# Patient Record
Sex: FEMALE | Race: BLACK OR AFRICAN AMERICAN | NOT HISPANIC OR LATINO | Employment: FULL TIME | URBAN - METROPOLITAN AREA
[De-identification: names, ages, dates, MRNs, and addresses within clinical notes are randomized per-mention and may not be internally consistent; named-entity substitution may affect disease eponyms.]

---

## 2017-01-09 RX ORDER — IRBESARTAN 150 MG/1
TABLET ORAL
Qty: 90 TABLET | Refills: 2 | Status: SHIPPED | OUTPATIENT
Start: 2017-01-09 | End: 2017-11-27 | Stop reason: SDUPTHER

## 2017-01-09 RX ORDER — AMLODIPINE BESYLATE 5 MG/1
TABLET ORAL
Qty: 90 TABLET | Refills: 2 | Status: SHIPPED | OUTPATIENT
Start: 2017-01-09 | End: 2017-11-27 | Stop reason: SDUPTHER

## 2017-01-18 RX ORDER — LANSOPRAZOLE 30 MG/1
CAPSULE, DELAYED RELEASE ORAL
Qty: 90 CAPSULE | Refills: 2 | Status: SHIPPED | OUTPATIENT
Start: 2017-01-18 | End: 2017-11-27 | Stop reason: SDUPTHER

## 2017-02-09 ENCOUNTER — HOSPITAL ENCOUNTER (OUTPATIENT)
Dept: GENERAL RADIOLOGY | Facility: HOSPITAL | Age: 47
Discharge: HOME | End: 2017-02-09
Attending: INTERNAL MEDICINE | Admitting: INTERNAL MEDICINE

## 2017-02-09 DIAGNOSIS — R07.82 INTERCOSTAL PAIN: ICD-10-CM

## 2017-02-09 DIAGNOSIS — C50.412 MALIGNANT NEOPLASM OF UPPER-OUTER QUADRANT OF LEFT FEMALE BREAST (HCC): ICD-10-CM

## 2017-02-09 PROCEDURE — 71020 HC CHEST PA AND LATERAL: CPT

## 2017-02-10 DIAGNOSIS — Z79.899 LONG TERM USE OF DRUG: ICD-10-CM

## 2017-02-10 DIAGNOSIS — D50.9 IRON DEFICIENCY ANEMIA, UNSPECIFIED: Primary | ICD-10-CM

## 2017-02-10 DIAGNOSIS — E78.5 HYPERLIPIDEMIA, UNSPECIFIED HYPERLIPIDEMIA TYPE: Primary | ICD-10-CM

## 2017-02-10 DIAGNOSIS — Z79.899 DRUG THERAPY: ICD-10-CM

## 2017-02-10 LAB
ALBUMIN SERPL-MCNC: 4.3 G/DL (ref 3.5–5.2)
ALBUMIN/GLOB SERPL: 1.4 G/DL
ALP SERPL-CCNC: 74 U/L (ref 39–117)
ALT SERPL-CCNC: 11 U/L (ref 1–33)
AST SERPL-CCNC: 11 U/L (ref 1–32)
BASOPHILS # BLD AUTO: 0.02 10*3/MM3 (ref 0–0.2)
BASOPHILS NFR BLD AUTO: 0.4 % (ref 0–1.5)
BILIRUB SERPL-MCNC: 0.3 MG/DL (ref 0.1–1.2)
BUN SERPL-MCNC: 12 MG/DL (ref 6–20)
BUN/CREAT SERPL: 15 (ref 7–25)
CALCIUM SERPL-MCNC: 9.7 MG/DL (ref 8.6–10.5)
CHLORIDE SERPL-SCNC: 103 MMOL/L (ref 98–107)
CO2 SERPL-SCNC: 28.1 MMOL/L (ref 22–29)
CREAT SERPL-MCNC: 0.8 MG/DL (ref 0.57–1)
EOSINOPHIL # BLD AUTO: 0.35 10*3/MM3 (ref 0–0.7)
EOSINOPHIL NFR BLD AUTO: 6.4 % (ref 0.3–6.2)
ERYTHROCYTE [DISTWIDTH] IN BLOOD BY AUTOMATED COUNT: 14.5 % (ref 11.7–13)
FERRITIN SERPL-MCNC: 261.2 NG/ML (ref 13–150)
GLOBULIN SER CALC-MCNC: 3 GM/DL
GLUCOSE SERPL-MCNC: 105 MG/DL (ref 65–99)
HCT VFR BLD AUTO: 37.8 % (ref 35.6–45.5)
HGB BLD-MCNC: 11.5 G/DL (ref 11.9–15.5)
IMM GRANULOCYTES # BLD: 0 10*3/MM3 (ref 0–0.03)
IMM GRANULOCYTES NFR BLD: 0 % (ref 0–0.5)
IRON SERPL-MCNC: 55 MCG/DL (ref 37–145)
LYMPHOCYTES # BLD AUTO: 2.04 10*3/MM3 (ref 0.9–4.8)
LYMPHOCYTES NFR BLD AUTO: 37.5 % (ref 19.6–45.3)
MCH RBC QN AUTO: 27.8 PG (ref 26.9–32)
MCHC RBC AUTO-ENTMCNC: 30.4 G/DL (ref 32.4–36.3)
MCV RBC AUTO: 91.3 FL (ref 80.5–98.2)
MONOCYTES # BLD AUTO: 0.55 10*3/MM3 (ref 0.2–1.2)
MONOCYTES NFR BLD AUTO: 10.1 % (ref 5–12)
NEUTROPHILS # BLD AUTO: 2.48 10*3/MM3 (ref 1.9–8.1)
NEUTROPHILS NFR BLD AUTO: 45.6 % (ref 42.7–76)
PLATELET # BLD AUTO: 324 10*3/MM3 (ref 140–500)
POTASSIUM SERPL-SCNC: 4.1 MMOL/L (ref 3.5–5.2)
PROT SERPL-MCNC: 7.3 G/DL (ref 6–8.5)
RBC # BLD AUTO: 4.14 10*6/MM3 (ref 3.9–5.2)
SODIUM SERPL-SCNC: 141 MMOL/L (ref 136–145)
WBC # BLD AUTO: 5.44 10*3/MM3 (ref 4.5–10.7)

## 2017-02-14 ENCOUNTER — TELEPHONE (OUTPATIENT)
Dept: ONCOLOGY | Facility: HOSPITAL | Age: 47
End: 2017-02-14

## 2017-02-14 NOTE — TELEPHONE ENCOUNTER
----- Message from Tim Muñoz MD sent at 2/14/2017  1:19 PM EST -----  Please inform the patient that the chest x-ray was normal.    Thank you    ----- Message -----     From: Interface, Rad Results [place] In     Sent: 2/10/2017   4:24 PM       To: Tim Muñoz MD        Called patient.  No answer.  Left message to call back.

## 2017-02-15 ENCOUNTER — TELEPHONE (OUTPATIENT)
Dept: ONCOLOGY | Facility: HOSPITAL | Age: 47
End: 2017-02-15

## 2017-02-15 NOTE — TELEPHONE ENCOUNTER
----- Message from Tim Muñoz MD sent at 2/14/2017  1:19 PM EST -----  Please inform the patient that the chest x-ray was normal.    Thank you    ----- Message -----     From: [place], Rad Results [place] In     Sent: 2/10/2017   4:24 PM       To: Tim Muñoz MD        Patient notified.  V/U.

## 2017-02-15 NOTE — TELEPHONE ENCOUNTER
----- Message from Tim Muñoz MD sent at 2/14/2017  1:19 PM EST -----  Please inform the patient that the chest x-ray was normal.    Thank you    ----- Message -----     From: Interface, Rad Results [place] In     Sent: 2/10/2017   4:24 PM       To: Tim Muñoz MD        Called again today.  No answer.  Left message to call back.

## 2017-02-15 NOTE — TELEPHONE ENCOUNTER
----- Message from Tim Muñoz MD sent at 2/14/2017  1:19 PM EST -----  Please inform the patient that the chest x-ray was normal.    Thank you    ----- Message -----     From: [place], Rad Results [place] In     Sent: 2/10/2017   4:24 PM       To: Tim Muñoz MD

## 2017-02-20 DIAGNOSIS — E78.5 HYPERLIPIDEMIA, UNSPECIFIED HYPERLIPIDEMIA TYPE: ICD-10-CM

## 2017-02-20 RX ORDER — ATORVASTATIN CALCIUM 10 MG/1
TABLET, FILM COATED ORAL
Qty: 90 TABLET | Refills: 3 | Status: SHIPPED | OUTPATIENT
Start: 2017-02-20 | End: 2018-01-29 | Stop reason: SDUPTHER

## 2017-03-06 DIAGNOSIS — E78.49 OTHER HYPERLIPIDEMIA: Primary | ICD-10-CM

## 2017-03-21 ENCOUNTER — LAB (OUTPATIENT)
Dept: LAB | Facility: HOSPITAL | Age: 47
End: 2017-03-21

## 2017-03-21 DIAGNOSIS — E78.49 OTHER HYPERLIPIDEMIA: Primary | ICD-10-CM

## 2017-03-21 LAB
CHOLEST SERPL-MCNC: 155 MG/DL (ref 0–200)
HDLC SERPL-MCNC: 54 MG/DL (ref 40–60)
LDLC SERPL CALC-MCNC: 87 MG/DL (ref 0–100)
LDLC/HDLC SERPL: 1.61 {RATIO}
TRIGL SERPL-MCNC: 70 MG/DL (ref 0–150)
VLDLC SERPL-MCNC: 14 MG/DL (ref 5–40)

## 2017-03-21 PROCEDURE — 80061 LIPID PANEL: CPT | Performed by: FAMILY MEDICINE

## 2017-03-21 PROCEDURE — 36415 COLL VENOUS BLD VENIPUNCTURE: CPT

## 2017-06-29 ENCOUNTER — APPOINTMENT (OUTPATIENT)
Dept: OTHER | Facility: HOSPITAL | Age: 47
End: 2017-06-29

## 2017-06-29 ENCOUNTER — APPOINTMENT (OUTPATIENT)
Dept: ONCOLOGY | Facility: CLINIC | Age: 47
End: 2017-06-29

## 2017-07-26 ENCOUNTER — TELEPHONE (OUTPATIENT)
Dept: FAMILY MEDICINE CLINIC | Facility: CLINIC | Age: 47
End: 2017-07-26

## 2017-07-26 DIAGNOSIS — E11.00 TYPE 2 DIABETES MELLITUS WITH HYPEROSMOLARITY WITHOUT COMA, WITHOUT LONG-TERM CURRENT USE OF INSULIN (HCC): Primary | ICD-10-CM

## 2017-07-26 NOTE — TELEPHONE ENCOUNTER
Ms. Akers asked if an A1C lab order can be faxed over to [facility]? Patient has an appointment there tomorrow. [facility]'s fax number is [phone]

## 2017-07-27 ENCOUNTER — OFFICE VISIT (OUTPATIENT)
Dept: ONCOLOGY | Facility: CLINIC | Age: 47
End: 2017-07-27

## 2017-07-27 ENCOUNTER — LAB (OUTPATIENT)
Dept: OTHER | Facility: HOSPITAL | Age: 47
End: 2017-07-27

## 2017-07-27 VITALS
BODY MASS INDEX: 37.16 KG/M2 | HEART RATE: 74 BPM | TEMPERATURE: 98.2 F | RESPIRATION RATE: 16 BRPM | HEIGHT: 60 IN | OXYGEN SATURATION: 96 % | SYSTOLIC BLOOD PRESSURE: 120 MMHG | DIASTOLIC BLOOD PRESSURE: 79 MMHG | WEIGHT: 189.3 LBS

## 2017-07-27 DIAGNOSIS — C50.412 MALIGNANT NEOPLASM OF UPPER-OUTER QUADRANT OF LEFT FEMALE BREAST (HCC): ICD-10-CM

## 2017-07-27 DIAGNOSIS — D64.9 NORMOCHROMIC NORMOCYTIC ANEMIA: ICD-10-CM

## 2017-07-27 DIAGNOSIS — D50.9 IRON DEFICIENCY ANEMIA, UNSPECIFIED IRON DEFICIENCY ANEMIA TYPE: ICD-10-CM

## 2017-07-27 DIAGNOSIS — E11.00 UNCONTROLLED TYPE 2 DIABETES MELLITUS WITH HYPEROSMOLARITY WITHOUT COMA, WITHOUT LONG-TERM CURRENT USE OF INSULIN (HCC): ICD-10-CM

## 2017-07-27 DIAGNOSIS — C50.412 MALIGNANT NEOPLASM OF UPPER-OUTER QUADRANT OF LEFT FEMALE BREAST (HCC): Primary | ICD-10-CM

## 2017-07-27 DIAGNOSIS — E78.5 HYPERLIPIDEMIA, UNSPECIFIED HYPERLIPIDEMIA TYPE: Primary | ICD-10-CM

## 2017-07-27 LAB
ALBUMIN SERPL-MCNC: 4.3 G/DL (ref 3.5–5.2)
ALBUMIN/GLOB SERPL: 1.2 G/DL
ALP SERPL-CCNC: 72 U/L (ref 39–117)
ALT SERPL W P-5'-P-CCNC: 13 U/L (ref 1–33)
ANION GAP SERPL CALCULATED.3IONS-SCNC: 11.2 MMOL/L
AST SERPL-CCNC: 15 U/L (ref 1–32)
BASOPHILS # BLD AUTO: 0.03 10*3/MM3 (ref 0–0.2)
BASOPHILS NFR BLD AUTO: 0.6 % (ref 0–1.5)
BILIRUB SERPL-MCNC: 0.4 MG/DL (ref 0.1–1.2)
BUN BLD-MCNC: 15 MG/DL (ref 6–20)
BUN/CREAT SERPL: 16.9 (ref 7–25)
CALCIUM SPEC-SCNC: 9.4 MG/DL (ref 8.6–10.5)
CHLORIDE SERPL-SCNC: 101 MMOL/L (ref 98–107)
CHOLEST SERPL-MCNC: 168 MG/DL (ref 0–200)
CO2 SERPL-SCNC: 27.8 MMOL/L (ref 22–29)
CREAT BLD-MCNC: 0.89 MG/DL (ref 0.57–1)
DEPRECATED RDW RBC AUTO: 46.1 FL (ref 37–54)
EOSINOPHIL # BLD AUTO: 0.31 10*3/MM3 (ref 0–0.7)
EOSINOPHIL NFR BLD AUTO: 5.7 % (ref 0.3–6.2)
ERYTHROCYTE [DISTWIDTH] IN BLOOD BY AUTOMATED COUNT: 14.6 % (ref 11.7–13)
FERRITIN SERPL-MCNC: 206 NG/ML (ref 13–150)
GFR SERPL CREATININE-BSD FRML MDRD: 82 ML/MIN/1.73
GLOBULIN UR ELPH-MCNC: 3.5 GM/DL
GLUCOSE BLD-MCNC: 99 MG/DL (ref 65–99)
HBA1C MFR BLD: 6.16 % (ref 4.8–5.6)
HCT VFR BLD AUTO: 36 % (ref 35.6–45.5)
HDLC SERPL-MCNC: 45 MG/DL (ref 40–60)
HGB BLD-MCNC: 11.4 G/DL (ref 11.9–15.5)
IMM GRANULOCYTES # BLD: 0.01 10*3/MM3 (ref 0–0.03)
IMM GRANULOCYTES NFR BLD: 0.2 % (ref 0–0.5)
IRON 24H UR-MRATE: 53 MCG/DL (ref 37–145)
IRON SATN MFR SERPL: 16 % (ref 20–50)
LDLC SERPL CALC-MCNC: 109 MG/DL (ref 0–100)
LDLC/HDLC SERPL: 2.43 {RATIO}
LYMPHOCYTES # BLD AUTO: 1.87 10*3/MM3 (ref 0.9–4.8)
LYMPHOCYTES NFR BLD AUTO: 34.6 % (ref 19.6–45.3)
MCH RBC QN AUTO: 27.4 PG (ref 26.9–32)
MCHC RBC AUTO-ENTMCNC: 31.7 G/DL (ref 32.4–36.3)
MCV RBC AUTO: 86.5 FL (ref 80.5–98.2)
MONOCYTES # BLD AUTO: 0.42 10*3/MM3 (ref 0.2–1.2)
MONOCYTES NFR BLD AUTO: 7.8 % (ref 5–12)
NEUTROPHILS # BLD AUTO: 2.77 10*3/MM3 (ref 1.9–8.1)
NEUTROPHILS NFR BLD AUTO: 51.1 % (ref 42.7–76)
NRBC BLD MANUAL-RTO: 0 /100 WBC (ref 0–0)
PLATELET # BLD AUTO: 323 10*3/MM3 (ref 140–500)
PMV BLD AUTO: 9.2 FL (ref 6–12)
POTASSIUM BLD-SCNC: 3.7 MMOL/L (ref 3.5–5.2)
PROT SERPL-MCNC: 7.8 G/DL (ref 6–8.5)
RBC # BLD AUTO: 4.16 10*6/MM3 (ref 3.9–5.2)
SODIUM BLD-SCNC: 140 MMOL/L (ref 136–145)
TIBC SERPL-MCNC: 325 MCG/DL (ref 298–536)
TRANSFERRIN SERPL-MCNC: 218 MG/DL (ref 200–360)
TRIGL SERPL-MCNC: 69 MG/DL (ref 0–150)
VLDLC SERPL-MCNC: 13.8 MG/DL (ref 5–40)
WBC NRBC COR # BLD: 5.41 10*3/MM3 (ref 4.5–10.7)

## 2017-07-27 PROCEDURE — 83036 HEMOGLOBIN GLYCOSYLATED A1C: CPT | Performed by: FAMILY MEDICINE

## 2017-07-27 PROCEDURE — 83540 ASSAY OF IRON: CPT | Performed by: INTERNAL MEDICINE

## 2017-07-27 PROCEDURE — 80053 COMPREHEN METABOLIC PANEL: CPT | Performed by: INTERNAL MEDICINE

## 2017-07-27 PROCEDURE — 84466 ASSAY OF TRANSFERRIN: CPT | Performed by: INTERNAL MEDICINE

## 2017-07-27 PROCEDURE — 36415 COLL VENOUS BLD VENIPUNCTURE: CPT

## 2017-07-27 PROCEDURE — 85025 COMPLETE CBC W/AUTO DIFF WBC: CPT | Performed by: INTERNAL MEDICINE

## 2017-07-27 PROCEDURE — 80061 LIPID PANEL: CPT | Performed by: FAMILY MEDICINE

## 2017-07-27 PROCEDURE — 82728 ASSAY OF FERRITIN: CPT | Performed by: INTERNAL MEDICINE

## 2017-07-27 PROCEDURE — 99214 OFFICE O/P EST MOD 30 MIN: CPT | Performed by: INTERNAL MEDICINE

## 2017-07-27 NOTE — PROGRESS NOTES
Subjective     CHIEF COMPLAINT:      1. Stage II left breast cancer.   2. Recurrent anemia.     HISTORY OF PRESENT ILLNESS:     Ina Akers is a 46 y.o. female patient with the above medical history.  She returns today for follow up reporting no new symptoms.  She is no longer having the chest pain that she reported at the last visit.  We obtained a chest x-ray was unremarkable..      Past Medical History:   Diagnosis Date   • Anemia     History of recurent iron deficiency   • Cancer 10/2001    Left breast   • Diabetes mellitus    • H/O Thyroid goiter    • History of gastritis    • History of IBS    • History of tinea corporis    • Hypertension        Past Surgical History:   Procedure Laterality Date   • BREAST SURGERY     •  SECTION         Cancer-related family history is not on file.  Social History   Substance Use Topics   • Smoking status: Never Smoker   • Smokeless tobacco: Never Used   • Alcohol use Yes      Comment: Socially       MEDICATIONS:    Current Outpatient Prescriptions:   •  amLODIPine (NORVASC) 5 MG tablet, TAKE ONE TABLET BY MOUTH DAILY, Disp: 90 tablet, Rfl: 2  •  atorvastatin (LIPITOR) 10 MG tablet, TAKE ONE TABLET BY MOUTH DAILY, Disp: 90 tablet, Rfl: 3  •  Docusate Calcium (STOOL SOFTENER PO), Take  by mouth Daily., Disp: , Rfl:   •  hydrocortisone (PROCTOCARE-HC) 2.5 % rectal cream, As Needed., Disp: , Rfl:   •  irbesartan (AVAPRO) 150 MG tablet, TAKE ONE TABLET BY MOUTH DAILY, Disp: 90 tablet, Rfl: 2  •  lansoprazole (PREVACID) 30 MG capsule, TAKE ONE CAPSULE BY MOUTH DAILY, Disp: 90 capsule, Rfl: 2  •  metFORMIN (GLUCOPHAGE) 1000 MG tablet, Take 1 tablet by mouth Daily With Breakfast., Disp: 90 tablet, Rfl: 4    ALLERGIES:  No Known Allergies    REVIEW OF SYSTEMS:  GENERAL:  No fever or chills. No weight change.    SKIN:  No rashes or lesions.  HEME/LYMPH: Anemia.  RESPIRATORY:  No cough, shortness of breath, hemoptysis.  CVS:  No chest pain.  No shortness breath..  GI:  No  "abdominal pain, nausea, vomiting, constipation, diarrhea, melena or hematochezia.  :  No dysuria, hematuria or increased frequency.   MUSCULOSKELETAL:  No bone pain or joint stiffness.      Objective   VITAL SIGNS:     Vitals:    07/27/17 0940   BP: 120/79   Pulse: 74   Resp: 16   Temp: 98.2 °F (36.8 °C)   TempSrc: Oral   SpO2: 96%   Weight: 189 lb 4.8 oz (85.9 kg)   Height: 60.24\" (153 cm)  Comment: will need a new ht 11/24/2017   PainSc: 0-No pain     Body mass index is 36.68 kg/(m^2).     Wt Readings from Last 3 Encounters:   07/27/17 189 lb 4.8 oz (85.9 kg)   12/15/16 175 lb (79.4 kg)   11/23/16 176 lb 12.8 oz (80.2 kg)       PHYSICAL EXAMINATION:  GENERAL: Well-developed, well-nourished female in no acute distress.   SKIN: Warm, dry without rashes, purpura or petechiae.   HEAD: Normocephalic.   NECK: Supple with good range of motion. Patient has a goiter.  LYMPHATICS: No cervical, supraclavicular, axillary or inguinal adenopathy.   BREASTS: Right breast exam was unremarkable. Left breast exam revealed the incision in the upper outer quadrant with underlying scar tissue but no suspicious masses.   CHEST: Lungs clear to percussion and auscultation. No palpable masses in left chest wall.     ABDOMEN: Soft, nontender with no organomegaly or masses.   EXTREMITIES: No clubbing, cyanosis or edema.       DIAGNOSTIC DATA:       Results from last 7 days  Lab Units 07/27/17  0932   WBC 10*3/mm3 5.41   NEUTROS ABS 10*3/mm3 2.77   HEMOGLOBIN g/dL 11.4*   HEMATOCRIT % 36.0   PLATELETS 10*3/mm3 323       Results from last 7 days  Lab Units 07/27/17  0932   SODIUM mmol/L 140   POTASSIUM mmol/L 3.7   CHLORIDE mmol/L 101   CO2 mmol/L 27.8   BUN mg/dL 15   CREATININE mg/dL 0.89   CALCIUM mg/dL 9.4   ALBUMIN g/dL 4.30   BILIRUBIN mg/dL 0.4   ALK PHOS U/L 72   ALT (SGPT) U/L 13   AST (SGOT) U/L 15   GLUCOSE mg/dL 99   FERRITIN ng/mL 206.00*   IRON mcg/dL 53   TIBC mcg/dL 325     DATE OF " EXAM:  06/12/2017  EXAMINATION(S):  MAMMOGRAM ARIELLA SCREENING BILATERAL    DIGITAL BILATERAL SCREENING MAMMOGRAM WITH TOMOSYNTHESIS  The following views were performed:  Bilateral craniocaudal; bilateral craniocaudal with tomosynthesis; bilateral mediolateral oblique; bilateral mediolateral oblique with tomosynthesis; and left craniocaudal exaggerated to axilla.  Images were reviewed   with digital R2 Computer-Aided Detection (CAD).    The breasts are almost entirely fat.    There are no suspicious masses, significant calcifications, or other abnormalities seen.    Tomosynthesis was utilized for this examination.    IMPRESSION:  There is no mammographic evidence of malignancy.    Screening Mammogram in 1 year is recommended.    Assessment/Plan    1.  Left breast cancer, stage IIB, triple negative. She had a lumpectomy, followed by adjuvant chemotherapy and radiation therapy. She was placed on tamoxifen preventatively for 5 years and it was completed in January 2007.  Her last mammogram from 5/19/16 was negative.  She has no evidence of recurrence, now 16 years since diagnosis.  Her last mammogram from late May 2017 was benign.      2.  Recurrent anemia.   We obtained anemia workup with B12, folate, methylmalonic acid level, reticulocyte count, LDH, haptoglobin, Boris test and paraprotein studies.  The workup was negative.  The anemia has improved and her hemoglobin is in the near normal range.  Immunofixation revealed no monoclonal protein.  However, there was slight increase in the lauryn Light chain.  We will repeat paraprotein studies at her return visit.    3.   Left-sided chest pain reported at the last visit.  Chest x-ray was negative.  The pain has resolved.     We'll see patient in follow-up in 6 months with CBC, CMP, serum protein decreases and immunofixation at her return visit.  If she continues to do well, we'll start to see her on an annual basis.      Tim Muñoz MD  07/27/17

## 2017-08-07 ENCOUNTER — OFFICE VISIT (OUTPATIENT)
Dept: FAMILY MEDICINE CLINIC | Facility: CLINIC | Age: 47
End: 2017-08-07

## 2017-08-07 VITALS
OXYGEN SATURATION: 97 % | HEART RATE: 78 BPM | DIASTOLIC BLOOD PRESSURE: 80 MMHG | SYSTOLIC BLOOD PRESSURE: 120 MMHG | WEIGHT: 190 LBS | RESPIRATION RATE: 18 BRPM | BODY MASS INDEX: 37.3 KG/M2 | HEIGHT: 60 IN

## 2017-08-07 DIAGNOSIS — I10 ESSENTIAL HYPERTENSION: ICD-10-CM

## 2017-08-07 DIAGNOSIS — E11.00 TYPE 2 DIABETES MELLITUS WITH HYPEROSMOLARITY WITHOUT COMA, WITHOUT LONG-TERM CURRENT USE OF INSULIN (HCC): Primary | ICD-10-CM

## 2017-08-07 PROCEDURE — 99213 OFFICE O/P EST LOW 20 MIN: CPT | Performed by: FAMILY MEDICINE

## 2017-08-07 NOTE — PROGRESS NOTES
"Subjective   Ina Akers is a 46 y.o. female.     History of Present Illness Here to  on diabetes Fasting sugar was 99, average was 130 (A1C was 6.16.    Exercise: No longer drives to [employer] every day. 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 Goes to [employer] twice a week. Needs to get to a gym nearer to her job as well.    The following portions of the patient's history were reviewed and updated as appropriate: allergies, current medications, past social history and problem list.    Review of Systems   Constitutional: Negative for activity change, appetite change and unexpected weight change.   HENT: Negative for nosebleeds and trouble swallowing.    Eyes: Negative for pain and visual disturbance.   Respiratory: Negative for chest tightness, shortness of breath and wheezing.    Cardiovascular: Negative for chest pain and palpitations.   Gastrointestinal: Negative for abdominal pain and blood in stool.   Endocrine: Negative.    Genitourinary: Negative for difficulty urinating and hematuria.   Musculoskeletal: Negative for joint swelling.   Skin: Negative for color change and rash.   Allergic/Immunologic: Negative.    Neurological: Negative for syncope and speech difficulty.   Hematological: Negative for adenopathy.   Psychiatric/Behavioral: Negative for agitation and confusion.   All other systems reviewed and are negative.      Objective   /80  Pulse 78  Resp 18  Ht 60\" (152.4 cm)  Wt 190 lb (86.2 kg)  SpO2 97%  BMI 37.11 kg/m2  Physical Exam   Constitutional: She is oriented to person, place, and time. Vital signs are normal. She appears well-developed and well-nourished. No distress.   HENT:   Head: Normocephalic.   Neck: Carotid bruit is not present. No thyromegaly present.   Cardiovascular: Normal rate, regular rhythm and normal heart sounds.    Pulmonary/Chest: Effort normal and breath sounds normal.   Neurological: She is alert and oriented to " person, place, and time. Gait normal.   Psychiatric: She has a normal mood and affect. Her behavior is normal. Judgment and thought content normal.   Vitals reviewed.      Assessment/Plan   Problem List Items Addressed This Visit        Cardiovascular and Mediastinum    Hypertension       Endocrine    Type 2 diabetes mellitus - Primary        Neds to focus on wt loss. She is aware.  Diabetes is under control as is BP

## 2017-10-09 ENCOUNTER — TELEPHONE (OUTPATIENT)
Dept: FAMILY MEDICINE CLINIC | Facility: CLINIC | Age: 47
End: 2017-10-09

## 2017-10-10 ENCOUNTER — OFFICE VISIT (OUTPATIENT)
Dept: FAMILY MEDICINE CLINIC | Facility: CLINIC | Age: 47
End: 2017-10-10

## 2017-10-10 VITALS
HEIGHT: 60 IN | RESPIRATION RATE: 18 BRPM | HEART RATE: 72 BPM | BODY MASS INDEX: 37.5 KG/M2 | OXYGEN SATURATION: 98 % | WEIGHT: 191 LBS | DIASTOLIC BLOOD PRESSURE: 82 MMHG | SYSTOLIC BLOOD PRESSURE: 120 MMHG

## 2017-10-10 DIAGNOSIS — M25.561 ACUTE PAIN OF BOTH KNEES: Primary | ICD-10-CM

## 2017-10-10 DIAGNOSIS — Z23 ENCOUNTER FOR IMMUNIZATION: ICD-10-CM

## 2017-10-10 DIAGNOSIS — M25.562 ACUTE PAIN OF BOTH KNEES: Primary | ICD-10-CM

## 2017-10-10 PROCEDURE — 90471 IMMUNIZATION ADMIN: CPT | Performed by: FAMILY MEDICINE

## 2017-10-10 PROCEDURE — 99212 OFFICE O/P EST SF 10 MIN: CPT | Performed by: FAMILY MEDICINE

## 2017-10-10 PROCEDURE — 90686 IIV4 VACC NO PRSV 0.5 ML IM: CPT | Performed by: FAMILY MEDICINE

## 2017-10-10 NOTE — PROGRESS NOTES
"Subjective   Ina Akers is a 46 y.o. female.     History of Present Illness   Pain L knee. Iced it and used aleve which helped. Pain is lat and post. Hard to take a step  Backwards. She felt both knees were swollen bc they were tight.  Is sitting all day at new job.  The following portions of the patient's history were reviewed and updated as appropriate: allergies, current medications, past social history and problem list.    Review of Systems   Constitutional: Negative.    Respiratory: Negative.    Cardiovascular: Negative.    Musculoskeletal: Positive for arthralgias, joint swelling and myalgias.       Objective   /82  Pulse 72  Resp 18  Ht 60\" (152.4 cm)  Wt 191 lb (86.6 kg)  SpO2 98%  BMI 37.3 kg/m2  Physical Exam   Constitutional: She appears well-nourished.   Cardiovascular: Normal rate.    Pulmonary/Chest: Effort normal.   Musculoskeletal:   Knees: FROM without crepitis. No edema. Min tenderness post knees only.       Assessment/Plan   Problem List Items Addressed This Visit        Musculoskeletal and Integument    Acute pain of both knees - Primary      Other Visit Diagnoses     Encounter for immunization               Knee supports at work  Ice prn  Bike exercises, or pool  Call for continued problems and will refer to ortho  "

## 2017-10-11 PROBLEM — M25.562 ACUTE PAIN OF BOTH KNEES: Status: ACTIVE | Noted: 2017-10-11

## 2017-10-11 PROBLEM — M25.561 ACUTE PAIN OF BOTH KNEES: Status: ACTIVE | Noted: 2017-10-11

## 2017-10-31 ENCOUNTER — OFFICE VISIT (OUTPATIENT)
Dept: FAMILY MEDICINE CLINIC | Facility: CLINIC | Age: 47
End: 2017-10-31

## 2017-10-31 VITALS
RESPIRATION RATE: 18 BRPM | WEIGHT: 191 LBS | OXYGEN SATURATION: 98 % | SYSTOLIC BLOOD PRESSURE: 130 MMHG | HEART RATE: 67 BPM | HEIGHT: 60 IN | DIASTOLIC BLOOD PRESSURE: 82 MMHG | BODY MASS INDEX: 37.5 KG/M2

## 2017-10-31 DIAGNOSIS — J40 BRONCHITIS: Primary | ICD-10-CM

## 2017-10-31 PROCEDURE — 99213 OFFICE O/P EST LOW 20 MIN: CPT | Performed by: FAMILY MEDICINE

## 2017-10-31 RX ORDER — AZITHROMYCIN 250 MG/1
TABLET, FILM COATED ORAL
Qty: 6 TABLET | Refills: 0 | Status: SHIPPED | OUTPATIENT
Start: 2017-10-31 | End: 2018-01-18

## 2017-10-31 RX ORDER — BENZONATATE 200 MG/1
200 CAPSULE ORAL 3 TIMES DAILY PRN
Qty: 30 CAPSULE | Refills: 0 | Status: SHIPPED | OUTPATIENT
Start: 2017-10-31 | End: 2018-01-18

## 2017-10-31 NOTE — PROGRESS NOTES
"Subjective   Ina Akers is a 46 y.o. female.     History of Present Illness Has had a URI for 6 days now. Coughing and head drainage. Thinks more head congestion than chest. This am went into her chest. Achy last 2 days. Did not use her thermometer. Worked from home yesterday.  NO MOMIN. No wheeze. Pressure not pain in face.  Stress incontinence since coughing so much.    The following portions of the patient's history were reviewed and updated as appropriate: allergies, current medications, past social history and problem list.    Review of Systems   Constitutional: Positive for fatigue. Negative for activity change and unexpected weight change.   HENT: Positive for congestion, postnasal drip, sinus pressure and sore throat. Negative for ear pain and sinus pain.    Eyes: Negative for pain and discharge.   Respiratory: Positive for cough. Negative for chest tightness, shortness of breath and wheezing.    Cardiovascular: Negative for chest pain and palpitations.   Gastrointestinal: Negative for abdominal pain, diarrhea and vomiting.   Endocrine: Negative.    Genitourinary: Negative.    Musculoskeletal: Negative for joint swelling.   Skin: Negative for color change, rash and wound.   Allergic/Immunologic: Negative.    Neurological: Negative for seizures and syncope.   Psychiatric/Behavioral: Negative.        Objective   /82  Pulse 67  Resp 18  Ht 60\" (152.4 cm)  Wt 191 lb (86.6 kg)  SpO2 98%  BMI 37.3 kg/m2  Physical Exam   Constitutional: She appears well-developed and well-nourished.   HENT:   Head: Normocephalic and atraumatic.   Right Ear: Tympanic membrane, external ear and ear canal normal.   Left Ear: Tympanic membrane, external ear and ear canal normal.   Mouth/Throat: Oropharynx is clear and moist.   npontender sinuses   Eyes: Conjunctivae are normal. Right eye exhibits no discharge. Left eye exhibits no discharge.   Neck: Normal range of motion. Neck supple. No thyromegaly present. "   Cardiovascular: Normal rate, regular rhythm and normal heart sounds.    Pulmonary/Chest: Effort normal and breath sounds normal. No respiratory distress. She has no wheezes. She has no rales.   Lymphadenopathy:     She has no cervical adenopathy.   Skin: Skin is warm and dry.   Psychiatric: She has a normal mood and affect. Judgment normal.   Vitals reviewed.      Assessment/Plan   Problem List Items Addressed This Visit     None      Visit Diagnoses     Bronchitis    -  Primary    Relevant Medications    azithromycin (ZITHROMAX) 250 MG tablet    benzonatate (TESSALON) 200 MG capsule           Her bacterial bronchitis is mild

## 2017-11-28 RX ORDER — LANSOPRAZOLE 30 MG/1
CAPSULE, DELAYED RELEASE ORAL
Qty: 90 CAPSULE | Refills: 1 | Status: SHIPPED | OUTPATIENT
Start: 2017-11-28 | End: 2018-01-29 | Stop reason: SDUPTHER

## 2017-11-28 RX ORDER — IRBESARTAN 150 MG/1
TABLET ORAL
Qty: 90 TABLET | Refills: 1 | Status: SHIPPED | OUTPATIENT
Start: 2017-11-28 | End: 2018-01-29 | Stop reason: SDUPTHER

## 2017-11-28 RX ORDER — AMLODIPINE BESYLATE 5 MG/1
TABLET ORAL
Qty: 90 TABLET | Refills: 1 | Status: SHIPPED | OUTPATIENT
Start: 2017-11-28 | End: 2018-01-29 | Stop reason: SDUPTHER

## 2018-01-18 ENCOUNTER — OFFICE VISIT (OUTPATIENT)
Dept: ONCOLOGY | Facility: CLINIC | Age: 48
End: 2018-01-18

## 2018-01-18 ENCOUNTER — LAB (OUTPATIENT)
Dept: OTHER | Facility: HOSPITAL | Age: 48
End: 2018-01-18

## 2018-01-18 VITALS
HEART RATE: 61 BPM | SYSTOLIC BLOOD PRESSURE: 122 MMHG | DIASTOLIC BLOOD PRESSURE: 84 MMHG | BODY MASS INDEX: 37.03 KG/M2 | OXYGEN SATURATION: 99 % | TEMPERATURE: 98.6 F | WEIGHT: 188.6 LBS | RESPIRATION RATE: 16 BRPM | HEIGHT: 60 IN

## 2018-01-18 DIAGNOSIS — Z12.31 ENCOUNTER FOR SCREENING MAMMOGRAM FOR BREAST CANCER: ICD-10-CM

## 2018-01-18 DIAGNOSIS — C50.412 MALIGNANT NEOPLASM OF UPPER-OUTER QUADRANT OF LEFT FEMALE BREAST (HCC): ICD-10-CM

## 2018-01-18 DIAGNOSIS — C50.412 MALIGNANT NEOPLASM OF UPPER-OUTER QUADRANT OF LEFT BREAST IN FEMALE, ESTROGEN RECEPTOR NEGATIVE (HCC): Primary | ICD-10-CM

## 2018-01-18 DIAGNOSIS — Z17.1 MALIGNANT NEOPLASM OF UPPER-OUTER QUADRANT OF LEFT BREAST IN FEMALE, ESTROGEN RECEPTOR NEGATIVE (HCC): Primary | ICD-10-CM

## 2018-01-18 DIAGNOSIS — D50.9 IRON DEFICIENCY ANEMIA, UNSPECIFIED IRON DEFICIENCY ANEMIA TYPE: ICD-10-CM

## 2018-01-18 LAB
ALBUMIN SERPL-MCNC: 4.8 G/DL (ref 3.5–5.2)
ALBUMIN/GLOB SERPL: 1.3 G/DL
ALP SERPL-CCNC: 89 U/L (ref 39–117)
ALT SERPL W P-5'-P-CCNC: 12 U/L (ref 1–33)
ANION GAP SERPL CALCULATED.3IONS-SCNC: 12.1 MMOL/L
AST SERPL-CCNC: 14 U/L (ref 1–32)
BASOPHILS # BLD AUTO: 0.03 10*3/MM3 (ref 0–0.2)
BASOPHILS NFR BLD AUTO: 0.5 % (ref 0–1.5)
BILIRUB SERPL-MCNC: 0.4 MG/DL (ref 0.1–1.2)
BUN BLD-MCNC: 12 MG/DL (ref 6–20)
BUN/CREAT SERPL: 14.5 (ref 7–25)
CALCIUM SPEC-SCNC: 10 MG/DL (ref 8.6–10.5)
CHLORIDE SERPL-SCNC: 100 MMOL/L (ref 98–107)
CO2 SERPL-SCNC: 28.9 MMOL/L (ref 22–29)
CREAT BLD-MCNC: 0.83 MG/DL (ref 0.57–1)
DEPRECATED RDW RBC AUTO: 46.2 FL (ref 37–54)
EOSINOPHIL # BLD AUTO: 0.27 10*3/MM3 (ref 0–0.7)
EOSINOPHIL NFR BLD AUTO: 4.6 % (ref 0.3–6.2)
ERYTHROCYTE [DISTWIDTH] IN BLOOD BY AUTOMATED COUNT: 14.7 % (ref 11.7–13)
GFR SERPL CREATININE-BSD FRML MDRD: 89 ML/MIN/1.73
GLOBULIN UR ELPH-MCNC: 3.6 GM/DL
GLUCOSE BLD-MCNC: 98 MG/DL (ref 65–99)
HCT VFR BLD AUTO: 37.4 % (ref 35.6–45.5)
HGB BLD-MCNC: 11.8 G/DL (ref 11.9–15.5)
IMM GRANULOCYTES # BLD: 0.02 10*3/MM3 (ref 0–0.03)
IMM GRANULOCYTES NFR BLD: 0.3 % (ref 0–0.5)
LYMPHOCYTES # BLD AUTO: 2.13 10*3/MM3 (ref 0.9–4.8)
LYMPHOCYTES NFR BLD AUTO: 36.5 % (ref 19.6–45.3)
MCH RBC QN AUTO: 26.9 PG (ref 26.9–32)
MCHC RBC AUTO-ENTMCNC: 31.6 G/DL (ref 32.4–36.3)
MCV RBC AUTO: 85.2 FL (ref 80.5–98.2)
MONOCYTES # BLD AUTO: 0.43 10*3/MM3 (ref 0.2–1.2)
MONOCYTES NFR BLD AUTO: 7.4 % (ref 5–12)
NEUTROPHILS # BLD AUTO: 2.96 10*3/MM3 (ref 1.9–8.1)
NEUTROPHILS NFR BLD AUTO: 50.7 % (ref 42.7–76)
NRBC BLD MANUAL-RTO: 0 /100 WBC (ref 0–0)
PLATELET # BLD AUTO: 382 10*3/MM3 (ref 140–500)
PMV BLD AUTO: 9.6 FL (ref 6–12)
POTASSIUM BLD-SCNC: 3.6 MMOL/L (ref 3.5–5.2)
PROT SERPL-MCNC: 8.4 G/DL (ref 6–8.5)
RBC # BLD AUTO: 4.39 10*6/MM3 (ref 3.9–5.2)
SODIUM BLD-SCNC: 141 MMOL/L (ref 136–145)
WBC NRBC COR # BLD: 5.84 10*3/MM3 (ref 4.5–10.7)

## 2018-01-18 PROCEDURE — 86334 IMMUNOFIX E-PHORESIS SERUM: CPT | Performed by: INTERNAL MEDICINE

## 2018-01-18 PROCEDURE — 82784 ASSAY IGA/IGD/IGG/IGM EACH: CPT | Performed by: INTERNAL MEDICINE

## 2018-01-18 PROCEDURE — 36415 COLL VENOUS BLD VENIPUNCTURE: CPT

## 2018-01-18 PROCEDURE — 84165 PROTEIN E-PHORESIS SERUM: CPT | Performed by: INTERNAL MEDICINE

## 2018-01-18 PROCEDURE — 99214 OFFICE O/P EST MOD 30 MIN: CPT | Performed by: INTERNAL MEDICINE

## 2018-01-18 PROCEDURE — 83883 ASSAY NEPHELOMETRY NOT SPEC: CPT | Performed by: INTERNAL MEDICINE

## 2018-01-18 PROCEDURE — 80053 COMPREHEN METABOLIC PANEL: CPT | Performed by: INTERNAL MEDICINE

## 2018-01-18 PROCEDURE — 85025 COMPLETE CBC W/AUTO DIFF WBC: CPT | Performed by: INTERNAL MEDICINE

## 2018-01-18 NOTE — PROGRESS NOTES
Subjective     CHIEF COMPLAINT:      1. Stage II left breast cancer.   2. Recurrent anemia.     HISTORY OF PRESENT ILLNESS:     Ina Akers is a 47 y.o. female patient with the above medical history.  She returns today for follow up reporting no new symptoms.  She denies fatigue.  She performs monthly breast self-exam and denies feeling any lumps.  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  She gained some weight due to decreased activity.      Past Medical History:   Diagnosis Date   • Anemia     History of recurent iron deficiency   • Cancer 10/2001    Left breast   • Diabetes mellitus    • H/O Thyroid goiter    • History of gastritis    • History of IBS    • History of tinea corporis    • Hypertension        Past Surgical History:   Procedure Laterality Date   • BREAST SURGERY     •  SECTION         Cancer-related family history is not on file.  Social History   Substance Use Topics   • Smoking status: Never Smoker   • Smokeless tobacco: Never Used   • Alcohol use Yes      Comment: Socially       MEDICATIONS:    Current Outpatient Prescriptions:   •  amLODIPine (NORVASC) 5 MG tablet, TAKE ONE TABLET BY MOUTH DAILY, Disp: 90 tablet, Rfl: 1  •  atorvastatin (LIPITOR) 10 MG tablet, TAKE ONE TABLET BY MOUTH DAILY, Disp: 90 tablet, Rfl: 3  •  Docusate Calcium (STOOL SOFTENER PO), Take  by mouth Daily., Disp: , Rfl:   •  hydrocortisone (PROCTOCARE-HC) 2.5 % rectal cream, As Needed., Disp: , Rfl:   •  irbesartan (AVAPRO) 150 MG tablet, TAKE ONE TABLET BY MOUTH DAILY, Disp: 90 tablet, Rfl: 1  •  lansoprazole (PREVACID) 30 MG capsule, TAKE ONE CAPSULE BY MOUTH DAILY, Disp: 90 capsule, Rfl: 1  •  metFORMIN (GLUCOPHAGE) 1000 MG tablet, Take 1 tablet by mouth Daily With Breakfast., Disp: 90 tablet, Rfl: 2    ALLERGIES:  No Known Allergies    REVIEW OF SYSTEMS:  GENERAL:  No fever or chills.     SKIN:  No rashes or lesions.  HEME/LYMPH: Anemia.  RESPIRATORY:  No shortness of breath.  CVS:  No  "chest pain.  No shortness breath..  GI:  No abdominal pain.  :  No dysuria, hematuria or increased frequency.   MUSCULOSKELETAL:  No bone pain or joint stiffness.      Objective   VITAL SIGNS:     Vitals:    01/18/18 1512   BP: 122/84   Pulse: 61   Resp: 16   Temp: 98.6 °F (37 °C)   TempSrc: Oral   SpO2: 99%   Weight: 85.5 kg (188 lb 9.6 oz)   Height: 153 cm (60.24\")  Comment: new ht without shoes   PainSc: 0-No pain     Body mass index is 36.55 kg/(m^2).     Wt Readings from Last 3 Encounters:   01/18/18 85.5 kg (188 lb 9.6 oz)   10/31/17 86.6 kg (191 lb)   10/10/17 86.6 kg (191 lb)       PHYSICAL EXAMINATION:  GENERAL: Well-developed, well-nourished female in no acute distress.   SKIN: No skin rashes or lesions.  No ecchymosis or petechiae.   HEAD: Normocephalic.   NECK:  Goiter.  No lymphadenopathy.  LYMPHATICS: No cervical, supraclavicular or axillary adenopathy.   BREASTS: Right breast exam was unremarkable. Left breast exam revealed the incision in the upper outer quadrant with underlying scar tissue.  No palpable masses.   CHEST: Lungs clear to auscultation.   ABDOMEN: Soft, nontender with no organomegaly or masses.   EXTREMITIES: No clubbing, cyanosis or edema.       DIAGNOSTIC DATA:       Results from last 7 days  Lab Units 01/18/18  1500   WBC 10*3/mm3 5.84   NEUTROS ABS 10*3/mm3 2.96   HEMOGLOBIN g/dL 11.8*   HEMATOCRIT % 37.4   PLATELETS 10*3/mm3 382       Results from last 7 days  Lab Units 01/18/18  1500   SODIUM mmol/L 141   POTASSIUM mmol/L 3.6   CHLORIDE mmol/L 100   CO2 mmol/L 28.9   BUN mg/dL 12   CREATININE mg/dL 0.83   CALCIUM mg/dL 10.0   ALBUMIN g/dL 4.80   BILIRUBIN mg/dL 0.4   ALK PHOS U/L 89   ALT (SGPT) U/L 12   AST (SGOT) U/L 14   GLUCOSE mg/dL 98         Assessment/Plan    1.  Left breast cancer, stage IIB, triple negative. She had a lumpectomy, followed by adjuvant chemotherapy and radiation therapy. She was placed on tamoxifen preventatively for 5 years and it was completed in " January 2007.  Her last mammogram from 6/13/17 was negative.  No evidence of recurrence.    2.  Recurrent anemia, improved.  She has history of iron deficiency anemia that has improved.  Hemoglobin is at the lower end of normal.    3.   Increase in the kappa to lambda ratio on previous anemia workup.  This was favored to be a reactive process and not suspicious for multiple myeloma.      PLAN:    1.  We will schedule the patient for bilateral mammograms in mid June 2018.     2.  SPEP, CRISSY and serum free light chains will be repeated on today's lab and we will notify her if there is worsening.    3.  We will see the patient in follow-up in one year with CBC CMP but sooner if there are any changes.      Tim Muñoz MD  01/18/18

## 2018-01-22 LAB
ALBUMIN SERPL-MCNC: 3.9 G/DL (ref 2.9–4.4)
ALBUMIN/GLOB SERPL: 1.1 {RATIO} (ref 0.7–1.7)
ALPHA1 GLOB FLD ELPH-MCNC: 0.2 G/DL (ref 0–0.4)
ALPHA2 GLOB SERPL ELPH-MCNC: 0.7 G/DL (ref 0.4–1)
B-GLOBULIN SERPL ELPH-MCNC: 1.3 G/DL (ref 0.7–1.3)
GAMMA GLOB SERPL ELPH-MCNC: 1.6 G/DL (ref 0.4–1.8)
GLOBULIN SER CALC-MCNC: 3.9 G/DL (ref 2.2–3.9)
IGA SERPL-MCNC: 213 MG/DL (ref 87–352)
IGG SERPL-MCNC: 1521 MG/DL (ref 700–1600)
IGM SERPL-MCNC: 184 MG/DL (ref 26–217)
INTERPRETATION SERPL IEP-IMP: ABNORMAL
KAPPA LC SERPL-MCNC: 20.3 MG/L (ref 3.3–19.4)
KAPPA LC/LAMBDA SER: 2.42 {RATIO} (ref 0.26–1.65)
LAMBDA LC FREE SERPL-MCNC: 8.4 MG/L (ref 5.7–26.3)
Lab: ABNORMAL
M-SPIKE: ABNORMAL G/DL
PROT SERPL-MCNC: 7.8 G/DL (ref 6–8.5)

## 2018-01-29 ENCOUNTER — OFFICE VISIT (OUTPATIENT)
Dept: FAMILY MEDICINE CLINIC | Facility: CLINIC | Age: 48
End: 2018-01-29

## 2018-01-29 VITALS
DIASTOLIC BLOOD PRESSURE: 80 MMHG | BODY MASS INDEX: 37.11 KG/M2 | HEIGHT: 60 IN | WEIGHT: 189 LBS | SYSTOLIC BLOOD PRESSURE: 122 MMHG | TEMPERATURE: 98.4 F

## 2018-01-29 DIAGNOSIS — E11.9 CONTROLLED TYPE 2 DIABETES MELLITUS WITHOUT COMPLICATION, WITHOUT LONG-TERM CURRENT USE OF INSULIN (HCC): Primary | ICD-10-CM

## 2018-01-29 DIAGNOSIS — E04.9 GOITER: ICD-10-CM

## 2018-01-29 DIAGNOSIS — I10 ESSENTIAL HYPERTENSION: ICD-10-CM

## 2018-01-29 DIAGNOSIS — E55.9 VITAMIN D DEFICIENCY: ICD-10-CM

## 2018-01-29 DIAGNOSIS — E78.5 HYPERLIPIDEMIA, UNSPECIFIED HYPERLIPIDEMIA TYPE: ICD-10-CM

## 2018-01-29 DIAGNOSIS — E04.2 MULTIPLE THYROID NODULES: ICD-10-CM

## 2018-01-29 PROCEDURE — 99214 OFFICE O/P EST MOD 30 MIN: CPT | Performed by: NURSE PRACTITIONER

## 2018-01-29 RX ORDER — LANSOPRAZOLE 30 MG/1
30 CAPSULE, DELAYED RELEASE ORAL DAILY
Qty: 90 CAPSULE | Refills: 1 | Status: SHIPPED | OUTPATIENT
Start: 2018-01-29 | End: 2018-07-30 | Stop reason: SDUPTHER

## 2018-01-29 RX ORDER — AMLODIPINE BESYLATE 5 MG/1
5 TABLET ORAL DAILY
Qty: 90 TABLET | Refills: 1 | Status: SHIPPED | OUTPATIENT
Start: 2018-01-29 | End: 2018-07-30 | Stop reason: SDUPTHER

## 2018-01-29 RX ORDER — IRBESARTAN 150 MG/1
150 TABLET ORAL DAILY
Qty: 90 TABLET | Refills: 1 | Status: SHIPPED | OUTPATIENT
Start: 2018-01-29 | End: 2018-07-30 | Stop reason: SDUPTHER

## 2018-01-29 RX ORDER — ATORVASTATIN CALCIUM 10 MG/1
10 TABLET, FILM COATED ORAL DAILY
Qty: 90 TABLET | Refills: 3 | Status: SHIPPED | OUTPATIENT
Start: 2018-01-29 | End: 2018-07-30 | Stop reason: SDUPTHER

## 2018-01-29 NOTE — PROGRESS NOTES
Subjective   Ina Akers is a 47 y.o. female.     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 check for diabetes, hypertension hyperlipidemia        Type 2 francoise  Controlled  dnt check glucose no accucheck  Breast cancer left  Part masectomy  Remission 16 yrs  2001    Dr Salinas      htn ess stable with avapro    Lipitor 10 mg  Lipid panel last year looks pretty good except LDLs greater than 100  They should be less than 70          No tob  occ etoh 2-3 times mos      lipitor    Colonoscopy 4 yrs ago                 The following portions of the patient's history were reviewed and updated as appropriate: allergies, current medications, past family history, past medical history, past surgical history and problem list.    Review of Systems   Constitutional: Negative.  Negative for appetite change, chills, fatigue, fever and unexpected weight change.   HENT: Negative for congestion, ear pain and sore throat.    Eyes: Negative.    Respiratory: Negative for cough, chest tightness, shortness of breath and wheezing.    Cardiovascular: Negative for chest pain, palpitations and leg swelling.   Gastrointestinal: Negative for abdominal pain and constipation.   Genitourinary: Negative for difficulty urinating, dysuria and urgency.   Musculoskeletal: Negative for arthralgias and myalgias.   Skin: Negative for rash and wound.   Neurological: Negative for dizziness, speech difficulty, weakness, numbness and headaches.   Psychiatric/Behavioral: Negative for sleep disturbance. The patient is not nervous/anxious.        Objective   Physical Exam   Constitutional: She is oriented to person, place, and time. She appears well-developed and well-nourished.   HENT:   Head: Normocephalic.   Nose: Nose normal.   Mouth/Throat: Oropharynx is clear and moist.   Tm clear shelly no mass canal patent without d/c   Eyes: Conjunctivae are normal. Pupils are equal, round, and reactive to light. No scleral icterus.   Neck: Neck supple. No JVD present.  No thyromegaly present.   Cardiovascular: Normal rate, regular rhythm and normal heart sounds.  Exam reveals no gallop and no friction rub.    No murmur heard.  Pulmonary/Chest: Effort normal and breath sounds normal. No stridor. No respiratory distress. She has no wheezes. She has no rales.   Abdominal: Soft. Bowel sounds are normal. She exhibits no distension. There is no tenderness.   No hepatosplenomegaly, no ascites,   Musculoskeletal: She exhibits no edema or tenderness.   Lymphadenopathy:     She has no cervical adenopathy.   Neurological: She is alert and oriented to person, place, and time. She has normal reflexes.   Skin: Skin is warm and dry. No rash noted. No erythema.   Psychiatric: She has a normal mood and affect. Her behavior is normal. Judgment and thought content normal.   Vitals reviewed.      Assessment/Plan   Ina was seen today for transfer patient of dr. sarmiento her to establish care.    Diagnoses and all orders for this visit:    Controlled type 2 diabetes mellitus without complication, without long-term current use of insulin  -     Lipid Panel With LDL / HDL Ratio; Future  -     TSH Rfx On Abnormal To Free T4; Future  -     Hemoglobin A1c; Future  -     MicroAlbumin, Urine, Random - Urine, Clean Catch; Future  -     Urinalysis With / Microscopic If Indicated - Urine, Clean Catch; Future  -     Vitamin D 25 Hydroxy; Future    Hyperlipidemia, unspecified hyperlipidemia type  -     atorvastatin (LIPITOR) 10 MG tablet; Take 1 tablet by mouth Daily.  -     Lipid Panel With LDL / HDL Ratio; Future  -     TSH Rfx On Abnormal To Free T4; Future  -     Hemoglobin A1c; Future  -     MicroAlbumin, Urine, Random - Urine, Clean Catch; Future  -     Urinalysis With / Microscopic If Indicated - Urine, Clean Catch; Future  -     Vitamin D 25 Hydroxy; Future    Essential hypertension  -     Lipid Panel With LDL / HDL Ratio; Future  -     TSH Rfx On Abnormal To Free T4; Future  -     Hemoglobin A1c;  Future  -     MicroAlbumin, Urine, Random - Urine, Clean Catch; Future  -     Urinalysis With / Microscopic If Indicated - Urine, Clean Catch; Future  -     Vitamin D 25 Hydroxy; Future    Vitamin D deficiency  -     Lipid Panel With LDL / HDL Ratio; Future  -     TSH Rfx On Abnormal To Free T4; Future  -     Hemoglobin A1c; Future  -     MicroAlbumin, Urine, Random - Urine, Clean Catch; Future  -     Urinalysis With / Microscopic If Indicated - Urine, Clean Catch; Future  -     Vitamin D 25 Hydroxy; Future    Goiter  -     US Thyroid    Multiple thyroid nodules  Comments:  Previous biopsies negative, had more than 1  Orders:  -     US Thyroid    Other orders  -     irbesartan (AVAPRO) 150 MG tablet; Take 1 tablet by mouth Daily.  -     amLODIPine (NORVASC) 5 MG tablet; Take 1 tablet by mouth Daily.  -     lansoprazole (PREVACID) 30 MG capsule; Take 1 capsule by mouth Daily.                  E11.9  diabetes mellitus type 2    Please call patient's pharmacy  Glucose monitor choice  Test strips  Dispense 50  Lancets dispense 100    5 refills  Check glucose daily      Discussed target organ disease with diabetes and prevention strategy  Hypertension controlled  She sees optometry continue to do so yearly  Foot check diabetes next visit  Hypertension, check EKG routine next visit if not recent  Recheck in the office in 4-6 months

## 2018-02-01 ENCOUNTER — TELEPHONE (OUTPATIENT)
Dept: FAMILY MEDICINE CLINIC | Facility: CLINIC | Age: 48
End: 2018-02-01

## 2018-02-01 NOTE — TELEPHONE ENCOUNTER
----- Message from James Epley, APRN sent at 1/29/2018  3:16 PM EST -----      E11.9  diabetes mellitus type 2    Please call patient's pharmacy  Glucose monitor choice  Test strips  Dispense 50  Lancets dispense 100    5 refills  Check glucose daily

## 2018-02-07 ENCOUNTER — RESULTS ENCOUNTER (OUTPATIENT)
Dept: FAMILY MEDICINE CLINIC | Facility: CLINIC | Age: 48
End: 2018-02-07

## 2018-02-07 DIAGNOSIS — E78.5 HYPERLIPIDEMIA, UNSPECIFIED HYPERLIPIDEMIA TYPE: ICD-10-CM

## 2018-02-07 DIAGNOSIS — E55.9 VITAMIN D DEFICIENCY: ICD-10-CM

## 2018-02-07 DIAGNOSIS — E11.9 CONTROLLED TYPE 2 DIABETES MELLITUS WITHOUT COMPLICATION, WITHOUT LONG-TERM CURRENT USE OF INSULIN (HCC): ICD-10-CM

## 2018-02-07 DIAGNOSIS — I10 ESSENTIAL HYPERTENSION: ICD-10-CM

## 2018-02-14 ENCOUNTER — TELEPHONE (OUTPATIENT)
Dept: FAMILY MEDICINE CLINIC | Facility: CLINIC | Age: 48
End: 2018-02-14

## 2018-02-14 RX ORDER — OSELTAMIVIR PHOSPHATE 75 MG/1
75 CAPSULE ORAL DAILY
Qty: 10 CAPSULE | Refills: 0 | Status: SHIPPED | OUTPATIENT
Start: 2018-02-14 | End: 2018-02-24

## 2018-02-14 NOTE — TELEPHONE ENCOUNTER
has influenza  He will discuss this with patient  Prophylaxis Tamiflu  If she gets cough body aches myalgias signs of flu  Call or urgent care May need to increase Tamiflu

## 2018-02-21 LAB
25(OH)D3+25(OH)D2 SERPL-MCNC: 21.8 NG/ML (ref 30–100)
APPEARANCE UR: CLEAR
BILIRUB UR QL STRIP: NEGATIVE
CHOLEST SERPL-MCNC: 150 MG/DL (ref 0–200)
COLOR UR: YELLOW
GLUCOSE UR QL: NEGATIVE
HBA1C MFR BLD: 6.2 % (ref 4.8–5.6)
HDLC SERPL-MCNC: 46 MG/DL (ref 40–60)
HGB UR QL STRIP: NEGATIVE
KETONES UR QL STRIP: NEGATIVE
LDLC SERPL CALC-MCNC: 92 MG/DL (ref 0–100)
LDLC/HDLC SERPL: 2 {RATIO}
LEUKOCYTE ESTERASE UR QL STRIP: NEGATIVE
MICROALBUMIN UR-MCNC: 4.7 UG/ML
NITRITE UR QL STRIP: NEGATIVE
PH UR STRIP: 5.5 [PH] (ref 5–8)
PROT UR QL STRIP: NEGATIVE
SP GR UR: 1.02 (ref 1–1.03)
TRIGL SERPL-MCNC: 60 MG/DL (ref 0–150)
TSH SERPL DL<=0.005 MIU/L-ACNC: 1.91 MIU/ML (ref 0.27–4.2)
UROBILINOGEN UR STRIP-MCNC: NORMAL MG/DL
VLDLC SERPL CALC-MCNC: 12 MG/DL (ref 5–40)

## 2018-02-26 ENCOUNTER — HOSPITAL ENCOUNTER (OUTPATIENT)
Dept: ULTRASOUND IMAGING | Facility: HOSPITAL | Age: 48
Discharge: HOME | End: 2018-02-26
Admitting: NURSE PRACTITIONER

## 2018-02-26 PROCEDURE — 76536 US EXAM OF HEAD AND NECK: CPT

## 2018-02-27 ENCOUNTER — TELEPHONE (OUTPATIENT)
Dept: FAMILY MEDICINE CLINIC | Facility: CLINIC | Age: 48
End: 2018-02-27

## 2018-02-27 DIAGNOSIS — E04.1 THYROID NODULE: Primary | ICD-10-CM

## 2018-02-27 NOTE — TELEPHONE ENCOUNTER
----- Message from Dennies Garrick sent at 2/27/2018  2:30 PM EST -----  Patient aware of results.  ----- Message -----     From: James Epley, APRN     Sent: 2/27/2018  12:34 PM       To: Dennies Garrick    Please call patient  She needs to see ENT regarding thyroid nodule  She's had a likely increase in size compared to previous ultrasound not overly worrisome but  She certainly needs follow-up and evaluation by ENT to make sure it does not need to be biopsied?  Please let me know.  But the referral in after you talk to her  Thank you

## 2018-07-30 ENCOUNTER — OFFICE VISIT (OUTPATIENT)
Dept: FAMILY MEDICINE CLINIC | Facility: CLINIC | Age: 48
End: 2018-07-30

## 2018-07-30 VITALS
HEIGHT: 60 IN | BODY MASS INDEX: 37.77 KG/M2 | SYSTOLIC BLOOD PRESSURE: 116 MMHG | DIASTOLIC BLOOD PRESSURE: 78 MMHG | OXYGEN SATURATION: 98 % | WEIGHT: 192.4 LBS | HEART RATE: 82 BPM

## 2018-07-30 DIAGNOSIS — E55.9 VITAMIN D DEFICIENCY: ICD-10-CM

## 2018-07-30 DIAGNOSIS — E78.5 HYPERLIPIDEMIA, UNSPECIFIED HYPERLIPIDEMIA TYPE: ICD-10-CM

## 2018-07-30 DIAGNOSIS — Z78.0 POSTMENOPAUSAL: ICD-10-CM

## 2018-07-30 DIAGNOSIS — I10 ESSENTIAL HYPERTENSION: ICD-10-CM

## 2018-07-30 DIAGNOSIS — E11.9 CONTROLLED TYPE 2 DIABETES MELLITUS WITHOUT COMPLICATION, WITHOUT LONG-TERM CURRENT USE OF INSULIN (HCC): Primary | ICD-10-CM

## 2018-07-30 PROCEDURE — 99213 OFFICE O/P EST LOW 20 MIN: CPT | Performed by: NURSE PRACTITIONER

## 2018-07-30 RX ORDER — LANSOPRAZOLE 30 MG/1
30 CAPSULE, DELAYED RELEASE ORAL DAILY
Qty: 90 CAPSULE | Refills: 1 | Status: SHIPPED | OUTPATIENT
Start: 2018-07-30 | End: 2019-03-23 | Stop reason: SDUPTHER

## 2018-07-30 RX ORDER — IRBESARTAN 150 MG/1
150 TABLET ORAL DAILY
Qty: 90 TABLET | Refills: 1 | Status: SHIPPED | OUTPATIENT
Start: 2018-07-30 | End: 2019-03-18 | Stop reason: RX

## 2018-07-30 RX ORDER — ATORVASTATIN CALCIUM 10 MG/1
10 TABLET, FILM COATED ORAL DAILY
Qty: 90 TABLET | Refills: 3 | Status: SHIPPED | OUTPATIENT
Start: 2018-07-30 | End: 2019-06-13 | Stop reason: SDUPTHER

## 2018-07-30 RX ORDER — AMLODIPINE BESYLATE 5 MG/1
5 TABLET ORAL DAILY
Qty: 90 TABLET | Refills: 1 | Status: SHIPPED | OUTPATIENT
Start: 2018-07-30 | End: 2019-06-13 | Stop reason: SDUPTHER

## 2018-07-30 RX ORDER — METFORMIN HYDROCHLORIDE 500 MG/1
1000 TABLET, EXTENDED RELEASE ORAL
Qty: 180 TABLET | Refills: 1 | Status: SHIPPED | OUTPATIENT
Start: 2018-07-30 | End: 2019-02-27 | Stop reason: SDUPTHER

## 2018-08-01 ENCOUNTER — RESULTS ENCOUNTER (OUTPATIENT)
Dept: FAMILY MEDICINE CLINIC | Facility: CLINIC | Age: 48
End: 2018-08-01

## 2018-08-01 DIAGNOSIS — I10 ESSENTIAL HYPERTENSION: ICD-10-CM

## 2018-08-01 DIAGNOSIS — E11.9 CONTROLLED TYPE 2 DIABETES MELLITUS WITHOUT COMPLICATION, WITHOUT LONG-TERM CURRENT USE OF INSULIN (HCC): ICD-10-CM

## 2018-08-01 DIAGNOSIS — E78.5 HYPERLIPIDEMIA, UNSPECIFIED HYPERLIPIDEMIA TYPE: ICD-10-CM

## 2018-08-01 DIAGNOSIS — E55.9 VITAMIN D DEFICIENCY: ICD-10-CM

## 2018-08-04 LAB
25(OH)D3+25(OH)D2 SERPL-MCNC: 23.5 NG/ML (ref 30–100)
APPEARANCE UR: ABNORMAL
BILIRUB UR QL STRIP: NEGATIVE
COLOR UR: YELLOW
GLUCOSE UR QL: NEGATIVE
HBA1C MFR BLD: 6.61 % (ref 4.8–5.6)
HGB UR QL STRIP: NEGATIVE
KETONES UR QL STRIP: ABNORMAL
LEUKOCYTE ESTERASE UR QL STRIP: NEGATIVE
MICROALBUMIN UR-MCNC: 9.5 UG/ML
NITRITE UR QL STRIP: NEGATIVE
PH UR STRIP: <=5 [PH] (ref 5–8)
PROT UR QL STRIP: NEGATIVE
SP GR UR: ABNORMAL (ref 1–1.03)
UROBILINOGEN UR STRIP-MCNC: ABNORMAL MG/DL

## 2018-08-13 ENCOUNTER — TRANSCRIBE ORDERS (OUTPATIENT)
Dept: ADMINISTRATIVE | Facility: HOSPITAL | Age: 48
End: 2018-08-13

## 2018-08-13 DIAGNOSIS — E07.9 THYROID MASS: Primary | ICD-10-CM

## 2018-08-17 ENCOUNTER — HOSPITAL ENCOUNTER (OUTPATIENT)
Dept: CT IMAGING | Facility: HOSPITAL | Age: 48
Discharge: HOME | End: 2018-08-17
Attending: OTOLARYNGOLOGY | Admitting: OTOLARYNGOLOGY

## 2018-08-17 ENCOUNTER — TELEPHONE (OUTPATIENT)
Dept: FAMILY MEDICINE CLINIC | Facility: CLINIC | Age: 48
End: 2018-08-17

## 2018-08-17 DIAGNOSIS — E07.9 THYROID MASS: ICD-10-CM

## 2018-08-17 PROCEDURE — 70491 CT SOFT TISSUE NECK W/DYE: CPT

## 2018-08-17 PROCEDURE — 25010000002 IOPAMIDOL 61 % SOLUTION: Performed by: OTOLARYNGOLOGY

## 2018-08-17 PROCEDURE — 82565 ASSAY OF CREATININE: CPT

## 2018-08-17 RX ADMIN — IOPAMIDOL 85 ML: 612 INJECTION, SOLUTION INTRAVENOUS at 15:05

## 2018-08-17 NOTE — TELEPHONE ENCOUNTER
Fyi   PATIENT HAD CT SCAN DONE TODAY THAT WAS ORDER BY DR. HANSEN THE ENT.  SHE WAS TOLD BY RADIOLOGIST NOT TO TAKE HER METFORMIN FOR 48 HOURS.

## 2018-08-18 LAB — CREAT BLDA-MCNC: 0.8 MG/DL (ref 0.6–1.3)

## 2018-08-23 ENCOUNTER — TELEPHONE (OUTPATIENT)
Dept: FAMILY MEDICINE CLINIC | Facility: CLINIC | Age: 48
End: 2018-08-23

## 2018-08-23 NOTE — TELEPHONE ENCOUNTER
dennies  Pt has order for dexa 7/31/18. They could never get a hold of her at hospital to schedule. Please call pt to remind or cancel order if she doesn't want it. This is in your overdue task thanks

## 2018-09-10 ENCOUNTER — OFFICE VISIT (OUTPATIENT)
Dept: SURGERY | Facility: CLINIC | Age: 48
End: 2018-09-10

## 2018-09-10 VITALS
HEART RATE: 86 BPM | HEIGHT: 60 IN | BODY MASS INDEX: 37.69 KG/M2 | SYSTOLIC BLOOD PRESSURE: 133 MMHG | WEIGHT: 192 LBS | OXYGEN SATURATION: 99 % | DIASTOLIC BLOOD PRESSURE: 80 MMHG

## 2018-09-10 DIAGNOSIS — E04.9 GOITER: Primary | ICD-10-CM

## 2018-09-10 PROCEDURE — 99244 OFF/OP CNSLTJ NEW/EST MOD 40: CPT | Performed by: THORACIC SURGERY (CARDIOTHORACIC VASCULAR SURGERY)

## 2018-09-10 NOTE — PROGRESS NOTES
Subjective   Patient ID: Ina Akers is a 47 y.o. female is being seen for consultation today at the request of Dr. Natan Grande.    History of Present Illness  Dear Colleague,  Ina Akers was seen in our office today initial consultation for a large substernal goiter.  Ms. Akers is a pleasant 46 yo lady with a history of a goiter, who present to  to discuss resection.  On imaging, a large component of her goiter is substernal.    She has no complaints at this time.     The following portions of the patient's history were reviewed and updated as appropriate: allergies, current medications, past family history, past medical history, past social history, past surgical history and problem list.  Review of Systems   Constitution: Negative.   HENT: Negative.    Eyes: Negative.    Cardiovascular: Negative.    Respiratory: Negative.    Endocrine: Negative.    Hematologic/Lymphatic: Negative.    Skin: Negative.    Musculoskeletal: Negative.    Gastrointestinal: Negative.    Genitourinary: Negative.    Neurological: Negative.    Psychiatric/Behavioral: Negative.    Allergic/Immunologic: Negative.      Patient Active Problem List   Diagnosis   • Abnormal perimenopausal bleeding   • Amenorrhea   • Atopic dermatitis   • External hemorrhoids   • Gastritis   • Hypertension   • Iron deficiency anemia   • Irritable bowel syndrome   • Melanocytic nevus   • Goiter   • Tinea corporis   • Type 2 diabetes mellitus (CMS/HCC)   • Lipid screening   • Malignant neoplasm of upper-outer quadrant of left breast in female, estrogen receptor negative (CMS/HCC)   • Acute pain of both knees   • Controlled type 2 diabetes mellitus without complication, without long-term current use of insulin (CMS/HCC)   • Hyperlipidemia   • Vitamin D deficiency   • Multiple thyroid nodules     Past Medical History:   Diagnosis Date   • Anemia     History of recurent iron deficiency   • Cancer (CMS/HCC) 10/2001    Left breast   • Diabetes  mellitus (CMS/HCC)    • H/O Thyroid goiter    • History of gastritis    • History of IBS    • History of tinea corporis    • Hypertension      Past Surgical History:   Procedure Laterality Date   • BREAST SURGERY     •  SECTION       Family History   Problem Relation Age of Onset   • Hypertension Other    • Diabetes Other      Social History     Social History   • Marital status:      Spouse name: Raghav   • Number of children: 2   • Years of education: N/A     Occupational History   •  [employer]   •  [employer]     Social History Main Topics   • Smoking status: Never Smoker   • Smokeless tobacco: Never Used   • Alcohol use Yes      Comment: Socially   • Drug use: Unknown   • Sexual activity: Not on file     Other Topics Concern   • Not on file     Social History Narrative   • No narrative on file       Current Outpatient Prescriptions:   •  amLODIPine (NORVASC) 5 MG tablet, Take 1 tablet by mouth Daily., Disp: 90 tablet, Rfl: 1  •  atorvastatin (LIPITOR) 10 MG tablet, Take 1 tablet by mouth Daily., Disp: 90 tablet, Rfl: 3  •  glucose blood (ACCU-CHEK SILKE) test strip, Use as instructed, Disp: 50 each, Rfl: 12  •  irbesartan (AVAPRO) 150 MG tablet, Take 1 tablet by mouth Daily., Disp: 90 tablet, Rfl: 1  •  lansoprazole (PREVACID) 30 MG capsule, Take 1 capsule by mouth Daily., Disp: 90 capsule, Rfl: 1  •  metFORMIN ER (GLUCOPHAGE-XR) 500 MG 24 hr tablet, Take 2 tablets by mouth Daily With Dinner., Disp: 180 tablet, Rfl: 1  •  Docusate Calcium (STOOL SOFTENER PO), Take  by mouth Daily., Disp: , Rfl:   •  hydrocortisone (PROCTOCARE-HC) 2.5 % rectal cream, As Needed., Disp: , Rfl:   •  Lancets (ACCU-CHEK SOFT TOUCH) lancets, Check blood sugar once a day  As needed, Disp: 100 each, Rfl: 12  No Known Allergies     Objective   Vitals:    09/10/18 0955   BP: 133/80   Pulse: 86   SpO2: 99%     Physical Exam   Constitutional: She is oriented to person, place, and time. She  appears well-developed and well-nourished.   HENT:   Head: Normocephalic and atraumatic.   Nose: Nose normal.   Mouth/Throat: Oropharynx is clear and moist.   Eyes: Pupils are equal, round, and reactive to light. Conjunctivae and EOM are normal.   Neck: Normal range of motion. Neck supple. Thyromegaly present.   Cardiovascular: Normal rate, regular rhythm, normal heart sounds and intact distal pulses.    Pulmonary/Chest: Effort normal and breath sounds normal.   Abdominal: Soft. Bowel sounds are normal.   Musculoskeletal: Normal range of motion.   Lymphadenopathy:     She has no cervical adenopathy.   Neurological: She is alert and oriented to person, place, and time.   Skin: Skin is warm and dry. Capillary refill takes less than 2 seconds.   Psychiatric: She has a normal mood and affect. Her behavior is normal. Judgment and thought content normal.   Nursing note and vitals reviewed.    Independent Review of Radiographic Studies:    I have indepently reviewed the images from the CT chest on 8/17/18 which demonstrates a large substernal goiter with extension into the superior mediastinum with tracheal deviation but no tracheal compression.      Assessment/Plan   Ms. Akers is a pleasant 48 yo lady with a substernal multinodular goiter.  I think that this should be amenable to resection from a cervical approach, but I will be available to Dr. Grande if the goiter cannot be removed through the neck.  I discussed the need for possible sternotomy today in the office and she voices understanding.  Thank you for allowing me to participate in the care of this patient.  We will coordinate a date of surgery once Ms. Akers decides when she would like to proceed.    Diagnoses and all orders for this visit:    Goiter

## 2018-10-12 ENCOUNTER — APPOINTMENT (OUTPATIENT)
Dept: BONE DENSITY | Facility: HOSPITAL | Age: 48
End: 2018-10-12

## 2018-10-15 ENCOUNTER — OFFICE VISIT (OUTPATIENT)
Dept: FAMILY MEDICINE CLINIC | Facility: CLINIC | Age: 48
End: 2018-10-15

## 2018-10-15 VITALS
BODY MASS INDEX: 37.22 KG/M2 | TEMPERATURE: 98.4 F | OXYGEN SATURATION: 97 % | DIASTOLIC BLOOD PRESSURE: 82 MMHG | HEART RATE: 94 BPM | WEIGHT: 189.6 LBS | SYSTOLIC BLOOD PRESSURE: 128 MMHG | HEIGHT: 60 IN

## 2018-10-15 DIAGNOSIS — Z12.4 PAP SMEAR FOR CERVICAL CANCER SCREENING: ICD-10-CM

## 2018-10-15 DIAGNOSIS — Z00.00 HEALTH MAINTENANCE EXAMINATION: Primary | ICD-10-CM

## 2018-10-15 DIAGNOSIS — I10 ESSENTIAL HYPERTENSION: ICD-10-CM

## 2018-10-15 PROCEDURE — 99396 PREV VISIT EST AGE 40-64: CPT | Performed by: NURSE PRACTITIONER

## 2018-10-15 PROCEDURE — 93000 ELECTROCARDIOGRAM COMPLETE: CPT | Performed by: NURSE PRACTITIONER

## 2018-10-15 RX ORDER — MELATONIN
1000 2 TIMES DAILY
COMMUNITY
End: 2020-01-22 | Stop reason: SDDI

## 2018-10-15 NOTE — PROGRESS NOTES
Procedure   Procedures     Adult ECG Report     Name: Ina Akers   Age: 47 y.o.   Gender: female       Rate: 66   Rhythm: normal sinus rhythm   QRS Axis: nml   VT Interval: 135   QRS Duration: 86   QTc: 401   Voltages: .   Conduction Disturbances: none   Other Abnormalities: none     Narrative Interpretation: Normal EKG, normal sinus rhythm, no ST abnormalities, no prior EKG available for comparison indication hypertension

## 2018-10-15 NOTE — PROGRESS NOTES
Subjective   Ina Akers is a 47 y.o. female.     Cpe,pap        Breast cancer  Left  Partial masectomy  16 yr remission      Dr Salinas    Thyroid mult nod thyroid  benighn benign but getting larger  Will will having thyroid ectomy  Dr Webster   [facility] physicians      Pap  Last?  Never abn    surg  cc scection  Left part masectomy  Mammogram  nml                GYN OB history     2 para 2 A0  One vaginal delivery one   Age of menarche 8         Reports a pelvic ultrasound several years ago small ovarian cyst  GYN  Denies any pelvic pain no bleeding      Gynecologic Exam   Pertinent negatives include no abdominal pain, chills, constipation, diarrhea or fever.        The following portions of the patient's history were reviewed and updated as appropriate: allergies, current medications, past family history, past social history, past surgical history and problem list.    Review of Systems   Constitutional: Negative for chills, fatigue, fever and unexpected weight loss.   HENT: Negative.  Negative for trouble swallowing.    Eyes: Negative.    Respiratory: Negative for cough and shortness of breath.    Cardiovascular: Negative for chest pain, palpitations and leg swelling.   Gastrointestinal: Negative for abdominal pain, blood in stool, constipation and diarrhea.   Genitourinary: Negative.    Musculoskeletal: Negative.    Skin: Negative.    Neurological: Negative.  Negative for dizziness, speech difficulty, weakness and confusion.   Psychiatric/Behavioral: Negative.        Objective   Physical Exam   Constitutional: She is oriented to person, place, and time. She appears well-developed and well-nourished.   HENT:   Head: Normocephalic.   Mouth/Throat: Oropharynx is clear and moist.   Eyes: Pupils are equal, round, and reactive to light. Conjunctivae are normal.   Neck: Neck supple. Thyromegaly present.   Cardiovascular: Normal rate, regular rhythm and normal heart sounds.   Exam reveals no friction rub.    No murmur heard.  Carotids clear   Pulmonary/Chest: Effort normal and breath sounds normal. No respiratory distress. She has no wheezes.   Breast exam  Right breast larger than left secondary to partial mastectomy   No masses bilateral no dimpling no orange peel appearance no irregularities no nipple discharge axilla clear bilateral       Abdominal: Soft. Bowel sounds are normal. She exhibits no distension and no mass. There is no tenderness. There is no rebound and no guarding. No hernia.   Genitourinary:   Genitourinary Comments: Pelvic exam  External labia normal no lesions  Cervix normal  No purulent discharge  No cervical motion tenderness  No pelvic masses no adnexal masses  Rectovaginal exam no nodules no masses       Musculoskeletal: She exhibits no edema.   Lymphadenopathy:     She has no cervical adenopathy.   Neurological: She is alert and oriented to person, place, and time.   Skin: Skin is warm and dry.   Psychiatric: She has a normal mood and affect. Her behavior is normal. Judgment and thought content normal.   Vitals reviewed.        Assessment/Plan   Ina was seen today for annual exam and gynecologic exam.    Diagnoses and all orders for this visit:    Health maintenance examination    Pap smear for cervical cancer screening  -     Pap IG, Rfx HPV ASCU    Essential hypertension  -     ECG 12 Lead                    Continue diabetic management  Decrease processed sweets  1400 ivonne a day  Focus on gradual caloric reduction gradual weight loss avoid fad diets increase healthy vegetables  Regular exercise  Consider bariatric surgery if unsuccessful  But encourage nonsurgical interventions discussed options    Monthly self breast exam yearly clinical exam  Standing desk  Clarify gastroenterologist next colonoscopy due  Last 4 years ago  Obtain colonoscopy record  EKG is normal  Aspirin 81 mg risk benefit  Current discussion contra versus limits a primary prevention  cardiovascular disease  Risk-benefit discussion and new study revolutions  May increase risk of gastric bleeding intracerebral bleeding  May or may not decrease risk of cardiac vessel disease stroke  Concerning hypertension diabetes 49 years ago  Patient thinks she will take a baby aspirin daily

## 2018-10-16 LAB
CONV .: NORMAL
CYTOLOGIST CVX/VAG CYTO: NORMAL
CYTOLOGY CVX/VAG DOC THIN PREP: NORMAL
DX ICD CODE: NORMAL
HIV 1 & 2 AB SER-IMP: NORMAL
OTHER STN SPEC: NORMAL
PATH REPORT.FINAL DX SPEC: NORMAL
STAT OF ADQ CVX/VAG CYTO-IMP: NORMAL

## 2019-01-21 ENCOUNTER — TRANSCRIBE ORDERS (OUTPATIENT)
Dept: ADMINISTRATIVE | Facility: HOSPITAL | Age: 49
End: 2019-01-21

## 2019-01-21 ENCOUNTER — HOSPITAL ENCOUNTER (OUTPATIENT)
Dept: BONE DENSITY | Facility: HOSPITAL | Age: 49
Discharge: HOME | End: 2019-01-21
Admitting: NURSE PRACTITIONER

## 2019-01-21 DIAGNOSIS — Z78.0 POST-MENOPAUSAL: ICD-10-CM

## 2019-01-21 DIAGNOSIS — Z78.0 POST-MENOPAUSAL: Primary | ICD-10-CM

## 2019-01-21 PROCEDURE — 77080 DXA BONE DENSITY AXIAL: CPT

## 2019-01-30 ENCOUNTER — APPOINTMENT (OUTPATIENT)
Dept: ONCOLOGY | Facility: CLINIC | Age: 49
End: 2019-01-30

## 2019-01-30 ENCOUNTER — OFFICE VISIT (OUTPATIENT)
Dept: ONCOLOGY | Facility: CLINIC | Age: 49
End: 2019-01-30

## 2019-01-30 ENCOUNTER — LAB (OUTPATIENT)
Dept: OTHER | Facility: HOSPITAL | Age: 49
End: 2019-01-30

## 2019-01-30 ENCOUNTER — APPOINTMENT (OUTPATIENT)
Dept: OTHER | Facility: HOSPITAL | Age: 49
End: 2019-01-30

## 2019-01-30 VITALS
BODY MASS INDEX: 35.91 KG/M2 | WEIGHT: 190.2 LBS | DIASTOLIC BLOOD PRESSURE: 88 MMHG | SYSTOLIC BLOOD PRESSURE: 131 MMHG | RESPIRATION RATE: 16 BRPM | HEIGHT: 61 IN | HEART RATE: 85 BPM | OXYGEN SATURATION: 96 % | TEMPERATURE: 98.6 F

## 2019-01-30 DIAGNOSIS — C50.412 MALIGNANT NEOPLASM OF UPPER-OUTER QUADRANT OF LEFT BREAST IN FEMALE, ESTROGEN RECEPTOR NEGATIVE (HCC): Primary | ICD-10-CM

## 2019-01-30 DIAGNOSIS — E05.00 PRIMARY THYROID HYPERPLASIA: ICD-10-CM

## 2019-01-30 DIAGNOSIS — Z12.31 ENCOUNTER FOR SCREENING MAMMOGRAM FOR BREAST CANCER: ICD-10-CM

## 2019-01-30 DIAGNOSIS — Z17.1 MALIGNANT NEOPLASM OF UPPER-OUTER QUADRANT OF LEFT BREAST IN FEMALE, ESTROGEN RECEPTOR NEGATIVE (HCC): Primary | ICD-10-CM

## 2019-01-30 DIAGNOSIS — D50.9 IRON DEFICIENCY ANEMIA, UNSPECIFIED IRON DEFICIENCY ANEMIA TYPE: ICD-10-CM

## 2019-01-30 LAB
ALBUMIN SERPL-MCNC: 4.6 G/DL (ref 3.5–5.2)
ALBUMIN/GLOB SERPL: 1.4 G/DL
ALP SERPL-CCNC: 87 U/L (ref 39–117)
ALT SERPL W P-5'-P-CCNC: 13 U/L (ref 1–33)
ANION GAP SERPL CALCULATED.3IONS-SCNC: 10.5 MMOL/L
AST SERPL-CCNC: 18 U/L (ref 1–32)
BASOPHILS # BLD AUTO: 0.03 10*3/MM3 (ref 0–0.2)
BASOPHILS NFR BLD AUTO: 0.5 % (ref 0–1.5)
BILIRUB SERPL-MCNC: 0.3 MG/DL (ref 0.1–1.2)
BUN BLD-MCNC: 10 MG/DL (ref 6–20)
BUN/CREAT SERPL: 12.2 (ref 7–25)
CALCIUM SPEC-SCNC: 9.7 MG/DL (ref 8.6–10.5)
CHLORIDE SERPL-SCNC: 104 MMOL/L (ref 98–107)
CO2 SERPL-SCNC: 27.5 MMOL/L (ref 22–29)
CREAT BLD-MCNC: 0.82 MG/DL (ref 0.57–1)
DEPRECATED RDW RBC AUTO: 47.2 FL (ref 37–54)
EOSINOPHIL # BLD AUTO: 0.27 10*3/MM3 (ref 0–0.7)
EOSINOPHIL NFR BLD AUTO: 4.3 % (ref 0.3–6.2)
ERYTHROCYTE [DISTWIDTH] IN BLOOD BY AUTOMATED COUNT: 15.1 % (ref 11.7–13)
GFR SERPL CREATININE-BSD FRML MDRD: 89 ML/MIN/1.73
GLOBULIN UR ELPH-MCNC: 3.3 GM/DL
GLUCOSE BLD-MCNC: 92 MG/DL (ref 65–99)
HCT VFR BLD AUTO: 35.4 % (ref 35.6–45.5)
HGB BLD-MCNC: 11 G/DL (ref 11.9–15.5)
IMM GRANULOCYTES # BLD AUTO: 0.02 10*3/MM3 (ref 0–0.03)
IMM GRANULOCYTES NFR BLD AUTO: 0.3 % (ref 0–0.5)
LYMPHOCYTES # BLD AUTO: 2.5 10*3/MM3 (ref 0.9–4.8)
LYMPHOCYTES NFR BLD AUTO: 39.8 % (ref 19.6–45.3)
MCH RBC QN AUTO: 26.5 PG (ref 26.9–32)
MCHC RBC AUTO-ENTMCNC: 31.1 G/DL (ref 32.4–36.3)
MCV RBC AUTO: 85.3 FL (ref 80.5–98.2)
MONOCYTES # BLD AUTO: 0.54 10*3/MM3 (ref 0.2–1.2)
MONOCYTES NFR BLD AUTO: 8.6 % (ref 5–12)
NEUTROPHILS # BLD AUTO: 2.92 10*3/MM3 (ref 1.9–8.1)
NEUTROPHILS NFR BLD AUTO: 46.5 % (ref 42.7–76)
NRBC BLD AUTO-RTO: 0 /100 WBC (ref 0–0)
PLATELET # BLD AUTO: 324 10*3/MM3 (ref 140–500)
PMV BLD AUTO: 9.4 FL (ref 6–12)
POTASSIUM BLD-SCNC: 3.9 MMOL/L (ref 3.5–5.2)
PROT SERPL-MCNC: 7.9 G/DL (ref 6–8.5)
RBC # BLD AUTO: 4.15 10*6/MM3 (ref 3.9–5.2)
SODIUM BLD-SCNC: 142 MMOL/L (ref 136–145)
WBC NRBC COR # BLD: 6.28 10*3/MM3 (ref 4.5–10.7)

## 2019-01-30 PROCEDURE — 82728 ASSAY OF FERRITIN: CPT | Performed by: INTERNAL MEDICINE

## 2019-01-30 PROCEDURE — 86334 IMMUNOFIX E-PHORESIS SERUM: CPT | Performed by: INTERNAL MEDICINE

## 2019-01-30 PROCEDURE — 82784 ASSAY IGA/IGD/IGG/IGM EACH: CPT | Performed by: INTERNAL MEDICINE

## 2019-01-30 PROCEDURE — 84165 PROTEIN E-PHORESIS SERUM: CPT | Performed by: INTERNAL MEDICINE

## 2019-01-30 PROCEDURE — 83883 ASSAY NEPHELOMETRY NOT SPEC: CPT | Performed by: INTERNAL MEDICINE

## 2019-01-30 PROCEDURE — 36415 COLL VENOUS BLD VENIPUNCTURE: CPT

## 2019-01-30 PROCEDURE — 80053 COMPREHEN METABOLIC PANEL: CPT | Performed by: INTERNAL MEDICINE

## 2019-01-30 PROCEDURE — 99215 OFFICE O/P EST HI 40 MIN: CPT | Performed by: INTERNAL MEDICINE

## 2019-01-30 PROCEDURE — 84466 ASSAY OF TRANSFERRIN: CPT | Performed by: INTERNAL MEDICINE

## 2019-01-30 PROCEDURE — 85025 COMPLETE CBC W/AUTO DIFF WBC: CPT | Performed by: INTERNAL MEDICINE

## 2019-01-30 PROCEDURE — 83540 ASSAY OF IRON: CPT | Performed by: INTERNAL MEDICINE

## 2019-01-30 NOTE — PROGRESS NOTES
Subjective     CHIEF COMPLAINT:      Chief Complaint   Patient presents with   • Annual Exam     no concerns       HISTORY OF PRESENT ILLNESS:     Ina Akers is a 48 y.o. female patient who returns today for follow up on her left breast cancer.  She was found to have an enlarged right thyroid lobe and she underwent a right thyroid lobectomy by Dr. Webster on 12/17/2018.  She tolerated procedure well.  She reports some discomfort at the incision but no true pain.     Patient reports some itching at the surgical incision in the left axillary area.  She did not notice any skin lesions.    Patient denies blood in the urine.  She had a brief episode of low back pain that resolved spontaneously.        REVIEW OF SYSTEMS:  Review of Systems   Constitutional: Negative for chills, fever and unexpected weight change.   HENT: Negative for mouth sores, nosebleeds, sore throat and voice change.    Eyes: Negative for visual disturbance.   Respiratory: Negative for cough and shortness of breath.    Cardiovascular: Negative for chest pain and leg swelling.   Gastrointestinal: Negative for abdominal pain, blood in stool, constipation, diarrhea, nausea and vomiting.   Genitourinary: Negative for dysuria, frequency and hematuria.   Musculoskeletal: Negative for arthralgias, back pain and joint swelling.   Skin: Negative for rash.   Neurological: Negative for dizziness, numbness and headaches.   Hematological: Negative for adenopathy. Does not bruise/bleed easily.   Psychiatric/Behavioral: Negative for dysphoric mood. The patient is not nervous/anxious.      I verified the ROS obtained by the MA.        Past Medical History:   Diagnosis Date   • Anemia     History of recurent iron deficiency   • Cancer (CMS/HCC) 10/2001    Left breast   • Diabetes mellitus (CMS/HCC)    • H/O Thyroid goiter    • History of gastritis    • History of IBS    • History of tinea corporis    • Hypertension        Past Surgical History:   Procedure  "Laterality Date   • BREAST SURGERY     •  SECTION     • THYROID BIOPSY  2018       Cancer-related family history is not on file.  Social History     Tobacco Use   • Smoking status: Never Smoker   • Smokeless tobacco: Never Used   Substance Use Topics   • Alcohol use: Yes     Comment: Socially       MEDICATIONS:    Current Outpatient Medications:   •  amLODIPine (NORVASC) 5 MG tablet, Take 1 tablet by mouth Daily., Disp: 90 tablet, Rfl: 1  •  atorvastatin (LIPITOR) 10 MG tablet, Take 1 tablet by mouth Daily., Disp: 90 tablet, Rfl: 3  •  cholecalciferol (VITAMIN D3) 1000 units tablet, Take 1,000 Units by mouth 2 (Two) Times a Day., Disp: , Rfl:   •  Docusate Calcium (STOOL SOFTENER PO), Take  by mouth Daily. As needed, Disp: , Rfl:   •  glucose blood (ACCU-CHEK SILKE) test strip, Use as instructed, Disp: 50 each, Rfl: 12  •  hydrocortisone (PROCTOCARE-HC) 2.5 % rectal cream, As Needed., Disp: , Rfl:   •  irbesartan (AVAPRO) 150 MG tablet, Take 1 tablet by mouth Daily., Disp: 90 tablet, Rfl: 1  •  Lancets (ACCU-CHEK SOFT TOUCH) lancets, Check blood sugar once a day  As needed, Disp: 100 each, Rfl: 12  •  lansoprazole (PREVACID) 30 MG capsule, Take 1 capsule by mouth Daily., Disp: 90 capsule, Rfl: 1  •  metFORMIN ER (GLUCOPHAGE-XR) 500 MG 24 hr tablet, Take 2 tablets by mouth Daily With Dinner., Disp: 180 tablet, Rfl: 1    ALLERGIES:  No Known Allergies      Objective   VITAL SIGNS:     Vitals:    19 1450   BP: 131/88   Pulse: 85   Resp: 16   Temp: 98.6 °F (37 °C)   TempSrc: Oral   SpO2: 96%   Weight: 86.3 kg (190 lb 3.2 oz)   Height: 155 cm (61.02\")  Comment: new ht   PainSc: 0-No pain     Body mass index is 35.91 kg/m².     Wt Readings from Last 3 Encounters:   19 86.3 kg (190 lb 3.2 oz)   10/15/18 86 kg (189 lb 9.6 oz)   09/10/18 87.1 kg (192 lb)       PHYSICAL EXAMINATION:  GENERAL:  The patient appears in good general condition, not in acute distress.  SKIN: Warm and dry. No skin rashes, " ecchymosis or petechiae.  HEAD:  Normocephalic.  EYES:  No Jaundice. No Pallor. Pupils equal. EOMI.  MOUTH: No Ulcers. No Thrush. No Exudates.  NECK:  Supple with Good ROM. No Thyroid masses.  Lower neck incision is clean and intact.    LYMPHATICS:  No cervical or supraclavicular or axillary lymphadenopathy.  BREASTS: Right breast exam is unremarkable.  Left breast exam revealed syndrome at the upper outer quadrant.  No suspicious masses.  Dryness of the skin at the incision with no suspicious skin lesions.  CHEST: Normal respiratory effort. Lungs clear to auscultation.   CARDIAC:  Normal S1 & S2. No murmurs. No edema.  ABDOMEN:  Soft. No tenderness. No Hepatomegaly. No Splenomegaly. No masses.  EXTREMITIES:  No clubbing. No joint swelling in the hands. No Calf tenderness.  NEUROLOGICAL:  No Focal neurological deficits.         DIAGNOSTIC DATA:     Results from last 7 days   Lab Units 01/30/19  1441   WBC 10*3/mm3 6.28   NEUTROS ABS 10*3/mm3 2.92   HEMOGLOBIN g/dL 11.0*   HEMATOCRIT % 35.4*   PLATELETS 10*3/mm3 324     Results from last 7 days   Lab Units 01/30/19  1441   SODIUM mmol/L 142   POTASSIUM mmol/L 3.9   CHLORIDE mmol/L 104   CO2 mmol/L 27.5   BUN mg/dL 10   CREATININE mg/dL 0.82   CALCIUM mg/dL 9.7   ALBUMIN g/dL 4.60   BILIRUBIN mg/dL 0.3   ALK PHOS U/L 87   ALT (SGPT) U/L 13   AST (SGOT) U/L 18   GLUCOSE mg/dL 92         Bilateral mammograms from 7/10/2018 were BI-RADS Category 1: Negative.    Patient Name: CARRIE GALVAN  YOB: 1970  MRN: [id number]  Accession#: [id number]  Collected: 12/17/2018  Received: 12/18/2018    [place] [id number]    DIAGNOSIS:  ADDENDUM REPORT (01/15/19)    This case was sent for consultation to Dr. Adán Currie, an expert in thyroid  pathology at [facility].  An excerpt from the consultation  report with his diagnosis is reported below.  Please see the scanned  consultation report below  "or in Epic for additional information.  If the scan is  not available in the electronic medical record, please call the [facility]  Pathology Office at [phone] for a hard copy.    DR. MEZA'S DIAGNOSIS:    \"...I have reviewed the glass slides you submitted from this thyroid lobectomy  specimen.  The sections, indeed, show features of multinodular hyperplasia with  extensive regressive changes and mild lymphocytic thyroiditis.  Some of the  nodules (such as in slides A7, A14, and A19) show a thick fibrous capsule and  dense cellularity with a fine pattern of stromal sclerosis at the edges and more  solid central portions displaying a striking clear cell appearance of the cells.      The differential diagnosis of a lesion with these features includes an  intrathyroidal parathyroid adenoma, a follicular thyroid nodule with clear cell  changes, and more remotely a metastasis from clear cell renal cell carcinoma. We  did a few immunohistochemical stains in our Lab from the paraffin blocks you  kindly provided, which showed nuclear positivity of the tumor cells in these  nodules for TTF and PAX8, and negative stains for CD10 and parathyroid hormone.   This establishes these nodules as representing follicular thyroid nodules with  secondary clear cells changes and helps to rule out parathyroid tumor and  metastatic renal cell carcinoma.     Overall, I believe these nodules are all benign and represent part and parcel of  the nodular hyperplastic process.  I would favor the designation of adenomatoid  hyperplastic nodules with clear cell changes for these lesions.  I am not  concerned for malignancy in this case.\"      ________________________________________________________________________________  _____________  ORIGINAL REPORT    RIGHT THYROID LOBE, RIGHT THYROID LOBECTOMY:       1.     OUTSIDE CONSULTATION PENDING.            2.     MULTIPLE CELLULAR THYROID NODULES WITH MILD CYTOLOGIC ATYPIA            IN " A BACKGROUND OF NODULAR HYPERPLASIA, CYSTIC DEGENERATION,            DYSTROPHIC CALCIFICATIONS, AND CHRONIC INFLAMMATION.       3.     SEE COMMENT.    Assessment/Plan   1.  Left breast cancer, stage IIB, triple negative.   · She had a lumpectomy  · She received adjuvant chemotherapy  · She received post-lumpectomy radiation therapy.   · She was placed on tamoxifen for 5 years and it was completed in January 2007.      She continues to do well.  No evidence of recurrence.  Bilateral mammograms from 7/10/2018 were benign.    2.  Anemia with history of iron deficiency anemia.    3.   Increase in the kappa to lambda ratio on previous anemia workup.  The kappa to lambda ratio was slightly elevated in January 2018.    4.  Multinodular thyroid goiter.  She has enlargement of the right lobe and she had right thyroid lobectomy on 12/17/2018.  The specimen was sent to the [facility] in consultation.  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  The patient is not having any symptoms suggesting underlying cancer.  I explained this to the patient.  At this point, we will not obtain a CT scan of the abdomen if she develops abdominal pain or hematuria, we'll obtain a CT scan to further evaluate this.  I explained this to the patient and she agreed with the plan.    PLAN:    1.  Obtain ferritin and iron panel to evaluate for recurrence of iron deficiency anemia.    2.  Repeat SPEP, CRISSY and serum free light chains today.  We will contact her with the results.    3.  We will schedule patient for bilateral mammograms in July 2019.    We will see in follow-up in one year with a CBC CMP ferritin and iron panel.          Tim Muñoz MD  01/30/19

## 2019-01-31 LAB
FERRITIN SERPL-MCNC: 167.9 NG/ML (ref 13–150)
IRON 24H UR-MRATE: 37 MCG/DL (ref 37–145)
IRON SATN MFR SERPL: 11 % (ref 20–50)
TIBC SERPL-MCNC: 337 MCG/DL (ref 298–536)
TRANSFERRIN SERPL-MCNC: 226 MG/DL (ref 200–360)

## 2019-02-01 LAB
ALBUMIN SERPL-MCNC: 3.9 G/DL (ref 2.9–4.4)
ALBUMIN/GLOB SERPL: 1.2 {RATIO} (ref 0.7–1.7)
ALPHA1 GLOB FLD ELPH-MCNC: 0.2 G/DL (ref 0–0.4)
ALPHA2 GLOB SERPL ELPH-MCNC: 0.6 G/DL (ref 0.4–1)
B-GLOBULIN SERPL ELPH-MCNC: 1.1 G/DL (ref 0.7–1.3)
GAMMA GLOB SERPL ELPH-MCNC: 1.6 G/DL (ref 0.4–1.8)
GLOBULIN SER CALC-MCNC: 3.5 G/DL (ref 2.2–3.9)
IGA SERPL-MCNC: 226 MG/DL (ref 87–352)
IGG SERPL-MCNC: 1419 MG/DL (ref 700–1600)
IGM SERPL-MCNC: 178 MG/DL (ref 26–217)
INTERPRETATION SERPL IEP-IMP: ABNORMAL
KAPPA LC SERPL-MCNC: 21.2 MG/L (ref 3.3–19.4)
KAPPA LC/LAMBDA SER: 2.26 {RATIO} (ref 0.26–1.65)
LAMBDA LC FREE SERPL-MCNC: 9.4 MG/L (ref 5.7–26.3)
Lab: ABNORMAL
M-SPIKE: ABNORMAL G/DL
PROT SERPL-MCNC: 7.4 G/DL (ref 6–8.5)

## 2019-02-04 ENCOUNTER — OFFICE VISIT (OUTPATIENT)
Dept: FAMILY MEDICINE CLINIC | Facility: CLINIC | Age: 49
End: 2019-02-04

## 2019-02-04 ENCOUNTER — TELEPHONE (OUTPATIENT)
Dept: ONCOLOGY | Facility: CLINIC | Age: 49
End: 2019-02-04

## 2019-02-04 VITALS
OXYGEN SATURATION: 95 % | BODY MASS INDEX: 37.11 KG/M2 | SYSTOLIC BLOOD PRESSURE: 120 MMHG | WEIGHT: 189 LBS | HEART RATE: 82 BPM | HEIGHT: 60 IN | DIASTOLIC BLOOD PRESSURE: 78 MMHG

## 2019-02-04 DIAGNOSIS — E11.9 CONTROLLED TYPE 2 DIABETES MELLITUS WITHOUT COMPLICATION, WITHOUT LONG-TERM CURRENT USE OF INSULIN (HCC): Primary | ICD-10-CM

## 2019-02-04 DIAGNOSIS — I10 ESSENTIAL HYPERTENSION: ICD-10-CM

## 2019-02-04 DIAGNOSIS — E55.9 VITAMIN D DEFICIENCY: ICD-10-CM

## 2019-02-04 DIAGNOSIS — Z12.11 SCREEN FOR COLON CANCER: ICD-10-CM

## 2019-02-04 DIAGNOSIS — E78.2 MIXED HYPERLIPIDEMIA: ICD-10-CM

## 2019-02-04 PROCEDURE — 99213 OFFICE O/P EST LOW 20 MIN: CPT | Performed by: NURSE PRACTITIONER

## 2019-02-04 NOTE — TELEPHONE ENCOUNTER
----- Message from Tim Muñoz MD sent at 2/4/2019  8:10 AM EST -----  Please inform the patient that the protein test was normal. Her iron level is low. Please ask her to start oral iron OTC once daily for 6 months.    Thank you    Called and left message for pt to call the office to inform her that the protein test was normal. Her iron level is low. Please ask her to start oral iron OTC once daily for 6 months per Dr. Muñoz

## 2019-02-04 NOTE — PATIENT INSTRUCTIONS
Discharge instructions  Continue present care  Check in 6 months  Increase vegetables increase fiber decrease processed foods  Exercise most days of the week

## 2019-02-06 ENCOUNTER — TELEPHONE (OUTPATIENT)
Dept: ONCOLOGY | Facility: CLINIC | Age: 49
End: 2019-02-06

## 2019-02-06 NOTE — TELEPHONE ENCOUNTER
----- Message from Tim Muñoz MD sent at 2/4/2019  8:10 AM EST -----  Please inform the patient that the protein test was normal. Her iron level is low. Please ask her to start oral iron OTC once daily for 6 months.    Thank you    Called and left message for to call the office back to inform the patient that the protein test was normal. Her iron level is low.  Please ask her to start oral iron OTC once daily for six months per Dr. Muñoz

## 2019-02-10 NOTE — PROGRESS NOTES
Subjective   Ina Akers is a 48 y.o. female.     Follow-up diabetes mellitus type II controlled  Essential hypertension controlled    Recent thyroidectomy  ENT doctor charlene went well  No problems         The following portions of the patient's history were reviewed and updated as appropriate: allergies, current medications, past family history, past medical history, past social history, past surgical history and problem list.    Review of Systems   Constitutional: Negative for chills, fatigue, fever and unexpected weight loss.   HENT: Negative.  Negative for trouble swallowing.    Eyes: Negative.    Respiratory: Negative for cough and shortness of breath.    Cardiovascular: Negative for chest pain, palpitations and leg swelling.   Gastrointestinal: Negative for abdominal pain, blood in stool, constipation and diarrhea.   Genitourinary: Negative.    Musculoskeletal: Negative.    Skin: Negative.    Neurological: Negative.  Negative for dizziness, speech difficulty, weakness and confusion.   Psychiatric/Behavioral: Negative.        Objective   Physical Exam   Constitutional: She is oriented to person, place, and time. She appears well-developed and well-nourished.   HENT:   Head: Normocephalic.   Mouth/Throat: Oropharynx is clear and moist.   Eyes: Conjunctivae are normal. Pupils are equal, round, and reactive to light.   Neck: Neck supple.   Cardiovascular: Normal rate, regular rhythm and normal heart sounds. Exam reveals no friction rub.   No murmur heard.  Pulmonary/Chest: Effort normal and breath sounds normal. No respiratory distress. She has no wheezes.   Abdominal: Soft. Bowel sounds are normal. She exhibits no distension. There is no tenderness.   Musculoskeletal: She exhibits no edema.   Neurological: She is alert and oriented to person, place, and time.   Skin: Skin is warm and dry.   Psychiatric: She has a normal mood and affect. Her behavior is normal. Judgment and thought content normal.   Vitals  reviewed.        Assessment/Plan   Ina was seen today for follow-up after thyroid surgery.    Diagnoses and all orders for this visit:    Controlled type 2 diabetes mellitus without complication, without long-term current use of insulin (CMS/[facility])  -     TSH Rfx On Abnormal To Free T4; Future  -     Hemoglobin A1c; Future  -     MicroAlbumin, Urine, Random - Urine, Clean Catch; Future  -     Lipid Panel With LDL / HDL Ratio; Future    Essential hypertension  -     TSH Rfx On Abnormal To Free T4; Future  -     Hemoglobin A1c; Future  -     MicroAlbumin, Urine, Random - Urine, Clean Catch; Future  -     Lipid Panel With LDL / HDL Ratio; Future    Mixed hyperlipidemia  -     TSH Rfx On Abnormal To Free T4; Future  -     Hemoglobin A1c; Future  -     MicroAlbumin, Urine, Random - Urine, Clean Catch; Future  -     Lipid Panel With LDL / HDL Ratio; Future    Vitamin D deficiency  -     TSH Rfx On Abnormal To Free T4; Future  -     Hemoglobin A1c; Future  -     MicroAlbumin, Urine, Random - Urine, Clean Catch; Future  -     Lipid Panel With LDL / HDL Ratio; Future    Screen for colon cancer  -     Ambulatory Referral For Screening Colonoscopy  -     TSH Rfx On Abnormal To Free T4; Future  -     Hemoglobin A1c; Future  -     MicroAlbumin, Urine, Random - Urine, Clean Catch; Future  -     Lipid Panel With LDL / HDL Ratio; Future                  Discharge instructions  Continue present care  Check in 6 months  Increase vegetables increase fiber decrease processed foods  Exercise most days of the week

## 2019-02-12 DIAGNOSIS — I10 ESSENTIAL HYPERTENSION: ICD-10-CM

## 2019-02-12 DIAGNOSIS — E11.9 CONTROLLED TYPE 2 DIABETES MELLITUS WITHOUT COMPLICATION, WITHOUT LONG-TERM CURRENT USE OF INSULIN (HCC): ICD-10-CM

## 2019-02-12 DIAGNOSIS — E55.9 VITAMIN D DEFICIENCY: ICD-10-CM

## 2019-02-12 DIAGNOSIS — Z12.11 SCREEN FOR COLON CANCER: ICD-10-CM

## 2019-02-12 DIAGNOSIS — E78.2 MIXED HYPERLIPIDEMIA: ICD-10-CM

## 2019-02-13 LAB
CHOLEST SERPL-MCNC: 148 MG/DL (ref 100–199)
HBA1C MFR BLD: 6.3 % (ref 4.8–5.6)
HDLC SERPL-MCNC: 42 MG/DL
LDLC SERPL CALC-MCNC: 93 MG/DL (ref 0–99)
LDLC/HDLC SERPL: 2.2 RATIO (ref 0–3.2)
MICROALBUMIN UR-MCNC: 5.1 UG/ML
T4 FREE SERPL-MCNC: 0.9 NG/DL (ref 0.82–1.77)
TRIGL SERPL-MCNC: 65 MG/DL (ref 0–149)
TSH SERPL DL<=0.005 MIU/L-ACNC: 4.69 UIU/ML (ref 0.45–4.5)
VLDLC SERPL CALC-MCNC: 13 MG/DL (ref 5–40)

## 2019-02-28 RX ORDER — METFORMIN HYDROCHLORIDE 500 MG/1
TABLET, EXTENDED RELEASE ORAL
Qty: 180 TABLET | Refills: 0 | Status: SHIPPED | OUTPATIENT
Start: 2019-02-28 | End: 2019-06-13 | Stop reason: SDUPTHER

## 2019-03-01 ENCOUNTER — TELEPHONE (OUTPATIENT)
Dept: FAMILY MEDICINE CLINIC | Facility: CLINIC | Age: 49
End: 2019-03-01

## 2019-03-01 NOTE — TELEPHONE ENCOUNTER
Ms. Akers wants to know with her thyroid being borderline would that cause her to have menstrual cramps.    I gave her the number to [facility] [phone]    Advise.

## 2019-03-01 NOTE — TELEPHONE ENCOUNTER
Unlikely this borderline    If she is having bad menstrual problems see GYN      Symptoms will overlap with other potential problems  May include constipation fatigue dry skin    If patient is symptomatic sometimes we go ahead and treat  Otherwise guidelines recommend that if the free T4 is within normal range as long as TSH is less than 10    Just continue to monitor      Hope this helps

## 2019-03-18 RX ORDER — LOSARTAN POTASSIUM 50 MG/1
50 TABLET ORAL DAILY
Qty: 30 TABLET | Refills: 0 | OUTPATIENT
Start: 2019-03-18 | End: 2019-06-13 | Stop reason: SDUPTHER

## 2019-03-23 DIAGNOSIS — E78.5 HYPERLIPIDEMIA, UNSPECIFIED HYPERLIPIDEMIA TYPE: ICD-10-CM

## 2019-03-23 DIAGNOSIS — E55.9 VITAMIN D DEFICIENCY: ICD-10-CM

## 2019-03-23 DIAGNOSIS — I10 ESSENTIAL HYPERTENSION: ICD-10-CM

## 2019-03-23 DIAGNOSIS — E11.9 CONTROLLED TYPE 2 DIABETES MELLITUS WITHOUT COMPLICATION, WITHOUT LONG-TERM CURRENT USE OF INSULIN (HCC): ICD-10-CM

## 2019-03-25 RX ORDER — LANSOPRAZOLE 30 MG/1
CAPSULE, DELAYED RELEASE ORAL
Qty: 90 CAPSULE | Refills: 0 | Status: SHIPPED | OUTPATIENT
Start: 2019-03-25 | End: 2019-06-13 | Stop reason: SDUPTHER

## 2019-06-04 DIAGNOSIS — E04.9 GOITER: ICD-10-CM

## 2019-06-04 DIAGNOSIS — I10 ESSENTIAL HYPERTENSION: ICD-10-CM

## 2019-06-04 DIAGNOSIS — E55.9 VITAMIN D DEFICIENCY: ICD-10-CM

## 2019-06-04 DIAGNOSIS — Z00.00 HEALTH MAINTENANCE EXAMINATION: ICD-10-CM

## 2019-06-04 DIAGNOSIS — E11.9 CONTROLLED TYPE 2 DIABETES MELLITUS WITHOUT COMPLICATION, WITHOUT LONG-TERM CURRENT USE OF INSULIN (HCC): ICD-10-CM

## 2019-06-04 DIAGNOSIS — E11.9 CONTROLLED TYPE 2 DIABETES MELLITUS WITHOUT COMPLICATION, WITHOUT LONG-TERM CURRENT USE OF INSULIN (HCC): Primary | ICD-10-CM

## 2019-06-06 LAB
25(OH)D3+25(OH)D2 SERPL-MCNC: 36.1 NG/ML (ref 30–100)
ALBUMIN SERPL-MCNC: 4.4 G/DL (ref 3.5–5.2)
ALBUMIN/GLOB SERPL: 1.3 G/DL
ALP SERPL-CCNC: 86 U/L (ref 39–117)
ALT SERPL-CCNC: 14 U/L (ref 1–33)
APPEARANCE UR: CLEAR
AST SERPL-CCNC: 13 U/L (ref 1–32)
BACTERIA #/AREA URNS HPF: NORMAL /HPF
BASOPHILS # BLD AUTO: 0.04 10*3/MM3 (ref 0–0.2)
BASOPHILS NFR BLD AUTO: 0.7 % (ref 0–1.5)
BILIRUB SERPL-MCNC: 0.4 MG/DL (ref 0.2–1.2)
BILIRUB UR QL STRIP: NEGATIVE
BUN SERPL-MCNC: 14 MG/DL (ref 6–20)
BUN/CREAT SERPL: 17.5 (ref 7–25)
CALCIUM SERPL-MCNC: 10.5 MG/DL (ref 8.6–10.5)
CASTS URNS MICRO: NORMAL
CHLORIDE SERPL-SCNC: 102 MMOL/L (ref 98–107)
CO2 SERPL-SCNC: 31.7 MMOL/L (ref 22–29)
COLOR UR: YELLOW
CREAT SERPL-MCNC: 0.8 MG/DL (ref 0.57–1)
EOSINOPHIL # BLD AUTO: 0.33 10*3/MM3 (ref 0–0.4)
EOSINOPHIL NFR BLD AUTO: 5.4 % (ref 0.3–6.2)
EPI CELLS #/AREA URNS HPF: NORMAL /HPF
ERYTHROCYTE [DISTWIDTH] IN BLOOD BY AUTOMATED COUNT: 15.1 % (ref 12.3–15.4)
GLOBULIN SER CALC-MCNC: 3.4 GM/DL
GLUCOSE SERPL-MCNC: 84 MG/DL (ref 65–99)
GLUCOSE UR QL: NEGATIVE
HBA1C MFR BLD: 6.3 % (ref 4.8–5.6)
HCT VFR BLD AUTO: 38.7 % (ref 34–46.6)
HGB BLD-MCNC: 11.8 G/DL (ref 12–15.9)
HGB UR QL STRIP: NEGATIVE
IMM GRANULOCYTES # BLD AUTO: 0.01 10*3/MM3 (ref 0–0.05)
IMM GRANULOCYTES NFR BLD AUTO: 0.2 % (ref 0–0.5)
KETONES UR QL STRIP: NEGATIVE
LEUKOCYTE ESTERASE UR QL STRIP: NEGATIVE
LYMPHOCYTES # BLD AUTO: 2.27 10*3/MM3 (ref 0.7–3.1)
LYMPHOCYTES NFR BLD AUTO: 37.1 % (ref 19.6–45.3)
MCH RBC QN AUTO: 26.8 PG (ref 26.6–33)
MCHC RBC AUTO-ENTMCNC: 30.5 G/DL (ref 31.5–35.7)
MCV RBC AUTO: 88 FL (ref 79–97)
MONOCYTES # BLD AUTO: 0.48 10*3/MM3 (ref 0.1–0.9)
MONOCYTES NFR BLD AUTO: 7.8 % (ref 5–12)
NEUTROPHILS # BLD AUTO: 2.99 10*3/MM3 (ref 1.7–7)
NEUTROPHILS NFR BLD AUTO: 48.8 % (ref 42.7–76)
NITRITE UR QL STRIP: NEGATIVE
NRBC BLD AUTO-RTO: 0 /100 WBC (ref 0–0.2)
PH UR STRIP: 6.5 [PH] (ref 5–8)
PLATELET # BLD AUTO: 370 10*3/MM3 (ref 140–450)
POTASSIUM SERPL-SCNC: 3.7 MMOL/L (ref 3.5–5.2)
PROT SERPL-MCNC: 7.8 G/DL (ref 6–8.5)
PROT UR QL STRIP: NEGATIVE
RBC # BLD AUTO: 4.4 10*6/MM3 (ref 3.77–5.28)
RBC #/AREA URNS HPF: NORMAL /HPF
SODIUM SERPL-SCNC: 143 MMOL/L (ref 136–145)
SP GR UR: 1.02 (ref 1–1.03)
TSH SERPL DL<=0.005 MIU/L-ACNC: 4.36 MIU/ML (ref 0.27–4.2)
UROBILINOGEN UR STRIP-MCNC: (no result) MG/DL
WBC # BLD AUTO: 6.12 10*3/MM3 (ref 3.4–10.8)
WBC #/AREA URNS HPF: NORMAL /HPF

## 2019-06-13 ENCOUNTER — OFFICE VISIT (OUTPATIENT)
Dept: FAMILY MEDICINE CLINIC | Facility: CLINIC | Age: 49
End: 2019-06-13

## 2019-06-13 VITALS
WEIGHT: 186 LBS | HEIGHT: 60 IN | OXYGEN SATURATION: 96 % | SYSTOLIC BLOOD PRESSURE: 120 MMHG | HEART RATE: 111 BPM | DIASTOLIC BLOOD PRESSURE: 80 MMHG | BODY MASS INDEX: 36.52 KG/M2

## 2019-06-13 DIAGNOSIS — R79.89 TSH ELEVATION: Primary | ICD-10-CM

## 2019-06-13 DIAGNOSIS — E11.9 CONTROLLED TYPE 2 DIABETES MELLITUS WITHOUT COMPLICATION, WITHOUT LONG-TERM CURRENT USE OF INSULIN (HCC): ICD-10-CM

## 2019-06-13 DIAGNOSIS — E55.9 VITAMIN D DEFICIENCY: ICD-10-CM

## 2019-06-13 DIAGNOSIS — I10 ESSENTIAL HYPERTENSION: ICD-10-CM

## 2019-06-13 DIAGNOSIS — E78.5 HYPERLIPIDEMIA, UNSPECIFIED HYPERLIPIDEMIA TYPE: ICD-10-CM

## 2019-06-13 PROCEDURE — 99213 OFFICE O/P EST LOW 20 MIN: CPT | Performed by: NURSE PRACTITIONER

## 2019-06-13 RX ORDER — AMLODIPINE BESYLATE 5 MG/1
5 TABLET ORAL DAILY
Qty: 90 TABLET | Refills: 1 | Status: SHIPPED | OUTPATIENT
Start: 2019-06-13 | End: 2020-01-06 | Stop reason: SDUPTHER

## 2019-06-13 RX ORDER — LOSARTAN POTASSIUM 50 MG/1
50 TABLET ORAL DAILY
Qty: 90 TABLET | Refills: 1 | OUTPATIENT
Start: 2019-06-13 | End: 2019-09-23 | Stop reason: SDUPTHER

## 2019-06-13 RX ORDER — METFORMIN HYDROCHLORIDE 500 MG/1
1000 TABLET, EXTENDED RELEASE ORAL
Qty: 180 TABLET | Refills: 1 | Status: SHIPPED | OUTPATIENT
Start: 2019-06-13 | End: 2020-01-06 | Stop reason: SDUPTHER

## 2019-06-13 RX ORDER — ATORVASTATIN CALCIUM 10 MG/1
10 TABLET, FILM COATED ORAL DAILY
Qty: 90 TABLET | Refills: 3 | Status: SHIPPED | OUTPATIENT
Start: 2019-06-13 | End: 2020-06-29 | Stop reason: SDUPTHER

## 2019-06-13 RX ORDER — LANSOPRAZOLE 30 MG/1
30 CAPSULE, DELAYED RELEASE ORAL DAILY
Qty: 90 CAPSULE | Refills: 3 | Status: SHIPPED | OUTPATIENT
Start: 2019-06-13 | End: 2020-11-30

## 2019-06-13 NOTE — PROGRESS NOTES
Subjective   Ina Akers is a 48 y.o. female.     Elevated TSH on recent labs  With normal low normal free T4 February repeated TSH mildly elevated but free T4 was not ordered  We discussed hypothyroid subclinical hypothyroid she has no increasing fatigue or other symptoms of thyroid disease no difficulty swallowing or tenderness of her neck  Normal thyroid exam we will simply repeat this  In fever she is  Essential hypertension controlled  Diabetes mellitus type 2 controlled           The following portions of the patient's history were reviewed and updated as appropriate: allergies, current medications, past family history, past medical history, past social history, past surgical history and problem list.    Review of Systems   Constitutional: Negative for chills, fatigue, fever and unexpected weight loss.   HENT: Negative.  Negative for trouble swallowing.    Eyes: Negative.    Respiratory: Negative for cough and shortness of breath.    Cardiovascular: Negative for chest pain, palpitations and leg swelling.   Gastrointestinal: Negative for abdominal pain and blood in stool.   Genitourinary: Negative.  Negative for pelvic pain.   Musculoskeletal: Negative.    Skin: Negative.    Neurological: Negative.  Negative for dizziness, speech difficulty, weakness and confusion.   Psychiatric/Behavioral: Negative.        Objective   Physical Exam   Constitutional: She is oriented to person, place, and time. She appears well-developed and well-nourished.   HENT:   Head: Normocephalic.   Mouth/Throat: Oropharynx is clear and moist. No oropharyngeal exudate.   Eyes: Conjunctivae are normal. Pupils are equal, round, and reactive to light.   Neck: Neck supple. No JVD present.   Cardiovascular: Normal rate, regular rhythm and normal heart sounds. Exam reveals no friction rub.   No murmur heard.  Pulmonary/Chest: Effort normal and breath sounds normal. No respiratory distress. She has no wheezes.   Abdominal: Soft. Bowel  sounds are normal. She exhibits no distension and no mass. There is no tenderness. There is no guarding. No hernia.   Musculoskeletal: She exhibits no edema or tenderness.   Lymphadenopathy:     She has no cervical adenopathy.   Neurological: She is alert and oriented to person, place, and time.   Skin: Skin is warm and dry.   Psychiatric: She has a normal mood and affect. Her behavior is normal. Judgment and thought content normal.   Vitals reviewed.        Assessment/Plan   Ina was seen today for diabetes.    Diagnoses and all orders for this visit:    TSH elevation  -     TSH Rfx On Abnormal To Free T4; Future    Hyperlipidemia, unspecified hyperlipidemia type  -     lansoprazole (PREVACID) 30 MG capsule; Take 1 capsule by mouth Daily.  -     atorvastatin (LIPITOR) 10 MG tablet; Take 1 tablet by mouth Daily.  -     amLODIPine (NORVASC) 5 MG tablet; Take 1 tablet by mouth Daily.    Controlled type 2 diabetes mellitus without complication, without long-term current use of insulin (CMS/[facility])  -     lansoprazole (PREVACID) 30 MG capsule; Take 1 capsule by mouth Daily.  -     atorvastatin (LIPITOR) 10 MG tablet; Take 1 tablet by mouth Daily.  -     amLODIPine (NORVASC) 5 MG tablet; Take 1 tablet by mouth Daily.    Essential hypertension  -     lansoprazole (PREVACID) 30 MG capsule; Take 1 capsule by mouth Daily.  -     atorvastatin (LIPITOR) 10 MG tablet; Take 1 tablet by mouth Daily.  -     amLODIPine (NORVASC) 5 MG tablet; Take 1 tablet by mouth Daily.    Vitamin D deficiency  -     lansoprazole (PREVACID) 30 MG capsule; Take 1 capsule by mouth Daily.  -     atorvastatin (LIPITOR) 10 MG tablet; Take 1 tablet by mouth Daily.  -     amLODIPine (NORVASC) 5 MG tablet; Take 1 tablet by mouth Daily.    Other orders  -     metFORMIN ER (GLUCOPHAGE-XR) 500 MG 24 hr tablet; Take 2 tablets by mouth Daily With Dinner.  -     losartan (COZAAR) 50 MG tablet; Take 1 tablet by mouth Daily.      Continue present  medication  Follow-up outpatient lab may be developing hypothyroid  Continue therapeutic lifestyle changes healthy diet gradual weight loss              There are no Patient Instructions on file for this visit.

## 2019-07-17 ENCOUNTER — RESULTS ENCOUNTER (OUTPATIENT)
Dept: FAMILY MEDICINE CLINIC | Facility: CLINIC | Age: 49
End: 2019-07-17

## 2019-07-17 DIAGNOSIS — R79.89 TSH ELEVATION: ICD-10-CM

## 2019-09-16 ENCOUNTER — TELEPHONE (OUTPATIENT)
Dept: FAMILY MEDICINE CLINIC | Facility: CLINIC | Age: 49
End: 2019-09-16

## 2019-09-16 NOTE — TELEPHONE ENCOUNTER
WENT TO UC ON Sunday FOR HIVES, SHE GOT A STEROID INJ WHICH SHE FELT BETTER BUT ITS FLARING UP AGAIN AND SHE WANTS TO KNOW IF SHE ALSO NEEDS AN ORAL STEROID? PLEASE ADVISE

## 2019-09-16 NOTE — TELEPHONE ENCOUNTER
Possibly  Injections are usually long-acting but sometimes we have to add an oral steroid based on symptoms    Urgent care or urgent recheck here  She can also take Pepcid 20 mg twice daily if not already    And make sure she is taking Benadryl  OTC 4 times a day but caution sedation  Do not drive with Benadryl  Shortness of breath difficulty swallowing  Severe emergency room

## 2019-09-23 RX ORDER — LOSARTAN POTASSIUM 50 MG/1
50 TABLET ORAL DAILY
Qty: 90 TABLET | Refills: 1 | OUTPATIENT
Start: 2019-09-23 | End: 2020-03-16

## 2019-11-15 LAB — TSH SERPL DL<=0.005 MIU/L-ACNC: 4.49 UIU/ML (ref 0.27–4.2)

## 2019-12-16 ENCOUNTER — OFFICE VISIT (OUTPATIENT)
Dept: FAMILY MEDICINE CLINIC | Facility: CLINIC | Age: 49
End: 2019-12-16

## 2019-12-16 VITALS
BODY MASS INDEX: 33.99 KG/M2 | OXYGEN SATURATION: 98 % | WEIGHT: 180 LBS | HEIGHT: 61 IN | SYSTOLIC BLOOD PRESSURE: 130 MMHG | HEART RATE: 70 BPM | DIASTOLIC BLOOD PRESSURE: 80 MMHG

## 2019-12-16 DIAGNOSIS — E11.9 CONTROLLED TYPE 2 DIABETES MELLITUS WITHOUT COMPLICATION, WITHOUT LONG-TERM CURRENT USE OF INSULIN (HCC): Primary | ICD-10-CM

## 2019-12-16 DIAGNOSIS — E03.8 SUBCLINICAL HYPOTHYROIDISM: ICD-10-CM

## 2019-12-16 DIAGNOSIS — I10 ESSENTIAL HYPERTENSION: ICD-10-CM

## 2019-12-16 DIAGNOSIS — Z12.11 COLON CANCER SCREENING: ICD-10-CM

## 2019-12-16 DIAGNOSIS — R79.89 ABNORMAL CBC: ICD-10-CM

## 2019-12-16 DIAGNOSIS — E78.2 MIXED HYPERLIPIDEMIA: ICD-10-CM

## 2019-12-16 PROCEDURE — 99213 OFFICE O/P EST LOW 20 MIN: CPT | Performed by: NURSE PRACTITIONER

## 2019-12-16 NOTE — PATIENT INSTRUCTIONS
Call your insurance see if they cover Prevnar 13 if so outpatient here or your pharmacy   Tdap if not up-to-date    Shingrix shingles vaccine if covered x2

## 2019-12-16 NOTE — PROGRESS NOTES
"Subjective   Ina Akers is a 49 y.o. female.     Very pleasant patient here follow-up diabetes mellitus type 2 well controlled.  Hypertension essential controlled blood pressure is 130/80 her blood pressure goal is less than 130/80 so she is very close    Hyperlipidemia mixed take statin appropriately no problems    Colonoscopy in 2011 normal  Mammogram up-to-date  Pap up-to-date  Officially menopause now  Sees GYN    Subclinical hypothyroid the follow-up TSH for some reason free T4 not completed or not in the chart I requested  Otherwise a repeat this outpatient  As well she needs fasting labs      Diabetes          /80   Pulse 70   Ht 154.9 cm (61\")   Wt 81.6 kg (180 lb)   SpO2 98%   BMI 34.01 kg/m²       The following portions of the patient's history were reviewed and updated as appropriate: allergies, current medications, past family history, past medical history, past social history, past surgical history and problem list.    Review of Systems    Objective   Physical Exam      Assessment/Plan   Ina was seen today for diabetes.    Diagnoses and all orders for this visit:    Controlled type 2 diabetes mellitus without complication, without long-term current use of insulin (CMS/[facility])  -     TSH; Future  -     T4, Free; Future  -     Comprehensive Metabolic Panel; Future  -     Lipid Panel With LDL / HDL Ratio; Future  -     Urinalysis With Microscopic If Indicated (No Culture) - Urine, Clean Catch; Future  -     CBC & Differential; Future  -     MicroAlbumin, Urine, Random - Urine, Clean Catch; Future    Essential hypertension  -     TSH; Future  -     T4, Free; Future  -     Comprehensive Metabolic Panel; Future  -     Lipid Panel With LDL / HDL Ratio; Future  -     Urinalysis With Microscopic If Indicated (No Culture) - Urine, Clean Catch; Future  -     CBC & Differential; Future  -     MicroAlbumin, Urine, Random - Urine, Clean Catch; Future    Subclinical hypothyroidism  -     TSH; " Future  -     T4, Free; Future  -     Comprehensive Metabolic Panel; Future  -     Lipid Panel With LDL / HDL Ratio; Future  -     Urinalysis With Microscopic If Indicated (No Culture) - Urine, Clean Catch; Future  -     CBC & Differential; Future  -     MicroAlbumin, Urine, Random - Urine, Clean Catch; Future    Abnormal CBC  -     TSH; Future  -     T4, Free; Future  -     Comprehensive Metabolic Panel; Future  -     Lipid Panel With LDL / HDL Ratio; Future  -     Urinalysis With Microscopic If Indicated (No Culture) - Urine, Clean Catch; Future  -     CBC & Differential; Future  -     MicroAlbumin, Urine, Random - Urine, Clean Catch; Future    Colon cancer screening  -     Ambulatory Referral For Screening Colonoscopy; Future    Mixed hyperlipidemia      Diabetes mellitus type 2 controlled subclinical hypothyroid hyperlipidemia mixed hypertension essential continue healthy diet regular exercise therapeutic lifestyle changes labs today            Patient Instructions   Call your insurance see if they cover Prevnar 13 if so outpatient here or your pharmacy   Tdap if not up-to-date    Shingrix shingles vaccine if covered x2

## 2019-12-17 PROBLEM — E03.8 SUBCLINICAL HYPOTHYROIDISM: Status: ACTIVE | Noted: 2019-12-17

## 2019-12-19 ENCOUNTER — RESULTS ENCOUNTER (OUTPATIENT)
Dept: FAMILY MEDICINE CLINIC | Facility: CLINIC | Age: 49
End: 2019-12-19

## 2019-12-19 DIAGNOSIS — I10 ESSENTIAL HYPERTENSION: ICD-10-CM

## 2019-12-19 DIAGNOSIS — R79.89 ABNORMAL CBC: ICD-10-CM

## 2019-12-19 DIAGNOSIS — E03.8 SUBCLINICAL HYPOTHYROIDISM: ICD-10-CM

## 2019-12-19 DIAGNOSIS — E11.9 CONTROLLED TYPE 2 DIABETES MELLITUS WITHOUT COMPLICATION, WITHOUT LONG-TERM CURRENT USE OF INSULIN (HCC): ICD-10-CM

## 2019-12-21 LAB
ALBUMIN SERPL-MCNC: 4.5 G/DL (ref 3.5–5.2)
ALBUMIN/GLOB SERPL: 1.6 G/DL
ALP SERPL-CCNC: 73 U/L (ref 39–117)
ALT SERPL-CCNC: 11 U/L (ref 1–33)
APPEARANCE UR: CLEAR
AST SERPL-CCNC: 14 U/L (ref 1–32)
BASOPHILS # BLD AUTO: 0.05 10*3/MM3 (ref 0–0.2)
BASOPHILS NFR BLD AUTO: 0.9 % (ref 0–1.5)
BILIRUB SERPL-MCNC: 0.4 MG/DL (ref 0.2–1.2)
BILIRUB UR QL STRIP: NEGATIVE
BUN SERPL-MCNC: 12 MG/DL (ref 6–20)
BUN/CREAT SERPL: 14.5 (ref 7–25)
CALCIUM SERPL-MCNC: 9.4 MG/DL (ref 8.6–10.5)
CHLORIDE SERPL-SCNC: 102 MMOL/L (ref 98–107)
CHOLEST SERPL-MCNC: 145 MG/DL (ref 0–200)
CO2 SERPL-SCNC: 28.1 MMOL/L (ref 22–29)
COLOR UR: YELLOW
CREAT SERPL-MCNC: 0.83 MG/DL (ref 0.57–1)
EOSINOPHIL # BLD AUTO: 0.36 10*3/MM3 (ref 0–0.4)
EOSINOPHIL NFR BLD AUTO: 6.8 % (ref 0.3–6.2)
ERYTHROCYTE [DISTWIDTH] IN BLOOD BY AUTOMATED COUNT: 14.1 % (ref 12.3–15.4)
GLOBULIN SER CALC-MCNC: 2.9 GM/DL
GLUCOSE SERPL-MCNC: 92 MG/DL (ref 65–99)
GLUCOSE UR QL: NEGATIVE
HCT VFR BLD AUTO: 33.9 % (ref 34–46.6)
HDLC SERPL-MCNC: 50 MG/DL (ref 40–60)
HGB BLD-MCNC: 11.1 G/DL (ref 12–15.9)
HGB UR QL STRIP: NEGATIVE
IMM GRANULOCYTES # BLD AUTO: 0.01 10*3/MM3 (ref 0–0.05)
IMM GRANULOCYTES NFR BLD AUTO: 0.2 % (ref 0–0.5)
KETONES UR QL STRIP: NEGATIVE
LDLC SERPL CALC-MCNC: 84 MG/DL (ref 0–100)
LDLC/HDLC SERPL: 1.68 {RATIO}
LEUKOCYTE ESTERASE UR QL STRIP: NEGATIVE
LYMPHOCYTES # BLD AUTO: 1.57 10*3/MM3 (ref 0.7–3.1)
LYMPHOCYTES NFR BLD AUTO: 29.7 % (ref 19.6–45.3)
MCH RBC QN AUTO: 27.3 PG (ref 26.6–33)
MCHC RBC AUTO-ENTMCNC: 32.7 G/DL (ref 31.5–35.7)
MCV RBC AUTO: 83.5 FL (ref 79–97)
MICROALBUMIN UR-MCNC: 6.2 UG/ML
MONOCYTES # BLD AUTO: 0.42 10*3/MM3 (ref 0.1–0.9)
MONOCYTES NFR BLD AUTO: 7.9 % (ref 5–12)
NEUTROPHILS # BLD AUTO: 2.88 10*3/MM3 (ref 1.7–7)
NEUTROPHILS NFR BLD AUTO: 54.5 % (ref 42.7–76)
NITRITE UR QL STRIP: NEGATIVE
NRBC BLD AUTO-RTO: 0 /100 WBC (ref 0–0.2)
PH UR STRIP: 6 [PH] (ref 5–8)
PLATELET # BLD AUTO: 341 10*3/MM3 (ref 140–450)
POTASSIUM SERPL-SCNC: 4 MMOL/L (ref 3.5–5.2)
PROT SERPL-MCNC: 7.4 G/DL (ref 6–8.5)
PROT UR QL STRIP: NEGATIVE
RBC # BLD AUTO: 4.06 10*6/MM3 (ref 3.77–5.28)
SODIUM SERPL-SCNC: 142 MMOL/L (ref 136–145)
SP GR UR: 1.02 (ref 1–1.03)
T4 FREE SERPL-MCNC: 0.88 NG/DL (ref 0.93–1.7)
TRIGL SERPL-MCNC: 56 MG/DL (ref 0–150)
TSH SERPL DL<=0.005 MIU/L-ACNC: 4.21 UIU/ML (ref 0.27–4.2)
UROBILINOGEN UR STRIP-MCNC: NORMAL MG/DL
VLDLC SERPL CALC-MCNC: 11.2 MG/DL
WBC # BLD AUTO: 5.29 10*3/MM3 (ref 3.4–10.8)

## 2019-12-23 DIAGNOSIS — E03.8 SUBCLINICAL HYPOTHYROIDISM: Primary | ICD-10-CM

## 2019-12-23 RX ORDER — LEVOTHYROXINE SODIUM 0.05 MG/1
50 TABLET ORAL DAILY
Qty: 90 TABLET | Refills: 0 | Status: SHIPPED | OUTPATIENT
Start: 2019-12-23 | End: 2020-04-06

## 2020-01-06 DIAGNOSIS — E55.9 VITAMIN D DEFICIENCY: ICD-10-CM

## 2020-01-06 DIAGNOSIS — I10 ESSENTIAL HYPERTENSION: ICD-10-CM

## 2020-01-06 DIAGNOSIS — E78.5 HYPERLIPIDEMIA, UNSPECIFIED HYPERLIPIDEMIA TYPE: ICD-10-CM

## 2020-01-06 DIAGNOSIS — E11.9 CONTROLLED TYPE 2 DIABETES MELLITUS WITHOUT COMPLICATION, WITHOUT LONG-TERM CURRENT USE OF INSULIN (HCC): ICD-10-CM

## 2020-01-06 RX ORDER — METFORMIN HYDROCHLORIDE 500 MG/1
1000 TABLET, EXTENDED RELEASE ORAL
Qty: 180 TABLET | Refills: 2 | Status: SHIPPED | OUTPATIENT
Start: 2020-01-06 | End: 2020-10-06

## 2020-01-06 RX ORDER — AMLODIPINE BESYLATE 5 MG/1
5 TABLET ORAL DAILY
Qty: 90 TABLET | Refills: 2 | Status: SHIPPED | OUTPATIENT
Start: 2020-01-06 | End: 2020-10-01

## 2020-01-22 ENCOUNTER — LAB (OUTPATIENT)
Dept: OTHER | Facility: HOSPITAL | Age: 50
End: 2020-01-22

## 2020-01-22 ENCOUNTER — OFFICE VISIT (OUTPATIENT)
Dept: ONCOLOGY | Facility: CLINIC | Age: 50
End: 2020-01-22

## 2020-01-22 VITALS
HEIGHT: 60 IN | RESPIRATION RATE: 16 BRPM | WEIGHT: 174.5 LBS | OXYGEN SATURATION: 100 % | SYSTOLIC BLOOD PRESSURE: 120 MMHG | DIASTOLIC BLOOD PRESSURE: 84 MMHG | BODY MASS INDEX: 34.26 KG/M2 | HEART RATE: 70 BPM | TEMPERATURE: 98.3 F

## 2020-01-22 DIAGNOSIS — C50.412 MALIGNANT NEOPLASM OF UPPER-OUTER QUADRANT OF LEFT BREAST IN FEMALE, ESTROGEN RECEPTOR NEGATIVE (HCC): Primary | ICD-10-CM

## 2020-01-22 DIAGNOSIS — Z17.1 MALIGNANT NEOPLASM OF UPPER-OUTER QUADRANT OF LEFT BREAST IN FEMALE, ESTROGEN RECEPTOR NEGATIVE (HCC): ICD-10-CM

## 2020-01-22 DIAGNOSIS — Z12.31 ENCOUNTER FOR SCREENING MAMMOGRAM FOR BREAST CANCER: ICD-10-CM

## 2020-01-22 DIAGNOSIS — Z17.1 MALIGNANT NEOPLASM OF UPPER-OUTER QUADRANT OF LEFT BREAST IN FEMALE, ESTROGEN RECEPTOR NEGATIVE (HCC): Primary | ICD-10-CM

## 2020-01-22 DIAGNOSIS — D50.9 IRON DEFICIENCY ANEMIA, UNSPECIFIED IRON DEFICIENCY ANEMIA TYPE: ICD-10-CM

## 2020-01-22 DIAGNOSIS — D47.2 MONOCLONAL GAMMOPATHY: ICD-10-CM

## 2020-01-22 DIAGNOSIS — C50.412 MALIGNANT NEOPLASM OF UPPER-OUTER QUADRANT OF LEFT BREAST IN FEMALE, ESTROGEN RECEPTOR NEGATIVE (HCC): ICD-10-CM

## 2020-01-22 LAB
ALBUMIN SERPL-MCNC: 4.3 G/DL (ref 3.5–5.2)
ALBUMIN/GLOB SERPL: 1.2 G/DL
ALP SERPL-CCNC: 76 U/L (ref 39–117)
ALT SERPL W P-5'-P-CCNC: 7 U/L (ref 1–33)
ANION GAP SERPL CALCULATED.3IONS-SCNC: 9.3 MMOL/L (ref 5–15)
AST SERPL-CCNC: 11 U/L (ref 1–32)
BASOPHILS # BLD AUTO: 0.04 10*3/MM3 (ref 0–0.2)
BASOPHILS NFR BLD AUTO: 0.8 % (ref 0–1.5)
BILIRUB SERPL-MCNC: 0.4 MG/DL (ref 0.1–1.2)
BUN BLD-MCNC: 12 MG/DL (ref 6–20)
BUN/CREAT SERPL: 15.6 (ref 7–25)
CALCIUM SPEC-SCNC: 9.6 MG/DL (ref 8.6–10.5)
CHLORIDE SERPL-SCNC: 102 MMOL/L (ref 98–107)
CO2 SERPL-SCNC: 27.7 MMOL/L (ref 22–29)
CREAT BLD-MCNC: 0.77 MG/DL (ref 0.57–1)
DEPRECATED RDW RBC AUTO: 46.8 FL (ref 37–54)
EOSINOPHIL # BLD AUTO: 0.28 10*3/MM3 (ref 0–0.4)
EOSINOPHIL NFR BLD AUTO: 5.5 % (ref 0.3–6.2)
ERYTHROCYTE [DISTWIDTH] IN BLOOD BY AUTOMATED COUNT: 14.6 % (ref 12.3–15.4)
FERRITIN SERPL-MCNC: 200.8 NG/ML (ref 13–150)
GFR SERPL CREATININE-BSD FRML MDRD: 96 ML/MIN/1.73
GLOBULIN UR ELPH-MCNC: 3.6 GM/DL
GLUCOSE BLD-MCNC: 94 MG/DL (ref 65–99)
HCT VFR BLD AUTO: 34.3 % (ref 34–46.6)
HGB BLD-MCNC: 10.8 G/DL (ref 12–15.9)
IMM GRANULOCYTES # BLD AUTO: 0.01 10*3/MM3 (ref 0–0.05)
IMM GRANULOCYTES NFR BLD AUTO: 0.2 % (ref 0–0.5)
IRON 24H UR-MRATE: 48 MCG/DL (ref 37–145)
IRON SATN MFR SERPL: 15 % (ref 20–50)
LYMPHOCYTES # BLD AUTO: 1.99 10*3/MM3 (ref 0.7–3.1)
LYMPHOCYTES NFR BLD AUTO: 39.1 % (ref 19.6–45.3)
MCH RBC QN AUTO: 27.3 PG (ref 26.6–33)
MCHC RBC AUTO-ENTMCNC: 31.5 G/DL (ref 31.5–35.7)
MCV RBC AUTO: 86.8 FL (ref 79–97)
MONOCYTES # BLD AUTO: 0.35 10*3/MM3 (ref 0.1–0.9)
MONOCYTES NFR BLD AUTO: 6.9 % (ref 5–12)
NEUTROPHILS # BLD AUTO: 2.42 10*3/MM3 (ref 1.7–7)
NEUTROPHILS NFR BLD AUTO: 47.5 % (ref 42.7–76)
NRBC BLD AUTO-RTO: 0 /100 WBC (ref 0–0.2)
PLATELET # BLD AUTO: 314 10*3/MM3 (ref 140–450)
PMV BLD AUTO: 9.1 FL (ref 6–12)
POTASSIUM BLD-SCNC: 3.7 MMOL/L (ref 3.5–5.2)
PROT SERPL-MCNC: 7.9 G/DL (ref 6–8.5)
RBC # BLD AUTO: 3.95 10*6/MM3 (ref 3.77–5.28)
SODIUM BLD-SCNC: 139 MMOL/L (ref 136–145)
TIBC SERPL-MCNC: 317 MCG/DL (ref 298–536)
TRANSFERRIN SERPL-MCNC: 213 MG/DL (ref 200–360)
WBC NRBC COR # BLD: 5.09 10*3/MM3 (ref 3.4–10.8)

## 2020-01-22 PROCEDURE — 36415 COLL VENOUS BLD VENIPUNCTURE: CPT

## 2020-01-22 PROCEDURE — 82728 ASSAY OF FERRITIN: CPT | Performed by: INTERNAL MEDICINE

## 2020-01-22 PROCEDURE — 83540 ASSAY OF IRON: CPT | Performed by: INTERNAL MEDICINE

## 2020-01-22 PROCEDURE — 80053 COMPREHEN METABOLIC PANEL: CPT | Performed by: INTERNAL MEDICINE

## 2020-01-22 PROCEDURE — 85025 COMPLETE CBC W/AUTO DIFF WBC: CPT | Performed by: INTERNAL MEDICINE

## 2020-01-22 PROCEDURE — 84466 ASSAY OF TRANSFERRIN: CPT | Performed by: INTERNAL MEDICINE

## 2020-01-22 PROCEDURE — 99214 OFFICE O/P EST MOD 30 MIN: CPT | Performed by: INTERNAL MEDICINE

## 2020-01-22 RX ORDER — FERROUS SULFATE 325(65) MG
325 TABLET ORAL DAILY
Start: 2020-01-22 | End: 2020-07-20

## 2020-01-22 NOTE — PROGRESS NOTES
Subjective     CHIEF COMPLAINT:      Chief Complaint   Patient presents with   • Annual Exam     NO CONCERNS       HISTORY OF PRESENT ILLNESS:     Ina Akers is a 49 y.o. female patient who returns today for follow up on her left breast cancer.  She returns today for follow-up reporting no new symptoms.  She is performing monthly breast exam and denies feeling any lumps.  She states that she has been fasting with her Religion members for the past month.    Patient states that she is now on thyroid replacement under the care of her primary care team.    REVIEW OF SYSTEMS:  Review of Systems   Constitutional: Negative for chills, fever and unexpected weight change.   HENT: Negative for mouth sores, nosebleeds, sore throat and voice change.    Eyes: Negative for visual disturbance.   Respiratory: Negative for cough and shortness of breath.    Cardiovascular: Negative for chest pain and leg swelling.   Gastrointestinal: Negative for abdominal pain, blood in stool, constipation, diarrhea, nausea and vomiting.   Genitourinary: Negative for dysuria, frequency and hematuria.   Musculoskeletal: Negative for arthralgias, back pain and joint swelling.   Skin: Negative for rash.   Neurological: Negative for dizziness, numbness and headaches.   Hematological: Negative for adenopathy. Does not bruise/bleed easily.   Psychiatric/Behavioral: Negative for dysphoric mood. The patient is not nervous/anxious.      I verified the ROS obtained by the MA.      Past Medical History:   Diagnosis Date   • Anemia     History of recurent iron deficiency   • Cancer (CMS/HCC) 10/2001    Left breast   • Diabetes mellitus (CMS/HCC)    • H/O Thyroid goiter    • History of gastritis    • History of IBS    • History of tinea corporis    • Hypertension        Past Surgical History:   Procedure Laterality Date   • BREAST SURGERY     •  SECTION     • THYROID BIOPSY  2018       Cancer-related family history is not on file.  Social History  "    Tobacco Use   • Smoking status: Never Smoker   • Smokeless tobacco: Never Used   Substance Use Topics   • Alcohol use: Yes     Comment: Socially       MEDICATIONS:    Current Outpatient Medications:   •  amLODIPine (NORVASC) 5 MG tablet, Take 1 tablet by mouth Daily., Disp: 90 tablet, Rfl: 2  •  atorvastatin (LIPITOR) 10 MG tablet, Take 1 tablet by mouth Daily., Disp: 90 tablet, Rfl: 3  •  diphenhydrAMINE (BENADRYL ALLERGY) 25 mg capsule, Take 25 mg by mouth Every 6 (Six) Hours As Needed for Itching., Disp: , Rfl:   •  Docusate Calcium (STOOL SOFTENER PO), Take  by mouth Daily. As needed, Disp: , Rfl:   •  glucose blood (ACCU-CHEK SILKE) test strip, Use as instructed, Disp: 50 each, Rfl: 12  •  hydrocortisone (PROCTOCARE-HC) 2.5 % rectal cream, As Needed., Disp: , Rfl:   •  Lancets (ACCU-CHEK SOFT TOUCH) lancets, Check blood sugar once a day  As needed, Disp: 100 each, Rfl: 12  •  lansoprazole (PREVACID) 30 MG capsule, Take 1 capsule by mouth Daily., Disp: 90 capsule, Rfl: 3  •  levothyroxine (SYNTHROID) 50 MCG tablet, Take 1 tablet by mouth Daily., Disp: 90 tablet, Rfl: 0  •  losartan (COZAAR) 50 MG tablet, Take 1 tablet by mouth Daily., Disp: 90 tablet, Rfl: 1  •  metFORMIN ER (GLUCOPHAGE-XR) 500 MG 24 hr tablet, Take 2 tablets by mouth Daily With Dinner., Disp: 180 tablet, Rfl: 2    ALLERGIES:  No Known Allergies      Objective   VITAL SIGNS:     Vitals:    01/22/20 1601   BP: 120/84   Pulse: 70   Resp: 16   Temp: 98.3 °F (36.8 °C)   TempSrc: Oral   SpO2: 100%   Weight: 79.2 kg (174 lb 8 oz)   Height: 153 cm (60.24\")  Comment: NEW HT WITHOUT SHOES   PainSc: 0-No pain     Body mass index is 33.81 kg/m².     Wt Readings from Last 3 Encounters:   01/22/20 79.2 kg (174 lb 8 oz)   12/16/19 81.6 kg (180 lb)   09/15/19 83.9 kg (185 lb)       PHYSICAL EXAMINATION:  GENERAL:  The patient appears in good general condition, not in acute distress.  SKIN: Warm and dry. No skin rashes. No ecchymosis.  HEAD:  " Normocephalic.  EYES:  No Jaundice. No Pallor. Pupils equal. EOMI.  NECK:  Supple with Good ROM. No Thyromegaly. No Masses.  LYMPHATICS:  No cervical or supraclavicular or axillary lymphadenopathy.  CHEST: Normal respiratory effort. Lungs clear to auscultation.   CARDIAC:  Normal S1 & S2. No murmur. No edema.  ABDOMEN:  Soft. No tenderness. No Hepatomegaly. No Splenomegaly. No masses.  EXTREMITIES:  No clubbing. No deforming arthritis in the hands.  NEUROLOGICAL:  No Focal neurological deficits.         DIAGNOSTIC DATA:     Results from last 7 days   Lab Units 01/22/20  1547   WBC 10*3/mm3 5.09   NEUTROS ABS 10*3/mm3 2.42   HEMOGLOBIN g/dL 10.8*   HEMATOCRIT % 34.3   PLATELETS 10*3/mm3 314     Results from last 7 days   Lab Units 01/22/20  1547   SODIUM mmol/L 139   POTASSIUM mmol/L 3.7   CHLORIDE mmol/L 102   CO2 mmol/L 27.7   BUN mg/dL 12   CREATININE mg/dL 0.77   CALCIUM mg/dL 9.6   ALBUMIN g/dL 4.30   BILIRUBIN mg/dL 0.4   ALK PHOS U/L 76   ALT (SGPT) U/L 7   AST (SGOT) U/L 11   GLUCOSE mg/dL 94   FERRITIN ng/mL 200.80*   IRON mcg/dL 48   TIBC mcg/dL 317     Component      Latest Ref Rng & Units 11/23/2016 1/18/2018 1/30/2019   IgG      700 - 1600 mg/dL 1452 1521 1419   IgA      87 - 352 mg/dL 220 213 226   IgM      26 - 217 mg/dL 197 184 178   Total Protein      6.0 - 8.5 g/dL 7.3 7.8 7.4   Albumin      2.9 - 4.4 g/dL 3.6 3.9 3.9   Alpha-1-Globulin      0.0 - 0.4 g/dL 0.2 0.2 0.2   Alpha-2-Globulin      0.4 - 1.0 g/dL 0.6 0.7 0.6   Beta Globulin      0.7 - 1.3 g/dL 1.2 1.3 1.1   Gamma Globulin      0.4 - 1.8 g/dL 1.6 1.6 1.6   M-Russell      Not Observed g/dL Not Observed Not Observed Not Observed   Globulin      2.2 - 3.9 g/dL 3.7 3.9 3.5   A/G Ratio      0.7 - 1.7 1.0 1.1 1.2   Immunofixation Reflex, Serum       Comment Comment Comment   Please note       Comment Comment Comment   Kappa FLC      3.3 - 19.4 mg/L 21.64 (H) 20.3 (H) 21.2 (H)   Free Lambda Light Chains      5.7 - 26.3 mg/L 9.37 8.4 9.4    Kappa/Lambda Ratio      0.26 - 1.65 2.31 (H) 2.42 (H) 2.26 (H)       MAMMOGRAPHY REPORT    FACILITY:    UNIT/AGE/GENDER:      OP     AGE: 48 Y          SEX: F  PATIENT NAME/:  CARRIE GALVAN J    1970  UNIT NUMBER:  [id number]  ACCOUNT NUMBER:  [id number]  ACCESSION NUMBER:  [id number]    DATE OF EXAM:  2019  EXAMINATION(S):  MAMMOGRAM ARIELLA SCREENING BILATERAL    FOR THE PATIENT:  Your exam shows there is no evidence of breast cancer.    CLINICAL HISTORY:  Patient is a 48 year old female. The patient is seen for a screening examination.  The patient has the following personal history of breast cancer:  breast cancer in the left breast . The patient has a history of left lumpectomy in  - malignant.   The patient has no family history of breast cancer.    COMPARISON STUDIES:  The present examination has been compared to prior imaging studies performed at [facility] on 2017 and 07/10/2018.    DIGITAL BILATERAL SCREENING MAMMOGRAM WITH TOMOSYNTHESIS  The following views were performed:  Bilateral craniocaudal; bilateral craniocaudal with tomosynthesis; bilateral mediolateral oblique; and bilateral mediolateral oblique with tomosynthesis.  Images were reviewed with digital R2 Computer-Aided Detection   (CAD).    The breasts are almost entirely fatty.    There are no suspicious masses, significant calcifications, or other abnormalities seen.  The left lumpectomy site is stable.    Tomosynthesis was utilized for this examination.    IMPRESSION:  There is no mammographic evidence of malignancy.    Screening Mammogram in 1 year is recommended.    BI-RADS Category 1:  Negative    The patient has been entered into an automated reminder system.        <Electronically signed by CHAO VELIZ M.D.>  2019 1015    Assessment/Plan   1.  Left breast cancer, stage IIB, triple negative.   · S/P lumpectomy  · She received adjuvant chemotherapy on the NSABP B34 with AC  followed by [facility].  · She received post-lumpectomy radiation therapy.   · She was placed on tamoxifen for 5 years and it was completed in January 2007.    · Patient is doing well.  There is no evidence of recurrence.  Last mammograms from 8/9/2019 were benign.  · Patient is performing monthly breast self-exam.    2.  Iron deficiency anemia.  Hemoglobin level decreased on today's lab down to 10.8.  Transferrin saturation is low at 15% while ferritin is elevated but this is likely secondary to acute phase reaction.    3.   Increase in the kappa to lambda ratio.  She does not have monoclonal protein.  Repeat labs showed that the kappa to lambda ratio remains minimally elevated.  I recommended repeating labs in 1 year.    PLAN:    1.  Ferrous sulfate 325 mg once daily for 6 months.  2.  Bilateral mammograms will be obtained in August 2020.  3.  Follow-up in 1 year with CBC CMP ferritin iron panel SPEP [facility].        Tim Muñoz MD  01/22/20

## 2020-01-23 ENCOUNTER — RESULTS ENCOUNTER (OUTPATIENT)
Dept: FAMILY MEDICINE CLINIC | Facility: CLINIC | Age: 50
End: 2020-01-23

## 2020-01-23 DIAGNOSIS — E03.8 SUBCLINICAL HYPOTHYROIDISM: ICD-10-CM

## 2020-03-16 RX ORDER — LOSARTAN POTASSIUM 50 MG/1
TABLET ORAL
Qty: 90 TABLET | Refills: 1 | Status: SHIPPED | OUTPATIENT
Start: 2020-03-16 | End: 2020-10-01

## 2020-03-17 ENCOUNTER — TELEPHONE (OUTPATIENT)
Dept: FAMILY MEDICINE CLINIC | Facility: CLINIC | Age: 50
End: 2020-03-17

## 2020-03-17 NOTE — TELEPHONE ENCOUNTER
PATIENT PHARMACY CALLED IN TO REQUEST SCRIPT FOR TEST STRIPS AND LANCETS. WOULD ALSO LIKE TO KNOW HOW OFTEN SHE WILL BE USING THEM? PER-[facility]     PATIENT CAN BE REACHED AT:[phone]

## 2020-04-06 RX ORDER — LEVOTHYROXINE SODIUM 0.05 MG/1
TABLET ORAL
Qty: 90 TABLET | Refills: 0 | Status: SHIPPED | OUTPATIENT
Start: 2020-04-06 | End: 2020-07-06

## 2020-05-04 DIAGNOSIS — Z12.11 ENCOUNTER FOR SCREENING FOR MALIGNANT NEOPLASM OF COLON: Primary | ICD-10-CM

## 2020-05-04 RX ORDER — SODIUM CHLORIDE, SODIUM LACTATE, POTASSIUM CHLORIDE, CALCIUM CHLORIDE 600; 310; 30; 20 MG/100ML; MG/100ML; MG/100ML; MG/100ML
30 INJECTION, SOLUTION INTRAVENOUS CONTINUOUS
Status: CANCELLED | OUTPATIENT
Start: 2020-07-10

## 2020-05-19 PROBLEM — Z12.11 ENCOUNTER FOR SCREENING FOR MALIGNANT NEOPLASM OF COLON: Status: ACTIVE | Noted: 2020-05-19

## 2020-06-02 ENCOUNTER — TELEPHONE (OUTPATIENT)
Dept: FAMILY MEDICINE CLINIC | Facility: CLINIC | Age: 50
End: 2020-06-02

## 2020-06-02 DIAGNOSIS — I10 ESSENTIAL HYPERTENSION: Primary | ICD-10-CM

## 2020-06-02 DIAGNOSIS — D50.9 IRON DEFICIENCY ANEMIA, UNSPECIFIED IRON DEFICIENCY ANEMIA TYPE: ICD-10-CM

## 2020-06-02 DIAGNOSIS — E78.2 MIXED HYPERLIPIDEMIA: ICD-10-CM

## 2020-06-02 DIAGNOSIS — E11.9 CONTROLLED TYPE 2 DIABETES MELLITUS WITHOUT COMPLICATION, WITHOUT LONG-TERM CURRENT USE OF INSULIN (HCC): ICD-10-CM

## 2020-06-05 ENCOUNTER — LAB (OUTPATIENT)
Dept: LAB | Facility: HOSPITAL | Age: 50
End: 2020-06-05

## 2020-06-05 ENCOUNTER — RESULTS ENCOUNTER (OUTPATIENT)
Dept: FAMILY MEDICINE CLINIC | Facility: CLINIC | Age: 50
End: 2020-06-05

## 2020-06-05 DIAGNOSIS — E11.9 CONTROLLED TYPE 2 DIABETES MELLITUS WITHOUT COMPLICATION, WITHOUT LONG-TERM CURRENT USE OF INSULIN (HCC): ICD-10-CM

## 2020-06-05 DIAGNOSIS — I10 ESSENTIAL HYPERTENSION: ICD-10-CM

## 2020-06-05 DIAGNOSIS — D50.9 IRON DEFICIENCY ANEMIA, UNSPECIFIED IRON DEFICIENCY ANEMIA TYPE: ICD-10-CM

## 2020-06-05 DIAGNOSIS — E78.2 MIXED HYPERLIPIDEMIA: ICD-10-CM

## 2020-06-05 LAB
ALBUMIN SERPL-MCNC: 4.5 G/DL (ref 3.5–5.2)
ALBUMIN/GLOB SERPL: 1.5 G/DL
ALP SERPL-CCNC: 78 U/L (ref 39–117)
ALT SERPL W P-5'-P-CCNC: 11 U/L (ref 1–33)
ANION GAP SERPL CALCULATED.3IONS-SCNC: 8.5 MMOL/L (ref 5–15)
AST SERPL-CCNC: 14 U/L (ref 1–32)
BASOPHILS # BLD AUTO: 0.02 10*3/MM3 (ref 0–0.2)
BASOPHILS NFR BLD AUTO: 0.4 % (ref 0–1.5)
BILIRUB SERPL-MCNC: 0.3 MG/DL (ref 0.1–1.2)
BILIRUB UR QL STRIP: NEGATIVE
BUN BLD-MCNC: 14 MG/DL (ref 6–20)
BUN/CREAT SERPL: 16.3 (ref 7–25)
CALCIUM SPEC-SCNC: 9.4 MG/DL (ref 8.6–10.5)
CHLORIDE SERPL-SCNC: 103 MMOL/L (ref 98–107)
CHOLEST SERPL-MCNC: 131 MG/DL (ref 0–200)
CLARITY UR: CLEAR
CO2 SERPL-SCNC: 27.5 MMOL/L (ref 22–29)
COLOR UR: YELLOW
CREAT BLD-MCNC: 0.86 MG/DL (ref 0.57–1)
DEPRECATED RDW RBC AUTO: 47.1 FL (ref 37–54)
EOSINOPHIL # BLD AUTO: 0.41 10*3/MM3 (ref 0–0.4)
EOSINOPHIL NFR BLD AUTO: 8.4 % (ref 0.3–6.2)
ERYTHROCYTE [DISTWIDTH] IN BLOOD BY AUTOMATED COUNT: 14.6 % (ref 12.3–15.4)
GFR SERPL CREATININE-BSD FRML MDRD: 84 ML/MIN/1.73
GLOBULIN UR ELPH-MCNC: 3.1 GM/DL
GLUCOSE BLD-MCNC: 104 MG/DL (ref 65–99)
GLUCOSE UR STRIP-MCNC: NEGATIVE MG/DL
HBA1C MFR BLD: 6.23 % (ref 4.8–5.6)
HCT VFR BLD AUTO: 35.7 % (ref 34–46.6)
HDLC SERPL-MCNC: 42 MG/DL (ref 40–60)
HGB BLD-MCNC: 11 G/DL (ref 12–15.9)
HGB UR QL STRIP.AUTO: NEGATIVE
IMM GRANULOCYTES # BLD AUTO: 0.01 10*3/MM3 (ref 0–0.05)
IMM GRANULOCYTES NFR BLD AUTO: 0.2 % (ref 0–0.5)
KETONES UR QL STRIP: NEGATIVE
LDLC SERPL CALC-MCNC: 72 MG/DL (ref 0–100)
LDLC/HDLC SERPL: 1.71 {RATIO}
LEUKOCYTE ESTERASE UR QL STRIP.AUTO: NEGATIVE
LYMPHOCYTES # BLD AUTO: 1.91 10*3/MM3 (ref 0.7–3.1)
LYMPHOCYTES NFR BLD AUTO: 38.9 % (ref 19.6–45.3)
MCH RBC QN AUTO: 26.9 PG (ref 26.6–33)
MCHC RBC AUTO-ENTMCNC: 30.8 G/DL (ref 31.5–35.7)
MCV RBC AUTO: 87.3 FL (ref 79–97)
MONOCYTES # BLD AUTO: 0.41 10*3/MM3 (ref 0.1–0.9)
MONOCYTES NFR BLD AUTO: 8.4 % (ref 5–12)
NEUTROPHILS # BLD AUTO: 2.15 10*3/MM3 (ref 1.7–7)
NEUTROPHILS NFR BLD AUTO: 43.7 % (ref 42.7–76)
NITRITE UR QL STRIP: NEGATIVE
NRBC BLD AUTO-RTO: 0 /100 WBC (ref 0–0.2)
PH UR STRIP.AUTO: 5.5 [PH] (ref 5–8)
PLATELET # BLD AUTO: 293 10*3/MM3 (ref 140–450)
PMV BLD AUTO: 9.2 FL (ref 6–12)
POTASSIUM BLD-SCNC: 3.9 MMOL/L (ref 3.5–5.2)
PROT SERPL-MCNC: 7.6 G/DL (ref 6–8.5)
PROT UR QL STRIP: NEGATIVE
RBC # BLD AUTO: 4.09 10*6/MM3 (ref 3.77–5.28)
SODIUM BLD-SCNC: 139 MMOL/L (ref 136–145)
SP GR UR STRIP: 1.02 (ref 1–1.03)
TRIGL SERPL-MCNC: 86 MG/DL (ref 0–150)
TSH SERPL DL<=0.05 MIU/L-ACNC: 2.69 UIU/ML (ref 0.27–4.2)
UROBILINOGEN UR QL STRIP: NORMAL
VLDLC SERPL-MCNC: 17.2 MG/DL (ref 5–40)
WBC NRBC COR # BLD: 4.91 10*3/MM3 (ref 3.4–10.8)

## 2020-06-05 PROCEDURE — 85025 COMPLETE CBC W/AUTO DIFF WBC: CPT | Performed by: NURSE PRACTITIONER

## 2020-06-05 PROCEDURE — 80061 LIPID PANEL: CPT | Performed by: NURSE PRACTITIONER

## 2020-06-05 PROCEDURE — 81003 URINALYSIS AUTO W/O SCOPE: CPT | Performed by: NURSE PRACTITIONER

## 2020-06-05 PROCEDURE — 83036 HEMOGLOBIN GLYCOSYLATED A1C: CPT | Performed by: NURSE PRACTITIONER

## 2020-06-05 PROCEDURE — 84443 ASSAY THYROID STIM HORMONE: CPT | Performed by: NURSE PRACTITIONER

## 2020-06-05 PROCEDURE — 36415 COLL VENOUS BLD VENIPUNCTURE: CPT | Performed by: NURSE PRACTITIONER

## 2020-06-05 PROCEDURE — 80053 COMPREHEN METABOLIC PANEL: CPT | Performed by: NURSE PRACTITIONER

## 2020-06-19 ENCOUNTER — OFFICE VISIT (OUTPATIENT)
Dept: FAMILY MEDICINE CLINIC | Facility: CLINIC | Age: 50
End: 2020-06-19

## 2020-06-19 VITALS
HEIGHT: 61 IN | DIASTOLIC BLOOD PRESSURE: 87 MMHG | OXYGEN SATURATION: 99 % | WEIGHT: 183 LBS | HEART RATE: 97 BPM | BODY MASS INDEX: 34.55 KG/M2 | SYSTOLIC BLOOD PRESSURE: 144 MMHG

## 2020-06-19 DIAGNOSIS — E11.8 CONTROLLED TYPE 2 DIABETES MELLITUS WITH COMPLICATION, WITHOUT LONG-TERM CURRENT USE OF INSULIN (HCC): ICD-10-CM

## 2020-06-19 DIAGNOSIS — I10 HYPERTENSION, ESSENTIAL: ICD-10-CM

## 2020-06-19 DIAGNOSIS — Z00.00 HEALTH MAINTENANCE EXAMINATION: Primary | ICD-10-CM

## 2020-06-19 DIAGNOSIS — E03.9 ACQUIRED HYPOTHYROIDISM: ICD-10-CM

## 2020-06-19 DIAGNOSIS — Z28.39 IMMUNIZATION DEFICIENCY: ICD-10-CM

## 2020-06-19 PROCEDURE — 93000 ELECTROCARDIOGRAM COMPLETE: CPT | Performed by: NURSE PRACTITIONER

## 2020-06-19 PROCEDURE — 90471 IMMUNIZATION ADMIN: CPT | Performed by: NURSE PRACTITIONER

## 2020-06-19 PROCEDURE — 99396 PREV VISIT EST AGE 40-64: CPT | Performed by: NURSE PRACTITIONER

## 2020-06-19 PROCEDURE — 90715 TDAP VACCINE 7 YRS/> IM: CPT | Performed by: NURSE PRACTITIONER

## 2020-06-19 RX ORDER — GLUCOSAMINE HCL/CHONDROITIN SU 500-400 MG
CAPSULE ORAL
Qty: 100 EACH | Refills: 1 | Status: SHIPPED | OUTPATIENT
Start: 2020-06-19

## 2020-06-19 NOTE — PROGRESS NOTES
"Subjective   Ina Akers is a 49 y.o. female.     Pleasant patient here for complete physical exam she is doing well  Getting ready to get her mammogram with oncology history of breast cancer she sees hematology as well for anemia  She takes iron replacement she has a colonoscopy planned soon    She had the right side thyroid removed 2018 due to thyroid mass she is having no problems here we discussed if any follow-up is needed and we decided we will do a follow-up ultrasound next year  She is had normal thyroid exam since her surgery    Essential hypertension       /87   Pulse 97   Ht 154.9 cm (61\")   Wt 83 kg (183 lb)   SpO2 99%   BMI 34.58 kg/m²       The following portions of the patient's history were reviewed and updated as appropriate: allergies, current medications, past family history, past medical history, past social history, past surgical history and problem list.    Review of Systems   Constitutional: Negative for chills, fatigue, fever and unexpected weight loss.   HENT: Negative.  Negative for trouble swallowing.    Eyes: Negative.    Respiratory: Negative for cough and shortness of breath.    Cardiovascular: Negative for chest pain, palpitations and leg swelling.   Gastrointestinal: Negative for abdominal pain and blood in stool.   Genitourinary: Negative.  Negative for pelvic pain.   Musculoskeletal: Negative.    Skin: Negative.    Neurological: Negative.  Negative for dizziness, speech difficulty, weakness and confusion.   Psychiatric/Behavioral: Negative.        Objective   Physical Exam   Constitutional: She is oriented to person, place, and time. She appears well-developed and well-nourished.   HENT:   Head: Normocephalic.   Mouth/Throat: Oropharynx is clear and moist. No oropharyngeal exudate.   Eyes: Pupils are equal, round, and reactive to light. Conjunctivae are normal.   Neck: Neck supple. No JVD present. No thyromegaly present.   Normal thyroid exam   Cardiovascular: Normal " rate, regular rhythm and normal heart sounds. Exam reveals no friction rub.   No murmur heard.  Pulmonary/Chest: Effort normal and breath sounds normal. No respiratory distress. She has no wheezes.   Abdominal: Soft. Bowel sounds are normal. She exhibits no distension and no mass. There is no tenderness. There is no guarding. No hernia.   Musculoskeletal: She exhibits no edema or tenderness.   Lymphadenopathy:     She has no cervical adenopathy.   Neurological: She is alert and oriented to person, place, and time.   Skin: Skin is warm and dry.   Psychiatric: She has a normal mood and affect. Her behavior is normal. Judgment and thought content normal.   Vitals reviewed.        Assessment/Plan   Ina was seen today for annual exam.    Diagnoses and all orders for this visit:    Health maintenance examination  -     Tdap Vaccine Greater Than or Equal To 8yo IM    Controlled type 2 diabetes mellitus with complication, without long-term current use of insulin (CMS/[facility])    Immunization deficiency  -     Tdap Vaccine Greater Than or Equal To 8yo IM    Hypertension, essential  -     ECG 12 Lead    Acquired hypothyroidism    Other orders  -     Blood Glucose Monitoring Suppl w/Device kit; Check blood sugar twice a day  -     Glucose Blood (BLOOD GLUCOSE TEST) strip; Check blood sugar twice a day.  -     Lancets (ACCU-CHEK SOFT TOUCH) lancets; Check blood sugar twice a day as needed         Therapeutic lifestyle changes  Check blood pressure weekly should average less than 130/80  Healthy diet  As of vegetables chicken fish 64 ounces water daily follow-up with  Oncology hematology routine                There are no Patient Instructions on file for this visit.

## 2020-06-19 NOTE — PROGRESS NOTES
Procedure   Procedures     Adult ECG Report     Name: Ina Akers   Age: 49 y.o.   Gender: female       Rate: 63   Rhythm: normal sinus rhythm   QRS Axis: nml   WY Interval: 144   QRS Duration: 84   QTc: 395   Voltages: .   Conduction Disturbances: none   Other Abnormalities: none     Narrative Interpretation: Normal EKG indication hypertension comparison no change from previous EKG 2018

## 2020-06-19 NOTE — PATIENT INSTRUCTIONS
Check blood pressure weekly or monthly should average less than 130/80  Continue healthy diet regular exercise 1200 ivonne a day mostly vegetables chicken fish greatly decrease processed foods  Continue to do the good things you have been doing  Healthy diet to feel better    We will repeat your thyroid ultrasound next year yearly thyroid exam as well    Follow-up oncology hematology regarding  Anemia and history of breast cancers

## 2020-06-29 DIAGNOSIS — E55.9 VITAMIN D DEFICIENCY: ICD-10-CM

## 2020-06-29 DIAGNOSIS — I10 ESSENTIAL HYPERTENSION: ICD-10-CM

## 2020-06-29 DIAGNOSIS — E78.5 HYPERLIPIDEMIA, UNSPECIFIED HYPERLIPIDEMIA TYPE: ICD-10-CM

## 2020-06-29 DIAGNOSIS — E11.9 CONTROLLED TYPE 2 DIABETES MELLITUS WITHOUT COMPLICATION, WITHOUT LONG-TERM CURRENT USE OF INSULIN (HCC): ICD-10-CM

## 2020-06-29 RX ORDER — ATORVASTATIN CALCIUM 10 MG/1
10 TABLET, FILM COATED ORAL DAILY
Qty: 90 TABLET | Refills: 3 | Status: SHIPPED | OUTPATIENT
Start: 2020-06-29 | End: 2021-07-15 | Stop reason: SDUPTHER

## 2020-07-02 ENCOUNTER — TELEPHONE (OUTPATIENT)
Dept: FAMILY MEDICINE CLINIC | Facility: CLINIC | Age: 50
End: 2020-07-02

## 2020-07-02 NOTE — TELEPHONE ENCOUNTER
Patient would like to get an order for the cologuard test because she doesn't want to get a colonoscopy. Patient stated that she is having pain in her left hip and lower back and that's one of the reasons she doesn't want to have the colonoscopy. Please call to advise [phone]    [facility] [facility] - [phone] [facility] [phone]   [phone]

## 2020-07-06 RX ORDER — LEVOTHYROXINE SODIUM 0.05 MG/1
TABLET ORAL
Qty: 90 TABLET | Refills: 0 | Status: SHIPPED | OUTPATIENT
Start: 2020-07-06 | End: 2020-10-22

## 2020-07-06 NOTE — TELEPHONE ENCOUNTER
Cologuard test would be okay as long as she does not have a family history of colon cancer or family history of familial polyps  Or personal history of polyps    If this is the case, document this in the chart and go ahead and enter an order for Cologuard per patient please thank you

## 2020-07-06 NOTE — TELEPHONE ENCOUNTER
LVM for patient to call me back so I can ask her about her family history and personal history.  However, I did not see anything listed in the chart that would stop the order from being placed

## 2020-07-06 NOTE — TELEPHONE ENCOUNTER
HUB- If patient calls back please ask if there is a hx of colon cancer or polyps, or if she has a personal hx of polyps .  Thank you

## 2020-07-07 ENCOUNTER — RESULTS ENCOUNTER (OUTPATIENT)
Dept: FAMILY MEDICINE CLINIC | Facility: CLINIC | Age: 50
End: 2020-07-07

## 2020-07-07 DIAGNOSIS — Z12.11 COLON CANCER SCREENING: Primary | ICD-10-CM

## 2020-07-07 DIAGNOSIS — Z12.11 COLON CANCER SCREENING: ICD-10-CM

## 2020-07-07 NOTE — TELEPHONE ENCOUNTER
I put the Cologuard order  And for patient not sure   If this is all we need to do if you could assist please thank you

## 2020-08-18 RX ORDER — TRIAMCINOLONE ACETONIDE 1 MG/G
CREAM TOPICAL 2 TIMES DAILY
Qty: 60 G | Refills: 1 | Status: SHIPPED | OUTPATIENT
Start: 2020-08-18 | End: 2022-03-04

## 2020-08-24 ENCOUNTER — TELEPHONE (OUTPATIENT)
Dept: FAMILY MEDICINE CLINIC | Facility: CLINIC | Age: 50
End: 2020-08-24

## 2020-08-24 NOTE — TELEPHONE ENCOUNTER
PATIENT IS HAVING PAIN IN LEFT AND RIGHT HIP, ALONG WITH BACK PAIN. SHE WOULD LIKE TO GET MRI TO CHECK ON THIS ISSUE , SINCE IT IS NOT GETTING ANY BETTER. SHE HAS TRIED TO SELF MEDICATE WITH PAIN MEDICINE AND DIFFERENT STRECHING TECHNIQUES. PLEASE GIVE HER A CALL TO LET HER KNOW WHAT JAMES EPLEY RECOMMENDS.    [facility]# [phone]

## 2020-08-25 ENCOUNTER — HOSPITAL ENCOUNTER (OUTPATIENT)
Dept: GENERAL RADIOLOGY | Facility: HOSPITAL | Age: 50
Discharge: HOME | End: 2020-08-25
Admitting: NURSE PRACTITIONER

## 2020-08-25 ENCOUNTER — OFFICE VISIT (OUTPATIENT)
Dept: FAMILY MEDICINE CLINIC | Facility: CLINIC | Age: 50
End: 2020-08-25

## 2020-08-25 ENCOUNTER — HOSPITAL ENCOUNTER (OUTPATIENT)
Dept: GENERAL RADIOLOGY | Facility: HOSPITAL | Age: 50
Discharge: HOME | End: 2020-08-25

## 2020-08-25 VITALS
TEMPERATURE: 98.2 F | OXYGEN SATURATION: 99 % | WEIGHT: 182.3 LBS | HEIGHT: 61 IN | SYSTOLIC BLOOD PRESSURE: 146 MMHG | BODY MASS INDEX: 34.42 KG/M2 | DIASTOLIC BLOOD PRESSURE: 93 MMHG | HEART RATE: 90 BPM

## 2020-08-25 DIAGNOSIS — M54.42 ACUTE MIDLINE LOW BACK PAIN WITH LEFT-SIDED SCIATICA: Primary | ICD-10-CM

## 2020-08-25 DIAGNOSIS — M25.552 LEFT HIP PAIN: ICD-10-CM

## 2020-08-25 PROCEDURE — 99214 OFFICE O/P EST MOD 30 MIN: CPT | Performed by: NURSE PRACTITIONER

## 2020-08-25 PROCEDURE — 72100 X-RAY EXAM L-S SPINE 2/3 VWS: CPT

## 2020-08-25 PROCEDURE — 73502 X-RAY EXAM HIP UNI 2-3 VIEWS: CPT

## 2020-08-25 RX ORDER — METHYLPREDNISOLONE 4 MG/1
TABLET ORAL
Qty: 21 TABLET | Refills: 0 | Status: SHIPPED | OUTPATIENT
Start: 2020-08-25 | End: 2020-09-11

## 2020-08-25 NOTE — PATIENT INSTRUCTIONS
Discharge instructions temporarily stop ibuprofen and take Medrol Dosepak when completed for anti-inflammatory effect resume ibuprofen 600  3 times a day with food and water  To protect stomach and kidneys  Physical therapy  Follow-up in the office 4 weeks after starting therapy so I will see you back in 5 weeks or so    Weakness back pain fever severe back pain and associated belly pain  Bowel or bladder incontinence or retention saddlebag paresthesia emergency room    Any persistent or increase in pelvic pain new character pain intensity pain more frequency or associated with appetite changes fever etc. follow-up with GYN   Sciatica Rehab  Ask your health care provider which exercises are safe for you. Do exercises exactly as told by your health care provider and adjust them as directed. It is normal to feel mild stretching, pulling, tightness, or discomfort as you do these exercises. Stop right away if you feel sudden pain or your pain gets worse. Do not begin these exercises until told by your health care provider.  Stretching and range-of-motion exercises  These exercises warm up your muscles and joints and improve the movement and flexibility of your hips and back. These exercises also help to relieve pain, numbness, and tingling.  Sciatic nerve glide  1. Sit in a chair with your head facing down toward your chest. Place your hands behind your back. Let your shoulders slump forward.  2. Slowly straighten one of your legs while you tilt your head back as if you are looking toward the ceiling. Only straighten your leg as far as you can without making your symptoms worse.  3. Hold this position for __________ seconds.  4. Slowly return to the starting position.  5. Repeat with your other leg.  Repeat __________ times. Complete this exercise __________ times a day.  Knee to chest with hip adduction and internal rotation    1. Lie on your back on a firm surface with both legs straight.  2. Bend one of your knees  and move it up toward your chest until you feel a gentle stretch in your lower back and buttock. Then, move your knee toward the shoulder that is on the opposite side from your leg. This is hip adduction and internal rotation.  ? Hold your leg in this position by holding on to the front of your knee.  3. Hold this position for __________ seconds.  4. Slowly return to the starting position.  5. Repeat with your other leg.  Repeat __________ times. Complete this exercise __________ times a day.  Prone extension on elbows    1. Lie on your abdomen on a firm surface. A bed may be too soft for this exercise.  2. Prop yourself up on your elbows.  3. Use your arms to help lift your chest up until you feel a gentle stretch in your abdomen and your lower back.  ? This will place some of your body weight on your elbows. If this is uncomfortable, try stacking pillows under your chest.  ? Your hips should stay down, against the surface that you are lying on. Keep your hip and back muscles relaxed.  4. Hold this position for __________ seconds.  5. Slowly relax your upper body and return to the starting position.  Repeat __________ times. Complete this exercise __________ times a day.  Strengthening exercises  These exercises build strength and endurance in your back. Endurance is the ability to use your muscles for a long time, even after they get tired.  Pelvic tilt  This exercise strengthens the muscles that lie deep in the abdomen.  1. Lie on your back on a firm surface. Bend your knees and keep your feet flat on the floor.  2. Tense your abdominal muscles. Tip your pelvis up toward the ceiling and flatten your lower back into the floor.  ? To help with this exercise, you may place a small towel under your lower back and try to push your back into the towel.  3. Hold this position for __________ seconds.  4. Let your muscles relax completely before you repeat this exercise.  Repeat __________ times. Complete this exercise  __________ times a day.  Alternating arm and leg raises    1. Get on your hands and knees on a firm surface. If you are on a hard floor, you may want to use padding, such as an exercise mat, to cushion your knees.  2. Line up your arms and legs. Your hands should be directly below your shoulders, and your knees should be directly below your hips.  3. Lift your left leg behind you. At the same time, raise your right arm and straighten it in front of you.  ? Do not lift your leg higher than your hip.  ? Do not lift your arm higher than your shoulder.  ? Keep your abdominal and back muscles tight.  ? Keep your hips facing the ground.  ? Do not arch your back.  ? Keep your balance carefully, and do not hold your breath.  4. Hold this position for __________ seconds.  5. Slowly return to the starting position.  6. Repeat with your right leg and your left arm.  Repeat __________ times. Complete this exercise __________ times a day.  Posture and body mechanics  Good posture and healthy body mechanics can help to relieve stress in your body's tissues and joints. Body mechanics refers to the movements and positions of your body while you do your daily activities. Posture is part of body mechanics. Good posture means:  · Your spine is in its natural S-curve position (neutral).  · Your shoulders are pulled back slightly.  · Your head is not tipped forward.  Follow these guidelines to improve your posture and body mechanics in your everyday activities.  Standing    · When standing, keep your spine neutral and your feet about hip width apart. Keep a slight bend in your knees. Your ears, shoulders, and hips should line up.  · When you do a task in which you  one place for a long time, place one foot up on a stable object that is 2-4 inches (5-10 cm) high, such as a footstool. This helps keep your spine neutral.  Sitting    · When sitting, keep your spine neutral and keep your feet flat on the floor. Use a footrest, if  necessary, and keep your thighs parallel to the floor. Avoid rounding your shoulders, and avoid tilting your head forward.  · When working at a desk or a computer, keep your desk at a height where your hands are slightly lower than your elbows. Slide your chair under your desk so you are close enough to maintain good posture.  · When working at a computer, place your monitor at a height where you are looking straight ahead and you do not have to tilt your head forward or downward to look at the screen.  Resting  · When lying down and resting, avoid positions that are most painful for you.  · If you have pain with activities such as sitting, bending, stooping, or squatting, lie in a position in which your body does not bend very much. For example, avoid curling up on your side with your arms and knees near your chest (fetal position).  · If you have pain with activities such as standing for a long time or reaching with your arms, lie with your spine in a neutral position and bend your knees slightly. Try the following positions:  ? Lying on your side with a pillow between your knees.  ? Lying on your back with a pillow under your knees.  Lifting    · When lifting objects, keep your feet at least shoulder width apart and tighten your abdominal muscles.  · Bend your knees and hips and keep your spine neutral. It is important to lift using the strength of your legs, not your back. Do not lock your knees straight out.  · Always ask for help to lift heavy or awkward objects.  This information is not intended to replace advice given to you by your health care provider. Make sure you discuss any questions you have with your health care provider.  Document Released: 12/18/2006 Document Revised: 04/10/2020 Document Reviewed: 01/09/2020  Elsevier Patient Education © 2020 [employer]    Sciatica Rehab  Ask your health care provider which exercises are safe for you. Do exercises exactly as told by your health care provider and  adjust them as directed. It is normal to feel mild stretching, pulling, tightness, or discomfort as you do these exercises. Stop right away if you feel sudden pain or your pain gets worse. Do not begin these exercises until told by your health care provider.  Stretching and range-of-motion exercises  These exercises warm up your muscles and joints and improve the movement and flexibility of your hips and back. These exercises also help to relieve pain, numbness, and tingling.  Sciatic nerve glide  6. Sit in a chair with your head facing down toward your chest. Place your hands behind your back. Let your shoulders slump forward.  7. Slowly straighten one of your legs while you tilt your head back as if you are looking toward the ceiling. Only straighten your leg as far as you can without making your symptoms worse.  8. Hold this position for __________ seconds.  9. Slowly return to the starting position.  10. Repeat with your other leg.  Repeat __________ times. Complete this exercise __________ times a day.  Knee to chest with hip adduction and internal rotation    6. Lie on your back on a firm surface with both legs straight.  7. Bend one of your knees and move it up toward your chest until you feel a gentle stretch in your lower back and buttock. Then, move your knee toward the shoulder that is on the opposite side from your leg. This is hip adduction and internal rotation.  ? Hold your leg in this position by holding on to the front of your knee.  8. Hold this position for __________ seconds.  9. Slowly return to the starting position.  10. Repeat with your other leg.  Repeat __________ times. Complete this exercise __________ times a day.  Prone extension on elbows    6. Lie on your abdomen on a firm surface. A bed may be too soft for this exercise.  7. Prop yourself up on your elbows.  8. Use your arms to help lift your chest up until you feel a gentle stretch in your abdomen and your lower back.  ? This will  place some of your body weight on your elbows. If this is uncomfortable, try stacking pillows under your chest.  ? Your hips should stay down, against the surface that you are lying on. Keep your hip and back muscles relaxed.  9. Hold this position for __________ seconds.  10. Slowly relax your upper body and return to the starting position.  Repeat __________ times. Complete this exercise __________ times a day.  Strengthening exercises  These exercises build strength and endurance in your back. Endurance is the ability to use your muscles for a long time, even after they get tired.  Pelvic tilt  This exercise strengthens the muscles that lie deep in the abdomen.  5. Lie on your back on a firm surface. Bend your knees and keep your feet flat on the floor.  6. Tense your abdominal muscles. Tip your pelvis up toward the ceiling and flatten your lower back into the floor.  ? To help with this exercise, you may place a small towel under your lower back and try to push your back into the towel.  7. Hold this position for __________ seconds.  8. Let your muscles relax completely before you repeat this exercise.  Repeat __________ times. Complete this exercise __________ times a day.  Alternating arm and leg raises    7. Get on your hands and knees on a firm surface. If you are on a hard floor, you may want to use padding, such as an exercise mat, to cushion your knees.  8. Line up your arms and legs. Your hands should be directly below your shoulders, and your knees should be directly below your hips.  9. Lift your left leg behind you. At the same time, raise your right arm and straighten it in front of you.  ? Do not lift your leg higher than your hip.  ? Do not lift your arm higher than your shoulder.  ? Keep your abdominal and back muscles tight.  ? Keep your hips facing the ground.  ? Do not arch your back.  ? Keep your balance carefully, and do not hold your breath.  10. Hold this position for __________  seconds.  11. Slowly return to the starting position.  12. Repeat with your right leg and your left arm.  Repeat __________ times. Complete this exercise __________ times a day.  Posture and body mechanics  Good posture and healthy body mechanics can help to relieve stress in your body's tissues and joints. Body mechanics refers to the movements and positions of your body while you do your daily activities. Posture is part of body mechanics. Good posture means:  · Your spine is in its natural S-curve position (neutral).  · Your shoulders are pulled back slightly.  · Your head is not tipped forward.  Follow these guidelines to improve your posture and body mechanics in your everyday activities.  Standing    · When standing, keep your spine neutral and your feet about hip width apart. Keep a slight bend in your knees. Your ears, shoulders, and hips should line up.  · When you do a task in which you  one place for a long time, place one foot up on a stable object that is 2-4 inches (5-10 cm) high, such as a footstool. This helps keep your spine neutral.  Sitting    · When sitting, keep your spine neutral and keep your feet flat on the floor. Use a footrest, if necessary, and keep your thighs parallel to the floor. Avoid rounding your shoulders, and avoid tilting your head forward.  · When working at a desk or a computer, keep your desk at a height where your hands are slightly lower than your elbows. Slide your chair under your desk so you are close enough to maintain good posture.  · When working at a computer, place your monitor at a height where you are looking straight ahead and you do not have to tilt your head forward or downward to look at the screen.  Resting  · When lying down and resting, avoid positions that are most painful for you.  · If you have pain with activities such as sitting, bending, stooping, or squatting, lie in a position in which your body does not bend very much. For example, avoid  curling up on your side with your arms and knees near your chest (fetal position).  · If you have pain with activities such as standing for a long time or reaching with your arms, lie with your spine in a neutral position and bend your knees slightly. Try the following positions:  ? Lying on your side with a pillow between your knees.  ? Lying on your back with a pillow under your knees.  Lifting    · When lifting objects, keep your feet at least shoulder width apart and tighten your abdominal muscles.  · Bend your knees and hips and keep your spine neutral. It is important to lift using the strength of your legs, not your back. Do not lock your knees straight out.  · Always ask for help to lift heavy or awkward objects.  This information is not intended to replace advice given to you by your health care provider. Make sure you discuss any questions you have with your health care provider.  Document Released: 12/18/2006 Document Revised: 04/10/2020 Document Reviewed: 01/09/2020  Elsevier Patient Education © 2020 [employer]

## 2020-08-25 NOTE — PROGRESS NOTES
Left low back pain 3 mos    Patient complains of a nagging left low back pain sometimes left hip sometimes right side but for the most part mid and left-sided back pain aggravated by sitting more so in the evenings  Pain is waxed and waned and got a little better there for a while after she started anti-inflammatory ibuprofen 600 mg 3-4 times a day but is returned.  And her  thinks she needs to get it looked at  No bowel or bladder changes no saddle-like paresthesias no fevers no chills no associated abdominal pain and back pain however she sometimes has right lower pelvic discomfort she saw GYN last year for menstrual type pain without minces  Does not remember she had an ultrasound she has no acute pain today so she will follow-up with GYN    She has not had a colonoscopy but last year Cologuard was negative she has not had a CT of the abdomen    She has no night sweats or unexplained weight loss she does have a positive history of breast cancer  In 2001 left  Chemo radiation  Finished tamoxifen after 5 years    She has no severe focal cervical thoracic or lumbar pain    Clarified patient's pelvic pain she really does not have any increase in pelvic pain and she is already had a work-up for she thought it might be related least one occasion she had both abdominal and back pain but none presently for the most part is only back      Nonetheless she will follow-up with GYN any change or increased frequency of any pelvic pain and she need further work-up including ultrasound of pelvis and or CT abdomen pelvis        Subjective   Ina Akers is a 49 y.o. female.     Left low back pain 3 mos    Patient complains of a nagging left low back pain sometimes left hip sometimes right side but for the most part mid and left-sided back pain aggravated by sitting more so in the evenings  Pain is waxed and waned and got a little better there for a while after she started anti-inflammatory ibuprofen 600 mg 3-4 times a  "day but is returned.  And her  thinks she needs to get it looked at  No bowel or bladder changes no saddle-like paresthesias no fevers no chills no associated abdominal pain and back pain however she sometimes has right lower pelvic discomfort she saw GYN last year for menstrual type pain without minces  Does not remember she had an ultrasound she has no acute pain today so she will follow-up with GYN    She has not had a colonoscopy but last year Cologuard was negative she has not had a CT of the abdomen    She has no night sweats or unexplained weight loss she does have a positive history of breast cancer  In 2001 left  Chemo radiation  Finished tamoxifen after 5 years    She has no severe focal cervical thoracic or lumbar pain    Clarified patient's pelvic pain she really does not have any increase in pelvic pain and she is already had a work-up for she thought it might be related least one occasion she had both abdominal and back pain but none presently for the most part is only back      Nonetheless she will follow-up with GYN any change or increased frequency of any pelvic pain and she need further work-up including ultrasound of pelvis and or CT abdomen pelvis                       /93   Pulse 90   Temp 98.2 °F (36.8 °C)   Ht 154.9 cm (61\")   Wt 82.7 kg (182 lb 4.8 oz)   SpO2 99%   BMI 34.45 kg/m²       The following portions of the patient's history were reviewed and updated as appropriate: allergies, current medications, past family history, past medical history, past social history, past surgical history and problem list.    Review of Systems   Musculoskeletal: Positive for back pain.       Objective   Physical Exam   Constitutional: She is oriented to person, place, and time. She appears well-developed and well-nourished.   Pleasant appears well   HENT:   Head: Normocephalic.   Mouth/Throat: Oropharynx is clear and moist. No oropharyngeal exudate.   Eyes: Pupils are equal, round, and " reactive to light. Conjunctivae are normal.   Neck: Neck supple. No JVD present.   Cardiovascular: Normal rate, regular rhythm and normal heart sounds. Exam reveals no friction rub.   No murmur heard.  Pulmonary/Chest: Effort normal and breath sounds normal. No respiratory distress. She has no wheezes.   Abdominal: Soft. Bowel sounds are normal. She exhibits no distension and no mass. There is no tenderness. There is no guarding. No hernia.   Musculoskeletal: She exhibits no edema or tenderness.   Negative straight leg raise plantar flexion dorsiflexion normal  Range of motion bilateral hips normal no lateral tenderness over the bursa  No lumbar tenderness  Plantar flexion dorsiflexion normal neurovascular intact reflexes normal   Lymphadenopathy:     She has no cervical adenopathy.   Neurological: She is alert and oriented to person, place, and time.   Skin: Skin is warm and dry.   Psychiatric: She has a normal mood and affect. Her behavior is normal. Judgment and thought content normal.   Vitals reviewed.        Assessment/Plan   Ina was seen today for back pain and hip pain.    Diagnoses and all orders for this visit:    Acute midline low back pain with left-sided sciatica  -     Ambulatory Referral to Physical Therapy Evaluate and treat  -     XR Spine Lumbar 2 or 3 View  -     XR hip w or wo pelvis 2-3 view left    Left hip pain  -     Ambulatory Referral to Physical Therapy Evaluate and treat  -     XR Spine Lumbar 2 or 3 View  -     XR hip w or wo pelvis 2-3 view left    Other orders  -     methylPREDNISolone (MEDROL) 4 MG dose pack; Take as directed on package instructions.      Outpatient x-rays  Today call for tomorrow results  Medrol Dosepak risk-benefit as an inflammatory followed by NSAID  Short-term follow-up any increased pain fever chills weakness  Severe uncontrolled pain bowel bladder incontinence or retention groin paresthesias emergency room              Patient Instructions   Discharge  instructions temporarily stop ibuprofen and take Medrol Dosepak when completed for anti-inflammatory effect resume ibuprofen 600  3 times a day with food and water  To protect stomach and kidneys  Physical therapy  Follow-up in the office 4 weeks after starting therapy so I will see you back in 5 weeks or so    Weakness back pain fever severe back pain and associated belly pain  Bowel or bladder incontinence or retention saddlebag paresthesia emergency room    Any persistent or increase in pelvic pain new character pain intensity pain more frequency or associated with appetite changes fever etc. follow-up with GYN   Sciatica Rehab  Ask your health care provider which exercises are safe for you. Do exercises exactly as told by your health care provider and adjust them as directed. It is normal to feel mild stretching, pulling, tightness, or discomfort as you do these exercises. Stop right away if you feel sudden pain or your pain gets worse. Do not begin these exercises until told by your health care provider.  Stretching and range-of-motion exercises  These exercises warm up your muscles and joints and improve the movement and flexibility of your hips and back. These exercises also help to relieve pain, numbness, and tingling.  Sciatic nerve glide  1. Sit in a chair with your head facing down toward your chest. Place your hands behind your back. Let your shoulders slump forward.  2. Slowly straighten one of your legs while you tilt your head back as if you are looking toward the ceiling. Only straighten your leg as far as you can without making your symptoms worse.  3. Hold this position for __________ seconds.  4. Slowly return to the starting position.  5. Repeat with your other leg.  Repeat __________ times. Complete this exercise __________ times a day.  Knee to chest with hip adduction and internal rotation    1. Lie on your back on a firm surface with both legs straight.  2. Bend one of your knees and move it  up toward your chest until you feel a gentle stretch in your lower back and buttock. Then, move your knee toward the shoulder that is on the opposite side from your leg. This is hip adduction and internal rotation.  ? Hold your leg in this position by holding on to the front of your knee.  3. Hold this position for __________ seconds.  4. Slowly return to the starting position.  5. Repeat with your other leg.  Repeat __________ times. Complete this exercise __________ times a day.  Prone extension on elbows    1. Lie on your abdomen on a firm surface. A bed may be too soft for this exercise.  2. Prop yourself up on your elbows.  3. Use your arms to help lift your chest up until you feel a gentle stretch in your abdomen and your lower back.  ? This will place some of your body weight on your elbows. If this is uncomfortable, try stacking pillows under your chest.  ? Your hips should stay down, against the surface that you are lying on. Keep your hip and back muscles relaxed.  4. Hold this position for __________ seconds.  5. Slowly relax your upper body and return to the starting position.  Repeat __________ times. Complete this exercise __________ times a day.  Strengthening exercises  These exercises build strength and endurance in your back. Endurance is the ability to use your muscles for a long time, even after they get tired.  Pelvic tilt  This exercise strengthens the muscles that lie deep in the abdomen.  1. Lie on your back on a firm surface. Bend your knees and keep your feet flat on the floor.  2. Tense your abdominal muscles. Tip your pelvis up toward the ceiling and flatten your lower back into the floor.  ? To help with this exercise, you may place a small towel under your lower back and try to push your back into the towel.  3. Hold this position for __________ seconds.  4. Let your muscles relax completely before you repeat this exercise.  Repeat __________ times. Complete this exercise __________  times a day.  Alternating arm and leg raises    1. Get on your hands and knees on a firm surface. If you are on a hard floor, you may want to use padding, such as an exercise mat, to cushion your knees.  2. Line up your arms and legs. Your hands should be directly below your shoulders, and your knees should be directly below your hips.  3. Lift your left leg behind you. At the same time, raise your right arm and straighten it in front of you.  ? Do not lift your leg higher than your hip.  ? Do not lift your arm higher than your shoulder.  ? Keep your abdominal and back muscles tight.  ? Keep your hips facing the ground.  ? Do not arch your back.  ? Keep your balance carefully, and do not hold your breath.  4. Hold this position for __________ seconds.  5. Slowly return to the starting position.  6. Repeat with your right leg and your left arm.  Repeat __________ times. Complete this exercise __________ times a day.  Posture and body mechanics  Good posture and healthy body mechanics can help to relieve stress in your body's tissues and joints. Body mechanics refers to the movements and positions of your body while you do your daily activities. Posture is part of body mechanics. Good posture means:  · Your spine is in its natural S-curve position (neutral).  · Your shoulders are pulled back slightly.  · Your head is not tipped forward.  Follow these guidelines to improve your posture and body mechanics in your everyday activities.  Standing    · When standing, keep your spine neutral and your feet about hip width apart. Keep a slight bend in your knees. Your ears, shoulders, and hips should line up.  · When you do a task in which you  one place for a long time, place one foot up on a stable object that is 2-4 inches (5-10 cm) high, such as a footstool. This helps keep your spine neutral.  Sitting    · When sitting, keep your spine neutral and keep your feet flat on the floor. Use a footrest, if necessary, and  keep your thighs parallel to the floor. Avoid rounding your shoulders, and avoid tilting your head forward.  · When working at a desk or a computer, keep your desk at a height where your hands are slightly lower than your elbows. Slide your chair under your desk so you are close enough to maintain good posture.  · When working at a computer, place your monitor at a height where you are looking straight ahead and you do not have to tilt your head forward or downward to look at the screen.  Resting  · When lying down and resting, avoid positions that are most painful for you.  · If you have pain with activities such as sitting, bending, stooping, or squatting, lie in a position in which your body does not bend very much. For example, avoid curling up on your side with your arms and knees near your chest (fetal position).  · If you have pain with activities such as standing for a long time or reaching with your arms, lie with your spine in a neutral position and bend your knees slightly. Try the following positions:  ? Lying on your side with a pillow between your knees.  ? Lying on your back with a pillow under your knees.  Lifting    · When lifting objects, keep your feet at least shoulder width apart and tighten your abdominal muscles.  · Bend your knees and hips and keep your spine neutral. It is important to lift using the strength of your legs, not your back. Do not lock your knees straight out.  · Always ask for help to lift heavy or awkward objects.  This information is not intended to replace advice given to you by your health care provider. Make sure you discuss any questions you have with your health care provider.  Document Released: 12/18/2006 Document Revised: 04/10/2020 Document Reviewed: 01/09/2020  [employer] Patient Education © 2020 [employer]    Sciatica Rehab  Ask your health care provider which exercises are safe for you. Do exercises exactly as told by your health care provider and adjust them as  directed. It is normal to feel mild stretching, pulling, tightness, or discomfort as you do these exercises. Stop right away if you feel sudden pain or your pain gets worse. Do not begin these exercises until told by your health care provider.  Stretching and range-of-motion exercises  These exercises warm up your muscles and joints and improve the movement and flexibility of your hips and back. These exercises also help to relieve pain, numbness, and tingling.  Sciatic nerve glide  6. Sit in a chair with your head facing down toward your chest. Place your hands behind your back. Let your shoulders slump forward.  7. Slowly straighten one of your legs while you tilt your head back as if you are looking toward the ceiling. Only straighten your leg as far as you can without making your symptoms worse.  8. Hold this position for __________ seconds.  9. Slowly return to the starting position.  10. Repeat with your other leg.  Repeat __________ times. Complete this exercise __________ times a day.  Knee to chest with hip adduction and internal rotation    6. Lie on your back on a firm surface with both legs straight.  7. Bend one of your knees and move it up toward your chest until you feel a gentle stretch in your lower back and buttock. Then, move your knee toward the shoulder that is on the opposite side from your leg. This is hip adduction and internal rotation.  ? Hold your leg in this position by holding on to the front of your knee.  8. Hold this position for __________ seconds.  9. Slowly return to the starting position.  10. Repeat with your other leg.  Repeat __________ times. Complete this exercise __________ times a day.  Prone extension on elbows    6. Lie on your abdomen on a firm surface. A bed may be too soft for this exercise.  7. Prop yourself up on your elbows.  8. Use your arms to help lift your chest up until you feel a gentle stretch in your abdomen and your lower back.  ? This will place some of  your body weight on your elbows. If this is uncomfortable, try stacking pillows under your chest.  ? Your hips should stay down, against the surface that you are lying on. Keep your hip and back muscles relaxed.  9. Hold this position for __________ seconds.  10. Slowly relax your upper body and return to the starting position.  Repeat __________ times. Complete this exercise __________ times a day.  Strengthening exercises  These exercises build strength and endurance in your back. Endurance is the ability to use your muscles for a long time, even after they get tired.  Pelvic tilt  This exercise strengthens the muscles that lie deep in the abdomen.  5. Lie on your back on a firm surface. Bend your knees and keep your feet flat on the floor.  6. Tense your abdominal muscles. Tip your pelvis up toward the ceiling and flatten your lower back into the floor.  ? To help with this exercise, you may place a small towel under your lower back and try to push your back into the towel.  7. Hold this position for __________ seconds.  8. Let your muscles relax completely before you repeat this exercise.  Repeat __________ times. Complete this exercise __________ times a day.  Alternating arm and leg raises    7. Get on your hands and knees on a firm surface. If you are on a hard floor, you may want to use padding, such as an exercise mat, to cushion your knees.  8. Line up your arms and legs. Your hands should be directly below your shoulders, and your knees should be directly below your hips.  9. Lift your left leg behind you. At the same time, raise your right arm and straighten it in front of you.  ? Do not lift your leg higher than your hip.  ? Do not lift your arm higher than your shoulder.  ? Keep your abdominal and back muscles tight.  ? Keep your hips facing the ground.  ? Do not arch your back.  ? Keep your balance carefully, and do not hold your breath.  10. Hold this position for __________ seconds.  11. Slowly  return to the starting position.  12. Repeat with your right leg and your left arm.  Repeat __________ times. Complete this exercise __________ times a day.  Posture and body mechanics  Good posture and healthy body mechanics can help to relieve stress in your body's tissues and joints. Body mechanics refers to the movements and positions of your body while you do your daily activities. Posture is part of body mechanics. Good posture means:  · Your spine is in its natural S-curve position (neutral).  · Your shoulders are pulled back slightly.  · Your head is not tipped forward.  Follow these guidelines to improve your posture and body mechanics in your everyday activities.  Standing    · When standing, keep your spine neutral and your feet about hip width apart. Keep a slight bend in your knees. Your ears, shoulders, and hips should line up.  · When you do a task in which you  one place for a long time, place one foot up on a stable object that is 2-4 inches (5-10 cm) high, such as a footstool. This helps keep your spine neutral.  Sitting    · When sitting, keep your spine neutral and keep your feet flat on the floor. Use a footrest, if necessary, and keep your thighs parallel to the floor. Avoid rounding your shoulders, and avoid tilting your head forward.  · When working at a desk or a computer, keep your desk at a height where your hands are slightly lower than your elbows. Slide your chair under your desk so you are close enough to maintain good posture.  · When working at a computer, place your monitor at a height where you are looking straight ahead and you do not have to tilt your head forward or downward to look at the screen.  Resting  · When lying down and resting, avoid positions that are most painful for you.  · If you have pain with activities such as sitting, bending, stooping, or squatting, lie in a position in which your body does not bend very much. For example, avoid curling up on your side  with your arms and knees near your chest (fetal position).  · If you have pain with activities such as standing for a long time or reaching with your arms, lie with your spine in a neutral position and bend your knees slightly. Try the following positions:  ? Lying on your side with a pillow between your knees.  ? Lying on your back with a pillow under your knees.  Lifting    · When lifting objects, keep your feet at least shoulder width apart and tighten your abdominal muscles.  · Bend your knees and hips and keep your spine neutral. It is important to lift using the strength of your legs, not your back. Do not lock your knees straight out.  · Always ask for help to lift heavy or awkward objects.  This information is not intended to replace advice given to you by your health care provider. Make sure you discuss any questions you have with your health care provider.  Document Released: 12/18/2006 Document Revised: 04/10/2020 Document Reviewed: 01/09/2020  [employer] Patient Education © 2020 [employer]

## 2020-08-26 DIAGNOSIS — M89.8X9 BONE MASS: ICD-10-CM

## 2020-08-26 DIAGNOSIS — M54.42 ACUTE LEFT-SIDED BACK PAIN WITH SCIATICA: ICD-10-CM

## 2020-08-26 DIAGNOSIS — M54.42 ACUTE MIDLINE LOW BACK PAIN WITH LEFT-SIDED SCIATICA: ICD-10-CM

## 2020-08-26 DIAGNOSIS — M48.56XA PATHOLOGICAL COMPRESSION FRACTURE OF LUMBAR VERTEBRA, INITIAL ENCOUNTER (HCC): Primary | ICD-10-CM

## 2020-08-27 ENCOUNTER — HOSPITAL ENCOUNTER (OUTPATIENT)
Dept: MRI IMAGING | Facility: HOSPITAL | Age: 50
Discharge: HOME | End: 2020-08-27
Admitting: NURSE PRACTITIONER

## 2020-08-27 PROCEDURE — 82565 ASSAY OF CREATININE: CPT

## 2020-08-27 PROCEDURE — 25010000002 GADOTERATE MEGLUMINE 10 MMOL/20ML SOLUTION: Performed by: NURSE PRACTITIONER

## 2020-08-27 PROCEDURE — A9575 INJ GADOTERATE MEGLUMI 0.1ML: HCPCS | Performed by: NURSE PRACTITIONER

## 2020-08-27 PROCEDURE — 72158 MRI LUMBAR SPINE W/O & W/DYE: CPT

## 2020-08-27 RX ORDER — GADOTERATE MEGLUMINE 376.9 MG/ML
16 INJECTION INTRAVENOUS
Status: COMPLETED | OUTPATIENT
Start: 2020-08-27 | End: 2020-08-27

## 2020-08-27 RX ADMIN — GADOTERATE MEGLUMINE 16 ML: 376.9 INJECTION, SOLUTION INTRAVENOUS at 10:33

## 2020-08-28 ENCOUNTER — TELEPHONE (OUTPATIENT)
Dept: GENERAL RADIOLOGY | Facility: HOSPITAL | Age: 50
End: 2020-08-28

## 2020-08-28 DIAGNOSIS — G95.89 SPINAL CORD MASS (HCC): ICD-10-CM

## 2020-08-28 DIAGNOSIS — M84.48XA PATHOLOGICAL FRACTURE OF LUMBAR VERTEBRA, INITIAL ENCOUNTER: Primary | ICD-10-CM

## 2020-08-28 DIAGNOSIS — M89.8X9 BONE MASS: ICD-10-CM

## 2020-08-28 LAB — CREAT BLDA-MCNC: 0.7 MG/DL (ref 0.6–1.3)

## 2020-08-28 NOTE — TELEPHONE ENCOUNTER
----- Message from Tim Muñoz MD sent at 8/28/2020 10:38 AM EDT -----  I received a call from the patient's primary NP. She has a new L4 lesion.  She will be having a bone scan and is been referred to neurosurgery.  Please schedule her to see me on 9/3/2020 at 12 PM.    Thank you

## 2020-09-01 ENCOUNTER — TELEPHONE (OUTPATIENT)
Dept: NEUROSURGERY | Facility: CLINIC | Age: 50
End: 2020-09-01

## 2020-09-01 NOTE — TELEPHONE ENCOUNTER
Pt is scheduled to have PET scan tomorrow and is scheduled for follow up with next Friday. She would like to know if she could be seen sooner.   [phone]

## 2020-09-02 ENCOUNTER — HOSPITAL ENCOUNTER (OUTPATIENT)
Dept: NUCLEAR MEDICINE | Facility: HOSPITAL | Age: 50
Discharge: HOME | End: 2020-09-02

## 2020-09-02 PROCEDURE — 0 TECHNETIUM MEDRONATE KIT: Performed by: NURSE PRACTITIONER

## 2020-09-02 PROCEDURE — 78315 BONE IMAGING 3 PHASE: CPT

## 2020-09-02 PROCEDURE — A9503 TC99M MEDRONATE: HCPCS | Performed by: NURSE PRACTITIONER

## 2020-09-02 PROCEDURE — 78306 BONE IMAGING WHOLE BODY: CPT

## 2020-09-02 RX ORDER — TC 99M MEDRONATE 20 MG/10ML
20.7 INJECTION, POWDER, LYOPHILIZED, FOR SOLUTION INTRAVENOUS
Status: COMPLETED | OUTPATIENT
Start: 2020-09-02 | End: 2020-09-02

## 2020-09-02 RX ADMIN — Medication 20.7 MILLICURIE: at 08:46

## 2020-09-02 NOTE — TELEPHONE ENCOUNTER
Spoke with patient and advised that at the moment we do not have any earlier availability. I told her I would call her back if something changes.

## 2020-09-03 ENCOUNTER — LAB (OUTPATIENT)
Dept: LAB | Facility: HOSPITAL | Age: 50
End: 2020-09-03

## 2020-09-03 ENCOUNTER — OFFICE VISIT (OUTPATIENT)
Dept: ONCOLOGY | Facility: CLINIC | Age: 50
End: 2020-09-03

## 2020-09-03 ENCOUNTER — HOSPITAL ENCOUNTER (OUTPATIENT)
Dept: MRI IMAGING | Facility: HOSPITAL | Age: 50
Discharge: HOME | End: 2020-09-03
Admitting: INTERNAL MEDICINE

## 2020-09-03 VITALS
OXYGEN SATURATION: 100 % | SYSTOLIC BLOOD PRESSURE: 135 MMHG | BODY MASS INDEX: 34.36 KG/M2 | WEIGHT: 182 LBS | HEIGHT: 61 IN | HEART RATE: 94 BPM | TEMPERATURE: 97.3 F | DIASTOLIC BLOOD PRESSURE: 88 MMHG | RESPIRATION RATE: 16 BRPM

## 2020-09-03 DIAGNOSIS — M89.9 SKULL LESION: ICD-10-CM

## 2020-09-03 DIAGNOSIS — Z17.1 MALIGNANT NEOPLASM OF UPPER-OUTER QUADRANT OF LEFT BREAST IN FEMALE, ESTROGEN RECEPTOR NEGATIVE (HCC): ICD-10-CM

## 2020-09-03 DIAGNOSIS — M89.9 LYTIC LESION OF BONE ON X-RAY: ICD-10-CM

## 2020-09-03 DIAGNOSIS — Z17.1 MALIGNANT NEOPLASM OF UPPER-OUTER QUADRANT OF LEFT BREAST IN FEMALE, ESTROGEN RECEPTOR NEGATIVE (HCC): Primary | ICD-10-CM

## 2020-09-03 DIAGNOSIS — C50.412 MALIGNANT NEOPLASM OF UPPER-OUTER QUADRANT OF LEFT BREAST IN FEMALE, ESTROGEN RECEPTOR NEGATIVE (HCC): Primary | ICD-10-CM

## 2020-09-03 DIAGNOSIS — D50.9 IRON DEFICIENCY ANEMIA, UNSPECIFIED IRON DEFICIENCY ANEMIA TYPE: ICD-10-CM

## 2020-09-03 DIAGNOSIS — D47.2 MONOCLONAL GAMMOPATHY: ICD-10-CM

## 2020-09-03 DIAGNOSIS — C50.412 MALIGNANT NEOPLASM OF UPPER-OUTER QUADRANT OF LEFT BREAST IN FEMALE, ESTROGEN RECEPTOR NEGATIVE (HCC): ICD-10-CM

## 2020-09-03 LAB
ALBUMIN SERPL-MCNC: 4.2 G/DL (ref 3.5–5.2)
ALBUMIN/GLOB SERPL: 1.3 G/DL (ref 1.1–2.4)
ALP SERPL-CCNC: 67 U/L (ref 38–116)
ALT SERPL W P-5'-P-CCNC: 8 U/L (ref 0–33)
ANION GAP SERPL CALCULATED.3IONS-SCNC: 8 MMOL/L (ref 5–15)
AST SERPL-CCNC: 10 U/L (ref 0–32)
B2 MICROGLOB SERPL-MCNC: 2 MG/L (ref 0.8–2.2)
BASOPHILS # BLD AUTO: 0.02 10*3/MM3 (ref 0–0.2)
BASOPHILS NFR BLD AUTO: 0.4 % (ref 0–1.5)
BILIRUB SERPL-MCNC: 0.3 MG/DL (ref 0.2–1.2)
BUN SERPL-MCNC: 16 MG/DL (ref 6–20)
BUN/CREAT SERPL: 20.8 (ref 7.3–30)
CALCIUM SPEC-SCNC: 9.5 MG/DL (ref 8.5–10.2)
CANCER AG15-3 SERPL-ACNC: 10.1 U/ML
CHLORIDE SERPL-SCNC: 103 MMOL/L (ref 98–107)
CO2 SERPL-SCNC: 27 MMOL/L (ref 22–29)
CREAT SERPL-MCNC: 0.77 MG/DL (ref 0.6–1.1)
DEPRECATED RDW RBC AUTO: 48.3 FL (ref 37–54)
EOSINOPHIL # BLD AUTO: 0.37 10*3/MM3 (ref 0–0.4)
EOSINOPHIL NFR BLD AUTO: 6.6 % (ref 0.3–6.2)
ERYTHROCYTE [DISTWIDTH] IN BLOOD BY AUTOMATED COUNT: 14.9 % (ref 12.3–15.4)
GFR SERPL CREATININE-BSD FRML MDRD: 96 ML/MIN/1.73
GLOBULIN UR ELPH-MCNC: 3.3 GM/DL (ref 1.8–3.5)
GLUCOSE SERPL-MCNC: 93 MG/DL (ref 74–124)
HCT VFR BLD AUTO: 34.8 % (ref 34–46.6)
HGB BLD-MCNC: 10.6 G/DL (ref 12–15.9)
IMM GRANULOCYTES # BLD AUTO: 0.02 10*3/MM3 (ref 0–0.05)
IMM GRANULOCYTES NFR BLD AUTO: 0.4 % (ref 0–0.5)
LYMPHOCYTES # BLD AUTO: 1.88 10*3/MM3 (ref 0.7–3.1)
LYMPHOCYTES NFR BLD AUTO: 33.5 % (ref 19.6–45.3)
MCH RBC QN AUTO: 26.9 PG (ref 26.6–33)
MCHC RBC AUTO-ENTMCNC: 30.5 G/DL (ref 31.5–35.7)
MCV RBC AUTO: 88.3 FL (ref 79–97)
MONOCYTES # BLD AUTO: 0.67 10*3/MM3 (ref 0.1–0.9)
MONOCYTES NFR BLD AUTO: 11.9 % (ref 5–12)
NEUTROPHILS NFR BLD AUTO: 2.65 10*3/MM3 (ref 1.7–7)
NEUTROPHILS NFR BLD AUTO: 47.2 % (ref 42.7–76)
NRBC BLD AUTO-RTO: 0 /100 WBC (ref 0–0.2)
PLATELET # BLD AUTO: 292 10*3/MM3 (ref 140–450)
PMV BLD AUTO: 8.8 FL (ref 6–12)
POTASSIUM SERPL-SCNC: 3.6 MMOL/L (ref 3.5–4.7)
PROT SERPL-MCNC: 7.5 G/DL (ref 6.3–8)
RBC # BLD AUTO: 3.94 10*6/MM3 (ref 3.77–5.28)
SODIUM SERPL-SCNC: 138 MMOL/L (ref 134–145)
WBC # BLD AUTO: 5.61 10*3/MM3 (ref 3.4–10.8)

## 2020-09-03 PROCEDURE — 82607 VITAMIN B-12: CPT | Performed by: INTERNAL MEDICINE

## 2020-09-03 PROCEDURE — 85025 COMPLETE CBC W/AUTO DIFF WBC: CPT | Performed by: INTERNAL MEDICINE

## 2020-09-03 PROCEDURE — A9575 INJ GADOTERATE MEGLUMI 0.1ML: HCPCS | Performed by: INTERNAL MEDICINE

## 2020-09-03 PROCEDURE — 25010000002 GADOTERATE MEGLUMINE 10 MMOL/20ML SOLUTION: Performed by: INTERNAL MEDICINE

## 2020-09-03 PROCEDURE — 84466 ASSAY OF TRANSFERRIN: CPT | Performed by: INTERNAL MEDICINE

## 2020-09-03 PROCEDURE — 36415 COLL VENOUS BLD VENIPUNCTURE: CPT | Performed by: INTERNAL MEDICINE

## 2020-09-03 PROCEDURE — 82746 ASSAY OF FOLIC ACID SERUM: CPT | Performed by: INTERNAL MEDICINE

## 2020-09-03 PROCEDURE — 82728 ASSAY OF FERRITIN: CPT | Performed by: INTERNAL MEDICINE

## 2020-09-03 PROCEDURE — 83540 ASSAY OF IRON: CPT | Performed by: INTERNAL MEDICINE

## 2020-09-03 PROCEDURE — 80053 COMPREHEN METABOLIC PANEL: CPT | Performed by: INTERNAL MEDICINE

## 2020-09-03 PROCEDURE — 82232 ASSAY OF BETA-2 PROTEIN: CPT | Performed by: INTERNAL MEDICINE

## 2020-09-03 PROCEDURE — 70553 MRI BRAIN STEM W/O & W/DYE: CPT

## 2020-09-03 PROCEDURE — 99215 OFFICE O/P EST HI 40 MIN: CPT | Performed by: INTERNAL MEDICINE

## 2020-09-03 PROCEDURE — 86300 IMMUNOASSAY TUMOR CA 15-3: CPT | Performed by: INTERNAL MEDICINE

## 2020-09-03 RX ORDER — GADOTERATE MEGLUMINE 376.9 MG/ML
17 INJECTION INTRAVENOUS
Status: COMPLETED | OUTPATIENT
Start: 2020-09-03 | End: 2020-09-03

## 2020-09-03 RX ADMIN — GADOTERATE MEGLUMINE 17 ML: 376.9 INJECTION, SOLUTION INTRAVENOUS at 16:37

## 2020-09-03 NOTE — PROGRESS NOTES
Subjective     CHIEF COMPLAINT:      Chief Complaint   Patient presents with   • Follow-up     discuss bone scan and mri       HISTORY OF PRESENT ILLNESS:     Ina Akers is a 49 y.o. female patient with history of breast cancer who is seen today to evaluate the new findings of an abnormal lesion at L4.  Patient was at her usual state of health until May 2020.  She started experiencing low back pain.  The pain worsened gradually over time.  It started in the lower back and she then started experiencing pain in the left hip area.  She now has pain in the right hip.  The pain does not radiate to the lower extremities.  There was no preceding trauma.  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    Patient reports intermittent episodes of pain in the left side of her head.  This started a few months ago.  No vision changes.      REVIEW OF SYSTEMS:  Review of Systems   Constitutional: Negative for fatigue, fever and unexpected weight change.   HENT: Negative for nosebleeds and voice change.    Eyes: Negative for visual disturbance.   Respiratory: Negative for cough and shortness of breath.    Cardiovascular: Negative for chest pain and leg swelling.   Gastrointestinal: Negative for abdominal pain, blood in stool, constipation, diarrhea, nausea and vomiting.   Genitourinary: Negative for frequency and hematuria.   Musculoskeletal: Positive for back pain. Negative for joint swelling.        Bilateral hip pain   Skin: Negative for rash.   Neurological: Negative for dizziness and headaches.   Hematological: Negative for adenopathy. Does not bruise/bleed easily.   Psychiatric/Behavioral: Negative for dysphoric mood. The patient is not nervous/anxious.      I reviewed and verified the CC and ROS obtained by the MA.     Past Medical History:   Diagnosis Date   • Anemia     History of recurent iron deficiency   • Cancer (CMS/HCC) 10/2001    Left breast   • Diabetes mellitus (CMS/HCC)     • H/O Thyroid goiter    • History of gastritis    • History of IBS    • History of tinea corporis    • Hypertension        Past Surgical History:   Procedure Laterality Date   • BREAST SURGERY     •  SECTION     • THYROID BIOPSY  2018       Cancer-related family history is not on file.  Social History     Tobacco Use   • Smoking status: Never Smoker   • Smokeless tobacco: Never Used   Substance Use Topics   • Alcohol use: Yes     Comment: Socially       MEDICATIONS:    Current Outpatient Medications:   •  amLODIPine (NORVASC) 5 MG tablet, Take 1 tablet by mouth Daily., Disp: 90 tablet, Rfl: 2  •  atorvastatin (LIPITOR) 10 MG tablet, Take 1 tablet by mouth Daily., Disp: 90 tablet, Rfl: 3  •  Blood Glucose Monitoring Suppl w/Device kit, Check blood sugar twice a day, Disp: 1 each, Rfl: 0  •  diphenhydrAMINE (BENADRYL ALLERGY) 25 mg capsule, Take 25 mg by mouth Every 6 (Six) Hours As Needed for Itching., Disp: , Rfl:   •  Docusate Calcium (STOOL SOFTENER PO), Take  by mouth Daily. As needed, Disp: , Rfl:   •  Glucose Blood (BLOOD GLUCOSE TEST) strip, Check blood sugar twice a day., Disp: 100 each, Rfl: 1  •  hydrocortisone (PROCTOCARE-HC) 2.5 % rectal cream, As Needed., Disp: , Rfl:   •  Lancets (ACCU-CHEK SOFT TOUCH) lancets, Check blood sugar twice a day as needed, Disp: 100 each, Rfl: 1  •  lansoprazole (PREVACID) 30 MG capsule, Take 1 capsule by mouth Daily., Disp: 90 capsule, Rfl: 3  •  levothyroxine (SYNTHROID, LEVOTHROID) 50 MCG tablet, TAKE ONE TABLET BY MOUTH DAILY, Disp: 90 tablet, Rfl: 0  •  losartan (COZAAR) 50 MG tablet, TAKE ONE TABLET BY MOUTH DAILY, Disp: 90 tablet, Rfl: 1  •  metFORMIN ER (GLUCOPHAGE-XR) 500 MG 24 hr tablet, Take 2 tablets by mouth Daily With Dinner., Disp: 180 tablet, Rfl: 2  •  methylPREDNISolone (MEDROL) 4 MG dose pack, Take as directed on package instructions., Disp: 21 tablet, Rfl: 0  •  triamcinolone (KENALOG) 0.1 % cream, Apply  topically to the appropriate area as  "directed 2 (Two) Times a Day. Use sparingly avoid face, Disp: 60 g, Rfl: 1    ALLERGIES:  No Known Allergies      Objective   VITAL SIGNS:     Vitals:    09/03/20 1151   BP: 135/88   Pulse: 94   Resp: 16   Temp: 97.3 °F (36.3 °C)   SpO2: 100%   Weight: 82.6 kg (182 lb)   Height: 154.9 cm (60.98\")   PainSc:   5   PainLoc: Hip  Comment: lower back     Body mass index is 34.41 kg/m².     Wt Readings from Last 3 Encounters:   09/03/20 82.6 kg (182 lb)   08/25/20 82.7 kg (182 lb 4.8 oz)   06/19/20 83 kg (183 lb)       PHYSICAL EXAMINATION:  GENERAL:  The patient appears in pain, not in acute distress.  SKIN: No skin rashes. No ecchymosis.  HEAD:  Normocephalic.  EYES:  No Jaundice. No Pallor. Pupils equal. EOMI.  NECK:  Supple with Good ROM. No Thyromegaly. No Masses.  LYMPHATICS:  No cervical or supraclavicular or axillary lymphadenopathy.  CHEST: Normal respiratory effort. Lungs clear to auscultation.   CARDIAC:  Normal S1 & S2. No murmur. No edema.  ABDOMEN:  Soft. No tenderness. No Hepatomegaly. No Splenomegaly. No masses.  EXTREMITIES:  No clubbing. No deforming arthritis in the hands. No Calf tenderness.  Tenderness in the L4 area.  NEUROLOGICAL:  No Focal neurological deficits.       DIAGNOSTIC DATA:     Results from last 7 days   Lab Units 09/03/20  1139   WBC 10*3/mm3 5.61   NEUTROS ABS 10*3/mm3 2.65   HEMOGLOBIN g/dL 10.6*   HEMATOCRIT % 34.8   PLATELETS 10*3/mm3 292     Results from last 7 days   Lab Units 09/03/20  1139   SODIUM mmol/L 138   POTASSIUM mmol/L 3.6   CHLORIDE mmol/L 103   CO2 mmol/L 27.0   BUN mg/dL 16   CREATININE mg/dL 0.77   CALCIUM mg/dL 9.5   ALBUMIN g/dL 4.20   BILIRUBIN mg/dL 0.3   ALK PHOS U/L 67   ALT (SGPT) U/L 8   AST (SGOT) U/L 10   GLUCOSE mg/dL 93     WHOLE BODY BONE SCAN WITH THREE-PHASE IMAGES OF THE LUMBAR SPINE 9/2/2020:     HISTORY: Expansile, enhancing bone lesion was observed throughout the  entirety of the L4 vertebra and associated with compression deformity on  lumbar MRI " 08/27/2020. The patient has a reported history of breast  cancer from November 2016.     TECHNIQUE: Three phase bone scan of the lumbar region along with a  delayed whole body bone scan was performed and is correlated with lumbar  spine MRI 08/27/2020.     FINDINGS: There is no abnormal radiotracer activity observed around the  lumbar spine or the visualized upper pelvis on the first or the second  phase images. Normal genitourinary and vascular activity is observed on  those images.     Third phase images show normal genitourinary radiotracer activity. There  is very subtle increased activity in the region of the L4 vertebra that  would typically be mistaken for degenerative change but in this case  corresponds to the expansile lesions seen in that vertebral body to  better advantage on MRI. There is also a focus of abnormal radiotracer  uptake along the lateral left side of the skull approximately just  posterior to the expected level of the coronoid suture. This area of  abnormal activity measures approximately 3 cm diameter. Radiotracer  activity elsewhere throughout the skeleton is normal.     IMPRESSION:  There is only very subtle increased activity in the region  of the large expansile mass in the L4 vertebral body. There is also  abnormal radiotracer activity in the large area along the lateral left  side of the skull. It would not be unusual for a purely osteolytic  process such as myeloma or osteolytic metastases to be poorly  represented on bone scan.     This report was finalized on 9/3/2020 8:14 AM by Dr. Kumar Mustafa M.D.    I personally reviewed the bone scan with the patient and I concur with the finding of abnormal activity at L4 as well as in the left side of the skull.    MRI EXAMINATION OF THE LUMBAR SPINE WITH AND WITHOUT CONTRAST 8/27/2020:     HISTORY: Back pain, L4 fracture.     COMPARISON: Plain film examination 08/25/2020.     FINDINGS: The sagittal T2 sequence demonstrates a T2  hyperintense lesion  involving the L4 vertebral body which is T1 hypointense and enhances  moderately and relatively homogeneously after contrast administration.  This measures approximately 3.8 cm in AP dimension. The mass bows  anteriorly and extends anterior to the anterior margin of the lumbar  spine by approximately 7 mm. There is no evidence of bony retropulsion.  There is increased signal intensity involving the pedicles of L4  bilaterally. There is no evidence of paraspinal edema.     The conus is at T12-L1 and the caudal aspect of the spinal cord appears  unremarkable.     T12-L1: There is no evidence of disc bulge or herniation.     L1-L2: There is no evidence of disc bulge or herniation.     L2-L3: There is no evidence of disc bulge or herniation.     L3-L4: There is mild central disc osteophyte complex. There is no  evidence of bony retropulsion or of epidural extension of tumor.     L4-L5: Mild-to-moderate facet degenerative disease is present  bilaterally. There is a mild central disc osteophyte complex with no  evidence of herniation. There is mild neural foraminal compromise on the  right secondary to extension of a small disc osteophyte complex into the  neural foramen. At L4-L5 the enhancing lesion extends into the anterior  and superior aspect of the neural foramen. It does not abut the left L4  exiting nerve.     L5-S1: Mild-to-moderate facet degenerative disease is present  bilaterally.     Note is made of an area of signal loss involving the sacral ala on the  right on the axial T1 and T2 sequences. No convincing enhancement is  appreciated.     IMPRESSION:  There is a lesion involving the L4 vertebral body which  erodes and disrupts the anterior cortex. It extends anteriorly by  approximately 7 mm. The lesion measures 3.8 cm in craniocaudal dimension  and there is approximately 50% to 60% loss of vertical height centrally  representing a pathological fracture. There is no evidence of  bony  retropulsion. The enhancing lesion extends into the anterior and  superior aspect of the neural foramen on the left slightly. The  appearance is nonspecific. This may represent a myelomatous lesion.  Metastatic disease cannot be excluded. There is also an area of signal  loss involving the sacral ala on the right, nonspecific but without  convincing enhancement measuring approximately 1.4 cm in maximum  dimension. Further evaluation with a whole-body bone scan is  recommended. Biopsy of the L4 vertebral body could also be performed.     This report was finalized on 8/28/2020 8:51 AM by Dr. Santosh Thomson M.D.    Personally reviewed the MRI with the patient during today's visit and I concur with the findings.    Bilateral mammograms on 8/14/2020 showed no evidence of malignancy.  There were BI-RADS Category 2: Benign    Assessment/Plan   1.  Left breast cancer, stage IIB, triple negative.   · S/P lumpectomy  · She received adjuvant chemotherapy on the NSABP B34 with AC followed by Taxotere.  · She received post-lumpectomy radiation therapy.   · She was placed on tamoxifen for 5 years and it was completed in January 2007.    · Bilateral mammograms from 8/14/2020 were benign.      2.  A bone lesion at L4 resulting in a pathologic compression fracture.  There is 50-60% loss of the vertebral height.  It is hypermetabolic.  It is considered highly suspicious for metastasis from breast cancer.  The other possibility is multiple myeloma.  Patient is symptomatic and is having severe pain in the area.    I explained the findings to the patient.  The radiologist indicated that this lesion can be biopsied with CT guidance.  However, I recommended waiting for evaluation by neurosurgery since the patient may require open surgery or kyphoplasty and a tissue can be obtained at that point.    3.  Bone scan revealed a lesion in the skull in the left parietal area, also suspicious for metastasis.  Patient reports headaches.  Is  not clear if the headaches are related to this lesion.    4.  Iron deficiency anemia.  Patient is on ferrous sulfate 325 mg daily.  Her anemia worsened and hemoglobin is down to 10.6 today.        PLAN:    1.  I will obtain a PET scan to evaluate for other areas of metastasis.  2.  I will obtain MRI of the brain to evaluate the skull lesion reported on the bone scan.  3.  I will obtain CA-15-3, SPEP CRISSY F[facility] today.  4.  Repeat iron studies and obtain B12 folate and methylmalonic acid levels.  5.  Patient has follow-up with Neurosurgery in 1 week.  I will see her in follow-up in 2 weeks.        Tim Muñoz MD  09/03/20

## 2020-09-04 LAB
ALBUMIN SERPL-MCNC: 3.7 G/DL (ref 2.9–4.4)
ALBUMIN/GLOB SERPL: 1.1 {RATIO} (ref 0.7–1.7)
ALPHA1 GLOB FLD ELPH-MCNC: 0.2 G/DL (ref 0–0.4)
ALPHA2 GLOB SERPL ELPH-MCNC: 0.9 G/DL (ref 0.4–1)
B-GLOBULIN SERPL ELPH-MCNC: 1 G/DL (ref 0.7–1.3)
FERRITIN SERPL-MCNC: 192.5 NG/ML (ref 11–207)
FOLATE SERPL-MCNC: 7.51 NG/ML (ref 4.78–24.2)
GAMMA GLOB SERPL ELPH-MCNC: 1.5 G/DL (ref 0.4–1.8)
GLOBULIN SER CALC-MCNC: 3.6 G/DL (ref 2.2–3.9)
IGA SERPL-MCNC: 222 MG/DL (ref 87–352)
IGG SERPL-MCNC: 1326 MG/DL (ref 586–1602)
IGM SERPL-MCNC: 187 MG/DL (ref 26–217)
INTERPRETATION SERPL IEP-IMP: ABNORMAL
IRON 24H UR-MRATE: 29 MCG/DL (ref 37–145)
IRON SATN MFR SERPL: 10 % (ref 14–48)
KAPPA LC SERPL-MCNC: 23 MG/L (ref 3.3–19.4)
KAPPA LC/LAMBDA SER: 2.17 {RATIO} (ref 0.26–1.65)
LAMBDA LC FREE SERPL-MCNC: 10.6 MG/L (ref 5.7–26.3)
Lab: ABNORMAL
M-SPIKE: ABNORMAL G/DL
PROT SERPL-MCNC: 7.3 G/DL (ref 6–8.5)
TIBC SERPL-MCNC: 279 MCG/DL (ref 249–505)
TRANSFERRIN SERPL-MCNC: 199 MG/DL (ref 200–360)
VIT B12 BLD-MCNC: 464 PG/ML (ref 211–946)

## 2020-09-08 NOTE — PROGRESS NOTES
Subjective   History of Present Illness: Ina Akers is a 49 y.o. female is being seen for consultation today at the request of James Epley, APRN Dx: Pathological fracture of lumbar vertebra, initial encounter; Bone mass. She had XR L Spine , MRI L Spine , NM Bone scan  and MRI Brain 9/3 at [facility]. Today Ms. Akers reports intermittent low back pain that radiates bilaterally to hips and BLE.  She denies any numbness or weakness.  She denies issues with balance/gait, denies issues with bowel or bladder incontinence.  She reports that right now the pain is a 2 out of 10 while resting, but becomes severe with sitting in certain positions or with certain movements.  She denies any fever, chills, weight loss.  Denies any blood in her stool.  Denies any pain anywhere else on her body.  Denies any growths or lumps detected anywhere else on her body.  Denies any headaches.  Denies any nausea vomiting.     History of Present Illness    The following portions of the patient's history were reviewed and updated as appropriate: allergies, past family history, past medical history, past social history, past surgical history and problem list.    Past Medical History:   Diagnosis Date   • Anemia     History of recurent iron deficiency   • Cancer (CMS/HCC) 10/2001    Left breast   • Diabetes mellitus (CMS/HCC)    • H/O Thyroid goiter    • History of gastritis    • History of IBS    • History of tinea corporis    • Hypertension         Past Surgical History:   Procedure Laterality Date   • BREAST SURGERY     •  SECTION     • THYROID BIOPSY            Current Outpatient Medications:   •  amLODIPine (NORVASC) 5 MG tablet, Take 1 tablet by mouth Daily., Disp: 90 tablet, Rfl: 2  •  atorvastatin (LIPITOR) 10 MG tablet, Take 1 tablet by mouth Daily., Disp: 90 tablet, Rfl: 3  •  Blood Glucose Monitoring Suppl w/Device kit, Check blood sugar twice a day, Disp: 1 each, Rfl: 0  •  Docusate Calcium (STOOL SOFTENER  PO), Take  by mouth Daily. As needed, Disp: , Rfl:   •  Glucose Blood (BLOOD GLUCOSE TEST) strip, Check blood sugar twice a day., Disp: 100 each, Rfl: 1  •  Lancets (ACCU-CHEK SOFT TOUCH) lancets, Check blood sugar twice a day as needed, Disp: 100 each, Rfl: 1  •  lansoprazole (PREVACID) 30 MG capsule, Take 1 capsule by mouth Daily., Disp: 90 capsule, Rfl: 3  •  levothyroxine (SYNTHROID, LEVOTHROID) 50 MCG tablet, TAKE ONE TABLET BY MOUTH DAILY, Disp: 90 tablet, Rfl: 0  •  losartan (COZAAR) 50 MG tablet, TAKE ONE TABLET BY MOUTH DAILY, Disp: 90 tablet, Rfl: 1  •  metFORMIN ER (GLUCOPHAGE-XR) 500 MG 24 hr tablet, Take 2 tablets by mouth Daily With Dinner., Disp: 180 tablet, Rfl: 2  •  triamcinolone (KENALOG) 0.1 % cream, Apply  topically to the appropriate area as directed 2 (Two) Times a Day. Use sparingly avoid face, Disp: 60 g, Rfl: 1     Social History     Socioeconomic History   • Marital status:      Spouse name: Raghav   • Number of children: 2   • Years of education: Not on file   • Highest education level: Not on file   Occupational History     Employer: [employer]     Employer: [employer]   Tobacco Use   • Smoking status: Never Smoker   • Smokeless tobacco: Never Used   Substance and Sexual Activity   • Alcohol use: Yes     Comment: Socially   • Drug use: No   • Sexual activity: Defer        Family History   Problem Relation Age of Onset   • Hypertension Other    • Diabetes Other     No Known Allergies    Review of Systems   Constitutional: Negative for chills and fever.   HENT: Negative for tinnitus and trouble swallowing.    Eyes: Negative for photophobia, redness and visual disturbance.   Respiratory: Negative for cough, shortness of breath and wheezing.    Cardiovascular: Negative for chest pain, palpitations and leg swelling.   Gastrointestinal: Negative for abdominal pain, constipation, diarrhea, nausea and vomiting.   Genitourinary:  "Negative for difficulty urinating and frequency.   Musculoskeletal: Positive for back pain. Negative for gait problem, neck pain and neck stiffness.   Neurological: Positive for numbness. Negative for dizziness, speech difficulty, weakness, light-headedness and headaches.   Psychiatric/Behavioral: Negative for confusion and decreased concentration.       Objective     Vitals:    09/11/20 0846   BP: 124/88   Pulse: 106   Temp: 97.3 °F (36.3 °C)   Weight: 83.1 kg (183 lb 3.7 oz)   Height: 154.9 cm (61\")     Body mass index is 34.62 kg/m².      Physical Exam  Neurologic Exam  Awake, alert, oriented x3  Pupils equal round reactive to light  Extraocular muscles intact  Face symmetric  Speech is fluent and clear  No pronator drift  Motor exam  Bilateral deltoids 5/5, bilateral biceps 5/5, bilateral triceps 5/5, bilateral wrist extension 5/5 bilateral hand  5/5  Bilateral hip flexion 5/5, bilateral knee extension 5/5, bilateral DF/PF 5/5  No clonus  No Alethea's reflex  2+ bilateral patellar reflexes  2+ bilateral biceps reflex  Steady normal gait  Able to walk heel-to-toe without difficulty  Able to detect  light touch in all 4 extremities    Assessment/Plan   Independent Review of Radiographic Studies:      I personally reviewed the images from the following studies.  MRI lumbar spine, MRI brain with and without contrast.  The MRI brain with and without contrast demonstrates a small left parietal skull lesion extending into the dura.  The lesion is about 2 to 3 mm in thickness and several many millimeters in length.  There is no significant mass-effect or compression of the underlying brain.  There is a small blush within the right occipital lobe but no obvious tumor.  The MRI of the lumbar spine demonstrates a large amount of tumor burden within the L4 vertebral body with anterior extension and a pathologic fracture with loss of vertebral body height.  There is no retropulsion and no canal stenosis at this level. "     Medical Decision Makin-year-old female with history of breast cancer now presenting with lower back and hip pain since May  -The MRI of her lumbar spine demonstrates a L4 vertebral body lesion with pathologic fracture.  There is no central canal stenosis, she has no numbness or weakness in her lower extremities.  I believe she is a candidate for a osteo-cool radiofrequency ablation and kyphoplasty.  I will arrange for this procedure next Thursday.  This procedure will help obtain a biopsy, ablate some of the tumor, reinforce the vertebral body with cement to avoid further collapse and treat her pain.   -I recommend that she follow-up with her oncologist as soon as possible and begin radiation treatment as soon as possible.  She has had a nuclear medicine study which demonstrated she also has lesions in her right ninth rib, the L4 vertebral body, the right femoral head, sacrum, left ischium  -She is also had an MRI of her brain which demonstrates a small left parietal skull lesion with extension into the underlying dura and a small blush within the right occipital lobe which may represent a benign venous anomaly.  I recommend radiation to the left parietal lesion.  I will follow-up in 2 to 3 months with a repeat MRI to evaluate for any new intracranial lesions and for any growth of the left parietal skull lesion      Ina was seen today for pathological fracture of lumbar vertebra, initial encounter and bone mass.    Diagnoses and all orders for this visit:    Lumbar spine tumor  -     MRI Brain With & Without Contrast; Future  -     Ambulatory Referral to Radiation Oncology    Skull lesion  -     MRI Brain With & Without Contrast; Future  -     Ambulatory Referral to Radiation Oncology      No follow-ups on file.

## 2020-09-09 ENCOUNTER — HOSPITAL ENCOUNTER (OUTPATIENT)
Dept: PET IMAGING | Facility: HOSPITAL | Age: 50
Discharge: HOME | End: 2020-09-09

## 2020-09-09 DIAGNOSIS — M89.9 LYTIC LESION OF BONE ON X-RAY: ICD-10-CM

## 2020-09-09 DIAGNOSIS — C50.412 MALIGNANT NEOPLASM OF UPPER-OUTER QUADRANT OF LEFT BREAST IN FEMALE, ESTROGEN RECEPTOR NEGATIVE (HCC): ICD-10-CM

## 2020-09-09 DIAGNOSIS — Z17.1 MALIGNANT NEOPLASM OF UPPER-OUTER QUADRANT OF LEFT BREAST IN FEMALE, ESTROGEN RECEPTOR NEGATIVE (HCC): ICD-10-CM

## 2020-09-09 LAB — GLUCOSE BLDC GLUCOMTR-MCNC: 77 MG/DL (ref 70–130)

## 2020-09-09 PROCEDURE — A9552 F18 FDG: HCPCS | Performed by: INTERNAL MEDICINE

## 2020-09-09 PROCEDURE — 0 FLUDEOXYGLUCOSE F18 SOLUTION: Performed by: INTERNAL MEDICINE

## 2020-09-09 PROCEDURE — 78815 PET IMAGE W/CT SKULL-THIGH: CPT

## 2020-09-09 PROCEDURE — 82962 GLUCOSE BLOOD TEST: CPT

## 2020-09-09 RX ADMIN — FLUDEOXYGLUCOSE F18 1 DOSE: 300 INJECTION INTRAVENOUS at 12:20

## 2020-09-10 LAB
Lab: NORMAL
METHYLMALONATE SERPL-SCNC: 144 NMOL/L (ref 0–378)

## 2020-09-11 ENCOUNTER — PREP FOR SURGERY (OUTPATIENT)
Dept: OTHER | Facility: HOSPITAL | Age: 50
End: 2020-09-11

## 2020-09-11 ENCOUNTER — OFFICE VISIT (OUTPATIENT)
Dept: NEUROSURGERY | Facility: CLINIC | Age: 50
End: 2020-09-11

## 2020-09-11 VITALS
TEMPERATURE: 97.3 F | DIASTOLIC BLOOD PRESSURE: 88 MMHG | HEIGHT: 61 IN | HEART RATE: 106 BPM | BODY MASS INDEX: 34.59 KG/M2 | WEIGHT: 183.23 LBS | SYSTOLIC BLOOD PRESSURE: 124 MMHG

## 2020-09-11 DIAGNOSIS — M89.9 SKULL LESION: ICD-10-CM

## 2020-09-11 DIAGNOSIS — D49.2 LUMBAR SPINE TUMOR: Primary | ICD-10-CM

## 2020-09-11 PROCEDURE — 99243 OFF/OP CNSLTJ NEW/EST LOW 30: CPT | Performed by: NEUROLOGICAL SURGERY

## 2020-09-15 ENCOUNTER — TELEPHONE (OUTPATIENT)
Dept: ONCOLOGY | Facility: CLINIC | Age: 50
End: 2020-09-15

## 2020-09-15 ENCOUNTER — APPOINTMENT (OUTPATIENT)
Dept: PREADMISSION TESTING | Facility: HOSPITAL | Age: 50
End: 2020-09-15

## 2020-09-15 VITALS
OXYGEN SATURATION: 98 % | DIASTOLIC BLOOD PRESSURE: 82 MMHG | WEIGHT: 181 LBS | HEART RATE: 84 BPM | HEIGHT: 61 IN | RESPIRATION RATE: 20 BRPM | SYSTOLIC BLOOD PRESSURE: 127 MMHG | TEMPERATURE: 98.1 F | BODY MASS INDEX: 34.17 KG/M2

## 2020-09-15 DIAGNOSIS — D49.2 LUMBAR SPINE TUMOR: ICD-10-CM

## 2020-09-15 LAB
ANION GAP SERPL CALCULATED.3IONS-SCNC: 9.1 MMOL/L (ref 5–15)
APTT PPP: 29.1 SECONDS (ref 22.7–35.4)
BUN SERPL-MCNC: 15 MG/DL (ref 6–20)
BUN/CREAT SERPL: 17.2 (ref 7–25)
CALCIUM SPEC-SCNC: 9.5 MG/DL (ref 8.6–10.5)
CHLORIDE SERPL-SCNC: 102 MMOL/L (ref 98–107)
CO2 SERPL-SCNC: 25.9 MMOL/L (ref 22–29)
CREAT SERPL-MCNC: 0.87 MG/DL (ref 0.57–1)
DEPRECATED RDW RBC AUTO: 44.4 FL (ref 37–54)
ERYTHROCYTE [DISTWIDTH] IN BLOOD BY AUTOMATED COUNT: 14.1 % (ref 12.3–15.4)
GFR SERPL CREATININE-BSD FRML MDRD: 83 ML/MIN/1.73
GLUCOSE SERPL-MCNC: 103 MG/DL (ref 65–99)
HCT VFR BLD AUTO: 35.2 % (ref 34–46.6)
HGB BLD-MCNC: 11.1 G/DL (ref 12–15.9)
INR PPP: 1.01 (ref 0.9–1.1)
MCH RBC QN AUTO: 27.1 PG (ref 26.6–33)
MCHC RBC AUTO-ENTMCNC: 31.5 G/DL (ref 31.5–35.7)
MCV RBC AUTO: 86.1 FL (ref 79–97)
PLATELET # BLD AUTO: 327 10*3/MM3 (ref 140–450)
PMV BLD AUTO: 9.1 FL (ref 6–12)
POTASSIUM SERPL-SCNC: 3.6 MMOL/L (ref 3.5–5.2)
PROTHROMBIN TIME: 13.2 SECONDS (ref 11.7–14.2)
RBC # BLD AUTO: 4.09 10*6/MM3 (ref 3.77–5.28)
SODIUM SERPL-SCNC: 137 MMOL/L (ref 136–145)
WBC # BLD AUTO: 5.71 10*3/MM3 (ref 3.4–10.8)

## 2020-09-15 PROCEDURE — 85730 THROMBOPLASTIN TIME PARTIAL: CPT | Performed by: NEUROLOGICAL SURGERY

## 2020-09-15 PROCEDURE — 36415 COLL VENOUS BLD VENIPUNCTURE: CPT

## 2020-09-15 PROCEDURE — 80048 BASIC METABOLIC PNL TOTAL CA: CPT | Performed by: NEUROLOGICAL SURGERY

## 2020-09-15 PROCEDURE — U0004 COV-19 TEST NON-CDC HGH THRU: HCPCS | Performed by: NURSE PRACTITIONER

## 2020-09-15 PROCEDURE — 85610 PROTHROMBIN TIME: CPT | Performed by: NEUROLOGICAL SURGERY

## 2020-09-15 PROCEDURE — 85027 COMPLETE CBC AUTOMATED: CPT | Performed by: NEUROLOGICAL SURGERY

## 2020-09-15 PROCEDURE — C9803 HOPD COVID-19 SPEC COLLECT: HCPCS | Performed by: NURSE PRACTITIONER

## 2020-09-15 RX ORDER — FERROUS SULFATE 325(65) MG
325 TABLET ORAL
COMMUNITY
End: 2021-01-20

## 2020-09-15 RX ORDER — MELATONIN
1000 DAILY
COMMUNITY

## 2020-09-15 NOTE — DISCHARGE INSTRUCTIONS
Take the following medications the morning of surgery:    AMLODIPINE, PREVACID, LEVOTHYROXINE    ARRIVE TO MAIN SURGERY AT 9:30 AM ON 9/17/2020    If you are on prescription narcotic pain medication to control your pain you may also take that medication the morning of surgery.    General Instructions:  • Do not eat solid food after midnight the night before surgery.  • You may drink clear liquids day of surgery but must stop at least one hour before your hospital arrival time.  • It is beneficial for you to have a clear drink that contains carbohydrates the day of surgery.  We suggest a 12 to 20 ounce bottle of Gatorade or Powerade for non-diabetic patients or a 12 to 20 ounce bottle of G2 or Powerade Zero for diabetic patients. (Pediatric patients, are not advised to drink a 12 to 20 ounce carbohydrate drink)    Clear liquids are liquids you can see through.  Nothing red in color.     Plain water                               Sports drinks  Sodas                                   Gelatin (Jell-O)  Fruit juices without pulp such as white grape juice and apple juice  Popsicles that contain no fruit or yogurt  Tea or coffee (no cream or milk added)  Gatorade / Powerade  G2 / Powerade Zero    • Infants may have breast milk up to four hours before surgery.  • Infants drinking formula may drink formula up to six hours before surgery.   • Patients who avoid smoking, chewing tobacco and alcohol for 4 weeks prior to surgery have a reduced risk of post-operative complications.  Quit smoking as many days before surgery as you can.  • Do not smoke, use chewing tobacco or drink alcohol the day of surgery.   • If applicable bring your C-PAP/ BI-PAP machine.  • Bring any papers given to you in the doctor’s office.  • Wear clean comfortable clothes.  • Do not wear contact lenses, false eyelashes or make-up.  Bring a case for your glasses.   • Bring crutches or walker if applicable.  • Remove all piercings.  Leave jewelry and any  other valuables at home.  • Hair extensions with metal clips must be removed prior to surgery.  • The Pre-Admission Testing nurse will instruct you to bring medications if unable to obtain an accurate list in Pre-Admission Testing.            Preventing a Surgical Site Infection:  • For 2 to 3 days before surgery, avoid shaving with a razor because the razor can irritate skin and make it easier to develop an infection.    • Any areas of open skin can increase the risk of a post-operative wound infection by allowing bacteria to enter and travel throughout the body.  Notify your surgeon if you have any skin wounds / rashes even if it is not near the expected surgical site.  The area will need assessed to determine if surgery should be delayed until it is healed.  • The night prior to surgery shower using a fresh bar of anti-bacterial soap (such as Dial) and clean washcloth.  Sleep in a clean bed with clean clothing.  Do not allow pets to sleep with you.  • Shower on the morning of surgery using a fresh bar of anti-bacterial soap (such as Dial) and clean washcloth.  Dry with a clean towel and dress in clean clothing.  • Ask your surgeon if you will be receiving antibiotics prior to surgery.  • Make sure you, your family, and all healthcare providers clean their hands with soap and water or an alcohol based hand  before caring for you or your wound.    Day of surgery:  Your arrival time is approximately two hours before your scheduled surgery time.  Upon arrival, a Pre-op nurse and Anesthesiologist will review your health history, obtain vital signs, and answer questions you may have.  The only belongings needed at this time will be a list of your home medications and if applicable your C-PAP/BI-PAP machine.  If you are staying overnight your family can leave the rest of your belongings in the car and bring them to your room later.  A Pre-op nurse will start an IV and you may receive medication in preparation  for surgery, including something to help you relax.  Your family will be able to see you in the Pre-op area.  Two visitors at a time will be allowed in the Pre-op room.  While you are in surgery your family should notify the waiting room  if they leave the waiting room area and provide a contact phone number.    Please be aware that surgery does come with discomfort.  We want to make every effort to control your discomfort so please discuss any uncontrolled symptoms with your nurse.   Your doctor will most likely have prescribed pain medications.      If you are going home after surgery you will receive individualized written care instructions before being discharged.  A responsible adult must drive you to and from the hospital on the day of your surgery and stay with you for 24 hours.    If you are staying overnight following surgery, you will be transported to your hospital room following the recovery period.  [facility] has all private rooms.    If you have any questions please call Pre-Admission Testing at [phone].  Deductibles and co-payments are collected on the day of service. Please be prepared to pay the required co-pay, deductible or deposit on the day of service as defined by your plan.    Patient Education for Self-Quarantine Process    Following your COVID testing, we strongly recommend that you do not leave your home after you have been tested for COVID except to get medical care. This includes not going to work, school or to public areas.  If this is not possible for you to do please limit your activities to only required outings.  Be sure to wear a mask when you are with other people, practice social distancing and wash your hands frequently.      The following items provide additional details to keep you safe.  • Wash your hands with soap and water frequently for at least 20 seconds.   • Avoid touching your eyes, nose and mouth with unwashed hands.  • Do not share  anything - utensils, towels, food from the same bowl.   • Have your own utensils, drinking glass, dishes, towels and bedding.   • Do not have visitors.   • Do use FaceTime to stay in touch with family and friends.  • You should stay in a specific room away from others if possible.   • Stay at least 6 feet away from others in the home if you cannot have a dedicated room to yourself.   • Do not snuggle with your pet. While the CDC says there is no evidence that pets can spread COVID-19 or be infected from humans, it is probably best to avoid “petting, snuggling, being kissed or licked and sharing food (during self-quarantine)”, according to the CDC.   • Sanitize household surfaces daily. Include all high touch areas (door handles, light switches, phones, countertops, etc.)  • Do not share a bathroom with others, if possible.   • Wear a mask around others in your home if you are unable to stay in a separate room or 6 feet apart. If  you are unable to wear a mask, have your family member wear a mask if they must be within 6 feet of you.   Call your surgeon immediately if you experience any of the following symptoms:  • Sore Throat  • Shortness of Breath or difficulty breathing  • Cough  • Chills  • Body soreness or muscle pain  • Headache  • Fever  • New loss of taste or smell  • Do not arrive for your surgery ill.  Your procedure will need to be rescheduled to another time.  You will need to call your physician before the day of surgery to avoid any unnecessary exposure to hospital staff as well as other patients.    CHLORHEXIDINE CLOTH INSTRUCTIONS  The morning of surgery follow these instructions using the Chlorhexidine cloths you've been given.  These steps reduce bacteria on the body.  Do not use the cloths near your eyes, ears mouth, genitalia or on open wounds.  Throw the cloths away after use but do not try to flush them down a toilet.      • Open and remove one cloth at a time from the package.    • Leave the  cloth unfolded and begin the bathing.  • Massage the skin with the cloths using gentle pressure to remove bacteria.  Do not scrub harshly.   • Follow the steps below with one 2% CHG cloth per area (6 total cloths).  • One cloth for neck, shoulders and chest.  • One cloth for both arms, hands, fingers and underarms (do underarms last).  • One cloth for the abdomen followed by groin.  • One cloth for right leg and foot including between the toes.  • One cloth for left leg and foot including between the toes.  • The last cloth is to be used for the back of the neck, back and buttocks.    Allow the CHG to air dry 3 minutes on the skin which will give it time to work and decrease the chance of irritation.  The skin may feel sticky until it is dry.  Do not rinse with water or any other liquid or you will lose the beneficial effects of the CHG.  If mild skin irritation occurs, do rinse the skin to remove the CHG.  Report this to the nurse at time of admission.  Do not apply lotions, creams, ointments, deodorants or perfumes after using the clothes. Dress in clean clothes before coming to the hospital.

## 2020-09-15 NOTE — TELEPHONE ENCOUNTER
Pt called to let the doctor know she is having out patient surgery on Thursday 9/17 with Dr. Rios at [facility].  Also she wanted to know if her visit on Friday 9/18 can be by Telephone?  Best call back number [phone]

## 2020-09-15 NOTE — TELEPHONE ENCOUNTER
PT CALLING ASKING IF HER APPT THIS FRI COULD BE CHANGED TO TELEVISIT. PT IS HAVING OUTPT SPINAL SX THIS THURS AND DOESN'T WANT TO COME INTO OFFICE FRI. MESSAGED MA POOL TO HANDLE. PT V/U.

## 2020-09-16 LAB — SARS-COV-2 RNA RESP QL NAA+PROBE: NOT DETECTED

## 2020-09-17 ENCOUNTER — ANESTHESIA EVENT (OUTPATIENT)
Dept: PERIOP | Facility: HOSPITAL | Age: 50
End: 2020-09-17

## 2020-09-17 ENCOUNTER — ANESTHESIA (OUTPATIENT)
Dept: PERIOP | Facility: HOSPITAL | Age: 50
End: 2020-09-17

## 2020-09-17 ENCOUNTER — APPOINTMENT (OUTPATIENT)
Dept: GENERAL RADIOLOGY | Facility: HOSPITAL | Age: 50
End: 2020-09-17

## 2020-09-17 ENCOUNTER — HOSPITAL ENCOUNTER (OUTPATIENT)
Facility: HOSPITAL | Age: 50
Setting detail: HOSPITAL OUTPATIENT SURGERY
Discharge: HOME | End: 2020-09-17
Attending: NEUROLOGICAL SURGERY | Admitting: NEUROLOGICAL SURGERY

## 2020-09-17 VITALS
HEIGHT: 61 IN | DIASTOLIC BLOOD PRESSURE: 82 MMHG | OXYGEN SATURATION: 94 % | TEMPERATURE: 97.6 F | WEIGHT: 181.2 LBS | SYSTOLIC BLOOD PRESSURE: 128 MMHG | HEART RATE: 70 BPM | RESPIRATION RATE: 16 BRPM | BODY MASS INDEX: 34.21 KG/M2

## 2020-09-17 DIAGNOSIS — D49.2 LUMBAR SPINE TUMOR: ICD-10-CM

## 2020-09-17 LAB
GLUCOSE BLDC GLUCOMTR-MCNC: 88 MG/DL (ref 70–130)
GLUCOSE BLDC GLUCOMTR-MCNC: 89 MG/DL (ref 70–130)

## 2020-09-17 PROCEDURE — 0 IOPAMIDOL 41 % SOLUTION: Performed by: NEUROLOGICAL SURGERY

## 2020-09-17 PROCEDURE — 25010000002 FENTANYL CITRATE (PF) 100 MCG/2ML SOLUTION: Performed by: NURSE ANESTHETIST, CERTIFIED REGISTERED

## 2020-09-17 PROCEDURE — S0260 H&P FOR SURGERY: HCPCS | Performed by: NEUROLOGICAL SURGERY

## 2020-09-17 PROCEDURE — 22514 PERQ VERTEBRAL AUGMENTATION: CPT | Performed by: NEUROLOGICAL SURGERY

## 2020-09-17 PROCEDURE — 25010000002 NEOSTIGMINE PER 0.5 MG: Performed by: NURSE ANESTHETIST, CERTIFIED REGISTERED

## 2020-09-17 PROCEDURE — 25010000003 CEFAZOLIN IN DEXTROSE 2-4 GM/100ML-% SOLUTION: Performed by: NEUROLOGICAL SURGERY

## 2020-09-17 PROCEDURE — 82962 GLUCOSE BLOOD TEST: CPT

## 2020-09-17 PROCEDURE — 88307 TISSUE EXAM BY PATHOLOGIST: CPT | Performed by: NEUROLOGICAL SURGERY

## 2020-09-17 PROCEDURE — 20982 ABLATE BONE TUMOR(S) PERQ: CPT | Performed by: NEUROLOGICAL SURGERY

## 2020-09-17 PROCEDURE — 25010000002 DEXAMETHASONE PER 1 MG: Performed by: NURSE ANESTHETIST, CERTIFIED REGISTERED

## 2020-09-17 PROCEDURE — 25010000003 LIDOCAINE 1 % SOLUTION 20 ML VIAL: Performed by: NEUROLOGICAL SURGERY

## 2020-09-17 PROCEDURE — 25010000002 PROPOFOL 10 MG/ML EMULSION: Performed by: NURSE ANESTHETIST, CERTIFIED REGISTERED

## 2020-09-17 PROCEDURE — 25010000002 ONDANSETRON PER 1 MG: Performed by: NURSE ANESTHETIST, CERTIFIED REGISTERED

## 2020-09-17 PROCEDURE — C1713 ANCHOR/SCREW BN/BN,TIS/BN: HCPCS | Performed by: NEUROLOGICAL SURGERY

## 2020-09-17 DEVICE — CEMENT C01A KYPHX HV-R BONE CEMENT EN
Type: IMPLANTABLE DEVICE | Site: SPINE LUMBAR | Status: FUNCTIONAL
Brand: KYPHON® HV-R® BONE CEMENT

## 2020-09-17 RX ORDER — SODIUM CHLORIDE 0.9 % (FLUSH) 0.9 %
3-10 SYRINGE (ML) INJECTION AS NEEDED
Status: DISCONTINUED | OUTPATIENT
Start: 2020-09-17 | End: 2020-09-17 | Stop reason: HOSPADM

## 2020-09-17 RX ORDER — ROCURONIUM BROMIDE 10 MG/ML
INJECTION, SOLUTION INTRAVENOUS AS NEEDED
Status: DISCONTINUED | OUTPATIENT
Start: 2020-09-17 | End: 2020-09-17 | Stop reason: SURG

## 2020-09-17 RX ORDER — EPHEDRINE SULFATE 50 MG/ML
INJECTION, SOLUTION INTRAVENOUS AS NEEDED
Status: DISCONTINUED | OUTPATIENT
Start: 2020-09-17 | End: 2020-09-17 | Stop reason: SURG

## 2020-09-17 RX ORDER — EPHEDRINE SULFATE 50 MG/ML
5 INJECTION, SOLUTION INTRAVENOUS ONCE AS NEEDED
Status: DISCONTINUED | OUTPATIENT
Start: 2020-09-17 | End: 2020-09-17 | Stop reason: HOSPADM

## 2020-09-17 RX ORDER — HYDROCODONE BITARTRATE AND ACETAMINOPHEN 7.5; 325 MG/1; MG/1
1 TABLET ORAL ONCE AS NEEDED
Status: DISCONTINUED | OUTPATIENT
Start: 2020-09-17 | End: 2020-09-17 | Stop reason: HOSPADM

## 2020-09-17 RX ORDER — ACETAMINOPHEN 500 MG
1000 TABLET ORAL EVERY 6 HOURS PRN
COMMUNITY

## 2020-09-17 RX ORDER — MAGNESIUM HYDROXIDE 1200 MG/15ML
LIQUID ORAL AS NEEDED
Status: DISCONTINUED | OUTPATIENT
Start: 2020-09-17 | End: 2020-09-17 | Stop reason: HOSPADM

## 2020-09-17 RX ORDER — DEXAMETHASONE SODIUM PHOSPHATE 10 MG/ML
INJECTION INTRAMUSCULAR; INTRAVENOUS AS NEEDED
Status: DISCONTINUED | OUTPATIENT
Start: 2020-09-17 | End: 2020-09-17 | Stop reason: SURG

## 2020-09-17 RX ORDER — DIPHENHYDRAMINE HYDROCHLORIDE 50 MG/ML
12.5 INJECTION INTRAMUSCULAR; INTRAVENOUS
Status: DISCONTINUED | OUTPATIENT
Start: 2020-09-17 | End: 2020-09-17 | Stop reason: HOSPADM

## 2020-09-17 RX ORDER — CEFAZOLIN SODIUM 2 G/100ML
2 INJECTION, SOLUTION INTRAVENOUS ONCE
Status: COMPLETED | OUTPATIENT
Start: 2020-09-17 | End: 2020-09-17

## 2020-09-17 RX ORDER — OXYCODONE AND ACETAMINOPHEN 7.5; 325 MG/1; MG/1
1 TABLET ORAL ONCE AS NEEDED
Status: DISCONTINUED | OUTPATIENT
Start: 2020-09-17 | End: 2020-09-17 | Stop reason: HOSPADM

## 2020-09-17 RX ORDER — PROPOFOL 10 MG/ML
VIAL (ML) INTRAVENOUS AS NEEDED
Status: DISCONTINUED | OUTPATIENT
Start: 2020-09-17 | End: 2020-09-17 | Stop reason: SURG

## 2020-09-17 RX ORDER — ONDANSETRON 2 MG/ML
4 INJECTION INTRAMUSCULAR; INTRAVENOUS ONCE AS NEEDED
Status: DISCONTINUED | OUTPATIENT
Start: 2020-09-17 | End: 2020-09-17 | Stop reason: HOSPADM

## 2020-09-17 RX ORDER — LIDOCAINE HYDROCHLORIDE 20 MG/ML
INJECTION, SOLUTION INFILTRATION; PERINEURAL AS NEEDED
Status: DISCONTINUED | OUTPATIENT
Start: 2020-09-17 | End: 2020-09-17 | Stop reason: SURG

## 2020-09-17 RX ORDER — HYDRALAZINE HYDROCHLORIDE 20 MG/ML
5 INJECTION INTRAMUSCULAR; INTRAVENOUS
Status: DISCONTINUED | OUTPATIENT
Start: 2020-09-17 | End: 2020-09-17 | Stop reason: HOSPADM

## 2020-09-17 RX ORDER — ONDANSETRON 2 MG/ML
INJECTION INTRAMUSCULAR; INTRAVENOUS AS NEEDED
Status: DISCONTINUED | OUTPATIENT
Start: 2020-09-17 | End: 2020-09-17 | Stop reason: SURG

## 2020-09-17 RX ORDER — OXYCODONE HYDROCHLORIDE AND ACETAMINOPHEN 5; 325 MG/1; MG/1
2 TABLET ORAL EVERY 6 HOURS PRN
Status: DISCONTINUED | OUTPATIENT
Start: 2020-09-17 | End: 2020-09-17 | Stop reason: HOSPADM

## 2020-09-17 RX ORDER — FENTANYL CITRATE 50 UG/ML
50 INJECTION, SOLUTION INTRAMUSCULAR; INTRAVENOUS
Status: DISCONTINUED | OUTPATIENT
Start: 2020-09-17 | End: 2020-09-17 | Stop reason: HOSPADM

## 2020-09-17 RX ORDER — NALOXONE HCL 0.4 MG/ML
0.2 VIAL (ML) INJECTION AS NEEDED
Status: DISCONTINUED | OUTPATIENT
Start: 2020-09-17 | End: 2020-09-17 | Stop reason: HOSPADM

## 2020-09-17 RX ORDER — PROMETHAZINE HYDROCHLORIDE 25 MG/1
25 TABLET ORAL ONCE AS NEEDED
Status: DISCONTINUED | OUTPATIENT
Start: 2020-09-17 | End: 2020-09-17 | Stop reason: HOSPADM

## 2020-09-17 RX ORDER — PROMETHAZINE HYDROCHLORIDE 25 MG/1
25 SUPPOSITORY RECTAL ONCE AS NEEDED
Status: DISCONTINUED | OUTPATIENT
Start: 2020-09-17 | End: 2020-09-17 | Stop reason: HOSPADM

## 2020-09-17 RX ORDER — HYDROMORPHONE HYDROCHLORIDE 1 MG/ML
0.5 INJECTION, SOLUTION INTRAMUSCULAR; INTRAVENOUS; SUBCUTANEOUS
Status: DISCONTINUED | OUTPATIENT
Start: 2020-09-17 | End: 2020-09-17 | Stop reason: HOSPADM

## 2020-09-17 RX ORDER — DIPHENHYDRAMINE HCL 25 MG
25 CAPSULE ORAL
Status: DISCONTINUED | OUTPATIENT
Start: 2020-09-17 | End: 2020-09-17 | Stop reason: HOSPADM

## 2020-09-17 RX ORDER — OXYCODONE HYDROCHLORIDE AND ACETAMINOPHEN 5; 325 MG/1; MG/1
2 TABLET ORAL EVERY 6 HOURS PRN
Qty: 24 TABLET | Refills: 0 | Status: SHIPPED | OUTPATIENT
Start: 2020-09-17 | End: 2020-09-20

## 2020-09-17 RX ORDER — FENTANYL CITRATE 50 UG/ML
INJECTION, SOLUTION INTRAMUSCULAR; INTRAVENOUS AS NEEDED
Status: DISCONTINUED | OUTPATIENT
Start: 2020-09-17 | End: 2020-09-17 | Stop reason: SURG

## 2020-09-17 RX ORDER — LABETALOL HYDROCHLORIDE 5 MG/ML
5 INJECTION, SOLUTION INTRAVENOUS
Status: DISCONTINUED | OUTPATIENT
Start: 2020-09-17 | End: 2020-09-17 | Stop reason: HOSPADM

## 2020-09-17 RX ORDER — GLYCOPYRROLATE 0.2 MG/ML
INJECTION INTRAMUSCULAR; INTRAVENOUS AS NEEDED
Status: DISCONTINUED | OUTPATIENT
Start: 2020-09-17 | End: 2020-09-17 | Stop reason: SURG

## 2020-09-17 RX ORDER — SODIUM CHLORIDE, SODIUM LACTATE, POTASSIUM CHLORIDE, CALCIUM CHLORIDE 600; 310; 30; 20 MG/100ML; MG/100ML; MG/100ML; MG/100ML
9 INJECTION, SOLUTION INTRAVENOUS CONTINUOUS
Status: DISCONTINUED | OUTPATIENT
Start: 2020-09-17 | End: 2020-09-17 | Stop reason: HOSPADM

## 2020-09-17 RX ORDER — SCOLOPAMINE TRANSDERMAL SYSTEM 1 MG/1
1 PATCH, EXTENDED RELEASE TRANSDERMAL ONCE
Status: DISCONTINUED | OUTPATIENT
Start: 2020-09-17 | End: 2020-09-17 | Stop reason: HOSPADM

## 2020-09-17 RX ORDER — FLUMAZENIL 0.1 MG/ML
0.2 INJECTION INTRAVENOUS AS NEEDED
Status: DISCONTINUED | OUTPATIENT
Start: 2020-09-17 | End: 2020-09-17 | Stop reason: HOSPADM

## 2020-09-17 RX ORDER — MIDAZOLAM HYDROCHLORIDE 1 MG/ML
1 INJECTION INTRAMUSCULAR; INTRAVENOUS
Status: DISCONTINUED | OUTPATIENT
Start: 2020-09-17 | End: 2020-09-17 | Stop reason: HOSPADM

## 2020-09-17 RX ORDER — LIDOCAINE HYDROCHLORIDE 10 MG/ML
0.5 INJECTION, SOLUTION EPIDURAL; INFILTRATION; INTRACAUDAL; PERINEURAL ONCE AS NEEDED
Status: DISCONTINUED | OUTPATIENT
Start: 2020-09-17 | End: 2020-09-17 | Stop reason: HOSPADM

## 2020-09-17 RX ORDER — SODIUM CHLORIDE 0.9 % (FLUSH) 0.9 %
3 SYRINGE (ML) INJECTION EVERY 12 HOURS SCHEDULED
Status: DISCONTINUED | OUTPATIENT
Start: 2020-09-17 | End: 2020-09-17 | Stop reason: HOSPADM

## 2020-09-17 RX ADMIN — SODIUM CHLORIDE, POTASSIUM CHLORIDE, SODIUM LACTATE AND CALCIUM CHLORIDE 9 ML/HR: 600; 310; 30; 20 INJECTION, SOLUTION INTRAVENOUS at 09:50

## 2020-09-17 RX ADMIN — OXYCODONE HYDROCHLORIDE AND ACETAMINOPHEN 1 TABLET: 5; 325 TABLET ORAL at 16:46

## 2020-09-17 RX ADMIN — EPHEDRINE SULFATE 10 MG: 50 INJECTION INTRAVENOUS at 13:36

## 2020-09-17 RX ADMIN — FENTANYL CITRATE 100 MCG: 50 INJECTION INTRAMUSCULAR; INTRAVENOUS at 13:06

## 2020-09-17 RX ADMIN — DEXAMETHASONE SODIUM PHOSPHATE 4 MG: 10 INJECTION INTRAMUSCULAR; INTRAVENOUS at 15:02

## 2020-09-17 RX ADMIN — GLYCOPYRROLATE 0.1 MG: 0.2 INJECTION INTRAMUSCULAR; INTRAVENOUS at 15:19

## 2020-09-17 RX ADMIN — NEOSTIGMINE METHYLSULFATE 3 MG: 1 INJECTION INTRAMUSCULAR; INTRAVENOUS; SUBCUTANEOUS at 15:07

## 2020-09-17 RX ADMIN — GLYCOPYRROLATE 0.4 MG: 0.2 INJECTION INTRAMUSCULAR; INTRAVENOUS at 15:07

## 2020-09-17 RX ADMIN — NEOSTIGMINE METHYLSULFATE 1 MG: 1 INJECTION INTRAMUSCULAR; INTRAVENOUS; SUBCUTANEOUS at 15:19

## 2020-09-17 RX ADMIN — LIDOCAINE HYDROCHLORIDE 100 MG: 20 INJECTION, SOLUTION INFILTRATION; PERINEURAL at 13:10

## 2020-09-17 RX ADMIN — EPHEDRINE SULFATE 10 MG: 50 INJECTION INTRAVENOUS at 13:47

## 2020-09-17 RX ADMIN — ONDANSETRON HYDROCHLORIDE 4 MG: 2 SOLUTION INTRAMUSCULAR; INTRAVENOUS at 15:02

## 2020-09-17 RX ADMIN — EPHEDRINE SULFATE 10 MG: 50 INJECTION INTRAVENOUS at 14:15

## 2020-09-17 RX ADMIN — EPHEDRINE SULFATE 10 MG: 50 INJECTION INTRAVENOUS at 13:27

## 2020-09-17 RX ADMIN — SCOPALAMINE 1 PATCH: 1 PATCH, EXTENDED RELEASE TRANSDERMAL at 10:12

## 2020-09-17 RX ADMIN — CEFAZOLIN SODIUM 2 G: 2 INJECTION, SOLUTION INTRAVENOUS at 13:04

## 2020-09-17 RX ADMIN — PROPOFOL 200 MG: 10 INJECTION, EMULSION INTRAVENOUS at 13:10

## 2020-09-17 RX ADMIN — FENTANYL CITRATE 50 MCG: 50 INJECTION, SOLUTION INTRAMUSCULAR; INTRAVENOUS at 15:55

## 2020-09-17 RX ADMIN — ROCURONIUM BROMIDE 50 MG: 10 INJECTION INTRAVENOUS at 13:10

## 2020-09-17 RX ADMIN — FENTANYL CITRATE 50 MCG: 50 INJECTION, SOLUTION INTRAMUSCULAR; INTRAVENOUS at 15:35

## 2020-09-17 NOTE — OP NOTE
Surgeon: Jacobo Rios MD    Preoperative diagnosis: Acute L4 pathological  compression fracture   Postoperative diagnosis Acute L4 pathological fracture from metastatic tumor     Procedure: L4 Osteo-cool ablation, biopsy, and  kyphoplasty      Indication: To obtain biopsy, treat the tumor, and augment bone to prevent further deformity and collapse.  Also to treat pain         Description of procedure: The patient was brought into the hybrid OR and placed prone on the OR table a timeout was performed her back was then cleaned prepped and draped in usual sterile fashion.  She was given preoperative antibiotics.  A hemostat was then used to localize the L4 pedicles in AP and lateral planes.  1% lidocaine was injected in the subcutaneous tissue and down to the periosteum with a spinal needle.  Next a small stab incision was made on both sides adjacent to the L4 pedicles.  The pedicles were then cannulated with the pedicle needles under live fluoroscopy in the AP and lateral planes.  A biopsy was then taken from within the vertebral body and sent to pathology.  Small drills were then used to hand drill path to the anterior third of the vertebral body. The osteo-cool ablation probes were inserted and heated to 90 degrees C for 6 min.   The Kyphon balloons were then advanced down the pedicles into the vertebral body and inflated under live fluoroscopy.  There was some restoration of the height of the vertebral body.  The balloons were then removed and 6 cc cement was carefully injected into each cavity under direct fluoroscopy.  The needles were then removed and the skin was closed with Steri-Strips and Mastisol.      Complications: None  Estimated blood loss: Less than 10 cc     Anesthesia: General  Specimen: L4 tumor biopsy

## 2020-09-17 NOTE — H&P
History of Present Illness: Ina Akers is a 49 y.o. female is being seen for consultation today at the request of James Epley, APRN Dx: Pathological fracture of lumbar vertebra, initial encounter; Bone mass. She had XR L Spine , MRI L Spine , NM Bone scan  and MRI Brain 9/3 at [facility]. Today Ms. Akers reports intermittent low back pain that radiates bilaterally to hips and BLE.  She denies any numbness or weakness.  She denies issues with balance/gait, denies issues with bowel or bladder incontinence.  She reports that right now the pain is a 2 out of 10 while resting, but becomes severe with sitting in certain positions or with certain movements.  She denies any fever, chills, weight loss.  Denies any blood in her stool.  Denies any pain anywhere else on her body.  Denies any growths or lumps detected anywhere else on her body.  Denies any headaches.  Denies any nausea vomiting.      History of Present Illness     The following portions of the patient's history were reviewed and updated as appropriate: allergies, past family history, past medical history, past social history, past surgical history and problem list.     Medical History        Past Medical History:   Diagnosis Date   • Anemia       History of recurent iron deficiency   • Cancer (CMS/HCC) 10/2001     Left breast   • Diabetes mellitus (CMS/HCC)     • H/O Thyroid goiter     • History of gastritis     • History of IBS     • History of tinea corporis     • Hypertension              Surgical History         Past Surgical History:   Procedure Laterality Date   • BREAST SURGERY       •  SECTION       • THYROID BIOPSY                  Current Outpatient Medications:   •  amLODIPine (NORVASC) 5 MG tablet, Take 1 tablet by mouth Daily., Disp: 90 tablet, Rfl: 2  •  atorvastatin (LIPITOR) 10 MG tablet, Take 1 tablet by mouth Daily., Disp: 90 tablet, Rfl: 3  •  Blood Glucose Monitoring Suppl w/Device kit, Check blood sugar twice a  day, Disp: 1 each, Rfl: 0  •  Docusate Calcium (STOOL SOFTENER PO), Take  by mouth Daily. As needed, Disp: , Rfl:   •  Glucose Blood (BLOOD GLUCOSE TEST) strip, Check blood sugar twice a day., Disp: 100 each, Rfl: 1  •  Lancets (ACCU-CHEK SOFT TOUCH) lancets, Check blood sugar twice a day as needed, Disp: 100 each, Rfl: 1  •  lansoprazole (PREVACID) 30 MG capsule, Take 1 capsule by mouth Daily., Disp: 90 capsule, Rfl: 3  •  levothyroxine (SYNTHROID, LEVOTHROID) 50 MCG tablet, TAKE ONE TABLET BY MOUTH DAILY, Disp: 90 tablet, Rfl: 0  •  losartan (COZAAR) 50 MG tablet, TAKE ONE TABLET BY MOUTH DAILY, Disp: 90 tablet, Rfl: 1  •  metFORMIN ER (GLUCOPHAGE-XR) 500 MG 24 hr tablet, Take 2 tablets by mouth Daily With Dinner., Disp: 180 tablet, Rfl: 2  •  triamcinolone (KENALOG) 0.1 % cream, Apply  topically to the appropriate area as directed 2 (Two) Times a Day. Use sparingly avoid face, Disp: 60 g, Rfl: 1      Social History   Social History            Socioeconomic History   • Marital status:        Spouse name: Raghav   • Number of children: 2   • Years of education: Not on file   • Highest education level: Not on file   Occupational History       Employer: [employer]       Employer: [employer]   Tobacco Use   • Smoking status: Never Smoker   • Smokeless tobacco: Never Used   Substance and Sexual Activity   • Alcohol use: Yes       Comment: Socially   • Drug use: No   • Sexual activity: Defer                  Family History   Problem Relation Age of Onset   • Hypertension Other     • Diabetes Other      No Known Allergies     Review of Systems   Constitutional: Negative for chills and fever.   HENT: Negative for tinnitus and trouble swallowing.    Eyes: Negative for photophobia, redness and visual disturbance.   Respiratory: Negative for cough, shortness of breath and wheezing.    Cardiovascular: Negative for chest pain, palpitations and leg swelling.  "  Gastrointestinal: Negative for abdominal pain, constipation, diarrhea, nausea and vomiting.   Genitourinary: Negative for difficulty urinating and frequency.   Musculoskeletal: Positive for back pain. Negative for gait problem, neck pain and neck stiffness.   Neurological: Positive for numbness. Negative for dizziness, speech difficulty, weakness, light-headedness and headaches.   Psychiatric/Behavioral: Negative for confusion and decreased concentration.            Objective         Vitals       Vitals:     09/11/20 0846   BP: 124/88   Pulse: 106   Temp: 97.3 °F (36.3 °C)   Weight: 83.1 kg (183 lb 3.7 oz)   Height: 154.9 cm (61\")        Body mass index is 34.62 kg/m².        Physical Exam  Neurologic Exam  Awake, alert, oriented x3  Pupils equal round reactive to light  Extraocular muscles intact  Face symmetric  Speech is fluent and clear  No pronator drift  Motor exam  Bilateral deltoids 5/5, bilateral biceps 5/5, bilateral triceps 5/5, bilateral wrist extension 5/5 bilateral hand  5/5  Bilateral hip flexion 5/5, bilateral knee extension 5/5, bilateral DF/PF 5/5  No clonus  No Alethea's reflex  2+ bilateral patellar reflexes  2+ bilateral biceps reflex  Steady normal gait  Able to walk heel-to-toe without difficulty  Able to detect  light touch in all 4 extremities        Assessment/Plan      Independent Review of Radiographic Studies:      I personally reviewed the images from the following studies.  MRI lumbar spine, MRI brain with and without contrast.  The MRI brain with and without contrast demonstrates a small left parietal skull lesion extending into the dura.  The lesion is about 2 to 3 mm in thickness and several many millimeters in length.  There is no significant mass-effect or compression of the underlying brain.  There is a small blush within the right occipital lobe but no obvious tumor.  The MRI of the lumbar spine demonstrates a large amount of tumor burden within the L4 vertebral body with " anterior extension and a pathologic fracture with loss of vertebral body height.  There is no retropulsion and no canal stenosis at this level.      Medical Decision Makin-year-old female with history of breast cancer now presenting with lower back and hip pain since May  -The MRI of her lumbar spine demonstrates a L4 vertebral body lesion with pathologic fracture.  There is no central canal stenosis, she has no numbness or weakness in her lower extremities.  I believe she is a candidate for a osteo-cool radiofrequency ablation and kyphoplasty.  I will arrange for this procedure next Thursday.  This procedure will help obtain a biopsy, ablate some of the tumor, reinforce the vertebral body with cement to avoid further collapse and treat her pain.   -I recommend that she follow-up with her oncologist as soon as possible and begin radiation treatment as soon as possible.  She has had a nuclear medicine study which demonstrated she also has lesions in her right ninth rib, the L4 vertebral body, the right femoral head, sacrum, left ischium  -She is also had an MRI of her brain which demonstrates a small left parietal skull lesion with extension into the underlying dura and a small blush within the right occipital lobe which may represent a benign venous anomaly.  I recommend radiation to the left parietal lesion.  I will follow-up in 2 to 3 months with a repeat MRI to evaluate for any new intracranial lesions and for any growth of the left parietal skull lesion            H&P reviewed and updated.  No changes made from previous visit  Plan to proceed to OR for biopsy, ablation, and kyphoplasty.

## 2020-09-17 NOTE — DISCHARGE INSTRUCTIONS
KYPHOPLASTY  Kyphoplasty is a procedure done to reduce pain and deformity of vertebral compression fractures. An orthopedic balloon was placed between the fractured, collapsed vertebra in your spinal column. The balloon was inflated, raising the collapsed vertebra to its original height. This procedure is used in people with osteoporosis and certain types of cancer.  It strengthens the collapsed vertebra and reduces the risk of fracture in the surrounding vertebra.    Follow these instructions:  - Rest for the next 24 hours. Return to your regular activities as you feel able.  - Take your pain medicines exactly as prescribed.  - Keep your incision covered and dry for the next 24 hours. You then may remove the bandage and shower. Pat the stitches or steri-strips dry.  - Do not soak in the tub until your incision is healed.    Call your doctor if you have:  - trouble walking.  - numbness, tingling, or loss of feeling in your feet or legs.  - problems urinating or having a bowel movement.  - increased bleeding or drainage from your incision.  - swelling, redness or opening of your incision.  - foul odor from your incision or dressing.  - chills or fever.  - pain or increased back pain.  - any new problems or concerns.          Scopolamine Patch  This patch has been applied to the skin behind one of your ears.  It may stay in place up to 24 hours. You may remove it at any time after your surgery; however, it should be removed after you are up and walking around the next day.  This medicine reduces stomach upset. Side effects may include: dry mouth, dizziness, sleepiness, constipation, or upset stomach.  An allergy would show up as: a rash, itching, wheezing or shortness of breath.  Follow these instructions:  1. Do not drink alcohol, drive or operate machinery while taking this medicine.  2. Wear only 1 patch at a time. You can leave the patch on for up to 24 hours.  3. When you remove the patch, fold it in half with  the sticky sides together and throw it away. Wash your hands and the area under the patch.  4. Do not touch your eye with your hand if it has touched the patch.  5. Wash your hands well before and after touching the patch.  6. Sit or stand slowly to avoid dizziness.  Call your doctor if you have:  1. Any sign of allergy  2. No relief  3. Trouble passing urine  4. Any new or severe symptoms    **Please remove the patch behind your ear tonight or tomorrow.**

## 2020-09-17 NOTE — ANESTHESIA PROCEDURE NOTES
Airway  Urgency: elective    Date/Time: 9/17/2020 1:12 PM  End Time:9/17/2020 1:12 PM  Airway not difficult    General Information and Staff    Patient location during procedure: OR  Anesthesiologist: Marquise Cardona MD  CRNA: Libertad Gardner CRNA    Indications and Patient Condition  Indications for airway management: airway protection    Preoxygenated: yes  Mask difficulty assessment: 2 - vent by mask + OA or adjuvant +/- NMBA    Final Airway Details  Final airway type: endotracheal airway      Successful airway: ETT  Cuffed: yes   Successful intubation technique: direct laryngoscopy  Facilitating devices/methods: intubating stylet  Endotracheal tube insertion site: oral  Blade: Schuler  Blade size: 2  ETT size (mm): 7.0  Cormack-Lehane Classification: grade IIa - partial view of glottis  Placement verified by: chest auscultation and capnometry   Cuff volume (mL): 7  Measured from: lips  ETT/EBT  to lips (cm): 21  Number of attempts at approach: 1  Assessment: lips, teeth, and gum same as pre-op and atraumatic intubation    Additional Comments  SIVI.  EYES TAPED CLOSED PRIOR TO DL.  INTUBATION AS CHARTED ABOVE.  APPEARS ATRAUMATIC.  NO CHANGE TO DENTITION. +ETCO2. +BBS. +CR.

## 2020-09-17 NOTE — ANESTHESIA POSTPROCEDURE EVALUATION
"Patient: Ina Akers    Procedure Summary     Date: 09/17/20 Room / Location:  [facility] OR 19 INV /  [facility] OR 18/19    Anesthesia Start: 1304 Anesthesia Stop: 1526    Procedure: Lumbar 4  osteo cool radiofrequency ablation and KYPHOPLASTY (N/A Spine Lumbar) Diagnosis:       Lumbar spine tumor      (Lumbar spine tumor [D49.2])    Surgeon: Vik Rios MD Provider: Marquise Cardona MD    Anesthesia Type: general ASA Status: 3          Anesthesia Type: general    Vitals  Vitals Value Taken Time   /63 09/17/20 1650   Temp 36.4 °C (97.6 °F) 09/17/20 1640   Pulse 74 09/17/20 1650   Resp 16 09/17/20 1650   SpO2 98 % 09/17/20 1658   Vitals shown include unvalidated device data.        Post Anesthesia Care and Evaluation    Patient location during evaluation: bedside  Patient participation: complete - patient participated  Level of consciousness: awake and alert  Pain management: adequate  Airway patency: patent  Anesthetic complications: No anesthetic complications    Cardiovascular status: acceptable  Respiratory status: acceptable  Hydration status: acceptable    Comments: /92   Pulse 88   Temp 36.4 °C (97.6 °F) (Oral)   Resp 16   Ht 154.9 cm (61\")   Wt 82.2 kg (181 lb 3.2 oz)   SpO2 96%   BMI 34.24 kg/m²       "

## 2020-09-17 NOTE — ANESTHESIA PREPROCEDURE EVALUATION
Anesthesia Evaluation     Patient summary reviewed   history of anesthetic complications: PONV  NPO Solid Status: > 8 hours  NPO Liquid Status: > 2 hours           Airway   Mallampati: I  TM distance: >3 FB  Neck ROM: full  Dental      Pulmonary     breath sounds clear to auscultation  (-) shortness of breath, not a smoker  Cardiovascular   Exercise tolerance: good (4-7 METS)    ECG reviewed  Rhythm: regular  Rate: normal    (+) hypertension, hyperlipidemia,   (-) angina, MOMIN      Neuro/Psych  GI/Hepatic/Renal/Endo    (+) obesity,   diabetes mellitus,     Musculoskeletal     (+) chronic pain (L  hip),   Abdominal    Substance History      OB/GYN          Other      history of cancer                    Anesthesia Plan    ASA 3     general     intravenous induction     Anesthetic plan, all risks, benefits, and alternatives have been provided, discussed and informed consent has been obtained with: patient.

## 2020-09-18 ENCOUNTER — TELEPHONE (OUTPATIENT)
Dept: NEUROSURGERY | Facility: CLINIC | Age: 50
End: 2020-09-18

## 2020-09-18 ENCOUNTER — APPOINTMENT (OUTPATIENT)
Dept: LAB | Facility: HOSPITAL | Age: 50
End: 2020-09-18

## 2020-09-18 NOTE — TELEPHONE ENCOUNTER
PATIENT CALLED TO INFORM PAOLA WILKS THAT SHE IS FAXING WORK PAPERWORK OVER TO THE PRACTICE AND TO BE ON THE LOOK OUT FOR IT.  DR GAMBOA WILL NEED TO FILL OUT THE PAPERWORK AND FAX BACK.    [phone]

## 2020-09-21 ENCOUNTER — TELEPHONE (OUTPATIENT)
Dept: ONCOLOGY | Facility: CLINIC | Age: 50
End: 2020-09-21

## 2020-09-21 LAB
CYTO UR: NORMAL
LAB AP CASE REPORT: NORMAL
PATH REPORT.FINAL DX SPEC: NORMAL
PATH REPORT.GROSS SPEC: NORMAL

## 2020-09-21 NOTE — TELEPHONE ENCOUNTER
Caller: CARRIE GALVAN    Relationship to patient: SELF    Best call back number: [phone]    Concerns or Questions if Applicable:  PT  HAD BREAST CANCER IN 2001 AND NOW HAS A NEW DX.    SHE NEEDS TO GET ADA PAPERWORK DONE IN ORDER TO WORK FROM HOME, SHE'S ASKING IF DR LORD NEEDS TO COMPLETE THE PAPERWORK OF IF IT SHOULD BE HANDLED BY ANOTHER OFFICE

## 2020-09-23 ENCOUNTER — CONSULT (OUTPATIENT)
Dept: RADIATION ONCOLOGY | Facility: HOSPITAL | Age: 50
End: 2020-09-23

## 2020-09-23 ENCOUNTER — APPOINTMENT (OUTPATIENT)
Dept: RADIATION ONCOLOGY | Facility: HOSPITAL | Age: 50
End: 2020-09-23

## 2020-09-23 ENCOUNTER — OFFICE VISIT (OUTPATIENT)
Dept: ONCOLOGY | Facility: CLINIC | Age: 50
End: 2020-09-23

## 2020-09-23 VITALS
WEIGHT: 182.7 LBS | SYSTOLIC BLOOD PRESSURE: 150 MMHG | BODY MASS INDEX: 34.49 KG/M2 | HEIGHT: 61 IN | RESPIRATION RATE: 18 BRPM | DIASTOLIC BLOOD PRESSURE: 92 MMHG | TEMPERATURE: 97.3 F | HEART RATE: 92 BPM | OXYGEN SATURATION: 100 %

## 2020-09-23 VITALS
SYSTOLIC BLOOD PRESSURE: 122 MMHG | WEIGHT: 181 LBS | HEART RATE: 78 BPM | OXYGEN SATURATION: 97 % | TEMPERATURE: 97.2 F | BODY MASS INDEX: 34.2 KG/M2 | DIASTOLIC BLOOD PRESSURE: 88 MMHG

## 2020-09-23 DIAGNOSIS — Z17.1 MALIGNANT NEOPLASM OF UPPER-OUTER QUADRANT OF LEFT BREAST IN FEMALE, ESTROGEN RECEPTOR NEGATIVE (HCC): ICD-10-CM

## 2020-09-23 DIAGNOSIS — M84.48XA PATHOLOGICAL FRACTURE OF LUMBOSACRAL SPINE: ICD-10-CM

## 2020-09-23 DIAGNOSIS — Z79.899 ENCOUNTER FOR MONITORING CARDIOTOXIC DRUG THERAPY: ICD-10-CM

## 2020-09-23 DIAGNOSIS — C50.412 MALIGNANT NEOPLASM OF UPPER-OUTER QUADRANT OF LEFT BREAST IN FEMALE, ESTROGEN RECEPTOR NEGATIVE (HCC): Primary | ICD-10-CM

## 2020-09-23 DIAGNOSIS — C50.412 MALIGNANT NEOPLASM OF UPPER-OUTER QUADRANT OF LEFT BREAST IN FEMALE, ESTROGEN RECEPTOR NEGATIVE (HCC): ICD-10-CM

## 2020-09-23 DIAGNOSIS — Z51.81 ENCOUNTER FOR MONITORING CARDIOTOXIC DRUG THERAPY: ICD-10-CM

## 2020-09-23 DIAGNOSIS — D63.0 ANEMIA IN NEOPLASTIC DISEASE: ICD-10-CM

## 2020-09-23 DIAGNOSIS — C79.51 BONE METASTASIS: Primary | ICD-10-CM

## 2020-09-23 DIAGNOSIS — Z17.1 MALIGNANT NEOPLASM OF UPPER-OUTER QUADRANT OF LEFT BREAST IN FEMALE, ESTROGEN RECEPTOR NEGATIVE (HCC): Primary | ICD-10-CM

## 2020-09-23 PROBLEM — Z12.11 SCREEN FOR COLON CANCER: Status: RESOLVED | Noted: 2019-02-04 | Resolved: 2020-09-23

## 2020-09-23 PROBLEM — Z12.11 ENCOUNTER FOR SCREENING FOR MALIGNANT NEOPLASM OF COLON: Status: RESOLVED | Noted: 2020-05-19 | Resolved: 2020-09-23

## 2020-09-23 PROCEDURE — G0463 HOSPITAL OUTPT CLINIC VISIT: HCPCS | Performed by: RADIOLOGY

## 2020-09-23 PROCEDURE — 99215 OFFICE O/P EST HI 40 MIN: CPT | Performed by: INTERNAL MEDICINE

## 2020-09-23 PROCEDURE — 77263 THER RADIOLOGY TX PLNG CPLX: CPT | Performed by: RADIOLOGY

## 2020-09-23 PROCEDURE — 99205 OFFICE O/P NEW HI 60 MIN: CPT | Performed by: RADIOLOGY

## 2020-09-23 PROCEDURE — 77334 RADIATION TREATMENT AID(S): CPT | Performed by: RADIOLOGY

## 2020-09-23 NOTE — PROGRESS NOTES
Subjective     CHIEF COMPLAINT:      Chief Complaint   Patient presents with   • Follow-up     discuss scans       HISTORY OF PRESENT ILLNESS:     Ina Akers is a 49 y.o. female patient who returns today for follow up on her abnormal bone lesions.  Since her last visit, she had kyphoplasty to L4.  She is feeling better since the procedure.  Pain has improved but she is having pain in the right hip anterior and posterior aspect.  The pain worsened with walking.  Patient denies having pain in the hip while in the sitting position.    Patient denies having headache.          REVIEW OF SYSTEMS:  Review of Systems   Constitutional: Negative for fatigue, fever and unexpected weight change.   HENT: Negative for nosebleeds and voice change.    Eyes: Negative for visual disturbance.   Respiratory: Negative for cough and shortness of breath.    Cardiovascular: Negative for chest pain and leg swelling.   Gastrointestinal: Negative for abdominal pain, blood in stool, constipation, diarrhea, nausea and vomiting.   Genitourinary: Negative for frequency and hematuria.   Musculoskeletal: Negative for back pain and joint swelling.   Skin: Negative for rash.   Neurological: Negative for dizziness and headaches.   Hematological: Negative for adenopathy. Does not bruise/bleed easily.   Psychiatric/Behavioral: Negative for dysphoric mood. The patient is not nervous/anxious.      I reviewed and verified the CC and ROS obtained by the MA.     Past Medical History:   Diagnosis Date   • Anemia     History of recurent iron deficiency   • Cancer (CMS/HCC) 10/2001    Left breast   • Compression fracture of fourth lumbar vertebra (CMS/HCC)    • Diabetes mellitus (CMS/HCC)    • H/O Thyroid goiter    • History of gastritis    • History of IBS    • History of tinea corporis    • Hypertension    • PONV (postoperative nausea and vomiting)        Past Surgical History:   Procedure Laterality Date   • BREAST SURGERY      PARTIAL LEFT  MASTECTOMY   •  SECTION     • KYPHOPLASTY N/A 2020    Procedure: Lumbar 4  osteo cool radiofrequency ablation and KYPHOPLASTY;  Surgeon: Vik Rios MD;  Location: [facility] ;  Service: Neurosurgery;  Laterality: N/A;   • THYROID BIOPSY Left 2018       Cancer-related family history is not on file.  Social History     Tobacco Use   • Smoking status: Never Smoker   • Smokeless tobacco: Never Used   Substance Use Topics   • Alcohol use: Yes     Comment: Socially       MEDICATIONS:    Current Outpatient Medications:   •  acetaminophen (TYLENOL) 500 MG tablet, Take 1,000 mg by mouth Every 6 (Six) Hours As Needed for Mild Pain ., Disp: , Rfl:   •  amLODIPine (NORVASC) 5 MG tablet, Take 1 tablet by mouth Daily., Disp: 90 tablet, Rfl: 2  •  atorvastatin (LIPITOR) 10 MG tablet, Take 1 tablet by mouth Daily., Disp: 90 tablet, Rfl: 3  •  Blood Glucose Monitoring Suppl w/Device kit, Check blood sugar twice a day, Disp: 1 each, Rfl: 0  •  cholecalciferol (VITAMIN D3) 25 MCG (1000 UT) tablet, Take 1,000 Units by mouth Daily., Disp: , Rfl:   •  Docusate Calcium (STOOL SOFTENER PO), Take  by mouth As Needed. As needed, Disp: , Rfl:   •  ferrous sulfate 325 (65 FE) MG tablet, Take 325 mg by mouth Daily With Breakfast., Disp: , Rfl:   •  Glucose Blood (BLOOD GLUCOSE TEST) strip, Check blood sugar twice a day., Disp: 100 each, Rfl: 1  •  Lancets (ACCU-CHEK SOFT TOUCH) lancets, Check blood sugar twice a day as needed, Disp: 100 each, Rfl: 1  •  lansoprazole (PREVACID) 30 MG capsule, Take 1 capsule by mouth Daily., Disp: 90 capsule, Rfl: 3  •  levothyroxine (SYNTHROID, LEVOTHROID) 50 MCG tablet, TAKE ONE TABLET BY MOUTH DAILY, Disp: 90 tablet, Rfl: 0  •  losartan (COZAAR) 50 MG tablet, TAKE ONE TABLET BY MOUTH DAILY, Disp: 90 tablet, Rfl: 1  •  metFORMIN ER (GLUCOPHAGE-XR) 500 MG 24 hr tablet, Take 2 tablets by mouth Daily With Dinner., Disp: 180 tablet, Rfl: 2  •  triamcinolone (KENALOG) 0.1 % cream,  "Apply  topically to the appropriate area as directed 2 (Two) Times a Day. Use sparingly avoid face, Disp: 60 g, Rfl: 1    ALLERGIES:  No Known Allergies      Objective   VITAL SIGNS:     Vitals:    09/23/20 1610   BP: 150/92   Pulse: 92   Resp: 18   Temp: 97.3 °F (36.3 °C)   TempSrc: Temporal   SpO2: 100%   Weight: 82.9 kg (182 lb 11.2 oz)   Height: 154.9 cm (60.98\")   PainSc:   4   PainLoc: Hip  Comment: rt     Body mass index is 34.54 kg/m².     Wt Readings from Last 3 Encounters:   09/23/20 82.9 kg (182 lb 11.2 oz)   09/23/20 82.1 kg (181 lb)   09/17/20 82.2 kg (181 lb 3.2 oz)       PHYSICAL EXAMINATION:  GENERAL:  The patient appears in good general condition, not in acute distress.  SKIN: No skin rashes. No ecchymosis.  HEAD:  Normocephalic.  EYES:  No Jaundice. No Pallor. Pupils equal. EOMI.  NECK:  Supple with Good ROM. No Thyromegaly. No Masses.  LYMPHATICS:  No cervical or supraclavicular lymphadenopathy.  CHEST: Normal respiratory effort. Lungs clear to auscultation.   CARDIAC:  Normal S1 & S2. No murmur. No edema.  ABDOMEN:  Soft. No tenderness. No Hepatomegaly. No Splenomegaly. No masses.  EXTREMITIES: Tenderness on exam of the lower thoracic spine  Tenderness in the right hip.  NEUROLOGICAL:  No Focal neurological deficits.       DIAGNOSTIC DATA:     F-18 FDG PET FROM SKULL BASE TO MID THIGH WITH PET/CT FUSION 9/9/2020.     HISTORY: 49-year-old female with metastatic left breast cancer. Staging.     TECHNIQUE: Radiation dose reduction techniques were utilized, including  automated exposure control and exposure modulation based on body size.   Blood glucose level at time of injection was 77 mg/dL.  6.3 mCi of F-18  FDG were injected and PET was performed from skull base to mid thigh. CT  was obtained for localization and attenuation correction. Time at  injection 12:20 PM. PET start time 1:48 PM. Compared with bone scan  09/02/2020.     FINDINGS: There is an approximately 1.2 cm lytic lesion at the " posterior  medial aspect of the right 9th rib, at the costovertebral articulation  and the lytic lesion has a maximal SUV of 7.5. There is intense  hypermetabolic activity at the L4 vertebral body which has a large lytic  lesion involving nearly the entire vertebral body with a maximal SUV of  7.4. There is a tiny focus of hypermetabolic activity at the midline of  the sacrum just anterior to the S1 neural foramina. There is an  approximately 2.3 cm lytic lesion at the right femoral neck which has a  maximal SUV of 7.6. There is a tiny hypermetabolic focus within the left  ischium. The calvarial metastasis seen on the recent brain MRI is not  included. There is no hypermetabolic lymphadenopathy at the axilla or  subpectoral regions. There is no hypermetabolic lymphadenopathy or  suspicious abnormality within the chest. There is a typical speckled  pattern of activity throughout the liver. There are long segments of  bowel activity with no definitive thickening on the corresponding CT.     IMPRESSION:  There are hypermetabolic metastases at the right 9th rib,  L4, right femoral neck, sacrum, and left ischium. There is no  hypermetabolic lymphadenopathy and there is no evidence for visceral  metastases.     This report was finalized on 9/10/2020 4:29 PM by Dr. Ellen Dickerson M.D.      MRI EXAMINATION OF THE BRAIN WITH AND WITHOUT CONTRAST 9/3/2020:     HISTORY: Breast cancer, abnormal bone scan.     COMPARISON: No prior MRI examination of the brain is available for  comparison. Comparison is made to the bone scan performed on 09/02/2020.     TECHNIQUE: A MRI examination of the brain was performed before and after  the intravenous administration of contrast utilizing sagittal T1, axial  diffusion, T1, T2, T2 FLAIR as well as sagittal, axial and coronal T1  postcontrast weighted sequences.     FINDINGS: There is no evidence of restricted diffusion to suggest acute  infarction. The axial T2 sequence demonstrates increased  signal  intensity involving the left parietal bone with the area of increased  signal intensity measuring approximately 17 mm in AP dimension. This  enhances after contrast administration. There is adjacent dural  thickening/enhancement measuring approximately 2.7 mm in transverse  dimension and approximately 14 mm in the craniocaudal dimension. There  is expected flow-void in the basilar artery and in the distal aspect of  the internal carotid arteries bilaterally on the axial T2 sequence. The  basilar artery is diminutive in caliber which is likely a function of  fetal origins of the posterior cerebral arteries bilaterally. There is  moderate mucosal thickening involving the ethmoid air cells bilaterally  and mild mucosal thickening involving the sinuses.     An area of enhancement involving the subcortical white matter of the  right occipital lobe posterolaterally and superiorly is noted measuring  approximately 17 x 4 x 4 mm in size. There is no corresponding area of  T2 FLAIR signal abnormality. There is mild small vessel ischemic  disease. No other areas of abnormal or atypical intra-axial enhancement  were appreciated.     IMPRESSION:  1.  There is a calvarial metastatic lesion with dural thickening or  epidural extension appreciated involving the left parietal bone. The  calvarial lesion measures approximately 17 mm in AP dimension and the  epidural area of enhancement measures approximately 2.7 mm in thickness.  2.  Area of enhancement with somewhat ill-defined margins involving the  right occipital lobe posterolaterally and superiorly measuring  approximately 17 x 4 x 4 mm in size. There is no corresponding T2 FLAIR  hyperintensity. This may represent an area of capillary telangiectasia  or a developmental venous anomaly. A subacute infarct or metastatic  disease is thought to be less likely. A short-term follow-up MRI  examination is suggested to assess stability.  3.  Mild small vessel ischemic disease  and paranasal sinus disease as  described.     This report was finalized on 9/4/2020 4:48 PM by Dr. Santosh Thomson M.D.    Pathology exam from 9/17/2020:  1. Bone, Lumbar Spine, Fracture:   A. Scanty fragments of bone and fragments of marrow with extensive clotted blood consistent with the clinical        history.    Assessment/Plan   1.  Left breast cancer, stage IIB, triple negative.   · S/P lumpectomy  · She received adjuvant chemotherapy on the NSABP B34 with AC followed by Taxotere.  · She received post-lumpectomy radiation therapy.   · She was placed on tamoxifen for 5 years and it was completed in January 2007.    · Bilateral mammograms from 8/14/2020 were benign.       2.  Bone metastasis.  · Patient was found on MRI on 8/27/2020 to have an L4 lesion resulting in pathologic compression fracture.  · Bone scan on 9/2/2020 revealed increased activity at L4 and left side of the skull.  · PET scan on 9/9/2020 revealed abnormal activity at L4, right femoral neck, lower sacrum, left ischium and right ninth medial aspect.  · Findings are highly suspicious for bone metastasis.  · Although we initially suspected possible multiple myeloma, paraprotein studies showed no evidence of M protein or elevated free light chains.  · I explained to the patient and her  today that the findings are considered suspicious for metastatic cancer to bone.  · PET scan showed no evidence of visceral metastasis.  · Pathology exam of the lesion at L4 was nondiagnostic.  I explained to the patient the need to obtain a tissue to confirm the diagnosis and from additional testing for ER, DE and HER-2/stacey.    3.  Bone scan revealed a lesion in the skull in the left parietal area seen on bone scan on 9/2/2020.  MRI of the brain on 9/3/2020 confirmed the findings.  She is asymptomatic.  She was seen by radiation oncology and radiation therapy to this area was not recommended at this point.    4.  Iron deficiency anemia.  Patient is on  ferrous sulfate 325 mg daily.  Her anemia worsened.  Hemoglobin decreased to 10.6 on 9/3/2020.  Considered due to anemia of malignancy..        PLAN:    1.  I will refer the patient to Orthopedics for their opinion regarding need for surgery to the right femoral neck metastasis.  If the patient has surgery, this provides the specimen to evaluate for ER CO and HER-2/stacey.  .  2.  I recommended starting a bone strengthening agent.  I recommended Xgeva every 4 weeks.  She agreed to proceed.  We will schedule her to start Xgeva on 9/29/2020.    3.  I will obtain new baseline echocardiogram.  4.  Continue with starting radiation therapy to L4 on 9/29/2020.    5.  If the patient is not going to have surgery for the right femoral neck, I would recommend obtaining CT-guided biopsy from the right ninth rib lesion.    6.  I discussed the case with Dr. Contreras today.  I agreed with proceeding with radiation therapy to L4.  We will hold on the radiation to the right hip until the decision is made regarding the possibility of surgery.    7.  I will see the patient in follow-up on 2/20/2020.        Tim Muñoz MD  09/23/20

## 2020-09-23 NOTE — PROGRESS NOTES
Subjective     Vik Rios MD    Cancer Staging  Stage IIB (T2, N1, cM0)    Chief Complaint   Patient presents with   • Consult     Spinal tumor     CC: T2N1M1 metastaic breast cancer triple negative                             Dear Vik Rios MD    I had the pleasure of seeing Ina Akers  today in the [facility]    The patient is a 49 y.o. female with a hx of T2N1 triple negative left breast cancer diagnosed in 2002 s/p lumpectomy, chemotherapy and radiation who presented recently with a several month hx of low back pain.  She had a mri of the lumbar spine on 8/27/20 which showed a 3.8cm hyperintense lesion in L4 vertebral body exending anteiror to anterior margin of lumbar spine by 7mm with 50-60% loss of vertical heigth representing pathologic fracture with eh lesion extending into the anterior and superior aspect of the neural foramen on the left sligtly, a 1.4cm nonspecific area of signal loss involving the right sacral ala.  She had a bone scan on 9/2/20 which showed subtle activity in L4 and abnormal uptake in lateral late skull.     She had a brain mri on 9/3/20 which showed a calvarial lesion involving the left parietal bone with dural thickening or epidural extension measuring 17mm AP and the epidural enhancement measuring 2.7mm and ill defined area of enhancement in right occipital lobe posteriolaterally and superiorly 17mm with short term follow up recommneded.  She had a PET scan on 9/9/20 which showed a 1.2cm lytic lesion posterior medial right 9th rib suv of 7.5, hypermetabolic activity at L4 lesion with max suv of 7.4, tiny focus of activity in midline sacrum anterior to S1 neural foramina, 2.3cm lytic lesion right femoral neck with max suv of 7.6 and tiny focus left ischium.    She met with Dr. Rios with neurosurgery and underwent a L4 osteo-cool ablation, biopsy and kyphoplasty on 9/17/20.  Pathology from the biopsy showed only scant framgents of bone and marrow  with extensive clotted blood. She is referred today for evaluation for radiation.  She states the pain in her low back and left hip have improved significantly since the kyphoplasty procedure.  Her pain is now mostly located in her right hip and upper leg.  She denies any numbness, tingling, weakness, bowel or bladder incontinence.  .        Review of Systems   Constitutional: Negative.    HENT: Negative.    Respiratory: Negative.    Cardiovascular: Negative.    Gastrointestinal: Negative.    Genitourinary: Positive for pelvic pain.   Musculoskeletal: Positive for arthralgias and back pain.   Skin: Negative.    Allergic/Immunologic: Negative.    Neurological: Negative.    Hematological: Negative.    Psychiatric/Behavioral: Negative.          Past Medical History:   Diagnosis Date   • Anemia     History of recurent iron deficiency   • Cancer (CMS/HCC) 10/2001    Left breast   • Compression fracture of fourth lumbar vertebra (CMS/HCC)    • Diabetes mellitus (CMS/HCC)    • H/O Thyroid goiter    • History of gastritis    • History of IBS    • History of tinea corporis    • Hypertension    • PONV (postoperative nausea and vomiting)          Past Surgical History:   Procedure Laterality Date   • BREAST SURGERY      PARTIAL LEFT MASTECTOMY   •  SECTION     • KYPHOPLASTY N/A 2020    Procedure: Lumbar 4  osteo cool radiofrequency ablation and KYPHOPLASTY;  Surgeon: Vik Rios MD;  Location: [facility] ;  Service: Neurosurgery;  Laterality: N/A;   • THYROID BIOPSY Left          Social History     Socioeconomic History   • Marital status:      Spouse name: Raghav   • Number of children: 2   • Years of education: Not on file   • Highest education level: Not on file   Occupational History     [employer]er: [employer]     [employer]er: [employer]   Tobacco Use   • Smoking status: Never Smoker   • Smokeless tobacco: Never Used   Substance and  Sexual Activity   • Alcohol use: Yes     Comment: Socially   • Drug use: No   • Sexual activity: Defer         Family History   Problem Relation Age of Onset   • Hypertension Other    • Diabetes Other    • Malig Hyperthermia Neg Hx           Objective    Physical Exam  Constitutional:       Appearance: Normal appearance.   HENT:      Head: Atraumatic.      Nose: Nose normal.      Mouth/Throat:      Mouth: Mucous membranes are moist.   Eyes:      Extraocular Movements: Extraocular movements intact.   Pulmonary:      Effort: Pulmonary effort is normal.   Abdominal:      Palpations: Abdomen is soft.   Musculoskeletal: Normal range of motion.      Comments: nontender to palpation over her spine   Neurological:      General: No focal deficit present.      Mental Status: She is alert and oriented to person, place, and time.      Motor: No weakness.      Gait: Gait normal.   Psychiatric:         Mood and Affect: Mood normal.         Behavior: Behavior normal.         Thought Content: Thought content normal.         Judgment: Judgment normal.           Current Outpatient Medications on File Prior to Visit   Medication Sig Dispense Refill   • acetaminophen (TYLENOL) 500 MG tablet Take 1,000 mg by mouth Every 6 (Six) Hours As Needed for Mild Pain .     • amLODIPine (NORVASC) 5 MG tablet Take 1 tablet by mouth Daily. 90 tablet 2   • atorvastatin (LIPITOR) 10 MG tablet Take 1 tablet by mouth Daily. 90 tablet 3   • Blood Glucose Monitoring Suppl w/Device kit Check blood sugar twice a day 1 each 0   • cholecalciferol (VITAMIN D3) 25 MCG (1000 UT) tablet Take 1,000 Units by mouth Daily.     • Docusate Calcium (STOOL SOFTENER PO) Take  by mouth As Needed. As needed     • ferrous sulfate 325 (65 FE) MG tablet Take 325 mg by mouth Daily With Breakfast.     • Glucose Blood (BLOOD GLUCOSE TEST) strip Check blood sugar twice a day. 100 each 1   • Lancets (ACCU-CHEK SOFT TOUCH) lancets Check blood sugar twice a day as needed 100 each 1    • lansoprazole (PREVACID) 30 MG capsule Take 1 capsule by mouth Daily. 90 capsule 3   • levothyroxine (SYNTHROID, LEVOTHROID) 50 MCG tablet TAKE ONE TABLET BY MOUTH DAILY 90 tablet 0   • losartan (COZAAR) 50 MG tablet TAKE ONE TABLET BY MOUTH DAILY 90 tablet 1   • metFORMIN ER (GLUCOPHAGE-XR) 500 MG 24 hr tablet Take 2 tablets by mouth Daily With Dinner. 180 tablet 2   • triamcinolone (KENALOG) 0.1 % cream Apply  topically to the appropriate area as directed 2 (Two) Times a Day. Use sparingly avoid face 60 g 1     No current facility-administered medications on file prior to visit.        ALLERGIES:  No Known Allergies    There were no vitals taken for this visit.     Current Status 9/3/2020   ECOG score 0         Assessment/Plan     49 year old female with a hx of stage IIB triple negative left breast cancer in 2002 now with multiple osseous lesions concerning for metastatic breast cancer.  I have personally reviewed all of her imaging studies including MRI of lumbar spine, mri of the brain, PET scan and bone scan as well as her records from referring physicians. Her recent biopsy of L4 showed no malignancy.  She will likely need another biopsy for tissue confirmation, possibly L4 or right posterior 9th rib lesion.  I discussed with her my recommendation for palliative radiation to the L4 lesion as well as the right femoral neck lesion to hopefully palliate her right hip pain and prevent a fracture down the road.  At this time, she is asymptomatic from the left parietal skull lesion and other lesions so I recommend holding off on radiation to these areas but if they shows signs of growth or become symptomatic, I can certainly treat them in the future.  I discussed possible acute side effects of radiation to L4 and right femur to include fatigue, skin erythema, nausea, abdominal pain, cramping diarrhea and possible lymphedema of the right leg.  I discussed late effects of possible fracture or permanent lymphedema.   She voiced understanding and wishes to proceed with the treaments.  I will discuss with dr. Muñoz her medical oncologist, the need for a repeat biopsy.  We will proceed with CT simulation today.  I plan to treat the L4 lesion with stereotactic radiotherapy in 5 fractions and the right femoral head/neck lesion in 10 fractions.   We will also attempt to retrieve her records from prior radiation at [facility].    I spent greater than 60 minutes in face-to-face time with the patient and 45 minutes of that time were spent in counseling and coordination of care, including review of imaging and pathology; indications, goals, logistics, alternatives and risks - both common and rare - for my recommendations as well as surveillance and potential outcomes.             Thank you very much for allowing me to participate in the care of this very pleasant patient.    Sincerely,      Tiffany Contreras MD

## 2020-09-24 NOTE — PROGRESS NOTES
Subjective   History of Present Illness: Ina Akers is a 49 y.o. female is here today for p/o 1 L4 osteo-cool ablation, biopsy, and kyphoplasty on 20 by Dr. Rios. Today Ms. Akers reports some back pain, numbness bilateral thighs. Incision looks healthy no redness or oozing.  She reports some of her pain is significantly improved since the procedure however she continues to have right hip pain.  She denies any numbness or weakness in her lower extremities.    History of Present Illness    The following portions of the patient's history were reviewed and updated as appropriate: allergies, past family history, past medical history, past social history, past surgical history and problem list.    Past Medical History:   Diagnosis Date   • Anemia     History of recurent iron deficiency   • Cancer (CMS/HCC) 10/2001    Left breast   • Compression fracture of fourth lumbar vertebra (CMS/HCC)    • Diabetes mellitus (CMS/HCC)    • H/O Thyroid goiter    • History of gastritis    • History of IBS    • History of tinea corporis    • Hypertension    • PONV (postoperative nausea and vomiting)         Past Surgical History:   Procedure Laterality Date   • BREAST SURGERY      PARTIAL LEFT MASTECTOMY   •  SECTION     • KYPHOPLASTY N/A 2020    Procedure: Lumbar 4  osteo cool radiofrequency ablation and KYPHOPLASTY;  Surgeon: Vik Rios MD;  Location: [facility] ;  Service: Neurosurgery;  Laterality: N/A;   • THYROID BIOPSY Left           Current Outpatient Medications:   •  acetaminophen (TYLENOL) 500 MG tablet, Take 1,000 mg by mouth Every 6 (Six) Hours As Needed for Mild Pain ., Disp: , Rfl:   •  amLODIPine (NORVASC) 5 MG tablet, Take 1 tablet by mouth Daily., Disp: 90 tablet, Rfl: 2  •  atorvastatin (LIPITOR) 10 MG tablet, Take 1 tablet by mouth Daily., Disp: 90 tablet, Rfl: 3  •  Blood Glucose Monitoring Suppl w/Device kit, Check blood sugar twice a day, Disp: 1 each, Rfl: 0  •   cholecalciferol (VITAMIN D3) 25 MCG (1000 UT) tablet, Take 1,000 Units by mouth Daily., Disp: , Rfl:   •  Docusate Calcium (STOOL SOFTENER PO), Take  by mouth As Needed. As needed, Disp: , Rfl:   •  ferrous sulfate 325 (65 FE) MG tablet, Take 325 mg by mouth Daily With Breakfast., Disp: , Rfl:   •  Glucose Blood (BLOOD GLUCOSE TEST) strip, Check blood sugar twice a day., Disp: 100 each, Rfl: 1  •  Lancets (ACCU-CHEK SOFT TOUCH) lancets, Check blood sugar twice a day as needed, Disp: 100 each, Rfl: 1  •  lansoprazole (PREVACID) 30 MG capsule, Take 1 capsule by mouth Daily., Disp: 90 capsule, Rfl: 3  •  levothyroxine (SYNTHROID, LEVOTHROID) 50 MCG tablet, TAKE ONE TABLET BY MOUTH DAILY, Disp: 90 tablet, Rfl: 0  •  losartan (COZAAR) 50 MG tablet, TAKE ONE TABLET BY MOUTH DAILY, Disp: 90 tablet, Rfl: 1  •  metFORMIN ER (GLUCOPHAGE-XR) 500 MG 24 hr tablet, Take 2 tablets by mouth Daily With Dinner., Disp: 180 tablet, Rfl: 2  •  triamcinolone (KENALOG) 0.1 % cream, Apply  topically to the appropriate area as directed 2 (Two) Times a Day. Use sparingly avoid face, Disp: 60 g, Rfl: 1  •  HYDROcodone-acetaminophen (NORCO) 5-325 MG per tablet, Take 2 tablets by mouth Every 6 (Six) Hours As Needed for Moderate Pain  for up to 5 days., Disp: 30 tablet, Rfl: 0     Social History     Socioeconomic History   • Marital status:      Spouse name: Raghav   • Number of children: 2   • Years of education: Not on file   • Highest education level: Not on file   Occupational History     Employer: [employer] [place] [employer]     Employer: [employer]   Tobacco Use   • Smoking status: Never Smoker   • Smokeless tobacco: Never Used   Substance and Sexual Activity   • Alcohol use: Yes     Comment: Socially   • Drug use: No   • Sexual activity: Defer        Family History   Problem Relation Age of Onset   • Hypertension Other    • Diabetes Other    • Malig Hyperthermia Neg Hx         Review of Systems  "  Musculoskeletal: Positive for back pain. Negative for gait problem.   Skin: Negative for wound.   Neurological: Positive for numbness (bilateral thigh). Negative for weakness.   Psychiatric/Behavioral: Negative for sleep disturbance.       Objective     Vitals:    20 1408   BP: 130/88   Pulse: 80   Temp: 98 °F (36.7 °C)   Weight: 83 kg (183 lb)   Height: 154.9 cm (61\")     Body mass index is 34.58 kg/m².      Physical Exam  Neurologic Exam    Awake, alert, oriented x3  Pupils equal round reactive to light  Extraocular muscles intact  Face symmetric  Speech is fluent and clear  No pronator drift  Motor exam  Bilateral deltoids 5/5, bilateral biceps 5/5, bilateral triceps 5/5, bilateral wrist extension 5/5 bilateral hand  5/5  Bilateral hip flexion 5/5, bilateral knee extension 5/5, bilateral DF/PF 5/5  Steady normal gait, walks with cane secondary to pain in right hip  Able to detect  light touch in all 4 extremities    Assessment/Plan   Independent Review of Radiographic Studies:      I personally reviewed the images from the following studies.  N/A    Medical Decision Makin-year-old female with history of breast cancer now presenting with lower back and hip pain since May  -She had an MRI of her lumbar spine which demonstrated an L4 vertebral body tumor as well as metastatic cancer with lesions in her  right ninth rib, the L4 vertebral body, the right femoral head, sacrum, left ischium  -She is also had an MRI of her brain which demonstrates a small left parietal skull lesion with extension into the underlying dura and a small blush within the right occipital lobe which may represent a benign venous anomaly.  I recommend radiation to the left parietal lesion.  I will follow-up in 2 to 3 months with a repeat MRI to evaluate for any new intracranial lesions and for any growth of the left parietal skull lesion  -She recently underwent a L4 radiofrequency ablation of the tumor with kyphoplasty.  I " sent a biopsy at the time of that procedure which was inconclusive.  -She still has some severe pain from her right hip lesion and pelvic lesions.  I have given her a 5-day course of Norco for now but have recommended that she follow-up with her oncologist for further management of her pain.    Ina was seen today for post-op.    Diagnoses and all orders for this visit:    Lumbar spine tumor  -     HYDROcodone-acetaminophen (NORCO) 5-325 MG per tablet; Take 2 tablets by mouth Every 6 (Six) Hours As Needed for Moderate Pain  for up to 5 days.    Skull lesion  -     HYDROcodone-acetaminophen (NORCO) 5-325 MG per tablet; Take 2 tablets by mouth Every 6 (Six) Hours As Needed for Moderate Pain  for up to 5 days.      Return in about 2 months (around 11/25/2020).

## 2020-09-25 ENCOUNTER — TELEPHONE (OUTPATIENT)
Dept: FAMILY MEDICINE CLINIC | Facility: CLINIC | Age: 50
End: 2020-09-25

## 2020-09-25 ENCOUNTER — OFFICE VISIT (OUTPATIENT)
Dept: NEUROSURGERY | Facility: CLINIC | Age: 50
End: 2020-09-25

## 2020-09-25 ENCOUNTER — TELEPHONE (OUTPATIENT)
Dept: NEUROSURGERY | Facility: CLINIC | Age: 50
End: 2020-09-25

## 2020-09-25 ENCOUNTER — TELEPHONE (OUTPATIENT)
Dept: ONCOLOGY | Facility: CLINIC | Age: 50
End: 2020-09-25

## 2020-09-25 VITALS
DIASTOLIC BLOOD PRESSURE: 88 MMHG | BODY MASS INDEX: 34.55 KG/M2 | WEIGHT: 183 LBS | TEMPERATURE: 98 F | SYSTOLIC BLOOD PRESSURE: 130 MMHG | HEART RATE: 80 BPM | HEIGHT: 61 IN

## 2020-09-25 DIAGNOSIS — D49.2 LUMBAR SPINE TUMOR: Primary | ICD-10-CM

## 2020-09-25 DIAGNOSIS — M89.9 SKULL LESION: ICD-10-CM

## 2020-09-25 PROCEDURE — 77470 SPECIAL RADIATION TREATMENT: CPT | Performed by: RADIOLOGY

## 2020-09-25 PROCEDURE — 99024 POSTOP FOLLOW-UP VISIT: CPT | Performed by: NEUROLOGICAL SURGERY

## 2020-09-25 RX ORDER — HYDROCODONE BITARTRATE AND ACETAMINOPHEN 5; 325 MG/1; MG/1
2 TABLET ORAL EVERY 6 HOURS PRN
Qty: 30 TABLET | Refills: 0 | Status: SHIPPED | OUTPATIENT
Start: 2020-09-25 | End: 2020-09-30

## 2020-09-25 NOTE — TELEPHONE ENCOUNTER
Patient said that we are supposed to have [facility] paperwork signed and ready for her to . Wants to confirm this is ready for her because she will be by the office around 1:30pm or 2:00pm.     Callback# [phone]

## 2020-09-25 NOTE — TELEPHONE ENCOUNTER
PT IS CALLING IN TO LEAVE A MESSAGE FOR DR EPLEYS ASSISTANT.  PT STATES THAT SHE IS REQUESTING TO HAVE HER HEMORRHOID MEDICATION THAT SHE WAS PRESCRIBED TO BE CALLED IN FOR HER.  PT STATES THAT SHE DOES NOT REMEMBER THE NAME OF IT BUT KNOW THAT IT WAS A CREAM.  PT IS REQUESTING A CALL BACK REGARDING THIS.        PT CALL BACK   [phone]  PHARMACY  [facility]  [address]  [phone]

## 2020-09-25 NOTE — TELEPHONE ENCOUNTER
Pt forgot to ask Dr Rios about being able to drive. Her post op paper says she cannot drive until she sees him. Is she ok to drive?   Ina [phone]

## 2020-09-25 NOTE — TELEPHONE ENCOUNTER
Per Dr Rios:  It is OK to drive , but not while taking the Hydrocodone.  Pt advised and voiced understanding.

## 2020-09-28 PROCEDURE — 77338 DESIGN MLC DEVICE FOR IMRT: CPT | Performed by: RADIOLOGY

## 2020-09-28 PROCEDURE — 77301 RADIOTHERAPY DOSE PLAN IMRT: CPT | Performed by: RADIOLOGY

## 2020-09-28 PROCEDURE — 77300 RADIATION THERAPY DOSE PLAN: CPT | Performed by: RADIOLOGY

## 2020-09-28 RX ORDER — HYDROCORTISONE 25 MG/G
CREAM TOPICAL 3 TIMES DAILY PRN
Qty: 60 G | Refills: 2 | Status: SHIPPED | OUTPATIENT
Start: 2020-09-28 | End: 2022-03-24

## 2020-09-29 ENCOUNTER — INFUSION (OUTPATIENT)
Dept: ONCOLOGY | Facility: HOSPITAL | Age: 50
End: 2020-09-29

## 2020-09-29 ENCOUNTER — APPOINTMENT (OUTPATIENT)
Dept: ONCOLOGY | Facility: HOSPITAL | Age: 50
End: 2020-09-29

## 2020-09-29 ENCOUNTER — RADIATION ONCOLOGY WEEKLY ASSESSMENT (OUTPATIENT)
Dept: RADIATION ONCOLOGY | Facility: HOSPITAL | Age: 50
End: 2020-09-29
Payer: COMMERCIAL

## 2020-09-29 ENCOUNTER — LAB (OUTPATIENT)
Dept: ONCOLOGY | Facility: HOSPITAL | Age: 50
End: 2020-09-29

## 2020-09-29 ENCOUNTER — HOSPITAL ENCOUNTER (OUTPATIENT)
Dept: CARDIOLOGY | Facility: HOSPITAL | Age: 50
Discharge: HOME | End: 2020-09-29
Admitting: INTERNAL MEDICINE

## 2020-09-29 VITALS
HEIGHT: 61 IN | DIASTOLIC BLOOD PRESSURE: 78 MMHG | WEIGHT: 181 LBS | SYSTOLIC BLOOD PRESSURE: 130 MMHG | HEART RATE: 68 BPM | OXYGEN SATURATION: 95 % | BODY MASS INDEX: 34.17 KG/M2

## 2020-09-29 VITALS
WEIGHT: 180.8 LBS | SYSTOLIC BLOOD PRESSURE: 136 MMHG | BODY MASS INDEX: 34.16 KG/M2 | DIASTOLIC BLOOD PRESSURE: 91 MMHG | RESPIRATION RATE: 16 BRPM | OXYGEN SATURATION: 93 % | TEMPERATURE: 96.9 F | HEART RATE: 77 BPM

## 2020-09-29 DIAGNOSIS — Z51.81 ENCOUNTER FOR MONITORING CARDIOTOXIC DRUG THERAPY: ICD-10-CM

## 2020-09-29 DIAGNOSIS — Z17.1 MALIGNANT NEOPLASM OF UPPER-OUTER QUADRANT OF LEFT BREAST IN FEMALE, ESTROGEN RECEPTOR NEGATIVE (HCC): ICD-10-CM

## 2020-09-29 DIAGNOSIS — C79.51 BONE METASTASIS: Primary | ICD-10-CM

## 2020-09-29 DIAGNOSIS — Z79.899 ENCOUNTER FOR MONITORING CARDIOTOXIC DRUG THERAPY: ICD-10-CM

## 2020-09-29 DIAGNOSIS — C50.412 MALIGNANT NEOPLASM OF UPPER-OUTER QUADRANT OF LEFT BREAST IN FEMALE, ESTROGEN RECEPTOR NEGATIVE (HCC): ICD-10-CM

## 2020-09-29 DIAGNOSIS — C79.51 BONE METASTASIS: ICD-10-CM

## 2020-09-29 DIAGNOSIS — C50.412 MALIGNANT NEOPLASM OF UPPER-OUTER QUADRANT OF LEFT BREAST IN FEMALE, ESTROGEN RECEPTOR NEGATIVE (HCC): Primary | ICD-10-CM

## 2020-09-29 DIAGNOSIS — Z17.1 MALIGNANT NEOPLASM OF UPPER-OUTER QUADRANT OF LEFT BREAST IN FEMALE, ESTROGEN RECEPTOR NEGATIVE (HCC): Primary | ICD-10-CM

## 2020-09-29 LAB
ALBUMIN SERPL-MCNC: 4.3 G/DL (ref 3.5–5.2)
ALBUMIN/GLOB SERPL: 1.2 G/DL (ref 1.1–2.4)
ALP SERPL-CCNC: 79 U/L (ref 38–116)
ALT SERPL W P-5'-P-CCNC: 12 U/L (ref 0–33)
ANION GAP SERPL CALCULATED.3IONS-SCNC: 9.7 MMOL/L (ref 5–15)
AST SERPL-CCNC: 12 U/L (ref 0–32)
BASOPHILS # BLD AUTO: 0.03 10*3/MM3 (ref 0–0.2)
BASOPHILS NFR BLD AUTO: 0.6 % (ref 0–1.5)
BILIRUB SERPL-MCNC: 0.3 MG/DL (ref 0.2–1.2)
BUN SERPL-MCNC: 10 MG/DL (ref 6–20)
BUN/CREAT SERPL: 9.8 (ref 7.3–30)
CALCIUM SPEC-SCNC: 9.4 MG/DL (ref 8.5–10.2)
CHLORIDE SERPL-SCNC: 101 MMOL/L (ref 98–107)
CO2 SERPL-SCNC: 28.3 MMOL/L (ref 22–29)
CREAT SERPL-MCNC: 1.02 MG/DL (ref 0.6–1.1)
DEPRECATED RDW RBC AUTO: 47.7 FL (ref 37–54)
EOSINOPHIL # BLD AUTO: 0.3 10*3/MM3 (ref 0–0.4)
EOSINOPHIL NFR BLD AUTO: 5.8 % (ref 0.3–6.2)
ERYTHROCYTE [DISTWIDTH] IN BLOOD BY AUTOMATED COUNT: 14.6 % (ref 12.3–15.4)
GFR SERPL CREATININE-BSD FRML MDRD: 69 ML/MIN/1.73
GLOBULIN UR ELPH-MCNC: 3.6 GM/DL (ref 1.8–3.5)
GLUCOSE SERPL-MCNC: 103 MG/DL (ref 74–124)
HCT VFR BLD AUTO: 36.2 % (ref 34–46.6)
HGB BLD-MCNC: 10.9 G/DL (ref 12–15.9)
IMM GRANULOCYTES # BLD AUTO: 0.02 10*3/MM3 (ref 0–0.05)
IMM GRANULOCYTES NFR BLD AUTO: 0.4 % (ref 0–0.5)
LYMPHOCYTES # BLD AUTO: 1.84 10*3/MM3 (ref 0.7–3.1)
LYMPHOCYTES NFR BLD AUTO: 35.8 % (ref 19.6–45.3)
MAGNESIUM SERPL-MCNC: 2.1 MG/DL (ref 1.8–2.5)
MCH RBC QN AUTO: 26.8 PG (ref 26.6–33)
MCHC RBC AUTO-ENTMCNC: 30.1 G/DL (ref 31.5–35.7)
MCV RBC AUTO: 88.9 FL (ref 79–97)
MONOCYTES # BLD AUTO: 0.41 10*3/MM3 (ref 0.1–0.9)
MONOCYTES NFR BLD AUTO: 8 % (ref 5–12)
NEUTROPHILS NFR BLD AUTO: 2.54 10*3/MM3 (ref 1.7–7)
NEUTROPHILS NFR BLD AUTO: 49.4 % (ref 42.7–76)
NRBC BLD AUTO-RTO: 0 /100 WBC (ref 0–0.2)
PHOSPHATE SERPL-MCNC: 3.6 MG/DL (ref 2.5–4.5)
PLATELET # BLD AUTO: 455 10*3/MM3 (ref 140–450)
PMV BLD AUTO: 8.2 FL (ref 6–12)
POTASSIUM SERPL-SCNC: 3.7 MMOL/L (ref 3.5–4.7)
PROT SERPL-MCNC: 7.9 G/DL (ref 6.3–8)
RBC # BLD AUTO: 4.07 10*6/MM3 (ref 3.77–5.28)
SODIUM SERPL-SCNC: 139 MMOL/L (ref 134–145)
WBC # BLD AUTO: 5.14 10*3/MM3 (ref 3.4–10.8)

## 2020-09-29 PROCEDURE — 96365 THER/PROPH/DIAG IV INF INIT: CPT

## 2020-09-29 PROCEDURE — 85025 COMPLETE CBC W/AUTO DIFF WBC: CPT

## 2020-09-29 PROCEDURE — 96374 THER/PROPH/DIAG INJ IV PUSH: CPT

## 2020-09-29 PROCEDURE — 93356 MYOCRD STRAIN IMG SPCKL TRCK: CPT | Performed by: INTERNAL MEDICINE

## 2020-09-29 PROCEDURE — 84100 ASSAY OF PHOSPHORUS: CPT

## 2020-09-29 PROCEDURE — 25010000002 ZOLEDRONIC ACID 4 MG/100ML SOLUTION: Performed by: INTERNAL MEDICINE

## 2020-09-29 PROCEDURE — 93356 MYOCRD STRAIN IMG SPCKL TRCK: CPT

## 2020-09-29 PROCEDURE — 93306 TTE W/DOPPLER COMPLETE: CPT | Performed by: INTERNAL MEDICINE

## 2020-09-29 PROCEDURE — 77373 STRTCTC BDY RAD THER TX DLVR: CPT | Performed by: RADIOLOGY

## 2020-09-29 PROCEDURE — 93306 TTE W/DOPPLER COMPLETE: CPT

## 2020-09-29 PROCEDURE — 25010000002 PERFLUTREN (DEFINITY) 8.476 MG IN SODIUM CHLORIDE 0.9 % 10 ML INJECTION: Performed by: INTERNAL MEDICINE

## 2020-09-29 PROCEDURE — 77435 SBRT MANAGEMENT: CPT | Performed by: RADIOLOGY

## 2020-09-29 PROCEDURE — 83735 ASSAY OF MAGNESIUM: CPT

## 2020-09-29 PROCEDURE — 80053 COMPREHEN METABOLIC PANEL: CPT

## 2020-09-29 RX ORDER — SODIUM CHLORIDE 9 MG/ML
250 INJECTION, SOLUTION INTRAVENOUS ONCE
Status: COMPLETED | OUTPATIENT
Start: 2020-09-29 | End: 2020-09-29

## 2020-09-29 RX ORDER — ZOLEDRONIC ACID 0.04 MG/ML
4 INJECTION, SOLUTION INTRAVENOUS ONCE
Status: COMPLETED | OUTPATIENT
Start: 2020-09-29 | End: 2020-09-29

## 2020-09-29 RX ADMIN — SODIUM CHLORIDE 250 ML: 9 INJECTION, SOLUTION INTRAVENOUS at 14:48

## 2020-09-29 RX ADMIN — PERFLUTREN 1.5 ML: 6.52 INJECTION, SUSPENSION INTRAVENOUS at 07:45

## 2020-09-29 RX ADMIN — ZOLEDRONIC ACID 4 MG: 0.04 INJECTION, SOLUTION INTRAVENOUS at 14:48

## 2020-09-29 NOTE — PROGRESS NOTES
"  Physician Stereotactic Management Note    Diagnosis:   Bone metastasis (CMS/HCC)    Doing well pretreatment, no problems.    Treatment Number and Dose:fx 1/5 lumbar spine sbrt, 6Gy of 30Gy    Notes on treatment course, films, medical progress and plan:    Doing well. Tolerated treatment without difficulty. No problems or questions.    Subjective     I was personally present at the machine for the delivery of the above treatment.     I provided direct supervision of the patient's set up, treatment parameters and image guidance. I provided corrections and approval of the above prior to the treatment, in real time. I was present for any adjustments required due to patient motion, target movement or equipment issues.    The ongoing images for localization, tumor tracking, gating and beam's eye view localization images will also be approved.     Problem added:  None  Medications added:   None  Ancillary referrals made: None    I have reviewed and marked as \"reviewed\" the current medications, allergies and problem list in the patients EMR.          "

## 2020-09-30 LAB
AORTIC ARCH: 2.5 CM
ASCENDING AORTA: 2.6 CM
BH CV ECHO MEAS - ACS: 2 CM
BH CV ECHO MEAS - AO ARCH DIAM (PROXIMAL TRANS.): 2.5 CM
BH CV ECHO MEAS - AO MAX PG (FULL): 3.3 MMHG
BH CV ECHO MEAS - AO MAX PG: 5.5 MMHG
BH CV ECHO MEAS - AO MEAN PG (FULL): 1.6 MMHG
BH CV ECHO MEAS - AO MEAN PG: 3 MMHG
BH CV ECHO MEAS - AO ROOT AREA (BSA CORRECTED): 1.7
BH CV ECHO MEAS - AO ROOT AREA: 7.3 CM^2
BH CV ECHO MEAS - AO ROOT DIAM: 3 CM
BH CV ECHO MEAS - AO V2 MAX: 117.3 CM/SEC
BH CV ECHO MEAS - AO V2 MEAN: 80.6 CM/SEC
BH CV ECHO MEAS - AO V2 VTI: 22.1 CM
BH CV ECHO MEAS - ASC AORTA: 2.6 CM
BH CV ECHO MEAS - AVA(I,A): 2 CM^2
BH CV ECHO MEAS - AVA(I,D): 2 CM^2
BH CV ECHO MEAS - AVA(V,A): 1.7 CM^2
BH CV ECHO MEAS - AVA(V,D): 1.7 CM^2
BH CV ECHO MEAS - BSA(HAYCOCK): 1.9 M^2
BH CV ECHO MEAS - BSA: 1.8 M^2
BH CV ECHO MEAS - BZI_BMI: 34.2 KILOGRAMS/M^2
BH CV ECHO MEAS - BZI_METRIC_HEIGHT: 154.9 CM
BH CV ECHO MEAS - BZI_METRIC_WEIGHT: 82.1 KG
BH CV ECHO MEAS - EDV(MOD-SP2): 105 ML
BH CV ECHO MEAS - EDV(MOD-SP4): 111 ML
BH CV ECHO MEAS - EDV(TEICH): 100.9 ML
BH CV ECHO MEAS - EF(CUBED): 56.1 %
BH CV ECHO MEAS - EF(MOD-BP): 64.1 %
BH CV ECHO MEAS - EF(MOD-SP2): 62.9 %
BH CV ECHO MEAS - EF(MOD-SP4): 64.9 %
BH CV ECHO MEAS - EF(TEICH): 47.8 %
BH CV ECHO MEAS - EF_3D-VOL: 62 %
BH CV ECHO MEAS - ESV(MOD-SP2): 39 ML
BH CV ECHO MEAS - ESV(MOD-SP4): 39 ML
BH CV ECHO MEAS - ESV(TEICH): 52.7 ML
BH CV ECHO MEAS - FS: 24 %
BH CV ECHO MEAS - IVS/LVPW: 1
BH CV ECHO MEAS - IVSD: 1 CM
BH CV ECHO MEAS - LA 3D VOL INDEX: 30
BH CV ECHO MEAS - LAT PEAK E' VEL: 7 CM/SEC
BH CV ECHO MEAS - LV DIASTOLIC VOL/BSA (35-75): 61.3 ML/M^2
BH CV ECHO MEAS - LV MASS(C)D: 168.2 GRAMS
BH CV ECHO MEAS - LV MASS(C)DI: 92.9 GRAMS/M^2
BH CV ECHO MEAS - LV MAX PG: 2.2 MMHG
BH CV ECHO MEAS - LV MEAN PG: 1.3 MMHG
BH CV ECHO MEAS - LV SYSTOLIC VOL/BSA (12-30): 21.5 ML/M^2
BH CV ECHO MEAS - LV V1 MAX: 73.7 CM/SEC
BH CV ECHO MEAS - LV V1 MEAN: 55.1 CM/SEC
BH CV ECHO MEAS - LV V1 VTI: 16.3 CM
BH CV ECHO MEAS - LVIDD: 4.7 CM
BH CV ECHO MEAS - LVIDS: 3.6 CM
BH CV ECHO MEAS - LVLD AP2: 7.2 CM
BH CV ECHO MEAS - LVLD AP4: 7.3 CM
BH CV ECHO MEAS - LVLS AP2: 5.8 CM
BH CV ECHO MEAS - LVLS AP4: 5.9 CM
BH CV ECHO MEAS - LVOT AREA (M): 2.8 CM^2
BH CV ECHO MEAS - LVOT AREA: 2.7 CM^2
BH CV ECHO MEAS - LVOT DIAM: 1.9 CM
BH CV ECHO MEAS - LVPWD: 1 CM
BH CV ECHO MEAS - MED PEAK E' VEL: 6.3 CM/SEC
BH CV ECHO MEAS - MV A DUR: 0.1 SEC
BH CV ECHO MEAS - MV A MAX VEL: 67.3 CM/SEC
BH CV ECHO MEAS - MV DEC SLOPE: 329.9 CM/SEC^2
BH CV ECHO MEAS - MV DEC TIME: 0.14 SEC
BH CV ECHO MEAS - MV E MAX VEL: 66.8 CM/SEC
BH CV ECHO MEAS - MV E/A: 0.99
BH CV ECHO MEAS - MV MAX PG: 2.9 MMHG
BH CV ECHO MEAS - MV MEAN PG: 1.4 MMHG
BH CV ECHO MEAS - MV P1/2T MAX VEL: 85.8 CM/SEC
BH CV ECHO MEAS - MV P1/2T: 76.2 MSEC
BH CV ECHO MEAS - MV V2 MAX: 84.7 CM/SEC
BH CV ECHO MEAS - MV V2 MEAN: 56 CM/SEC
BH CV ECHO MEAS - MV V2 VTI: 24 CM
BH CV ECHO MEAS - MVA P1/2T LCG: 2.6 CM^2
BH CV ECHO MEAS - MVA(P1/2T): 2.9 CM^2
BH CV ECHO MEAS - MVA(VTI): 1.8 CM^2
BH CV ECHO MEAS - PA MAX PG (FULL): 2.7 MMHG
BH CV ECHO MEAS - PA MAX PG: 3.5 MMHG
BH CV ECHO MEAS - PA V2 MAX: 93.1 CM/SEC
BH CV ECHO MEAS - PULM A REVS DUR: 0.11 SEC
BH CV ECHO MEAS - PULM A REVS VEL: 32.6 CM/SEC
BH CV ECHO MEAS - PULM DIAS VEL: 41.1 CM/SEC
BH CV ECHO MEAS - PULM S/D: 1.8
BH CV ECHO MEAS - PULM SYS VEL: 72 CM/SEC
BH CV ECHO MEAS - PVA(V,A): 1.9 CM^2
BH CV ECHO MEAS - PVA(V,D): 1.9 CM^2
BH CV ECHO MEAS - QP/QS: 1
BH CV ECHO MEAS - RAP SYSTOLE: 8 MMHG
BH CV ECHO MEAS - RV MAX PG: 0.76 MMHG
BH CV ECHO MEAS - RV MEAN PG: 0.53 MMHG
BH CV ECHO MEAS - RV V1 MAX: 43.7 CM/SEC
BH CV ECHO MEAS - RV V1 MEAN: 35.1 CM/SEC
BH CV ECHO MEAS - RV V1 VTI: 11 CM
BH CV ECHO MEAS - RVOT AREA: 4.1 CM^2
BH CV ECHO MEAS - RVOT DIAM: 2.3 CM
BH CV ECHO MEAS - RVSP: 29 MMHG
BH CV ECHO MEAS - SI(AO): 89.4 ML/M^2
BH CV ECHO MEAS - SI(CUBED): 31.6 ML/M^2
BH CV ECHO MEAS - SI(LVOT): 24.3 ML/M^2
BH CV ECHO MEAS - SI(MOD-SP2): 36.5 ML/M^2
BH CV ECHO MEAS - SI(MOD-SP4): 39.8 ML/M^2
BH CV ECHO MEAS - SI(TEICH): 26.7 ML/M^2
BH CV ECHO MEAS - SUP REN AO DIAM: 1.9 CM
BH CV ECHO MEAS - SV(AO): 161.8 ML
BH CV ECHO MEAS - SV(CUBED): 57.2 ML
BH CV ECHO MEAS - SV(LVOT): 44.1 ML
BH CV ECHO MEAS - SV(MOD-SP2): 66 ML
BH CV ECHO MEAS - SV(MOD-SP4): 72 ML
BH CV ECHO MEAS - SV(RVOT): 45.2 ML
BH CV ECHO MEAS - SV(TEICH): 48.3 ML
BH CV ECHO MEAS - TAPSE (>1.6): 2.2 CM
BH CV ECHO MEAS - TR MAX VEL: 229.1 CM/SEC
BH CV ECHO MEASUREMENTS AVERAGE E/E' RATIO: 10.05
BH CV XLRA - RV BASE: 2.8 CM
BH CV XLRA - RV LENGTH: 6.1 CM
BH CV XLRA - RV MID: 2.6 CM
BH CV XLRA - TDI S': 12.6 CM/SEC
LEFT ATRIUM VOLUME INDEX: 27 ML/M2
LEFT ATRIUM VOLUME: 45 CM3
LV EF 2D ECHO EST: 64 %
MAXIMAL PREDICTED HEART RATE: 169 BPM
SINUS: 2.8 CM
STJ: 2.5 CM
STRESS TARGET HR: 144 BPM

## 2020-10-01 ENCOUNTER — APPOINTMENT (OUTPATIENT)
Dept: RADIATION ONCOLOGY | Facility: HOSPITAL | Age: 50
End: 2020-10-01

## 2020-10-01 DIAGNOSIS — I10 ESSENTIAL HYPERTENSION: ICD-10-CM

## 2020-10-01 DIAGNOSIS — E78.5 HYPERLIPIDEMIA, UNSPECIFIED HYPERLIPIDEMIA TYPE: ICD-10-CM

## 2020-10-01 DIAGNOSIS — E55.9 VITAMIN D DEFICIENCY: ICD-10-CM

## 2020-10-01 DIAGNOSIS — E11.9 CONTROLLED TYPE 2 DIABETES MELLITUS WITHOUT COMPLICATION, WITHOUT LONG-TERM CURRENT USE OF INSULIN (HCC): ICD-10-CM

## 2020-10-01 RX ORDER — AMLODIPINE BESYLATE 5 MG/1
TABLET ORAL
Qty: 90 TABLET | Refills: 1 | Status: SHIPPED | OUTPATIENT
Start: 2020-10-01 | End: 2021-01-20 | Stop reason: DRUGHIGH

## 2020-10-01 RX ORDER — LOSARTAN POTASSIUM 50 MG/1
TABLET ORAL
Qty: 90 TABLET | Refills: 0 | Status: SHIPPED | OUTPATIENT
Start: 2020-10-01 | End: 2021-05-17 | Stop reason: ALTCHOICE

## 2020-10-02 ENCOUNTER — TRANSCRIBE ORDERS (OUTPATIENT)
Dept: ADMINISTRATIVE | Facility: HOSPITAL | Age: 50
End: 2020-10-02

## 2020-10-02 ENCOUNTER — RADIATION ONCOLOGY WEEKLY ASSESSMENT (OUTPATIENT)
Dept: RADIATION ONCOLOGY | Facility: HOSPITAL | Age: 50
End: 2020-10-02

## 2020-10-02 ENCOUNTER — OFFICE VISIT (OUTPATIENT)
Dept: ONCOLOGY | Facility: CLINIC | Age: 50
End: 2020-10-02

## 2020-10-02 ENCOUNTER — TELEPHONE (OUTPATIENT)
Dept: ONCOLOGY | Facility: CLINIC | Age: 50
End: 2020-10-02

## 2020-10-02 ENCOUNTER — LAB (OUTPATIENT)
Dept: LAB | Facility: HOSPITAL | Age: 50
End: 2020-10-02

## 2020-10-02 VITALS
OXYGEN SATURATION: 98 % | BODY MASS INDEX: 34.78 KG/M2 | HEIGHT: 61 IN | DIASTOLIC BLOOD PRESSURE: 81 MMHG | HEART RATE: 86 BPM | WEIGHT: 184.2 LBS | SYSTOLIC BLOOD PRESSURE: 125 MMHG | RESPIRATION RATE: 16 BRPM | TEMPERATURE: 97.3 F

## 2020-10-02 DIAGNOSIS — C79.51 BONE METASTASIS: ICD-10-CM

## 2020-10-02 DIAGNOSIS — C50.412 MALIGNANT NEOPLASM OF UPPER-OUTER QUADRANT OF LEFT BREAST IN FEMALE, ESTROGEN RECEPTOR NEGATIVE (HCC): Primary | ICD-10-CM

## 2020-10-02 DIAGNOSIS — Z01.818 OTHER SPECIFIED PRE-OPERATIVE EXAMINATION: Primary | ICD-10-CM

## 2020-10-02 DIAGNOSIS — M89.9 SKULL LESION: ICD-10-CM

## 2020-10-02 DIAGNOSIS — D50.9 IRON DEFICIENCY ANEMIA, UNSPECIFIED IRON DEFICIENCY ANEMIA TYPE: ICD-10-CM

## 2020-10-02 DIAGNOSIS — Z17.1 MALIGNANT NEOPLASM OF UPPER-OUTER QUADRANT OF LEFT BREAST IN FEMALE, ESTROGEN RECEPTOR NEGATIVE (HCC): Primary | ICD-10-CM

## 2020-10-02 PROCEDURE — 99214 OFFICE O/P EST MOD 30 MIN: CPT | Performed by: INTERNAL MEDICINE

## 2020-10-02 PROCEDURE — 77373 STRTCTC BDY RAD THER TX DLVR: CPT | Performed by: RADIOLOGY

## 2020-10-02 NOTE — PROGRESS NOTES
Subjective     CHIEF COMPLAINT:      Chief Complaint   Patient presents with   • Follow-up     discuss work and zometa side effects       HISTORY OF PRESENT ILLNESS:     Ina Akers is a 49 y.o. female patient who returns today for follow up on her metastatic cancer to bone.  She was seen by Dr. Connell yesterday.     Patient reports intermittent pain in the left hip area.  It usually develops after sitting for a period of time.  She is not having pain in the right hip.    Patient reports developing achiness in the bones after receiving Zometa.  She also had low-grade fever and chills.  It started the day of the infusion.  It continued to be the following day and stopped after she took Benadryl and Tylenol.    REVIEW OF SYSTEMS:  Review of Systems   Constitutional: Negative for fatigue, fever and unexpected weight change.   HENT: Negative for nosebleeds and voice change.    Eyes: Negative for visual disturbance.   Respiratory: Negative for cough and shortness of breath.    Cardiovascular: Negative for chest pain and leg swelling.   Gastrointestinal: Negative for abdominal pain, blood in stool, constipation, diarrhea, nausea and vomiting.   Genitourinary: Negative for frequency and hematuria.   Musculoskeletal: Negative for back pain and joint swelling.   Skin: Negative for rash.   Neurological: Negative for dizziness and headaches.   Hematological: Negative for adenopathy. Does not bruise/bleed easily.   Psychiatric/Behavioral: Negative for dysphoric mood. The patient is not nervous/anxious.      I reviewed and verified the CC and ROS obtained by the MA.     Past Medical History:   Diagnosis Date   • Anemia     History of recurent iron deficiency   • Cancer (CMS/HCC) 10/2001    Left breast   • Compression fracture of fourth lumbar vertebra (CMS/HCC)    • Diabetes mellitus (CMS/HCC)    • H/O Thyroid goiter    • History of gastritis    • History of IBS    • History of tinea corporis    • Hypertension    • PONV  (postoperative nausea and vomiting)        Past Surgical History:   Procedure Laterality Date   • BREAST SURGERY      PARTIAL LEFT MASTECTOMY   •  SECTION     • KYPHOPLASTY N/A 2020    Procedure: Lumbar 4  osteo cool radiofrequency ablation and KYPHOPLASTY;  Surgeon: Vik Rios MD;  Location: [facility] ;  Service: Neurosurgery;  Laterality: N/A;   • THYROID BIOPSY Left 2018       Cancer-related family history is not on file.  Social History     Tobacco Use   • Smoking status: Never Smoker   • Smokeless tobacco: Never Used   Substance Use Topics   • Alcohol use: Yes     Comment: Socially       MEDICATIONS:    Current Outpatient Medications:   •  acetaminophen (TYLENOL) 500 MG tablet, Take 1,000 mg by mouth Every 6 (Six) Hours As Needed for Mild Pain ., Disp: , Rfl:   •  amLODIPine (NORVASC) 5 MG tablet, TAKE ONE TABLET BY MOUTH DAILY, Disp: 90 tablet, Rfl: 1  •  atorvastatin (LIPITOR) 10 MG tablet, Take 1 tablet by mouth Daily., Disp: 90 tablet, Rfl: 3  •  Blood Glucose Monitoring Suppl w/Device kit, Check blood sugar twice a day, Disp: 1 each, Rfl: 0  •  cholecalciferol (VITAMIN D3) 25 MCG (1000 UT) tablet, Take 1,000 Units by mouth Daily., Disp: , Rfl:   •  Docusate Calcium (STOOL SOFTENER PO), Take  by mouth As Needed. As needed, Disp: , Rfl:   •  erythromycin (ROMYCIN) 5 MG/GM ophthalmic ointment, Apply  to eye(s) as directed by provider Every 4 (Four) Hours While Awake., Disp: 1 each, Rfl: 0  •  ferrous sulfate 325 (65 FE) MG tablet, Take 325 mg by mouth Daily With Breakfast., Disp: , Rfl:   •  Glucose Blood (BLOOD GLUCOSE TEST) strip, Check blood sugar twice a day., Disp: 100 each, Rfl: 1  •  Hydrocortisone, Perianal, (ANUSOL-HC) 2.5 % rectal cream, Insert  into the rectum 3 (Three) Times a Day As Needed for Hemorrhoids., Disp: 60 g, Rfl: 2  •  Lancets (ACCU-CHEK SOFT TOUCH) lancets, Check blood sugar twice a day as needed, Disp: 100 each, Rfl: 1  •  lansoprazole (PREVACID) 30  "MG capsule, Take 1 capsule by mouth Daily., Disp: 90 capsule, Rfl: 3  •  levothyroxine (SYNTHROID, LEVOTHROID) 50 MCG tablet, TAKE ONE TABLET BY MOUTH DAILY, Disp: 90 tablet, Rfl: 0  •  losartan (COZAAR) 50 MG tablet, TAKE ONE TABLET BY MOUTH DAILY, Disp: 90 tablet, Rfl: 0  •  metFORMIN ER (GLUCOPHAGE-XR) 500 MG 24 hr tablet, Take 2 tablets by mouth Daily With Dinner., Disp: 180 tablet, Rfl: 2  •  triamcinolone (KENALOG) 0.1 % cream, Apply  topically to the appropriate area as directed 2 (Two) Times a Day. Use sparingly avoid face, Disp: 60 g, Rfl: 1    ALLERGIES:  No Known Allergies      Objective   VITAL SIGNS:     Vitals:    10/02/20 1213   BP: 125/81   Pulse: 86   Resp: 16   Temp: 97.3 °F (36.3 °C)   TempSrc: Temporal   SpO2: 98%   Weight: 83.6 kg (184 lb 3.2 oz)   Height: 154.9 cm (60.98\")   PainSc:   4   PainLoc: Buttocks  Comment: lt hip     Body mass index is 34.82 kg/m².     Wt Readings from Last 3 Encounters:   10/02/20 83.6 kg (184 lb 3.2 oz)   09/30/20 82.1 kg (181 lb)   09/29/20 82.1 kg (181 lb)       PHYSICAL EXAMINATION:  GENERAL:  The patient appears in good general condition, not in acute distress.  SKIN: No skin rashes. No ecchymosis.  HEAD:  Normocephalic.  EYES:  No Jaundice. No Pallor. Pupils equal. EOMI.  NECK:  Supple with Good ROM. No Thyromegaly. No Masses.  LYMPHATICS:  No cervical or supraclavicular lymphadenopathy.  CHEST: Normal respiratory effort. Lungs clear to auscultation.  No tenderness in the right posterior ribs.  CARDIAC:  Normal S1 & S2. No murmur. No edema.  ABDOMEN:  Soft. No tenderness. No Hepatomegaly. No Splenomegaly. No masses.  EXTREMITIES:  No clubbing. No deforming arthritis in the hands.   NEUROLOGICAL:  No Focal neurological deficits.       DIAGNOSTIC DATA:     Results from last 7 days   Lab Units 09/29/20  1317   WBC 10*3/mm3 5.14   NEUTROS ABS 10*3/mm3 2.54   HEMOGLOBIN g/dL 10.9*   HEMATOCRIT % 36.2   PLATELETS 10*3/mm3 455*     Results from last 7 days   Lab Units " 09/29/20  1317   SODIUM mmol/L 139   POTASSIUM mmol/L 3.7   CHLORIDE mmol/L 101   CO2 mmol/L 28.3   BUN mg/dL 10   CREATININE mg/dL 1.02   CALCIUM mg/dL 9.4   ALBUMIN g/dL 4.30   BILIRUBIN mg/dL 0.3   ALK PHOS U/L 79   ALT (SGPT) U/L 12   AST (SGOT) U/L 12   GLUCOSE mg/dL 103   MAGNESIUM mg/dL 2.1     Echocardiogram on 9/23/2020 showed ejection fraction of 64.1%.    Assessment/Plan   1.  Left breast cancer, stage IIB, triple negative.   · S/P lumpectomy  · She received adjuvant chemotherapy on the NSABP B34 with AC followed by Taxotere.  · She received post-lumpectomy radiation therapy.   · She was placed on tamoxifen for 5 years and it was completed in January 2007.    · Bilateral mammograms from 8/14/2020 were benign.       2.  Bone metastasis.  · Patient was found on MRI on 8/27/2020 to have an L4 lesion resulting in pathologic compression fracture.  · Bone scan on 9/2/2020 revealed increased activity at L4 and left side of the skull.  · PET scan on 9/9/2020 revealed abnormal activity at L4, right femoral neck, lower sacrum, left ischium and right ninth medial aspect.  · Findings are highly suspicious for bone metastasis.  · Although we initially suspected possible multiple myeloma, paraprotein studies showed no evidence of M protein or elevated free light chains.  · I explained to the patient and her  today that the findings are considered suspicious for metastatic cancer to bone.  · PET scan showed no evidence of visceral metastasis.  · Pathology exam of the lesion at L4 was nondiagnostic.  · I recommended CT-guided biopsy of the lesion in the right posterior ninth rib.  In addition to general pathology exam, the specimen will be sent for testing for ER WI and HER-2/stacey.     3.  Bone scan revealed a lesion in the skull in the left parietal area seen on bone scan on 9/2/2020.  MRI of the brain on 9/3/2020 confirmed the findings.  Patient is not having headaches.    4.  Iron deficiency anemia.  Patient is  on ferrous sulfate 325 mg daily. Hemoglobin decreased to 10.6 on 9/3/2020.  The worsening was explained by anemia of malignancy.      PLAN:    1.  I will request a CT-guided biopsy of the right ninth rib lesion.  2.  I discussed the case with Dr. Connell.  If the pathology exam is nondiagnostic, we will ask Dr. Connell to perform right hip replacement and the lesion in the right femoral neck will be sent for pathology exam.  3.  If the biopsy provide the needed information, we will ask Dr. Connell to perform nailing of the right femoral neck.  This will be followed by radiation therapy.  4.  I will see the patient in follow-up in 1 week to review the results.  5.  After the patient receives the next dose of Zometa, we will ask her to take Tylenol and Benadryl.  If the symptoms do not improve, we would recommend switching to Xgeva.  6.  I gave the patient okay to work from home.        Tim Muñoz MD  10/02/20

## 2020-10-02 NOTE — PROGRESS NOTES
"  Physician Stereotactic Management Note    Diagnosis:   Malignant neoplasm of upper-outer quadrant of left breast in female, estrogen receptor negative (CMS/HCC)    Doing well pretreatment, no problems.    Treatment Number and Dose:fx 2/5 12Gy of30Gy lumbar spine, saw Dr. harmon with ortho and he is not doing surgery on femur yet but wants to hold off on xrt as well for 4 weeks per pt    Notes on treatment course, films, medical progress and plan:    Doing well. Tolerated treatment without difficulty. No problems or questions.    Subjective     I was personally present at the machine for the delivery of the above treatment.     I provided direct supervision of the patient's set up, treatment parameters and image guidance. I provided corrections and approval of the above prior to the treatment, in real time. I was present for any adjustments required due to patient motion, target movement or equipment issues.    The ongoing images for localization, tumor tracking, gating and beam's eye view localization images will also be approved.     Problem added:  None  Medications added:   None  Ancillary referrals made: None    I have reviewed and marked as \"reviewed\" the current medications, allergies and problem list in the patients EMR.          "

## 2020-10-02 NOTE — TELEPHONE ENCOUNTER
Caller: CARRIE GALVAN    Relationship to patient: SELF    Best call back number: [phone]    PT SAYS SHE WAS TOLD TO LET THE PRACTICE KNOW WHEN SHE HAD RESCHEDULED HER COVID TEST. SHE SAYS IT HAS BEEN RESCHEDULED FOR 10/3 12:40P AT [facility]

## 2020-10-03 ENCOUNTER — APPOINTMENT (OUTPATIENT)
Dept: LAB | Facility: HOSPITAL | Age: 50
End: 2020-10-03

## 2020-10-03 ENCOUNTER — LAB (OUTPATIENT)
Dept: LAB | Facility: HOSPITAL | Age: 50
End: 2020-10-03

## 2020-10-03 DIAGNOSIS — Z01.818 OTHER SPECIFIED PRE-OPERATIVE EXAMINATION: ICD-10-CM

## 2020-10-03 PROCEDURE — U0004 COV-19 TEST NON-CDC HGH THRU: HCPCS

## 2020-10-03 PROCEDURE — C9803 HOPD COVID-19 SPEC COLLECT: HCPCS

## 2020-10-05 ENCOUNTER — RADIATION ONCOLOGY WEEKLY ASSESSMENT (OUTPATIENT)
Dept: RADIATION ONCOLOGY | Facility: HOSPITAL | Age: 50
End: 2020-10-05

## 2020-10-05 DIAGNOSIS — C50.412 MALIGNANT NEOPLASM OF UPPER-OUTER QUADRANT OF LEFT BREAST IN FEMALE, ESTROGEN RECEPTOR NEGATIVE (HCC): Primary | ICD-10-CM

## 2020-10-05 DIAGNOSIS — Z17.1 MALIGNANT NEOPLASM OF UPPER-OUTER QUADRANT OF LEFT BREAST IN FEMALE, ESTROGEN RECEPTOR NEGATIVE (HCC): Primary | ICD-10-CM

## 2020-10-05 LAB — SARS-COV-2 RNA RESP QL NAA+PROBE: NOT DETECTED

## 2020-10-05 PROCEDURE — 77373 STRTCTC BDY RAD THER TX DLVR: CPT | Performed by: RADIOLOGY

## 2020-10-05 NOTE — PROGRESS NOTES
"  Physician Stereotactic Management Note    Diagnosis:   Malignant neoplasm of upper-outer quadrant of left breast in female, estrogen receptor negative (CMS/HCC)    Doing well pretreatment, no problems.    Treatment Number and Dose:fx 3/5 lumbar spine 18Gy    Notes on treatment course, films, medical progress and plan:    Doing well. Tolerated treatment without difficulty. No problems or questions.    Subjective     I was personally present at the machine for the delivery of the above treatment.     I provided direct supervision of the patient's set up, treatment parameters and image guidance. I provided corrections and approval of the above prior to the treatment, in real time. I was present for any adjustments required due to patient motion, target movement or equipment issues.    The ongoing images for localization, tumor tracking, gating and beam's eye view localization images will also be approved.     Problem added:  None  Medications added:   None  Ancillary referrals made: None    I have reviewed and marked as \"reviewed\" the current medications, allergies and problem list in the patients EMR.          "

## 2020-10-06 ENCOUNTER — HOSPITAL ENCOUNTER (OUTPATIENT)
Dept: CT IMAGING | Facility: HOSPITAL | Age: 50
Discharge: HOME | End: 2020-10-06

## 2020-10-06 ENCOUNTER — HOSPITAL ENCOUNTER (OUTPATIENT)
Dept: GENERAL RADIOLOGY | Facility: HOSPITAL | Age: 50
Discharge: HOME | End: 2020-10-06

## 2020-10-06 VITALS
WEIGHT: 181 LBS | RESPIRATION RATE: 16 BRPM | HEIGHT: 61 IN | TEMPERATURE: 97.7 F | OXYGEN SATURATION: 99 % | DIASTOLIC BLOOD PRESSURE: 78 MMHG | SYSTOLIC BLOOD PRESSURE: 118 MMHG | HEART RATE: 79 BPM | BODY MASS INDEX: 34.17 KG/M2

## 2020-10-06 DIAGNOSIS — C79.51 BONE METASTASIS: ICD-10-CM

## 2020-10-06 DIAGNOSIS — Z17.1 MALIGNANT NEOPLASM OF UPPER-OUTER QUADRANT OF LEFT BREAST IN FEMALE, ESTROGEN RECEPTOR NEGATIVE (HCC): ICD-10-CM

## 2020-10-06 DIAGNOSIS — C50.412 MALIGNANT NEOPLASM OF UPPER-OUTER QUADRANT OF LEFT BREAST IN FEMALE, ESTROGEN RECEPTOR NEGATIVE (HCC): ICD-10-CM

## 2020-10-06 LAB
INR PPP: 1 (ref 0.8–1.2)
PLATELET # BLD AUTO: 431 10*3/MM3 (ref 140–450)
PROTHROMBIN TIME: 11.6 SECONDS (ref 12.8–15.2)

## 2020-10-06 PROCEDURE — 25010000003 LIDOCAINE 1 % SOLUTION: Performed by: RADIOLOGY

## 2020-10-06 PROCEDURE — 85049 AUTOMATED PLATELET COUNT: CPT | Performed by: RADIOLOGY

## 2020-10-06 PROCEDURE — 88305 TISSUE EXAM BY PATHOLOGIST: CPT | Performed by: INTERNAL MEDICINE

## 2020-10-06 PROCEDURE — 88342 IMHCHEM/IMCYTCHM 1ST ANTB: CPT | Performed by: INTERNAL MEDICINE

## 2020-10-06 PROCEDURE — 77012 CT SCAN FOR NEEDLE BIOPSY: CPT

## 2020-10-06 PROCEDURE — 71045 X-RAY EXAM CHEST 1 VIEW: CPT

## 2020-10-06 PROCEDURE — 88341 IMHCHEM/IMCYTCHM EA ADD ANTB: CPT | Performed by: INTERNAL MEDICINE

## 2020-10-06 PROCEDURE — 85610 PROTHROMBIN TIME: CPT

## 2020-10-06 PROCEDURE — 77336 RADIATION PHYSICS CONSULT: CPT | Performed by: RADIOLOGY

## 2020-10-06 RX ORDER — ALPRAZOLAM 0.5 MG/1
0.5 TABLET ORAL ONCE
Status: COMPLETED | OUTPATIENT
Start: 2020-10-06 | End: 2020-10-06

## 2020-10-06 RX ORDER — SODIUM CHLORIDE 0.9 % (FLUSH) 0.9 %
10 SYRINGE (ML) INJECTION AS NEEDED
Status: DISCONTINUED | OUTPATIENT
Start: 2020-10-06 | End: 2020-10-07 | Stop reason: HOSPADM

## 2020-10-06 RX ORDER — LIDOCAINE HYDROCHLORIDE 10 MG/ML
20 INJECTION, SOLUTION INFILTRATION; PERINEURAL ONCE
Status: COMPLETED | OUTPATIENT
Start: 2020-10-06 | End: 2020-10-06

## 2020-10-06 RX ORDER — METFORMIN HYDROCHLORIDE 500 MG/1
TABLET, EXTENDED RELEASE ORAL
Qty: 180 TABLET | Refills: 1 | Status: SHIPPED | OUTPATIENT
Start: 2020-10-06 | End: 2021-04-08

## 2020-10-06 RX ORDER — SODIUM CHLORIDE 0.9 % (FLUSH) 0.9 %
3 SYRINGE (ML) INJECTION EVERY 12 HOURS SCHEDULED
Status: DISCONTINUED | OUTPATIENT
Start: 2020-10-06 | End: 2020-10-07 | Stop reason: HOSPADM

## 2020-10-06 RX ADMIN — LIDOCAINE HYDROCHLORIDE 20 ML: 10 INJECTION, SOLUTION INFILTRATION; PERINEURAL at 08:15

## 2020-10-06 RX ADMIN — ALPRAZOLAM 0.5 MG: 0.5 TABLET ORAL at 07:05

## 2020-10-06 NOTE — DISCHARGE INSTRUCTIONS
EDUCATION /DISCHARGE INSTRUCTIONS  CT/US guided biopsy:  A biopsy is a procedure done to remove tissue for further analysis.  Before images are taken to locate the target area.  Images can be obtained using ultrasound, CT or MRI.  A physician will clean your skin with antiseptic soap, place a sterile towel around the site and administer a local anesthetic to numb the area.  The physician will then insert a special needle.  Sometimes images are taken of the needle after it is inserted to ensure the needle is in the correct area to be biopsied.   A sample is obtained and sent to the laboratory for study.  Occasionally the laboratory is unable to make a diagnosis from the sample and the procedure may need to be repeated.  Within a week the radiologist will send a report to your physician.  A pathologist will also examine the tissue and send a report.    Risks of the procedure include but are not limited to:   *  Bleeding    *  Infection   *  Puncture of surrounding organs *  Death     *  Lung collapse if the biopsy is near the chest which may require insertion of a      chest tube to re-inflate the lung if severe.    Benefits of the procedure:  Using x-ray helps to locate the area that requires a biopsy. The procedure is less invasive than a surgical procedure, there are no large incisions and it does not require anesthesia.    Alternatives to the procedure:  A biopsy can be performed surgically.  Risks of a surgical biopsy include exposure to anesthesia, infection, excessive bleeding and injury to abdominal organs.  A benefit of surgical biopsy is the ability to see the area to be biopsied and remove of a larger piece of tissue.    THIS EDUCATION INFORMATION WAS REVIEWED PRIOR TO PROCEDURE AND CONSENT. Patient initials__________________Time___0715________________    Post Procedure:    *  Expect the biopsy site may be tender up to one week.    *  Rest today (no pushing pulling or straining).   *  Slowly increase  activity tomorrow.    *  If you received sedation do not drive for 24 hours.   *  Keep dressing clean and dry.   *  Leave dressing on puncture site for 24 hours.    *  You may shower when dressing removed.  Call your doctor if experiencing:   *  Signs of infection such as redness, swelling, excessive pain and / or foul        smelling drainage from the puncture site.   *  Chills or fever over 101 degrees (by mouth).   *  Unrelieved pain.   *  Any new or severe symptoms.   *  If experiencing sudden / severe shortness of breath or chest pain go to the       nearest emergency room.   Following the procedure:     Follow-up with the ordering physician as directed.    Continue to take other medications as directed by your physician unless    otherwise instructed.   If applicable, resume taking your blood thinners or Aspirin on __October 7, 2020 after 10:00 AM_________.    If you have any concerns please call the Radiology Nurses Desk at [phone].  You are the most important factor in your recovery.  Follow the above instructions carefully.

## 2020-10-06 NOTE — H&P
Name: Ina Akers ADMIT: 10/6/2020   : 1970  PCP: Epley, James, APRN    MRN: [id number] LOS: 0 days   AGE/SEX: 49 y.o. female  ROOM: Room/bed info not found       Chief complaint destructive rib leason    Present Illness or Internal History:  Patient is a 49 y.o. female presents with same.     Past Surgical History:  Past Surgical History:   Procedure Laterality Date   • BREAST SURGERY      PARTIAL LEFT MASTECTOMY   •  SECTION     • KYPHOPLASTY N/A 2020    Procedure: Lumbar 4  osteo cool radiofrequency ablation and KYPHOPLASTY;  Surgeon: Vik Rios MD;  Location: [facility] ;  Service: Neurosurgery;  Laterality: N/A;   • THYROID BIOPSY Left        Past Medical History:  Past Medical History:   Diagnosis Date   • Anemia     History of recurent iron deficiency   • Cancer (CMS/HCC) 10/2001    Left breast   • Compression fracture of fourth lumbar vertebra (CMS/HCC)    • Diabetes mellitus (CMS/HCC)    • H/O Thyroid goiter    • History of gastritis    • History of IBS    • History of tinea corporis    • Hypertension    • PONV (postoperative nausea and vomiting)        Home Medications:  (Not in a hospital admission)      Allergies:  Patient has no known allergies.    Family History:  Family History   Problem Relation Age of Onset   • Hypertension Other    • Diabetes Other    • Malig Hyperthermia Neg Hx        Social History:  Social History     Tobacco Use   • Smoking status: Never Smoker   • Smokeless tobacco: Never Used   Substance Use Topics   • Alcohol use: Yes     Comment: Socially   • Drug use: No        Objective     Physical Exam:    No exam performed today,    Vital Signs       Anticipated Surgical Procedure:  Ct bx    The risks, benefits and alternatives of this procedure have been discussed with the patient or responsible party: Yes        Faizan Barbosa MD  10/06/20  06:55 EDT

## 2020-10-07 ENCOUNTER — TELEMEDICINE (OUTPATIENT)
Dept: FAMILY MEDICINE CLINIC | Facility: CLINIC | Age: 50
End: 2020-10-07

## 2020-10-07 ENCOUNTER — TELEPHONE (OUTPATIENT)
Dept: INTERVENTIONAL RADIOLOGY/VASCULAR | Facility: HOSPITAL | Age: 50
End: 2020-10-07

## 2020-10-07 ENCOUNTER — RADIATION ONCOLOGY WEEKLY ASSESSMENT (OUTPATIENT)
Dept: RADIATION ONCOLOGY | Facility: HOSPITAL | Age: 50
End: 2020-10-07

## 2020-10-07 DIAGNOSIS — Z17.1 MALIGNANT NEOPLASM OF UPPER-OUTER QUADRANT OF LEFT BREAST IN FEMALE, ESTROGEN RECEPTOR NEGATIVE (HCC): Primary | ICD-10-CM

## 2020-10-07 DIAGNOSIS — I10 ESSENTIAL HYPERTENSION: ICD-10-CM

## 2020-10-07 DIAGNOSIS — M25.562 ACUTE PAIN OF BOTH KNEES: ICD-10-CM

## 2020-10-07 DIAGNOSIS — E11.9 CONTROLLED TYPE 2 DIABETES MELLITUS WITHOUT COMPLICATION, WITHOUT LONG-TERM CURRENT USE OF INSULIN (HCC): ICD-10-CM

## 2020-10-07 DIAGNOSIS — C50.412 MALIGNANT NEOPLASM OF UPPER-OUTER QUADRANT OF LEFT BREAST IN FEMALE, ESTROGEN RECEPTOR NEGATIVE (HCC): Primary | ICD-10-CM

## 2020-10-07 DIAGNOSIS — C79.51 BONE METASTASIS: Primary | ICD-10-CM

## 2020-10-07 DIAGNOSIS — M25.561 ACUTE PAIN OF BOTH KNEES: ICD-10-CM

## 2020-10-07 PROCEDURE — 77373 STRTCTC BDY RAD THER TX DLVR: CPT | Performed by: RADIOLOGY

## 2020-10-07 PROCEDURE — 99213 OFFICE O/P EST LOW 20 MIN: CPT | Performed by: NURSE PRACTITIONER

## 2020-10-07 NOTE — PROGRESS NOTES
"  Physician Stereotactic Management Note    Diagnosis:   Malignant neoplasm of upper-outer quadrant of left breast in female, estrogen receptor negative (CMS/HCC)    Doing well pretreatment, no problems.    Treatment Number and Dose:fx 4/5 lumbar spine, 24/30Gy    Notes on treatment course, films, medical progress and plan:    Doing well. Tolerated treatment without difficulty. No problems or questions.    Subjective     I was personally present at the machine for the delivery of the above treatment.     I provided direct supervision of the patient's set up, treatment parameters and image guidance. I provided corrections and approval of the above prior to the treatment, in real time. I was present for any adjustments required due to patient motion, target movement or equipment issues.    The ongoing images for localization, tumor tracking, gating and beam's eye view localization images will also be approved.     Problem added:  None  Medications added:   None  Ancillary referrals made: None    I have reviewed and marked as \"reviewed\" the current medications, allergies and problem list in the patients EMR.          "

## 2020-10-07 NOTE — PROGRESS NOTES
Subjective   Ina Akers is a 49 y.o. female.     Very pleasant patient who unfortunately was recently diagnosed with cavernous malignant bone lesion L4 as well as abnormal pet scan of her section of her ribs skull and right hip region  She has no pain in the right hip and she is not a candidate at this time for hip replacement may need some pinning for strengthening  At this time both her doctors are worried about an injury or fall causing a fracture on the right  She is already had 4 radiation treatments and scheduled to get next week she will be starting chemotherapy soon  Presently on short-term disability  No metastasis otherwise with the exception of bone  Coping as well as can be expected she has no acute complaints maintaining social distance  Diabetes type 2 and hypertension controlled       There were no vitals taken for this visit.      The following portions of the patient's history were reviewed and updated as appropriate: allergies, current medications, past family history, past medical history, past social history, past surgical history and problem list.    Review of Systems   Constitutional: Negative for chills, fatigue, fever and unexpected weight loss.   HENT: Negative.  Negative for trouble swallowing.    Eyes: Negative.    Respiratory: Negative for cough and shortness of breath.    Cardiovascular: Negative for chest pain, palpitations and leg swelling.   Gastrointestinal: Negative for abdominal pain and blood in stool.   Genitourinary: Negative.  Negative for pelvic pain.   Musculoskeletal: Positive for back pain.   Skin: Negative.    Neurological: Negative.  Negative for dizziness, speech difficulty, weakness and confusion.   Psychiatric/Behavioral: Negative.        Objective   Physical Exam  Constitutional:       Appearance: Normal appearance.      Comments: Pleasant appears well   Skin:     General: Skin is warm and dry.   Neurological:      General: No focal deficit present.       Mental Status: She is alert and oriented to person, place, and time.   Psychiatric:         Mood and Affect: Mood normal.         Behavior: Behavior normal.         Thought Content: Thought content normal.         Judgment: Judgment normal.      Comments: Pleasant tearful at times discussing the diagnosis smelling at times as well           Assessment/Plan   Diagnoses and all orders for this visit:    Bone metastasis (CMS/HCC)    Acute pain of both knees    Controlled type 2 diabetes mellitus without complication, without long-term current use of insulin (CMS/HCC)    Essential hypertension        Encourage patient  She will let me know if there is anything I can do for her  She will be in our thoughts and prayers during her treatment  She will follow-up in December  These fasting labs at that time  She will continue to follow-up with her doctors social distance  kn95 filtration mass preferable to no filtration  And was a  Telehealth video visit 20 minutes with patient permission  Continue present medication          There are no Patient Instructions on file for this visit.

## 2020-10-08 ENCOUNTER — FLU SHOT (OUTPATIENT)
Dept: FAMILY MEDICINE CLINIC | Facility: CLINIC | Age: 50
End: 2020-10-08

## 2020-10-08 DIAGNOSIS — Z23 NEED FOR IMMUNIZATION AGAINST INFLUENZA: Primary | ICD-10-CM

## 2020-10-08 DIAGNOSIS — D84.9 IMMUNOCOMPROMISED PATIENT (HCC): ICD-10-CM

## 2020-10-08 DIAGNOSIS — C79.51 BONE METASTASIS: ICD-10-CM

## 2020-10-08 PROCEDURE — 90686 IIV4 VACC NO PRSV 0.5 ML IM: CPT | Performed by: NURSE PRACTITIONER

## 2020-10-08 PROCEDURE — 90732 PPSV23 VACC 2 YRS+ SUBQ/IM: CPT | Performed by: NURSE PRACTITIONER

## 2020-10-08 PROCEDURE — 90472 IMMUNIZATION ADMIN EACH ADD: CPT | Performed by: NURSE PRACTITIONER

## 2020-10-08 PROCEDURE — 90471 IMMUNIZATION ADMIN: CPT | Performed by: NURSE PRACTITIONER

## 2020-10-09 ENCOUNTER — APPOINTMENT (OUTPATIENT)
Dept: OTHER | Facility: HOSPITAL | Age: 50
End: 2020-10-09

## 2020-10-09 ENCOUNTER — APPOINTMENT (OUTPATIENT)
Dept: LAB | Facility: HOSPITAL | Age: 50
End: 2020-10-09

## 2020-10-09 ENCOUNTER — DOCUMENTATION (OUTPATIENT)
Dept: RADIATION ONCOLOGY | Facility: HOSPITAL | Age: 50
End: 2020-10-09

## 2020-10-09 ENCOUNTER — DOCUMENTATION (OUTPATIENT)
Dept: OTHER | Facility: HOSPITAL | Age: 50
End: 2020-10-09

## 2020-10-09 ENCOUNTER — OFFICE VISIT (OUTPATIENT)
Dept: ONCOLOGY | Facility: CLINIC | Age: 50
End: 2020-10-09

## 2020-10-09 ENCOUNTER — RADIATION ONCOLOGY WEEKLY ASSESSMENT (OUTPATIENT)
Dept: RADIATION ONCOLOGY | Facility: HOSPITAL | Age: 50
End: 2020-10-09

## 2020-10-09 VITALS
OXYGEN SATURATION: 99 % | DIASTOLIC BLOOD PRESSURE: 82 MMHG | HEIGHT: 61 IN | HEART RATE: 95 BPM | TEMPERATURE: 97.7 F | SYSTOLIC BLOOD PRESSURE: 129 MMHG | BODY MASS INDEX: 34.64 KG/M2 | WEIGHT: 183.5 LBS | RESPIRATION RATE: 16 BRPM

## 2020-10-09 DIAGNOSIS — D50.9 IRON DEFICIENCY ANEMIA, UNSPECIFIED IRON DEFICIENCY ANEMIA TYPE: ICD-10-CM

## 2020-10-09 DIAGNOSIS — C50.412 MALIGNANT NEOPLASM OF UPPER-OUTER QUADRANT OF LEFT BREAST IN FEMALE, ESTROGEN RECEPTOR NEGATIVE (HCC): Primary | ICD-10-CM

## 2020-10-09 DIAGNOSIS — M89.9 SKULL LESION: ICD-10-CM

## 2020-10-09 DIAGNOSIS — C73 THYROID CANCER (HCC): Primary | ICD-10-CM

## 2020-10-09 DIAGNOSIS — C79.51 BONE METASTASIS: ICD-10-CM

## 2020-10-09 DIAGNOSIS — C50.412 MALIGNANT NEOPLASM OF UPPER-OUTER QUADRANT OF LEFT BREAST IN FEMALE, ESTROGEN RECEPTOR NEGATIVE (HCC): ICD-10-CM

## 2020-10-09 DIAGNOSIS — Z17.1 MALIGNANT NEOPLASM OF UPPER-OUTER QUADRANT OF LEFT BREAST IN FEMALE, ESTROGEN RECEPTOR NEGATIVE (HCC): Primary | ICD-10-CM

## 2020-10-09 DIAGNOSIS — Z17.1 MALIGNANT NEOPLASM OF UPPER-OUTER QUADRANT OF LEFT BREAST IN FEMALE, ESTROGEN RECEPTOR NEGATIVE (HCC): ICD-10-CM

## 2020-10-09 LAB
T4 FREE SERPL-MCNC: 1.29 NG/DL (ref 0.93–1.7)
TSH SERPL DL<=0.05 MIU/L-ACNC: 1.19 UIU/ML (ref 0.27–4.2)

## 2020-10-09 PROCEDURE — 77373 STRTCTC BDY RAD THER TX DLVR: CPT | Performed by: RADIOLOGY

## 2020-10-09 PROCEDURE — 84439 ASSAY OF FREE THYROXINE: CPT | Performed by: INTERNAL MEDICINE

## 2020-10-09 PROCEDURE — 84443 ASSAY THYROID STIM HORMONE: CPT | Performed by: INTERNAL MEDICINE

## 2020-10-09 PROCEDURE — 99215 OFFICE O/P EST HI 40 MIN: CPT | Performed by: INTERNAL MEDICINE

## 2020-10-09 PROCEDURE — 36415 COLL VENOUS BLD VENIPUNCTURE: CPT | Performed by: INTERNAL MEDICINE

## 2020-10-09 NOTE — PROGRESS NOTES
Subjective     CHIEF COMPLAINT:      Chief Complaint   Patient presents with   • Follow-up     discuss back/lt hip and rt leg pain       HISTORY OF PRESENT ILLNESS:     Ina Akers is a 49 y.o. female patient who returns today for follow up on her metastatic cancer to bone.  She reports worsening of the pain in the back.  She also has pain in the right hip.  Due to worsening of the pain, she contacted the orthopedic surgeon and she will see him on 10/14/2020.    Patient tolerated the rib biopsy well.  She initially had pain but the pain has subsided.    REVIEW OF SYSTEMS:  Review of Systems   Constitutional: Negative for fatigue, fever and unexpected weight change.   HENT: Negative for nosebleeds and voice change.    Eyes: Negative for visual disturbance.   Respiratory: Negative for cough and shortness of breath.    Cardiovascular: Negative for chest pain and leg swelling.   Gastrointestinal: Negative for abdominal pain, blood in stool, constipation, diarrhea, nausea and vomiting.   Genitourinary: Negative for frequency and hematuria.   Musculoskeletal: Positive for back pain. Negative for joint swelling.   Skin: Negative for rash.   Neurological: Negative for dizziness and headaches.   Hematological: Negative for adenopathy. Does not bruise/bleed easily.   Psychiatric/Behavioral: Negative for dysphoric mood. The patient is not nervous/anxious.      I reviewed and verified the CC and ROS obtained by the MA.     Past Medical History:   Diagnosis Date   • Anemia     History of recurent iron deficiency   • Cancer (CMS/HCC) 10/2001    Left breast   • Compression fracture of fourth lumbar vertebra (CMS/HCC)    • Diabetes mellitus (CMS/HCC)    • H/O Thyroid goiter    • History of gastritis    • History of IBS    • History of tinea corporis    • Hypertension    • PONV (postoperative nausea and vomiting)        Past Surgical History:   Procedure Laterality Date   • BREAST SURGERY      PARTIAL LEFT MASTECTOMY   •   SECTION     • KYPHOPLASTY N/A 2020    Procedure: Lumbar 4  osteo cool radiofrequency ablation and KYPHOPLASTY;  Surgeon: Vik Rios MD;  Location: [facility] ;  Service: Neurosurgery;  Laterality: N/A;   • THYROID BIOPSY Left 2018       Cancer-related family history is not on file.  Social History     Tobacco Use   • Smoking status: Never Smoker   • Smokeless tobacco: Never Used   Substance Use Topics   • Alcohol use: Yes     Comment: Socially       MEDICATIONS:    Current Outpatient Medications:   •  acetaminophen (TYLENOL) 500 MG tablet, Take 1,000 mg by mouth Every 6 (Six) Hours As Needed for Mild Pain ., Disp: , Rfl:   •  amLODIPine (NORVASC) 5 MG tablet, TAKE ONE TABLET BY MOUTH DAILY, Disp: 90 tablet, Rfl: 1  •  atorvastatin (LIPITOR) 10 MG tablet, Take 1 tablet by mouth Daily., Disp: 90 tablet, Rfl: 3  •  Blood Glucose Monitoring Suppl w/Device kit, Check blood sugar twice a day, Disp: 1 each, Rfl: 0  •  cholecalciferol (VITAMIN D3) 25 MCG (1000 UT) tablet, Take 1,000 Units by mouth Daily., Disp: , Rfl:   •  Docusate Calcium (STOOL SOFTENER PO), Take  by mouth As Needed. As needed, Disp: , Rfl:   •  erythromycin (ROMYCIN) 5 MG/GM ophthalmic ointment, Apply  to eye(s) as directed by provider Every 4 (Four) Hours While Awake., Disp: 1 each, Rfl: 0  •  ferrous sulfate 325 (65 FE) MG tablet, Take 325 mg by mouth Daily With Breakfast., Disp: , Rfl:   •  Glucose Blood (BLOOD GLUCOSE TEST) strip, Check blood sugar twice a day., Disp: 100 each, Rfl: 1  •  Hydrocortisone, Perianal, (ANUSOL-HC) 2.5 % rectal cream, Insert  into the rectum 3 (Three) Times a Day As Needed for Hemorrhoids., Disp: 60 g, Rfl: 2  •  Lancets (ACCU-CHEK SOFT TOUCH) lancets, Check blood sugar twice a day as needed, Disp: 100 each, Rfl: 1  •  lansoprazole (PREVACID) 30 MG capsule, Take 1 capsule by mouth Daily., Disp: 90 capsule, Rfl: 3  •  levothyroxine (SYNTHROID, LEVOTHROID) 50 MCG tablet, TAKE ONE TABLET BY  "MOUTH DAILY, Disp: 90 tablet, Rfl: 0  •  losartan (COZAAR) 50 MG tablet, TAKE ONE TABLET BY MOUTH DAILY, Disp: 90 tablet, Rfl: 0  •  metFORMIN ER (GLUCOPHAGE-XR) 500 MG 24 hr tablet, TAKE TWO TABLETS BY MOUTH DAILY WITH DINNER, Disp: 180 tablet, Rfl: 1  •  triamcinolone (KENALOG) 0.1 % cream, Apply  topically to the appropriate area as directed 2 (Two) Times a Day. Use sparingly avoid face, Disp: 60 g, Rfl: 1    ALLERGIES:  No Known Allergies      Objective   VITAL SIGNS:     Vitals:    10/09/20 1233   BP: 129/82   Pulse: 95   Resp: 16   Temp: 97.7 °F (36.5 °C)   TempSrc: Temporal   SpO2: 99%   Weight: 83.2 kg (183 lb 8 oz)   Height: 154.9 cm (60.98\")   PainSc:   4   PainLoc: Back  Comment: hip and leg     Body mass index is 34.69 kg/m².     Wt Readings from Last 3 Encounters:   10/09/20 83.2 kg (183 lb 8 oz)   10/06/20 82.1 kg (181 lb)   10/02/20 83.6 kg (184 lb 3.2 oz)       PHYSICAL EXAMINATION:  GENERAL:  The patient appears in fair general condition, not in acute distress.  SKIN: No skin rashes. No ecchymosis.  HEAD:  Normocephalic.  EYES:  No Jaundice. No Pallor. Pupils equal. EOMI.  NECK:  Supple with Good ROM. No Thyromegaly. No Masses.  LYMPHATICS:  No cervical or supraclavicular lymphadenopathy.  CHEST: Normal respiratory effort.  Biopsy site is well-healed..  No hematoma.  CARDIAC:   No edema.  ABDOMEN:  Soft. No tenderness. No Hepatomegaly. No Splenomegaly. No masses.  EXTREMITIES: Ambulating with the use of a cane  NEUROLOGICAL:  No Focal neurological deficits.       DIAGNOSTIC DATA:     Results from last 7 days   Lab Units 10/06/20  0707   PLATELETS 10*3/mm3 431         Results from last 7 days   Lab Units 10/06/20  0707   INR  1.0     Pathology exam from 10/6/2020:  Preliminary Diagnosis   1. Chest Wall, Right, CT-Guided Core Needle Biopsy:               A. INVOLVED BY CLEAR CELL NEOPLASM (SEE COMMENT).    Preliminary result electronically signed by Nila Araya MD on 10/9/2020 at 0843   Comment P "    Additional immunohistochemical studies are pending with a final report to follow upon receipt of those additional studies.      This case was preliminarily discussed with Dr. Muñoz via telephone conversation on 10/9/2020 at 8:35 am by Dr. Nila Araya.          Assessment/Plan   *Left breast cancer, stage IIB, triple negative.   · S/P lumpectomy  · She received adjuvant chemotherapy on the NSABP B34 with AC followed by Taxotere.  · She received post-lumpectomy radiation therapy.   · She was placed on tamoxifen for 5 years and it was completed in January 2007.    · Bilateral mammograms from 8/14/2020 were benign.       *Bone metastasis.  · Patient was found on MRI on 8/27/2020 to have an L4 lesion resulting in pathologic compression fracture.  · Bone scan on 9/2/2020 revealed increased activity at L4 and left side of the skull.  · PET scan on 9/9/2020 revealed abnormal activity at L4, right femoral neck, lower sacrum, left ischium and right ninth medial aspect.  · Paraprotein studies showed no evidence of M protein or elevated free light chains.  · PET scan showed no evidence of visceral metastasis.  · Patient had kyphoplasty on 9/17/2020.  · Pathology exam of the specimen obtained from L4 was nondiagnostic.  · Patient underwent CT-guided biopsy of the lesion in the right posterior ninth rib on 10/6/2020.   · Pathology exam revealed clear cell neoplasm.  It is positive for PAX 8 and TTF-1.  Those findings are arguing against metastatic breast cancer and favor metastatic thyroid cancer.    *History of thyroid lesion.  She had right thyroid lobectomy on 12/17/2018.  The specimen was sent for outside consultation and the final conclusion was that there was no evidence of malignancy.    The finding of metastatic clear cell carcinoma is concerning for the thyroid being the primary.    *Bone scan revealed a lesion in the skull in the left parietal area seen on bone scan on 9/2/2020.  MRI of the brain on 9/3/2020  confirmed the findings.  Patient is not having headaches.    * Iron deficiency anemia.  Patient is on ferrous sulfate 325 mg daily. Hemoglobin decreased to 10.6 on 9/3/2020.  The worsening was explained by anemia of malignancy.      PLAN:    1.  I had a lengthy discussion with the patient.  I explained the pathology findings to her.  2.  I discussed the case with the pathologist, Dr. Araya.  We will obtain the slides from the right thyroid lobectomy surgery from 12/17/2018 so the pathologist at [facility] can review.  3.  Obtain T4, TSH and thyroglobulin level.  4.  I recommended referral to the thyroid cancer clinic at [facility] to see Dr. Garcia.  Patient agreed to proceed.  5.  Patient will see Dr. Connell on 10/14/2020.  If no additional tissue is needed, he can proceed with pinning of the right femoral neck and this will be followed by radiation therapy when she is cleared by him.  I will update Dr. Diaz about the status of the pathology exam of the current specimen.  6.  Follow-up in 3 weeks.  We will schedule to receive the next dose of Zometa.            Tim Muñoz MD  10/09/20

## 2020-10-09 NOTE — PROGRESS NOTES
"  Physician Stereotactic Management Note    Diagnosis:   Malignant neoplasm of upper-outer quadrant of left breast in female, estrogen receptor negative (CMS/HCC)    Bone metastasis (CMS/HCC)    Doing well pretreatment, no problems.    Treatment Number and Dose:fx 5/5 30Gy lumbar spine    Notes on treatment course, films, medical progress and plan:    Doing well. Tolerated treatment without difficulty. No problems or questions.    Subjective     I was personally present at the machine for the delivery of the above treatment.     I provided direct supervision of the patient's set up, treatment parameters and image guidance. I provided corrections and approval of the above prior to the treatment, in real time. I was present for any adjustments required due to patient motion, target movement or equipment issues.    The ongoing images for localization, tumor tracking, gating and beam's eye view localization images will also be approved.     Problem added:  None  Medications added:   None  Ancillary referrals made: None    I have reviewed and marked as \"reviewed\" the current medications, allergies and problem list in the patients EMR.          "

## 2020-10-09 NOTE — PROGRESS NOTES
Oncology Social Work  Radiation Center - [facility]    OSW met with patient today to provide information on how to apply for SSD - Social Security Disability.  Patient is still working and hopes to work as long as she can.  Also provided information on how to notarize will.     Encourage patient to follow up with me if future needs arise  Tia Florian, ANTONYW, CSW, OSW_C

## 2020-10-12 ENCOUNTER — LAB REQUISITION (OUTPATIENT)
Dept: LAB | Facility: HOSPITAL | Age: 50
End: 2020-10-12

## 2020-10-12 DIAGNOSIS — Z00.00 ENCOUNTER FOR GENERAL ADULT MEDICAL EXAMINATION WITHOUT ABNORMAL FINDINGS: ICD-10-CM

## 2020-10-12 NOTE — PROGRESS NOTES
RADIATION THERAPY TREATMENT SUMMARY  Patient: Ina Akers  YOB: 1970  Diagnosis: Malignant neoplasm of upper-outer quadrant of left breast in female, estrogen receptor negative (CMS/HCC)     Ina Akers has recently completed the course of radiation therapy prescribed for the above-mentioned diagnosis.  Below, please find the specifics of the course of treatment delivered:    Radiation Details:    Tx Start Date  9/29/2020   Tx End date  10/9/2020   Intent   Palliative   Total Treatments 5    Prescription Name SBRT L4  Site   Lumbar Spine  Primary/Boost  PRIMARY  Dose Per Fraction 600 cGy/Frac  Number of Fractions 5  Prescribe To  Volume PTV_High  3000 cGy  600 cGy/Frac  Mode    Photon  Technique  SBRT VMAT  Frequency  3 TIMES A WEEK  Energy   6FFF  Prescription Note 100% covers 90% PTV High  VMAT            Tolerance and Toxicities: she tolerated the treatments well , requiring only the planned breaks between treatments. she completed the treatments in 10 elapsed days. She met with the orthopaedic surgeon and he would like to hold off on surgery and radiation for now to the femur and re-evaluate in one month.    Follow-up Plans:  I have asked the patient to return to see me in approximately 4 weeks .

## 2020-10-13 ENCOUNTER — TELEPHONE (OUTPATIENT)
Dept: ONCOLOGY | Facility: CLINIC | Age: 50
End: 2020-10-13

## 2020-10-13 LAB
CYTO UR: NORMAL
DX PRELIMINARY: NORMAL
LAB AP CASE REPORT: NORMAL
LAB AP CLINICAL INFORMATION: NORMAL
LAB AP DIAGNOSIS COMMENT: NORMAL
LAB AP SPECIAL STAINS: NORMAL
PATH REPORT.FINAL DX SPEC: NORMAL
PATH REPORT.GROSS SPEC: NORMAL

## 2020-10-13 NOTE — TELEPHONE ENCOUNTER
Caller: GÉNESIS    Relationship to patient: [facility]    Best call back number: [phone]    DR LORD IS REFERRING THE PT TO THEIR PRACTICE. THEY STILL NEED PATHOLOGY, OP NOTES, LABS, IMAGING, AND OFFICE NOTES FAXED OVER TO THEIR OFFICE    FAX: [phone]

## 2020-10-14 ENCOUNTER — PREP FOR SURGERY (OUTPATIENT)
Dept: OTHER | Facility: HOSPITAL | Age: 50
End: 2020-10-14

## 2020-10-14 DIAGNOSIS — M84.451A: Primary | ICD-10-CM

## 2020-10-14 RX ORDER — CEFAZOLIN SODIUM 2 G/100ML
2 INJECTION, SOLUTION INTRAVENOUS ONCE
Status: CANCELLED | OUTPATIENT
Start: 2020-10-23 | End: 2020-10-14

## 2020-10-15 LAB — THYROGLOB SERPL-MCNC: 46 NG/ML

## 2020-10-19 NOTE — H&P (VIEW-ONLY)
TORIBIO MCGHEE is a 49F presents for follow up of a RIGHT femoral neck lesion and LEFT hip pain.  She was diagnosed with breast cancer in 2001. At that time she underwent partial mastectomy, chemo and radiaiton. She finished this treatment in 2002. A recent brain MRI showed mets to the dura and a recent PET scan showed bony metastatic lesions to L4, left ischium, sacrum at the S1 foramen, 9th rib, and the right femoral neck. On 10/6 she underwent a CT guided biopsy of the right 9th rib. Biopsy shows metastasis from thyroid cancer. She has been referred to the thyroid cancer specialists at the [facility].   She has been having some pain in the posterior hip periodically. It is occurs mostly with prolonged sitting. It does not happen everyday. Denies groin pain. Denies radiating pain. Denies numbness or tingling.   She has also noticed increased pain in the RIGHT thight over the last two weeks.  She has significantly decreased her activity and has started using a cane held in the LEFT hand.  She works as a nurse in the [employer].  Denies any history of MRSA, DVT.  She is here today for discussion of cannulated screw vs gamma nail / DEVENDRA to stabilize the bone, for  Impending pathological fracture , and prepped for radiation therapy.   Review of Systems:  Positive for: Joint Pain.    Patient denies: Abdominal Pain, Bleeding, Chest Pain, Convulsions/Seizure, Decreased Motion, Depression, Difficulty Swallowing, Easy Bruisability, Emotional Disturbances, Eyes or Vision Problems, Fecal Incontinence, Fever/Chills, Headaches, Increased Thirst, Increased Hunger, Insomnia, Nausea/Vomiting, Night Sweats, Poor Balance, Persistent Cough, Rash, Shortness of Breath, Shortness of Breath While Lying down, Skin Problems, Urinary Retention and Weakness.  Allergies:  * no known allergies  Medications:  cvs stool softener capsule (docusate sodium caps)   iron tablet (ferrous sulfate tabs)   cvs lansoprazole capsule  delayed release (lansoprazole cpdr)   metformin hcl tablet (metformin hcl tabs)   losartan potassium-hctz 50-12.5 mg oral tablet (losartan potassium-hctz) ; route: oral  amlodipine besylate 5 mg oral tablet (amlodipine besylate) ; route: oral  atorvastatin calcium tablet (atorvastatin calcium tabs)   Patient History of:  HYPERLIPIDEMIA  THYROID DISEASE  HYPERTENSION  DIABETES - TYPE 2  CANCER - BONE  BLOOD CLOTS/EMBOLISM - NEGATIVE  CANCER - BREAST  Surgical History:  bisopy of 9th rib-   Spine-   L4-   Thyroidectomy-[[id number]]   Known Family History of:  hypertension/mother  diabetes-mother  cancer-father  Social History:  Social history taken on 10/14/2020 states CARRIE GALVAN is a  49 year old female.  She has never used tobacco products.      Past medical, social, family histories and ROS reviewed today with the patient and changes documented in the chart (10/14/2020).  Referring Dr. USAMA LORD MD  PCP Dr. EPLEY APRN, JAMES    Physical Exam  Height:  61 in.    Weight:  181.0 lbs.     BMI:  34.32      Gait: normal               Ambulation: patient ambulates without assistive devices      Mental/HEENT/Cardio/Skin  The patient's general appearance was well nourished, well hydrated, no acute distress.  Orientation was alert and oritented x 3.  The patient's mood was normal.  A head exam revealed normocephalic/atraumatic.  An eye exam revealed pupils equal.  Pulmonary exam shows normal air exchange, no labored breathing, or shortness of breath.  A skin exam showed normal temperature and color in the area of examination.      Right Hip/Pelvis  Neutral alignment.  No atrophy.  There is no effusion.  No venous status deformatitus.  There is no warmth.  There is no erythema.  The leg lengths are equal.  Skin is normal: Skin was normal..  Neurovascular status is intact.  Sensation in hip is normal.  Capillary Refill is normal.  Reflexes are normal.    mild pain with stinchfield.    ROM  Internal  Rotation: >30  External Rotation: >80  Abduction: >45  Adduction: >25  Flexion: >100  Extension: >40    Strength  Quad: normal  Abduction: normal  Adduction: normal  Flexion: normal  Extension: normal    Left Hip/Pelvis    Neutral alignment.  No atrophy.  There is no effusion.  No venous status deformatitus.  There is no warmth.  There is no erythema.  The leg lengths are equal.  Skin is normal: Skin was normal..  Neurovascular status is intact.  Sensation in hip is normal.  Capillary Refill is normal.  Reflexes are normal.     ROM  Internal Rotation: >30  External Rotation: >80  Abduction: >45  Adduction: >25  Flexion: >100  Extension: >40    Strength  Quad: normal  Abduction: normal  Adduction: normal  Flexion: normal  Extension: normal        Imaging/Diagnostic Studies  X-rays of the Left Hip [AP;AP pelvis;Frog Lateral] were ordered and reviewed today.      X-rays of the left hip/pelvis show no acute changes,no significant abnormalities are identified,no fractures identified, no sign of arthritis andno subchondral cyst.    Impression  Thigh pain  Hip pain  Skeletal metastatic disease - Impending pathological fracture, RIGHT femoral neck  Adult bmi 34.0-34.9    Plan  Options and alternatives were discussed with the patient.   The patient has an impending pathological fracture involving the RIGHT proximal femur.  We have discussed regarding possible cannulated screw fixation/IM nailing/total hip replacement.  She has no symptoms of hip joint involvement.  She is young.  I think the optimal ways to stabilize her with intramedullary nailing.   We will plan for a RIGHT gamma nail to stabilize the femur for upcoming radiation treatments.  The likely risks and benefits of the procedure including but not limited to infection, DVT, pulmonary embolism, malunion, nonunion, post traumatic stiffness, post traumatic arthritis, possibility of injury to nerves, vessels, tendons have been discussed in detail with the patient .  Despite the risks involved the patient would like to proceed.   The surgery is scheduled for at [facility] on October 23, 2020.  She will be a 23 hour observation.  She understands that this is not a definite management for her malignancy.        We discussed the benefits of surgical intervention, as well as alternative treatments.  Potential surgical risks and complications include but are not limited to expected outcomes and the risk that the procedure may not accomplish the desired objective.  Sufficient opportunity was given to discuss the condition and treatment plan with the doctor, and all questions were answered for the patient.  The discussion lasted 30 minutes.  CARRIE agreed to proceed with the surgery.      Carrie should follow up with MATEO ARAUJO MD post op.

## 2020-10-19 NOTE — H&P
TORIBIO MCGHEE is a 49F presents for follow up of a RIGHT femoral neck lesion and LEFT hip pain.  She was diagnosed with breast cancer in 2001. At that time she underwent partial mastectomy, chemo and radiaiton. She finished this treatment in 2002. A recent brain MRI showed mets to the dura and a recent PET scan showed bony metastatic lesions to L4, left ischium, sacrum at the S1 foramen, 9th rib, and the right femoral neck. On 10/6 she underwent a CT guided biopsy of the right 9th rib. Biopsy shows metastasis from thyroid cancer. She has been referred to the thyroid cancer specialists at the [facility].   She has been having some pain in the posterior hip periodically. It is occurs mostly with prolonged sitting. It does not happen everyday. Denies groin pain. Denies radiating pain. Denies numbness or tingling.   She has also noticed increased pain in the RIGHT thight over the last two weeks.  She has significantly decreased her activity and has started using a cane held in the LEFT hand.  She works as a nurse in the [employer].  Denies any history of MRSA, DVT.  She is here today for discussion of cannulated screw vs gamma nail / DEVENDRA to stabilize the bone, for  Impending pathological fracture , and prepped for radiation therapy.   Review of Systems:  Positive for: Joint Pain.    Patient denies: Abdominal Pain, Bleeding, Chest Pain, Convulsions/Seizure, Decreased Motion, Depression, Difficulty Swallowing, Easy Bruisability, Emotional Disturbances, Eyes or Vision Problems, Fecal Incontinence, Fever/Chills, Headaches, Increased Thirst, Increased Hunger, Insomnia, Nausea/Vomiting, Night Sweats, Poor Balance, Persistent Cough, Rash, Shortness of Breath, Shortness of Breath While Lying down, Skin Problems, Urinary Retention and Weakness.  Allergies:  * no known allergies  Medications:  cvs stool softener capsule (docusate sodium caps)   iron tablet (ferrous sulfate tabs)   cvs lansoprazole capsule  delayed release (lansoprazole cpdr)   metformin hcl tablet (metformin hcl tabs)   losartan potassium-hctz 50-12.5 mg oral tablet (losartan potassium-hctz) ; route: oral  amlodipine besylate 5 mg oral tablet (amlodipine besylate) ; route: oral  atorvastatin calcium tablet (atorvastatin calcium tabs)   Patient History of:  HYPERLIPIDEMIA  THYROID DISEASE  HYPERTENSION  DIABETES - TYPE 2  CANCER - BONE  BLOOD CLOTS/EMBOLISM - NEGATIVE  CANCER - BREAST  Surgical History:  bisopy of 9th rib-   Spine-   L4-   Thyroidectomy-[[id number]]   Known Family History of:  hypertension/mother  diabetes-mother  cancer-father  Social History:  Social history taken on 10/14/2020 states CARRIE GALVAN is a  49 year old female.  She has never used tobacco products.      Past medical, social, family histories and ROS reviewed today with the patient and changes documented in the chart (10/14/2020).  Referring Dr. USAMA LORD MD  PCP Dr. EPLEY APRN, JAMES    Physical Exam  Height:  61 in.    Weight:  181.0 lbs.     BMI:  34.32      Gait: normal               Ambulation: patient ambulates without assistive devices      Mental/HEENT/Cardio/Skin  The patient's general appearance was well nourished, well hydrated, no acute distress.  Orientation was alert and oritented x 3.  The patient's mood was normal.  A head exam revealed normocephalic/atraumatic.  An eye exam revealed pupils equal.  Pulmonary exam shows normal air exchange, no labored breathing, or shortness of breath.  A skin exam showed normal temperature and color in the area of examination.      Right Hip/Pelvis  Neutral alignment.  No atrophy.  There is no effusion.  No venous status deformatitus.  There is no warmth.  There is no erythema.  The leg lengths are equal.  Skin is normal: Skin was normal..  Neurovascular status is intact.  Sensation in hip is normal.  Capillary Refill is normal.  Reflexes are normal.    mild pain with stinchfield.    ROM  Internal  Rotation: >30  External Rotation: >80  Abduction: >45  Adduction: >25  Flexion: >100  Extension: >40    Strength  Quad: normal  Abduction: normal  Adduction: normal  Flexion: normal  Extension: normal    Left Hip/Pelvis    Neutral alignment.  No atrophy.  There is no effusion.  No venous status deformatitus.  There is no warmth.  There is no erythema.  The leg lengths are equal.  Skin is normal: Skin was normal..  Neurovascular status is intact.  Sensation in hip is normal.  Capillary Refill is normal.  Reflexes are normal.     ROM  Internal Rotation: >30  External Rotation: >80  Abduction: >45  Adduction: >25  Flexion: >100  Extension: >40    Strength  Quad: normal  Abduction: normal  Adduction: normal  Flexion: normal  Extension: normal        Imaging/Diagnostic Studies  X-rays of the Left Hip [AP;AP pelvis;Frog Lateral] were ordered and reviewed today.      X-rays of the left hip/pelvis show no acute changes,no significant abnormalities are identified,no fractures identified, no sign of arthritis andno subchondral cyst.    Impression  Thigh pain  Hip pain  Skeletal metastatic disease - Impending pathological fracture, RIGHT femoral neck  Adult bmi 34.0-34.9    Plan  Options and alternatives were discussed with the patient.   The patient has an impending pathological fracture involving the RIGHT proximal femur.  We have discussed regarding possible cannulated screw fixation/IM nailing/total hip replacement.  She has no symptoms of hip joint involvement.  She is young.  I think the optimal ways to stabilize her with intramedullary nailing.   We will plan for a RIGHT gamma nail to stabilize the femur for upcoming radiation treatments.  The likely risks and benefits of the procedure including but not limited to infection, DVT, pulmonary embolism, malunion, nonunion, post traumatic stiffness, post traumatic arthritis, possibility of injury to nerves, vessels, tendons have been discussed in detail with the patient .  Despite the risks involved the patient would like to proceed.   The surgery is scheduled for at [facility] on October 23, 2020.  She will be a 23 hour observation.  She understands that this is not a definite management for her malignancy.        We discussed the benefits of surgical intervention, as well as alternative treatments.  Potential surgical risks and complications include but are not limited to expected outcomes and the risk that the procedure may not accomplish the desired objective.  Sufficient opportunity was given to discuss the condition and treatment plan with the doctor, and all questions were answered for the patient.  The discussion lasted 30 minutes.  CARRIE agreed to proceed with the surgery.      Carrie should follow up with MATEO ARAUJO MD post op.

## 2020-10-21 ENCOUNTER — HOSPITAL ENCOUNTER (OUTPATIENT)
Dept: GENERAL RADIOLOGY | Facility: HOSPITAL | Age: 50
Discharge: HOME | End: 2020-10-21

## 2020-10-21 ENCOUNTER — APPOINTMENT (OUTPATIENT)
Dept: PREADMISSION TESTING | Facility: HOSPITAL | Age: 50
End: 2020-10-21

## 2020-10-21 VITALS
WEIGHT: 182.9 LBS | DIASTOLIC BLOOD PRESSURE: 91 MMHG | RESPIRATION RATE: 20 BRPM | BODY MASS INDEX: 34.53 KG/M2 | SYSTOLIC BLOOD PRESSURE: 141 MMHG | TEMPERATURE: 98.9 F | OXYGEN SATURATION: 99 % | HEIGHT: 61 IN | HEART RATE: 78 BPM

## 2020-10-21 LAB
ALBUMIN SERPL-MCNC: 4.2 G/DL (ref 3.5–5.2)
ALBUMIN/GLOB SERPL: 1.2 G/DL
ALP SERPL-CCNC: 65 U/L (ref 39–117)
ALT SERPL W P-5'-P-CCNC: 17 U/L (ref 1–33)
ANION GAP SERPL CALCULATED.3IONS-SCNC: 8.7 MMOL/L (ref 5–15)
APTT PPP: 29.4 SECONDS (ref 22.7–35.4)
AST SERPL-CCNC: 16 U/L (ref 1–32)
BILIRUB SERPL-MCNC: 0.3 MG/DL (ref 0–1.2)
BUN SERPL-MCNC: 14 MG/DL (ref 6–20)
BUN/CREAT SERPL: 17.9 (ref 7–25)
CALCIUM SPEC-SCNC: 9.5 MG/DL (ref 8.6–10.5)
CHLORIDE SERPL-SCNC: 104 MMOL/L (ref 98–107)
CO2 SERPL-SCNC: 27.3 MMOL/L (ref 22–29)
CREAT SERPL-MCNC: 0.78 MG/DL (ref 0.57–1)
DEPRECATED RDW RBC AUTO: 43.9 FL (ref 37–54)
ERYTHROCYTE [DISTWIDTH] IN BLOOD BY AUTOMATED COUNT: 14.4 % (ref 12.3–15.4)
GFR SERPL CREATININE-BSD FRML MDRD: 94 ML/MIN/1.73
GLOBULIN UR ELPH-MCNC: 3.4 GM/DL
GLUCOSE SERPL-MCNC: 93 MG/DL (ref 65–99)
HCT VFR BLD AUTO: 33 % (ref 34–46.6)
HGB BLD-MCNC: 10.5 G/DL (ref 12–15.9)
INR PPP: 1.03 (ref 0.9–1.1)
MCH RBC QN AUTO: 26.6 PG (ref 26.6–33)
MCHC RBC AUTO-ENTMCNC: 31.8 G/DL (ref 31.5–35.7)
MCV RBC AUTO: 83.8 FL (ref 79–97)
PLATELET # BLD AUTO: 259 10*3/MM3 (ref 140–450)
PMV BLD AUTO: 8.9 FL (ref 6–12)
POTASSIUM SERPL-SCNC: 4 MMOL/L (ref 3.5–5.2)
PROT SERPL-MCNC: 7.6 G/DL (ref 6–8.5)
PROTHROMBIN TIME: 13.4 SECONDS (ref 11.7–14.2)
RBC # BLD AUTO: 3.94 10*6/MM3 (ref 3.77–5.28)
SODIUM SERPL-SCNC: 140 MMOL/L (ref 136–145)
WBC # BLD AUTO: 3.79 10*3/MM3 (ref 3.4–10.8)

## 2020-10-21 PROCEDURE — 85027 COMPLETE CBC AUTOMATED: CPT | Performed by: ORTHOPAEDIC SURGERY

## 2020-10-21 PROCEDURE — 73502 X-RAY EXAM HIP UNI 2-3 VIEWS: CPT

## 2020-10-21 PROCEDURE — 80053 COMPREHEN METABOLIC PANEL: CPT | Performed by: ORTHOPAEDIC SURGERY

## 2020-10-21 PROCEDURE — 36415 COLL VENOUS BLD VENIPUNCTURE: CPT

## 2020-10-21 PROCEDURE — 85610 PROTHROMBIN TIME: CPT | Performed by: ORTHOPAEDIC SURGERY

## 2020-10-21 PROCEDURE — C9803 HOPD COVID-19 SPEC COLLECT: HCPCS | Performed by: ORTHOPAEDIC SURGERY

## 2020-10-21 PROCEDURE — 71046 X-RAY EXAM CHEST 2 VIEWS: CPT

## 2020-10-21 PROCEDURE — U0004 COV-19 TEST NON-CDC HGH THRU: HCPCS | Performed by: ORTHOPAEDIC SURGERY

## 2020-10-21 PROCEDURE — 85730 THROMBOPLASTIN TIME PARTIAL: CPT | Performed by: ORTHOPAEDIC SURGERY

## 2020-10-21 NOTE — DISCHARGE INSTRUCTIONS
Take the following medications the morning of surgery:  AMLODIPINE, LANSOPRAZOLE, AND LEVOTHYROXINE    ARRIVAL TIME 1000 TO MAIN OR       If you are on prescription narcotic pain medication to control your pain you may also take that medication the morning of surgery.    General Instructions:  • Do not eat solid food after midnight the night before surgery.  • You may drink clear liquids day of surgery but must stop at least one hour before your hospital arrival time - CUTOFF TIME 0900  • It is beneficial for you to have a clear drink that contains carbohydrates the day of surgery.  We suggest a 12 to 20 ounce bottle of Gatorade or Powerade for non-diabetic patients or a 12 to 20 ounce bottle of G2 or Powerade Zero for diabetic patients. (Pediatric patients, are not advised to drink a 12 to 20 ounce carbohydrate drink)    Clear liquids are liquids you can see through.  Nothing red in color.     Plain water                               Sports drinks  Sodas                                   Gelatin (Jell-O)  Fruit juices without pulp such as white grape juice and apple juice  Popsicles that contain no fruit or yogurt  Tea or coffee (no cream or milk added)  Gatorade / Powerade  G2 / Powerade Zero    • Infants may have breast milk up to four hours before surgery.  • Infants drinking formula may drink formula up to six hours before surgery.   • Patients who avoid smoking, chewing tobacco and alcohol for 4 weeks prior to surgery have a reduced risk of post-operative complications.  Quit smoking as many days before surgery as you can.  • Do not smoke, use chewing tobacco or drink alcohol the day of surgery.   • If applicable bring your C-PAP/ BI-PAP machine.  • Bring any papers given to you in the doctor’s office.  • Wear clean comfortable clothes.  • Do not wear contact lenses, false eyelashes or make-up.  Bring a case for your glasses.   • Bring crutches or walker if applicable.  • Remove all piercings.  Leave jewelry  and any other valuables at home.  • Hair extensions with metal clips must be removed prior to surgery.  • The Pre-Admission Testing nurse will instruct you to bring medications if unable to obtain an accurate list in Pre-Admission Testing.        If you were given a blood bank ID arm band remember to bring it with you the day of surgery.    Preventing a Surgical Site Infection:  • For 2 to 3 days before surgery, avoid shaving with a razor because the razor can irritate skin and make it easier to develop an infection.    • Any areas of open skin can increase the risk of a post-operative wound infection by allowing bacteria to enter and travel throughout the body.  Notify your surgeon if you have any skin wounds / rashes even if it is not near the expected surgical site.  The area will need assessed to determine if surgery should be delayed until it is healed.  • The night prior to surgery shower using a fresh bar of anti-bacterial soap (such as Dial) and clean washcloth.  Sleep in a clean bed with clean clothing.  Do not allow pets to sleep with you.  • Shower on the morning of surgery using a fresh bar of anti-bacterial soap (such as Dial) and clean washcloth.  Dry with a clean towel and dress in clean clothing.  • Ask your surgeon if you will be receiving antibiotics prior to surgery.  • Make sure you, your family, and all healthcare providers clean their hands with soap and water or an alcohol based hand  before caring for you or your wound.    Day of surgery:  Your arrival time is approximately two hours before your scheduled surgery time.  Upon arrival, a Pre-op nurse and Anesthesiologist will review your health history, obtain vital signs, and answer questions you may have.  The only belongings needed at this time will be a list of your home medications and if applicable your C-PAP/BI-PAP machine.  If you are staying overnight your family can leave the rest of your belongings in the car and bring them  to your room later.  A Pre-op nurse will start an IV and you may receive medication in preparation for surgery, including something to help you relax.  While you are in surgery your family should notify the waiting room  if they leave the waiting room area and provide a contact phone number.    Please be aware that surgery does come with discomfort.  We want to make every effort to control your discomfort so please discuss any uncontrolled symptoms with your nurse.   Your doctor will most likely have prescribed pain medications.      If you are going home after surgery you will receive individualized written care instructions before being discharged.  A responsible adult must drive you to and from the hospital on the day of your surgery and stay with you for 24 hours.    If you are staying overnight following surgery, you will be transported to your hospital room following the recovery period.  [facility] has all private rooms.    If you have any questions please call Pre-Admission Testing at [phone].  Deductibles and co-payments are collected on the day of service. Please be prepared to pay the required co-pay, deductible or deposit on the day of service as defined by your plan.    Patient Education for Self-Quarantine Process    Following your COVID testing, we strongly recommend that you do not leave your home after you have been tested for COVID except to get medical care. This includes not going to work, school or to public areas.  If this is not possible for you to do please limit your activities to only required outings.  Be sure to wear a mask when you are with other people, practice social distancing and wash your hands frequently.      The following items provide additional details to keep you safe.  • Wash your hands with soap and water frequently for at least 20 seconds.   • Avoid touching your eyes, nose and mouth with unwashed hands.  • Do not share anything - utensils,  towels, food from the same bowl.   • Have your own utensils, drinking glass, dishes, towels and bedding.   • Do not have visitors.   • Do use FaceTime to stay in touch with family and friends.  • You should stay in a specific room away from others if possible.   • Stay at least 6 feet away from others in the home if you cannot have a dedicated room to yourself.   • Do not snuggle with your pet. While the CDC says there is no evidence that pets can spread COVID-19 or be infected from humans, it is probably best to avoid “petting, snuggling, being kissed or licked and sharing food (during self-quarantine)”, according to the CDC.   • Sanitize household surfaces daily. Include all high touch areas (door handles, light switches, phones, countertops, etc.)  • Do not share a bathroom with others, if possible.   • Wear a mask around others in your home if you are unable to stay in a separate room or 6 feet apart. If  you are unable to wear a mask, have your family member wear a mask if they must be within 6 feet of you.   Call your surgeon immediately if you experience any of the following symptoms:  • Sore Throat  • Shortness of Breath or difficulty breathing  • Cough  • Chills  • Body soreness or muscle pain  • Headache  • Fever  • New loss of taste or smell  • Do not arrive for your surgery ill.  Your procedure will need to be rescheduled to another time.  You will need to call your physician before the day of surgery to avoid any unnecessary exposure to hospital staff as well as other patients.

## 2020-10-22 ENCOUNTER — TELEPHONE (OUTPATIENT)
Dept: ONCOLOGY | Facility: CLINIC | Age: 50
End: 2020-10-22

## 2020-10-22 LAB — SARS-COV-2 RNA RESP QL NAA+PROBE: NOT DETECTED

## 2020-10-22 RX ORDER — LEVOTHYROXINE SODIUM 0.05 MG/1
TABLET ORAL
Qty: 90 TABLET | Refills: 1 | Status: SHIPPED | OUTPATIENT
Start: 2020-10-22 | End: 2020-11-25 | Stop reason: HOSPADM

## 2020-10-22 NOTE — TELEPHONE ENCOUNTER
Caller: Tami    Relationship: [employer] alir[employer]     Best call back number: [phone]     What form or medical record are you requesting: Most recent scans, labs and office note    Who is requesting this form or medical record from you: Dr. Demi Webster     How would you like to receive the form or medical records (pick-up, mail, fax): Fax  If fax, what is the fax number: [phone]    Timeframe paperwork needed: Before EOD 10/23    Additional notes: Pt's seeking a second opinion

## 2020-10-23 ENCOUNTER — APPOINTMENT (OUTPATIENT)
Dept: GENERAL RADIOLOGY | Facility: HOSPITAL | Age: 50
End: 2020-10-23

## 2020-10-23 ENCOUNTER — ANESTHESIA (OUTPATIENT)
Dept: PERIOP | Facility: HOSPITAL | Age: 50
End: 2020-10-23

## 2020-10-23 ENCOUNTER — HOSPITAL ENCOUNTER (INPATIENT)
Facility: HOSPITAL | Age: 50
LOS: 1 days | Discharge: HOME HEALTH | End: 2020-10-24
Attending: ORTHOPAEDIC SURGERY | Admitting: ORTHOPAEDIC SURGERY

## 2020-10-23 ENCOUNTER — ANESTHESIA EVENT (OUTPATIENT)
Dept: PERIOP | Facility: HOSPITAL | Age: 50
End: 2020-10-23

## 2020-10-23 DIAGNOSIS — M84.451A: ICD-10-CM

## 2020-10-23 LAB
GLUCOSE BLDC GLUCOMTR-MCNC: 120 MG/DL (ref 70–130)
GLUCOSE BLDC GLUCOMTR-MCNC: 91 MG/DL (ref 70–130)

## 2020-10-23 PROCEDURE — 25010000002 ONDANSETRON PER 1 MG: Performed by: NURSE ANESTHETIST, CERTIFIED REGISTERED

## 2020-10-23 PROCEDURE — 76000 FLUOROSCOPY <1 HR PHYS/QHP: CPT

## 2020-10-23 PROCEDURE — 73502 X-RAY EXAM HIP UNI 2-3 VIEWS: CPT

## 2020-10-23 PROCEDURE — 25010000002 HYDROMORPHONE PER 4 MG: Performed by: NURSE ANESTHETIST, CERTIFIED REGISTERED

## 2020-10-23 PROCEDURE — 25010000002 PHENYLEPHRINE PER 1 ML: Performed by: NURSE ANESTHETIST, CERTIFIED REGISTERED

## 2020-10-23 PROCEDURE — C1713 ANCHOR/SCREW BN/BN,TIS/BN: HCPCS | Performed by: ORTHOPAEDIC SURGERY

## 2020-10-23 PROCEDURE — 88342 IMHCHEM/IMCYTCHM 1ST ANTB: CPT | Performed by: ORTHOPAEDIC SURGERY

## 2020-10-23 PROCEDURE — 25010000003 CEFAZOLIN IN DEXTROSE 2-4 GM/100ML-% SOLUTION: Performed by: ORTHOPAEDIC SURGERY

## 2020-10-23 PROCEDURE — 63710000001 ONDANSETRON PER 8 MG: Performed by: ORTHOPAEDIC SURGERY

## 2020-10-23 PROCEDURE — 25010000002 FENTANYL CITRATE (PF) 100 MCG/2ML SOLUTION: Performed by: NURSE ANESTHETIST, CERTIFIED REGISTERED

## 2020-10-23 PROCEDURE — 73501 X-RAY EXAM HIP UNI 1 VIEW: CPT

## 2020-10-23 PROCEDURE — 25010000002 DEXAMETHASONE PER 1 MG: Performed by: NURSE ANESTHETIST, CERTIFIED REGISTERED

## 2020-10-23 PROCEDURE — 25010000002 PROPOFOL 10 MG/ML EMULSION: Performed by: NURSE ANESTHETIST, CERTIFIED REGISTERED

## 2020-10-23 PROCEDURE — C1769 GUIDE WIRE: HCPCS | Performed by: ORTHOPAEDIC SURGERY

## 2020-10-23 PROCEDURE — 88311 DECALCIFY TISSUE: CPT | Performed by: ORTHOPAEDIC SURGERY

## 2020-10-23 PROCEDURE — 82962 GLUCOSE BLOOD TEST: CPT

## 2020-10-23 PROCEDURE — 25010000002 NEOSTIGMINE PER 0.5 MG: Performed by: NURSE ANESTHETIST, CERTIFIED REGISTERED

## 2020-10-23 PROCEDURE — 25010000002 MIDAZOLAM PER 1 MG: Performed by: ANESTHESIOLOGY

## 2020-10-23 PROCEDURE — 88307 TISSUE EXAM BY PATHOLOGIST: CPT | Performed by: ORTHOPAEDIC SURGERY

## 2020-10-23 PROCEDURE — 0QH634Z INSERTION OF INTERNAL FIXATION DEVICE INTO RIGHT UPPER FEMUR, PERCUTANEOUS APPROACH: ICD-10-PCS | Performed by: ORTHOPAEDIC SURGERY

## 2020-10-23 DEVICE — TROCHANTERIC NAIL KIT, TI
Type: IMPLANTABLE DEVICE | Site: FEMUR | Status: FUNCTIONAL
Brand: GAMMA

## 2020-10-23 DEVICE — LAG SCREW, TI
Type: IMPLANTABLE DEVICE | Site: FEMUR | Status: FUNCTIONAL
Brand: GAMMA

## 2020-10-23 DEVICE — LOCKING SCREW, FULLY THREADED: Type: IMPLANTABLE DEVICE | Site: FEMUR | Status: FUNCTIONAL

## 2020-10-23 RX ORDER — SODIUM CHLORIDE, SODIUM LACTATE, POTASSIUM CHLORIDE, CALCIUM CHLORIDE 600; 310; 30; 20 MG/100ML; MG/100ML; MG/100ML; MG/100ML
9 INJECTION, SOLUTION INTRAVENOUS CONTINUOUS
Status: DISCONTINUED | OUTPATIENT
Start: 2020-10-23 | End: 2020-10-23 | Stop reason: HOSPADM

## 2020-10-23 RX ORDER — SODIUM CHLORIDE 0.9 % (FLUSH) 0.9 %
3-10 SYRINGE (ML) INJECTION AS NEEDED
Status: DISCONTINUED | OUTPATIENT
Start: 2020-10-23 | End: 2020-10-23 | Stop reason: HOSPADM

## 2020-10-23 RX ORDER — SODIUM CHLORIDE 0.9 % (FLUSH) 0.9 %
3 SYRINGE (ML) INJECTION EVERY 12 HOURS SCHEDULED
Status: DISCONTINUED | OUTPATIENT
Start: 2020-10-23 | End: 2020-10-23 | Stop reason: HOSPADM

## 2020-10-23 RX ORDER — PROPOFOL 10 MG/ML
VIAL (ML) INTRAVENOUS AS NEEDED
Status: DISCONTINUED | OUTPATIENT
Start: 2020-10-23 | End: 2020-10-23 | Stop reason: SURG

## 2020-10-23 RX ORDER — FAMOTIDINE 10 MG/ML
20 INJECTION, SOLUTION INTRAVENOUS ONCE
Status: COMPLETED | OUTPATIENT
Start: 2020-10-23 | End: 2020-10-23

## 2020-10-23 RX ORDER — MAGNESIUM HYDROXIDE 1200 MG/15ML
LIQUID ORAL AS NEEDED
Status: DISCONTINUED | OUTPATIENT
Start: 2020-10-23 | End: 2020-10-23 | Stop reason: HOSPADM

## 2020-10-23 RX ORDER — MIDAZOLAM HYDROCHLORIDE 1 MG/ML
1 INJECTION INTRAMUSCULAR; INTRAVENOUS
Status: DISCONTINUED | OUTPATIENT
Start: 2020-10-23 | End: 2020-10-23 | Stop reason: HOSPADM

## 2020-10-23 RX ORDER — CEFAZOLIN SODIUM 2 G/100ML
2 INJECTION, SOLUTION INTRAVENOUS ONCE
Status: COMPLETED | OUTPATIENT
Start: 2020-10-23 | End: 2020-10-23

## 2020-10-23 RX ORDER — NALOXONE HCL 0.4 MG/ML
0.1 VIAL (ML) INJECTION
Status: DISCONTINUED | OUTPATIENT
Start: 2020-10-23 | End: 2020-10-24 | Stop reason: HOSPADM

## 2020-10-23 RX ORDER — SCOLOPAMINE TRANSDERMAL SYSTEM 1 MG/1
1 PATCH, EXTENDED RELEASE TRANSDERMAL CONTINUOUS
Status: DISCONTINUED | OUTPATIENT
Start: 2020-10-23 | End: 2020-10-23 | Stop reason: HOSPADM

## 2020-10-23 RX ORDER — HYDROCODONE BITARTRATE AND ACETAMINOPHEN 5; 325 MG/1; MG/1
2 TABLET ORAL EVERY 4 HOURS PRN
Status: DISCONTINUED | OUTPATIENT
Start: 2020-10-23 | End: 2020-10-23

## 2020-10-23 RX ORDER — LIDOCAINE HYDROCHLORIDE 10 MG/ML
0.5 INJECTION, SOLUTION EPIDURAL; INFILTRATION; INTRACAUDAL; PERINEURAL ONCE AS NEEDED
Status: DISCONTINUED | OUTPATIENT
Start: 2020-10-23 | End: 2020-10-23 | Stop reason: HOSPADM

## 2020-10-23 RX ORDER — HYDRALAZINE HYDROCHLORIDE 20 MG/ML
5 INJECTION INTRAMUSCULAR; INTRAVENOUS
Status: DISCONTINUED | OUTPATIENT
Start: 2020-10-23 | End: 2020-10-23 | Stop reason: HOSPADM

## 2020-10-23 RX ORDER — FENTANYL CITRATE 50 UG/ML
INJECTION, SOLUTION INTRAMUSCULAR; INTRAVENOUS AS NEEDED
Status: DISCONTINUED | OUTPATIENT
Start: 2020-10-23 | End: 2020-10-23 | Stop reason: SURG

## 2020-10-23 RX ORDER — FLUMAZENIL 0.1 MG/ML
0.2 INJECTION INTRAVENOUS AS NEEDED
Status: DISCONTINUED | OUTPATIENT
Start: 2020-10-23 | End: 2020-10-23 | Stop reason: HOSPADM

## 2020-10-23 RX ORDER — ROCURONIUM BROMIDE 10 MG/ML
INJECTION, SOLUTION INTRAVENOUS AS NEEDED
Status: DISCONTINUED | OUTPATIENT
Start: 2020-10-23 | End: 2020-10-23 | Stop reason: SURG

## 2020-10-23 RX ORDER — NALOXONE HCL 0.4 MG/ML
0.2 VIAL (ML) INJECTION AS NEEDED
Status: DISCONTINUED | OUTPATIENT
Start: 2020-10-23 | End: 2020-10-23 | Stop reason: HOSPADM

## 2020-10-23 RX ORDER — DIPHENHYDRAMINE HCL 25 MG
25 CAPSULE ORAL
Status: DISCONTINUED | OUTPATIENT
Start: 2020-10-23 | End: 2020-10-23 | Stop reason: HOSPADM

## 2020-10-23 RX ORDER — HYDROCODONE BITARTRATE AND ACETAMINOPHEN 7.5; 325 MG/1; MG/1
1 TABLET ORAL EVERY 4 HOURS PRN
Status: DISCONTINUED | OUTPATIENT
Start: 2020-10-23 | End: 2020-10-23

## 2020-10-23 RX ORDER — LIDOCAINE HYDROCHLORIDE 20 MG/ML
INJECTION, SOLUTION INFILTRATION; PERINEURAL AS NEEDED
Status: DISCONTINUED | OUTPATIENT
Start: 2020-10-23 | End: 2020-10-23 | Stop reason: SURG

## 2020-10-23 RX ORDER — ONDANSETRON 2 MG/ML
4 INJECTION INTRAMUSCULAR; INTRAVENOUS ONCE AS NEEDED
Status: COMPLETED | OUTPATIENT
Start: 2020-10-23 | End: 2020-10-23

## 2020-10-23 RX ORDER — OXYCODONE HYDROCHLORIDE AND ACETAMINOPHEN 5; 325 MG/1; MG/1
1 TABLET ORAL EVERY 4 HOURS PRN
Status: DISCONTINUED | OUTPATIENT
Start: 2020-10-23 | End: 2020-10-24 | Stop reason: HOSPADM

## 2020-10-23 RX ORDER — PROMETHAZINE HYDROCHLORIDE 25 MG/1
25 TABLET ORAL ONCE AS NEEDED
Status: DISCONTINUED | OUTPATIENT
Start: 2020-10-23 | End: 2020-10-23 | Stop reason: HOSPADM

## 2020-10-23 RX ORDER — ONDANSETRON 4 MG/1
4 TABLET, FILM COATED ORAL EVERY 6 HOURS PRN
Status: DISCONTINUED | OUTPATIENT
Start: 2020-10-23 | End: 2020-10-24 | Stop reason: HOSPADM

## 2020-10-23 RX ORDER — LEVOTHYROXINE SODIUM 0.05 MG/1
50 TABLET ORAL
Status: DISCONTINUED | OUTPATIENT
Start: 2020-10-24 | End: 2020-10-24 | Stop reason: HOSPADM

## 2020-10-23 RX ORDER — ONDANSETRON 2 MG/ML
INJECTION INTRAMUSCULAR; INTRAVENOUS AS NEEDED
Status: DISCONTINUED | OUTPATIENT
Start: 2020-10-23 | End: 2020-10-23 | Stop reason: SURG

## 2020-10-23 RX ORDER — HYDROCODONE BITARTRATE AND ACETAMINOPHEN 7.5; 325 MG/1; MG/1
1 TABLET ORAL ONCE AS NEEDED
Status: DISCONTINUED | OUTPATIENT
Start: 2020-10-23 | End: 2020-10-23 | Stop reason: HOSPADM

## 2020-10-23 RX ORDER — FENTANYL CITRATE 50 UG/ML
50 INJECTION, SOLUTION INTRAMUSCULAR; INTRAVENOUS
Status: DISCONTINUED | OUTPATIENT
Start: 2020-10-23 | End: 2020-10-23 | Stop reason: HOSPADM

## 2020-10-23 RX ORDER — DOCUSATE SODIUM 100 MG/1
100 CAPSULE, LIQUID FILLED ORAL 2 TIMES DAILY
Status: DISCONTINUED | OUTPATIENT
Start: 2020-10-23 | End: 2020-10-24 | Stop reason: HOSPADM

## 2020-10-23 RX ORDER — HYDROMORPHONE HYDROCHLORIDE 1 MG/ML
0.5 INJECTION, SOLUTION INTRAMUSCULAR; INTRAVENOUS; SUBCUTANEOUS
Status: DISCONTINUED | OUTPATIENT
Start: 2020-10-23 | End: 2020-10-23 | Stop reason: HOSPADM

## 2020-10-23 RX ORDER — DEXAMETHASONE SODIUM PHOSPHATE 10 MG/ML
INJECTION INTRAMUSCULAR; INTRAVENOUS AS NEEDED
Status: DISCONTINUED | OUTPATIENT
Start: 2020-10-23 | End: 2020-10-23 | Stop reason: SURG

## 2020-10-23 RX ORDER — DIPHENHYDRAMINE HYDROCHLORIDE 50 MG/ML
12.5 INJECTION INTRAMUSCULAR; INTRAVENOUS
Status: DISCONTINUED | OUTPATIENT
Start: 2020-10-23 | End: 2020-10-23 | Stop reason: HOSPADM

## 2020-10-23 RX ORDER — EPHEDRINE SULFATE 50 MG/ML
5 INJECTION, SOLUTION INTRAVENOUS ONCE AS NEEDED
Status: DISCONTINUED | OUTPATIENT
Start: 2020-10-23 | End: 2020-10-23 | Stop reason: HOSPADM

## 2020-10-23 RX ORDER — LOSARTAN POTASSIUM 50 MG/1
50 TABLET ORAL DAILY
Status: DISCONTINUED | OUTPATIENT
Start: 2020-10-23 | End: 2020-10-24 | Stop reason: HOSPADM

## 2020-10-23 RX ORDER — FERROUS SULFATE 325(65) MG
325 TABLET ORAL
Status: DISCONTINUED | OUTPATIENT
Start: 2020-10-24 | End: 2020-10-24 | Stop reason: HOSPADM

## 2020-10-23 RX ORDER — LABETALOL HYDROCHLORIDE 5 MG/ML
5 INJECTION, SOLUTION INTRAVENOUS
Status: DISCONTINUED | OUTPATIENT
Start: 2020-10-23 | End: 2020-10-23 | Stop reason: HOSPADM

## 2020-10-23 RX ORDER — PANTOPRAZOLE SODIUM 40 MG/1
40 TABLET, DELAYED RELEASE ORAL EVERY MORNING
Status: DISCONTINUED | OUTPATIENT
Start: 2020-10-24 | End: 2020-10-24 | Stop reason: HOSPADM

## 2020-10-23 RX ORDER — OXYCODONE AND ACETAMINOPHEN 7.5; 325 MG/1; MG/1
1 TABLET ORAL ONCE AS NEEDED
Status: DISCONTINUED | OUTPATIENT
Start: 2020-10-23 | End: 2020-10-23 | Stop reason: HOSPADM

## 2020-10-23 RX ORDER — PROMETHAZINE HYDROCHLORIDE 25 MG/1
25 SUPPOSITORY RECTAL ONCE AS NEEDED
Status: DISCONTINUED | OUTPATIENT
Start: 2020-10-23 | End: 2020-10-23 | Stop reason: HOSPADM

## 2020-10-23 RX ORDER — GLYCOPYRROLATE 0.2 MG/ML
INJECTION INTRAMUSCULAR; INTRAVENOUS AS NEEDED
Status: DISCONTINUED | OUTPATIENT
Start: 2020-10-23 | End: 2020-10-23 | Stop reason: SURG

## 2020-10-23 RX ORDER — HYDROMORPHONE HYDROCHLORIDE 1 MG/ML
0.5 INJECTION, SOLUTION INTRAMUSCULAR; INTRAVENOUS; SUBCUTANEOUS
Status: DISCONTINUED | OUTPATIENT
Start: 2020-10-23 | End: 2020-10-24 | Stop reason: HOSPADM

## 2020-10-23 RX ORDER — AMLODIPINE BESYLATE 5 MG/1
5 TABLET ORAL DAILY
Status: DISCONTINUED | OUTPATIENT
Start: 2020-10-24 | End: 2020-10-24 | Stop reason: HOSPADM

## 2020-10-23 RX ORDER — METFORMIN HYDROCHLORIDE 500 MG/1
1000 TABLET, EXTENDED RELEASE ORAL
Status: DISCONTINUED | OUTPATIENT
Start: 2020-10-23 | End: 2020-10-24 | Stop reason: HOSPADM

## 2020-10-23 RX ORDER — CEFAZOLIN SODIUM 2 G/100ML
2 INJECTION, SOLUTION INTRAVENOUS EVERY 8 HOURS
Status: COMPLETED | OUTPATIENT
Start: 2020-10-23 | End: 2020-10-24

## 2020-10-23 RX ORDER — ONDANSETRON 2 MG/ML
4 INJECTION INTRAMUSCULAR; INTRAVENOUS EVERY 6 HOURS PRN
Status: DISCONTINUED | OUTPATIENT
Start: 2020-10-23 | End: 2020-10-24 | Stop reason: HOSPADM

## 2020-10-23 RX ADMIN — LIDOCAINE HYDROCHLORIDE 60 MG: 20 INJECTION, SOLUTION INFILTRATION; PERINEURAL at 13:21

## 2020-10-23 RX ADMIN — ONDANSETRON HYDROCHLORIDE 4 MG: 2 INJECTION, SOLUTION INTRAMUSCULAR; INTRAVENOUS at 16:39

## 2020-10-23 RX ADMIN — HYDROMORPHONE HYDROCHLORIDE 0.5 MG: 1 INJECTION, SOLUTION INTRAMUSCULAR; INTRAVENOUS; SUBCUTANEOUS at 14:50

## 2020-10-23 RX ADMIN — NEOSTIGMINE METHYLSULFATE 5 MG: 1 INJECTION INTRAMUSCULAR; INTRAVENOUS; SUBCUTANEOUS at 14:25

## 2020-10-23 RX ADMIN — MIDAZOLAM 1 MG: 1 INJECTION INTRAMUSCULAR; INTRAVENOUS at 12:07

## 2020-10-23 RX ADMIN — PROPOFOL 25 MCG/KG/MIN: 10 INJECTION, EMULSION INTRAVENOUS at 13:28

## 2020-10-23 RX ADMIN — OXYCODONE HYDROCHLORIDE AND ACETAMINOPHEN 1 TABLET: 5; 325 TABLET ORAL at 23:30

## 2020-10-23 RX ADMIN — GLYCOPYRROLATE 0.8 MG: 0.2 INJECTION INTRAMUSCULAR; INTRAVENOUS at 14:25

## 2020-10-23 RX ADMIN — CEFAZOLIN SODIUM 2 G: 2 INJECTION, SOLUTION INTRAVENOUS at 13:30

## 2020-10-23 RX ADMIN — PROPOFOL 150 MG: 10 INJECTION, EMULSION INTRAVENOUS at 13:21

## 2020-10-23 RX ADMIN — SCOPALAMINE 1 PATCH: 1 PATCH, EXTENDED RELEASE TRANSDERMAL at 12:07

## 2020-10-23 RX ADMIN — FENTANYL CITRATE 50 MCG: 50 INJECTION, SOLUTION INTRAMUSCULAR; INTRAVENOUS at 15:50

## 2020-10-23 RX ADMIN — ONDANSETRON HYDROCHLORIDE 4 MG: 4 TABLET, FILM COATED ORAL at 23:30

## 2020-10-23 RX ADMIN — FENTANYL CITRATE 50 MCG: 50 INJECTION, SOLUTION INTRAMUSCULAR; INTRAVENOUS at 15:15

## 2020-10-23 RX ADMIN — ROCURONIUM BROMIDE 40 MG: 10 INJECTION INTRAVENOUS at 13:21

## 2020-10-23 RX ADMIN — CEFAZOLIN SODIUM 2 G: 2 INJECTION, SOLUTION INTRAVENOUS at 20:52

## 2020-10-23 RX ADMIN — FAMOTIDINE 20 MG: 10 INJECTION INTRAVENOUS at 12:06

## 2020-10-23 RX ADMIN — FENTANYL CITRATE 50 MCG: 50 INJECTION INTRAMUSCULAR; INTRAVENOUS at 13:16

## 2020-10-23 RX ADMIN — DOCUSATE SODIUM 100 MG: 100 CAPSULE ORAL at 20:52

## 2020-10-23 RX ADMIN — SODIUM CHLORIDE, POTASSIUM CHLORIDE, SODIUM LACTATE AND CALCIUM CHLORIDE 9 ML/HR: 600; 310; 30; 20 INJECTION, SOLUTION INTRAVENOUS at 12:07

## 2020-10-23 RX ADMIN — ONDANSETRON HYDROCHLORIDE 4 MG: 2 SOLUTION INTRAMUSCULAR; INTRAVENOUS at 14:18

## 2020-10-23 RX ADMIN — OXYCODONE HYDROCHLORIDE AND ACETAMINOPHEN 1 TABLET: 5; 325 TABLET ORAL at 19:16

## 2020-10-23 RX ADMIN — FENTANYL CITRATE 50 MCG: 50 INJECTION INTRAMUSCULAR; INTRAVENOUS at 13:46

## 2020-10-23 RX ADMIN — HYDROMORPHONE HYDROCHLORIDE 0.5 MG: 1 INJECTION, SOLUTION INTRAMUSCULAR; INTRAVENOUS; SUBCUTANEOUS at 15:00

## 2020-10-23 RX ADMIN — PHENYLEPHRINE HYDROCHLORIDE 100 MCG: 10 INJECTION INTRAVENOUS at 13:36

## 2020-10-23 RX ADMIN — HYDROMORPHONE HYDROCHLORIDE 0.5 MG: 1 INJECTION, SOLUTION INTRAMUSCULAR; INTRAVENOUS; SUBCUTANEOUS at 15:55

## 2020-10-23 RX ADMIN — FENTANYL CITRATE 50 MCG: 50 INJECTION, SOLUTION INTRAMUSCULAR; INTRAVENOUS at 14:55

## 2020-10-23 RX ADMIN — DEXAMETHASONE SODIUM PHOSPHATE 8 MG: 10 INJECTION INTRAMUSCULAR; INTRAVENOUS at 13:31

## 2020-10-23 RX ADMIN — FENTANYL CITRATE 50 MCG: 50 INJECTION, SOLUTION INTRAMUSCULAR; INTRAVENOUS at 14:45

## 2020-10-23 RX ADMIN — HYDROMORPHONE HYDROCHLORIDE 0.5 MG: 1 INJECTION, SOLUTION INTRAMUSCULAR; INTRAVENOUS; SUBCUTANEOUS at 15:20

## 2020-10-23 NOTE — PLAN OF CARE
Problem: Adult Inpatient Plan of Care  Goal: Plan of Care Review  Outcome: Ongoing, Progressing  Flowsheets  Taken 10/23/2020 1752 by Afia Butler, RN  Progress: no change  Outcome Summary: pt to unit from pacu SP IM nailing for pathalogical fx. pt drowsy. no pain medication given at this time. VSS.  at bedside DTV. no sticks to L Arm. Pt plans to D/C home with HH in am. Educated on the importance of glucose monitoring and HO of DM. Verbalized understanding. will cont to monitor.  Taken 10/23/2020 1607 by Bruna Buckley, RN  Plan of Care Reviewed With: patient   Goal Outcome Evaluation:  Plan of Care Reviewed With: patient  Progress: no change  Outcome Summary: pt to unit from pacu SP IM nailing for pathalogical fx. pt drowsy. no pain medication given at this time. VSS.  at bedside DTV. no sticks to L Arm. Pt plans to D/C home with HH in am. Educated on the importance of glucose monitoring and HO of DM. Verbalized understanding. will cont to monitor.

## 2020-10-23 NOTE — ANESTHESIA POSTPROCEDURE EVALUATION
"Patient: Ina Akers    Procedure Summary     Date: 10/23/20 Room / Location: [facility] OR  /  [facility] OR    Anesthesia Start: 1314 Anesthesia Stop: 1441    Procedure: HIP INTERTROCHCHANTERIC NAILING (Right Thigh) Diagnosis:       Fracture, pathologic, femur, neck, right, initial encounter (CMS/HCC)      (Fracture, pathologic, femur, neck, right, initial encounter (CMS/[facility]) [M84.451A])    Surgeon: Gretchen Connell MD Provider: Lalit Padron MD    Anesthesia Type: general ASA Status: 3          Anesthesia Type: general    Vitals  Vitals Value Taken Time   /90 10/23/20 1600   Temp 36.4 °C (97.5 °F) 10/23/20 1438   Pulse 60 10/23/20 1607   Resp 14 10/23/20 1600   SpO2 97 % 10/23/20 1607   Vitals shown include unvalidated device data.        Post Anesthesia Care and Evaluation    Patient location during evaluation: bedside  Patient participation: complete - patient participated  Level of consciousness: awake and alert  Pain management: adequate  Airway patency: patent  Anesthetic complications: No anesthetic complications    Cardiovascular status: acceptable  Respiratory status: acceptable  Hydration status: acceptable    Comments: /90   Pulse 62   Temp 36.4 °C (97.5 °F) (Oral)   Resp 14   Ht 154.9 cm (61\")   Wt 81.2 kg (179 lb 1 oz)   SpO2 92%   BMI 33.83 kg/m²           "

## 2020-10-23 NOTE — ANESTHESIA PREPROCEDURE EVALUATION
Anesthesia Evaluation     Patient summary reviewed and Nursing notes reviewed   history of anesthetic complications: PONV  NPO Solid Status: > 8 hours  NPO Liquid Status: > 2 hours           Airway   Mallampati: I  TM distance: >3 FB  Neck ROM: full  Dental - normal exam     Pulmonary - negative pulmonary ROS and normal exam    breath sounds clear to auscultation  (-) shortness of breath, not a smoker  Cardiovascular - normal exam    Rhythm: regular  Rate: normal    (+) hypertension, hyperlipidemia,   (-) angina, orthopnea, PND, MOMIN    ROS comment: · Calculated left ventricular EF = 64.1% Calculated left ventricular 3D EF = 62% Estimated left ventricular EF was in agreement with the calculated left ventricular EF. Estimated left ventricular EF = 64% Left ventricular systolic function is normal. Normal global longitudinal LV strain (GLS) = -17.2%. Left ventricle strain data was reviewed by the physician. Normal left ventricular cavity size and wall thickness noted. All left ventricular wall segments contract normally. Left ventricular diastolic function was normal.  · Mild mitral valve regurgitation is present.  · Trace tricuspid valve regurgitation is present. Estimated right ventricular systolic pressure from tricuspid regurgitation is normal (<35 mmHg). Calculated right ventricular systolic pressure from tricuspid regurgitation is 29 mmHg.    Neuro/Psych- negative ROS  GI/Hepatic/Renal/Endo    (+) obesity,  GERD,  diabetes mellitus type 2, thyroid problem hypothyroidism    Musculoskeletal (-) negative ROS    Chronic pain: L  hip.  Abdominal    Substance History - negative use     OB/GYN negative ob/gyn ROS         Other      history of cancer (Malignant neoplasm of upper-outer quadrant of left breast in female)                      Anesthesia Plan    ASA 3     general   (I have reviewed the patient's history with the patient and the chart, including all pertinent laboratory results and imaging. I have explained  the risks of anesthesia including but not limited to dental damage, corneal abrasion, nerve injury, MI, stroke, and death.)  intravenous induction     Anesthetic plan, all risks, benefits, and alternatives have been provided, discussed and informed consent has been obtained with: patient.

## 2020-10-23 NOTE — OP NOTE
ORTHOPAEDIC OPERATIVE NOTE    Patient: Ina Akers   YOB: 1970    Medical Record Number: [id number]    Attending Physician: Gretchen Connell,*    Primary Care Physician: Epley, James, APRN    DATE OF PROCEDURE: 10/23/2020    Surgeon: Gretchen Connell MD        Pre-op Diagnosis:   Fracture, pathologic, femur, neck, right, initial encounter (CMS/[facility]) [M84.451A]    Post-Op Diagnosis Codes:     * Fracture, pathologic, femur, neck, right, initial encounter (CMS/[facility]) [M84.451A]    PROCEDURE PERFORMED: RIGHT femur cephalo medullary  NAILING utilizing a [employer] gamma nail measuring  11×180 mm, 125° neck angle, lag screw 80 mm.     Implant Name Type Inv. Item Serial No.  Lot No. LRB No. Used Action   NAIL FEM BRITTA/180KT TI 125D [id number] STRL - [id number] Implant NAIL FEM BRITTA/180KT TI 125D [id number] STRL  TANK [employer] [id number] Right 1 Implanted   SCRW LAG GAM3 TI 10.5X80MM STRL - [id number] Implant SCRW LAG GAM3 TI 10.5X80MM STRL  TANK ZORAIDA [id number] Right 1 Implanted   SCRW LK FUL/THRD 5X35MM STRL - [id number] Implant SCRW LK FUL/THRD 5X35MM STRL  TANK ZORAIDA [id number] Right 1 Implanted       SURGEON: Gretchen Connell MD     ASSISTANT: Chun Hermosillo DO Fellow     ANESTHESIA: General  Anesthesiologist: Lalit Padron MD  CRNA: Sylvia Linda CRNA    Estimated Blood Loss: 50 mL    Specimens:   Order Name Source Comment Collection Info Order Time   TISSUE PATHOLOGY EXAM Hip, Right  Collected By: Gretchen Connell MD 10/23/2020  1:57 PM       COMPLICATIONS:  Nil.     DRAINS: Nil  * No LDAs found *    INDICATIONS: The patient is a 49 y.o. female  who presented to my office for evaluation of a pathological lesion right femoral neck and proximal femur.  She has a history of breast cancer about 20 years back.  Recently she has a history of thyroid cancer about 2 years back.  She had a kyphoplasty but the sample was not  representative.  She was initially sent for possible biopsy/total hip replacement.  However she had no symptoms of pain in the hip joint.  She had pain in the right hip area on weightbearing.  Joint space was intact.  She subsequently underwent a biopsy from the rib which confirmed a thyroid mets.     Treatment options and alternatives were discussed in detail with the  patient and    who is indicated for an internal fixation of the  Proximal femur fracture with intramedullary nailing.  We have also discussed regarding possible right total hip replacement but this was held off as the patient's hip joint is normal.  Likely risks and benefits of the procedure, including but not limited to  infection, DVT, pulmonary embolism, fat embolism, malunion, nonunion, leg length discrepancy, failure of fixation, periimplant fractures, medical risks including stroke, heart attack, have been discussed in detail. Despite the risks involved, the patient elected to proceed and informed consent was obtained and the patient was scheduled for surgery. The patient was seen in the preoperative holding area and the operative site was marked. The patient has been seen and cleared  by the admitting service to the operating room.    DESCRIPTION OF PROCEDURE:   The patient was transferred to [facility] operating room. Preoperative antibiotics in the form of Kefzol  intravenously was infused prior to the incision  according to the SCIP protocol. A surgical time out was done with the team and the correct patient, surgical side and site were identified.      After achieving adequate general anesthesia, the patient was transferred onto the [place] table.  All bony prominences were padded well.  Closed reduction of the hip fracture was done and the position was satisfactory under image intensifier control, both in AP and lateral views.  The  hip was prepped and draped in the usual sterile fashion.  A skin incision about 4 cm long was  made proximal to the tip of the greater trochanter.    Skin and subcutaneous  tissue and deep fascia was incised in line with the skin incision.  A threaded guidewire was utilized to enter the proximal femur and the the guidewire was found to be satisfactorily seated in the medullary canal. An entry reamer was utilized.  Reamings from the proximal femur and from the triple reamer were sent for histopathological examination.     A   cephalo medullary  nail  measuring 11× 180 mm was selected, assembled to its introducer on the back table.  The nail was seated over the guidewire.  It was found to be seated satisfactorily.  A second skin incision was made over the lateral aspect of the thigh and a threaded guidewire was passed from the lateral femoral cortex into the femoral neck and femoral head utilizing the locking zig.  This was found to be seated satisfactorily in both AP and lateral views.  Estimated lag screw length was measured, triple reamer was utilized and a lag screw 80 mm was seated into position over the guidewire.  A set screw was seated, making this an angle stable device.      A single interlocking screw was placed from lateral to medial direction utilizing locking zig, in the static hole using a separate small incision.    The position of the hardware, The reduction of the fracture, The fracture alignment and the stability of fixation was found to be satisfactory and stable.  Incisions were thoroughly irrigated with saline and closed in layers utilizing Vicryl  And skin Staples.  Sterile dressings were placed and the patient was transferred to the recovery room in a stable condition.  The patient tolerated the procedure well.  There were no complications.  The patient had adequate distal pulses and good capillary refill.      I plan to continue  antibiotics postoperatively  Kefzol  IV every 8 hours for the first 24 hours.  Also,  will be started on Eliquis 2.5 mg twice daily starting on postoperative  day #1.  We will mobilize the patient  with physical therapy.      I discussed the satisfactory performance of the procedure with the patient's family and discussed with them The postoperative management.       Gretchen Connell M.D.    10/23/2020    CC: Epley, James, APRN; MD Becki Heaton, Gretchen ARROYO,*

## 2020-10-23 NOTE — ANESTHESIA PROCEDURE NOTES
Airway  Urgency: elective    Date/Time: 10/23/2020 1:23 PM  Airway not difficult    General Information and Staff    Patient location during procedure: OR  Anesthesiologist: Lalit Padron MD  CRNA: Sylvia Linda CRNA    Indications and Patient Condition  Indications for airway management: airway protection    Preoxygenated: yes  MILS maintained throughout  Mask difficulty assessment: 1 - vent by mask    Final Airway Details  Final airway type: endotracheal airway      Successful airway: ETT  Cuffed: yes   Successful intubation technique: direct laryngoscopy  Facilitating devices/methods: anterior pressure/BURP  Endotracheal tube insertion site: oral  Blade: Schuler  Blade size: 2  ETT size (mm): 7.0  Cormack-Lehane Classification: grade I - full view of glottis  Placement verified by: chest auscultation   Cuff volume (mL): 8  Measured from: lips  ETT/EBT  to lips (cm): 21  Number of attempts at approach: 1  Assessment: lips, teeth, and gum same as pre-op and atraumatic intubation    Additional Comments  Pt preoxygenated, SIVI, bag mask vent, ATETI, dentition as before

## 2020-10-24 ENCOUNTER — READMISSION MANAGEMENT (OUTPATIENT)
Dept: CALL CENTER | Facility: HOSPITAL | Age: 50
End: 2020-10-24

## 2020-10-24 VITALS
DIASTOLIC BLOOD PRESSURE: 73 MMHG | SYSTOLIC BLOOD PRESSURE: 102 MMHG | TEMPERATURE: 97.4 F | RESPIRATION RATE: 18 BRPM | HEIGHT: 61 IN | BODY MASS INDEX: 33.8 KG/M2 | WEIGHT: 179.01 LBS | HEART RATE: 64 BPM | OXYGEN SATURATION: 96 %

## 2020-10-24 PROCEDURE — 97116 GAIT TRAINING THERAPY: CPT

## 2020-10-24 PROCEDURE — 25010000003 CEFAZOLIN IN DEXTROSE 2-4 GM/100ML-% SOLUTION: Performed by: ORTHOPAEDIC SURGERY

## 2020-10-24 PROCEDURE — 97110 THERAPEUTIC EXERCISES: CPT

## 2020-10-24 PROCEDURE — 97161 PT EVAL LOW COMPLEX 20 MIN: CPT

## 2020-10-24 RX ORDER — ONDANSETRON 4 MG/1
4 TABLET, FILM COATED ORAL EVERY 6 HOURS PRN
Qty: 30 TABLET | Refills: 0 | Status: SHIPPED | OUTPATIENT
Start: 2020-10-24 | End: 2021-03-05

## 2020-10-24 RX ORDER — OXYCODONE HYDROCHLORIDE AND ACETAMINOPHEN 5; 325 MG/1; MG/1
1 TABLET ORAL EVERY 4 HOURS PRN
Qty: 42 TABLET | Refills: 0 | Status: SHIPPED | OUTPATIENT
Start: 2020-10-24 | End: 2021-01-20

## 2020-10-24 RX ADMIN — APIXABAN 2.5 MG: 2.5 TABLET, FILM COATED ORAL at 09:08

## 2020-10-24 RX ADMIN — DOCUSATE SODIUM 100 MG: 100 CAPSULE ORAL at 09:09

## 2020-10-24 RX ADMIN — CEFAZOLIN SODIUM 2 G: 2 INJECTION, SOLUTION INTRAVENOUS at 06:03

## 2020-10-24 RX ADMIN — OXYCODONE HYDROCHLORIDE AND ACETAMINOPHEN 1 TABLET: 5; 325 TABLET ORAL at 03:52

## 2020-10-24 RX ADMIN — OXYCODONE HYDROCHLORIDE AND ACETAMINOPHEN 1 TABLET: 5; 325 TABLET ORAL at 09:06

## 2020-10-24 RX ADMIN — LEVOTHYROXINE SODIUM 50 MCG: 50 TABLET ORAL at 06:04

## 2020-10-24 RX ADMIN — PANTOPRAZOLE SODIUM 40 MG: 40 TABLET, DELAYED RELEASE ORAL at 09:08

## 2020-10-24 NOTE — DISCHARGE INSTR - ACTIVITY
Discharge and Follow up Instructions:      I. ACTIVITIES:  1. Exercises:  · Complete exercise program as taught by the hospital physical therapist 2 times per day  · Exercise program will be advanced by the physical therapist  · During the day be up ambulating every 2 hours (while awake) for short distances  · Complete the ankle pump exercises at least 10 times per hour (while awake)  · Elevate legs when in bed and for at least 30 minutes during the day.Use cold packs 20-30 minutes approximately 5 times per day. This should be done before and after completing your exercises and at any time you are experiencing pain/ stiffness in your operative extremity.        2. Activities of Daily Living:  · No tub baths, hot tubs, or swimming pools for 4 weeks  · May shower and let water run over the incision on post-operative day #5 if no drainage. Do not scrub or rub the incision. Simply let the water run over the incision and pat dry.     II. Restrictions  · Continue hip precautions as taught at the hospital  · Your surgeon will discuss with you when you will be able to drive again. Usual guidelines are you are to be off pain medications prior to driving.  · Weight bearing is as tolerated for transfers and short distance gait. Do not wean from the walker.   · First week stay inside on even terrain. May go up and down stairs one stair at a time utilizing the hand rail once cleared by physical therapy to do so.  · After one week, you may venture outside (if cleared to do so by physical therapist).     III. Precautions:  · Everyone that comes near you should wash their hands  · No elective dental, genital-urinary, or colon procedures or surgical procedures for 12 weeks after surgery unless absolutely necessary.  ·  If dental work or surgical procedure is deemed absolutely necessary, you will need to contact your surgeon as you will need to take antibiotics 1 hour prior to any dental work (including teeth cleanings).  · Please  discuss with your surgeon prophylactic antibiotics as the length of time this intervention will be necessary for you varies with each patient’s health history and situation.  · Avoid sick people. If you must be around someone who is ill, they should wear a mask.  · Avoid visits to the Emergency Room or Urgent Care. If you feel you need to go to the Emergency Room, please notify your Surgeon's office.  · Stockings are to be worn for one week after surgery and are to be placed on in the morning and removed at night. Observe your skin when stocking is removed for any problems. Monitor the stockings to ensure that any swelling is not causing the stockings to become too tight. In this case, remove stockings immediately.        IV. INCISION CARE:  · Wash your hands prior to dressing changes  · Change the dressing as needed to keep incision clean and dry. Utilize dry gauze and paper tape. Avoid touching the side of the gauze that goes against the incision with your hands.  · No creams or ointments to the incision  · May remove dressing once the incision is free of drainage  · Do not touch or pick at the incision  · Check incision every day and notify surgeon immediately if any of the following signs or symptoms are noted:  ? Increase in redness  ? Increase in swelling around the incision and of the entire extremity  ? Increase in pain  ? Drainage oozing from the incision  ? Pulling apart of the edges of the incision  ? Increase in overall body temperature (greater than 100.5 degrees)  · Your Staples will be removed between 10-14 days postoperation.  This may be done by either the home health nurse, rehabilitation nurse or during your return visit to Dr. Connell's office.  You will then be instructed on how to care for the incision.  (Please call the office if your staples have not been removed within 14 days after surgery).     V. Medications:   1. Anticoagulants: You will be discharged on an anticoagulant. This is a  prophylactic medication that helps prevent blood clots during your post-operative period.  You will be on Eliquis 2.5mg twice daily.   · While taking the anticoagulant, you should avoid taking any additional aspirin, ibuprofen (Advil or Motrin), Aleve (Naprosyn) or other non-steroidal anti-inflammatory medications.   · Notify surgeon immediately if any francy bleeding is noted in the urine, stool, emesis, or from the nose or the incision. Blood in the stool will often appear as black rather than red. Blood in urine may appear as pink. Blood in emesis may appear as brown/black like coffee grounds.  · You will need to apply pressure for longer periods of time to any cuts or abrasions to stop bleeding  · Avoid alcohol while taking anticoagulants     2. Stool Softeners: You will be at greater risk of constipation after surgery due to being less mobile and the pain medications.   · Take stool softeners as instructed by your surgeon while on pain medications. Over the counter Colace 100 mg 1-2 capsules twice daily.   · If stools become too loose or too frequent, please decreases the dosage or stop the stool softener.  · If constipation occurs despite use of stool softeners, you are to continue the stool softeners and add a laxative (Milk of Magnesia 1 ounce daily as needed).  · Dulcolax oral tabs or suppository, or a fleets enema can also be utilized for constipation and can be obtained over the counter.   · If above interventions are unsuccessful in inducing bowel movements, please contact your surgeon's office / family physician's office.  · Drink plenty of fluids, and eat fruits and vegetables during your recovery time     3. Pain Medications utilized after surgery are narcotics and the law requires that the following information be given to all patients that are prescribed narcotics:  · CLASSIFICATION: Pain medications are called Opioids and are narcotics  · LEGALITIES: It is illegal to share narcotics with others and  to drive within 24 hours of taking narcotics  · POTENTIAL SIDE EFFECTS: Potential side effects of opioids include: nausea, vomiting, itching, dizziness, drowsiness, dry mouth, constipation, and difficulty urinating.  · POTENTIAL ADVERSE EFFECTS:   ? Opioid tolerance can develop with use of pain medications and this simply means that it requires more and more of the medication to control pain; however, this is seen more in patients that use opioids for longer periods of time.  ? Opioid dependence can develop with use of Opioids and this simply means that to stop the medication can cause withdrawal symptoms; however, this is seen with patients that use Opioids for longer periods of time.  ? Opioid addiction can develop with use of Opioids and the incidence of this is very unlikely in patients who take the medications as ordered and stop the medications as instructed.  ? Opioid overdose can be dangerous, but is unlikely when the medication is taken as ordered and stopped when ordered. It is important not to mix opioids with alcohol or with and type of sedative such as Benadryl as this can lead to over sedation and respiratory difficulty.  · DOSAGE:   ? Pain medications will need to be taken consistently for the first week to decrease pain and promote adequate pain relief and participation in physical therapy.  ? After the initial surgical pain begins to resolve, you may begin to decrease the pain medication. By the end of 6 weeks, you should be off of pain medications.  ? Refills will not be given by the office during evening hours, on weekends, or after 6 weeks post-op.  ? To seek refills on pain medications during the initial 6 week post-operative period, you must call the office 48 hours in advance to request the refill. The office will then notify you when to  the prescription. DO NOT wait until you are out of the medication to request a refill.     V. FOLLOW-UP VISITS:  · You will need to follow up in the  office with your surgeon on November 4, 2020. Please call this number [phone]  to schedule this appointment.  If you have any concerns or suspected complications prior to your follow up visit, please call your surgeons office. Do not wait until your appointment time if you suspect complications. These will need to be addressed in the office promptly.

## 2020-10-24 NOTE — PLAN OF CARE
Problem: Adult Inpatient Plan of Care  Goal: Plan of Care Review  Flowsheets (Taken 10/24/2020 1011)  Plan of Care Reviewed With: patient  Outcome Summary:   Patient is a 50 yo female with h/o breast and thyroid cancer POD #1 Right femoral cephalo medullary nailing after pathologic R femoral neck fx. She lives with her spouse in a home with 1 TAYLER; independent with mobility at baseline using standard cane. She currently presents with post-op R hip pain and weakness. Today she was able to ambulate at least 25 ft with RW SBA and stairs x 1 CGA. At this time, PT recommends home after discharge; RW and BSC issued to pt.  ..Patient was wearing a face mask during this therapy encounter. Therapist used appropriate personal protective equipment including eye protection, mask, and gloves.  Mask used was standard procedure mask. Appropriate PPE was worn during the entire therapy session. Hand hygiene was completed before and after therapy session. Patient is not in enhanced droplet precautions.

## 2020-10-24 NOTE — PLAN OF CARE
Goal Outcome Evaluation:  Plan of Care Reviewed With: patient  Progress: improving     [facility] with [facility], [facility] until Monday. I called and spoke with pharmacist at [facility], they are able to do a partial fill today (3 days worth), pt notified.

## 2020-10-24 NOTE — DISCHARGE SUMMARY
Orthopedic Discharge Summary      Patient: Ina Akers      YOB: 1970    Medical Record Number: [id number]    Attending Physician: Gretchen Connell,*  Consulting Physician(s):   Date of Admission: 10/23/2020 10:04 AM  Date of Discharge:       Patient Active Problem List   Diagnosis   • Abnormal perimenopausal bleeding   • Amenorrhea   • Atopic dermatitis   • External hemorrhoids   • Gastritis   • Hypertension   • Iron deficiency anemia   • Irritable bowel syndrome   • Melanocytic nevus   • Goiter   • Tinea corporis   • Type 2 diabetes mellitus (CMS/[facility])   • Lipid screening   • Malignant neoplasm of upper-outer quadrant of left breast in female, estrogen receptor negative (CMS/[facility])   • Acute pain of both knees   • Controlled type 2 diabetes mellitus without complication, without long-term current use of insulin (CMS/[facility])   • Hyperlipidemia   • Vitamin D deficiency   • Multiple thyroid nodules   • Subclinical hypothyroidism   • Lumbar spine tumor   • Bone metastasis (CMS/[facility])   • Fracture, pathologic, femur, neck, right, initial encounter (CMS/[facility])     CT OPEN RX FEMUR FX+INTRAMED AB [45093] (HUMERUS INTRAMEDULLARY NAIL/AB INSERTION)      Allergies   Allergen Reactions   • Hydrocodone Nausea Only   • Iodine Itching            Discharge Medications      New Medications      Instructions Start Date   apixaban 2.5 MG tablet tablet  Commonly known as: ELIQUIS   2.5 mg, Oral, Every 12 Hours Scheduled      ondansetron 4 MG tablet  Commonly known as: ZOFRAN   4 mg, Oral, Every 6 Hours PRN      oxyCODONE-acetaminophen 5-325 MG per tablet  Commonly known as: PERCOCET   1 tablet, Oral, Every 4 Hours PRN         Changes to Medications      Instructions Start Date   atorvastatin 10 MG tablet  Commonly known as: LIPITOR  What changed: when to take this   10 mg, Oral, Daily      erythromycin 5 MG/GM ophthalmic ointment  Commonly known as: ROMYCIN  What changed:   · when to take this  · reasons to  take this   Ophthalmic, Every 4 Hours While Awake      metFORMIN  MG 24 hr tablet  Commonly known as: GLUCOPHAGE-XR  What changed: when to take this   TAKE TWO TABLETS BY MOUTH DAILY WITH DINNER      triamcinolone 0.1 % cream  Commonly known as: KENALOG  What changed:   · when to take this  · reasons to take this   Topical, 2 Times Daily, Use sparingly avoid face         Continue These Medications      Instructions Start Date   accu-chek soft touch lancets   Check blood sugar twice a day as needed      acetaminophen 500 MG tablet  Commonly known as: TYLENOL   1,000 mg, Oral, Every 6 Hours PRN      amLODIPine 5 MG tablet  Commonly known as: NORVASC   TAKE ONE TABLET BY MOUTH DAILY      Blood Glucose Monitoring Suppl w/Device kit   Check blood sugar twice a day      Blood Glucose Test strip   Check blood sugar twice a day.      cholecalciferol 25 MCG (1000 UT) tablet  Commonly known as: VITAMIN D3   1,000 Units, Oral, Daily      ferrous sulfate 325 (65 FE) MG tablet   325 mg, Oral, Daily With Breakfast      Hydrocortisone (Perianal) 2.5 % rectal cream  Commonly known as: ANUSOL-HC   Rectal, 3 Times Daily PRN      lansoprazole 30 MG capsule  Commonly known as: PREVACID   30 mg, Oral, Daily      levothyroxine 50 MCG tablet  Commonly known as: SYNTHROID, LEVOTHROID   TAKE ONE TABLET BY MOUTH DAILY      losartan 50 MG tablet  Commonly known as: COZAAR   TAKE ONE TABLET BY MOUTH DAILY      STOOL SOFTENER PO   1 tablet, Oral, As Needed, As needed                  Past Medical History:   Diagnosis Date   • Anemia     History of recurent iron deficiency   • Compression fracture of fourth lumbar vertebra (CMS/HCC)     RECENT KYPHOPLASTY 9/2020   • Diabetes mellitus (CMS/HCC)    • GERD (gastroesophageal reflux disease)    • History of breast cancer 2001    LEFT BREAST MASTECTOMY, CHEMO AND RADIATION    • History of gastritis    • History of thyroid nodule 2018   • Hyperlipidemia    • Hypertension    • Metastatic bone  cancer (CMS/HCC)     RIB, HIP - AWAITING FINDINGS FOR PRIMARY SOURCE (THYROID VS BREAST)   • PONV (postoperative nausea and vomiting)         Past Surgical History:   Procedure Laterality Date   •  SECTION     • KYPHOPLASTY N/A 2020    Procedure: Lumbar 4  osteo cool radiofrequency ablation and KYPHOPLASTY;  Surgeon: Vik Rios MD;  Location: [facility] ;  Service: Neurosurgery;  Laterality: N/A;   • MASTECTOMY, PARTIAL Left    • RIB BIOPSY  10/2020   • THYROIDECTOMY, PARTIAL Right         Social History     Occupational History     Employer: [employer]     Employer: [employer]   Tobacco Use   • Smoking status: Never Smoker   • Smokeless tobacco: Never Used   Substance and Sexual Activity   • Alcohol use: Yes     Comment: Socially   • Drug use: No   • Sexual activity: Defer      Social History     Social History Narrative   • Not on file        Family History   Problem Relation Age of Onset   • Hypertension Other    • Diabetes Other    • Malig Hyperthermia Neg Hx          Physical Exam: 49 y.o. female  General Appearance:    Alert, cooperative, in no acute distress                      Vitals:    10/23/20 1806 10/23/20 2255 10/24/20 0346 10/24/20 0717   BP: 126/83 132/86 105/62 102/73   BP Location: Right arm Right arm Right arm Right arm   Patient Position: Lying Lying Lying Lying   Pulse: 63 92 67 64   Resp: 16 16 16 18   Temp:  97.3 °F (36.3 °C) 97.1 °F (36.2 °C) 97.4 °F (36.3 °C)   TempSrc:  Skin Skin Oral   SpO2: 96% 100% 99% 96%   Weight:       Height:            Hospital Course:  49 y.o. female admitted to [facility] to services of Gretchen Connell R,* with Fracture, pathologic, femur, neck, right, initial encounter (CMS/[facility]) [M84.451A]  Fracture, pathologic, femur, neck, right, initial encounter (CMS/[facility]) [M84.451A]  Fracture, pathologic, femur, neck, right, initial encounter (CMS/[facility]) [M84.451A] on  10/23/2020 and underwent a RIGHT femur cephalo medullary  NAILING per MAGO Terrell. Antibiotic Kefzol 2 gm every 8 hours and VTE prophylaxis Eliquis 2.5mg BID were per SCIP protocols. Post-operatively the patient transferred to the post-operative floor where the patient underwent mobilization therapy that included active ROM exercises. Opioids were titrated to achieve appropriate pain management to allow for participation in mobilization exercises. Vital signs are now stable. The incision is intact without signs or symptoms of infection. Operative extremity neurovascular status remains intact.     Appropriate education re: incision care, activity levels, medications, hip dislocation precautions, and follow up visits was completed and all questions were answered. The patient is now deemed stable for discharge.      DISCHARGE DISPOSITION AND PLAN:  The  Patient is being discharged home with home health for PT   2-3 X per week for 2-3 weeks and nursing care as needed.       Consulting Physician(s)             None            DIAGNOSTIC TESTS:     Admission on 10/23/2020   Component Date Value Ref Range Status   • Glucose 10/23/2020 91  70 - 130 mg/dL Final   • Glucose 10/23/2020 120  70 - 130 mg/dL Final       No results found.    Discharge and Follow up Instructions:     I. ACTIVITIES:  1. Exercises:  ? Complete exercise program as taught by the hospital physical therapist 2 times per day  ? Exercise program will be advanced by the physical therapist  ? During the day be up ambulating every 2 hours (while awake) for short distances  ? Complete the ankle pump exercises at least 10 times per hour (while awake)  ? Elevate legs when in bed and for at least 30 minutes during the day.Use cold packs 20-30 minutes approximately 5 times per day. This should be done before and after completing your exercises and at any time you are experiencing pain/ stiffness in your operative extremity.      2. Activities of Daily  Living:  ? No tub baths, hot tubs, or swimming pools for 4 weeks  ? May shower and let water run over the incision on post-operative day #5 if no drainage. Do not scrub or rub the incision. Simply let the water run over the incision and pat dry.    II. Restrictions  ? Continue hip precautions as taught at the hospital  ? Your surgeon will discuss with you when you will be able to drive again. Usual guidelines are you are to be off pain medications prior to driving.  ? Weight bearing is as tolerated for transfers and short distance gait. Do not wean from the walker.   ? First week stay inside on even terrain. May go up and down stairs one stair at a time utilizing the hand rail once cleared by physical therapy to do so.  ? After one week, you may venture outside (if cleared to do so by physical therapist).    III. Precautions:  ? Everyone that comes near you should wash their hands  ? No elective dental, genital-urinary, or colon procedures or surgical procedures for 12 weeks after surgery unless absolutely necessary.  ?  If dental work or surgical procedure is deemed absolutely necessary, you will need to contact your surgeon as you will need to take antibiotics 1 hour prior to any dental work (including teeth cleanings).  ? Please discuss with your surgeon prophylactic antibiotics as the length of time this intervention will be necessary for you varies with each patient’s health history and situation.  ? Avoid sick people. If you must be around someone who is ill, they should wear a mask.  ? Avoid visits to the Emergency Room or Urgent Care. If you feel you need to go to the Emergency Room, please notify your Surgeon's office.  ? Stockings are to be worn for one week after surgery and are to be placed on in the morning and removed at night. Observe your skin when stocking is removed for any problems. Monitor the stockings to ensure that any swelling is not causing the stockings to become too tight. In this case,  remove stockings immediately.      IV. INCISION CARE:  ? Wash your hands prior to dressing changes  ? Change the dressing as needed to keep incision clean and dry. Utilize dry gauze and paper tape. Avoid touching the side of the gauze that goes against the incision with your hands.  ? No creams or ointments to the incision  ? May remove dressing once the incision is free of drainage  ? Do not touch or pick at the incision  ? Check incision every day and notify surgeon immediately if any of the following signs or symptoms are noted:  o Increase in redness  o Increase in swelling around the incision and of the entire extremity  o Increase in pain  o Drainage oozing from the incision  o Pulling apart of the edges of the incision  o Increase in overall body temperature (greater than 100.5 degrees)  ? Your Staples will be removed between 10-14 days postoperation.  This may be done by either the home health nurse, rehabilitation nurse or during your return visit to Dr. Connell's office.  You will then be instructed on how to care for the incision.  (Please call the office if your staples have not been removed within 14 days after surgery).    V. Medications:   1. Anticoagulants: You will be discharged on an anticoagulant. This is a prophylactic medication that helps prevent blood clots during your post-operative period.  You will be on Eliquis 2.5mg twice daily.   ? While taking the anticoagulant, you should avoid taking any additional aspirin, ibuprofen (Advil or Motrin), Aleve (Naprosyn) or other non-steroidal anti-inflammatory medications.   ? Notify surgeon immediately if any francy bleeding is noted in the urine, stool, emesis, or from the nose or the incision. Blood in the stool will often appear as black rather than red. Blood in urine may appear as pink. Blood in emesis may appear as brown/black like coffee grounds.  ? You will need to apply pressure for longer periods of time to any cuts or abrasions to stop  bleeding  ? Avoid alcohol while taking anticoagulants    2. Stool Softeners: You will be at greater risk of constipation after surgery due to being less mobile and the pain medications.   ? Take stool softeners as instructed by your surgeon while on pain medications. Over the counter Colace 100 mg 1-2 capsules twice daily.   ? If stools become too loose or too frequent, please decreases the dosage or stop the stool softener.  ? If constipation occurs despite use of stool softeners, you are to continue the stool softeners and add a laxative (Milk of Magnesia 1 ounce daily as needed).  ? Dulcolax oral tabs or suppository, or a fleets enema can also be utilized for constipation and can be obtained over the counter.   ? If above interventions are unsuccessful in inducing bowel movements, please contact your surgeon's office / family physician's office.  ? Drink plenty of fluids, and eat fruits and vegetables during your recovery time    3. Pain Medications utilized after surgery are narcotics and the law requires that the following information be given to all patients that are prescribed narcotics:  ? CLASSIFICATION: Pain medications are called Opioids and are narcotics  ? LEGALITIES: It is illegal to share narcotics with others and to drive within 24 hours of taking narcotics  ? POTENTIAL SIDE EFFECTS: Potential side effects of opioids include: nausea, vomiting, itching, dizziness, drowsiness, dry mouth, constipation, and difficulty urinating.  ? POTENTIAL ADVERSE EFFECTS:   o Opioid tolerance can develop with use of pain medications and this simply means that it requires more and more of the medication to control pain; however, this is seen more in patients that use opioids for longer periods of time.  o Opioid dependence can develop with use of Opioids and this simply means that to stop the medication can cause withdrawal symptoms; however, this is seen with patients that use Opioids for longer periods of  time.  o Opioid addiction can develop with use of Opioids and the incidence of this is very unlikely in patients who take the medications as ordered and stop the medications as instructed.  o Opioid overdose can be dangerous, but is unlikely when the medication is taken as ordered and stopped when ordered. It is important not to mix opioids with alcohol or with and type of sedative such as Benadryl as this can lead to over sedation and respiratory difficulty.  ? DOSAGE:   o Pain medications will need to be taken consistently for the first week to decrease pain and promote adequate pain relief and participation in physical therapy.  o After the initial surgical pain begins to resolve, you may begin to decrease the pain medication. By the end of 6 weeks, you should be off of pain medications.  o Refills will not be given by the office during evening hours, on weekends, or after 6 weeks post-op.  o To seek refills on pain medications during the initial 6 week post-operative period, you must call the office 48 hours in advance to request the refill. The office will then notify you when to  the prescription. DO NOT wait until you are out of the medication to request a refill.    V. FOLLOW-UP VISITS:  ? You will need to follow up in the office with your surgeon on November 4, 2020. Please call this number [phone]  to schedule this appointment.  If you have any concerns or suspected complications prior to your follow up visit, please call your surgeons office. Do not wait until your appointment time if you suspect complications. These will need to be addressed in the office promptly.        Date: 10/24/2020    MAGO Terrell    CC: Epley, James, APRN; MAGO Terrell

## 2020-10-24 NOTE — OUTREACH NOTE
Prep Survey      Responses   Scientology facility patient discharged from?  [place]   Is LACE score < 7 ?  No   Eligibility  [facility]   Date of Admission  10/23/20   Date of Discharge  10/24/20   Discharge Disposition  Home-Health Care Svc   Discharge diagnosis  s/p Open femur, edil insertion for fracture   Does the patient have one of the following disease processes/diagnoses(primary or secondary)?  General Surgery   Does the patient have Home health ordered?  Yes   What is the Home health agency?   [facility]   Is there a DME ordered?  No   Prep survey completed?  Yes          Shanice Benson RN

## 2020-10-24 NOTE — PROGRESS NOTES
Continued Stay Note  [facility]     Patient Name: Ina Akers  MRN: [id number]  Today's Date: 10/24/2020    Admit Date: 10/23/2020    Discharge Plan     Row Name 10/24/20 1044       Plan    Plan  Home with [facility]    Patient/Family in Agreement with Plan  yes    Plan Comments  Received call from primary RN stating pt being discharged and will need HH.  Discussed with pt who requests [facility].  Referral placedin Epic and spoke with Nory/CHRISTY who will follow.  SHAQ parks RN        Discharge Codes    No documentation.       Expected Discharge Date and Time     Expected Discharge Date Expected Discharge Time    Oct 24, 2020             Laura Parks, RN

## 2020-10-24 NOTE — PROGRESS NOTES
"      Patient: Ina Akers  YOB: 1970     Date of Admission: 10/23/2020 10:04 AM Medical Record Number: [id number]     Attending Physician: Gretchen Connell,Milagros    Procedure(s):  HIP INTERTROCHCHANTERIC NAILING Post Operative Day Number: 1    Subjective : No new orthopaedic complaints     Pain Relief: some relief with present medication.     Systemic Complaints: No Complaints  Vitals:    10/23/20 1700 10/23/20 1806 10/23/20 2255 10/24/20 0346   BP: 130/85 126/83 132/86 105/62   BP Location: Right arm Right arm Right arm Right arm   Patient Position: Lying Lying Lying Lying   Pulse: 75 63 92 67   Resp: 16 16 16 16   Temp: 97.5 °F (36.4 °C)  97.3 °F (36.3 °C) 97.1 °F (36.2 °C)   TempSrc: Oral  Skin Skin   SpO2: 96% 96% 100% 99%   Weight: 81.2 kg (179 lb 0.2 oz)      Height: 154.9 cm (60.98\")          Physical Exam: 49 y.o. female    General Appearance:       Alert, cooperative, in no acute distress                  Extremities:    Dressing Clean, Dry and Intact         Incision healthy without signs or symptoms of infections         No clinical sign of DVT        Able to do good movements of digits    Pulses:   Pulses palpable and equal bilaterally           Diagnostic Tests:     Results from last 7 days   Lab Units 10/21/20  1618   WBC 10*3/mm3 3.79   HEMOGLOBIN g/dL 10.5*   HEMATOCRIT % 33.0*   PLATELETS 10*3/mm3 259     Results from last 7 days   Lab Units 10/21/20  1618   SODIUM mmol/L 140   POTASSIUM mmol/L 4.0   CHLORIDE mmol/L 104   CO2 mmol/L 27.3   BUN mg/dL 14   CREATININE mg/dL 0.78   GLUCOSE mg/dL 93   CALCIUM mg/dL 9.5     Results from last 7 days   Lab Units 10/21/20  1618   INR  1.03   APTT seconds 29.4     No results found for: CRP  No results found for: SEDRATE  No results found for: URICACID  No results found for: CRYSTAL  Microbiology Results (last 10 days)     Procedure Component Value - Date/Time    COVID PRE-OP / PRE-PROCEDURE SCREENING ORDER (NO ISOLATION) - Swab, " Nasopharynx [[id number]] Collected: 10/21/20 1618    Lab Status: Final result Specimen: Swab from Nasopharynx Updated: 10/22/20 1156    Narrative:      The following orders were created for panel order COVID PRE-OP / PRE-PROCEDURE SCREENING ORDER (NO ISOLATION) - Swab, Nasopharynx.  Procedure                               Abnormality         Status                     ---------                               -----------         ------                     COVID-19,BIOTAP, NP/OP S...[[id number]]                      Final result                 Please view results for these tests on the individual orders.    COVID-19,BIOTAP, NP/OP SWAB IN TRANSPORT MEDIA OR SALINE 24-36 HR TAT - Swab, Nasopharynx [[id number]] Collected: 10/21/20 1618    Lab Status: Final result Specimen: Swab from Nasopharynx Updated: 10/22/20 1156     SARS-CoV-2 PCR Not Detected     Comment: Nucleic acid specific to SARS-CoV-2 (COVID-19) virus was not detected inthis sample by the TaqPath (TM) COVID-19 Combo Kit.SARS-CoV-2 (COVID-19) nucleic acid testing performed using [facility] Aptima (R) SARS-CoV-2 Assay or [employer] TaqPath   (TM)COVID-19 Combo Kit as indicated above under Emergency Use Authorization (EUA) from the FDA. Aptima (R) and TaqPath (TM) test performancecharacteristics verified by [facility] in accordance with the FDAs Guidance Document (Policy for Diagnostic   Tests for Coronavirus Disease-2019during the Public Health Emergency) issued on March 16, 2020. The laboratory is regulated under CLIA as qualified to perform high-complexity testingUnless otherwise noted, all testing was performed at [facility],   CLIA No. [id number], [place] State Licensee No. [id number]           Fl C Arm During Surgery    Result Date: 10/23/2020   As described.  This report was finalized on 10/23/2020 2:41 PM by Dr. Michael Elias M.D.      Xr Hip With Or Without Pelvis 1 View Right    Result Date: 10/23/2020   Postsurgical changes.    This report  was finalized on 10/23/2020 3:58 PM by Dr. Michael Elias M.D.      Xr Hip With Or Without Pelvis 2 - 3 View Right    Result Date: 10/23/2020   As described.  This report was finalized on 10/23/2020 2:41 PM by Dr. Michael Elias M.D.              Current Medications:  Scheduled Meds:amLODIPine, 5 mg, Oral, Daily  apixaban, 2.5 mg, Oral, Q12H  ceFAZolin, 2 g, Intravenous, Q8H  docusate sodium, 100 mg, Oral, BID  ferrous sulfate, 325 mg, Oral, Daily With Breakfast  levothyroxine, 50 mcg, Oral, Q AM  losartan, 50 mg, Oral, Daily  metFORMIN ER, 1,000 mg, Oral, Daily With Dinner  pantoprazole, 40 mg, Oral, QAM      Continuous Infusions:   PRN Meds:.HYDROmorphone **AND** naloxone  •  ondansetron **OR** ondansetron  •  oxyCODONE-acetaminophen    Assessment:    Procedure(s):  HIP INTERTROCHCHANTERIC NAILING    Patient Active Problem List   Diagnosis   • Abnormal perimenopausal bleeding   • Amenorrhea   • Atopic dermatitis   • External hemorrhoids   • Gastritis   • Hypertension   • Iron deficiency anemia   • Irritable bowel syndrome   • Melanocytic nevus   • Goiter   • Tinea corporis   • Type 2 diabetes mellitus (CMS/HCC)   • Lipid screening   • Malignant neoplasm of upper-outer quadrant of left breast in female, estrogen receptor negative (CMS/HCC)   • Acute pain of both knees   • Controlled type 2 diabetes mellitus without complication, without long-term current use of insulin (CMS/HCC)   • Hyperlipidemia   • Vitamin D deficiency   • Multiple thyroid nodules   • Subclinical hypothyroidism   • Lumbar spine tumor   • Bone metastasis (CMS/HCC)   • Fracture, pathologic, femur, neck, right, initial encounter (CMS/[facility])       PLAN:   Continues current post-op course  Anticoagulation: Eliquis  Dressing Change PRN  Mobilize with PT as tolerated per protocol.  Stay with the walker. No weaning for 2-3 months.  Ok to start radiation treatments.     Weight Bearing: WBAT for transfers and short distance gait up to 25 feet with  crutches or walker.  Discharge Plan: OK to plan for discharge in  today to home and home health  from orthopadic perspective.      Tia Villanueva, MAGO    Date: 10/24/2020    Time: 06:25 EDT

## 2020-10-24 NOTE — THERAPY EVALUATION
Patient Name: Ina Akers  : 1970    MRN: [id number]                              Today's Date: 10/24/2020       Admit Date: 10/23/2020    Visit Dx:     ICD-10-CM ICD-9-CM   1. Fracture, pathologic, femur, neck, right, initial encounter (CMS/[facility])  M84.451A 733.14     Patient Active Problem List   Diagnosis   • Abnormal perimenopausal bleeding   • Amenorrhea   • Atopic dermatitis   • External hemorrhoids   • Gastritis   • Hypertension   • Iron deficiency anemia   • Irritable bowel syndrome   • Melanocytic nevus   • Goiter   • Tinea corporis   • Type 2 diabetes mellitus (CMS/HCC)   • Lipid screening   • Malignant neoplasm of upper-outer quadrant of left breast in female, estrogen receptor negative (CMS/HCC)   • Acute pain of both knees   • Controlled type 2 diabetes mellitus without complication, without long-term current use of insulin (CMS/HCC)   • Hyperlipidemia   • Vitamin D deficiency   • Multiple thyroid nodules   • Subclinical hypothyroidism   • Lumbar spine tumor   • Bone metastasis (CMS/HCC)   • Fracture, pathologic, femur, neck, right, initial encounter (CMS/[facility])     Past Medical History:   Diagnosis Date   • Anemia     History of recurent iron deficiency   • Compression fracture of fourth lumbar vertebra (CMS/HCC)     RECENT KYPHOPLASTY 2020   • Diabetes mellitus (CMS/HCC)    • GERD (gastroesophageal reflux disease)    • History of breast cancer 2001    LEFT BREAST MASTECTOMY, CHEMO AND RADIATION    • History of gastritis    • History of thyroid nodule    • Hyperlipidemia    • Hypertension    • Metastatic bone cancer (CMS/HCC)     RIB, HIP - AWAITING FINDINGS FOR PRIMARY SOURCE (THYROID VS BREAST)   • PONV (postoperative nausea and vomiting)      Past Surgical History:   Procedure Laterality Date   •  SECTION     • KYPHOPLASTY N/A 2020    Procedure: Lumbar 4  osteo cool radiofrequency ablation and KYPHOPLASTY;  Surgeon: Vik Rios MD;  Location: [facility] OR  18/19;  Service: Neurosurgery;  Laterality: N/A;   • MASTECTOMY, PARTIAL Left 2001   • RIB BIOPSY  10/2020   • THYROIDECTOMY, PARTIAL Right 2018     General Information     Row Name 10/24/20 0945          Physical Therapy Time and Intention    Document Type  evaluation  -MW     Mode of Treatment  physical therapy  -     Row Name 10/24/20 0945          General Information    Patient Profile Reviewed  yes  -MW     Prior Level of Function  independent:;gait Standard cane  -MW     Existing Precautions/Restrictions  fall;other (see comments) WBAT for transfers & short distance up to 25' with RW or crutches  -MW     Barriers to Rehab  none identified  -MW     Row Name 10/24/20 0945          Home Main Entrance    Number of Stairs, Main Entrance  one  -MW     Stair Railings, Main Entrance  none  -MW     Row Name 10/24/20 0945          Cognition    Orientation Status (Cognition)  oriented x 4  -MW     Row Name 10/24/20 0989          Safety Issues, Functional Mobility    Safety Issues Affecting Function (Mobility)  ability to follow commands;awareness of need for assistance  -MW     Impairments Affecting Function (Mobility)  pain;range of motion (ROM);strength  -MW     Comment, Safety Issues/Impairments (Mobility)  Gait belt used for safety  -MW       User Key  (r) = Recorded By, (t) = Taken By, (c) = Cosigned By    Initials Name Provider Type     Idania Stevenson, PT Physical Therapist        Mobility     Row Name 10/24/20 1001          Bed Mobility    Bed Mobility  bed mobility (all) activities  -     All Activities, [place] (Bed Mobility)  modified independence  -     Assistive Device (Bed Mobility)  head of bed elevated  -     Row Name 10/24/20 1001          Sit-Stand Transfer    Sit-Stand [place] (Transfers)  set up  -     Assistive Device (Sit-Stand Transfers)  walker, front-wheeled  -     Row Name 10/24/20 1001          Gait/Stairs (Locomotion)    [place] Level (Gait)  standby assist  -      Assistive Device (Gait)  walker, front-wheeled  -MW     Distance in Feet (Gait)  25 x 2  -MW     Deviations/Abnormal Patterns (Gait)  gait speed decreased;stride length decreased  -MW     Bilateral Gait Deviations  forward flexed posture  -MW     Right Sided Gait Deviations  weight shift ability decreased  -MW     [place] Level (Stairs)  verbal cues;contact guard  -MW     Assistive Device (Stairs)  walker, front-wheeled  -MW     Number of Steps (Stairs)  1  -MW     Ascending Technique (Stairs)  step-to-step  -MW     Descending Technique (Stairs)  step-to-step  -MW     Comment (Gait/Stairs)  Pt ambulated to/from staircase where she was able to go up/down 1 step with R rail and RW folded on L as well as instruction on how to negotiate a single step by raising the RW  -MW     Row Name 10/24/20 1001          Mobility    Extremity Weight-bearing Status  right lower extremity  -MW     Right Lower Extremity (Weight-bearing Status)  weight-bearing as tolerated (WBAT)  -MW       User Key  (r) = Recorded By, (t) = Taken By, (c) = Cosigned By    Initials Name Provider Type    MW Idania Stevenson, PT Physical Therapist        Obj/Interventions     Row Name 10/24/20 1006          Range of Motion Comprehensive    Comment, General Range of Motion  Decreased Right hip ROM, LLE WFL  -MW     Row Name 10/24/20 1006          Motor Skills    Therapeutic Exercise  hip;knee;ankle  -MW     Row Name 10/24/20 1006          Hip (Therapeutic Exercise)    Hip (Therapeutic Exercise)  other (see comments) Glute sets x 10  -MW     Row Name 10/24/20 1006          Knee (Therapeutic Exercise)    Knee (Therapeutic Exercise)  strengthening exercise  -MW     Knee Strengthening (Therapeutic Exercise)  bilateral;LAQ (long arc quad);10 repetitions  -MW     Row Name 10/24/20 1006          Ankle (Therapeutic Exercise)    Ankle (Therapeutic Exercise)  AROM (active range of motion)  -MW     Ankle AROM (Therapeutic Exercise)   bilateral;dorsiflexion;plantarflexion;10 repetitions  -MW     Row Name 10/24/20 1006          Sensory Assessment (Somatosensory)    Sensory Assessment (Somatosensory)  sensation intact Pt denises numbness/tingling at BLEs  -MW     Row Name 10/24/20 1006          General Lower Extremity Assessment (Range of Motion)    Lower Extremity: Range of Motion  LLE ROM WNL;hip, right: LE ROM;other (see comments) Impaired R hip ROM  -MW     Row Name 10/24/20 1006          Lower Extremity (Manual Muscle Testing)    Lower Extremity: Manual Muscle Testing (MMT)  left LE strength is WNL;right hip strength deficit;right knee strength deficit;other (see comments)  -MW     Comment, MMT: Lower Extremity  Right hip and knee 2+/5  -MW       User Key  (r) = Recorded By, (t) = Taken By, (c) = Cosigned By    Initials Name Provider Type    Idania Sheikh, LINDSEY Physical Therapist        Goals/Plan    No documentation.       Clinical Impression     Row Name 10/24/20 1011          Pain    Additional Documentation  Pain Scale: Numbers Pre/Post-Treatment (Group)  -MW     Row Name 10/24/20 1011          Pain Scale: Numbers Pre/Post-Treatment    Pretreatment Pain Rating  6/10  -MW     Posttreatment Pain Rating  6/10  -MW     Pain Location - Side  Right  -MW     Pain Location - Orientation  proximal  -MW     Pain Location  hip  -MW     Pre/Posttreatment Pain Comment  Pt just received pain meds  -MW     Pain Intervention(s)  Cold pack;Ambulation/increased activity;Repositioned;Rest  -MW     Row Name 10/24/20 1011          Plan of Care Review    Plan of Care Reviewed With  patient  -MW     Outcome Summary  Patient is a 48 yo female with h/o breast and thyroid cancer POD #1 Right femoral cephalo medullary nailing after pathologic R femoral neck fx. She lives with her spouse in a home with 1 TAYLER, independent with mobility at baseline using standard cane. She currently presents with post-op R hip pain and weakness. Today she was able to ambulate at  least 25 ft with RW SBA and stairs x 1 CGA. At this time, PT recommends home after discharge; RW and BSC issued to pt.  -MW     Row Name 10/24/20 1011          Therapy Assessment/Plan (PT)    Criteria for Skilled Interventions Met (PT)  (S) yes;other (see comments) Acute PT x 1 visit for pt education and safety with mobility  -MW     Row Name 10/24/20 1011          Vital Signs    O2 Delivery Pre Treatment  room air  -MW     O2 Delivery Intra Treatment  room air  -MW     O2 Delivery Post Treatment  room air  -MW     Pre Patient Position  Supine  -MW     Intra Patient Position  Standing  -MW     Post Patient Position  Sitting  -MW     Row Name 10/24/20 1011          Positioning and Restraints    Pre-Treatment Position  in bed  -MW     Post Treatment Position  chair  -MW     In Chair  reclined;sitting;exit alarm on;encouraged to call for assist;call light within reach  -MW       User Key  (r) = Recorded By, (t) = Taken By, (c) = Cosigned By    Initials Name Provider Type    Idania Sheikh PT Physical Therapist        Outcome Measures     Row Name 10/24/20 1017          How much help from another person do you currently need...    Turning from your back to your side while in flat bed without using bedrails?  4  -MW     Moving from lying on back to sitting on the side of a flat bed without bedrails?  4  -MW     Moving to and from a bed to a chair (including a wheelchair)?  4  -MW     Standing up from a chair using your arms (e.g., wheelchair, bedside chair)?  4  -MW     Climbing 3-5 steps with a railing?  3  -MW     To walk in hospital room?  4  -MW     AM-PAC 6 Clicks Score (PT)  23  -MW     Row Name 10/24/20 1017          Functional Assessment    Outcome Measure Options  AM-PAC 6 Clicks Basic Mobility (PT)  -MW       User Key  (r) = Recorded By, (t) = Taken By, (c) = Cosigned By    Initials Name Provider Type    Idania Sheikh PT Physical Therapist        Physical Therapy Education                 Title: PT OT  SLP Therapies (Done)     Topic: Physical Therapy (Done)     Point: Mobility training (Done)     Learning Progress Summary           Patient Acceptance, E,D, VU by  at 10/24/2020 1018                   Point: Home exercise program (Done)     Learning Progress Summary           Patient Acceptance, E,D, VU by  at 10/24/2020 1018                   Point: Body mechanics (Done)     Learning Progress Summary           Patient Acceptance, E,D, VU by  at 10/24/2020 1018                   Point: Precautions (Done)     Learning Progress Summary           Patient Acceptance, E,D, VU by  at 10/24/2020 1018                               User Key     Initials Effective Dates Name Provider Type Discipline     09/17/19 -  Idania Stevenson PT Physical Therapist PT              PT Recommendation and Plan     Plan of Care Reviewed With: patient  Outcome Summary: Patient is a 48 yo female with h/o breast and thyroid cancer POD #1 Right femoral cephalo medullary nailing after pathologic R femoral neck fx. She lives with her spouse in a home with 1 TAYLER, independent with mobility at baseline using standard cane. She currently presents with post-op R hip pain and weakness. Today she was able to ambulate at least 25 ft with RW SBA and stairs x 1 CGA. At this time, PT recommends home after discharge; RW and BSC issued to pt.     Time Calculation:   PT Charges     Row Name 10/24/20 1000             Time Calculation    Start Time  0911  -MW      Stop Time  0950  -MW      Time Calculation (min)  39 min  -MW      PT Received On  10/24/20  -MW         Time Calculation- PT    Total Timed Code Minutes- PT  38 minute(s)  -MW        User Key  (r) = Recorded By, (t) = Taken By, (c) = Cosigned By    Initials Name Provider Type    Idania Sheikh PT Physical Therapist        Therapy Charges for Today     Code Description Service Date Service Provider Modifiers Qty    22359069982 HC PT EVAL LOW COMPLEXITY 2 10/24/2020 Idania Stevenson PT GP 1     [id number]  GAIT TRAINING EA 15 MIN 10/24/2020 Idania Stevenson, PT GP 1    16007023024 HC PT THER PROC EA 15 MIN 10/24/2020 Idania Stevenson, PT GP 1          PT G-Codes  Outcome Measure Options: AM-PAC 6 Clicks Basic Mobility (PT)  AM-PAC 6 Clicks Score (PT): 23    Idania Stevenson, PT  10/24/2020

## 2020-10-24 NOTE — NURSING NOTE
Offered pt a percocet prior to DC, but unable to administer until 1236 per hospital protocol. Patient did not want to wait.

## 2020-10-24 NOTE — PLAN OF CARE
Goal Outcome Evaluation:  Plan of Care Reviewed With: patient  Progress: improving     Plan is for DC home with HH today

## 2020-10-24 NOTE — DISCHARGE PLACEMENT REQUEST
"Ina Galvan (49 y.o. Female)     Date of Birth Social Security Number Address Home Phone MRN    1970  [id number] [place] [id number] [phone] [id number]    Hindu Marital Status          Adventism        Admission Date Admission Type Admitting Provider Attending Provider Department, Room/Bed    10/23/20 Elective Gretchen Connell MD Yakkanti, Madhusudhan R, MD [address], P878/1    Discharge Date Discharge Disposition Discharge Destination         Home-Health Care [facility] [place]              Attending Provider: Gretchen Connell MD    Allergies: Hydrocodone, Iodine    Isolation: None   Infection: None   Code Status: CPR    Ht: 154.9 cm (60.98\")   Wt: 81.2 kg (179 lb 0.2 oz)    Admission Cmt: None   Principal Problem: Fracture, pathologic, femur, neck, right, initial encounter (CMS/[facility]) [M84.451A] More...                 Active Insurance as of 10/23/2020     Primary Coverage     Payor Plan Insurance Group Employer/Plan Group    HUMANA HUMANA [id number]     Payor Plan Address Payor Plan Phone Number Payor Plan Fax Number Effective Dates    [address] [address] [phone]  1/1/2016 - None Entered    [facility] [id number]       Subscriber Name Subscriber Birth Date Member ID       PARIS GALVAN 7/6/1969 [id number]                 Emergency Contacts      (Rel.) Home Phone Work Phone Mobile Phone    Raghav Galvan (Spouse) [phone] -- [phone]    MOLINA GALVAN (Daughter) [phone] -- --              "

## 2020-10-26 ENCOUNTER — TELEPHONE (OUTPATIENT)
Dept: ONCOLOGY | Facility: CLINIC | Age: 50
End: 2020-10-26

## 2020-10-26 ENCOUNTER — TRANSITIONAL CARE MANAGEMENT TELEPHONE ENCOUNTER (OUTPATIENT)
Dept: CALL CENTER | Facility: HOSPITAL | Age: 50
End: 2020-10-26

## 2020-10-26 LAB
LAB AP CASE REPORT: NORMAL
LAB AP DIAGNOSIS COMMENT: NORMAL
LAB AP SPECIAL STAINS: NORMAL
PATH REPORT.FINAL DX SPEC: NORMAL
PATH REPORT.GROSS SPEC: NORMAL

## 2020-10-26 NOTE — OUTREACH NOTE
Call Center TCM Note      Responses   [facility] patient discharged from?  [place]   Does the patient have one of the following disease processes/diagnoses(primary or secondary)?  General Surgery   TCM attempt successful?  Yes   Discharge diagnosis  s/p Open femur, edil insertion for fracture   Meds reviewed with patient/caregiver?  Yes   Is the patient having any side effects they believe may be caused by any medication additions or changes?  No   Does the patient have all medications related to this admission filled (includes all antibiotics, pain medications, etc.)  Yes   Is the patient taking all medications as directed (includes completed medication regime)?  Yes   Does the patient have a follow up appointment scheduled with their surgeon?  Yes   Comments  post op 11/04/2020   What is the Home health agency?   [facility]   Has home health visited the patient within 72 hours of discharge?  Yes   What DME was ordered?  Walker commode chair   Has all DME been delivered?  Yes   Psychosocial issues?  No   Nursing interventions  Reviewed instructions with patient   What is the patient's perception of their health status since discharge?  Improving   Is the patient /caregiver able to teach back basic post-op care?  Continue use of incentive spirometry at least 1 week post discharge, Drive as instructed by MD in discharge instructions, Do not remove steri-strips, No tub bath, swimming, or hot tub until instructed by MD, Practice 'cough and deep breath', Take showers only when approved by MD-sponge bathe until then, Keep incision areas clean,dry and protected, Lifting as instructed by MD in discharge instructions   Is the patient/caregiver able to teach back signs and symptoms of incisional infection?  Increased redness, swelling or pain at the incisonal site, Pus or odor from incision, Fever, Increased drainage or bleeding, Incisional warmth   Is the patient/caregiver able to teach back steps to recovery at home?  Set  small, achievable goals for return to baseline health, Practice good oral hygiene, Eat a well-balance diet, Rest and rebuild strength, gradually increase activity, Weigh daily, Make a list of questions for surgeon's appointment   Is the patient/caregiver able to teach back the hierarchy of who to call/visit for symptoms/problems? PCP, Specialist, Home health nurse, Urgent Care, ED, 911  Yes   TCM call completed?  Yes   Wrap up additional comments  Pt doing pretty well s/p nail rodding for femur fx. Pt does have some concerns re: taking Eliquis & has d'cussed with ortho surgeon and is calling PCP and she will make her own TCM FWP. Pt is sched for ortho post op on 11/04/2020          Polina Clemente MA    10/26/2020, 16:16 EDT

## 2020-10-26 NOTE — TELEPHONE ENCOUNTER
Ina called to cancel her infusion 10/30. The oncologist at [facility] suggested that she not get that infusion.   She's also asking if it's necessary to come in for lab and follow up on 10/30. She says she had surgery on 10/23 and will have post-op on 11/04.    Phone# [phone]

## 2020-10-26 NOTE — PROGRESS NOTES
Case Management Discharge Note      Final Note: Pt d/c home with family support and [facility]. CCP called the patient who confirmed she recieved her walker and commode chair at d/c. No other needs identified.         Selected Continued Care - Discharged on 10/24/2020 Admission date: 10/23/2020 - Discharge disposition: Home-Health Care Svc    Destination    No services have been selected for the patient.              Durable Medical Equipment    No services have been selected for the patient.              Dialysis/Infusion    No services have been selected for the patient.              Home Medical Care Coordination complete    Service Provider Selected Services Address Phone Fax    [facility] [place]  [facility] [address] [address] [phone]          Therapy    No services have been selected for the patient.              Community Resources    No services have been selected for the patient.                       Final Discharge Disposition Code: 06 - home with home health care

## 2020-10-30 ENCOUNTER — APPOINTMENT (OUTPATIENT)
Dept: ONCOLOGY | Facility: HOSPITAL | Age: 50
End: 2020-10-30

## 2020-11-02 ENCOUNTER — OFFICE VISIT (OUTPATIENT)
Dept: FAMILY MEDICINE CLINIC | Facility: CLINIC | Age: 50
End: 2020-11-02

## 2020-11-02 VITALS
DIASTOLIC BLOOD PRESSURE: 85 MMHG | HEIGHT: 60 IN | HEART RATE: 118 BPM | WEIGHT: 180.4 LBS | SYSTOLIC BLOOD PRESSURE: 138 MMHG | OXYGEN SATURATION: 99 % | BODY MASS INDEX: 35.42 KG/M2

## 2020-11-02 DIAGNOSIS — C41.9 METASTATIC BONE CANCER: Primary | ICD-10-CM

## 2020-11-02 DIAGNOSIS — R10.10 PAIN OF UPPER ABDOMEN: ICD-10-CM

## 2020-11-02 PROBLEM — IMO0001 METASTATIC BONE CANCER: Status: ACTIVE | Noted: 2020-09-23

## 2020-11-02 PROCEDURE — 99213 OFFICE O/P EST LOW 20 MIN: CPT | Performed by: NURSE PRACTITIONER

## 2020-11-02 RX ORDER — LEVOTHYROXINE SODIUM 88 UG/1
TABLET ORAL
COMMUNITY
Start: 2020-10-26 | End: 2020-11-25 | Stop reason: HOSPADM

## 2020-11-02 RX ORDER — PROMETHAZINE HYDROCHLORIDE 12.5 MG/1
TABLET ORAL
Qty: 30 TABLET | Refills: 3 | Status: SHIPPED | OUTPATIENT
Start: 2020-11-02 | End: 2021-09-17

## 2020-11-02 NOTE — PROGRESS NOTES
"Subjective   Ina Akers is a 49 y.o. female.     Patient is here today, follow-up after being diagnosed with metastatic cancer to the lower spine and to right hip as well as left skull.  Etiology per 2 biopsies is likely related to thyroid.  She is scheduled to have a thyroidectomy soon.  Had received a couple radiation treatments but now she has been referred to oncology and [place] and she can no longer get the radiation treatments.  She has no severe complaints today she is will be getting chemo she is making quite a bit of changes in her life to make sure she gives everything to treat her cancer  She had had an occasional sharp pain left upper abdomen without any radiation into her chest or any associated symptoms and a sharp pain mild only lasting a second or 2 then resolving  Occur several times throughout the day she thought she better she gets things checked out not take any changes  She has no pain presently nothing related to meals no chest pain shortness of breath no increasing back pain no weakness no pelvic pains  Appetite remains good and she is without fever  No coffee-ground emesis or black tarry stools    Understandably she has had anxiety related to her treatment but she is doing what she needs to do       /85   Pulse 118   Ht 152.4 cm (60\")   Wt 81.8 kg (180 lb 6.4 oz)   SpO2 99%   BMI 35.23 kg/m²       The following portions of the patient's history were reviewed and updated as appropriate: allergies, current medications, past family history, past medical history, past social history, past surgical history and problem list.    Review of Systems   Constitutional: Negative for chills, fatigue, fever and unexpected weight loss.   HENT: Negative.  Negative for trouble swallowing.    Eyes: Negative.    Respiratory: Negative for cough and shortness of breath.    Cardiovascular: Negative for chest pain, palpitations and leg swelling.   Gastrointestinal: Negative for abdominal pain " and blood in stool.   Genitourinary: Negative.  Negative for pelvic pain.   Musculoskeletal: Positive for back pain.   Skin: Negative.    Neurological: Negative.  Negative for dizziness, speech difficulty, weakness and confusion.   Psychiatric/Behavioral: Negative.        Objective   Physical Exam  Vitals signs reviewed.   Constitutional:       Appearance: She is well-developed.   HENT:      Head: Normocephalic.      Nose: Nose normal.   Eyes:      General: No scleral icterus.     Conjunctiva/sclera: Conjunctivae normal.      Pupils: Pupils are equal, round, and reactive to light.   Neck:      Musculoskeletal: Neck supple.      Thyroid: No thyromegaly.      Vascular: No JVD.   Cardiovascular:      Rate and Rhythm: Normal rate and regular rhythm.      Heart sounds: Normal heart sounds. No murmur. No friction rub. No gallop.    Pulmonary:      Effort: Pulmonary effort is normal. No respiratory distress.      Breath sounds: Normal breath sounds. No stridor. No wheezing or rales.   Abdominal:      General: Bowel sounds are normal. There is no distension.      Palpations: Abdomen is soft. There is no mass.      Tenderness: There is no abdominal tenderness. There is no right CVA tenderness, left CVA tenderness, guarding or rebound.      Hernia: No hernia is present.      Comments: No hepatosplenomegaly, no ascites,  Completely normal abdominal exam no hepatosplenomegaly nontender bowel sounds are positive no flank tenderness  No masses no distention   Musculoskeletal:         General: No tenderness.   Lymphadenopathy:      Cervical: No cervical adenopathy.   Skin:     General: Skin is warm and dry.      Findings: No erythema or rash.   Neurological:      Mental Status: She is alert and oriented to person, place, and time.      Deep Tendon Reflexes: Reflexes are normal and symmetric.   Psychiatric:         Behavior: Behavior normal.         Thought Content: Thought content normal.         Judgment: Judgment normal.       Comments: Pleasant appropriate tearful at times when appropriate           Assessment/Plan   Diagnoses and all orders for this visit:    1. Metastatic bone cancer (CMS/HCC) (Primary)    2. Pain of upper abdomen  Comments:  Intermittent mild right upper sharp pain discomfort 1 day without red flags normal exam    Other orders  -     promethazine (PHENERGAN) 12.5 MG tablet; 1/2 to 1 tablet by mouth every 4 hours as needed for nausea vomiting caution sedation no falls or possible  Dispense: 30 tablet; Refill: 3      I think patient really just needed reassurance today  She is without any abdominal pain and does not report any worrisome abdominal pain should she have a new pain severe or persistent she would need a further work-up  She should go to the emergency room any severe abdominal pain weakness fever chills chest pain or shortness of breath    Likely a gassy pain she describes  Avoid spicy greasy foods  Promethazine related to  Need for chemo as she cannot tolerate Zofran  She should try lowering the dose such as 6.25 or 12.5 mg as needed  Caution sedation  She will follow up oncology and keep me in the loop of anything she may need                    There are no Patient Instructions on file for this visit.

## 2020-11-02 NOTE — PATIENT INSTRUCTIONS
Discharge instructions    Caution with diet avoid spicy greasy foods smaller more frequent meals the next several days should she have increased abdominal pain weakness nausea vomiting diaphoresis coffee-ground emesis black tarry stools increased back pain fever chills emergency room    Continue to follow-up with your good doctors  Follow-up with your oncologist in [place] as well  Communicate me next week on how you are doing with your abdomen issues

## 2020-11-03 ENCOUNTER — READMISSION MANAGEMENT (OUTPATIENT)
Dept: CALL CENTER | Facility: HOSPITAL | Age: 50
End: 2020-11-03

## 2020-11-03 NOTE — OUTREACH NOTE
General Surgery Week 2 Survey      Responses   [facility] patient discharged from?  [place]   Does the patient have one of the following disease processes/diagnoses(primary or secondary)?  General Surgery   Week 2 attempt successful?  Yes   Call start time  1300   Call end time  1305   Discharge diagnosis  s/p Open femur, edil insertion for fracture   Is patient permission given to speak with other caregiver?  No   Meds reviewed with patient/caregiver?  Yes   Is the patient having any side effects they believe may be caused by any medication additions or changes?  No   Does the patient have all medications related to this admission filled (includes all antibiotics, pain medications, etc.)  Yes   Is the patient taking all medications as directed (includes completed medication regime)?  Yes   Does the patient have a follow up appointment scheduled with their surgeon?  Yes   Has the patient kept scheduled appointments due by today?  Yes   Comments  post op 11/04/2020   What is the Home health agency?   [facility]   Has home health visited the patient within 72 hours of discharge?  Yes   Home health comments    PT   Psychosocial issues?  No   Did the patient receive a copy of their discharge instructions?  Yes   Nursing interventions  Reviewed instructions with patient   What is the patient's perception of their health status since discharge?  Improving [Patient reports that she is planned for upcoming thyroidectomy surgery. ]   Nursing interventions  Nurse provided patient education   Is the patient/caregiver able to teach back signs and symptoms of incisional infection?  Increased redness, swelling or pain at the incisonal site, Increased drainage or bleeding, Incisional warmth, Pus or odor from incision, Fever [patient reports hip wound healing well. No signs of infection. ]   Is the patient/caregiver able to teach back steps to recovery at home?  Set small, achievable goals for return to baseline health, Rest and  rebuild strength, gradually increase activity   If the patient is a current smoker, are they able to teach back resources for cessation?  Not a smoker   Is the patient/caregiver able to teach back the hierarchy of who to call/visit for symptoms/problems? PCP, Specialist, Home health nurse, Urgent Care, ED, 911  Yes   Week 2 call completed?  Yes          Polina Rose RN

## 2020-11-10 ENCOUNTER — TELEPHONE (OUTPATIENT)
Dept: ONCOLOGY | Facility: CLINIC | Age: 50
End: 2020-11-10

## 2020-11-10 NOTE — TELEPHONE ENCOUNTER
Caller: DASIA    Relationship to patient: [employer]    Best call back number: [phone]    CLAIM NUMBER [id number]    REQUESTING INFORMATION IN REGARDS TO PT'S APPTS. PLEASE CALL BACK WHEN ABLE

## 2020-11-11 ENCOUNTER — READMISSION MANAGEMENT (OUTPATIENT)
Dept: CALL CENTER | Facility: HOSPITAL | Age: 50
End: 2020-11-11

## 2020-11-11 NOTE — OUTREACH NOTE
General Surgery Week 3 Survey      Responses   [facility] patient discharged from?  [place]   Does the patient have one of the following disease processes/diagnoses(primary or secondary)?  General Surgery   Week 3 attempt successful?  No   Unsuccessful attempts  Attempt 1          Afia Chappell RN

## 2020-11-13 ENCOUNTER — READMISSION MANAGEMENT (OUTPATIENT)
Dept: CALL CENTER | Facility: HOSPITAL | Age: 50
End: 2020-11-13

## 2020-11-13 NOTE — OUTREACH NOTE
General Surgery Week 3 Survey      Responses   [facility] [place] patient discharged from?  [place]   Does the patient have one of the following disease processes/diagnoses(primary or secondary)?  General Surgery   Week 3 attempt successful?  Yes   Call start time  1503   Call end time  1509   Meds reviewed with patient/caregiver?  Yes   Is the patient having any side effects they believe may be caused by any medication additions or changes?  No   Does the patient have all medications related to this admission filled (includes all antibiotics, pain medications, etc.)  Yes   Is the patient taking all medications as directed (includes completed medication regime)?  Yes   Does the patient have a follow up appointment scheduled with their surgeon?  Yes   Has the patient kept scheduled appointments due by today?  Yes   Comments  Next surgeon appt 11/18/20 for thyroid surgery done on 11/09/20.   Has home health visited the patient within 72 hours of discharge?  Yes   Home health comments  States has been discharged by  PT.   Psychosocial issues?  No   Did the patient receive a copy of their discharge instructions?  Yes   Nursing interventions  Reviewed instructions with patient   What is the patient's perception of their health status since discharge?  Improving   Nursing interventions  Nurse provided patient education   Is the patient/caregiver able to teach back the hierarchy of who to call/visit for symptoms/problems? PCP, Specialist, Home health nurse, Urgent Care, ED, 911  Yes   Week 3 call completed?  Yes   Revoked  No further contact(revokes)-requires comment   Graduated/Revoked comments  Denies any need for further calls. States is doing well and will call if any questions/concerns.   Wrap up additional comments  States is feeling better-states hip has healed. States had thyroidectomy on 11/09/20 at [facility], and doing well. Denies any s/s of infection.          Shavonne Berry RN

## 2020-11-18 ENCOUNTER — HOSPITAL ENCOUNTER (OUTPATIENT)
Dept: MRI IMAGING | Facility: HOSPITAL | Age: 50
Discharge: HOME | End: 2020-11-18
Admitting: NEUROLOGICAL SURGERY

## 2020-11-18 DIAGNOSIS — M89.9 SKULL LESION: ICD-10-CM

## 2020-11-18 DIAGNOSIS — D49.2 LUMBAR SPINE TUMOR: ICD-10-CM

## 2020-11-18 PROCEDURE — 82565 ASSAY OF CREATININE: CPT

## 2020-11-18 PROCEDURE — A9577 INJ MULTIHANCE: HCPCS | Performed by: NEUROLOGICAL SURGERY

## 2020-11-18 PROCEDURE — 0 GADOBENATE DIMEGLUMINE 529 MG/ML SOLUTION: Performed by: NEUROLOGICAL SURGERY

## 2020-11-18 PROCEDURE — 70553 MRI BRAIN STEM W/O & W/DYE: CPT

## 2020-11-18 RX ADMIN — GADOBENATE DIMEGLUMINE 17 ML: 529 INJECTION, SOLUTION INTRAVENOUS at 16:42

## 2020-11-19 LAB — CREAT BLDA-MCNC: 0.7 MG/DL (ref 0.6–1.3)

## 2020-11-24 NOTE — PROGRESS NOTES
Subjective   History of Present Illness: Ina Akers is a 50 y.o. female is here today for 2 month follow-up with MRI Brain 20. She is s/p L4 Osteo-cool ablation, biopsy, and  kyphoplasty by Dr. Rios on he was last in the office 20 for p/o visit.  Today Ms. Akers reports she is still having back and bilateral hip pain. She had R hip surgery in 10/23/20.  Overall she appears to be doing well.  She was diagnosed with metastatic thyroid cancer and has since had her thyroid removed.  Is soon to start radioisotope iodine treatment.  She denies any new numbness or weakness in her lower extremities.  Denies any headaches.  Denies any visual changes.   History of Present Illness    The following portions of the patient's history were reviewed and updated as appropriate: allergies, past family history, past medical history, past social history, past surgical history and problem list.    Past Medical History:   Diagnosis Date   • Anemia     History of recurent iron deficiency   • Compression fracture of fourth lumbar vertebra (CMS/HCC)     RECENT KYPHOPLASTY 2020   • Diabetes mellitus (CMS/HCC)    • GERD (gastroesophageal reflux disease)    • History of breast cancer 2001    LEFT BREAST MASTECTOMY, CHEMO AND RADIATION    • History of gastritis    • History of thyroid nodule    • Hyperlipidemia    • Hypertension    • Metastatic bone cancer (CMS/HCC)     RIB, HIP - AWAITING FINDINGS FOR PRIMARY SOURCE (THYROID VS BREAST)   • PONV (postoperative nausea and vomiting)         Past Surgical History:   Procedure Laterality Date   •  SECTION     • FEMUR IM NAILING/RODDING Right 10/23/2020    Procedure: HIP INTERTROCHCHANTERIC NAILING;  Surgeon: Gretchen Connell MD;  Location: [facility];  Service: Orthopedics;  Laterality: Right;   • KYPHOPLASTY N/A 2020    Procedure: Lumbar 4  osteo cool radiofrequency ablation and KYPHOPLASTY;  Surgeon: Vik Rios MD;  Location:  [facility] HYBRID OR 18/19;  Service: Neurosurgery;  Laterality: N/A;   • MASTECTOMY, PARTIAL Left 2001   • RIB BIOPSY  10/2020   • THYROIDECTOMY, PARTIAL Right 2018          Current Outpatient Medications:   •  acetaminophen (TYLENOL) 500 MG tablet, Take 1,000 mg by mouth Every 6 (Six) Hours As Needed for Mild Pain ., Disp: , Rfl:   •  amLODIPine (NORVASC) 5 MG tablet, TAKE ONE TABLET BY MOUTH DAILY (Patient taking differently: Take 5 mg by mouth Daily.), Disp: 90 tablet, Rfl: 1  •  atorvastatin (LIPITOR) 10 MG tablet, Take 1 tablet by mouth Daily. (Patient taking differently: Take 10 mg by mouth Every Night.), Disp: 90 tablet, Rfl: 3  •  Blood Glucose Monitoring Suppl w/Device kit, Check blood sugar twice a day, Disp: 1 each, Rfl: 0  •  cholecalciferol (VITAMIN D3) 25 MCG (1000 UT) tablet, Take 1,000 Units by mouth Daily., Disp: , Rfl:   •  Docusate Calcium (STOOL SOFTENER PO), Take 1 tablet by mouth As Needed. As needed, Disp: , Rfl:   •  erythromycin (ROMYCIN) 5 MG/GM ophthalmic ointment, Apply  to eye(s) as directed by provider Every 4 (Four) Hours While Awake. (Patient taking differently: Apply  to eye(s) as directed by provider As Needed.), Disp: 1 each, Rfl: 0  •  Glucose Blood (BLOOD GLUCOSE TEST) strip, Check blood sugar twice a day., Disp: 100 each, Rfl: 1  •  Hydrocortisone, Perianal, (ANUSOL-HC) 2.5 % rectal cream, Insert  into the rectum 3 (Three) Times a Day As Needed for Hemorrhoids., Disp: 60 g, Rfl: 2  •  Lancets (ACCU-CHEK SOFT TOUCH) lancets, Check blood sugar twice a day as needed, Disp: 100 each, Rfl: 1  •  lansoprazole (PREVACID) 30 MG capsule, Take 1 capsule by mouth Daily., Disp: 90 capsule, Rfl: 3  •  liothyronine (CYTOMEL) 25 MCG tablet, , Disp: , Rfl:   •  losartan (COZAAR) 50 MG tablet, TAKE ONE TABLET BY MOUTH DAILY (Patient taking differently: Take 50 mg by mouth Daily.), Disp: 90 tablet, Rfl: 0  •  metFORMIN ER (GLUCOPHAGE-XR) 500 MG 24 hr tablet, TAKE TWO TABLETS BY MOUTH DAILY WITH  DINNER (Patient taking differently: Take 1,000 mg by mouth Every Night.), Disp: 180 tablet, Rfl: 1  •  ondansetron (ZOFRAN) 4 MG tablet, Take 1 tablet by mouth Every 6 (Six) Hours As Needed for Nausea or Vomiting., Disp: 30 tablet, Rfl: 0  •  promethazine (PHENERGAN) 12.5 MG tablet, 1/2 to 1 tablet by mouth every 4 hours as needed for nausea vomiting caution sedation no falls or possible, Disp: 30 tablet, Rfl: 3  •  triamcinolone (KENALOG) 0.1 % cream, Apply  topically to the appropriate area as directed 2 (Two) Times a Day. Use sparingly avoid face (Patient taking differently: Apply  topically to the appropriate area as directed As Needed. Use sparingly avoid face), Disp: 60 g, Rfl: 1  •  ferrous sulfate 325 (65 FE) MG tablet, Take 325 mg by mouth Daily With Breakfast., Disp: , Rfl:   •  oxyCODONE-acetaminophen (PERCOCET) 5-325 MG per tablet, Take 1 tablet by mouth Every 4 (Four) Hours As Needed for Moderate Pain ., Disp: 42 tablet, Rfl: 0     Social History     Socioeconomic History   • Marital status:      Spouse name: Raghav   • Number of children: 2   • Years of education: Not on file   • Highest education level: Not on file   Occupational History     Employer: [employer]     Employer: [employer]   Tobacco Use   • Smoking status: Never Smoker   • Smokeless tobacco: Never Used   Substance and Sexual Activity   • Alcohol use: Yes     Comment: Socially   • Drug use: No   • Sexual activity: Defer        Family History   Problem Relation Age of Onset   • Hypertension Other    • Diabetes Other    • Malig Hyperthermia Neg Hx         Review of Systems   Constitutional: Negative for chills, fatigue and fever.   Musculoskeletal: Positive for back pain and gait problem. Negative for neck pain and neck stiffness.   Neurological: Positive for numbness. Negative for weakness.       Objective     Vitals:    11/25/20 1649   BP: 126/68   Temp: 97.9 °F (36.6 °C)  "  Weight: 81.6 kg (180 lb)   Height: 152.4 cm (60\")     Body mass index is 35.15 kg/m².      Physical Exam  Neurologic Exam  Awake, alert, oriented x3  Pupils equal round reactive to light  Extraocular muscles intact  Face symmetric  Speech is fluent and clear  No pronator drift  Motor exam  Bilateral deltoids 5/5, bilateral biceps 5/5, bilateral triceps 5/5, bilateral wrist extension 5/5 bilateral hand  5/5  Bilateral hip flexion 5/5, bilateral knee extension 5/5, bilateral DF/PF 5/5  No clonus  No Alethea's reflex  2+ bilateral patellar reflexes  2+ bilateral biceps reflex  Steady normal gait  Able to walk heel-to-toe without difficulty  Able to detect  light touch in all 4 extremities      Assessment/Plan   Independent Review of Radiographic Studies:      I personally reviewed the images from the following studies.  MRI brain with and without contrast from 2020  There is a 1.9 nm x 0.7 cm left parietal skull lesion extending in towards the dura.  There is no intracranial mass lesions.  This lesion is suspicious for metastatic lesion however there is no significant growth since her previous MRI.     Medical Decision Makin-year-old female status post L4 osteo-Coblation and kyphoplasty on 2020  -She has been since diagnosed with metastatic thyroid cancer and had her thyroid gland removed  -She continues to have mild to moderate back pain which is localized and not radiating.  Will order follow-up x-ray to evaluate for any new fractures.  We will follow up x-ray results in 1 to 2 weeks with a phone encounter.  -I am concerned that this left parietal lesion could possibly be a metastatic lesion.  Will plan for follow-up MRI in 3 months.     Diagnoses and all orders for this visit:    1. Lumbar spine tumor (Primary)    2. Skull lesion      Return in about 3 months (around 2021).         "

## 2020-11-25 ENCOUNTER — OFFICE VISIT (OUTPATIENT)
Dept: NEUROSURGERY | Facility: CLINIC | Age: 50
End: 2020-11-25

## 2020-11-25 VITALS
HEIGHT: 60 IN | WEIGHT: 180 LBS | BODY MASS INDEX: 35.34 KG/M2 | DIASTOLIC BLOOD PRESSURE: 68 MMHG | SYSTOLIC BLOOD PRESSURE: 126 MMHG | TEMPERATURE: 97.9 F

## 2020-11-25 DIAGNOSIS — M89.9 SKULL LESION: ICD-10-CM

## 2020-11-25 DIAGNOSIS — D49.2 LUMBAR SPINE TUMOR: Primary | ICD-10-CM

## 2020-11-25 PROCEDURE — 99214 OFFICE O/P EST MOD 30 MIN: CPT | Performed by: NEUROLOGICAL SURGERY

## 2020-11-25 RX ORDER — LIOTHYRONINE SODIUM 25 UG/1
TABLET ORAL
COMMUNITY
Start: 2020-11-09 | End: 2021-03-05

## 2020-11-27 ENCOUNTER — APPOINTMENT (OUTPATIENT)
Dept: MRI IMAGING | Facility: HOSPITAL | Age: 50
End: 2020-11-27

## 2020-11-29 DIAGNOSIS — I10 ESSENTIAL HYPERTENSION: ICD-10-CM

## 2020-11-29 DIAGNOSIS — E11.9 CONTROLLED TYPE 2 DIABETES MELLITUS WITHOUT COMPLICATION, WITHOUT LONG-TERM CURRENT USE OF INSULIN (HCC): ICD-10-CM

## 2020-11-29 DIAGNOSIS — E78.5 HYPERLIPIDEMIA, UNSPECIFIED HYPERLIPIDEMIA TYPE: ICD-10-CM

## 2020-11-29 DIAGNOSIS — E55.9 VITAMIN D DEFICIENCY: ICD-10-CM

## 2020-11-30 RX ORDER — LANSOPRAZOLE 30 MG/1
CAPSULE, DELAYED RELEASE ORAL
Qty: 90 CAPSULE | Refills: 2 | Status: SHIPPED | OUTPATIENT
Start: 2020-11-30 | End: 2022-03-22

## 2020-12-11 ENCOUNTER — TELEPHONE (OUTPATIENT)
Dept: FAMILY MEDICINE CLINIC | Facility: CLINIC | Age: 50
End: 2020-12-11

## 2020-12-11 DIAGNOSIS — Z20.822 EXPOSURE TO COVID-19 VIRUS: Primary | ICD-10-CM

## 2020-12-11 NOTE — TELEPHONE ENCOUNTER
Please call patient ASAP  See if we get her and to do a drive-through Covid test    Plenty of fluids      If she gets symptoms, of Covid cough congestion fever  She should be seen and monitored closely    At any time should she have high fever chest pain or shortness of breath immediately to the emergency room without hesitation    If she was to get Covid as well I will start her on Zithromax  To protect her from any secondary infection as well    Quarantine 10 to 14 days

## 2020-12-11 NOTE — TELEPHONE ENCOUNTER
PATIENT STATES THAT SHE WAS JUST DIAGNOISED WITH CANCER AND DAUGHTER VISITS PERIODICALLY AND WAS JUST EXPOSED BY BOYFRIENDS SIBLING.  PLEASE ADVISE IF PATIENT NEEDS TO BE TESTED?    NO SYMPTOMS (HUB SCREENED 12/11)    PATIENT WILL START TREATMENTS IN [place] ON 12/14.    PLEASE CALL HER @ [phone].

## 2020-12-14 DIAGNOSIS — Z20.822 EXPOSURE TO COVID-19 VIRUS: ICD-10-CM

## 2020-12-15 LAB — SARS-COV-2 RNA RESP QL NAA+PROBE: NOT DETECTED

## 2020-12-21 ENCOUNTER — OFFICE VISIT (OUTPATIENT)
Dept: FAMILY MEDICINE CLINIC | Facility: CLINIC | Age: 50
End: 2020-12-21

## 2020-12-21 ENCOUNTER — TELEPHONE (OUTPATIENT)
Dept: FAMILY MEDICINE CLINIC | Facility: CLINIC | Age: 50
End: 2020-12-21

## 2020-12-21 VITALS
WEIGHT: 175.4 LBS | BODY MASS INDEX: 34.44 KG/M2 | SYSTOLIC BLOOD PRESSURE: 138 MMHG | DIASTOLIC BLOOD PRESSURE: 90 MMHG | OXYGEN SATURATION: 99 % | HEART RATE: 102 BPM | TEMPERATURE: 97.3 F | HEIGHT: 60 IN

## 2020-12-21 DIAGNOSIS — R50.9 FEVER, UNSPECIFIED FEVER CAUSE: Primary | ICD-10-CM

## 2020-12-21 PROCEDURE — 99214 OFFICE O/P EST MOD 30 MIN: CPT | Performed by: NURSE PRACTITIONER

## 2020-12-21 RX ORDER — LENVATINIB 10 MG/1
CAPSULE ORAL
COMMUNITY
End: 2021-03-05

## 2020-12-21 RX ORDER — AMOXICILLIN AND CLAVULANATE POTASSIUM 875; 125 MG/1; MG/1
1 TABLET, FILM COATED ORAL 2 TIMES DAILY
Qty: 14 TABLET | Refills: 0 | Status: SHIPPED | OUTPATIENT
Start: 2020-12-21 | End: 2021-03-05

## 2020-12-21 RX ORDER — HYDROCHLOROTHIAZIDE 25 MG/1
25 TABLET ORAL DAILY
COMMUNITY
Start: 2020-11-30 | End: 2022-02-18

## 2020-12-21 NOTE — PROGRESS NOTES
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  She is getting treatment [place] her oncologist  And she recently had thyroid injection, followed by radioactive iodine tablet she swallowed and she had quite a bit of reactive pain to her low back and hip  And she spoke to her oncologist who said that was to be expected  But unfortunately her cancer, would not be able to to be fully treated with this type of intervention and she will require further intervention of  Chemotherapy    Presently no fever  No sore throat no difficulty swallowing    Patient had a potential exposure last week and her Covid test was -7 days ago.  Her  had fever this weekend he went in for test is pending            Physical exam no significant lymphadenopathy she has some mild localized tenderness and tiny enlarged nodes    Chest clear heart regular rate and rhythm patient appears well no distress          Zithromax if she gets fever again  Any persistent fever chest pain shortness of breath however emergency room    Quarantine Covid test today

## 2020-12-21 NOTE — TELEPHONE ENCOUNTER
Patient wants to let James Epley know that the glands under both her ears are swollen. She made an appointment for tomorrow at 9am. She just wants to make sure that [facility] wants her to come in.

## 2020-12-21 NOTE — PATIENT INSTRUCTIONS
Discharge instructions plenty of fluids follow instructions with your doctor quarantine  Cannot rule out Covid    No evidence of pneumonia today if you develop fever again start the antibiotic however    High fever persistent fever  Increased neck swelling chest pain shortness of breath lethargy weakness any worsening any significant worsening emergency room the antibiotic is insufficient and you need further work-up immediately

## 2020-12-22 PROBLEM — D84.9 IMMUNOCOMPROMISED: Status: ACTIVE | Noted: 2020-12-22

## 2020-12-23 LAB — SARS-COV-2 RNA RESP QL NAA+PROBE: NOT DETECTED

## 2020-12-28 ENCOUNTER — TELEPHONE (OUTPATIENT)
Dept: FAMILY MEDICINE CLINIC | Facility: CLINIC | Age: 50
End: 2020-12-28

## 2020-12-28 NOTE — TELEPHONE ENCOUNTER
PATIENT STATES: that she would like a call back about her high blood pressure please advise     PATIENT CAN BE REACHED ON:[phone]

## 2020-12-28 NOTE — TELEPHONE ENCOUNTER
I discussed with patient that they at the message she had no chest pain or shortness of breath her doctors were aware  Of the high suspicion Covid nonetheless want her to continue her infusions she is without  Chest pain or shortness of breath  She is very high risk and fever chest pain shortness of breath weakness emergency room low threshold for ER

## 2020-12-28 NOTE — TELEPHONE ENCOUNTER
Spoke with pt. Informed her [facility] is out of office this week but spoke to Kathia. Reccommended calling her ONC first as she recently started a new med from them. Informed her if they would like her to follow up with us she can be seen today.

## 2020-12-28 NOTE — TELEPHONE ENCOUNTER
----- Message from Carla Valdez sent at 12/23/2020  9:37 AM EST -----  Regarding: FW: Test Results Question  Contact: [phone]    ----- Message -----  From: Ina Akers  Sent: 12/23/2020   7:00 AM EST  To: [facility]  Subject: Test Results Question                            Good Morning Panfilo,    I regretfully report that my  has tested positive for COVID 19. I was tested yesterday afternoon per your order. I have not had anymore fever but have had occasional loose bowels along with the swelling and tenderness under my ears and chin that I previously reported. I thought the loose bowels was contributed to increase in Levothyroxine in combination with Cytomel.  The swelling has subsided but some tenderness remains. Just wanted you to know.    Ina Akers

## 2020-12-28 NOTE — TELEPHONE ENCOUNTER
PATIENT STATED SPOKE WITH ONCOLOGIST ABOUT BLOOD PRESSURE AND ONCOLOGIST WANTS PATIENT TO HAVE COVID-19 TEST BEFORE COMING IN OFFICE PATIENT  HAS TESTED POSTIVE    PATIENT REQUESTING CALLBACK  [phone]

## 2020-12-31 NOTE — PROGRESS NOTES
"Subjective   Ina Akers is a 50 y.o. female.     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  She is getting treatment [place] her oncologist  And she recently had thyroid injection, followed by radioactive iodine tablet she swallowed and she had quite a bit of reactive pain to her low back and hip  And she spoke to her oncologist who said that was to be expected  But unfortunately her cancer, would not be able to to be fully treated with this type of intervention and she will require further intervention of  Chemotherapy    Presently no fever  No sore throat no difficulty swallowing    Patient had a potential exposure last week and her Covid test was -7 days ago.  Her  had fever this weekend he went in for test is pending            Physical exam no significant lymphadenopathy she has some mild localized tenderness and tiny enlarged nodes    Chest clear heart regular rate and rhythm patient appears well no distress            Any persistent fever chest pain shortness of breath however emergency room    Quarantine Covid test today       /90   Pulse 102   Temp 97.3 °F (36.3 °C) (Temporal)   Ht 152.4 cm (60\")   Wt 79.6 kg (175 lb 6.4 oz)   SpO2 99%   BMI 34.26 kg/m²       The following portions of the patient's history were reviewed and updated as appropriate: allergies, current medications, past family history, past medical history, past social history, past surgical history and problem list.    Review of Systems    Objective   Physical Exam      Assessment/Plan   Diagnoses and all orders for this visit:    1. Fever, unspecified fever cause " (Primary)  -     COVID-19,[facility] ROUTINE, NP/OP SWAB IN TRANSPORT MEDIA OR ESWAB 72 HR TAT - Swab, Nasopharynx    Other orders  -     amoxicillin-clavulanate (Augmentin) 875-125 MG per tablet; Take 1 tablet by mouth 2 (Two) Times a Day.  Dispense: 14 tablet; Refill: 0        Patient is without chest pain shortness of breath symptoms are concerning she is at risk for pneumonia as well as Covid complications she will follow instructions below any acute illness emergency room any high fever chest pain shortness of breath weakness good communication with her oncologist          Patient Instructions   Discharge instructions plenty of fluids follow instructions with your doctor quarantine  Cannot rule out Covid    No evidence of pneumonia today if you develop fever again start the antibiotic however    High fever persistent fever  Increased neck swelling chest pain shortness of breath lethargy weakness any worsening any significant worsening emergency room the antibiotic is insufficient and you need further work-up immediately

## 2021-01-07 PROBLEM — Z12.11 ENCOUNTER FOR SCREENING FOR MALIGNANT NEOPLASM OF COLON: Status: ACTIVE | Noted: 2020-05-19

## 2021-01-20 ENCOUNTER — OFFICE VISIT (OUTPATIENT)
Dept: ONCOLOGY | Facility: CLINIC | Age: 51
End: 2021-01-20

## 2021-01-20 ENCOUNTER — LAB (OUTPATIENT)
Dept: OTHER | Facility: HOSPITAL | Age: 51
End: 2021-01-20

## 2021-01-20 VITALS
DIASTOLIC BLOOD PRESSURE: 82 MMHG | RESPIRATION RATE: 16 BRPM | HEART RATE: 74 BPM | WEIGHT: 178.1 LBS | HEIGHT: 61 IN | BODY MASS INDEX: 33.62 KG/M2 | OXYGEN SATURATION: 97 % | SYSTOLIC BLOOD PRESSURE: 128 MMHG | TEMPERATURE: 98 F

## 2021-01-20 DIAGNOSIS — D50.9 IRON DEFICIENCY ANEMIA, UNSPECIFIED IRON DEFICIENCY ANEMIA TYPE: ICD-10-CM

## 2021-01-20 DIAGNOSIS — D47.2 MONOCLONAL GAMMOPATHY: ICD-10-CM

## 2021-01-20 DIAGNOSIS — D69.6 THROMBOCYTOPENIA (HCC): ICD-10-CM

## 2021-01-20 DIAGNOSIS — D63.0 ANEMIA IN NEOPLASTIC DISEASE: ICD-10-CM

## 2021-01-20 DIAGNOSIS — Z17.1 MALIGNANT NEOPLASM OF UPPER-OUTER QUADRANT OF LEFT BREAST IN FEMALE, ESTROGEN RECEPTOR NEGATIVE (HCC): ICD-10-CM

## 2021-01-20 DIAGNOSIS — C50.412 MALIGNANT NEOPLASM OF UPPER-OUTER QUADRANT OF LEFT BREAST IN FEMALE, ESTROGEN RECEPTOR NEGATIVE (HCC): ICD-10-CM

## 2021-01-20 DIAGNOSIS — C73 THYROID CANCER (HCC): Primary | ICD-10-CM

## 2021-01-20 DIAGNOSIS — C79.51 BONE METASTASIS: ICD-10-CM

## 2021-01-20 LAB
ALBUMIN SERPL-MCNC: 4.3 G/DL (ref 3.5–5.2)
ALBUMIN/GLOB SERPL: 1.3 G/DL
ALP SERPL-CCNC: 85 U/L (ref 39–117)
ALT SERPL W P-5'-P-CCNC: 19 U/L (ref 1–33)
ANION GAP SERPL CALCULATED.3IONS-SCNC: 5.4 MMOL/L (ref 5–15)
AST SERPL-CCNC: 20 U/L (ref 1–32)
BASOPHILS # BLD AUTO: 0.01 10*3/MM3 (ref 0–0.2)
BASOPHILS NFR BLD AUTO: 0.2 % (ref 0–1.5)
BILIRUB SERPL-MCNC: 0.9 MG/DL (ref 0–1.2)
BUN SERPL-MCNC: 17 MG/DL (ref 6–20)
BUN/CREAT SERPL: 18.5 (ref 7–25)
CALCIUM SPEC-SCNC: 9.9 MG/DL (ref 8.6–10.5)
CHLORIDE SERPL-SCNC: 101 MMOL/L (ref 98–107)
CO2 SERPL-SCNC: 32.6 MMOL/L (ref 22–29)
CREAT SERPL-MCNC: 0.92 MG/DL (ref 0.57–1)
DEPRECATED RDW RBC AUTO: 47.1 FL (ref 37–54)
EOSINOPHIL # BLD AUTO: 0.24 10*3/MM3 (ref 0–0.4)
EOSINOPHIL NFR BLD AUTO: 4.8 % (ref 0.3–6.2)
ERYTHROCYTE [DISTWIDTH] IN BLOOD BY AUTOMATED COUNT: 15.7 % (ref 12.3–15.4)
FERRITIN SERPL-MCNC: 606.7 NG/ML (ref 13–150)
GFR SERPL CREATININE-BSD FRML MDRD: 78 ML/MIN/1.73
GLOBULIN UR ELPH-MCNC: 3.3 GM/DL
GLUCOSE SERPL-MCNC: 90 MG/DL (ref 65–99)
HCT VFR BLD AUTO: 30 % (ref 34–46.6)
HGB BLD-MCNC: 9.4 G/DL (ref 12–15.9)
IMM GRANULOCYTES # BLD AUTO: 0.02 10*3/MM3 (ref 0–0.05)
IMM GRANULOCYTES NFR BLD AUTO: 0.4 % (ref 0–0.5)
IRON 24H UR-MRATE: 71 MCG/DL (ref 37–145)
IRON SATN MFR SERPL: 20 % (ref 20–50)
LYMPHOCYTES # BLD AUTO: 1.16 10*3/MM3 (ref 0.7–3.1)
LYMPHOCYTES NFR BLD AUTO: 23.2 % (ref 19.6–45.3)
MCH RBC QN AUTO: 26.9 PG (ref 26.6–33)
MCHC RBC AUTO-ENTMCNC: 31.3 G/DL (ref 31.5–35.7)
MCV RBC AUTO: 86 FL (ref 79–97)
MONOCYTES # BLD AUTO: 0.62 10*3/MM3 (ref 0.1–0.9)
MONOCYTES NFR BLD AUTO: 12.4 % (ref 5–12)
NEUTROPHILS NFR BLD AUTO: 2.96 10*3/MM3 (ref 1.7–7)
NEUTROPHILS NFR BLD AUTO: 59 % (ref 42.7–76)
NRBC BLD AUTO-RTO: 0 /100 WBC (ref 0–0.2)
PLATELET # BLD AUTO: 107 10*3/MM3 (ref 140–450)
PMV BLD AUTO: 10.2 FL (ref 6–12)
POTASSIUM SERPL-SCNC: 3.2 MMOL/L (ref 3.5–5.2)
PROT SERPL-MCNC: 7.6 G/DL (ref 6–8.5)
RBC # BLD AUTO: 3.49 10*6/MM3 (ref 3.77–5.28)
SODIUM SERPL-SCNC: 139 MMOL/L (ref 136–145)
TIBC SERPL-MCNC: 364 MCG/DL (ref 298–536)
TRANSFERRIN SERPL-MCNC: 244 MG/DL (ref 200–360)
WBC # BLD AUTO: 5.01 10*3/MM3 (ref 3.4–10.8)

## 2021-01-20 PROCEDURE — 83883 ASSAY NEPHELOMETRY NOT SPEC: CPT | Performed by: INTERNAL MEDICINE

## 2021-01-20 PROCEDURE — 80053 COMPREHEN METABOLIC PANEL: CPT | Performed by: INTERNAL MEDICINE

## 2021-01-20 PROCEDURE — 84165 PROTEIN E-PHORESIS SERUM: CPT | Performed by: INTERNAL MEDICINE

## 2021-01-20 PROCEDURE — 82784 ASSAY IGA/IGD/IGG/IGM EACH: CPT | Performed by: INTERNAL MEDICINE

## 2021-01-20 PROCEDURE — 84466 ASSAY OF TRANSFERRIN: CPT | Performed by: INTERNAL MEDICINE

## 2021-01-20 PROCEDURE — 83540 ASSAY OF IRON: CPT | Performed by: INTERNAL MEDICINE

## 2021-01-20 PROCEDURE — 36415 COLL VENOUS BLD VENIPUNCTURE: CPT

## 2021-01-20 PROCEDURE — 86334 IMMUNOFIX E-PHORESIS SERUM: CPT | Performed by: INTERNAL MEDICINE

## 2021-01-20 PROCEDURE — 82728 ASSAY OF FERRITIN: CPT | Performed by: INTERNAL MEDICINE

## 2021-01-20 PROCEDURE — 85025 COMPLETE CBC W/AUTO DIFF WBC: CPT | Performed by: INTERNAL MEDICINE

## 2021-01-20 PROCEDURE — 99214 OFFICE O/P EST MOD 30 MIN: CPT | Performed by: INTERNAL MEDICINE

## 2021-01-20 RX ORDER — AMLODIPINE BESYLATE 10 MG/1
10 TABLET ORAL DAILY
COMMUNITY
Start: 2020-12-28 | End: 2021-07-15 | Stop reason: SDUPTHER

## 2021-01-20 RX ORDER — LEVOTHYROXINE SODIUM 112 UG/1
112 TABLET ORAL DAILY
COMMUNITY
Start: 2020-12-18 | End: 2021-07-15

## 2021-01-20 RX ORDER — LABETALOL 100 MG/1
200 TABLET, FILM COATED ORAL 2 TIMES DAILY
COMMUNITY
Start: 2021-01-02 | End: 2021-04-15

## 2021-01-20 RX ORDER — LOSARTAN POTASSIUM 100 MG/1
100 TABLET ORAL DAILY
COMMUNITY
Start: 2020-12-28

## 2021-01-20 NOTE — PROGRESS NOTES
Subjective     CHIEF COMPLAINT:      Chief Complaint   Patient presents with   • Follow-up       HISTORY OF PRESENT ILLNESS:     Ina Akers is a 50 y.o. female patient who returns today for follow up on her metastatic thyroid cancer. She is following up with Dr. Mario Huddleston at [facility]. She was started on Lenvima 20 mg daily on 12/24/2020. She is tolerating the treatment except for some fatigue. She developed cough and congestion. She was started on Augmentin on 1/19/2021.    Patient reports developing elevated blood pressure after starting Lenvima. She was placed on Labetalol 100 mg daily. The dose was gradually increased to BID due to the increase in the blood pressure.     The patient continues to have pain in the right hip. It worsens when she lays on her right side. She denies having back pain at present.     Past medical, surgical, social and family history reviewed.     MEDICATIONS:    Current Outpatient Medications:   •  acetaminophen (TYLENOL) 500 MG tablet, Take 1,000 mg by mouth Every 6 (Six) Hours As Needed for Mild Pain ., Disp: , Rfl:   •  amLODIPine (NORVASC) 10 MG tablet, , Disp: , Rfl:   •  amoxicillin-clavulanate (Augmentin) 875-125 MG per tablet, Take 1 tablet by mouth 2 (Two) Times a Day., Disp: 14 tablet, Rfl: 0  •  atorvastatin (LIPITOR) 10 MG tablet, Take 1 tablet by mouth Daily. (Patient taking differently: Take 10 mg by mouth Every Night.), Disp: 90 tablet, Rfl: 3  •  Blood Glucose Monitoring Suppl w/Device kit, Check blood sugar twice a day, Disp: 1 each, Rfl: 0  •  cholecalciferol (VITAMIN D3) 25 MCG (1000 UT) tablet, Take 1,000 Units by mouth Daily., Disp: , Rfl:   •  Docusate Calcium (STOOL SOFTENER PO), Take 1 tablet by mouth As Needed. As needed, Disp: , Rfl:   •  erythromycin (ROMYCIN) 5 MG/GM ophthalmic ointment, Apply  to eye(s) as directed by provider Every 4 (Four) Hours While Awake. (Patient taking differently: Apply  to eye(s) as directed by provider As  Needed.), Disp: 1 each, Rfl: 0  •  ferrous sulfate 325 (65 FE) MG tablet, Take 325 mg by mouth Daily With Breakfast., Disp: , Rfl:   •  Glucose Blood (BLOOD GLUCOSE TEST) strip, Check blood sugar twice a day., Disp: 100 each, Rfl: 1  •  hydroCHLOROthiazide (HYDRODIURIL) 25 MG tablet, , Disp: , Rfl:   •  Hydrocortisone, Perianal, (ANUSOL-HC) 2.5 % rectal cream, Insert  into the rectum 3 (Three) Times a Day As Needed for Hemorrhoids., Disp: 60 g, Rfl: 2  •  labetalol (NORMODYNE) 100 MG tablet, , Disp: , Rfl:   •  Lancets (ACCU-CHEK SOFT TOUCH) lancets, Check blood sugar twice a day as needed, Disp: 100 each, Rfl: 1  •  lansoprazole (PREVACID) 30 MG capsule, TAKE ONE CAPSULE BY MOUTH DAILY, Disp: 90 capsule, Rfl: 2  •  Lenvatinib, 20 MG Daily Dose, (Lenvima, 20 MG Daily Dose,) 2 x 10 MG capsule therapy, Take  by mouth., Disp: , Rfl:   •  levothyroxine (SYNTHROID, LEVOTHROID) 112 MCG tablet, , Disp: , Rfl:   •  liothyronine (CYTOMEL) 25 MCG tablet, , Disp: , Rfl:   •  losartan (COZAAR) 100 MG tablet, , Disp: , Rfl:   •  losartan (COZAAR) 50 MG tablet, TAKE ONE TABLET BY MOUTH DAILY (Patient taking differently: Take 50 mg by mouth Daily.), Disp: 90 tablet, Rfl: 0  •  metFORMIN ER (GLUCOPHAGE-XR) 500 MG 24 hr tablet, TAKE TWO TABLETS BY MOUTH DAILY WITH DINNER (Patient taking differently: Take 1,000 mg by mouth Every Night.), Disp: 180 tablet, Rfl: 1  •  ondansetron (ZOFRAN) 4 MG tablet, Take 1 tablet by mouth Every 6 (Six) Hours As Needed for Nausea or Vomiting., Disp: 30 tablet, Rfl: 0  •  oxyCODONE-acetaminophen (PERCOCET) 5-325 MG per tablet, Take 1 tablet by mouth Every 4 (Four) Hours As Needed for Moderate Pain ., Disp: 42 tablet, Rfl: 0  •  promethazine (PHENERGAN) 12.5 MG tablet, 1/2 to 1 tablet by mouth every 4 hours as needed for nausea vomiting caution sedation no falls or possible, Disp: 30 tablet, Rfl: 3  •  triamcinolone (KENALOG) 0.1 % cream, Apply  topically to the appropriate area as directed 2 (Two)  "Times a Day. Use sparingly avoid face (Patient taking differently: Apply  topically to the appropriate area as directed As Needed. Use sparingly avoid face), Disp: 60 g, Rfl: 1    Objective   VITAL SIGNS:     Vitals:    01/20/21 1557   BP: 128/82   Pulse: 74   Resp: 16   Temp: 98 °F (36.7 °C)   TempSrc: Skin   SpO2: 97%   Weight: 80.8 kg (178 lb 1.6 oz)   Height: 154.9 cm (60.98\")   PainSc: 0-No pain     Body mass index is 33.67 kg/m².     Wt Readings from Last 5 Encounters:   01/20/21 80.8 kg (178 lb 1.6 oz)   12/28/20 79.4 kg (175 lb)   12/21/20 79.6 kg (175 lb 6.4 oz)   11/25/20 81.6 kg (180 lb)   11/02/20 81.8 kg (180 lb 6.4 oz)       PHYSICAL EXAMINATION:   GENERAL: The patient appears in good general condition, not in acute distress.   SKIN: No ecchymosis.  EYES: Pallor. No jaundice.  NECK: lower neck incision is well healed. No masses.   LYMPHATICS: No cervical or supraclavicular lymphadenopathy.  CHEST: Lungs clear to auscultation.   CARDIAC: Normal S1 & S2.  ABDOMEN: Soft. No tenderness. No Hepatomegaly. No Splenomegaly. No masses.  EXTREMITIES: Tenderness in the right hip area. No tenderness in the spine. No edema.       DIAGNOSTIC DATA:     Results from last 7 days   Lab Units 01/20/21  1542   WBC 10*3/mm3 5.01   NEUTROS ABS 10*3/mm3 2.96   HEMOGLOBIN g/dL 9.4*   HEMATOCRIT % 30.0*   PLATELETS 10*3/mm3 107*     Results from last 7 days   Lab Units 01/20/21  1542   SODIUM mmol/L 139   POTASSIUM mmol/L 3.2*   CHLORIDE mmol/L 101   CO2 mmol/L 32.6*   BUN mg/dL 17   CREATININE mg/dL 0.92   CALCIUM mg/dL 9.9   ALBUMIN g/dL 4.30   BILIRUBIN mg/dL 0.9   ALK PHOS U/L 85   ALT (SGPT) U/L 19   AST (SGOT) U/L 20   GLUCOSE mg/dL 90   FERRITIN ng/mL 606.70*   IRON mcg/dL 71   TIBC mcg/dL 364       Component      Latest Ref Rng & Units 9/3/2020 1/20/2021   Iron      37 - 145 mcg/dL 29 (L) 71   Iron Saturation      20 - 50 % 10 (L) 20   Transferrin      200 - 360 mg/dL 199 (L) 244   TIBC      298 - 536 mcg/dL 279 364 "   Methylmalonic Acid      0 - 378 nmol/L 144    DISCLAIMER       Comment    Vitamin B-12      211 - 946 pg/mL 464    Folate      4.78 - 24.20 ng/mL 7.51    Ferritin      13.00 - 150.00 ng/mL 192.50 606.70 (H)       Assessment/Plan   *Metastatic thyroid cancer diagnosed on 10/6/2020.  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  · She had right thyroid lobectomy on 12/17/2018 at [place].    · The specimen was sent for outside consultation and the final conclusion was that there was no evidence of malignancy.  · MRI on 8/27/2020 revealed an L4 lesion resulting in pathologic compression fracture.  · Bone scan on 9/2/2020 revealed increased activity at L4 and left side of the skull.  · PET scan on 9/9/2020 revealed abnormal activity at L4, right femoral neck, lower sacrum, left ischium and right ninth medial aspect.  · PET scan 9/9/2020 showed no evidence of visceral metastasis.  · Patient had kyphoplasty on 9/17/2020.  Pathology was nondiagnostic.  · CT-guided biopsy of the lesion in the right posterior ninth rib on 10/6/2020 revealed a clear cell neoplasm.  It is positive for PAX 8 and TTF-1 favoring metastatic thyroid cancer.  · Patient was referred to [facility].  · Patient underwent completion thyroidectomy on 11/9/2020.  · There was no evidence of uptake of radioactive iodine on SPECT imaging on 12/18/2020.  · This was indicative of the tumor not going to respond to radioactive iodine therapy.  · Patient was started on Lenvima 20 mg daily on 12/24/2020.  · Patient tolerated the treatment except for development of hypertension and cytopenias.    *Left breast cancer, stage IIB, triple negative.   · S/P lumpectomy  · She received adjuvant chemotherapy on the NSABP B34 with AC followed by Taxotere.  · She received post-lumpectomy radiation therapy.   · She was placed on tamoxifen for 5 years and it was completed in January 2007.    · Bilateral mammograms from 8/14/2020 were benign.    · Patient underwent recent breast imaging studies due to suspected abnormality and the findings were benign.      *Bone metastasis.  · Patient was given Zometa on 9/29/2020.  · Patient underwent right femur medullary nailing on 10/23/2020.  · Patient reports improvement in the pain in the area.  Pain only developed when she sits on the right side of her hip.    * Iron deficiency anemia.  Patient is on ferrous sulfate 325 mg daily. Hemoglobin decreased to 10.6 on 9/3/2020.  Hemoglobin decreased to 9.4 today.  However, her iron studies do not show evidence of deficiency at this point.  The anemia is therefore attributed to underlying malignancy, Lenvima and the recent infection.    *Thrombocytopenia.  · This is a new finding.  · It is likely secondary to Lenvima.  · Patient has a recent infection which may have contributed.  · Vitamin levels from 9/3/2020 showed adequate B12 and folate levels.    PLAN:    1.  Stop ferrous sulfate.  2.  I recommended starting back on Zometa.  She will discuss this with Dr. Mario Huddleston when she sees him on 1/22/2021.  3.  We discussed radiation therapy to the right hip area.  Our recommendation was to hold off until her follow-up CT scan to determine response to Lenvima.  4.  Since the patient will have follow-up at [facility] regularly, we will see her in follow-up in June 2021 with CBC CMP ferritin iron panel.        Tim Muñoz MD  01/20/21

## 2021-01-21 RX ORDER — POTASSIUM CHLORIDE 20 MEQ/1
10 TABLET, EXTENDED RELEASE ORAL DAILY
Qty: 30 TABLET | Refills: 2 | Status: SHIPPED | OUTPATIENT
Start: 2021-01-21 | End: 2021-06-23 | Stop reason: SDUPTHER

## 2021-01-21 NOTE — TELEPHONE ENCOUNTER
Notified pt by phone of potassium level per Dr Muñoz's request.  Informed her of his recommendation to begin potassium 20meq daily.  This was routed to him for signature.  Pt v/u, and I asked her to call should she have any questions or concerns.

## 2021-01-22 LAB
ALBUMIN SERPL ELPH-MCNC: 3.7 G/DL (ref 2.9–4.4)
ALBUMIN/GLOB SERPL: 1.1 {RATIO} (ref 0.7–1.7)
ALPHA1 GLOB SERPL ELPH-MCNC: 0.3 G/DL (ref 0–0.4)
ALPHA2 GLOB SERPL ELPH-MCNC: 0.6 G/DL (ref 0.4–1)
B-GLOBULIN SERPL ELPH-MCNC: 1.2 G/DL (ref 0.7–1.3)
GAMMA GLOB SERPL ELPH-MCNC: 1.4 G/DL (ref 0.4–1.8)
GLOBULIN SER-MCNC: 3.4 G/DL (ref 2.2–3.9)
IGA SERPL-MCNC: 253 MG/DL (ref 87–352)
IGG SERPL-MCNC: 1495 MG/DL (ref 586–1602)
IGM SERPL-MCNC: 186 MG/DL (ref 26–217)
INTERPRETATION SERPL IEP-IMP: ABNORMAL
KAPPA LC FREE SER-MCNC: 34.4 MG/L (ref 3.3–19.4)
KAPPA LC FREE/LAMBDA FREE SER: 2.84 {RATIO} (ref 0.26–1.65)
LABORATORY COMMENT REPORT: ABNORMAL
LAMBDA LC FREE SERPL-MCNC: 12.1 MG/L (ref 5.7–26.3)
M PROTEIN SERPL ELPH-MCNC: ABNORMAL G/DL
PROT SERPL-MCNC: 7.1 G/DL (ref 6–8.5)

## 2021-01-27 ENCOUNTER — OFFICE VISIT (OUTPATIENT)
Dept: FAMILY MEDICINE CLINIC | Facility: CLINIC | Age: 51
End: 2021-01-27

## 2021-01-27 VITALS
DIASTOLIC BLOOD PRESSURE: 92 MMHG | BODY MASS INDEX: 33.55 KG/M2 | WEIGHT: 177.7 LBS | OXYGEN SATURATION: 100 % | TEMPERATURE: 97.1 F | RESPIRATION RATE: 16 BRPM | HEART RATE: 82 BPM | SYSTOLIC BLOOD PRESSURE: 152 MMHG | HEIGHT: 61 IN

## 2021-01-27 DIAGNOSIS — M79.605 PAIN OF LEFT LOWER EXTREMITY: Primary | ICD-10-CM

## 2021-01-27 PROCEDURE — 99213 OFFICE O/P EST LOW 20 MIN: CPT | Performed by: NURSE PRACTITIONER

## 2021-01-27 NOTE — PATIENT INSTRUCTIONS
Follow instructions by your doctor regarding your blood pressure medication  Send me some blood pressure results in 1 week please and heart rate    Normal activities with your leg no evidence of any blood clot if your pain does not resolve completely in a couple weeks and me a message so we can get a x-ray and/or send him to orthopedic

## 2021-01-31 NOTE — PROGRESS NOTES
"Chief Complaint  Knee Pain (x1wk left knee back of knee)    Subjective          Ina Akers presents to [facility] for   Pleasant patient she has had pain since yesterday posterior knee, mild mild tenderness as well.  She has some this morning but now the pain is resolved has no pain presently.  No chest pain shortness of breath no weakness no calf tenderness or swelling no medial thigh tenderness or swelling no history DVT PE or family history.  Nonetheless she has active metastatic disease to her right hip, as well as lumbar would be origin of her thyroid.  Initially it was thought that it was breast cancer recurrence she is getting treatment Cerulean with infusions.  She is up-to-date presently.  She has been social distancing.  She plans on getting the Covid vaccine when available I think next week.    Blood pressures been controlled although is little high today 152/92.  For essential hypertension.    Knee Pain         Objective   Vital Signs:   /92   Pulse 82   Temp 97.1 °F (36.2 °C) (Temporal)   Resp 16   Ht 154.9 cm (61\")   Wt 80.6 kg (177 lb 11.2 oz)   SpO2 100%   BMI 33.58 kg/m²     Physical Exam  Vitals signs reviewed.   Constitutional:       Appearance: Normal appearance. She is well-developed.   HENT:      Head: Normocephalic.      Nose: Nose normal.   Eyes:      General: No scleral icterus.     Conjunctiva/sclera: Conjunctivae normal.      Pupils: Pupils are equal, round, and reactive to light.   Neck:      Musculoskeletal: Neck supple.      Thyroid: No thyromegaly.      Vascular: No JVD.   Cardiovascular:      Rate and Rhythm: Normal rate and regular rhythm.      Heart sounds: Normal heart sounds. No murmur. No friction rub. No gallop.    Pulmonary:      Effort: Pulmonary effort is normal. No respiratory distress.      Breath sounds: Normal breath sounds. No stridor. No wheezing or rales.   Abdominal:      General: Bowel sounds are normal. There is " no distension.      Palpations: Abdomen is soft.      Tenderness: There is no abdominal tenderness.      Comments: No hepatosplenomegaly, no ascites,   Musculoskeletal:         General: No swelling, tenderness, deformity or signs of injury.      Right lower leg: No edema.      Left lower leg: No edema.      Comments: Left knee she has no thigh or calf tenderness no medial thigh tenderness no posterior calf tenderness no posterior left knee pain or tenderness or swelling left knee appears normal range of motion normal no crepitus or edema no redness or swelling.  Her gait is normal.   Lymphadenopathy:      Cervical: No cervical adenopathy.   Skin:     General: Skin is warm and dry.      Findings: No erythema or rash.   Neurological:      Mental Status: She is alert and oriented to person, place, and time.      Deep Tendon Reflexes: Reflexes are normal and symmetric.   Psychiatric:         Behavior: Behavior normal.         Thought Content: Thought content normal.         Judgment: Judgment normal.        Result Review :                 Assessment and Plan    Problem List Items Addressed This Visit     None      Visit Diagnoses     Pain of left lower extremity    -  Primary    Left posterior knee pain tenderness resolved          Follow Up   No follow-ups on file.  Patient was given instructions and counseling regarding her condition or for health maintenance advice. Please see specific information pulled into the AVS if appropriate.       Patient's pain has resolved and she has low localized tenderness no evidence of any acute DVT which was initially her concern not as well.    She will take Tylenol if this bothers her again but any persistent pain recheck for further evaluation chest pain shortness of breath increased pain redness swelling weakness emergency room she will follow up with her oncologist any new bony pain immediately follow-up with her oncologist myself as well.  She has none at this time.    Office  visit 20-minute

## 2021-02-01 ENCOUNTER — TELEPHONE (OUTPATIENT)
Dept: NEUROSURGERY | Facility: CLINIC | Age: 51
End: 2021-02-01

## 2021-02-01 NOTE — TELEPHONE ENCOUNTER
Pt is R/S for 2/26/21 @ 9am and was notified.  She will also go forward with the MRI that is scheduled.

## 2021-02-01 NOTE — TELEPHONE ENCOUNTER
Caller: CARREI GALVAN    Relationship to patient: SELF    Best call back number: [phone]    Chief complaint: RESCHEDULE FOLLOW UP- CONFLICT ON MONDAYS    Type of visit: FOLLOW UP- PREFER VIRTUAL APPT    Requested date: 02/17/2021 OR AFTER, NO MONDAYS    If rescheduling, when is the original appointment: 02/15/2021    Additional notes: PATIENT HAD CT OF HEAD AT  01/21/21 , IN CHART UNDER MEDIA DATED 01/22/2021- [facility]/THYROID ONCOLOGY PROGRAM- IN PROGRESS NOTE. WOULD LIKE TO KNOW IF THIS CAN BE USED OR IF MRI IS STILL NEEDED. PLEASE CALL PT TO ADVISE IS APPOINTMENT CAN BE RESCHEDULED FOR TELEHEALTH AND IF MRI IS NEEDED. THANK YOU

## 2021-02-05 ENCOUNTER — TELEPHONE (OUTPATIENT)
Dept: FAMILY MEDICINE CLINIC | Facility: CLINIC | Age: 51
End: 2021-02-05

## 2021-02-05 NOTE — TELEPHONE ENCOUNTER
Call returned to patient. She is aware she can get labs done at [facility] as long as she has the order

## 2021-02-05 NOTE — TELEPHONE ENCOUNTER
PATIENT CALLED IN REGARDS TO LAB ORDERS FROM  IN [place] DR. ZULEIKA CHRISTIANSON, ONCOLOGIST. PATIENT HAS THE ORDERS IN HAND. SHE IS WONDERING IF SHE NEEDS ORDERS FROM A LOCAL DRCami TO GET LABS DONE AT [facility] OR [facility].    PLEASE CALL [phone]

## 2021-02-08 ENCOUNTER — APPOINTMENT (OUTPATIENT)
Dept: MRI IMAGING | Facility: HOSPITAL | Age: 51
End: 2021-02-08

## 2021-02-08 ENCOUNTER — TRANSCRIBE ORDERS (OUTPATIENT)
Dept: ADMINISTRATIVE | Facility: HOSPITAL | Age: 51
End: 2021-02-08

## 2021-02-08 ENCOUNTER — LAB (OUTPATIENT)
Dept: LAB | Facility: HOSPITAL | Age: 51
End: 2021-02-08

## 2021-02-08 DIAGNOSIS — D64.9 ANEMIA, UNSPECIFIED TYPE: ICD-10-CM

## 2021-02-08 DIAGNOSIS — R04.0 EPISTAXIS: Primary | ICD-10-CM

## 2021-02-08 DIAGNOSIS — D64.9 ANEMIA, UNSPECIFIED TYPE: Primary | ICD-10-CM

## 2021-02-08 LAB
BASOPHILS # BLD AUTO: 0.02 10*3/MM3 (ref 0–0.2)
BASOPHILS NFR BLD AUTO: 0.3 % (ref 0–1.5)
DEPRECATED RDW RBC AUTO: 63.9 FL (ref 37–54)
EOSINOPHIL # BLD AUTO: 0.3 10*3/MM3 (ref 0–0.4)
EOSINOPHIL NFR BLD AUTO: 4.8 % (ref 0.3–6.2)
ERYTHROCYTE [DISTWIDTH] IN BLOOD BY AUTOMATED COUNT: 19.2 % (ref 12.3–15.4)
HCT VFR BLD AUTO: 28.4 % (ref 34–46.6)
HGB BLD-MCNC: 8.7 G/DL (ref 12–15.9)
HGB RETIC QN AUTO: 31.2 PG (ref 29.8–36.1)
IMM GRANULOCYTES # BLD AUTO: 0.02 10*3/MM3 (ref 0–0.05)
IMM GRANULOCYTES NFR BLD AUTO: 0.3 % (ref 0–0.5)
IMM RETICS NFR: 23.1 % (ref 3–15.8)
LYMPHOCYTES # BLD AUTO: 1.42 10*3/MM3 (ref 0.7–3.1)
LYMPHOCYTES NFR BLD AUTO: 22.7 % (ref 19.6–45.3)
MCH RBC QN AUTO: 28.2 PG (ref 26.6–33)
MCHC RBC AUTO-ENTMCNC: 30.6 G/DL (ref 31.5–35.7)
MCV RBC AUTO: 91.9 FL (ref 79–97)
MONOCYTES # BLD AUTO: 0.75 10*3/MM3 (ref 0.1–0.9)
MONOCYTES NFR BLD AUTO: 12 % (ref 5–12)
NEUTROPHILS NFR BLD AUTO: 3.74 10*3/MM3 (ref 1.7–7)
NEUTROPHILS NFR BLD AUTO: 59.9 % (ref 42.7–76)
NRBC BLD AUTO-RTO: 0 /100 WBC (ref 0–0.2)
PLATELET # BLD AUTO: 163 10*3/MM3 (ref 140–450)
PMV BLD AUTO: 9.6 FL (ref 6–12)
RBC # BLD AUTO: 3.09 10*6/MM3 (ref 3.77–5.28)
RETICS # AUTO: 0.12 10*6/MM3 (ref 0.02–0.13)
RETICS # AUTO: 0.13 10*6/MM3 (ref 0.02–0.13)
RETICS/RBC NFR AUTO: 3.83 % (ref 0.7–1.9)
RETICS/RBC NFR AUTO: 4.03 % (ref 0.7–1.9)
WBC # BLD AUTO: 6.25 10*3/MM3 (ref 3.4–10.8)

## 2021-02-08 PROCEDURE — 85046 RETICYTE/HGB CONCENTRATE: CPT

## 2021-02-08 PROCEDURE — 85045 AUTOMATED RETICULOCYTE COUNT: CPT

## 2021-02-08 PROCEDURE — 85025 COMPLETE CBC W/AUTO DIFF WBC: CPT

## 2021-02-08 PROCEDURE — 36415 COLL VENOUS BLD VENIPUNCTURE: CPT

## 2021-02-08 RX ORDER — HYDRALAZINE HYDROCHLORIDE 10 MG/1
10 TABLET, FILM COATED ORAL 3 TIMES DAILY
Qty: 90 TABLET | Refills: 2 | Status: SHIPPED | OUTPATIENT
Start: 2021-02-08 | End: 2021-03-05

## 2021-02-09 ENCOUNTER — TELEPHONE (OUTPATIENT)
Dept: FAMILY MEDICINE CLINIC | Facility: CLINIC | Age: 51
End: 2021-02-09

## 2021-02-09 NOTE — TELEPHONE ENCOUNTER
Patient called in stating her Oncologist needs her to get Hemoccult Slide so she can check her stool done for three weeks. Patient wants to know if she will be able to have it done at the office?    Best call back # [phone]

## 2021-02-09 NOTE — TELEPHONE ENCOUNTER
Caller: Ina Akers    Relationship: Self    Best call back number:[phone]    Caller requesting test results: ANTWONEVERETT    What test was performed: STAT BLOOD WORK    When was the test performed: 2/8/2021    Where was the test performed: OFFICE     Additional notes: RESULTS NEED TO BE FAXED TO DR.KENNTH CHRISTIANSON AT [facility]YRO[facility] DOCTOR AT FAX # [phone]. THIS WILL HIM DIRECTLY AND TO CANCER CENTER AT FAX # [phone]. PLEASE SEND ASAP. THANK YOU.

## 2021-02-09 NOTE — TELEPHONE ENCOUNTER
Patient will ask oncology to mail her the cards. We do not use the brand he is wanting to be used the office has the IFOBT tubes. Patient will reach back out to the office if they do not have any to mail her.

## 2021-02-10 ENCOUNTER — TELEPHONE (OUTPATIENT)
Dept: NEUROSURGERY | Facility: CLINIC | Age: 51
End: 2021-02-10

## 2021-02-10 ENCOUNTER — TELEPHONE (OUTPATIENT)
Dept: ONCOLOGY | Facility: CLINIC | Age: 51
End: 2021-02-10

## 2021-02-10 DIAGNOSIS — D49.2 LUMBAR SPINE TUMOR: Primary | ICD-10-CM

## 2021-02-10 NOTE — TELEPHONE ENCOUNTER
Patient last in the office 11/25/20 with c/o back pain and bilateral hip pain. MRI Brain and XR L-Spine were ordered. Please advise.   Office Visit with Vik Rios MD (11/25/2020)

## 2021-02-10 NOTE — TELEPHONE ENCOUNTER
----- Message from Ina Akers sent at 2/9/2021  5:48 PM EST -----  Regarding: Non-Urgent Medical Question  Contact: [phone]  Dr. Rios,    I am scheduled to have MRI of the brain on 2/19/21 at the [facility]. I am wondering if I can have MRI of my full spine on that same day. I have been having pain in the middle of my back. Usually resolves with several hours of rest and heat.    Thanks,  Ina Akers

## 2021-02-12 ENCOUNTER — TELEPHONE (OUTPATIENT)
Dept: ONCOLOGY | Facility: CLINIC | Age: 51
End: 2021-02-12

## 2021-02-12 ENCOUNTER — LAB (OUTPATIENT)
Dept: OTHER | Facility: HOSPITAL | Age: 51
End: 2021-02-12

## 2021-02-12 DIAGNOSIS — N92.4 ABNORMAL PERIMENOPAUSAL BLEEDING: ICD-10-CM

## 2021-02-12 DIAGNOSIS — N91.2 AMENORRHEA: Primary | ICD-10-CM

## 2021-02-12 LAB
COLLECT DATE SP2 STL: NORMAL
COLLECT DATE SP3 STL: NORMAL
COLLECT DATE STL: NORMAL
HEMOCCULT STL QL: NEGATIVE
HEMOCCULT STL QL: NEGATIVE
HEMOCCULT STL QL: NORMAL
Lab: 1200
Lab: 1200
Lab: NORMAL

## 2021-02-12 PROCEDURE — 82270 OCCULT BLOOD FECES: CPT

## 2021-02-12 NOTE — TELEPHONE ENCOUNTER
Caller: CARRIE GALVAN    Relationship to patient: SELF    Best call back number: [phone]    REQUESTING GABRIELLE IN THE LABS CALL HER BACK AS SOON AS POSSIBLE REGARDING THE HEMOCCULT SLIDES SHE IS SUPPOSED TO

## 2021-02-15 ENCOUNTER — TRANSCRIBE ORDERS (OUTPATIENT)
Dept: ADMINISTRATIVE | Facility: HOSPITAL | Age: 51
End: 2021-02-15

## 2021-02-15 ENCOUNTER — LAB (OUTPATIENT)
Dept: LAB | Facility: HOSPITAL | Age: 51
End: 2021-02-15

## 2021-02-15 DIAGNOSIS — C73 THYROID CARCINOMA (HCC): ICD-10-CM

## 2021-02-15 DIAGNOSIS — C73 THYROID CARCINOMA (HCC): Primary | ICD-10-CM

## 2021-02-15 LAB
BASOPHILS # BLD AUTO: 0.02 10*3/MM3 (ref 0–0.2)
BASOPHILS NFR BLD AUTO: 0.5 % (ref 0–1.5)
DEPRECATED RDW RBC AUTO: 62.4 FL (ref 37–54)
EOSINOPHIL # BLD AUTO: 0.35 10*3/MM3 (ref 0–0.4)
EOSINOPHIL NFR BLD AUTO: 8.4 % (ref 0.3–6.2)
ERYTHROCYTE [DISTWIDTH] IN BLOOD BY AUTOMATED COUNT: 18.4 % (ref 12.3–15.4)
HCT VFR BLD AUTO: 29 % (ref 34–46.6)
HGB BLD-MCNC: 8.8 G/DL (ref 12–15.9)
IMM GRANULOCYTES # BLD AUTO: 0.02 10*3/MM3 (ref 0–0.05)
IMM GRANULOCYTES NFR BLD AUTO: 0.5 % (ref 0–0.5)
LYMPHOCYTES # BLD AUTO: 1.21 10*3/MM3 (ref 0.7–3.1)
LYMPHOCYTES NFR BLD AUTO: 29.2 % (ref 19.6–45.3)
MCH RBC QN AUTO: 28.4 PG (ref 26.6–33)
MCHC RBC AUTO-ENTMCNC: 30.3 G/DL (ref 31.5–35.7)
MCV RBC AUTO: 93.5 FL (ref 79–97)
MONOCYTES # BLD AUTO: 0.63 10*3/MM3 (ref 0.1–0.9)
MONOCYTES NFR BLD AUTO: 15.2 % (ref 5–12)
NEUTROPHILS NFR BLD AUTO: 1.92 10*3/MM3 (ref 1.7–7)
NEUTROPHILS NFR BLD AUTO: 46.2 % (ref 42.7–76)
NRBC BLD AUTO-RTO: 0 /100 WBC (ref 0–0.2)
PLATELET # BLD AUTO: 270 10*3/MM3 (ref 140–450)
PMV BLD AUTO: 9.1 FL (ref 6–12)
RBC # BLD AUTO: 3.1 10*6/MM3 (ref 3.77–5.28)
RETICS # AUTO: 0.06 10*6/MM3 (ref 0.02–0.13)
RETICS/RBC NFR AUTO: 2.08 % (ref 0.7–1.9)
WBC # BLD AUTO: 4.15 10*3/MM3 (ref 3.4–10.8)

## 2021-02-15 PROCEDURE — 85025 COMPLETE CBC W/AUTO DIFF WBC: CPT

## 2021-02-15 PROCEDURE — 36415 COLL VENOUS BLD VENIPUNCTURE: CPT

## 2021-02-15 PROCEDURE — 85045 AUTOMATED RETICULOCYTE COUNT: CPT

## 2021-02-16 NOTE — TELEPHONE ENCOUNTER
MRI L-Spine and T-Spine have been placed. I attempted to reach scheduling dept, LM to contact patient. I s/w patient and she will call scheduling to see about getting tests added on to current appt.

## 2021-02-17 ENCOUNTER — LAB (OUTPATIENT)
Dept: OTHER | Facility: HOSPITAL | Age: 51
End: 2021-02-17

## 2021-02-17 DIAGNOSIS — N92.4 ABNORMAL PERIMENOPAUSAL BLEEDING: Primary | ICD-10-CM

## 2021-02-19 ENCOUNTER — HOSPITAL ENCOUNTER (OUTPATIENT)
Dept: MRI IMAGING | Facility: HOSPITAL | Age: 51
Discharge: HOME | End: 2021-02-19
Admitting: NEUROLOGICAL SURGERY

## 2021-02-19 ENCOUNTER — APPOINTMENT (OUTPATIENT)
Dept: MRI IMAGING | Facility: HOSPITAL | Age: 51
End: 2021-02-19

## 2021-02-19 DIAGNOSIS — D49.2 LUMBAR SPINE TUMOR: ICD-10-CM

## 2021-02-19 DIAGNOSIS — M89.9 SKULL LESION: ICD-10-CM

## 2021-02-19 PROCEDURE — 70553 MRI BRAIN STEM W/O & W/DYE: CPT

## 2021-02-19 PROCEDURE — 0 GADOBENATE DIMEGLUMINE 529 MG/ML SOLUTION: Performed by: NEUROLOGICAL SURGERY

## 2021-02-19 PROCEDURE — A9577 INJ MULTIHANCE: HCPCS | Performed by: NEUROLOGICAL SURGERY

## 2021-02-19 RX ADMIN — GADOBENATE DIMEGLUMINE 17 ML: 529 INJECTION, SOLUTION INTRAVENOUS at 16:38

## 2021-02-22 ENCOUNTER — LAB (OUTPATIENT)
Dept: LAB | Facility: HOSPITAL | Age: 51
End: 2021-02-22

## 2021-02-22 ENCOUNTER — TRANSCRIBE ORDERS (OUTPATIENT)
Dept: ADMINISTRATIVE | Facility: HOSPITAL | Age: 51
End: 2021-02-22

## 2021-02-22 DIAGNOSIS — C73 THYROID CARCINOMA (HCC): Primary | ICD-10-CM

## 2021-02-22 DIAGNOSIS — C73 THYROID CARCINOMA (HCC): ICD-10-CM

## 2021-02-22 LAB
BASOPHILS # BLD AUTO: 0.02 10*3/MM3 (ref 0–0.2)
BASOPHILS NFR BLD AUTO: 0.4 % (ref 0–1.5)
DEPRECATED RDW RBC AUTO: 60.8 FL (ref 37–54)
EOSINOPHIL # BLD AUTO: 0.35 10*3/MM3 (ref 0–0.4)
EOSINOPHIL NFR BLD AUTO: 7.4 % (ref 0.3–6.2)
ERYTHROCYTE [DISTWIDTH] IN BLOOD BY AUTOMATED COUNT: 17.7 % (ref 12.3–15.4)
HCT VFR BLD AUTO: 29.2 % (ref 34–46.6)
HGB BLD-MCNC: 8.9 G/DL (ref 12–15.9)
IMM GRANULOCYTES # BLD AUTO: 0.02 10*3/MM3 (ref 0–0.05)
IMM GRANULOCYTES NFR BLD AUTO: 0.4 % (ref 0–0.5)
LYMPHOCYTES # BLD AUTO: 1.22 10*3/MM3 (ref 0.7–3.1)
LYMPHOCYTES NFR BLD AUTO: 25.6 % (ref 19.6–45.3)
MCH RBC QN AUTO: 28.3 PG (ref 26.6–33)
MCHC RBC AUTO-ENTMCNC: 30.5 G/DL (ref 31.5–35.7)
MCV RBC AUTO: 93 FL (ref 79–97)
MONOCYTES # BLD AUTO: 0.61 10*3/MM3 (ref 0.1–0.9)
MONOCYTES NFR BLD AUTO: 12.8 % (ref 5–12)
NEUTROPHILS NFR BLD AUTO: 2.54 10*3/MM3 (ref 1.7–7)
NEUTROPHILS NFR BLD AUTO: 53.4 % (ref 42.7–76)
NRBC BLD AUTO-RTO: 0 /100 WBC (ref 0–0.2)
PLATELET # BLD AUTO: 277 10*3/MM3 (ref 140–450)
PMV BLD AUTO: 8.4 FL (ref 6–12)
RBC # BLD AUTO: 3.14 10*6/MM3 (ref 3.77–5.28)
RETICS # AUTO: 0.06 10*6/MM3 (ref 0.02–0.13)
RETICS/RBC NFR AUTO: 1.85 % (ref 0.7–1.9)
WBC # BLD AUTO: 4.76 10*3/MM3 (ref 3.4–10.8)

## 2021-02-22 PROCEDURE — 85025 COMPLETE CBC W/AUTO DIFF WBC: CPT

## 2021-02-22 PROCEDURE — 85045 AUTOMATED RETICULOCYTE COUNT: CPT

## 2021-02-22 PROCEDURE — 36415 COLL VENOUS BLD VENIPUNCTURE: CPT

## 2021-02-23 LAB
COLLECT DATE SP2 STL: NORMAL
COLLECT DATE STL: NORMAL
HEMOCCULT STL QL: NEGATIVE
Lab: 1430
Lab: 1430

## 2021-02-23 PROCEDURE — 82270 OCCULT BLOOD FECES: CPT

## 2021-02-24 ENCOUNTER — TELEPHONE (OUTPATIENT)
Dept: NEUROSURGERY | Facility: CLINIC | Age: 51
End: 2021-02-24

## 2021-02-24 NOTE — TELEPHONE ENCOUNTER
Caller: CARRIE GALVAN    Relationship to patient: SELF    Best call back number: [phone]    Chief complaint: THE PATIENT WANTS TO CHANGE HER APPOINTMENT TO A TELEVISIT AND RESCHEDULE FOR SOME TIME NEXT WEEK    Type of visit: FOLLOW UP    Requested date: NEXT WEEK ASIDE FROM Monday, 3/2-3/5.     If rescheduling, when is the original appointment: 2/26/21    Additional notes: THE PATIENT HAD A MRI BRAIN ON 2/19/21, HER MRI L SPINE AND T SPINE ARE SCHEDULED FOR 3/13/21.

## 2021-02-24 NOTE — TELEPHONE ENCOUNTER
I s/w patient and she would like to do a telephone visit. She has not completed MRI L-Spine/T-Spine. She is okay with waiting until after all imaging has been completed and then do a visit after, she just wants to make sure that her MRI Brain was stable to wait a couple weeks for appt. Please advise.

## 2021-02-24 NOTE — TELEPHONE ENCOUNTER
Per verbal from Dr. Rios > I prefer to see patient in office to discuss results. However, if she is insisting on a telephone visit that would be okay.

## 2021-03-01 NOTE — PROGRESS NOTES
Subjective   History of Present Illness: Ina Akers is a 50 y.o. female is here today for a 3 month follow-up after MRI brain done on 2/19/21.  Pt last seen on 11/25/20 and MRI showed a left parietal skull lesion extending in towards the dura that is suspicious for being a metastatic lesion.  She was recently diagnosed with metastatic thyroid cancer.  She has had her thyroid removed and was started on chemotherapy.  She had to be stopped from the chemotherapy after episodes of hemorrhage.  The repeat MRI shows the left parietal lesion is slightly smaller.  She previously had an MRI of her lumbar spine scheduled to follow-up on the L4 lesion however was unable to have the MRI.  She reports that overall her back pain is improving.  She denies any leg symptoms.  Denies any numbness or weakness.  Denies any difficulty walking.                                                                                                                                        You have chosen to receive care through a telephone visit. Do you consent to use a telephone visit for your medical care today? Yes                                                                                                                                         History of Present Illness    The following portions of the patient's history were reviewed and updated as appropriate: allergies, current medications, past medical history, past social history, past surgical history and problem list.    Past Medical History:   Diagnosis Date   • Anemia     History of recurent iron deficiency   • Compression fracture of fourth lumbar vertebra (CMS/HCC)     RECENT KYPHOPLASTY 9/2020   • Diabetes mellitus (CMS/HCC)    • GERD (gastroesophageal reflux disease)    • History of breast cancer 2001    LEFT BREAST MASTECTOMY, CHEMO AND RADIATION    • History of gastritis    • History of thyroid nodule 2018   • Hyperlipidemia    • Hypertension    • Metastatic bone cancer  (CMS/[facility])     RIB, HIP - AWAITING FINDINGS FOR PRIMARY SOURCE (THYROID VS BREAST)   • PONV (postoperative nausea and vomiting)         Past Surgical History:   Procedure Laterality Date   •  SECTION     • FEMUR IM NAILING/RODDING Right 10/23/2020    Procedure: HIP INTERTROCHCHANTERIC NAILING;  Surgeon: Gretchen Connell MD;  Location: [facility] MAIN OR;  Service: Orthopedics;  Laterality: Right;   • KYPHOPLASTY N/A 2020    Procedure: Lumbar 4  osteo cool radiofrequency ablation and KYPHOPLASTY;  Surgeon: Vik Rios MD;  Location: [facility] HYBRID OR ;  Service: Neurosurgery;  Laterality: N/A;   • MASTECTOMY, PARTIAL Left    • RIB BIOPSY  10/2020   • THYROIDECTOMY, PARTIAL Right           Current Outpatient Medications:   •  acetaminophen (TYLENOL) 500 MG tablet, Take 1,000 mg by mouth Every 6 (Six) Hours As Needed for Mild Pain ., Disp: , Rfl:   •  amLODIPine (NORVASC) 10 MG tablet, Take 10 mg by mouth Daily., Disp: , Rfl:   •  atorvastatin (LIPITOR) 10 MG tablet, Take 1 tablet by mouth Daily. (Patient taking differently: Take 10 mg by mouth Every Night.), Disp: 90 tablet, Rfl: 3  •  Blood Glucose Monitoring Suppl w/Device kit, Check blood sugar twice a day, Disp: 1 each, Rfl: 0  •  cholecalciferol (VITAMIN D3) 25 MCG (1000 UT) tablet, Take 1,000 Units by mouth Daily., Disp: , Rfl:   •  Docusate Calcium (STOOL SOFTENER PO), Take 1 tablet by mouth As Needed. As needed, Disp: , Rfl:   •  erythromycin (ROMYCIN) 5 MG/GM ophthalmic ointment, Apply  to eye(s) as directed by provider Every 4 (Four) Hours While Awake. (Patient taking differently: Apply  to eye(s) as directed by provider As Needed.), Disp: 1 each, Rfl: 0  •  Glucose Blood (BLOOD GLUCOSE TEST) strip, Check blood sugar twice a day., Disp: 100 each, Rfl: 1  •  hydroCHLOROthiazide (HYDRODIURIL) 25 MG tablet, Take 25 mg by mouth Daily., Disp: , Rfl:   •  Hydrocortisone, Perianal, (ANUSOL-HC) 2.5 % rectal cream, Insert  into the  rectum 3 (Three) Times a Day As Needed for Hemorrhoids., Disp: 60 g, Rfl: 2  •  labetalol (NORMODYNE) 100 MG tablet, Take 200 mg by mouth 2 (Two) Times a Day., Disp: , Rfl:   •  Lancets (ACCU-CHEK SOFT TOUCH) lancets, Check blood sugar twice a day as needed, Disp: 100 each, Rfl: 1  •  lansoprazole (PREVACID) 30 MG capsule, TAKE ONE CAPSULE BY MOUTH DAILY, Disp: 90 capsule, Rfl: 2  •  levothyroxine (SYNTHROID, LEVOTHROID) 112 MCG tablet, Take 112 mcg by mouth Daily., Disp: , Rfl:   •  losartan (COZAAR) 100 MG tablet, Take 100 mg by mouth Daily., Disp: , Rfl:   •  losartan (COZAAR) 50 MG tablet, TAKE ONE TABLET BY MOUTH DAILY (Patient taking differently: Take 50 mg by mouth Daily.), Disp: 90 tablet, Rfl: 0  •  metFORMIN ER (GLUCOPHAGE-XR) 500 MG 24 hr tablet, TAKE TWO TABLETS BY MOUTH DAILY WITH DINNER (Patient taking differently: Take 1,000 mg by mouth Every Night.), Disp: 180 tablet, Rfl: 1  •  potassium chloride (K-DUR,KLOR-CON) 20 MEQ CR tablet, Take 0.5 tablets by mouth Daily., Disp: 30 tablet, Rfl: 2  •  promethazine (PHENERGAN) 12.5 MG tablet, 1/2 to 1 tablet by mouth every 4 hours as needed for nausea vomiting caution sedation no falls or possible, Disp: 30 tablet, Rfl: 3  •  triamcinolone (KENALOG) 0.1 % cream, Apply  topically to the appropriate area as directed 2 (Two) Times a Day. Use sparingly avoid face (Patient taking differently: Apply  topically to the appropriate area as directed As Needed. Use sparingly avoid face), Disp: 60 g, Rfl: 1     Social History     Socioeconomic History   • Marital status:      Spouse name: Raghav   • Number of children: 2   • Years of education: Not on file   • Highest education level: Not on file   Occupational History     Employer: [employer]     [employer]er: [employer]   Tobacco Use   • Smoking status: Never Smoker   • Smokeless tobacco: Never Used   Substance and Sexual Activity   • Alcohol use: Yes      Comment: Socially   • Drug use: No   • Sexual activity: Defer        Family History   Problem Relation Age of Onset   • Hypertension Other    • Diabetes Other    • No Known Problems Mother    • No Known Problems Father    • Malig Hyperthermia Neg Hx         Review of Systems   Eyes: Negative for visual disturbance.   Genitourinary: Negative for difficulty urinating and enuresis.        Patient denies having any issues with bowel or bladder control.   Musculoskeletal: Negative for gait problem.        Patient denies having any issues with balance or gait.     Neurological: Negative for dizziness, speech difficulty, light-headedness and headaches.   Psychiatric/Behavioral: Negative for confusion and decreased concentration.       Objective     Vitals:    21 1413   BP: Comment: No V/S performed     There is no height or weight on file to calculate BMI.      Physical Exam  Neurologic Exam  No physical exam because this was a telephone visit      Assessment/Plan   Independent Review of Radiographic Studies:      I personally reviewed the images from the following studies.  MRI brain with and without contrast from 2021 and 2020  The most recent MRI shows a 15 mm x 6 mm left parietal enhancing lesion.  This appears slightly smaller from the MRI in 2020.  There are no new obvious intracranial or extracranial lesions seen on this MRI..     Medical Decision Makin-year-old female status post biopsy, osteocool, kyphoplasty of L4 lesion on 2020  -She was subsequently diagnosed with metastatic thyroid cancer.  She was also found to have a small left parietal calvarial lesion.  Her most recent MRI shows no obvious growth of this calvarial lesion in fact it appears slightly smaller.  This may be because she was responding to her chemotherapy agent.  She reports though that she had some issues with bleeding from her chemotherapy issue and is temporarily been stopped..   -I have  recommended that she follow-up with a repeat MRI in 3 months to evaluate for any changes in the left parietal lesion.  I also had an MRI of her thoracic and lumbar spine ordered, but she was unable to have them completed.  She reports overall her back pain is improving and has no weakness or numbness in her lower extremities.  Denies any difficulty walking.  -I have also ordered and recommended that she have an MRI in 3 months of her lumbar thoracic spine to evaluate for any changes of the L4 lesion or any new lesions  This was a telephone visit and we spoke for approximately 10 minutes    Diagnoses and all orders for this visit:    1. Lumbar spine tumor (Primary)    2. Skull lesion  -     MRI Brain With & Without Contrast; Future      Return in about 3 months (around 6/5/2021).

## 2021-03-04 ENCOUNTER — APPOINTMENT (OUTPATIENT)
Dept: MRI IMAGING | Facility: HOSPITAL | Age: 51
End: 2021-03-04

## 2021-03-05 ENCOUNTER — OFFICE VISIT (OUTPATIENT)
Dept: NEUROSURGERY | Facility: CLINIC | Age: 51
End: 2021-03-05

## 2021-03-05 DIAGNOSIS — D49.2 LUMBAR SPINE TUMOR: Primary | ICD-10-CM

## 2021-03-05 DIAGNOSIS — M89.9 SKULL LESION: ICD-10-CM

## 2021-03-05 PROCEDURE — 99441 PR PHYS/QHP TELEPHONE EVALUATION 5-10 MIN: CPT | Performed by: NEUROLOGICAL SURGERY

## 2021-03-05 NOTE — TELEPHONE ENCOUNTER
Patient had telephone visit today to discuss MRI Brain results. She will f/u in 3 months with new MRI Brain and MRI L-Spine & T-Spine.

## 2021-03-13 ENCOUNTER — APPOINTMENT (OUTPATIENT)
Dept: MRI IMAGING | Facility: HOSPITAL | Age: 51
End: 2021-03-13

## 2021-03-22 ENCOUNTER — TELEPHONE (OUTPATIENT)
Dept: NEUROSURGERY | Facility: CLINIC | Age: 51
End: 2021-03-22

## 2021-03-22 NOTE — TELEPHONE ENCOUNTER
Caller: CARRIE GALVAN    Relationship: PT/SELF    Best call back number: [phone]    What form or medical record are you requesting: MRI-BRAIN    Who is requesting this form or medical record from you: ONCOLOGIST (ZULEIKA CHRISTIANSON [phone])    How would you like to receive the form or medical records (pick-up, mail, fax): FAX  If fax, what is the fax number: [phone] and [phone]  If mail, what is the address:   If pick-up, provide patient with address and location details    Timeframe paperwork needed: ASAP    Additional notes: DR CHRISTIANSON IS REQUESTING IMAGING REPORT TO BE FAXED TO BOTH FAX #s FOR REVIEW.    THANK YOU!

## 2021-03-26 ENCOUNTER — BULK ORDERING (OUTPATIENT)
Dept: CASE MANAGEMENT | Facility: OTHER | Age: 51
End: 2021-03-26

## 2021-03-26 DIAGNOSIS — Z23 IMMUNIZATION DUE: ICD-10-CM

## 2021-03-26 NOTE — TELEPHONE ENCOUNTER
I called and spoke with Dr Mario Huddleston to verify the doctor office and clinic due to hub had oncologist and the physician pulled up under endocrinology. I did verify the fax numbers and let Dr Huddleston know the mri is on its way.

## 2021-04-08 RX ORDER — METFORMIN HYDROCHLORIDE 500 MG/1
TABLET, EXTENDED RELEASE ORAL
Qty: 180 TABLET | Refills: 1 | Status: SHIPPED | OUTPATIENT
Start: 2021-04-08 | End: 2021-07-15 | Stop reason: SDUPTHER

## 2021-04-14 ENCOUNTER — TELEPHONE (OUTPATIENT)
Dept: NEUROSURGERY | Facility: CLINIC | Age: 51
End: 2021-04-14

## 2021-04-14 NOTE — TELEPHONE ENCOUNTER
Pt called: She is requesting that someone  call her about some questions concerning her MRI appts and she would also like to reschedule her appt with Dr. Rios that is scheduled for 6/11/21-she has another md appt that same day and needs to move it. Please advise!     Pt contact # [phone]  Best time to call: anytime

## 2021-04-14 NOTE — TELEPHONE ENCOUNTER
I s/w patient re: MRI. She wanted to let Dr. Rios know that her Endocrinologist, Dr. Mario Huddleston has been ordered CT's for her to have done every two months. She is going to go ahead and have MRI's on 5/8/21 but after that she will just stick with the CT's that Dr. Huddleston orders. She states that her oncologist has d/c her Chemo meds (r/t side effects of Epistaxis and Bruising) Patient requested to r/s her appt to sometime in May due to other appointments she has scheduled. Appt has been moved to 5/17/21 @ 2:30pm. Patient is requesting that this visit be by phone. Please advise if okay to change to phone visit.

## 2021-04-15 ENCOUNTER — OFFICE VISIT (OUTPATIENT)
Dept: FAMILY MEDICINE CLINIC | Facility: CLINIC | Age: 51
End: 2021-04-15

## 2021-04-15 VITALS
HEART RATE: 94 BPM | HEIGHT: 61 IN | SYSTOLIC BLOOD PRESSURE: 115 MMHG | WEIGHT: 183 LBS | OXYGEN SATURATION: 97 % | DIASTOLIC BLOOD PRESSURE: 75 MMHG | BODY MASS INDEX: 34.55 KG/M2

## 2021-04-15 DIAGNOSIS — Z12.11 COLON CANCER SCREENING: ICD-10-CM

## 2021-04-15 DIAGNOSIS — E11.9 CONTROLLED TYPE 2 DIABETES MELLITUS WITHOUT COMPLICATION, WITHOUT LONG-TERM CURRENT USE OF INSULIN (HCC): ICD-10-CM

## 2021-04-15 DIAGNOSIS — I10 ESSENTIAL HYPERTENSION: Primary | ICD-10-CM

## 2021-04-15 DIAGNOSIS — C41.9 METASTATIC BONE CANCER: ICD-10-CM

## 2021-04-15 PROCEDURE — 99213 OFFICE O/P EST LOW 20 MIN: CPT | Performed by: NURSE PRACTITIONER

## 2021-04-15 RX ORDER — LABETALOL 100 MG/1
50 TABLET, FILM COATED ORAL 2 TIMES DAILY
Qty: 180 TABLET | Refills: 1 | COMMUNITY
Start: 2021-04-15

## 2021-04-15 NOTE — PROGRESS NOTES
Pleasant patient here today, follow-up diabetes mellitus previously controlled, we need an A1c soon  Patient history of thyroid cancer with metastasis to the lumbar, right hip  As well possibly left parietal of her skull.  She is up-to-date with her oncologist, and orthopedist surgeon    Earl Young patient did not do well with it she became quite anemic and bruising and week, we had to discontinue it but thankfully looks so far that we have no new areas of growth.  And so far the doctors are pleased.    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

## 2021-04-16 ENCOUNTER — TELEPHONE (OUTPATIENT)
Dept: GASTROENTEROLOGY | Facility: CLINIC | Age: 51
End: 2021-04-16

## 2021-04-17 LAB
ALBUMIN SERPL-MCNC: 4.4 G/DL (ref 3.5–5.2)
ALBUMIN/GLOB SERPL: 1.5 G/DL
ALP SERPL-CCNC: 76 U/L (ref 39–117)
ALT SERPL-CCNC: 9 U/L (ref 1–33)
AST SERPL-CCNC: 14 U/L (ref 1–32)
BASOPHILS # BLD AUTO: 0.02 10*3/MM3 (ref 0–0.2)
BASOPHILS NFR BLD AUTO: 0.3 % (ref 0–1.5)
BILIRUB SERPL-MCNC: 0.2 MG/DL (ref 0–1.2)
BUN SERPL-MCNC: 17 MG/DL (ref 6–20)
BUN/CREAT SERPL: 21.3 (ref 7–25)
CALCIUM SERPL-MCNC: 9.9 MG/DL (ref 8.6–10.5)
CHLORIDE SERPL-SCNC: 101 MMOL/L (ref 98–107)
CO2 SERPL-SCNC: 28.3 MMOL/L (ref 22–29)
CREAT SERPL-MCNC: 0.8 MG/DL (ref 0.57–1)
EOSINOPHIL # BLD AUTO: 0.31 10*3/MM3 (ref 0–0.4)
EOSINOPHIL NFR BLD AUTO: 4.9 % (ref 0.3–6.2)
ERYTHROCYTE [DISTWIDTH] IN BLOOD BY AUTOMATED COUNT: 13.9 % (ref 12.3–15.4)
GLOBULIN SER CALC-MCNC: 3 GM/DL
GLUCOSE SERPL-MCNC: 84 MG/DL (ref 65–99)
HBA1C MFR BLD: 6.1 % (ref 4.8–5.6)
HCT VFR BLD AUTO: 31.8 % (ref 34–46.6)
HGB BLD-MCNC: 10.4 G/DL (ref 12–15.9)
IMM GRANULOCYTES # BLD AUTO: 0.04 10*3/MM3 (ref 0–0.05)
IMM GRANULOCYTES NFR BLD AUTO: 0.6 % (ref 0–0.5)
LYMPHOCYTES # BLD AUTO: 1.66 10*3/MM3 (ref 0.7–3.1)
LYMPHOCYTES NFR BLD AUTO: 26.3 % (ref 19.6–45.3)
MCH RBC QN AUTO: 28.7 PG (ref 26.6–33)
MCHC RBC AUTO-ENTMCNC: 32.7 G/DL (ref 31.5–35.7)
MCV RBC AUTO: 87.8 FL (ref 79–97)
MONOCYTES # BLD AUTO: 0.75 10*3/MM3 (ref 0.1–0.9)
MONOCYTES NFR BLD AUTO: 11.9 % (ref 5–12)
NEUTROPHILS # BLD AUTO: 3.54 10*3/MM3 (ref 1.7–7)
NEUTROPHILS NFR BLD AUTO: 56 % (ref 42.7–76)
NRBC BLD AUTO-RTO: 0 /100 WBC (ref 0–0.2)
PLATELET # BLD AUTO: 338 10*3/MM3 (ref 140–450)
POTASSIUM SERPL-SCNC: 3.8 MMOL/L (ref 3.5–5.2)
PROT SERPL-MCNC: 7.4 G/DL (ref 6–8.5)
RBC # BLD AUTO: 3.62 10*6/MM3 (ref 3.77–5.28)
SODIUM SERPL-SCNC: 139 MMOL/L (ref 136–145)
WBC # BLD AUTO: 6.32 10*3/MM3 (ref 3.4–10.8)

## 2021-04-20 NOTE — PROGRESS NOTES
"Chief Complaint  Diabetes    Subjective          Ina Akers presents to [facility]  Pleasant patient here today, follow-up diabetes mellitus previously controlled, we need an A1c soon  Patient history of thyroid cancer with metastasis to the lumbar, right hip  As well possibly left parietal of her skull.  She is up-to-date with her oncologist, and orthopedist surgeon    Chemo [place] patient did not do well with it she became quite anemic and bruising and week, we had to discontinue it but thankfully looks so far that we have no new areas of growth.  And so far the doctors are pleased.    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   Constitutional:       Appearance: She is well-developed.   HENT:      Head: Normocephalic.      Nose: Nose normal.   Eyes:      General: No scleral icterus.     Conjunctiva/sclera: Conjunctivae normal.      Pupils: Pupils are equal, round, and reactive to light.   Neck:      Thyroid: No thyromegaly.      Vascular: No JVD.   Cardiovascular:      Rate and Rhythm: Normal rate and regular rhythm.      Heart sounds: Normal heart sounds. No murmur heard.   No friction rub. No gallop.    Pulmonary:      Effort: Pulmonary effort is normal. No respiratory distress.      Breath sounds: Normal breath sounds. No stridor. No wheezing or rales.   Abdominal:      General: Bowel sounds are normal. There is no distension.      Palpations: Abdomen is soft.      Tenderness: There is no abdominal tenderness.      Comments: No " hepatosplenomegaly, no ascites,   Musculoskeletal:         General: No tenderness.      Cervical back: Neck supple.   Lymphadenopathy:      Cervical: No cervical adenopathy.   Skin:     General: Skin is warm and dry.      Findings: No erythema or rash.   Neurological:      Mental Status: She is alert and oriented to person, place, and time.      Deep Tendon Reflexes: Reflexes are normal and symmetric.   Psychiatric:         Behavior: Behavior normal.         Thought Content: Thought content normal.         Judgment: Judgment normal.        Result Review :                 Assessment and Plan    Diagnoses and all orders for this visit:    1. Essential hypertension (Primary)  -     CBC & Differential  -     Comprehensive Metabolic Panel  -     Hemoglobin A1c    2. Controlled type 2 diabetes mellitus without complication, without long-term current use of insulin (CMS/HCC)  -     CBC & Differential  -     Comprehensive Metabolic Panel  -     Hemoglobin A1c    3. Metastatic bone cancer (CMS/HCC)  -     CBC & Differential  -     Comprehensive Metabolic Panel  -     Hemoglobin A1c    4. Colon cancer screening  -     Ambulatory Referral For Screening Colonoscopy        Follow Up   No follow-ups on file.  Patient was given instructions and counseling regarding her condition or for health maintenance advice. Please see specific information pulled into the AVS if appropriate.   Patient's blood pressures controlled, she will follow-up with her oncologist she will continue present care she will let me know if she needs anything, she is doing wonderfully, continue sunshine therapy when able to take frequent breaks from work short-term follow-up 3 months

## 2021-05-08 ENCOUNTER — APPOINTMENT (OUTPATIENT)
Dept: MRI IMAGING | Facility: HOSPITAL | Age: 51
End: 2021-05-08

## 2021-05-08 ENCOUNTER — HOSPITAL ENCOUNTER (OUTPATIENT)
Dept: MRI IMAGING | Facility: HOSPITAL | Age: 51
Discharge: HOME | End: 2021-05-08

## 2021-05-08 DIAGNOSIS — D49.2 LUMBAR SPINE TUMOR: ICD-10-CM

## 2021-05-08 PROCEDURE — 0 GADOBENATE DIMEGLUMINE 529 MG/ML SOLUTION: Performed by: NEUROLOGICAL SURGERY

## 2021-05-08 PROCEDURE — 72158 MRI LUMBAR SPINE W/O & W/DYE: CPT

## 2021-05-08 PROCEDURE — A9577 INJ MULTIHANCE: HCPCS | Performed by: NEUROLOGICAL SURGERY

## 2021-05-08 PROCEDURE — 72157 MRI CHEST SPINE W/O & W/DYE: CPT

## 2021-05-08 RX ADMIN — GADOBENATE DIMEGLUMINE 17 ML: 529 INJECTION, SOLUTION INTRAVENOUS at 10:57

## 2021-05-14 ENCOUNTER — TELEPHONE (OUTPATIENT)
Dept: ONCOLOGY | Facility: CLINIC | Age: 51
End: 2021-05-14

## 2021-05-14 NOTE — TELEPHONE ENCOUNTER
Caller: CARRIE    Relationship to patient: PATIENT    Best call back number: [phone]    Type of visit: LAB AND FOLLOW UP     Requested date: 06/23, LATEST APPT OF THE DAY    If rescheduling, when is the original appointment: 06/09

## 2021-05-17 ENCOUNTER — OFFICE VISIT (OUTPATIENT)
Dept: NEUROSURGERY | Facility: CLINIC | Age: 51
End: 2021-05-17

## 2021-05-17 DIAGNOSIS — M89.9 SKULL LESION: ICD-10-CM

## 2021-05-17 DIAGNOSIS — D49.2 LUMBAR SPINE TUMOR: Primary | ICD-10-CM

## 2021-05-17 PROCEDURE — 99441 PR PHYS/QHP TELEPHONE EVALUATION 5-10 MIN: CPT | Performed by: NEUROLOGICAL SURGERY

## 2021-05-17 NOTE — PROGRESS NOTES
Subjective   History of Present Illness: Ina Akers is a 50 y.o. female is here today for follow-up via telephone to discuss results of MRI T-Spine & L-Spine 21 @ [facility]. Today Ms. Akers reports she is off of her medication for her metastatic thyroid cancer.  She reports that she has a follow-up CT of her head and spine scheduled for  and she is to follow-up with her oncologist in [place].  She reports that today she is doing very well.  She does not have any new back pain.  Does not have any headaches.  No new numbness or weakness.    You have chosen to receive care through a telephone visit. Do you consent to use a telephone visit for your medical care today? Yes      History of Present Illness    The following portions of the patient's history were reviewed and updated as appropriate: allergies, current medications, past medical history, past social history, past surgical history and problem list.    Past Medical History:   Diagnosis Date   • Anemia     History of recurent iron deficiency   • Compression fracture of fourth lumbar vertebra (CMS/HCC)     RECENT KYPHOPLASTY 2020   • Diabetes mellitus (CMS/HCC)    • GERD (gastroesophageal reflux disease)    • History of breast cancer     LEFT BREAST MASTECTOMY, CHEMO AND RADIATION    • History of gastritis    • History of thyroid nodule    • Hyperlipidemia    • Hypertension    • Metastatic bone cancer (CMS/HCC)     RIB, HIP - AWAITING FINDINGS FOR PRIMARY SOURCE (THYROID VS BREAST)   • PONV (postoperative nausea and vomiting)         Past Surgical History:   Procedure Laterality Date   •  SECTION     • FEMUR IM NAILING/RODDING Right 10/23/2020    Procedure: HIP INTERTROCHCHANTERIC NAILING;  Surgeon: Gretchen Connell MD;  Location: [facility] OR;  Service: Orthopedics;  Laterality: Right;   • KYPHOPLASTY N/A 2020    Procedure: Lumbar 4  osteo cool radiofrequency ablation and KYPHOPLASTY;  Surgeon: Blanca  MD Vik;  Location: [facility] OR 18/19;  Service: Neurosurgery;  Laterality: N/A;   • MASTECTOMY, PARTIAL Left 2001   • RIB BIOPSY  10/2020   • THYROIDECTOMY, PARTIAL Right 2018          Current Outpatient Medications:   •  acetaminophen (TYLENOL) 500 MG tablet, Take 1,000 mg by mouth Every 6 (Six) Hours As Needed for Mild Pain ., Disp: , Rfl:   •  amLODIPine (NORVASC) 10 MG tablet, Take 10 mg by mouth Daily., Disp: , Rfl:   •  atorvastatin (LIPITOR) 10 MG tablet, Take 1 tablet by mouth Daily. (Patient taking differently: Take 10 mg by mouth Every Night.), Disp: 90 tablet, Rfl: 3  •  Blood Glucose Monitoring Suppl w/Device kit, Check blood sugar twice a day, Disp: 1 each, Rfl: 0  •  cholecalciferol (VITAMIN D3) 25 MCG (1000 UT) tablet, Take 1,000 Units by mouth Daily., Disp: , Rfl:   •  Docusate Calcium (STOOL SOFTENER PO), Take 1 tablet by mouth As Needed. As needed, Disp: , Rfl:   •  Glucose Blood (BLOOD GLUCOSE TEST) strip, Check blood sugar twice a day., Disp: 100 each, Rfl: 1  •  hydroCHLOROthiazide (HYDRODIURIL) 25 MG tablet, Take 25 mg by mouth Daily., Disp: , Rfl:   •  Hydrocortisone, Perianal, (ANUSOL-HC) 2.5 % rectal cream, Insert  into the rectum 3 (Three) Times a Day As Needed for Hemorrhoids., Disp: 60 g, Rfl: 2  •  labetalol (NORMODYNE) 100 MG tablet, Take 0.5 tablets by mouth 2 (Two) Times a Day., Disp: 180 tablet, Rfl: 1  •  Lancets (ACCU-CHEK SOFT TOUCH) lancets, Check blood sugar twice a day as needed, Disp: 100 each, Rfl: 1  •  lansoprazole (PREVACID) 30 MG capsule, TAKE ONE CAPSULE BY MOUTH DAILY (Patient taking differently: Take 30 mg by mouth Every Other Day.), Disp: 90 capsule, Rfl: 2  •  levothyroxine (SYNTHROID, LEVOTHROID) 112 MCG tablet, Take 112 mcg by mouth Daily., Disp: , Rfl:   •  losartan (COZAAR) 100 MG tablet, Take 100 mg by mouth Daily., Disp: , Rfl:   •  metFORMIN ER (GLUCOPHAGE-XR) 500 MG 24 hr tablet, TAKE TWO TABLETS BY MOUTH DAILY WITH DINNER, Disp: 180 tablet, Rfl:  1  •  potassium chloride (K-DUR,KLOR-CON) 20 MEQ CR tablet, Take 0.5 tablets by mouth Daily., Disp: 30 tablet, Rfl: 2  •  promethazine (PHENERGAN) 12.5 MG tablet, 1/2 to 1 tablet by mouth every 4 hours as needed for nausea vomiting caution sedation no falls or possible, Disp: 30 tablet, Rfl: 3  •  triamcinolone (KENALOG) 0.1 % cream, Apply  topically to the appropriate area as directed 2 (Two) Times a Day. Use sparingly avoid face (Patient taking differently: Apply  topically to the appropriate area as directed As Needed. Use sparingly avoid face), Disp: 60 g, Rfl: 1     Social History     Socioeconomic History   • Marital status:      Spouse name: Raghav   • Number of children: 2   • Years of education: Not on file   • Highest education level: Not on file   Tobacco Use   • Smoking status: Never Smoker   • Smokeless tobacco: Never Used   Vaping Use   • Vaping Use: Never used   Substance and Sexual Activity   • Alcohol use: Yes     Comment: Socially   • Drug use: No   • Sexual activity: Defer        Family History   Problem Relation Age of Onset   • Hypertension Other    • Diabetes Other    • No Known Problems Mother    • No Known Problems Father    • Malig Hyperthermia Neg Hx         Review of Systems   Genitourinary: Negative for difficulty urinating and enuresis.        Patient denies having any issues with bowel or bladder control.   Musculoskeletal: Positive for back pain (only if standing for a long period of time, does not radiate). Negative for gait problem.        Patient denies having any issues with balance or gait.   Neurological: Negative for weakness and numbness.       Objective     Vitals:    05/17/21 1516   BP: Comment: No V/S performed during phone visit     There is no height or weight on file to calculate BMI.      Physical Exam  Neurologic Exam  No physical exam because this was a telephone visit      Assessment/Plan   Independent Review of Radiographic Studies:      I personally reviewed  the images from the following studies.  MRI lumbar and thoracic spine with and without contrast May 8, 2021 and MRI lumbar spine from 2020  The MRI from  showed an L4 pathologic compression fracture with significant tumor burden within the vertebral body and tumor involving nearly the entire vertebral body.  The latest MRI shows a small amount of residual tumor without any additional collapse of the vertebral body.  The residual enhancing component measures 10 mm x 15 mm in the posterior third of the vertebral body    Medical Decision Makin-year-old female s/p biopsy, osteocool, kyphoplasty of L4 lesion on 2020  -She has been diagnosed with metastatic thyroid cancer and is followed by an oncologist in [place], [place].  She reports that currently she is off of her chemotherapy and being monitored.  She has a follow-up ointment next month with planned CT scans of her head and spine.  I have asked her to send me a copy of these images once they are available for me to review.  Initially I plan to follow-up with today with a copy of a repeat MRI to evaluate her left parietal lesion for any growth.  Instead, she had an MRI of her thoracic and lumbar spine.  The thoracic spine MRI does not show any lesions however the MRI of her lumbar spine does show some enhancement in the L4 vertebral body suggesting residual tumor.  She is told me that she has been partially treated with radiation and and this area.  Overall, the vertebral body looks definitely improved compared to  however I am concerned there is some residual tumor that needs to be monitored closely.  I have asked her to discuss this with her oncologist to see if she may be a candidate for additional radiation.   -I will plan to see her back in 1 month to evaluate the results of her CT scan.  I will be available if needed if there is any growth of these lesions.     -This was a telephone visit and we spoke for  approximately 10 minutes    Diagnoses and all orders for this visit:    1. Lumbar spine tumor (Primary)    2. Skull lesion      Return in about 4 weeks (around 6/14/2021).

## 2021-05-24 ENCOUNTER — TELEPHONE (OUTPATIENT)
Dept: FAMILY MEDICINE CLINIC | Facility: CLINIC | Age: 51
End: 2021-05-24

## 2021-05-24 NOTE — TELEPHONE ENCOUNTER
Caller: Carrie Galvan    Relationship: Self    Best call back number: [phone] (M)    What is the best time to reach you: ANYTIME    Who are you requesting to speak with (clinical staff, provider,  specific staff member): CLINICAL STAFF    Do you know the name of the person who called: CARRIE GALVAN    What was the call regarding: PATIENT IS REQUESTING AN UPDATE ON THE REFERRAL FOR HER COLONOSCOPY FROM HER LAST VISIT WITH JAMES EPLEY, PATIENT WOULD ALSO LIKE TO GET AN XRAY OR MRI OF HER LEFT KNEE BECAUSE IT IS STILL HURTING, PLEASE ADVISE ASAP     Do you require a callback: YES, ASAP

## 2021-05-24 NOTE — TELEPHONE ENCOUNTER
Pt was given the phone number to  office in regards to her Colonoscopy. But she's asking for a x-ray or mri for her left knee pain. Please advise

## 2021-05-25 ENCOUNTER — TELEPHONE (OUTPATIENT)
Dept: GASTROENTEROLOGY | Facility: CLINIC | Age: 51
End: 2021-05-25

## 2021-05-25 DIAGNOSIS — M25.562 ACUTE PAIN OF LEFT KNEE: Primary | ICD-10-CM

## 2021-05-25 NOTE — TELEPHONE ENCOUNTER
Last scope was approx. 10 years ago, patient isn't sure where-- no personal or family hx of polyps or colon ca-- no ASA or blood thinners-- medications:    acetaminophen (TYLENOL) 500 MG tablet  amLODIPine (NORVASC) 10 MG tablet  atorvastatin (LIPITOR) 10 MG tablet  Blood Glucose Monitoring Suppl w/Device kit  cholecalciferol (VITAMIN D3) 25 MCG (1000 UT) tablet  Docusate Calcium (STOOL SOFTENER PO)  Glucose Blood (BLOOD GLUCOSE TEST) strip  hydroCHLOROthiazide (HYDRODIURIL) 25 MG tablet  Hydrocortisone, Perianal, (ANUSOL-HC) 2.5 % rectal cream  labetalol (NORMODYNE) 100 MG tablet  Lancets (ACCU-CHEK SOFT TOUCH) lancets  lansoprazole (PREVACID) 30 MG capsule  levothyroxine (SYNTHROID, LEVOTHROID) 112 MCG tablet  losartan (COZAAR) 100 MG tablet  metFORMIN ER (GLUCOPHAGE-XR) 500 MG 24 hr tablet  potassium chloride (K-DUR,KLOR-CON) 20 MEQ CR tablet  promethazine (PHENERGAN) 12.5 MG tablet  triamcinolone (KENALOG) 0.1 % cream    OA form scanned into media

## 2021-05-25 NOTE — TELEPHONE ENCOUNTER
Please tell patient I have referred her to Dr. Rodriges orthopedist he can x-ray her in the office so this to be one-stop shopping and he is in a better position to order an MRI if one is needed

## 2021-06-07 ENCOUNTER — TELEPHONE (OUTPATIENT)
Dept: GASTROENTEROLOGY | Facility: CLINIC | Age: 51
End: 2021-06-07

## 2021-06-07 ENCOUNTER — PREP FOR SURGERY (OUTPATIENT)
Dept: OTHER | Facility: HOSPITAL | Age: 51
End: 2021-06-07

## 2021-06-07 DIAGNOSIS — Z12.11 SCREEN FOR COLON CANCER: Primary | ICD-10-CM

## 2021-06-23 ENCOUNTER — LAB (OUTPATIENT)
Dept: OTHER | Facility: HOSPITAL | Age: 51
End: 2021-06-23

## 2021-06-23 ENCOUNTER — OFFICE VISIT (OUTPATIENT)
Dept: ONCOLOGY | Facility: CLINIC | Age: 51
End: 2021-06-23

## 2021-06-23 VITALS
SYSTOLIC BLOOD PRESSURE: 117 MMHG | HEIGHT: 61 IN | DIASTOLIC BLOOD PRESSURE: 76 MMHG | TEMPERATURE: 97.8 F | RESPIRATION RATE: 16 BRPM | WEIGHT: 180.3 LBS | HEART RATE: 81 BPM | BODY MASS INDEX: 34.04 KG/M2 | OXYGEN SATURATION: 98 %

## 2021-06-23 DIAGNOSIS — D50.9 IRON DEFICIENCY ANEMIA, UNSPECIFIED IRON DEFICIENCY ANEMIA TYPE: ICD-10-CM

## 2021-06-23 DIAGNOSIS — C50.412 MALIGNANT NEOPLASM OF UPPER-OUTER QUADRANT OF LEFT BREAST IN FEMALE, ESTROGEN RECEPTOR NEGATIVE (HCC): ICD-10-CM

## 2021-06-23 DIAGNOSIS — Z17.1 MALIGNANT NEOPLASM OF UPPER-OUTER QUADRANT OF LEFT BREAST IN FEMALE, ESTROGEN RECEPTOR NEGATIVE (HCC): ICD-10-CM

## 2021-06-23 DIAGNOSIS — C79.51 BONE METASTASIS: ICD-10-CM

## 2021-06-23 DIAGNOSIS — D69.6 THROMBOCYTOPENIA (HCC): ICD-10-CM

## 2021-06-23 DIAGNOSIS — C73 THYROID CANCER (HCC): ICD-10-CM

## 2021-06-23 DIAGNOSIS — E87.6 HYPOKALEMIA: ICD-10-CM

## 2021-06-23 DIAGNOSIS — C73 THYROID CANCER (HCC): Primary | ICD-10-CM

## 2021-06-23 LAB
ALBUMIN SERPL-MCNC: 4.4 G/DL (ref 3.5–5.2)
ALBUMIN/GLOB SERPL: 1.3 G/DL
ALP SERPL-CCNC: 81 U/L (ref 39–117)
ALT SERPL W P-5'-P-CCNC: 10 U/L (ref 1–33)
ANION GAP SERPL CALCULATED.3IONS-SCNC: 10.6 MMOL/L (ref 5–15)
AST SERPL-CCNC: 14 U/L (ref 1–32)
BASOPHILS # BLD AUTO: 0.03 10*3/MM3 (ref 0–0.2)
BASOPHILS NFR BLD AUTO: 0.5 % (ref 0–1.5)
BILIRUB SERPL-MCNC: 0.4 MG/DL (ref 0–1.2)
BUN SERPL-MCNC: 13 MG/DL (ref 6–20)
BUN/CREAT SERPL: 14.4 (ref 7–25)
CALCIUM SPEC-SCNC: 10.1 MG/DL (ref 8.6–10.5)
CHLORIDE SERPL-SCNC: 99 MMOL/L (ref 98–107)
CO2 SERPL-SCNC: 28.4 MMOL/L (ref 22–29)
CREAT SERPL-MCNC: 0.9 MG/DL (ref 0.57–1)
DEPRECATED RDW RBC AUTO: 46.1 FL (ref 37–54)
EOSINOPHIL # BLD AUTO: 0.34 10*3/MM3 (ref 0–0.4)
EOSINOPHIL NFR BLD AUTO: 5.8 % (ref 0.3–6.2)
ERYTHROCYTE [DISTWIDTH] IN BLOOD BY AUTOMATED COUNT: 15.1 % (ref 12.3–15.4)
FERRITIN SERPL-MCNC: 156 NG/ML (ref 13–150)
GFR SERPL CREATININE-BSD FRML MDRD: 80 ML/MIN/1.73
GLOBULIN UR ELPH-MCNC: 3.3 GM/DL
GLUCOSE SERPL-MCNC: 118 MG/DL (ref 65–99)
HCT VFR BLD AUTO: 33.4 % (ref 34–46.6)
HGB BLD-MCNC: 10.4 G/DL (ref 12–15.9)
IMM GRANULOCYTES # BLD AUTO: 0.04 10*3/MM3 (ref 0–0.05)
IMM GRANULOCYTES NFR BLD AUTO: 0.7 % (ref 0–0.5)
IRON 24H UR-MRATE: 39 MCG/DL (ref 37–145)
IRON SATN MFR SERPL: 12 % (ref 20–50)
LYMPHOCYTES # BLD AUTO: 1.43 10*3/MM3 (ref 0.7–3.1)
LYMPHOCYTES NFR BLD AUTO: 24.6 % (ref 19.6–45.3)
MCH RBC QN AUTO: 26.5 PG (ref 26.6–33)
MCHC RBC AUTO-ENTMCNC: 31.1 G/DL (ref 31.5–35.7)
MCV RBC AUTO: 85.2 FL (ref 79–97)
MONOCYTES # BLD AUTO: 0.53 10*3/MM3 (ref 0.1–0.9)
MONOCYTES NFR BLD AUTO: 9.1 % (ref 5–12)
NEUTROPHILS NFR BLD AUTO: 3.45 10*3/MM3 (ref 1.7–7)
NEUTROPHILS NFR BLD AUTO: 59.3 % (ref 42.7–76)
NRBC BLD AUTO-RTO: 0 /100 WBC (ref 0–0.2)
PLATELET # BLD AUTO: 341 10*3/MM3 (ref 140–450)
PMV BLD AUTO: 8.8 FL (ref 6–12)
POTASSIUM SERPL-SCNC: 3.3 MMOL/L (ref 3.5–5.2)
PROT SERPL-MCNC: 7.7 G/DL (ref 6–8.5)
RBC # BLD AUTO: 3.92 10*6/MM3 (ref 3.77–5.28)
SODIUM SERPL-SCNC: 138 MMOL/L (ref 136–145)
TIBC SERPL-MCNC: 323 MCG/DL (ref 298–536)
TRANSFERRIN SERPL-MCNC: 217 MG/DL (ref 200–360)
WBC # BLD AUTO: 5.82 10*3/MM3 (ref 3.4–10.8)

## 2021-06-23 PROCEDURE — 36415 COLL VENOUS BLD VENIPUNCTURE: CPT

## 2021-06-23 PROCEDURE — 99214 OFFICE O/P EST MOD 30 MIN: CPT | Performed by: INTERNAL MEDICINE

## 2021-06-23 PROCEDURE — 84165 PROTEIN E-PHORESIS SERUM: CPT | Performed by: INTERNAL MEDICINE

## 2021-06-23 PROCEDURE — 84466 ASSAY OF TRANSFERRIN: CPT | Performed by: INTERNAL MEDICINE

## 2021-06-23 PROCEDURE — 86334 IMMUNOFIX E-PHORESIS SERUM: CPT | Performed by: INTERNAL MEDICINE

## 2021-06-23 PROCEDURE — 85025 COMPLETE CBC W/AUTO DIFF WBC: CPT | Performed by: INTERNAL MEDICINE

## 2021-06-23 PROCEDURE — 82784 ASSAY IGA/IGD/IGG/IGM EACH: CPT | Performed by: INTERNAL MEDICINE

## 2021-06-23 PROCEDURE — 82728 ASSAY OF FERRITIN: CPT | Performed by: INTERNAL MEDICINE

## 2021-06-23 PROCEDURE — 80053 COMPREHEN METABOLIC PANEL: CPT | Performed by: INTERNAL MEDICINE

## 2021-06-23 PROCEDURE — 83883 ASSAY NEPHELOMETRY NOT SPEC: CPT | Performed by: INTERNAL MEDICINE

## 2021-06-23 PROCEDURE — 83540 ASSAY OF IRON: CPT | Performed by: INTERNAL MEDICINE

## 2021-06-23 RX ORDER — FERROUS SULFATE 325(65) MG
325 TABLET ORAL DAILY
Start: 2021-06-23 | End: 2022-03-24

## 2021-06-23 RX ORDER — POTASSIUM CHLORIDE 20 MEQ/1
20 TABLET, EXTENDED RELEASE ORAL DAILY
Qty: 30 TABLET | Refills: 5 | Status: SHIPPED | OUTPATIENT
Start: 2021-06-23 | End: 2021-12-08 | Stop reason: SDUPTHER

## 2021-06-23 RX ORDER — FAMOTIDINE 20 MG/1
20 TABLET, FILM COATED ORAL EVERY OTHER DAY
COMMUNITY
Start: 2021-01-22

## 2021-06-23 NOTE — PROGRESS NOTES
Subjective     CHIEF COMPLAINT:      Chief Complaint   Patient presents with   • Follow-up       HISTORY OF PRESENT ILLNESS:     Ina Akers is a 50 y.o. female patient who returns today for follow up on her thyroid cancer, history of breast cancer and anemia.  She returns today for follow-up reporting no new symptoms.  She was having fatigue, difficulty focusing and nasal bleeding while on Lenvima.  All symptoms resolved after the medicine was stopped in February 2021.    Patient reports intermittent pain in the right posterior chest.  She also reports intermittent pain in the left hip.  She has follow-up with orthopedics in August.    Patient had her screening mammograms in mid August 2020 and will be due for the next mammograms in mid August 2021.      ROS:  Pertinent ROS is in the HPI.     Past medical, surgical, social and family history were reviewed.     MEDICATIONS:    Current Outpatient Medications:   •  acetaminophen (TYLENOL) 500 MG tablet, Take 1,000 mg by mouth Every 6 (Six) Hours As Needed for Mild Pain ., Disp: , Rfl:   •  amLODIPine (NORVASC) 10 MG tablet, Take 10 mg by mouth Daily., Disp: , Rfl:   •  atorvastatin (LIPITOR) 10 MG tablet, Take 1 tablet by mouth Daily. (Patient taking differently: Take 10 mg by mouth Every Night.), Disp: 90 tablet, Rfl: 3  •  Blood Glucose Monitoring Suppl w/Device kit, Check blood sugar twice a day, Disp: 1 each, Rfl: 0  •  cholecalciferol (VITAMIN D3) 25 MCG (1000 UT) tablet, Take 1,000 Units by mouth Daily., Disp: , Rfl:   •  Docusate Calcium (STOOL SOFTENER PO), Take 1 tablet by mouth As Needed. As needed, Disp: , Rfl:   •  famotidine (PEPCID) 20 MG tablet, Every Other Day., Disp: , Rfl:   •  Glucose Blood (BLOOD GLUCOSE TEST) strip, Check blood sugar twice a day., Disp: 100 each, Rfl: 1  •  hydroCHLOROthiazide (HYDRODIURIL) 25 MG tablet, Take 25 mg by mouth Daily., Disp: , Rfl:   •  Hydrocortisone, Perianal, (ANUSOL-HC) 2.5 % rectal cream, Insert  into the  "rectum 3 (Three) Times a Day As Needed for Hemorrhoids., Disp: 60 g, Rfl: 2  •  labetalol (NORMODYNE) 100 MG tablet, Take 0.5 tablets by mouth 2 (Two) Times a Day., Disp: 180 tablet, Rfl: 1  •  Lancets (ACCU-CHEK SOFT TOUCH) lancets, Check blood sugar twice a day as needed, Disp: 100 each, Rfl: 1  •  lansoprazole (PREVACID) 30 MG capsule, TAKE ONE CAPSULE BY MOUTH DAILY (Patient taking differently: Take 30 mg by mouth Every Other Day.), Disp: 90 capsule, Rfl: 2  •  levothyroxine (SYNTHROID, LEVOTHROID) 112 MCG tablet, Take 112 mcg by mouth Daily., Disp: , Rfl:   •  losartan (COZAAR) 100 MG tablet, Take 100 mg by mouth Daily., Disp: , Rfl:   •  metFORMIN ER (GLUCOPHAGE-XR) 500 MG 24 hr tablet, TAKE TWO TABLETS BY MOUTH DAILY WITH DINNER, Disp: 180 tablet, Rfl: 1  •  potassium chloride (K-DUR,KLOR-CON) 20 MEQ CR tablet, Take 0.5 tablets by mouth Daily., Disp: 30 tablet, Rfl: 2  •  promethazine (PHENERGAN) 12.5 MG tablet, 1/2 to 1 tablet by mouth every 4 hours as needed for nausea vomiting caution sedation no falls or possible, Disp: 30 tablet, Rfl: 3  •  triamcinolone (KENALOG) 0.1 % cream, Apply  topically to the appropriate area as directed 2 (Two) Times a Day. Use sparingly avoid face (Patient taking differently: Apply  topically to the appropriate area as directed As Needed. Use sparingly avoid face), Disp: 60 g, Rfl: 1    Objective   VITAL SIGNS:     Vitals:    06/23/21 1622   BP: 117/76   Pulse: 81   Resp: 16   Temp: 97.8 °F (36.6 °C)   TempSrc: Temporal   SpO2: 98%   Weight: 81.8 kg (180 lb 4.8 oz)   Height: 154.9 cm (60.98\")   PainSc: 0-No pain     Body mass index is 34.09 kg/m².     Wt Readings from Last 5 Encounters:   06/23/21 81.8 kg (180 lb 4.8 oz)   04/15/21 83 kg (183 lb)   01/27/21 80.6 kg (177 lb 11.2 oz)   01/20/21 80.8 kg (178 lb 1.6 oz)   12/28/20 79.4 kg (175 lb)       PHYSICAL EXAMINATION:   GENERAL: The patient appears in good general condition, not in acute distress.   SKIN: No " ecchymosis.  EYES: No jaundice.  LYMPHATICS: No cervical, supraclavicular or axillary lymphadenopathy.  CHEST: Normal respiratory effort.  Lungs clear bilaterally.  No added sounds.  There is tenderness at the right posterior ninth rib area.  CVS: Normal S1-S2.  No murmurs.    ABDOMEN: Soft. No tenderness. No Hepatomegaly. No Splenomegaly. No masses.  EXTREMITIES: No edema.     DIAGNOSTIC DATA:     Results from last 7 days   Lab Units 06/23/21  1614   WBC 10*3/mm3 5.82   NEUTROS ABS 10*3/mm3 3.45   HEMOGLOBIN g/dL 10.4*   HEMATOCRIT % 33.4*   PLATELETS 10*3/mm3 341     Results from last 7 days   Lab Units 06/23/21  1614   SODIUM mmol/L 138   POTASSIUM mmol/L 3.3*   CHLORIDE mmol/L 99   CO2 mmol/L 28.4   BUN mg/dL 13   CREATININE mg/dL 0.90   CALCIUM mg/dL 10.1   ALBUMIN g/dL 4.40   BILIRUBIN mg/dL 0.4   ALK PHOS U/L 81   ALT (SGPT) U/L 10   AST (SGOT) U/L 14   GLUCOSE mg/dL 118*         Lab 06/23/21  1614   IRON 39   IRON SATURATION 12*   TIBC 323   TRANSFERRIN 217   FERRITIN 156.00*        Assessment/Plan   *Metastatic thyroid cancer diagnosed on 10/6/2020.  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  · She had right thyroid lobectomy on 12/17/2018 at [place].    · The specimen was sent for outside consultation and the final conclusion was that there was no evidence of malignancy.  · MRI on 8/27/2020 revealed an L4 lesion resulting in pathologic compression fracture.  · Bone scan on 9/2/2020 revealed increased activity at L4 and left side of the skull.  · PET scan on 9/9/2020 revealed abnormal activity at L4, right femoral neck, lower sacrum, left ischium and right ninth medial aspect.  · PET scan 9/9/2020 showed no evidence of visceral metastasis.  · Patient had kyphoplasty on 9/17/2020.  Pathology was nondiagnostic.  · CT-guided biopsy of the lesion in the right posterior ninth rib on 10/6/2020 revealed a clear cell neoplasm.  It is positive for PAX 8 and TTF-1 favoring metastatic thyroid  cancer.  · Patient was referred to [facility].  · Patient underwent completion thyroidectomy on 11/9/2020.  · There was no evidence of uptake of radioactive iodine on SPECT imaging on 12/18/2020.  · This was indicative of the tumor not going to respond to radioactive iodine therapy.  · Patient was started on Lenvima 20 mg daily on 12/24/2020.  · Lenvima was held on 2/6/2021 due to drug induced anemia.  · CT scans on 3/23/2021 showed stable disease.  · CBC was stable on 6/8/2021.  CT scans showed stable disease.  Thyroglobulin was stable at 3.4.  She is going to have a PET scan on 9/9/2021.    *Left breast cancer, stage IIB, triple negative.   · S/P lumpectomy  · She received adjuvant chemotherapy on the NSABP B34 with AC followed by Taxotere.  · She received post-lumpectomy radiation therapy.   · She was placed on tamoxifen for 5 years and it was completed in January 2007.    · Bilateral mammograms from 8/14/2020 were benign.   · Patient underwent recent breast imaging studies due to suspected abnormality and the findings were benign.  · Patient is due for her bilateral screening mammograms in mid August 2021.      *Bone metastasis.  · Patient was given Zometa on 9/29/2020.  · Patient underwent right femur medullary nailing on 10/23/2020.  · Patient reports improvement in the pain in the area.  Pain only developed when she sits on the right side of her hip.  · Patient is not having new areas of pain.  She reports intermittent pain in the left hip.  She has follow-up with orthopedics in August.    * Iron deficiency anemia.    · Patient was previously treated with ferrous sulfate 325 mg daily.  · Hemoglobin decreased to 10.6 on 9/3/2020.    · Hemoglobin decreased to 9.4 in January 2021.  However, her iron studies did not show evidence of deficiency at this point.  The anemia is therefore attributed to underlying malignancy and  Lenvima.  Lenvima was put on hold in February 2021.  · Patient's hemoglobin  improved to 10.4 on 4/16/2021.  · Hemoglobin is 10.4 today.  Iron stores decreased with transferrin saturation decreasing to 12%.    *Thrombocytopenia.  · This was attributed to Lenvima.  Patient had nasal bleeding.  · Vitamin levels from 9/3/2020 showed adequate B12 and folate levels.  · Platelet count improved to 341,000 today.    *Hypokalemia.  Potassium is 3.3 today.  She is on 20 mEq of potassium chloride taking half a tablet daily.    PLAN:    1.  Start back on ferrous sulfate 325 mg daily.  2.  Increase potassium chloride to 20 mEq daily.  3.  I asked the patient to notify us if she is not tolerating the oral iron.  4.  Patient will have bilateral mammograms in mid August 2021.  5.  Patient will have labs and scans scheduled in September 2021 at [facility].  6.  If the anemia does not improve with the oral iron, recommended IV iron therapy.  7.  Follow-up in 6 months with CBC CMP ferritin iron panel.        Tim Muñoz MD  06/23/21

## 2021-06-24 ENCOUNTER — TELEPHONE (OUTPATIENT)
Dept: FAMILY MEDICINE CLINIC | Facility: CLINIC | Age: 51
End: 2021-06-24

## 2021-06-24 LAB
ALBUMIN SERPL ELPH-MCNC: 3.8 G/DL (ref 2.9–4.4)
ALBUMIN/GLOB SERPL: 1.1 {RATIO} (ref 0.7–1.7)
ALPHA1 GLOB SERPL ELPH-MCNC: 0.2 G/DL (ref 0–0.4)
ALPHA2 GLOB SERPL ELPH-MCNC: 0.8 G/DL (ref 0.4–1)
B-GLOBULIN SERPL ELPH-MCNC: 1.2 G/DL (ref 0.7–1.3)
GAMMA GLOB SERPL ELPH-MCNC: 1.6 G/DL (ref 0.4–1.8)
GLOBULIN SER-MCNC: 3.8 G/DL (ref 2.2–3.9)
IGA SERPL-MCNC: 224 MG/DL (ref 87–352)
IGG SERPL-MCNC: 1450 MG/DL (ref 586–1602)
IGM SERPL-MCNC: 160 MG/DL (ref 26–217)
INTERPRETATION SERPL IEP-IMP: ABNORMAL
KAPPA LC FREE SER-MCNC: 28.4 MG/L (ref 3.3–19.4)
KAPPA LC FREE/LAMBDA FREE SER: 2.96 {RATIO} (ref 0.26–1.65)
LABORATORY COMMENT REPORT: ABNORMAL
LAMBDA LC FREE SERPL-MCNC: 9.6 MG/L (ref 5.7–26.3)
M PROTEIN SERPL ELPH-MCNC: ABNORMAL G/DL
PROT SERPL-MCNC: 7.6 G/DL (ref 6–8.5)

## 2021-06-24 NOTE — TELEPHONE ENCOUNTER
Caller: Carrie Galvan    Relationship: Self    Best call back number: [phone] (M)    What is the best time to reach you: ANYTIME    Who are you requesting to speak with (clinical staff, provider,  specific staff member): JAMES EPLEY'S ASSISTANT    Do you know the name of the person who called: CARRIE GALVAN    What was the call regarding: PATIENT WOULD LIKE A CALL BACK REGARDING FMLA PAPERWORK    Do you require a callback: YES

## 2021-06-30 ENCOUNTER — TELEPHONE (OUTPATIENT)
Dept: FAMILY MEDICINE CLINIC | Facility: CLINIC | Age: 51
End: 2021-06-30

## 2021-06-30 NOTE — TELEPHONE ENCOUNTER
Did not realize this was a [facility]ple[facility] patients. LVM for the patient that this will need to wait for Panfil[employer]. In the future it would be best to drop this off sooner to avoid situations like this. Panfilo will return next Tuesday and the forms will be completed by the end of next week.

## 2021-06-30 NOTE — TELEPHONE ENCOUNTER
Info Needed:    Section 8 needs to know how many episodes needed per month, 2 appointments per month  Check the box 8 how many episodes per month requests 3 times a month.    Needs to be returned July 2nd.    Can we do this?

## 2021-06-30 NOTE — TELEPHONE ENCOUNTER
Caller: Ina Akers    Relationship: Self    Best call back number: [phone]    What form or medical record are you requesting: [facility] PAPERS NEED TO BE MODIFIED PER [facility].    How would you like to receive the form or medical records (pick-up, mail, fax):    [phone] FAX NUMBER   [phone] PHONE NUMBER     PATIENT STATES THAT LUIZ CAN CALL HER FOR THE INFORMATION OR CALL [employer] DIRECTLY AT NUMBER ABOVE.

## 2021-07-01 ENCOUNTER — TELEPHONE (OUTPATIENT)
Dept: GASTROENTEROLOGY | Facility: CLINIC | Age: 51
End: 2021-07-01

## 2021-07-01 NOTE — TELEPHONE ENCOUNTER
----- Message from Manish Krishnamurthy sent at 7/1/2021  3:14 PM EDT -----  Regarding: Questions  Patient is having a colonoscopy tomorrow w/Dr. Newman. Patient is stating that she took the Dulcolax tablets and now her stomach is really hurting her, pt would like to know if she can take Phenergan for nausea.

## 2021-07-01 NOTE — TELEPHONE ENCOUNTER
Returned patient's phone call. She states after taking the Doculax she developed abdominal cramping, nausea and sweating. She states its much better now. Advised if she should become nauseated again, she may take one Phenergan. She verb understanding.

## 2021-07-02 ENCOUNTER — HOSPITAL ENCOUNTER (OUTPATIENT)
Facility: HOSPITAL | Age: 51
Setting detail: HOSPITAL OUTPATIENT SURGERY
Discharge: HOME | End: 2021-07-02
Attending: INTERNAL MEDICINE | Admitting: INTERNAL MEDICINE

## 2021-07-02 ENCOUNTER — ANESTHESIA EVENT (OUTPATIENT)
Dept: GASTROENTEROLOGY | Facility: HOSPITAL | Age: 51
End: 2021-07-02

## 2021-07-02 ENCOUNTER — ANESTHESIA (OUTPATIENT)
Dept: GASTROENTEROLOGY | Facility: HOSPITAL | Age: 51
End: 2021-07-02

## 2021-07-02 VITALS
BODY MASS INDEX: 34.81 KG/M2 | WEIGHT: 177.3 LBS | SYSTOLIC BLOOD PRESSURE: 125 MMHG | OXYGEN SATURATION: 99 % | DIASTOLIC BLOOD PRESSURE: 77 MMHG | HEART RATE: 76 BPM | RESPIRATION RATE: 16 BRPM | HEIGHT: 60 IN

## 2021-07-02 LAB — GLUCOSE BLDC GLUCOMTR-MCNC: 103 MG/DL (ref 70–130)

## 2021-07-02 PROCEDURE — 82962 GLUCOSE BLOOD TEST: CPT

## 2021-07-02 PROCEDURE — S0260 H&P FOR SURGERY: HCPCS | Performed by: INTERNAL MEDICINE

## 2021-07-02 PROCEDURE — 25010000002 PROPOFOL 10 MG/ML EMULSION: Performed by: NURSE ANESTHETIST, CERTIFIED REGISTERED

## 2021-07-02 PROCEDURE — 45378 DIAGNOSTIC COLONOSCOPY: CPT | Performed by: INTERNAL MEDICINE

## 2021-07-02 RX ORDER — PROPOFOL 10 MG/ML
VIAL (ML) INTRAVENOUS AS NEEDED
Status: DISCONTINUED | OUTPATIENT
Start: 2021-07-02 | End: 2021-07-02 | Stop reason: SURG

## 2021-07-02 RX ORDER — PROPOFOL 10 MG/ML
VIAL (ML) INTRAVENOUS CONTINUOUS PRN
Status: DISCONTINUED | OUTPATIENT
Start: 2021-07-02 | End: 2021-07-02 | Stop reason: SURG

## 2021-07-02 RX ORDER — LIDOCAINE HYDROCHLORIDE 20 MG/ML
INJECTION, SOLUTION INFILTRATION; PERINEURAL AS NEEDED
Status: DISCONTINUED | OUTPATIENT
Start: 2021-07-02 | End: 2021-07-02 | Stop reason: SURG

## 2021-07-02 RX ORDER — SODIUM CHLORIDE, SODIUM LACTATE, POTASSIUM CHLORIDE, CALCIUM CHLORIDE 600; 310; 30; 20 MG/100ML; MG/100ML; MG/100ML; MG/100ML
30 INJECTION, SOLUTION INTRAVENOUS CONTINUOUS PRN
Status: DISCONTINUED | OUTPATIENT
Start: 2021-07-02 | End: 2021-07-02 | Stop reason: HOSPADM

## 2021-07-02 RX ADMIN — SODIUM CHLORIDE, POTASSIUM CHLORIDE, SODIUM LACTATE AND CALCIUM CHLORIDE 30 ML/HR: 600; 310; 30; 20 INJECTION, SOLUTION INTRAVENOUS at 09:03

## 2021-07-02 RX ADMIN — LIDOCAINE HYDROCHLORIDE 60 MG: 20 INJECTION, SOLUTION INFILTRATION; PERINEURAL at 09:48

## 2021-07-02 RX ADMIN — PROPOFOL 250 MCG/KG/MIN: 10 INJECTION, EMULSION INTRAVENOUS at 09:48

## 2021-07-02 RX ADMIN — PROPOFOL 100 MG: 10 INJECTION, EMULSION INTRAVENOUS at 09:48

## 2021-07-02 NOTE — H&P
[facility]  Pre Procedure History & Physical    Chief Complaint:   Colonoscopy screening    Subjective     HPI:   Patient 50-year-old female with history of hypertension, hyperlipidemia and breast cancer presenting for colonoscopy screening.  Patient with no GI complaints here for colonoscopy.    Past Medical History:   Past Medical History:   Diagnosis Date   • Anemia     History of recurent iron deficiency   • Compression fracture of fourth lumbar vertebra (CMS/HCC)     RECENT KYPHOPLASTY 2020   • Diabetes mellitus (CMS/HCC)    • Disease of thyroid gland    • GERD (gastroesophageal reflux disease)    • History of breast cancer     LEFT BREAST MASTECTOMY, CHEMO AND RADIATION    • History of gastritis    • History of thyroid nodule    • Hyperlipidemia    • Hypertension    • Metastatic bone cancer (CMS/HCC)     RIB, HIP - AWAITING FINDINGS FOR PRIMARY SOURCE (THYROID VS BREAST)   • PONV (postoperative nausea and vomiting)        Past Surgical History:  Past Surgical History:   Procedure Laterality Date   •  SECTION     • FEMUR IM NAILING/RODDING Right 10/23/2020    Procedure: HIP INTERTROCHCHANTERIC NAILING;  Surgeon: Gretchen Connell MD;  Location: [facility] OR;  Service: Orthopedics;  Laterality: Right;   • KYPHOPLASTY N/A 2020    Procedure: Lumbar 4  osteo cool radiofrequency ablation and KYPHOPLASTY;  Surgeon: Vik Rios MD;  Location: [facility] OR ;  Service: Neurosurgery;  Laterality: N/A;   • MASTECTOMY, PARTIAL Left    • RIB BIOPSY  10/2020   • THYROIDECTOMY, PARTIAL Right        Family History:  Family History   Problem Relation Age of Onset   • Hypertension Other    • Diabetes Other    • No Known Problems Mother    • No Known Problems Father    • Malig Hyperthermia Neg Hx        Social History:   reports that she has never smoked. She has never used smokeless tobacco. She reports current alcohol use. She reports that she does  not use drugs.    Medications:   Medications Prior to Admission   Medication Sig Dispense Refill Last Dose   • acetaminophen (TYLENOL) 500 MG tablet Take 1,000 mg by mouth Every 6 (Six) Hours As Needed for Mild Pain .   Past Month at Unknown time   • amLODIPine (NORVASC) 10 MG tablet Take 10 mg by mouth Daily.   7/2/2021 at Unknown time   • cholecalciferol (VITAMIN D3) 25 MCG (1000 UT) tablet Take 1,000 Units by mouth Daily.   7/1/2021 at Unknown time   • Docusate Calcium (STOOL SOFTENER PO) Take 1 tablet by mouth As Needed. As needed   Past Week at Unknown time   • famotidine (PEPCID) 20 MG tablet Every Other Day.   7/1/2021 at Unknown time   • ferrous sulfate 325 (65 FE) MG tablet Take 1 tablet by mouth Daily.   Past Week at Unknown time   • hydroCHLOROthiazide (HYDRODIURIL) 25 MG tablet Take 25 mg by mouth Daily.   7/1/2021 at Unknown time   • Hydrocortisone, Perianal, (ANUSOL-HC) 2.5 % rectal cream Insert  into the rectum 3 (Three) Times a Day As Needed for Hemorrhoids. 60 g 2 Past Month at Unknown time   • levothyroxine (SYNTHROID, LEVOTHROID) 112 MCG tablet Take 112 mcg by mouth Daily.   7/2/2021 at Unknown time   • losartan (COZAAR) 100 MG tablet Take 100 mg by mouth Daily.   7/2/2021 at Unknown time   • promethazine (PHENERGAN) 12.5 MG tablet 1/2 to 1 tablet by mouth every 4 hours as needed for nausea vomiting caution sedation no falls or possible 30 tablet 3 Past Week at Unknown time   • triamcinolone (KENALOG) 0.1 % cream Apply  topically to the appropriate area as directed 2 (Two) Times a Day. Use sparingly avoid face (Patient taking differently: Apply  topically to the appropriate area as directed As Needed. Use sparingly avoid face) 60 g 1 Past Month at Unknown time   • atorvastatin (LIPITOR) 10 MG tablet Take 1 tablet by mouth Daily. (Patient taking differently: Take 10 mg by mouth Every Night.) 90 tablet 3 6/30/2021   • Blood Glucose Monitoring Suppl w/Device kit Check blood sugar twice a day 1 each 0  "   • Glucose Blood (BLOOD GLUCOSE TEST) strip Check blood sugar twice a day. 100 each 1    • labetalol (NORMODYNE) 100 MG tablet Take 0.5 tablets by mouth 2 (Two) Times a Day. 180 tablet 1 6/30/2021   • Lancets (ACCU-CHEK SOFT TOUCH) lancets Check blood sugar twice a day as needed 100 each 1    • lansoprazole (PREVACID) 30 MG capsule TAKE ONE CAPSULE BY MOUTH DAILY (Patient taking differently: Take 30 mg by mouth Every Other Day.) 90 capsule 2 6/30/2021   • metFORMIN ER (GLUCOPHAGE-XR) 500 MG 24 hr tablet TAKE TWO TABLETS BY MOUTH DAILY WITH DINNER 180 tablet 1 6/30/2021   • potassium chloride (K-DUR,KLOR-CON) 20 MEQ CR tablet Take 1 tablet by mouth Daily. 30 tablet 5 6/30/2021       Allergies:  Hydrocodone and Lenvatinib    ROS:    Pertinent items are noted in HPI     Objective     Blood pressure 149/77, pulse 72, resp. rate 16, height 152.4 cm (60\"), weight 80.4 kg (177 lb 4.8 oz), SpO2 96 %, not currently breastfeeding.    Physical Exam   Constitutional: Pt is oriented to person, place, and time and well-developed, well-nourished, and in no distress.   Mouth/Throat: Oropharynx is clear and moist.   Neck: Normal range of motion.   Cardiovascular: Normal rate, regular rhythm and normal heart sounds.    Pulmonary/Chest: Effort normal and breath sounds normal.   Abdominal: Soft. Nontender  Skin: Skin is warm and dry.   Psychiatric: Mood, memory, affect and judgment normal.     Assessment/Plan     Diagnosis:  Colonoscopy screening    Anticipated Surgical Procedure:  Colonoscopy    The risks, benefits, and alternatives of this procedure have been discussed with the patient or the responsible party- the patient understands and agrees to proceed.                                                          "

## 2021-07-02 NOTE — BRIEF OP NOTE
COLONOSCOPY  Progress Note    Ina Akers  7/2/2021    Pre-op Diagnosis:   Screen for colon cancer [Z12.11]       Post-Op Diagnosis Codes:     * Screen for colon cancer [Z12.11]     * Diverticulosis [K57.90]     * Internal hemorrhoids [K64.8]    Procedure/CPT® Codes:        Procedure(s):  COLONOSCOPY TO CECUM AND TI    Surgeon(s):  Jefferson Newman MD    Anesthesia: Monitored Anesthesia Care    Staff:   Endo Technician: Diana Horne  Endo Nurse: Chacorta Cerna RN         Estimated Blood Loss: none    Urine Voided: * No values recorded between 7/2/2021  9:45 AM and 7/2/2021 10:10 AM *    Specimens:                None          Drains: * No LDAs found *    Findings: Colonoscopy to the terminal ileum with normal ileum mucosa.  Sigmoid diverticulosis and internal hemorrhoids were noted.    Complications: None          Jefferson Newman MD     Date: 7/2/2021  Time: 10:10 EDT

## 2021-07-02 NOTE — ANESTHESIA PREPROCEDURE EVALUATION
Anesthesia Evaluation     Patient summary reviewed and Nursing notes reviewed   history of anesthetic complications: PONV  NPO Solid Status: > 8 hours  NPO Liquid Status: > 2 hours           Airway   Mallampati: I  TM distance: >3 FB  Neck ROM: full  Dental - normal exam     Pulmonary - negative pulmonary ROS and normal exam    breath sounds clear to auscultation  (-) shortness of breath, not a smoker  Cardiovascular - normal exam    Rhythm: regular  Rate: normal    (+) hypertension, hyperlipidemia,   (-) angina, orthopnea, PND, MOMIN    ROS comment: · Calculated left ventricular EF = 64.1% Calculated left ventricular 3D EF = 62% Estimated left ventricular EF was in agreement with the calculated left ventricular EF. Estimated left ventricular EF = 64% Left ventricular systolic function is normal. Normal global longitudinal LV strain (GLS) = -17.2%. Left ventricle strain data was reviewed by the physician. Normal left ventricular cavity size and wall thickness noted. All left ventricular wall segments contract normally. Left ventricular diastolic function was normal.  · Mild mitral valve regurgitation is present.  · Trace tricuspid valve regurgitation is present. Estimated right ventricular systolic pressure from tricuspid regurgitation is normal (<35 mmHg). Calculated right ventricular systolic pressure from tricuspid regurgitation is 29 mmHg.    Neuro/Psych- negative ROS  GI/Hepatic/Renal/Endo    (+) obesity,  GERD,  diabetes mellitus type 2, thyroid problem hypothyroidism    Musculoskeletal (-) negative ROS    Chronic pain: L  hip.  Abdominal    Substance History - negative use     OB/GYN negative ob/gyn ROS         Other      history of cancer (Malignant neoplasm of upper-outer quadrant of left breast in female )                      Anesthesia Plan    ASA 3     MAC   (D/W pt. MAC and possible awareness intra op.  Pt understands MAC and GA are not the same and the possibility of GA being required for failed  MAC)  intravenous induction     Anesthetic plan, all risks, benefits, and alternatives have been provided, discussed and informed consent has been obtained with: patient.

## 2021-07-02 NOTE — ANESTHESIA POSTPROCEDURE EVALUATION
"Patient: Ina Akers    Procedure Summary     Date: 07/02/21 Room / [facility]:  [facility]    Anesthesia Start: 0945 Anesthesia Stop: 1013    Procedure: COLONOSCOPY TO CECUM AND TI (N/A ) Diagnosis:       Screen for colon cancer      Diverticulosis      Internal hemorrhoids      (Screen for colon cancer [Z12.11])    Surgeons: Jefferson Newman MD Provider: Marquise Cardona MD    Anesthesia Type: MAC ASA Status: 3          Anesthesia Type: MAC    Vitals  Vitals Value Taken Time   /66 07/02/21 1022   Temp     Pulse 82 07/02/21 1022   Resp 16 07/02/21 1022   SpO2 98 % 07/02/21 1022           Post Anesthesia Care and Evaluation    Patient location during evaluation: bedside  Patient participation: complete - patient participated  Level of consciousness: awake and alert  Pain management: adequate  Airway patency: patent  Anesthetic complications: No anesthetic complications    Cardiovascular status: acceptable  Respiratory status: acceptable  Hydration status: acceptable    Comments: /66 (BP Location: Left arm, Patient Position: Lying)   Pulse 82   Resp 16   Ht 152.4 cm (60\")   Wt 80.4 kg (177 lb 4.8 oz)   SpO2 98%   BMI 34.63 kg/m²     Patient is stable postoperatively and has adequately recovered from anesthesia as described above unless otherwise noted      "

## 2021-07-02 NOTE — DISCHARGE INSTRUCTIONS
For the next 24 hours patient needs to be with a responsible adult.    For 24 hours DO NOT drive, operate machinery, appliances, drink alcohol, make important decisions or sign legal documents.    Start with a light or bland diet if you are feeling sick to your stomach otherwise advance to regular diet as tolerated.    Follow recommendations on procedure report if provided by your doctor.    Call Dr Newman for problems [phone]    Problems may include but not limited to: large amounts of bleeding, trouble breathing, repeated vomiting, severe unrelieved pain, fever or chills.

## 2021-07-06 ENCOUNTER — TELEPHONE (OUTPATIENT)
Dept: FAMILY MEDICINE CLINIC | Facility: CLINIC | Age: 51
End: 2021-07-06

## 2021-07-06 NOTE — TELEPHONE ENCOUNTER
Caller: Ina Akers    Relationship: Self    Best call back number: [phone]    Who are you requesting to speak with (clinical staff, provider,  specific staff member): LUIZ    What was the call regarding: FMLA DOCUMENTATION    Do you require a callback: YES

## 2021-07-08 NOTE — TELEPHONE ENCOUNTER
PATIENT IS CALLING IN SHE STATES THAT THE [facility] PAPERWORK IS NOT FILLED OUT CORRECTLY AND NEEDS TO BE DONE OVER. SHE STATES IT NEEDS TO BE ADDEND.      SHE LEFT FAX NUMBER  FAX NUMBER IS  [phone]    [employer] IS THE NAME OF THE COMPANY.      REGULAR NUMBER IS  [phone]      SHE STATES IT IS DUE BACK TO THEM BY TOMORROW.

## 2021-07-15 ENCOUNTER — TELEMEDICINE (OUTPATIENT)
Dept: FAMILY MEDICINE CLINIC | Facility: CLINIC | Age: 51
End: 2021-07-15

## 2021-07-15 ENCOUNTER — TELEPHONE (OUTPATIENT)
Dept: FAMILY MEDICINE CLINIC | Facility: CLINIC | Age: 51
End: 2021-07-15

## 2021-07-15 DIAGNOSIS — E78.5 HYPERLIPIDEMIA, UNSPECIFIED HYPERLIPIDEMIA TYPE: ICD-10-CM

## 2021-07-15 DIAGNOSIS — Z12.31 ENCOUNTER FOR SCREENING MAMMOGRAM FOR BREAST CANCER: ICD-10-CM

## 2021-07-15 DIAGNOSIS — E11.9 CONTROLLED TYPE 2 DIABETES MELLITUS WITHOUT COMPLICATION, WITHOUT LONG-TERM CURRENT USE OF INSULIN (HCC): Primary | ICD-10-CM

## 2021-07-15 DIAGNOSIS — Z78.0 POST-MENOPAUSAL: ICD-10-CM

## 2021-07-15 DIAGNOSIS — I10 ESSENTIAL HYPERTENSION: ICD-10-CM

## 2021-07-15 DIAGNOSIS — E55.9 VITAMIN D DEFICIENCY: ICD-10-CM

## 2021-07-15 DIAGNOSIS — E03.9 ACQUIRED HYPOTHYROIDISM: ICD-10-CM

## 2021-07-15 PROCEDURE — 99213 OFFICE O/P EST LOW 20 MIN: CPT | Performed by: NURSE PRACTITIONER

## 2021-07-15 RX ORDER — METFORMIN HYDROCHLORIDE 500 MG/1
1000 TABLET, EXTENDED RELEASE ORAL
Qty: 180 TABLET | Refills: 1 | Status: SHIPPED | OUTPATIENT
Start: 2021-07-15 | End: 2022-04-21

## 2021-07-15 RX ORDER — LEVOTHYROXINE SODIUM 88 UG/1
88 TABLET ORAL DAILY
Qty: 30 TABLET | Refills: 2 | Status: SHIPPED | OUTPATIENT
Start: 2021-07-15 | End: 2022-01-26 | Stop reason: ALTCHOICE

## 2021-07-15 RX ORDER — AMLODIPINE BESYLATE 10 MG/1
10 TABLET ORAL DAILY
Qty: 90 TABLET | Refills: 1 | Status: SHIPPED | OUTPATIENT
Start: 2021-07-15 | End: 2022-02-18

## 2021-07-15 RX ORDER — ATORVASTATIN CALCIUM 10 MG/1
10 TABLET, FILM COATED ORAL DAILY
Qty: 90 TABLET | Refills: 3 | Status: SHIPPED | OUTPATIENT
Start: 2021-07-15 | End: 2022-07-18

## 2021-07-15 NOTE — PROGRESS NOTES
Subjective   Ina Akers is a 50 y.o. female.     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      The following portions of the patient's history were reviewed and updated as appropriate: allergies, current medications, past family history, past medical history, past social history, past surgical history and problem list.    Review of Systems    Objective   Physical Exam      Assessment/Plan   Diagnoses and all orders for this visit:    1. Controlled type 2 diabetes mellitus without complication, without long-term current use of insulin (CMS/[facility]) (Primary)  -     atorvastatin (LIPITOR) 10 MG tablet; Take 1 tablet by mouth Daily.  Dispense: 90 tablet; Refill: 3    2. Hyperlipidemia, unspecified hyperlipidemia type  -     atorvastatin (LIPITOR) 10 MG tablet; Take 1 tablet by mouth Daily.  Dispense: 90 tablet; Refill: 3    3. Essential hypertension  -     atorvastatin (LIPITOR) 10 MG tablet; Take 1 tablet by mouth Daily.  Dispense: 90 tablet; Refill: 3    4. Vitamin D deficiency  -     atorvastatin (LIPITOR) 10 MG tablet; Take 1 tablet by mouth Daily.  Dispense: 90 tablet; Refill: 3    5. Encounter for screening mammogram for breast cancer  -     Mammo Screening Bilateral With CAD    6. Post-menopausal  -     DEXA Bone Density Axial    7. Acquired hypothyroidism  -     TSH    Other orders  -     metFORMIN ER (GLUCOPHAGE-XR) 500 MG 24 hr tablet; Take 2 tablets by mouth Daily With  Dinner.  Dispense: 180 tablet; Refill: 1  -     amLODIPine (NORVASC) 10 MG tablet; Take 1 tablet by mouth Daily.  Dispense: 90 tablet; Refill: 1  -     levothyroxine (SYNTHROID, LEVOTHROID) 88 MCG tablet; Take 1 tablet by mouth Daily.  Dispense: 30 tablet; Refill: 2      Dox with patient permission 20 minutes video visit              There are no Patient Instructions on file for this visit.

## 2021-07-15 NOTE — TELEPHONE ENCOUNTER
PATIENT CALLED TO SAY THAT SHE IS STILL IN THE VIRTUAL WAITING ROOM FOR [facility] EP[facility]. HER APPT WAS AT 3:30.    PLEASE CALL  [phone]

## 2021-07-16 ENCOUNTER — TELEPHONE (OUTPATIENT)
Dept: FAMILY MEDICINE CLINIC | Facility: CLINIC | Age: 51
End: 2021-07-16

## 2021-07-16 NOTE — TELEPHONE ENCOUNTER
DELETE AFTER REVIEWING: Telephone encounter to be sent to the clinical pool.    Caller: Ina Akers    Relationship: Self    Best call back number:     Caller requesting test results:     What test was performed:     When was the test performed: June 2021    Where was the test performed:     Additional notes: PATIENT IS CALLING IN WANTING TO KNOW IF HER LAB RESULTS FROM June CAN BE FAXED TO DR CHRISTIANSON  [phone]  ATTENTION JACEK

## 2021-07-19 NOTE — TELEPHONE ENCOUNTER
You changed her thyroid medication recently from labs done back in June, PT is confused as to why you changed it when she has someone else following her on it. Please advise what lab you looked at to come to that conclussion as she would like it to be faxed over to the one who manages her Thyroid and be able to explain why it was changed.

## 2021-07-19 NOTE — TELEPHONE ENCOUNTER
Recent labs drawn on 6 -8 by one of her other doctor showed her TSH was low  It is causing hyperthyroid her medication  I did not mean to step on toes so to speak    But this would put her at risk for complications such as atrial fibrillation  That is pretty straightforward so I decreased her medication with a follow-up    There is overlap with things like thyroid but certainly I would rather have 1 person take care of this    Please fax over the thyroid the patient's  And she can follow their advice    Thank you

## 2021-08-19 ENCOUNTER — APPOINTMENT (OUTPATIENT)
Dept: WOMENS IMAGING | Facility: HOSPITAL | Age: 51
End: 2021-08-19

## 2021-08-19 PROCEDURE — 77063 BREAST TOMOSYNTHESIS BI: CPT | Performed by: RADIOLOGY

## 2021-08-19 PROCEDURE — 77067 SCR MAMMO BI INCL CAD: CPT | Performed by: RADIOLOGY

## 2021-09-13 ENCOUNTER — TELEPHONE (OUTPATIENT)
Dept: NEUROSURGERY | Facility: CLINIC | Age: 51
End: 2021-09-13

## 2021-09-13 NOTE — TELEPHONE ENCOUNTER
PATIENT IS READY TO HAVE THE MRI COMPLETED OF THE BRAIN PER LAST OV. THE ORDER IS NOT IN YET.  PLEASE ADVISE.  PATIENT ALSO COMPLETED THE PET SCAN AT , THE ONCOLOGIST TOLD HER THAT THE SCAN DID NOT  CLEAR IMAGES ON THE BRAIN.    [phone]

## 2021-09-14 NOTE — TELEPHONE ENCOUNTER
I called pt left  letting her know MRI has been ordered and is ready to be scheduled. She can call [facility] scheduling to have it scheduled or they will call her within 3 business days to get her scheduled. A follow up appointment has been made to see Dr. Rios for the results in Epic. An appointment reminder has also been mailed.

## 2021-09-17 RX ORDER — PROMETHAZINE HYDROCHLORIDE 12.5 MG/1
TABLET ORAL
Qty: 30 TABLET | Refills: 3 | Status: SHIPPED | OUTPATIENT
Start: 2021-09-17 | End: 2022-09-23

## 2021-09-17 NOTE — TELEPHONE ENCOUNTER
Rx Refill Note  Requested Prescriptions     Pending Prescriptions Disp Refills    promethazine (PHENERGAN) 12.5 MG tablet [Pharmacy Med Name: PROMETHAZINE 12.5 MG TABLET] 30 tablet 3     Sig: TAKE 1/2 TO 1 TABLET BY MOUTH EVERY 4 HOURS AS NEEDED FOR NAUSEA AND VOMITING MAY MAKE DROWSY      Last office visit with prescribing clinician: 4/15/2021      Next office visit with prescribing clinician: Visit date not found     Yecenia Chun  09/17/21, 08:27 EDT

## 2021-09-21 ENCOUNTER — HOSPITAL ENCOUNTER (OUTPATIENT)
Dept: MRI IMAGING | Facility: HOSPITAL | Age: 51
Discharge: HOME | End: 2021-09-21
Admitting: NEUROLOGICAL SURGERY

## 2021-09-21 DIAGNOSIS — M89.9 SKULL LESION: ICD-10-CM

## 2021-09-21 PROCEDURE — 70553 MRI BRAIN STEM W/O & W/DYE: CPT

## 2021-09-21 PROCEDURE — 0 GADOBENATE DIMEGLUMINE 529 MG/ML SOLUTION: Performed by: NEUROLOGICAL SURGERY

## 2021-09-21 PROCEDURE — A9577 INJ MULTIHANCE: HCPCS | Performed by: NEUROLOGICAL SURGERY

## 2021-09-21 RX ADMIN — GADOBENATE DIMEGLUMINE 17 ML: 529 INJECTION, SOLUTION INTRAVENOUS at 08:39

## 2021-09-22 ENCOUNTER — TELEPHONE (OUTPATIENT)
Dept: ONCOLOGY | Facility: CLINIC | Age: 51
End: 2021-09-22

## 2021-09-22 DIAGNOSIS — C79.51 BONE METASTASIS: Primary | ICD-10-CM

## 2021-09-22 NOTE — TELEPHONE ENCOUNTER
Pt said her [facility]c, Dr. Huddleston wants her to go back on the Zometa and for Dr. Muñoz to order it.

## 2021-09-22 NOTE — TELEPHONE ENCOUNTER
----- Message from Tim Muñoz MD sent at 9/22/2021 12:32 PM EDT -----  Okay to schedule the patient to receive Zometa at our office.  Please schedule her to receive it within 1-2 weeks with CBC stat CMP magnesium phosphorus.    Thank you  ----- Message -----  From: Isabella Nelson RN  Sent: 9/22/2021  12:06 PM EDT  To: Tim Muñoz MD    Pt called and said her [facility] oncologist suggested she start back on zometa. She didn't want to drive to [place] for this and wanted to know if you would order this for her? It looks like she's had it here in the past. Please advise.    Thank you

## 2021-09-22 NOTE — TELEPHONE ENCOUNTER
Pt called reporting her [facility] oncologist suggested she start back on zometa. Pt does not wish to drive to [place] for this and would like Dr. Muñoz to order it. Sent Dr. Muñoz a message to advise.

## 2021-09-30 ENCOUNTER — INFUSION (OUTPATIENT)
Dept: ONCOLOGY | Facility: HOSPITAL | Age: 51
End: 2021-09-30

## 2021-09-30 VITALS
BODY MASS INDEX: 34.92 KG/M2 | SYSTOLIC BLOOD PRESSURE: 113 MMHG | DIASTOLIC BLOOD PRESSURE: 74 MMHG | TEMPERATURE: 98.4 F | OXYGEN SATURATION: 98 % | HEART RATE: 82 BPM | WEIGHT: 178.8 LBS

## 2021-09-30 DIAGNOSIS — C50.412 MALIGNANT NEOPLASM OF UPPER-OUTER QUADRANT OF LEFT BREAST IN FEMALE, ESTROGEN RECEPTOR NEGATIVE (HCC): ICD-10-CM

## 2021-09-30 DIAGNOSIS — C41.9 METASTATIC BONE CANCER: ICD-10-CM

## 2021-09-30 DIAGNOSIS — C79.51 BONE METASTASIS: Primary | ICD-10-CM

## 2021-09-30 DIAGNOSIS — Z17.1 MALIGNANT NEOPLASM OF UPPER-OUTER QUADRANT OF LEFT BREAST IN FEMALE, ESTROGEN RECEPTOR NEGATIVE (HCC): ICD-10-CM

## 2021-09-30 LAB
ALBUMIN SERPL-MCNC: 4.4 G/DL (ref 3.5–5.2)
ALBUMIN/GLOB SERPL: 1.2 G/DL (ref 1.1–2.4)
ALP SERPL-CCNC: 82 U/L (ref 38–116)
ALT SERPL W P-5'-P-CCNC: 13 U/L (ref 0–33)
ANION GAP SERPL CALCULATED.3IONS-SCNC: 8 MMOL/L (ref 5–15)
AST SERPL-CCNC: 21 U/L (ref 0–32)
BASOPHILS # BLD AUTO: 0.03 10*3/MM3 (ref 0–0.2)
BASOPHILS NFR BLD AUTO: 0.6 % (ref 0–1.5)
BILIRUB SERPL-MCNC: 0.4 MG/DL (ref 0.2–1.2)
BUN SERPL-MCNC: 12 MG/DL (ref 6–20)
BUN/CREAT SERPL: 14.3 (ref 7.3–30)
CALCIUM SPEC-SCNC: 9.7 MG/DL (ref 8.5–10.2)
CHLORIDE SERPL-SCNC: 101 MMOL/L (ref 98–107)
CO2 SERPL-SCNC: 29 MMOL/L (ref 22–29)
CREAT SERPL-MCNC: 0.84 MG/DL (ref 0.6–1.1)
DEPRECATED RDW RBC AUTO: 47.8 FL (ref 37–54)
EOSINOPHIL # BLD AUTO: 0.3 10*3/MM3 (ref 0–0.4)
EOSINOPHIL NFR BLD AUTO: 6.3 % (ref 0.3–6.2)
ERYTHROCYTE [DISTWIDTH] IN BLOOD BY AUTOMATED COUNT: 15.3 % (ref 12.3–15.4)
GFR SERPL CREATININE-BSD FRML MDRD: 86 ML/MIN/1.73
GLOBULIN UR ELPH-MCNC: 3.6 GM/DL (ref 1.8–3.5)
GLUCOSE SERPL-MCNC: 100 MG/DL (ref 74–124)
HCT VFR BLD AUTO: 33.5 % (ref 34–46.6)
HGB BLD-MCNC: 10.4 G/DL (ref 12–15.9)
IMM GRANULOCYTES # BLD AUTO: 0.02 10*3/MM3 (ref 0–0.05)
IMM GRANULOCYTES NFR BLD AUTO: 0.4 % (ref 0–0.5)
LYMPHOCYTES # BLD AUTO: 1.35 10*3/MM3 (ref 0.7–3.1)
LYMPHOCYTES NFR BLD AUTO: 28.3 % (ref 19.6–45.3)
MAGNESIUM SERPL-MCNC: 1.8 MG/DL (ref 1.8–2.5)
MCH RBC QN AUTO: 26.7 PG (ref 26.6–33)
MCHC RBC AUTO-ENTMCNC: 31 G/DL (ref 31.5–35.7)
MCV RBC AUTO: 85.9 FL (ref 79–97)
MONOCYTES # BLD AUTO: 0.34 10*3/MM3 (ref 0.1–0.9)
MONOCYTES NFR BLD AUTO: 7.1 % (ref 5–12)
NEUTROPHILS NFR BLD AUTO: 2.73 10*3/MM3 (ref 1.7–7)
NEUTROPHILS NFR BLD AUTO: 57.3 % (ref 42.7–76)
NRBC BLD AUTO-RTO: 0 /100 WBC (ref 0–0.2)
PHOSPHATE SERPL-MCNC: 3.2 MG/DL (ref 2.5–4.5)
PLATELET # BLD AUTO: 343 10*3/MM3 (ref 140–450)
PMV BLD AUTO: 9.1 FL (ref 6–12)
POTASSIUM SERPL-SCNC: 3.7 MMOL/L (ref 3.5–4.7)
PROT SERPL-MCNC: 8 G/DL (ref 6.3–8)
RBC # BLD AUTO: 3.9 10*6/MM3 (ref 3.77–5.28)
SODIUM SERPL-SCNC: 138 MMOL/L (ref 134–145)
WBC # BLD AUTO: 4.77 10*3/MM3 (ref 3.4–10.8)

## 2021-09-30 PROCEDURE — 83735 ASSAY OF MAGNESIUM: CPT

## 2021-09-30 PROCEDURE — 84100 ASSAY OF PHOSPHORUS: CPT

## 2021-09-30 PROCEDURE — 96374 THER/PROPH/DIAG INJ IV PUSH: CPT

## 2021-09-30 PROCEDURE — 85025 COMPLETE CBC W/AUTO DIFF WBC: CPT

## 2021-09-30 PROCEDURE — 25010000002 ZOLEDRONIC ACID 4 MG/100ML SOLUTION: Performed by: NURSE PRACTITIONER

## 2021-09-30 PROCEDURE — 80053 COMPREHEN METABOLIC PANEL: CPT

## 2021-09-30 RX ORDER — ZOLEDRONIC ACID 0.04 MG/ML
4 INJECTION, SOLUTION INTRAVENOUS ONCE
Status: COMPLETED | OUTPATIENT
Start: 2021-09-30 | End: 2021-09-30

## 2021-09-30 RX ORDER — SODIUM CHLORIDE 9 MG/ML
250 INJECTION, SOLUTION INTRAVENOUS ONCE
Status: COMPLETED | OUTPATIENT
Start: 2021-09-30 | End: 2021-09-30

## 2021-09-30 RX ADMIN — SODIUM CHLORIDE 250 ML: 9 INJECTION, SOLUTION INTRAVENOUS at 16:23

## 2021-09-30 RX ADMIN — ZOLEDRONIC ACID 4 MG: 0.04 INJECTION, SOLUTION INTRAVENOUS at 16:23

## 2021-09-30 NOTE — PROGRESS NOTES
Pt reports after last infusion of zometa she woke up with fever, chills, and left eye was swollen. She reports taking tylenol and benadryl which helped. S/w Dr. Muñoz about pt complaints. Per MD pt can have 25 mg benadryl PO prior to zometa if she doesn't have a far drive. Dr. Muñoz would like the pt to take 25 mg of benadryl tonight before bed. The pt states she has a far drive and would like to hold off on benadryl while in the office but will take it once she gets home.

## 2021-10-04 ENCOUNTER — TELEPHONE (OUTPATIENT)
Dept: ONCOLOGY | Facility: CLINIC | Age: 51
End: 2021-10-04

## 2021-10-04 NOTE — TELEPHONE ENCOUNTER
"Spoke with pt who informed this RN she is currently at the urgent care getting her eye looked at, pt described is as a \"hematoma on the eye but with more pain\". RN informed pt to call or message us when she finds out anything or has any other questions. Pt v/u  "

## 2021-10-04 NOTE — TELEPHONE ENCOUNTER
----- Message from Mell Boogie RN sent at 10/4/2021  7:57 AM EDT -----  Regarding: FW: Non-Urgent Medical Question  Contact: [phone]    ----- Message -----  From: Ina Akers  Sent: 10/4/2021   7:44 AM EDT  To: [facility]  Subject: Non-Urgent Medical Question                      Good Morning Dr. Muñoz,    I hope all is well. I received the Zometa infusion on Thursday afternoon and did okay with it. I had some body aches and fever about 24 hours after the infusion but it resolved within about 12 hours with Tylenol and Benadryl.   I woke up this morning with a bleed in my left eye with some tenderness. Should I report to urgent care or just find an Ophthalmologist to see me today?

## 2021-10-07 ENCOUNTER — TELEPHONE (OUTPATIENT)
Dept: ONCOLOGY | Facility: CLINIC | Age: 51
End: 2021-10-07

## 2021-10-07 ENCOUNTER — TELEPHONE (OUTPATIENT)
Dept: FAMILY MEDICINE CLINIC | Facility: CLINIC | Age: 51
End: 2021-10-07

## 2021-10-07 NOTE — TELEPHONE ENCOUNTER
Called spoke to pt in detail, pt voiced her understanding and wants Epley to know that she is getting the flu vaccine today at her job.

## 2021-10-07 NOTE — TELEPHONE ENCOUNTER
Caller: Carrie Akers    Relationship: Self    Best call back number: [phone]    What was the call regarding: CARRIE CALLED TO SAY THAT SHE HAD HER ZOMETA INFUSION LAST WEEK (09/30). HER EMPLOYMENT IS WANTING TO GIVE HER THE FLU VACCINE TODAY. SHE IS WANTING TO KNOW IF THAT IS OKAY.    Do you require a callback: YES

## 2021-10-07 NOTE — TELEPHONE ENCOUNTER
Caller: Ina Akers     Relationship: SELF     Best call back number: [phone]    What is your medical concern? PATIENT HAD THE ZOMETA INFUSION LAST WEEK AND IS WANTING TO GET A FLU VACCINE TODAY AT WORK, IS THIS OK OR DOES SHE NEED TO WAIT? PLEASE ADVISE.

## 2021-10-25 NOTE — PROGRESS NOTES
Subjective   History of Present Illness: Ina Akers is a 50 y.o. female is here today for follow-up for left parietal lesion. MRI Brain -21 [facility].  She underwent an osteo-cool ablation of an L4 lesion on 2020. She is doing well. She does have back when standing for long periods of time. No weakness.  She denies any new headaches.  Denies any new pain.  Denies any changes in strength or sensation.  Denies any difficulty walking.  She is currently off of her chemotherapy.  She previously was diagnosed with thyroid cancer and is followed by an oncologist at the [facility] in [place].     History of Present Illness    The following portions of the patient's history were reviewed and updated as appropriate: allergies, past family history, past medical history, past social history, past surgical history and problem list.    Past Medical History:   Diagnosis Date   • Anemia     History of recurent iron deficiency   • Compression fracture of fourth lumbar vertebra (HCC)     RECENT KYPHOPLASTY 2020   • Diabetes mellitus (HCC)    • Disease of thyroid gland    • GERD (gastroesophageal reflux disease)    • History of breast cancer     LEFT BREAST MASTECTOMY, CHEMO AND RADIATION    • History of gastritis    • History of thyroid nodule    • Hyperlipidemia    • Hypertension    • Metastatic bone cancer (HCC)     RIB, HIP - AWAITING FINDINGS FOR PRIMARY SOURCE (THYROID VS BREAST)   • PONV (postoperative nausea and vomiting)         Past Surgical History:   Procedure Laterality Date   •  SECTION     • COLONOSCOPY N/A 2021    Procedure: COLONOSCOPY TO CECUM AND TI;  Surgeon: Jefferson Newman MD;  Location: [facility];  Service: Gastroenterology;  Laterality: N/A;  PRE:SCREENING  POST:DIVERTICULOSIS AND HEMORRHOIDS   • FEMUR IM NAILING/RODDING Right 10/23/2020    Procedure: HIP INTERTROCHCHANTERIC NAILING;  Surgeon: Gretchen Connell MD;  Location: [facility] OR;   Service: Orthopedics;  Laterality: Right;   • KYPHOPLASTY N/A 9/17/2020    Procedure: Lumbar 4  osteo cool radiofrequency ablation and KYPHOPLASTY;  Surgeon: Vik Rios MD;  Location: [facility] 18/19;  Service: Neurosurgery;  Laterality: N/A;   • MASTECTOMY, PARTIAL Left 2001   • RIB BIOPSY  10/2020   • THYROIDECTOMY, PARTIAL Right 2018          Current Outpatient Medications:   •  acetaminophen (TYLENOL) 500 MG tablet, Take 1,000 mg by mouth Every 6 (Six) Hours As Needed for Mild Pain ., Disp: , Rfl:   •  amLODIPine (NORVASC) 10 MG tablet, Take 1 tablet by mouth Daily., Disp: 90 tablet, Rfl: 1  •  atorvastatin (LIPITOR) 10 MG tablet, Take 1 tablet by mouth Daily., Disp: 90 tablet, Rfl: 3  •  Blood Glucose Monitoring Suppl w/Device kit, Check blood sugar twice a day, Disp: 1 each, Rfl: 0  •  cholecalciferol (VITAMIN D3) 25 MCG (1000 UT) tablet, Take 1,000 Units by mouth Daily., Disp: , Rfl:   •  Docusate Calcium (STOOL SOFTENER PO), Take 1 tablet by mouth As Needed. As needed, Disp: , Rfl:   •  famotidine (PEPCID) 20 MG tablet, Every Other Day., Disp: , Rfl:   •  Glucose Blood (BLOOD GLUCOSE TEST) strip, Check blood sugar twice a day., Disp: 100 each, Rfl: 1  •  hydroCHLOROthiazide (HYDRODIURIL) 25 MG tablet, Take 25 mg by mouth Daily., Disp: , Rfl:   •  Hydrocortisone, Perianal, (ANUSOL-HC) 2.5 % rectal cream, Insert  into the rectum 3 (Three) Times a Day As Needed for Hemorrhoids., Disp: 60 g, Rfl: 2  •  labetalol (NORMODYNE) 100 MG tablet, Take 0.5 tablets by mouth 2 (Two) Times a Day., Disp: 180 tablet, Rfl: 1  •  Lancets (ACCU-CHEK SOFT TOUCH) lancets, Check blood sugar twice a day as needed, Disp: 100 each, Rfl: 1  •  lansoprazole (PREVACID) 30 MG capsule, TAKE ONE CAPSULE BY MOUTH DAILY (Patient taking differently: Take 30 mg by mouth Every Other Day.), Disp: 90 capsule, Rfl: 2  •  levothyroxine (SYNTHROID, LEVOTHROID) 88 MCG tablet, Take 1 tablet by mouth Daily., Disp: 30 tablet, Rfl: 2  •   "losartan (COZAAR) 100 MG tablet, Take 100 mg by mouth Daily., Disp: , Rfl:   •  metFORMIN ER (GLUCOPHAGE-XR) 500 MG 24 hr tablet, Take 2 tablets by mouth Daily With Dinner., Disp: 180 tablet, Rfl: 1  •  potassium chloride (K-DUR,KLOR-CON) 20 MEQ CR tablet, Take 1 tablet by mouth Daily., Disp: 30 tablet, Rfl: 5  •  promethazine (PHENERGAN) 12.5 MG tablet, TAKE 1/2 TO 1 TABLET BY MOUTH EVERY 4 HOURS AS NEEDED FOR NAUSEA AND VOMITING MAY MAKE DROWSY, Disp: 30 tablet, Rfl: 3  •  triamcinolone (KENALOG) 0.1 % cream, Apply  topically to the appropriate area as directed 2 (Two) Times a Day. Use sparingly avoid face (Patient taking differently: Apply  topically to the appropriate area as directed As Needed. Use sparingly avoid face), Disp: 60 g, Rfl: 1  •  ferrous sulfate 325 (65 FE) MG tablet, Take 1 tablet by mouth Daily., Disp: , Rfl:      Allergies   Allergen Reactions   • Hydrocodone Nausea Only   • Lenvatinib Other (See Comments)        Social History     Socioeconomic History   • Marital status:      Spouse name: Raghav   • Number of children: 2   Tobacco Use   • Smoking status: Never Smoker   • Smokeless tobacco: Never Used   Vaping Use   • Vaping Use: Never used   Substance and Sexual Activity   • Alcohol use: Yes     Comment: Socially   • Drug use: No   • Sexual activity: Defer        Family History   Problem Relation Age of Onset   • Hypertension Other    • Diabetes Other    • No Known Problems Mother    • No Known Problems Father    • Malig Hyperthermia Neg Hx         Review of Systems   Eyes: Negative for visual disturbance.   Neurological: Negative for dizziness, seizures, speech difficulty and headaches.   Psychiatric/Behavioral: Negative for confusion.       Objective     Vitals:    10/28/21 1417   BP: 112/72   Pulse: 88   Temp: 97.8 °F (36.6 °C)   Weight: 82.5 kg (181 lb 12.8 oz)   Height: 154.9 cm (61\")     Body mass index is 34.35 kg/m².      Physical Exam  Neurologic Exam  Awake, alert, oriented " x3  Pupils equal round reactive to light  Extraocular muscles intact  Face symmetric  Speech is fluent and clear  No pronator drift  Motor exam  Bilateral deltoids 5/5, bilateral biceps 5/5, bilateral triceps 5/5, bilateral wrist extension 5/5 bilateral hand  5/5  Bilateral hip flexion 5/5, bilateral knee extension 5/5, bilateral DF/PF 5/5  Steady normal gait  Able to detect  light touch in all 4 extremities      Assessment/Plan   Independent Review of Radiographic Studies:   I personally reviewed the MRI brain from 2021 and 2021  There has been a significant regression of the left parietal lesion.  There was previous intracranial extension which is now gone.  There are no new lesions identified    Medical Decision Makin-year-old female s/p osteocool ablation/kyphoplasty of an L4 lesion on 2020  -She was subsequently diagnosed with metastatic thyroid cancer and followed by an oncologist in [facility].  She was partially treated with chemotherapy, but had to stop due to a coagulopathy.  She has had a successful response to the chemotherapy.  The follow-up MRI shows regression of a left parietal lesion.  She is also had a recent PET scan on 2021 which reports no evidence of new disease.  These images are not available for me today.  I have asked her to obtain a copy of the PET scan and send it to my office when available for me to upload to our PACS system.  She has a another PET scan scheduled near the end of December.  I would like to see her back in January with a new PET scan and MRI of her brain to evaluate for any changes of the left parietal lesion.  The PET scan will be used to evaluate for any changes of her spinal lesion, but the MRI will be needed to evaluate for any changes of the calvarial lesion.  -I have asked her to call back or come back sooner if she develops any new neurologic symptoms, new back pain, or new headaches.    Diagnoses and all  orders for this visit:    1. Lumbar spine tumor (Primary)    2. Skull lesion      Return in about 3 months (around 1/28/2022).

## 2021-10-28 ENCOUNTER — OFFICE VISIT (OUTPATIENT)
Dept: NEUROSURGERY | Facility: CLINIC | Age: 51
End: 2021-10-28

## 2021-10-28 VITALS
SYSTOLIC BLOOD PRESSURE: 112 MMHG | HEIGHT: 61 IN | HEART RATE: 88 BPM | TEMPERATURE: 97.8 F | BODY MASS INDEX: 34.32 KG/M2 | DIASTOLIC BLOOD PRESSURE: 72 MMHG | WEIGHT: 181.8 LBS

## 2021-10-28 DIAGNOSIS — M89.9 SKULL LESION: ICD-10-CM

## 2021-10-28 DIAGNOSIS — D49.2 LUMBAR SPINE TUMOR: Primary | ICD-10-CM

## 2021-10-28 PROCEDURE — 99214 OFFICE O/P EST MOD 30 MIN: CPT | Performed by: NEUROLOGICAL SURGERY

## 2021-12-03 ENCOUNTER — TELEPHONE (OUTPATIENT)
Dept: FAMILY MEDICINE CLINIC | Facility: CLINIC | Age: 51
End: 2021-12-03

## 2021-12-03 DIAGNOSIS — D63.0 ANEMIA IN NEOPLASTIC DISEASE: Primary | ICD-10-CM

## 2021-12-03 DIAGNOSIS — D50.9 IRON DEFICIENCY ANEMIA, UNSPECIFIED IRON DEFICIENCY ANEMIA TYPE: ICD-10-CM

## 2021-12-03 NOTE — TELEPHONE ENCOUNTER
1 day PO: Patient is doing well post-operatively. The importance of post-op drop compliance was emphasized. Drop schedule reviewed with patient. Patient to call if any visual changes or concerns. Caller: Ina Akers    Relationship: Self    Best call back number: [phone]    What is the best time to reach you: ANY TIME    Who are you requesting to speak with (clinical staff, provider,  specific staff member): CLINICAL STAFF    What was the call regarding: PATIENT CALLING TO CHECK AND SEE IF WE RECEIVED HER FMLA PAPERWORK FAXED YESTERDAY 12/2/21 FROM [facility]    PLEASE ADVISE    Do you require a callback: YES

## 2021-12-08 ENCOUNTER — OFFICE VISIT (OUTPATIENT)
Dept: ONCOLOGY | Facility: CLINIC | Age: 51
End: 2021-12-08

## 2021-12-08 ENCOUNTER — LAB (OUTPATIENT)
Dept: OTHER | Facility: HOSPITAL | Age: 51
End: 2021-12-08

## 2021-12-08 ENCOUNTER — OFFICE VISIT (OUTPATIENT)
Dept: FAMILY MEDICINE CLINIC | Facility: CLINIC | Age: 51
End: 2021-12-08

## 2021-12-08 VITALS
WEIGHT: 184 LBS | TEMPERATURE: 97.3 F | BODY MASS INDEX: 34.74 KG/M2 | HEART RATE: 90 BPM | RESPIRATION RATE: 12 BRPM | DIASTOLIC BLOOD PRESSURE: 71 MMHG | OXYGEN SATURATION: 98 % | SYSTOLIC BLOOD PRESSURE: 115 MMHG | HEIGHT: 61 IN

## 2021-12-08 VITALS
RESPIRATION RATE: 18 BRPM | DIASTOLIC BLOOD PRESSURE: 64 MMHG | BODY MASS INDEX: 34.29 KG/M2 | OXYGEN SATURATION: 99 % | HEIGHT: 61 IN | WEIGHT: 181.6 LBS | HEART RATE: 70 BPM | SYSTOLIC BLOOD PRESSURE: 102 MMHG | TEMPERATURE: 97.5 F

## 2021-12-08 DIAGNOSIS — I10 HYPERTENSION, UNSPECIFIED TYPE: Primary | ICD-10-CM

## 2021-12-08 DIAGNOSIS — E11.9 CONTROLLED TYPE 2 DIABETES MELLITUS WITHOUT COMPLICATION, WITHOUT LONG-TERM CURRENT USE OF INSULIN (HCC): ICD-10-CM

## 2021-12-08 DIAGNOSIS — C73 THYROID CANCER (HCC): Primary | ICD-10-CM

## 2021-12-08 DIAGNOSIS — C50.412 MALIGNANT NEOPLASM OF UPPER-OUTER QUADRANT OF LEFT BREAST IN FEMALE, ESTROGEN RECEPTOR NEGATIVE (HCC): ICD-10-CM

## 2021-12-08 DIAGNOSIS — D63.0 ANEMIA IN NEOPLASTIC DISEASE: Primary | ICD-10-CM

## 2021-12-08 DIAGNOSIS — C79.51 BONE METASTASIS: ICD-10-CM

## 2021-12-08 DIAGNOSIS — D50.9 IRON DEFICIENCY ANEMIA, UNSPECIFIED IRON DEFICIENCY ANEMIA TYPE: ICD-10-CM

## 2021-12-08 DIAGNOSIS — Z17.1 MALIGNANT NEOPLASM OF UPPER-OUTER QUADRANT OF LEFT BREAST IN FEMALE, ESTROGEN RECEPTOR NEGATIVE (HCC): ICD-10-CM

## 2021-12-08 DIAGNOSIS — E87.6 HYPOKALEMIA: ICD-10-CM

## 2021-12-08 DIAGNOSIS — C41.9 METASTATIC BONE CANCER: ICD-10-CM

## 2021-12-08 DIAGNOSIS — E78.2 MIXED HYPERLIPIDEMIA: ICD-10-CM

## 2021-12-08 LAB
ALBUMIN SERPL-MCNC: 4.3 G/DL (ref 3.5–5.2)
ALBUMIN/GLOB SERPL: 1.2 G/DL
ALP SERPL-CCNC: 73 U/L (ref 39–117)
ALT SERPL W P-5'-P-CCNC: 9 U/L (ref 1–33)
ANION GAP SERPL CALCULATED.3IONS-SCNC: 10.1 MMOL/L (ref 5–15)
AST SERPL-CCNC: 14 U/L (ref 1–32)
BASOPHILS # BLD AUTO: 0.03 10*3/MM3 (ref 0–0.2)
BASOPHILS NFR BLD AUTO: 0.5 % (ref 0–1.5)
BILIRUB SERPL-MCNC: 0.2 MG/DL (ref 0–1.2)
BUN SERPL-MCNC: 21 MG/DL (ref 6–20)
BUN/CREAT SERPL: 21.4 (ref 7–25)
CALCIUM SPEC-SCNC: 9.4 MG/DL (ref 8.6–10.5)
CHLORIDE SERPL-SCNC: 103 MMOL/L (ref 98–107)
CO2 SERPL-SCNC: 27.9 MMOL/L (ref 22–29)
CREAT SERPL-MCNC: 0.98 MG/DL (ref 0.57–1)
DEPRECATED RDW RBC AUTO: 46.8 FL (ref 37–54)
EOSINOPHIL # BLD AUTO: 0.34 10*3/MM3 (ref 0–0.4)
EOSINOPHIL NFR BLD AUTO: 5.9 % (ref 0.3–6.2)
ERYTHROCYTE [DISTWIDTH] IN BLOOD BY AUTOMATED COUNT: 15.1 % (ref 12.3–15.4)
FERRITIN SERPL-MCNC: 116.5 NG/ML (ref 13–150)
GFR SERPL CREATININE-BSD FRML MDRD: 72 ML/MIN/1.73
GLOBULIN UR ELPH-MCNC: 3.6 GM/DL
GLUCOSE SERPL-MCNC: 116 MG/DL (ref 65–99)
HCT VFR BLD AUTO: 33 % (ref 34–46.6)
HGB BLD-MCNC: 10 G/DL (ref 12–15.9)
IMM GRANULOCYTES # BLD AUTO: 0.02 10*3/MM3 (ref 0–0.05)
IMM GRANULOCYTES NFR BLD AUTO: 0.3 % (ref 0–0.5)
IRON 24H UR-MRATE: 40 MCG/DL (ref 37–145)
IRON SATN MFR SERPL: 12 % (ref 20–50)
LYMPHOCYTES # BLD AUTO: 1.65 10*3/MM3 (ref 0.7–3.1)
LYMPHOCYTES NFR BLD AUTO: 28.5 % (ref 19.6–45.3)
MCH RBC QN AUTO: 25.8 PG (ref 26.6–33)
MCHC RBC AUTO-ENTMCNC: 30.3 G/DL (ref 31.5–35.7)
MCV RBC AUTO: 85.3 FL (ref 79–97)
MONOCYTES # BLD AUTO: 0.6 10*3/MM3 (ref 0.1–0.9)
MONOCYTES NFR BLD AUTO: 10.4 % (ref 5–12)
NEUTROPHILS NFR BLD AUTO: 3.15 10*3/MM3 (ref 1.7–7)
NEUTROPHILS NFR BLD AUTO: 54.4 % (ref 42.7–76)
NRBC BLD AUTO-RTO: 0 /100 WBC (ref 0–0.2)
PLATELET # BLD AUTO: 326 10*3/MM3 (ref 140–450)
PMV BLD AUTO: 8.9 FL (ref 6–12)
POTASSIUM SERPL-SCNC: 3.3 MMOL/L (ref 3.5–5.2)
PROT SERPL-MCNC: 7.9 G/DL (ref 6–8.5)
RBC # BLD AUTO: 3.87 10*6/MM3 (ref 3.77–5.28)
SODIUM SERPL-SCNC: 141 MMOL/L (ref 136–145)
TIBC SERPL-MCNC: 338 MCG/DL (ref 298–536)
TRANSFERRIN SERPL-MCNC: 227 MG/DL (ref 200–360)
WBC NRBC COR # BLD: 5.79 10*3/MM3 (ref 3.4–10.8)

## 2021-12-08 PROCEDURE — 83883 ASSAY NEPHELOMETRY NOT SPEC: CPT | Performed by: INTERNAL MEDICINE

## 2021-12-08 PROCEDURE — 84466 ASSAY OF TRANSFERRIN: CPT | Performed by: INTERNAL MEDICINE

## 2021-12-08 PROCEDURE — 85025 COMPLETE CBC W/AUTO DIFF WBC: CPT | Performed by: INTERNAL MEDICINE

## 2021-12-08 PROCEDURE — 84165 PROTEIN E-PHORESIS SERUM: CPT | Performed by: INTERNAL MEDICINE

## 2021-12-08 PROCEDURE — 83540 ASSAY OF IRON: CPT | Performed by: INTERNAL MEDICINE

## 2021-12-08 PROCEDURE — 99213 OFFICE O/P EST LOW 20 MIN: CPT | Performed by: NURSE PRACTITIONER

## 2021-12-08 PROCEDURE — 99214 OFFICE O/P EST MOD 30 MIN: CPT | Performed by: INTERNAL MEDICINE

## 2021-12-08 PROCEDURE — 82728 ASSAY OF FERRITIN: CPT | Performed by: INTERNAL MEDICINE

## 2021-12-08 PROCEDURE — 36415 COLL VENOUS BLD VENIPUNCTURE: CPT

## 2021-12-08 PROCEDURE — 80053 COMPREHEN METABOLIC PANEL: CPT | Performed by: INTERNAL MEDICINE

## 2021-12-08 PROCEDURE — 86334 IMMUNOFIX E-PHORESIS SERUM: CPT | Performed by: INTERNAL MEDICINE

## 2021-12-08 PROCEDURE — 82784 ASSAY IGA/IGD/IGG/IGM EACH: CPT | Performed by: INTERNAL MEDICINE

## 2021-12-08 RX ORDER — POTASSIUM CHLORIDE 20 MEQ/1
20 TABLET, EXTENDED RELEASE ORAL 2 TIMES DAILY
Qty: 60 TABLET | Refills: 5 | Status: SHIPPED | OUTPATIENT
Start: 2021-12-08 | End: 2022-02-18

## 2021-12-08 NOTE — PROGRESS NOTES
Subjective     CHIEF COMPLAINT:      Chief Complaint   Patient presents with   • Follow-up     discuss Zometa       HISTORY OF PRESENT ILLNESS:     Ina Akers is a 51 y.o. female patient who returns today for follow up on her metastatic thyroid cancer, anemia and hypokalemia.  She returns today for follow-up reporting some fatigue.  She reports pain in the right hip.  She was evaluated by her orthopedic surgeon earlier and x-rays showed no worsening of the metastatic lesion in the right hip.  She is unable to sleep on her right side.  She sleeps on her left side and has a pillow placed between her knees.    Patient is no longer taking the oral iron.  She was advised by Dr. Huddleston to stop it at the last visit with him in September 2021.    Patient is going to have a whole-body PET scan and MRI of the brain next week at [facility] and will see Dr. Huddleston afterwards.    ROS:  Pertinent ROS is in the HPI.     Past medical, surgical, social and family history were reviewed.     MEDICATIONS:    Current Outpatient Medications:   •  acetaminophen (TYLENOL) 500 MG tablet, Take 1,000 mg by mouth Every 6 (Six) Hours As Needed for Mild Pain ., Disp: , Rfl:   •  amLODIPine (NORVASC) 10 MG tablet, Take 1 tablet by mouth Daily., Disp: 90 tablet, Rfl: 1  •  atorvastatin (LIPITOR) 10 MG tablet, Take 1 tablet by mouth Daily., Disp: 90 tablet, Rfl: 3  •  Blood Glucose Monitoring Suppl w/Device kit, Check blood sugar twice a day, Disp: 1 each, Rfl: 0  •  cholecalciferol (VITAMIN D3) 25 MCG (1000 UT) tablet, Take 1,000 Units by mouth Daily., Disp: , Rfl:   •  Docusate Calcium (STOOL SOFTENER PO), Take 1 tablet by mouth As Needed. As needed, Disp: , Rfl:   •  famotidine (PEPCID) 20 MG tablet, Every Other Day., Disp: , Rfl:   •  ferrous sulfate 325 (65 FE) MG tablet, Take 1 tablet by mouth Daily., Disp: , Rfl:   •  Glucose Blood (BLOOD GLUCOSE TEST) strip, Check blood sugar twice a day., Disp: 100 each, Rfl: 1  •   "hydroCHLOROthiazide (HYDRODIURIL) 25 MG tablet, Take 25 mg by mouth Daily., Disp: , Rfl:   •  Hydrocortisone, Perianal, (ANUSOL-HC) 2.5 % rectal cream, Insert  into the rectum 3 (Three) Times a Day As Needed for Hemorrhoids., Disp: 60 g, Rfl: 2  •  labetalol (NORMODYNE) 100 MG tablet, Take 0.5 tablets by mouth 2 (Two) Times a Day., Disp: 180 tablet, Rfl: 1  •  Lancets (ACCU-CHEK SOFT TOUCH) lancets, Check blood sugar twice a day as needed, Disp: 100 each, Rfl: 1  •  lansoprazole (PREVACID) 30 MG capsule, TAKE ONE CAPSULE BY MOUTH DAILY (Patient taking differently: Take 30 mg by mouth Every Other Day.), Disp: 90 capsule, Rfl: 2  •  levothyroxine (SYNTHROID, LEVOTHROID) 88 MCG tablet, Take 1 tablet by mouth Daily., Disp: 30 tablet, Rfl: 2  •  losartan (COZAAR) 100 MG tablet, Take 100 mg by mouth Daily., Disp: , Rfl:   •  metFORMIN ER (GLUCOPHAGE-XR) 500 MG 24 hr tablet, Take 2 tablets by mouth Daily With Dinner., Disp: 180 tablet, Rfl: 1  •  potassium chloride (K-DUR,KLOR-CON) 20 MEQ CR tablet, Take 1 tablet by mouth Daily., Disp: 30 tablet, Rfl: 5  •  promethazine (PHENERGAN) 12.5 MG tablet, TAKE 1/2 TO 1 TABLET BY MOUTH EVERY 4 HOURS AS NEEDED FOR NAUSEA AND VOMITING MAY MAKE DROWSY, Disp: 30 tablet, Rfl: 3  •  triamcinolone (KENALOG) 0.1 % cream, Apply  topically to the appropriate area as directed 2 (Two) Times a Day. Use sparingly avoid face (Patient taking differently: Apply  topically to the appropriate area as directed As Needed. Use sparingly avoid face), Disp: 60 g, Rfl: 1    Objective   VITAL SIGNS:     Vitals:    12/08/21 1600   BP: 102/64   Pulse: 70   Resp: 18   Temp: 97.5 °F (36.4 °C)   TempSrc: Temporal   SpO2: 99%   Weight: 82.4 kg (181 lb 9.6 oz)   Height: 154.9 cm (60.98\")   PainSc: 0-No pain     Body mass index is 34.33 kg/m².     Wt Readings from Last 5 Encounters:   12/08/21 82.4 kg (181 lb 9.6 oz)   12/08/21 83.5 kg (184 lb)   10/28/21 82.5 kg (181 lb 12.8 oz)   10/04/21 81.6 kg (180 lb) "   09/30/21 81.1 kg (178 lb 12.8 oz)       PHYSICAL EXAMINATION:   GENERAL: The patient appears in fair general condition, not in acute distress.   SKIN: No ecchymosis.  EYES: No jaundice.  LYMPHATICS: No cervical lymphadenopathy.  CHEST: Normal respiratory effort.  Lungs clear bilaterally.  No added sounds.  CVS: Normal S1-S2.  No murmurs.  ABDOMEN: Soft. No tenderness. No Hepatomegaly. No Splenomegaly. No masses.  EXTREMITIES: Tenderness in the right lower ribs posterior aspect.  Tenderness in the lower lumbar spine.    DIAGNOSTIC DATA:     Results from last 7 days   Lab Units 12/08/21  1548   WBC 10*3/mm3 5.79   NEUTROS ABS 10*3/mm3 3.15   HEMOGLOBIN g/dL 10.0*   HEMATOCRIT % 33.0*   PLATELETS 10*3/mm3 326     Results from last 7 days   Lab Units 12/08/21  1548   SODIUM mmol/L 141   POTASSIUM mmol/L 3.3*   CHLORIDE mmol/L 103   CO2 mmol/L 27.9   BUN mg/dL 21*   CREATININE mg/dL 0.98   CALCIUM mg/dL 9.4   ALBUMIN g/dL 4.30   BILIRUBIN mg/dL 0.2   ALK PHOS U/L 73   ALT (SGPT) U/L 9   AST (SGOT) U/L 14   GLUCOSE mg/dL 116*         Lab 12/08/21  1548   IRON 40   IRON SATURATION 12*   TIBC 338   TRANSFERRIN 227   FERRITIN 116.50        Thyroglobulin (Inhouse)   <=34.0 ng/mL 4.2      PET scan on 9/1/2021:  As compared to prior PET/CT from 9/9/2020, there is significant interval decrease in FDG activity of the previously seen hypermetabolic osseous lesions, as described above, suggestive of favorable response to treatment. Status post L4 vertebral plasty and instrumentation in the proximal right femur with mild diffuse FDG activity, presumably postprocedural. Continued imaging follow-up to document complete metabolic resolution is suggested. No new suspicious FDG avid lesion or adenopathy.     Bilateral mammograms on 8/19/2021 revealed no evidence of malignancy.  It was BI-RADS Category 2: Benign.    Assessment/Plan   *Metastatic thyroid cancer diagnosed on 10/6/2020.  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  · She had right thyroid lobectomy on 12/17/2018 at [place].    · The specimen was sent for outside consultation and the final conclusion was that there was no evidence of malignancy.  · MRI on 8/27/2020 revealed an L4 lesion resulting in pathologic compression fracture.  · Bone scan on 9/2/2020 revealed increased activity at L4 and left side of the skull.  · PET scan on 9/9/2020 revealed abnormal activity at L4, right femoral neck, lower sacrum, left ischium and right ninth medial aspect.  · PET scan 9/9/2020 showed no evidence of visceral metastasis.  · Patient had kyphoplasty on 9/17/2020.  Pathology was nondiagnostic.  · CT-guided biopsy of the lesion in the right posterior ninth rib on 10/6/2020 revealed a clear cell neoplasm.  It is positive for PAX 8 and TTF-1 favoring metastatic thyroid cancer.  · Patient was referred to [facility].  · Patient underwent completion thyroidectomy on 11/9/2020.  · There was no evidence of uptake of radioactive iodine on SPECT imaging on 12/18/2020.  · This was indicative of the tumor not going to respond to radioactive iodine therapy.  · Patient was started on Lenvima 20 mg daily on 12/24/2020.  · Lenvima was held on 2/6/2021 due to drug induced anemia and thrombocytopenia.  She was unable to start back on it due to prolonged cytopenias.  · CT scans on 3/23/2021 showed stable disease.  · CBC was stable on 6/8/2021.  CT scans showed stable disease.  Thyroglobulin was stable at 3.4.    · PET scan on 9/1/2021 revealed decrease in the activity in the metastatic lesions.  Starting back on active treatment (Nexavar) was not recommended.    *Left breast cancer, stage IIB, triple negative.   · S/P lumpectomy  · She received adjuvant chemotherapy on the NSABP B34 with AC followed by Taxotere.  · She received post-lumpectomy radiation therapy.   · She was placed on tamoxifen for 5 years and it was completed in January 2007.   · Bilateral mammograms from  8/14/2020 were benign.   · Bilateral mammograms on 8/19/2021 were benign.    *Bone metastasis.  · Patient was given Zometa on 9/29/2020.  · Patient underwent right femur medullary nailing on 10/23/2020.  · Patient received 2 doses of Zometa so far.  · I recommended starting back on Zometa after her upcoming PET scan after she meets with Dr. Mario Huddleston at .    * Iron deficiency anemia.    · Patient was previously treated with ferrous sulfate 325 mg daily.  · Hemoglobin decreased to 10.6 on 9/3/2020.    · Hemoglobin decreased to 9.4 in January 2021.  However, her iron studies did not show evidence of deficiency at this point.  The anemia is therefore attributed to underlying malignancy and  Lenvima.  Lenvima was put on hold in February 2021.  · Patient's hemoglobin improved to 10.4 on 4/16/2021.  · Hemoglobin decreased to 10.4 on 6/23/2021.  Iron stores decreased with transferrin saturation decreasing to 12%.  · She was restarted back on ferrous sulfate 325 mg daily.  However, Dr. BASHIR asked her to stop the iron.  · Hemoglobin decreased to 10.0 today.  Transferrin saturation remains low at 12%.  · I recommended starting back on ferrous sulfate once daily.  She will get the okay this with Dr. Huddleston.    *Hypokalemia.  · She is on potassium chloride 20 mEq daily.  · Potassium is low at 3.3 today.      PLAN:    1.  I recommended restarting back on ferrous sulfate 325 mg daily.  She will get the okay from Dr. Huddleston regarding this.  2.  Increase potassium chloride to 20 mEq twice daily.  3.  Await upcoming PET scan.  I recommended restarting back on Zometa monthly after the upcoming PET scan.  She will contact us after her visit at [facility] and we will schedule her for monthly Zometa accordingly.    4.  Follow-up in 3-4 months with CBC CMP ferritin iron panel.        Tim Muñoz MD  12/08/21

## 2021-12-08 NOTE — PROGRESS NOTES
"Chief Complaint  cancer diagnosis (discuss treatment) and FMLA (paperwork fill out)    Subjective          Ina Akers presents to [facility]  F/u thyroid cancer mets to lumbar and spine rt hip  Very pleasant patient here today follow-up has thyroid cancer with metastasis to lower lumbar as well as involvement right hip  So far she is doing well, considering everything, he saw orthopedic today with a bit more pain in her right low back, x-rays looked okay does not look like any change the doctor thought it was likely related to some scar tissue as opposed to new or worsening metastasis.  Has a PET scan scheduled for next week.  Essential hypertension controlled mixed hyperlipidemia stable     Despite her diagnosis of pain, and metastatic bone cancer, patient continues to work.  She does have FMLA, to allow her to continue to work, working demands may be changing soon,  So she is here to update her condition, as well as for paperwork.    Hypothyroid acquired she is compliant with replacement therapy essential hypertension takes antihypertensives she is quite compliant with her care she monitors her blood pressure nicely.  She has vitamin D deficiency and she takes replacement vitamin D    Diabetes mellitus type 2 which has been controlled.  She is fully vaccinated for Covid along with her booster            Objective   Vital Signs:   /71   Pulse 90   Temp 97.3 °F (36.3 °C) (Infrared)   Resp 12   Ht 154.9 cm (61\")   Wt 83.5 kg (184 lb)   SpO2 98%   BMI 34.77 kg/m²     Physical Exam  Vitals reviewed.   Constitutional:       Appearance: She is well-developed.   HENT:      Head: Normocephalic.      Nose: Nose normal.   Eyes:      General: No scleral icterus.     Conjunctiva/sclera: Conjunctivae normal.      Pupils: Pupils are equal, round, and reactive to light.   Neck:      Thyroid: No thyromegaly.      Vascular: No JVD.   Cardiovascular:      Rate and Rhythm: Normal " rate and regular rhythm.      Heart sounds: Normal heart sounds. No murmur heard.  No friction rub. No gallop.    Pulmonary:      Effort: Pulmonary effort is normal. No respiratory distress.      Breath sounds: Normal breath sounds. No stridor. No wheezing or rales.   Abdominal:      General: Bowel sounds are normal. There is no distension.      Palpations: Abdomen is soft.      Tenderness: There is no abdominal tenderness.      Comments: No hepatosplenomegaly, no ascites,   Musculoskeletal:         General: No tenderness.      Cervical back: Neck supple.   Lymphadenopathy:      Cervical: No cervical adenopathy.   Skin:     General: Skin is warm and dry.      Findings: No erythema or rash.   Neurological:      Mental Status: She is alert and oriented to person, place, and time.      Deep Tendon Reflexes: Reflexes are normal and symmetric.   Psychiatric:         Behavior: Behavior normal.         Thought Content: Thought content normal.         Judgment: Judgment normal.        Result Review :                 Assessment and Plan    Diagnoses and all orders for this visit:    1. Hypertension, unspecified type (Primary)    2. Mixed hyperlipidemia    3. Controlled type 2 diabetes mellitus without complication, without long-term current use of insulin (HCC)    4. Metastatic bone cancer (HCC)      I spent 20  minutes caring for Ina on this date of service. This time includes time spent by me in the following activities:preparing for the visit, reviewing tests, performing a medically appropriate examination and/or evaluation , documenting information in the medical record and care coordination  Follow Up   Return Follow-up in February, as well as fasting lab please thank you.  Patient was given instructions and counseling regarding her condition or for health maintenance advice. Please see specific information pulled into the AVS if appropriate.     Patient will continue healthy diet regular exercise  Plenty of fluids  she will follow-up with her specialist  Should she  get symptoms of virus   She should check early in frequently to try to catch Covid early enough to make antibodies deficient  Lung she is doing well she will follow up in 6 months to continue present medication follow-up with her specialist  Stay on top of her boosters for Covid

## 2021-12-10 LAB
ALBUMIN SERPL ELPH-MCNC: 3.7 G/DL (ref 2.9–4.4)
ALBUMIN/GLOB SERPL: 1.1 {RATIO} (ref 0.7–1.7)
ALPHA1 GLOB SERPL ELPH-MCNC: 0.2 G/DL (ref 0–0.4)
ALPHA2 GLOB SERPL ELPH-MCNC: 0.7 G/DL (ref 0.4–1)
B-GLOBULIN SERPL ELPH-MCNC: 1.1 G/DL (ref 0.7–1.3)
GAMMA GLOB SERPL ELPH-MCNC: 1.4 G/DL (ref 0.4–1.8)
GLOBULIN SER-MCNC: 3.4 G/DL (ref 2.2–3.9)
IGA SERPL-MCNC: 215 MG/DL (ref 87–352)
IGG SERPL-MCNC: 1372 MG/DL (ref 586–1602)
IGM SERPL-MCNC: 151 MG/DL (ref 26–217)
INTERPRETATION SERPL IEP-IMP: ABNORMAL
KAPPA LC FREE SER-MCNC: 25.4 MG/L (ref 3.3–19.4)
KAPPA LC FREE/LAMBDA FREE SER: 2.07 {RATIO} (ref 0.26–1.65)
LABORATORY COMMENT REPORT: ABNORMAL
LAMBDA LC FREE SERPL-MCNC: 12.3 MG/L (ref 5.7–26.3)
M PROTEIN SERPL ELPH-MCNC: ABNORMAL G/DL
PROT SERPL-MCNC: 7.1 G/DL (ref 6–8.5)

## 2021-12-21 ENCOUNTER — TELEPHONE (OUTPATIENT)
Dept: ONCOLOGY | Facility: CLINIC | Age: 51
End: 2021-12-21

## 2021-12-23 ENCOUNTER — TELEPHONE (OUTPATIENT)
Dept: ONCOLOGY | Facility: CLINIC | Age: 51
End: 2021-12-23

## 2021-12-23 ENCOUNTER — TELEPHONE (OUTPATIENT)
Dept: NEUROSURGERY | Facility: CLINIC | Age: 51
End: 2021-12-23

## 2021-12-23 NOTE — TELEPHONE ENCOUNTER
Pt is calling to schedule her monthly zometa shot. She also informed that her oncologist in [place] would like our office to order an upper GI study due to her low hemoglobin and iron saturation, he thinks she could be bleeding. This RN will discuss with Dr. Muñoz because our office typically does not order GI studies, but we can refer her to a Gastroenterologist that could order that. We will call her back with instructions. She v/u.

## 2021-12-23 NOTE — TELEPHONE ENCOUNTER
----- Message from Coby Claire RN sent at 12/23/2021 11:07 AM EST -----  Please call this pt to set up her monthly Zometa injection. She is aware.     Thank you!

## 2021-12-23 NOTE — TELEPHONE ENCOUNTER
Called patient regarding MRI and PET scan. Needing to know when she is scheduled to have them and where? If outside of [facility] she will need to bring disc along with having them fax the results to our office prior to her follow up appointment

## 2021-12-27 ENCOUNTER — TELEPHONE (OUTPATIENT)
Dept: FAMILY MEDICINE CLINIC | Facility: CLINIC | Age: 51
End: 2021-12-27

## 2021-12-27 DIAGNOSIS — D50.9 IRON DEFICIENCY ANEMIA, UNSPECIFIED IRON DEFICIENCY ANEMIA TYPE: Primary | ICD-10-CM

## 2021-12-27 DIAGNOSIS — U07.1 LABORATORY CONFIRMED DIAGNOSIS OF COVID-19: Primary | ICD-10-CM

## 2021-12-27 NOTE — PROGRESS NOTES
Tim Muñoz MD Noel, Shelbie, RN  Agree with a referral back to her GI. I don’t need to see her before March.     Thank you             Previous Messages       ----- Message -----   From: Coby Claire, RN   Sent: 12/23/2021  11:19 AM EST   To: MD Dr. Toni Willard,   This pt saw her [facility] oncologist and he recommended she have an upper gi study d/t her low hgb and iron sat. I told her we would probably have to refer her for this order, but she has an established gi doctor. Do you need to see her any sooner than March?

## 2021-12-27 NOTE — TELEPHONE ENCOUNTER
----- Message from Manish Curtis sent at 12/27/2021  9:48 AM EST -----  Regarding: FW: Positive for COVID  Please advise  ----- Message -----  From: Ina Akers  Sent: 12/27/2021   9:42 AM EST  To: [facility]  Subject: Positive for COVID                               Thank you Charlie! I will also send a message to my oncologist.

## 2021-12-27 NOTE — TELEPHONE ENCOUNTER
Can you put the order in for Regeneron in the computer  You may have to have someone I have not entered the computer orders for the  Call patient with set up the IV infusion ASAP    She should already know the risk-benefit after she has more questions  I think it is already been 3 months since the last 1  Lets do this ASAP thank you!!!

## 2021-12-28 PROBLEM — U07.1 COVID-19 VIRUS INFECTION: Status: ACTIVE | Noted: 2021-12-28

## 2021-12-28 RX ORDER — SODIUM CHLORIDE 9 MG/ML
30 INJECTION, SOLUTION INTRAVENOUS ONCE
Status: CANCELLED | OUTPATIENT
Start: 2021-12-28

## 2021-12-28 RX ORDER — DIPHENHYDRAMINE HYDROCHLORIDE 50 MG/ML
50 INJECTION INTRAMUSCULAR; INTRAVENOUS ONCE AS NEEDED
Status: CANCELLED | OUTPATIENT
Start: 2021-12-28

## 2021-12-28 RX ORDER — EPINEPHRINE 1 MG/ML
0.3 INJECTION, SOLUTION, CONCENTRATE INTRAVENOUS AS NEEDED
Status: CANCELLED | OUTPATIENT
Start: 2021-12-28

## 2021-12-28 RX ORDER — DIPHENHYDRAMINE HCL 25 MG
50 TABLET ORAL ONCE AS NEEDED
Status: CANCELLED | OUTPATIENT
Start: 2021-12-28

## 2021-12-28 RX ORDER — METHYLPREDNISOLONE SODIUM SUCCINATE 125 MG/2ML
125 INJECTION, POWDER, LYOPHILIZED, FOR SOLUTION INTRAMUSCULAR; INTRAVENOUS AS NEEDED
Status: CANCELLED | OUTPATIENT
Start: 2021-12-28

## 2021-12-30 ENCOUNTER — TELEPHONE (OUTPATIENT)
Dept: NEUROSURGERY | Facility: CLINIC | Age: 51
End: 2021-12-30

## 2021-12-30 ENCOUNTER — HOSPITAL ENCOUNTER (OUTPATIENT)
Dept: INFUSION THERAPY | Facility: HOSPITAL | Age: 51
Discharge: HOME | End: 2021-12-30
Admitting: NURSE PRACTITIONER

## 2021-12-30 VITALS
OXYGEN SATURATION: 97 % | DIASTOLIC BLOOD PRESSURE: 78 MMHG | TEMPERATURE: 97.7 F | HEART RATE: 87 BPM | SYSTOLIC BLOOD PRESSURE: 129 MMHG

## 2021-12-30 DIAGNOSIS — U07.1 COVID-19 VIRUS INFECTION: Primary | ICD-10-CM

## 2021-12-30 PROCEDURE — 96365 THER/PROPH/DIAG IV INF INIT: CPT

## 2021-12-30 PROCEDURE — 25010000002 INJECTION, BAMLANIVIMAB AND ETESEVIMAB, 2100 MG: Performed by: NURSE PRACTITIONER

## 2021-12-30 PROCEDURE — M0245 HC IV INFUSION, BAMLANIVIMAB AND ETESEVIMAB, 2100 MG: HCPCS | Performed by: NURSE PRACTITIONER

## 2021-12-30 RX ORDER — SODIUM CHLORIDE 9 MG/ML
30 INJECTION, SOLUTION INTRAVENOUS ONCE
Status: CANCELLED | OUTPATIENT
Start: 2021-12-30

## 2021-12-30 RX ORDER — DIPHENHYDRAMINE HYDROCHLORIDE 50 MG/ML
50 INJECTION INTRAMUSCULAR; INTRAVENOUS ONCE AS NEEDED
Status: CANCELLED | OUTPATIENT
Start: 2021-12-30

## 2021-12-30 RX ORDER — SODIUM CHLORIDE 9 MG/ML
30 INJECTION, SOLUTION INTRAVENOUS ONCE
Status: COMPLETED | OUTPATIENT
Start: 2021-12-30 | End: 2021-12-30

## 2021-12-30 RX ORDER — DIPHENHYDRAMINE HCL 25 MG
50 CAPSULE ORAL ONCE AS NEEDED
Status: CANCELLED | OUTPATIENT
Start: 2021-12-30

## 2021-12-30 RX ORDER — DIPHENHYDRAMINE HCL 25 MG
50 CAPSULE ORAL ONCE AS NEEDED
Status: DISCONTINUED | OUTPATIENT
Start: 2021-12-30 | End: 2022-01-01 | Stop reason: HOSPADM

## 2021-12-30 RX ORDER — DIPHENHYDRAMINE HYDROCHLORIDE 50 MG/ML
50 INJECTION INTRAMUSCULAR; INTRAVENOUS ONCE AS NEEDED
Status: DISCONTINUED | OUTPATIENT
Start: 2021-12-30 | End: 2022-01-01 | Stop reason: HOSPADM

## 2021-12-30 RX ORDER — METHYLPREDNISOLONE SODIUM SUCCINATE 125 MG/2ML
125 INJECTION, POWDER, LYOPHILIZED, FOR SOLUTION INTRAMUSCULAR; INTRAVENOUS AS NEEDED
Status: DISCONTINUED | OUTPATIENT
Start: 2021-12-30 | End: 2022-01-01 | Stop reason: HOSPADM

## 2021-12-30 RX ORDER — METHYLPREDNISOLONE SODIUM SUCCINATE 125 MG/2ML
125 INJECTION, POWDER, LYOPHILIZED, FOR SOLUTION INTRAMUSCULAR; INTRAVENOUS AS NEEDED
Status: CANCELLED | OUTPATIENT
Start: 2021-12-30

## 2021-12-30 RX ADMIN — SODIUM CHLORIDE: 9 INJECTION, SOLUTION INTRAVENOUS at 18:45

## 2021-12-30 RX ADMIN — SODIUM CHLORIDE 30 ML/HR: 9 INJECTION, SOLUTION INTRAVENOUS at 18:45

## 2021-12-31 NOTE — PROGRESS NOTES
Received call from [facility] where pt receiving Mab.  She had rx with back spasms, numbness of lips shortly into infusion which was immediately stopped.  Pt reaction resolved quickly.  Pt currently stable.  [facility] will continue to monitor pt x 1 hr per protocol. But will not administer rest of infusion.  Pt will f/u with PCP next week prn.

## 2022-01-03 RX ORDER — DEXTROMETHORPHAN HYDROBROMIDE AND PROMETHAZINE HYDROCHLORIDE 15; 6.25 MG/5ML; MG/5ML
5 SYRUP ORAL 4 TIMES DAILY PRN
Qty: 90 ML | Refills: 2 | Status: SHIPPED | OUTPATIENT
Start: 2022-01-03 | End: 2022-02-18

## 2022-01-07 ENCOUNTER — OFFICE VISIT (OUTPATIENT)
Dept: NEUROSURGERY | Facility: CLINIC | Age: 52
End: 2022-01-07

## 2022-01-07 DIAGNOSIS — M89.9 SKULL LESION: ICD-10-CM

## 2022-01-07 DIAGNOSIS — D49.2 LUMBAR SPINE TUMOR: Primary | ICD-10-CM

## 2022-01-07 PROCEDURE — 99441 PR PHYS/QHP TELEPHONE EVALUATION 5-10 MIN: CPT | Performed by: NEUROLOGICAL SURGERY

## 2022-01-13 ENCOUNTER — TELEPHONE (OUTPATIENT)
Dept: ONCOLOGY | Facility: CLINIC | Age: 52
End: 2022-01-13

## 2022-01-13 NOTE — TELEPHONE ENCOUNTER
Caller: Ina Akers    Relationship to patient: Self    Best call back number: [phone]    Chief complaint: PATIENT HAS AN INFUSION ON THE 25TH AND SHE WANTED TO KNOW IF SHE PUSHED THAT OUT A COUPLE DAYS WOULD THAT EFFECT HER February INFUSION?    Type of visit: LAB AND INFUSION    Requested date: 1-27-22    If rescheduling, when is the original appointment: 1-25-22     Additional notes:PLEASE CALL TO ADVISE

## 2022-01-13 NOTE — TELEPHONE ENCOUNTER
Informed pt that if we push her appt her following appts will be pushed as well due to insurance. They require 28 days between each zometa treatment. Pt also informed this RN that she COVID in December and received a couple minutes of Regeneron but couldn't finish the infusion because she had an allergic reaction. She wanted to make sure this wouldn't affect her zometa and this RN said that it should not. She v/u and will wait for a call from scheduling.

## 2022-01-26 ENCOUNTER — OFFICE VISIT (OUTPATIENT)
Dept: GASTROENTEROLOGY | Facility: CLINIC | Age: 52
End: 2022-01-26

## 2022-01-26 ENCOUNTER — TELEPHONE (OUTPATIENT)
Dept: GASTROENTEROLOGY | Facility: CLINIC | Age: 52
End: 2022-01-26

## 2022-01-26 VITALS — BODY MASS INDEX: 34.29 KG/M2 | WEIGHT: 181.6 LBS | HEIGHT: 61 IN | TEMPERATURE: 97.3 F

## 2022-01-26 DIAGNOSIS — D50.8 OTHER IRON DEFICIENCY ANEMIA: Primary | ICD-10-CM

## 2022-01-26 DIAGNOSIS — K21.9 GASTROESOPHAGEAL REFLUX DISEASE, UNSPECIFIED WHETHER ESOPHAGITIS PRESENT: ICD-10-CM

## 2022-01-26 PROCEDURE — 99214 OFFICE O/P EST MOD 30 MIN: CPT | Performed by: NURSE PRACTITIONER

## 2022-01-26 RX ORDER — LEVOTHYROXINE SODIUM 112 UG/1
112 TABLET ORAL
COMMUNITY
Start: 2021-12-31

## 2022-01-26 NOTE — TELEPHONE ENCOUNTER
JOHNNY Orr for EGD on 03/25/2022  arrive at 11am  . Gave Prep instructions in office.      Advised PT  that  will call with final arrival time  24 hrs before procedure. If they do not get a phone call, arrival time will stay the same as given on instructions

## 2022-01-27 ENCOUNTER — APPOINTMENT (OUTPATIENT)
Dept: OTHER | Facility: HOSPITAL | Age: 52
End: 2022-01-27

## 2022-01-27 ENCOUNTER — INFUSION (OUTPATIENT)
Dept: ONCOLOGY | Facility: HOSPITAL | Age: 52
End: 2022-01-27

## 2022-01-27 VITALS
OXYGEN SATURATION: 98 % | TEMPERATURE: 97.2 F | HEART RATE: 75 BPM | DIASTOLIC BLOOD PRESSURE: 69 MMHG | WEIGHT: 180 LBS | HEIGHT: 61 IN | SYSTOLIC BLOOD PRESSURE: 108 MMHG | BODY MASS INDEX: 33.99 KG/M2 | RESPIRATION RATE: 18 BRPM

## 2022-01-27 DIAGNOSIS — C50.412 MALIGNANT NEOPLASM OF UPPER-OUTER QUADRANT OF LEFT BREAST IN FEMALE, ESTROGEN RECEPTOR NEGATIVE: ICD-10-CM

## 2022-01-27 DIAGNOSIS — Z17.1 MALIGNANT NEOPLASM OF UPPER-OUTER QUADRANT OF LEFT BREAST IN FEMALE, ESTROGEN RECEPTOR NEGATIVE: ICD-10-CM

## 2022-01-27 DIAGNOSIS — IMO0001 METASTATIC BONE CANCER: Primary | ICD-10-CM

## 2022-01-27 LAB
ALBUMIN SERPL-MCNC: 4.4 G/DL (ref 3.5–5.2)
ALBUMIN/GLOB SERPL: 1.3 G/DL
ALP SERPL-CCNC: 80 U/L (ref 39–117)
ALT SERPL W P-5'-P-CCNC: 11 U/L (ref 1–33)
ANION GAP SERPL CALCULATED.3IONS-SCNC: 9.8 MMOL/L (ref 5–15)
AST SERPL-CCNC: 14 U/L (ref 1–32)
BASOPHILS # BLD AUTO: 0.02 10*3/MM3 (ref 0–0.2)
BASOPHILS NFR BLD AUTO: 0.3 % (ref 0–1.5)
BILIRUB SERPL-MCNC: 0.3 MG/DL (ref 0–1.2)
BUN SERPL-MCNC: 18 MG/DL (ref 6–20)
BUN/CREAT SERPL: 20.5 (ref 7–25)
CALCIUM SPEC-SCNC: 9.6 MG/DL (ref 8.6–10.5)
CHLORIDE SERPL-SCNC: 100 MMOL/L (ref 98–107)
CO2 SERPL-SCNC: 26.2 MMOL/L (ref 22–29)
CREAT SERPL-MCNC: 0.88 MG/DL (ref 0.57–1)
DEPRECATED RDW RBC AUTO: 47.8 FL (ref 37–54)
EOSINOPHIL # BLD AUTO: 0.36 10*3/MM3 (ref 0–0.4)
EOSINOPHIL NFR BLD AUTO: 6 % (ref 0.3–6.2)
ERYTHROCYTE [DISTWIDTH] IN BLOOD BY AUTOMATED COUNT: 15.6 % (ref 12.3–15.4)
GFR SERPL CREATININE-BSD FRML MDRD: 81 ML/MIN/1.73
GLOBULIN UR ELPH-MCNC: 3.5 GM/DL
GLUCOSE SERPL-MCNC: 125 MG/DL (ref 65–99)
HCT VFR BLD AUTO: 33.3 % (ref 34–46.6)
HGB BLD-MCNC: 10.2 G/DL (ref 12–15.9)
IMM GRANULOCYTES # BLD AUTO: 0.03 10*3/MM3 (ref 0–0.05)
IMM GRANULOCYTES NFR BLD AUTO: 0.5 % (ref 0–0.5)
LYMPHOCYTES # BLD AUTO: 1.68 10*3/MM3 (ref 0.7–3.1)
LYMPHOCYTES NFR BLD AUTO: 27.8 % (ref 19.6–45.3)
MAGNESIUM SERPL-MCNC: 2 MG/DL (ref 1.6–2.6)
MCH RBC QN AUTO: 26 PG (ref 26.6–33)
MCHC RBC AUTO-ENTMCNC: 30.6 G/DL (ref 31.5–35.7)
MCV RBC AUTO: 84.9 FL (ref 79–97)
MONOCYTES # BLD AUTO: 0.47 10*3/MM3 (ref 0.1–0.9)
MONOCYTES NFR BLD AUTO: 7.8 % (ref 5–12)
NEUTROPHILS NFR BLD AUTO: 3.48 10*3/MM3 (ref 1.7–7)
NEUTROPHILS NFR BLD AUTO: 57.6 % (ref 42.7–76)
NRBC BLD AUTO-RTO: 0 /100 WBC (ref 0–0.2)
PHOSPHATE SERPL-MCNC: 2.7 MG/DL (ref 2.5–4.5)
PLATELET # BLD AUTO: 348 10*3/MM3 (ref 140–450)
PMV BLD AUTO: 9.7 FL (ref 6–12)
POTASSIUM SERPL-SCNC: 3.3 MMOL/L (ref 3.5–5.2)
PROT SERPL-MCNC: 7.9 G/DL (ref 6–8.5)
RBC # BLD AUTO: 3.92 10*6/MM3 (ref 3.77–5.28)
SODIUM SERPL-SCNC: 136 MMOL/L (ref 136–145)
WBC NRBC COR # BLD: 6.04 10*3/MM3 (ref 3.4–10.8)

## 2022-01-27 PROCEDURE — 96374 THER/PROPH/DIAG INJ IV PUSH: CPT

## 2022-01-27 PROCEDURE — 85025 COMPLETE CBC W/AUTO DIFF WBC: CPT | Performed by: NURSE PRACTITIONER

## 2022-01-27 PROCEDURE — 84100 ASSAY OF PHOSPHORUS: CPT | Performed by: NURSE PRACTITIONER

## 2022-01-27 PROCEDURE — 83735 ASSAY OF MAGNESIUM: CPT | Performed by: NURSE PRACTITIONER

## 2022-01-27 PROCEDURE — 25010000002 ZOLEDRONIC ACID PER 1 MG: Performed by: INTERNAL MEDICINE

## 2022-01-27 PROCEDURE — 80053 COMPREHEN METABOLIC PANEL: CPT | Performed by: NURSE PRACTITIONER

## 2022-01-27 RX ORDER — SODIUM CHLORIDE 9 MG/ML
250 INJECTION, SOLUTION INTRAVENOUS ONCE
Status: COMPLETED | OUTPATIENT
Start: 2022-01-27 | End: 2022-01-27

## 2022-01-27 RX ADMIN — ZOLEDRONIC ACID 4 MG: 0.8 INJECTION, SOLUTION, CONCENTRATE INTRAVENOUS at 16:00

## 2022-01-27 RX ADMIN — SODIUM CHLORIDE 250 ML: 9 INJECTION, SOLUTION INTRAVENOUS at 16:00

## 2022-01-27 NOTE — NURSING NOTE
K+ NOTED AT 3.3. PT TAKING PO KCL 20MEQ DAILY. PER [facility] NP PT TO NOW TAKE 20MEQ BID. PT MADE AWARE AND SHE V/U. ALSO MENTIONED PT F/U W/ HER PCP TO SEE WHAT MED (HCTZ?) COULD BE CAUSING HER K+ TO BE LOW. PT V/U.

## 2022-02-14 ENCOUNTER — LAB (OUTPATIENT)
Dept: LAB | Facility: HOSPITAL | Age: 52
End: 2022-02-14

## 2022-02-14 DIAGNOSIS — E11.9 CONTROLLED TYPE 2 DIABETES MELLITUS WITHOUT COMPLICATION, WITHOUT LONG-TERM CURRENT USE OF INSULIN: ICD-10-CM

## 2022-02-14 DIAGNOSIS — E11.00 TYPE 2 DIABETES MELLITUS WITH HYPEROSMOLARITY WITHOUT COMA, WITHOUT LONG-TERM CURRENT USE OF INSULIN: ICD-10-CM

## 2022-02-14 DIAGNOSIS — Z00.00 HEALTH CARE MAINTENANCE: ICD-10-CM

## 2022-02-14 DIAGNOSIS — E78.2 MIXED HYPERLIPIDEMIA: ICD-10-CM

## 2022-02-14 DIAGNOSIS — E03.8 SUBCLINICAL HYPOTHYROIDISM: ICD-10-CM

## 2022-02-14 DIAGNOSIS — E55.9 VITAMIN D DEFICIENCY: ICD-10-CM

## 2022-02-14 DIAGNOSIS — I10 HYPERTENSION, UNSPECIFIED TYPE: Primary | ICD-10-CM

## 2022-02-14 LAB
25(OH)D3 SERPL-MCNC: 39.6 NG/ML (ref 30–100)
ALBUMIN SERPL-MCNC: 4.3 G/DL (ref 3.5–5.2)
ALBUMIN/GLOB SERPL: 1.3 G/DL
ALP SERPL-CCNC: 70 U/L (ref 39–117)
ALT SERPL W P-5'-P-CCNC: 12 U/L (ref 1–33)
ANION GAP SERPL CALCULATED.3IONS-SCNC: 10.1 MMOL/L (ref 5–15)
AST SERPL-CCNC: 17 U/L (ref 1–32)
BASOPHILS # BLD AUTO: 0.02 10*3/MM3 (ref 0–0.2)
BASOPHILS NFR BLD AUTO: 0.4 % (ref 0–1.5)
BILIRUB SERPL-MCNC: 0.3 MG/DL (ref 0–1.2)
BUN SERPL-MCNC: 12 MG/DL (ref 6–20)
BUN/CREAT SERPL: 13.6 (ref 7–25)
CALCIUM SPEC-SCNC: 9.5 MG/DL (ref 8.6–10.5)
CHLORIDE SERPL-SCNC: 104 MMOL/L (ref 98–107)
CHOLEST SERPL-MCNC: 151 MG/DL (ref 0–200)
CO2 SERPL-SCNC: 25.9 MMOL/L (ref 22–29)
CREAT SERPL-MCNC: 0.88 MG/DL (ref 0.57–1)
DEPRECATED RDW RBC AUTO: 48.2 FL (ref 37–54)
EOSINOPHIL # BLD AUTO: 0.4 10*3/MM3 (ref 0–0.4)
EOSINOPHIL NFR BLD AUTO: 8 % (ref 0.3–6.2)
ERYTHROCYTE [DISTWIDTH] IN BLOOD BY AUTOMATED COUNT: 15.7 % (ref 12.3–15.4)
GFR SERPL CREATININE-BSD FRML MDRD: 81 ML/MIN/1.73
GLOBULIN UR ELPH-MCNC: 3.2 GM/DL
GLUCOSE SERPL-MCNC: 104 MG/DL (ref 65–99)
HBA1C MFR BLD: 6.1 % (ref 4.8–5.6)
HCT VFR BLD AUTO: 32.6 % (ref 34–46.6)
HCV AB SER DONR QL: REACTIVE
HDLC SERPL-MCNC: 44 MG/DL (ref 40–60)
HGB BLD-MCNC: 10.1 G/DL (ref 12–15.9)
IMM GRANULOCYTES # BLD AUTO: 0.01 10*3/MM3 (ref 0–0.05)
IMM GRANULOCYTES NFR BLD AUTO: 0.2 % (ref 0–0.5)
LDLC SERPL CALC-MCNC: 96 MG/DL (ref 0–100)
LDLC/HDLC SERPL: 2.2 {RATIO}
LYMPHOCYTES # BLD AUTO: 1.57 10*3/MM3 (ref 0.7–3.1)
LYMPHOCYTES NFR BLD AUTO: 31.5 % (ref 19.6–45.3)
MCH RBC QN AUTO: 26.4 PG (ref 26.6–33)
MCHC RBC AUTO-ENTMCNC: 31 G/DL (ref 31.5–35.7)
MCV RBC AUTO: 85.1 FL (ref 79–97)
MONOCYTES # BLD AUTO: 0.57 10*3/MM3 (ref 0.1–0.9)
MONOCYTES NFR BLD AUTO: 11.4 % (ref 5–12)
NEUTROPHILS NFR BLD AUTO: 2.42 10*3/MM3 (ref 1.7–7)
NEUTROPHILS NFR BLD AUTO: 48.5 % (ref 42.7–76)
NRBC BLD AUTO-RTO: 0 /100 WBC (ref 0–0.2)
PLATELET # BLD AUTO: 340 10*3/MM3 (ref 140–450)
PMV BLD AUTO: 9.2 FL (ref 6–12)
POTASSIUM SERPL-SCNC: 3.7 MMOL/L (ref 3.5–5.2)
PROT SERPL-MCNC: 7.5 G/DL (ref 6–8.5)
RBC # BLD AUTO: 3.83 10*6/MM3 (ref 3.77–5.28)
SODIUM SERPL-SCNC: 140 MMOL/L (ref 136–145)
T4 FREE SERPL-MCNC: 1.73 NG/DL (ref 0.93–1.7)
TRIGL SERPL-MCNC: 51 MG/DL (ref 0–150)
TSH SERPL DL<=0.05 MIU/L-ACNC: <0.01 UIU/ML (ref 0.27–4.2)
VLDLC SERPL-MCNC: 11 MG/DL (ref 5–40)
WBC NRBC COR # BLD: 4.99 10*3/MM3 (ref 3.4–10.8)

## 2022-02-14 PROCEDURE — 83036 HEMOGLOBIN GLYCOSYLATED A1C: CPT | Performed by: NURSE PRACTITIONER

## 2022-02-14 PROCEDURE — 80053 COMPREHEN METABOLIC PANEL: CPT | Performed by: NURSE PRACTITIONER

## 2022-02-14 PROCEDURE — 84439 ASSAY OF FREE THYROXINE: CPT | Performed by: NURSE PRACTITIONER

## 2022-02-14 PROCEDURE — 36415 COLL VENOUS BLD VENIPUNCTURE: CPT | Performed by: NURSE PRACTITIONER

## 2022-02-14 PROCEDURE — 80061 LIPID PANEL: CPT | Performed by: NURSE PRACTITIONER

## 2022-02-14 PROCEDURE — 84443 ASSAY THYROID STIM HORMONE: CPT | Performed by: NURSE PRACTITIONER

## 2022-02-14 PROCEDURE — 85025 COMPLETE CBC W/AUTO DIFF WBC: CPT | Performed by: NURSE PRACTITIONER

## 2022-02-14 PROCEDURE — 82306 VITAMIN D 25 HYDROXY: CPT | Performed by: NURSE PRACTITIONER

## 2022-02-14 PROCEDURE — 86803 HEPATITIS C AB TEST: CPT | Performed by: NURSE PRACTITIONER

## 2022-02-18 ENCOUNTER — OFFICE VISIT (OUTPATIENT)
Dept: FAMILY MEDICINE CLINIC | Facility: CLINIC | Age: 52
End: 2022-02-18

## 2022-02-18 VITALS
OXYGEN SATURATION: 100 % | WEIGHT: 180.3 LBS | DIASTOLIC BLOOD PRESSURE: 70 MMHG | BODY MASS INDEX: 34.04 KG/M2 | RESPIRATION RATE: 12 BRPM | TEMPERATURE: 97 F | SYSTOLIC BLOOD PRESSURE: 98 MMHG | HEIGHT: 61 IN | HEART RATE: 64 BPM

## 2022-02-18 DIAGNOSIS — C79.51 PRIMARY CANCER OF THYROID GLAND METASTATIC TO BONE: ICD-10-CM

## 2022-02-18 DIAGNOSIS — Z00.00 HEALTH MAINTENANCE EXAMINATION: Primary | ICD-10-CM

## 2022-02-18 DIAGNOSIS — M25.512 ACUTE PAIN OF LEFT SHOULDER: ICD-10-CM

## 2022-02-18 DIAGNOSIS — C73 PRIMARY CANCER OF THYROID GLAND METASTATIC TO BONE: ICD-10-CM

## 2022-02-18 DIAGNOSIS — E11.9 CONTROLLED TYPE 2 DIABETES MELLITUS WITHOUT COMPLICATION, WITHOUT LONG-TERM CURRENT USE OF INSULIN: ICD-10-CM

## 2022-02-18 PROCEDURE — 99396 PREV VISIT EST AGE 40-64: CPT | Performed by: NURSE PRACTITIONER

## 2022-02-18 RX ORDER — AMLODIPINE BESYLATE 5 MG/1
5 TABLET ORAL DAILY
Qty: 90 TABLET | Refills: 1 | Status: SHIPPED | OUTPATIENT
Start: 2022-02-18 | End: 2022-03-24

## 2022-02-24 ENCOUNTER — INFUSION (OUTPATIENT)
Dept: ONCOLOGY | Facility: HOSPITAL | Age: 52
End: 2022-02-24

## 2022-02-24 ENCOUNTER — APPOINTMENT (OUTPATIENT)
Dept: OTHER | Facility: HOSPITAL | Age: 52
End: 2022-02-24

## 2022-02-24 VITALS
HEIGHT: 61 IN | RESPIRATION RATE: 18 BRPM | WEIGHT: 182.8 LBS | TEMPERATURE: 98 F | HEART RATE: 80 BPM | DIASTOLIC BLOOD PRESSURE: 68 MMHG | SYSTOLIC BLOOD PRESSURE: 101 MMHG | OXYGEN SATURATION: 96 % | BODY MASS INDEX: 34.51 KG/M2

## 2022-02-24 DIAGNOSIS — Z17.1 MALIGNANT NEOPLASM OF UPPER-OUTER QUADRANT OF LEFT BREAST IN FEMALE, ESTROGEN RECEPTOR NEGATIVE: ICD-10-CM

## 2022-02-24 DIAGNOSIS — IMO0001 METASTATIC BONE CANCER: Primary | ICD-10-CM

## 2022-02-24 DIAGNOSIS — C50.412 MALIGNANT NEOPLASM OF UPPER-OUTER QUADRANT OF LEFT BREAST IN FEMALE, ESTROGEN RECEPTOR NEGATIVE: ICD-10-CM

## 2022-02-24 LAB
ALBUMIN SERPL-MCNC: 4.1 G/DL (ref 3.5–5.2)
ALBUMIN/GLOB SERPL: 1.4 G/DL
ALP SERPL-CCNC: 69 U/L (ref 39–117)
ALT SERPL W P-5'-P-CCNC: 10 U/L (ref 1–33)
ANION GAP SERPL CALCULATED.3IONS-SCNC: 11.5 MMOL/L (ref 5–15)
AST SERPL-CCNC: 13 U/L (ref 1–32)
BASOPHILS # BLD AUTO: 0.02 10*3/MM3 (ref 0–0.2)
BASOPHILS NFR BLD AUTO: 0.4 % (ref 0–1.5)
BILIRUB SERPL-MCNC: 0.2 MG/DL (ref 0–1.2)
BUN SERPL-MCNC: 20 MG/DL (ref 6–20)
BUN/CREAT SERPL: 24.7 (ref 7–25)
CALCIUM SPEC-SCNC: 9.5 MG/DL (ref 8.6–10.5)
CHLORIDE SERPL-SCNC: 105 MMOL/L (ref 98–107)
CO2 SERPL-SCNC: 22.5 MMOL/L (ref 22–29)
CREAT SERPL-MCNC: 0.81 MG/DL (ref 0.57–1)
DEPRECATED RDW RBC AUTO: 49.8 FL (ref 37–54)
EOSINOPHIL # BLD AUTO: 0.38 10*3/MM3 (ref 0–0.4)
EOSINOPHIL NFR BLD AUTO: 6.8 % (ref 0.3–6.2)
ERYTHROCYTE [DISTWIDTH] IN BLOOD BY AUTOMATED COUNT: 15.9 % (ref 12.3–15.4)
GFR SERPL CREATININE-BSD FRML MDRD: 90 ML/MIN/1.73
GLOBULIN UR ELPH-MCNC: 3 GM/DL
GLUCOSE SERPL-MCNC: 119 MG/DL (ref 65–99)
HCT VFR BLD AUTO: 30.3 % (ref 34–46.6)
HGB BLD-MCNC: 9.1 G/DL (ref 12–15.9)
IMM GRANULOCYTES # BLD AUTO: 0.02 10*3/MM3 (ref 0–0.05)
IMM GRANULOCYTES NFR BLD AUTO: 0.4 % (ref 0–0.5)
LYMPHOCYTES # BLD AUTO: 1.67 10*3/MM3 (ref 0.7–3.1)
LYMPHOCYTES NFR BLD AUTO: 30 % (ref 19.6–45.3)
MAGNESIUM SERPL-MCNC: 2 MG/DL (ref 1.6–2.6)
MCH RBC QN AUTO: 25.6 PG (ref 26.6–33)
MCHC RBC AUTO-ENTMCNC: 30 G/DL (ref 31.5–35.7)
MCV RBC AUTO: 85.4 FL (ref 79–97)
MONOCYTES # BLD AUTO: 0.63 10*3/MM3 (ref 0.1–0.9)
MONOCYTES NFR BLD AUTO: 11.3 % (ref 5–12)
NEUTROPHILS NFR BLD AUTO: 2.85 10*3/MM3 (ref 1.7–7)
NEUTROPHILS NFR BLD AUTO: 51.1 % (ref 42.7–76)
NRBC BLD AUTO-RTO: 0 /100 WBC (ref 0–0.2)
PHOSPHATE SERPL-MCNC: 2.8 MG/DL (ref 2.5–4.5)
PLATELET # BLD AUTO: 319 10*3/MM3 (ref 140–450)
PMV BLD AUTO: 9.7 FL (ref 6–12)
POTASSIUM SERPL-SCNC: 3.9 MMOL/L (ref 3.5–5.2)
PROT SERPL-MCNC: 7.1 G/DL (ref 6–8.5)
RBC # BLD AUTO: 3.55 10*6/MM3 (ref 3.77–5.28)
SODIUM SERPL-SCNC: 139 MMOL/L (ref 136–145)
WBC NRBC COR # BLD: 5.57 10*3/MM3 (ref 3.4–10.8)

## 2022-02-24 PROCEDURE — 85025 COMPLETE CBC W/AUTO DIFF WBC: CPT | Performed by: INTERNAL MEDICINE

## 2022-02-24 PROCEDURE — 80053 COMPREHEN METABOLIC PANEL: CPT | Performed by: INTERNAL MEDICINE

## 2022-02-24 PROCEDURE — 84100 ASSAY OF PHOSPHORUS: CPT | Performed by: INTERNAL MEDICINE

## 2022-02-24 PROCEDURE — 25010000002 ZOLEDRONIC ACID 4 MG/100ML SOLUTION: Performed by: NURSE PRACTITIONER

## 2022-02-24 PROCEDURE — 83735 ASSAY OF MAGNESIUM: CPT | Performed by: INTERNAL MEDICINE

## 2022-02-24 PROCEDURE — 36415 COLL VENOUS BLD VENIPUNCTURE: CPT

## 2022-02-24 PROCEDURE — 96374 THER/PROPH/DIAG INJ IV PUSH: CPT

## 2022-02-24 RX ORDER — SODIUM CHLORIDE 9 MG/ML
250 INJECTION, SOLUTION INTRAVENOUS ONCE
Status: COMPLETED | OUTPATIENT
Start: 2022-02-24 | End: 2022-02-24

## 2022-02-24 RX ORDER — ZOLEDRONIC ACID 0.04 MG/ML
4 INJECTION, SOLUTION INTRAVENOUS ONCE
Status: COMPLETED | OUTPATIENT
Start: 2022-02-24 | End: 2022-02-24

## 2022-02-24 RX ADMIN — ZOLEDRONIC ACID 4 MG: 0.04 INJECTION, SOLUTION INTRAVENOUS at 16:39

## 2022-02-24 RX ADMIN — SODIUM CHLORIDE 250 ML: 9 INJECTION, SOLUTION INTRAVENOUS at 15:33

## 2022-02-24 NOTE — NURSING NOTE
Patient in office today for Zometa.  Hgb noted to be decreased from 10.1 to 9.1.  Patient denies bleeding or complaints.  Reviewed labs with Shavonne Lowery NP.  Per NP- patient to contact [place] oncologist to advise.  Reviewed with patient and patient v/u.

## 2022-03-02 PROBLEM — C79.51 PRIMARY CANCER OF THYROID GLAND METASTATIC TO BONE: Status: ACTIVE | Noted: 2022-03-02

## 2022-03-02 PROBLEM — M25.512 ACUTE PAIN OF LEFT SHOULDER: Status: ACTIVE | Noted: 2022-03-02

## 2022-03-02 PROBLEM — C73 PRIMARY CANCER OF THYROID GLAND METASTATIC TO BONE (HCC): Status: ACTIVE | Noted: 2022-03-02

## 2022-03-02 NOTE — PROGRESS NOTES
"Chief Complaint  Annual Exam (physical) and Shoulder Pain (pt states left shoulder)    Subjective          Ina Akers presents to [facility]  Pleasant patient here today for health maintenance as well as has some left shoulder pain with range of motion  Said no chest pain no shortness breath no nausea vomiting diaphoresis she has no exertional chest pain no exertional arm pain nothing that radiates to her jaw thoracic neck back, no weakness in her extremities, 4 short period of time few weeks she is having some shoulder discomfort, which when she reaches and turns,  No known injury, not taking medications for that she has hypertension presently controlled  Diabetes mellitus type 2 stable, history of breast cancer left 2001 remission, unfortunately she was diagnosed with metastatic thyroid cancer metastasis to her right hip and lower spine this is stable she is receiving chemo and radiation treatments she is up-to-date with her oncologist.  She is fully vaccinated for COVID.  Thankfully she is still able to work at this time.        Objective   Vital Signs:   BP 98/70   Pulse 64   Temp 97 °F (36.1 °C) (Infrared)   Resp 12   Ht 154.9 cm (61\")   Wt 81.8 kg (180 lb 4.8 oz)   SpO2 100%   BMI 34.07 kg/m²     Physical Exam  Vitals reviewed.   Constitutional:       Appearance: She is well-developed.   HENT:      Head: Normocephalic.      Nose: Nose normal.   Eyes:      General: No scleral icterus.     Conjunctiva/sclera: Conjunctivae normal.      Pupils: Pupils are equal, round, and reactive to light.   Neck:      Thyroid: No thyromegaly.      Vascular: No JVD.   Cardiovascular:      Rate and Rhythm: Normal rate and regular rhythm.      Heart sounds: Normal heart sounds. No murmur heard.  No friction rub. No gallop.    Pulmonary:      Effort: Pulmonary effort is normal. No respiratory distress.      Breath sounds: Normal breath sounds. No stridor. No wheezing or rales. "   Abdominal:      General: Bowel sounds are normal. There is no distension.      Palpations: Abdomen is soft.      Tenderness: There is no abdominal tenderness.      Comments: No hepatosplenomegaly, no ascites,   Musculoskeletal:         General: No tenderness. Normal range of motion.      Cervical back: Neck supple.      Comments: Some pain in her shoulder when she reaches back otherwise normal exam no deformity, neurovascular intact no swelling, no AC joint tenderness clavicle normal.  Internal/external rotation normal range of motion.   Lymphadenopathy:      Cervical: No cervical adenopathy.   Skin:     General: Skin is warm and dry.      Findings: No erythema or rash.   Neurological:      Mental Status: She is alert and oriented to person, place, and time.      Deep Tendon Reflexes: Reflexes are normal and symmetric.   Psychiatric:         Behavior: Behavior normal.         Thought Content: Thought content normal.         Judgment: Judgment normal.        Result Review :                 Assessment and Plan    Diagnoses and all orders for this visit:    1. Health maintenance examination (Primary)    2. Acute pain of left shoulder    3. Controlled type 2 diabetes mellitus without complication, without long-term current use of insulin (HCC)    4. Primary cancer of thyroid gland metastatic to bone (HCC)    Other orders  -     amLODIPine (NORVASC) 5 MG tablet; Take 1 tablet by mouth Daily.  Dispense: 90 tablet; Refill: 1        Follow Up   Return in about 6 months (around 8/18/2022), or Labs before next appointment.  Patient was given instructions and counseling regarding her condition or for health maintenance advice. Please see specific information pulled into the AVS if appropriate.     Discharge instructions    Decrease amlodipine down to 5 mg  Daily  Discontinue HCTZ  After 5 days discontinue potassium  Outpatient BMP in 3 weeks  Send me your blood pressure readings in 1 week  Okay to resume amlodipine 10 mg if  need be  Push fluids you are at risk of falls orthostatic hypotension  Hold blood pressure medicine as needed    Left shoulder,  May be some rotator cuff tendinitis could be chronic tear etc. Bursitis  Simply start with range of motion exercises most days of the week, shoulder exercises  Try this for 6 to 8 weeks if not improving, send me a message I will send you to physical therapy and her shoulder specialist    Patient refers conservative treatment she has no bony left shoulder pain  But she will need x-rays if not improving in a few weeks discussed with patient offered x-ray sedation first await should she have any increase or worsening pain urgent recheck if not improving I referred her to the shoulder specialist Dr. Rodriges.  Discussed immunizations, blood pressure,  Will need to titrate dose down push fluids falls precaution

## 2022-03-04 RX ORDER — TRIAMCINOLONE ACETONIDE 1 MG/G
CREAM TOPICAL 2 TIMES DAILY
Qty: 80 G | Refills: 0 | Status: SHIPPED | OUTPATIENT
Start: 2022-03-04

## 2022-03-04 NOTE — TELEPHONE ENCOUNTER
Rx Refill Note  Requested Prescriptions     Pending Prescriptions Disp Refills   • triamcinolone (KENALOG) 0.1 % cream [Pharmacy Med Name: TRIAMCINOLONE 0.1% CREAM] 45 g      Sig: APPLY TO AFFECTED AREA(S) TOPICALLY TWO TIMES A DAY AS DIRECTED. USE SPARINGLY. AVOID FACE      Last office visit with prescribing clinician: 2/18/2022      Next office visit with prescribing clinician: 8/19/2022            Manish Wray Rep  03/04/22, 09:10 EST

## 2022-03-10 ENCOUNTER — TELEPHONE (OUTPATIENT)
Dept: ONCOLOGY | Facility: CLINIC | Age: 52
End: 2022-03-10

## 2022-03-10 NOTE — TELEPHONE ENCOUNTER
Caller: Ina Akers    Relationship: Self    Best call back number: [phone]  CAN CALL BACK ANYTIME AND MAY LEAVE VM IF NEEDED.    What orders are you requesting (i.e. lab or imaging): LABS    In what timeframe would the patient need to come in: 03/11/2022    [facility]    Additional notes: PATIENT WAS REFERRED BY MD LORD TO SEE A SPECIALIST WHO HAS GIVEN HER ORDERS TO HAVE LABS DONE.  PATIENT IS FAXING OVER ORDERS PRIOR TO DOING A WALK IN FOR LABS ON 03/11/2022.  ONCE ADDRESSED PLEASE CONTACT PATIENT BACK AND NOTIFY THAT WE HAVE RECEIVED THE ORDERS FOR LABS.        DJC/ANTONIO

## 2022-03-10 NOTE — TELEPHONE ENCOUNTER
Returned call to patient who needs a CBC done and is going to take to the lab at [place] to have done.  She stated that she has emailed her lab order to Nalini Varghese.  I explained to her to take her hard copy order with her to the lab.  She v/u.

## 2022-03-11 ENCOUNTER — LAB (OUTPATIENT)
Dept: LAB | Facility: HOSPITAL | Age: 52
End: 2022-03-11

## 2022-03-11 ENCOUNTER — TRANSCRIBE ORDERS (OUTPATIENT)
Dept: LAB | Facility: HOSPITAL | Age: 52
End: 2022-03-11

## 2022-03-11 DIAGNOSIS — C73 FOLLICULAR CARCINOMA: Primary | ICD-10-CM

## 2022-03-11 DIAGNOSIS — C73 FOLLICULAR CARCINOMA: ICD-10-CM

## 2022-03-11 LAB
BASOPHILS # BLD AUTO: 0.03 10*3/MM3 (ref 0–0.2)
BASOPHILS NFR BLD AUTO: 0.5 % (ref 0–1.5)
DEPRECATED RDW RBC AUTO: 48.5 FL (ref 37–54)
EOSINOPHIL # BLD AUTO: 0.41 10*3/MM3 (ref 0–0.4)
EOSINOPHIL NFR BLD AUTO: 6.5 % (ref 0.3–6.2)
ERYTHROCYTE [DISTWIDTH] IN BLOOD BY AUTOMATED COUNT: 15.8 % (ref 12.3–15.4)
HCT VFR BLD AUTO: 32.5 % (ref 34–46.6)
HGB BLD-MCNC: 9.9 G/DL (ref 12–15.9)
IMM GRANULOCYTES # BLD AUTO: 0.02 10*3/MM3 (ref 0–0.05)
IMM GRANULOCYTES NFR BLD AUTO: 0.3 % (ref 0–0.5)
LYMPHOCYTES # BLD AUTO: 1.94 10*3/MM3 (ref 0.7–3.1)
LYMPHOCYTES NFR BLD AUTO: 30.9 % (ref 19.6–45.3)
MCH RBC QN AUTO: 25.8 PG (ref 26.6–33)
MCHC RBC AUTO-ENTMCNC: 30.5 G/DL (ref 31.5–35.7)
MCV RBC AUTO: 84.9 FL (ref 79–97)
MONOCYTES # BLD AUTO: 0.64 10*3/MM3 (ref 0.1–0.9)
MONOCYTES NFR BLD AUTO: 10.2 % (ref 5–12)
NEUTROPHILS NFR BLD AUTO: 3.24 10*3/MM3 (ref 1.7–7)
NEUTROPHILS NFR BLD AUTO: 51.6 % (ref 42.7–76)
NRBC BLD AUTO-RTO: 0 /100 WBC (ref 0–0.2)
PLATELET # BLD AUTO: 344 10*3/MM3 (ref 140–450)
PMV BLD AUTO: 9.1 FL (ref 6–12)
RBC # BLD AUTO: 3.83 10*6/MM3 (ref 3.77–5.28)
WBC NRBC COR # BLD: 6.28 10*3/MM3 (ref 3.4–10.8)

## 2022-03-11 PROCEDURE — 36415 COLL VENOUS BLD VENIPUNCTURE: CPT

## 2022-03-11 PROCEDURE — 85025 COMPLETE CBC W/AUTO DIFF WBC: CPT

## 2022-03-16 ENCOUNTER — TRANSCRIBE ORDERS (OUTPATIENT)
Dept: ADMINISTRATIVE | Facility: HOSPITAL | Age: 52
End: 2022-03-16

## 2022-03-16 DIAGNOSIS — Z01.818 OTHER SPECIFIED PRE-OPERATIVE EXAMINATION: Primary | ICD-10-CM

## 2022-03-17 ENCOUNTER — TELEPHONE (OUTPATIENT)
Dept: GASTROENTEROLOGY | Facility: CLINIC | Age: 52
End: 2022-03-17

## 2022-03-17 DIAGNOSIS — Z01.818 PRE-OP TESTING: Primary | ICD-10-CM

## 2022-03-21 DIAGNOSIS — E11.9 CONTROLLED TYPE 2 DIABETES MELLITUS WITHOUT COMPLICATION, WITHOUT LONG-TERM CURRENT USE OF INSULIN: ICD-10-CM

## 2022-03-21 DIAGNOSIS — I10 ESSENTIAL HYPERTENSION: ICD-10-CM

## 2022-03-21 DIAGNOSIS — E55.9 VITAMIN D DEFICIENCY: ICD-10-CM

## 2022-03-21 DIAGNOSIS — E78.5 HYPERLIPIDEMIA, UNSPECIFIED HYPERLIPIDEMIA TYPE: ICD-10-CM

## 2022-03-22 RX ORDER — LANSOPRAZOLE 30 MG/1
CAPSULE, DELAYED RELEASE ORAL
Qty: 90 CAPSULE | Refills: 2 | Status: SHIPPED | OUTPATIENT
Start: 2022-03-22

## 2022-03-23 ENCOUNTER — LAB (OUTPATIENT)
Dept: LAB | Facility: HOSPITAL | Age: 52
End: 2022-03-23

## 2022-03-23 DIAGNOSIS — Z01.818 OTHER SPECIFIED PRE-OPERATIVE EXAMINATION: ICD-10-CM

## 2022-03-23 LAB — SARS-COV-2 ORF1AB RESP QL NAA+PROBE: NOT DETECTED

## 2022-03-23 PROCEDURE — U0004 COV-19 TEST NON-CDC HGH THRU: HCPCS

## 2022-03-23 PROCEDURE — C9803 HOPD COVID-19 SPEC COLLECT: HCPCS

## 2022-03-24 RX ORDER — AMLODIPINE BESYLATE 10 MG/1
10 TABLET ORAL EVERY MORNING
COMMUNITY
End: 2023-03-02

## 2022-03-25 ENCOUNTER — ANESTHESIA (OUTPATIENT)
Dept: GASTROENTEROLOGY | Facility: HOSPITAL | Age: 52
End: 2022-03-25

## 2022-03-25 ENCOUNTER — HOSPITAL ENCOUNTER (OUTPATIENT)
Facility: HOSPITAL | Age: 52
Setting detail: HOSPITAL OUTPATIENT SURGERY
Discharge: HOME | End: 2022-03-25
Attending: INTERNAL MEDICINE | Admitting: INTERNAL MEDICINE

## 2022-03-25 ENCOUNTER — TELEPHONE (OUTPATIENT)
Dept: GASTROENTEROLOGY | Facility: CLINIC | Age: 52
End: 2022-03-25

## 2022-03-25 ENCOUNTER — ANESTHESIA EVENT (OUTPATIENT)
Dept: GASTROENTEROLOGY | Facility: HOSPITAL | Age: 52
End: 2022-03-25

## 2022-03-25 VITALS
SYSTOLIC BLOOD PRESSURE: 134 MMHG | OXYGEN SATURATION: 92 % | HEIGHT: 60 IN | HEART RATE: 79 BPM | WEIGHT: 179 LBS | RESPIRATION RATE: 16 BRPM | BODY MASS INDEX: 35.14 KG/M2 | DIASTOLIC BLOOD PRESSURE: 97 MMHG

## 2022-03-25 DIAGNOSIS — K21.9 GASTROESOPHAGEAL REFLUX DISEASE, UNSPECIFIED WHETHER ESOPHAGITIS PRESENT: ICD-10-CM

## 2022-03-25 DIAGNOSIS — D50.8 OTHER IRON DEFICIENCY ANEMIA: ICD-10-CM

## 2022-03-25 DIAGNOSIS — Z12.11 ENCOUNTER FOR SCREENING FOR MALIGNANT NEOPLASM OF COLON: ICD-10-CM

## 2022-03-25 LAB — GLUCOSE BLDC GLUCOMTR-MCNC: 82 MG/DL (ref 70–130)

## 2022-03-25 PROCEDURE — 25010000002 PROPOFOL 10 MG/ML EMULSION: Performed by: ANESTHESIOLOGY

## 2022-03-25 PROCEDURE — S0260 H&P FOR SURGERY: HCPCS | Performed by: INTERNAL MEDICINE

## 2022-03-25 PROCEDURE — 88305 TISSUE EXAM BY PATHOLOGIST: CPT | Performed by: INTERNAL MEDICINE

## 2022-03-25 PROCEDURE — 43239 EGD BIOPSY SINGLE/MULTIPLE: CPT | Performed by: INTERNAL MEDICINE

## 2022-03-25 PROCEDURE — 82962 GLUCOSE BLOOD TEST: CPT

## 2022-03-25 RX ORDER — SODIUM CHLORIDE, SODIUM LACTATE, POTASSIUM CHLORIDE, CALCIUM CHLORIDE 600; 310; 30; 20 MG/100ML; MG/100ML; MG/100ML; MG/100ML
1000 INJECTION, SOLUTION INTRAVENOUS CONTINUOUS
Status: DISCONTINUED | OUTPATIENT
Start: 2022-03-25 | End: 2022-03-25 | Stop reason: HOSPADM

## 2022-03-25 RX ORDER — LIDOCAINE HYDROCHLORIDE 20 MG/ML
INJECTION, SOLUTION INFILTRATION; PERINEURAL AS NEEDED
Status: DISCONTINUED | OUTPATIENT
Start: 2022-03-25 | End: 2022-03-25 | Stop reason: SURG

## 2022-03-25 RX ORDER — PROPOFOL 10 MG/ML
VIAL (ML) INTRAVENOUS CONTINUOUS PRN
Status: DISCONTINUED | OUTPATIENT
Start: 2022-03-25 | End: 2022-03-25 | Stop reason: SURG

## 2022-03-25 RX ORDER — SODIUM CHLORIDE 0.9 % (FLUSH) 0.9 %
10 SYRINGE (ML) INJECTION AS NEEDED
Status: DISCONTINUED | OUTPATIENT
Start: 2022-03-25 | End: 2022-03-25 | Stop reason: HOSPADM

## 2022-03-25 RX ORDER — SODIUM CHLORIDE, SODIUM LACTATE, POTASSIUM CHLORIDE, CALCIUM CHLORIDE 600; 310; 30; 20 MG/100ML; MG/100ML; MG/100ML; MG/100ML
30 INJECTION, SOLUTION INTRAVENOUS CONTINUOUS
Status: DISCONTINUED | OUTPATIENT
Start: 2022-03-25 | End: 2022-03-25 | Stop reason: HOSPADM

## 2022-03-25 RX ORDER — PROPOFOL 10 MG/ML
VIAL (ML) INTRAVENOUS AS NEEDED
Status: DISCONTINUED | OUTPATIENT
Start: 2022-03-25 | End: 2022-03-25 | Stop reason: SURG

## 2022-03-25 RX ORDER — LIDOCAINE HYDROCHLORIDE 10 MG/ML
0.5 INJECTION, SOLUTION INFILTRATION; PERINEURAL ONCE AS NEEDED
Status: DISCONTINUED | OUTPATIENT
Start: 2022-03-25 | End: 2022-03-25 | Stop reason: HOSPADM

## 2022-03-25 RX ADMIN — LIDOCAINE HYDROCHLORIDE 60 MG: 20 INJECTION, SOLUTION INFILTRATION; PERINEURAL at 12:56

## 2022-03-25 RX ADMIN — Medication 200 MCG/KG/MIN: at 12:56

## 2022-03-25 RX ADMIN — PROPOFOL 150 MG: 10 INJECTION, EMULSION INTRAVENOUS at 12:56

## 2022-03-25 RX ADMIN — SODIUM CHLORIDE, POTASSIUM CHLORIDE, SODIUM LACTATE AND CALCIUM CHLORIDE 30 ML/HR: 600; 310; 30; 20 INJECTION, SOLUTION INTRAVENOUS at 12:30

## 2022-03-25 NOTE — BRIEF OP NOTE
ESOPHAGOGASTRODUODENOSCOPY  Progress Note    Ina Akers  3/25/2022    Pre-op Diagnosis:   Other iron deficiency anemia [D50.8]  Gastroesophageal reflux disease, unspecified whether esophagitis present [K21.9]       Post-Op Diagnosis Codes:     * Other iron deficiency anemia [D50.8]     * Gastroesophageal reflux disease, unspecified whether esophagitis present [K21.9]     * Hiatal hernia [K44.9]    Procedure/CPT® Codes:        Procedure(s):  ESOPHAGOGASTRODUODENOSCOPY WITH COLD BIOPSIES    Surgeon(s):  Jefferson Newman MD    Anesthesia: Monitored Anesthesia Care    Staff:   Endo Technician: Polina Soto  Endo Nurse: Emma Campos RN         Estimated Blood Loss: minimal    Urine Voided: * No values recorded between 3/25/2022 12:50 PM and 3/25/2022  1:05 PM *    Specimens:                Specimens     ID Source Type Tests Collected By Collected At Frozen?    A Small Intestine, Duodenum Tissue · TISSUE PATHOLOGY EXAM   Jefferson Newman MD 3/25/22 1301     Description: DUODENUM BIOPSIES R/O CELIAC                Drains: * No LDAs found *    Findings: EGD with biopsy revealed normal mucosa throughout the esophagus stomach and duodenum.  Small hiatal hernia was identified and biopsies were obtained of the duodenum to rule out celiac sprue.    Complications: None          Jefferson Newman MD     Date: 3/25/2022  Time: 13:06 EDT

## 2022-03-25 NOTE — DISCHARGE INSTRUCTIONS
For the next 24 hours patient needs to be with a responsible adult.    For 24 hours DO NOT drive, operate machinery, appliances, drink alcohol, make important decisions or sign legal documents.    Start with a light or bland diet if you are feeling sick to your stomach otherwise advance to regular diet as tolerated.    Follow recommendations on procedure report if provided by your doctor.    Call Dr Newman for problems [phone]    Problems may include but not limited to: large amounts of bleeding, trouble breathing, repeated vomiting, severe unrelieved pain, fever or chills.

## 2022-03-25 NOTE — H&P
[facility]  Pre Procedure History & Physical    Chief Complaint:   Iron deficiency anemia    Subjective     HPI:   Patient 51-year-old female with history of hypertension, hyperlipidemia, diabetes and breast cancer history presenting with iron deficiency anemia.  Patient status post colonoscopy in  that was negative here for EGD.    Past Medical History:   Past Medical History:   Diagnosis Date   • Anemia     History of recurent iron deficiency   • Compression fracture of fourth lumbar vertebra (HCC)     RECENT KYPHOPLASTY 2020   • COVID-19 virus infection 2021   • Diabetes mellitus (HCC)    • Disease of thyroid gland    • GERD (gastroesophageal reflux disease)    • History of breast cancer     LEFT BREAST MASTECTOMY, CHEMO AND RADIATION    • History of cancer chemotherapy    • History of gastritis    • History of radiation therapy    • History of thyroid nodule     cancer    • Hyperlipidemia    • Hypertension    • Metastatic bone cancer (HCC)     RIB, HIP - AWAITING FINDINGS FOR PRIMARY SOURCE (THYROID VS BREAST)   • PONV (postoperative nausea and vomiting)        Past Surgical History:  Past Surgical History:   Procedure Laterality Date   •  SECTION     • COLONOSCOPY N/A 2021    Procedure: COLONOSCOPY TO CECUM AND TI;  Surgeon: Jefferson Newman MD;  Location: [facility] ENDOSCOPY;  Service: Gastroenterology;  Laterality: N/A;  PRE:SCREENING  POST:DIVERTICULOSIS AND HEMORRHOIDS   • FEMUR IM NAILING/RODDING Right 10/23/2020    Procedure: HIP INTERTROCHCHANTERIC NAILING;  Surgeon: Gretchen Connell MD;  Location: [facility] OR;  Service: Orthopedics;  Laterality: Right;   • KYPHOPLASTY N/A 2020    Procedure: Lumbar 4  osteo cool radiofrequency ablation and KYPHOPLASTY;  Surgeon: Vik Rios MD;  Location: [facility] OR ;  Service: Neurosurgery;  Laterality: N/A;   • MASTECTOMY, PARTIAL Left    • RIB BIOPSY  10/2020   •  THYROIDECTOMY Left 2020   • THYROIDECTOMY, PARTIAL Right 2018   • VENOUS ACCESS DEVICE (PORT) INSERTION AND REMOVAL         Family History:  Family History   Problem Relation Age of Onset   • Hypertension Other    • Diabetes Other    • No Known Problems Mother    • No Known Problems Father    • Malig Hyperthermia Neg Hx        Social History:   reports that she has never smoked. She has never used smokeless tobacco. She reports current alcohol use. She reports that she does not use drugs.    Medications:   Medications Prior to Admission   Medication Sig Dispense Refill Last Dose   • acetaminophen (TYLENOL) 500 MG tablet Take 1,000 mg by mouth Every 6 (Six) Hours As Needed for Mild Pain .      • amLODIPine (NORVASC) 10 MG tablet Take 10 mg by mouth Every Morning.   3/25/2022 at Unknown time   • atorvastatin (LIPITOR) 10 MG tablet Take 1 tablet by mouth Daily. 90 tablet 3 3/24/2022 at Unknown time   • Blood Glucose Monitoring Suppl w/Device kit Check blood sugar twice a day 1 each 0 3/24/2022 at Unknown time   • cholecalciferol (VITAMIN D3) 25 MCG (1000 UT) tablet Take 1,000 Units by mouth Daily.   Past Week at Unknown time   • Docusate Calcium (STOOL SOFTENER PO) Take 1 tablet by mouth As Needed. As needed   Past Week at Unknown time   • famotidine (PEPCID) 20 MG tablet Take 20 mg by mouth Every Other Day.   3/24/2022 at Unknown time   • Glucose Blood (BLOOD GLUCOSE TEST) strip Check blood sugar twice a day. 100 each 1 3/24/2022 at Unknown time   • labetalol (NORMODYNE) 100 MG tablet Take 0.5 tablets by mouth 2 (Two) Times a Day. 180 tablet 1 3/25/2022 at Unknown time   • Lancets (ACCU-CHEK SOFT TOUCH) lancets Check blood sugar twice a day as needed 100 each 1 3/24/2022 at Unknown time   • lansoprazole (PREVACID) 30 MG capsule TAKE ONE CAPSULE BY MOUTH DAILY (Patient taking differently: Every Other Day.) 90 capsule 2 Past Week at Unknown time   • levothyroxine (SYNTHROID, LEVOTHROID) 112 MCG tablet Take 112 mcg by  "mouth Every Morning.   3/25/2022 at Unknown time   • losartan (COZAAR) 100 MG tablet Take 100 mg by mouth Daily.   3/24/2022 at Unknown time   • metFORMIN ER (GLUCOPHAGE-XR) 500 MG 24 hr tablet Take 2 tablets by mouth Daily With Dinner. 180 tablet 1 3/24/2022 at Unknown time   • promethazine (PHENERGAN) 12.5 MG tablet TAKE 1/2 TO 1 TABLET BY MOUTH EVERY 4 HOURS AS NEEDED FOR NAUSEA AND VOMITING MAY MAKE DROWSY 30 tablet 3 Past Week at Unknown time   • triamcinolone (KENALOG) 0.1 % cream Apply  topically to the appropriate area as directed 2 (Two) Times a Day. Sparingly shortest time possible avoid face 80 g 0 Past Week at Unknown time       Allergies:  Monoclonal antibodies, Hydrocodone, and Lenvatinib    ROS:    Pertinent items are noted in HPI     Objective     Blood pressure 134/78, pulse 64, resp. rate 16, height 152.4 cm (60\"), weight 81.2 kg (179 lb), SpO2 98 %, not currently breastfeeding.    Physical Exam   Constitutional: Pt is oriented to person, place, and time and well-developed, well-nourished, and in no distress.   Mouth/Throat: Oropharynx is clear and moist.   Neck: Normal range of motion.   Cardiovascular: Normal rate, regular rhythm and normal heart sounds.    Pulmonary/Chest: Effort normal and breath sounds normal.   Abdominal: Soft. Nontender  Skin: Skin is warm and dry.   Psychiatric: Mood, memory, affect and judgment normal.     Assessment/Plan     Diagnosis:  Iron deficiency anemia    Anticipated Surgical Procedure:  EGD    The risks, benefits, and alternatives of this procedure have been discussed with the patient or the responsible party- the patient understands and agrees to proceed.                                                          "

## 2022-03-25 NOTE — ANESTHESIA PREPROCEDURE EVALUATION
Anesthesia Evaluation     Patient summary reviewed and Nursing notes reviewed   history of anesthetic complications: PONV               Airway   Mallampati: II  TM distance: >3 FB  Neck ROM: full  No difficulty expected  Dental - normal exam     Pulmonary - normal exam   Cardiovascular - normal exam    (+) hypertension 2 medications or greater, hyperlipidemia,       Neuro/Psych  GI/Hepatic/Renal/Endo    (+) obesity,  GERD,  diabetes mellitus type 2, thyroid problem hypothyroidism    Musculoskeletal         ROS comment: Hx L4 comp fx  Abdominal   (+) obese,    Substance History      OB/GYN          Other      history of cancer      Other Comment: Hx breast CA/thyroid CA metastatic to bone                  Anesthesia Plan    ASA 3     MAC     intravenous induction     Anesthetic plan, all risks, benefits, and alternatives have been provided, discussed and informed consent has been obtained with: patient.        CODE STATUS:

## 2022-03-25 NOTE — ANESTHESIA POSTPROCEDURE EVALUATION
Patient: Ina Akers    Procedure Summary     Date: 03/25/22 Room / Location:  [facility]    Anesthesia Start: 1250 Anesthesia Stop: 1310    Procedure: ESOPHAGOGASTRODUODENOSCOPY WITH COLD BIOPSIES (N/A Esophagus) Diagnosis:       Other iron deficiency anemia      Gastroesophageal reflux disease, unspecified whether esophagitis present      Hiatal hernia      (Other iron deficiency anemia [D50.8])      (Gastroesophageal reflux disease, unspecified whether esophagitis present [K21.9])    Surgeons: Jefferson Newman MD Provider: Jakub Burton MD    Anesthesia Type: MAC ASA Status: 3          Anesthesia Type: MAC    Vitals  No vitals data found for the desired time range.          Post Anesthesia Care and Evaluation    Patient location during evaluation: PHASE II  Patient participation: complete - patient participated  Level of consciousness: awake and alert  Pain management: adequate  Airway patency: patent  Anesthetic complications: No anesthetic complications  PONV Status: none  Cardiovascular status: acceptable and hemodynamically stable  Respiratory status: acceptable, nonlabored ventilation and spontaneous ventilation  Hydration status: acceptable

## 2022-03-28 LAB
LAB AP CASE REPORT: NORMAL
PATH REPORT.FINAL DX SPEC: NORMAL
PATH REPORT.GROSS SPEC: NORMAL

## 2022-03-30 ENCOUNTER — INFUSION (OUTPATIENT)
Dept: ONCOLOGY | Facility: HOSPITAL | Age: 52
End: 2022-03-30

## 2022-03-30 ENCOUNTER — APPOINTMENT (OUTPATIENT)
Dept: OTHER | Facility: HOSPITAL | Age: 52
End: 2022-03-30

## 2022-03-30 ENCOUNTER — OFFICE VISIT (OUTPATIENT)
Dept: ONCOLOGY | Facility: CLINIC | Age: 52
End: 2022-03-30

## 2022-03-30 VITALS
DIASTOLIC BLOOD PRESSURE: 78 MMHG | HEART RATE: 71 BPM | TEMPERATURE: 97.2 F | BODY MASS INDEX: 33.61 KG/M2 | RESPIRATION RATE: 16 BRPM | WEIGHT: 178 LBS | HEIGHT: 61 IN | SYSTOLIC BLOOD PRESSURE: 129 MMHG | OXYGEN SATURATION: 92 %

## 2022-03-30 DIAGNOSIS — D50.9 IRON DEFICIENCY ANEMIA, UNSPECIFIED IRON DEFICIENCY ANEMIA TYPE: ICD-10-CM

## 2022-03-30 DIAGNOSIS — Z17.1 MALIGNANT NEOPLASM OF UPPER-OUTER QUADRANT OF LEFT BREAST IN FEMALE, ESTROGEN RECEPTOR NEGATIVE: ICD-10-CM

## 2022-03-30 DIAGNOSIS — E87.6 HYPOKALEMIA: ICD-10-CM

## 2022-03-30 DIAGNOSIS — C50.412 MALIGNANT NEOPLASM OF UPPER-OUTER QUADRANT OF LEFT BREAST IN FEMALE, ESTROGEN RECEPTOR NEGATIVE: ICD-10-CM

## 2022-03-30 DIAGNOSIS — IMO0001 METASTATIC BONE CANCER: Primary | ICD-10-CM

## 2022-03-30 DIAGNOSIS — C79.51 BONE METASTASIS: ICD-10-CM

## 2022-03-30 DIAGNOSIS — IMO0001 METASTATIC BONE CANCER: ICD-10-CM

## 2022-03-30 DIAGNOSIS — C73 THYROID CANCER: Primary | ICD-10-CM

## 2022-03-30 LAB
ALBUMIN SERPL-MCNC: 4.8 G/DL (ref 3.5–5.2)
ALBUMIN/GLOB SERPL: 1.5 G/DL
ALP SERPL-CCNC: 71 U/L (ref 39–117)
ALT SERPL W P-5'-P-CCNC: 16 U/L (ref 1–33)
ANION GAP SERPL CALCULATED.3IONS-SCNC: 9 MMOL/L (ref 5–15)
AST SERPL-CCNC: 18 U/L (ref 1–32)
BASOPHILS # BLD AUTO: 0.01 10*3/MM3 (ref 0–0.2)
BASOPHILS NFR BLD AUTO: 0.2 % (ref 0–1.5)
BILIRUB SERPL-MCNC: 0.4 MG/DL (ref 0–1.2)
BUN SERPL-MCNC: 12 MG/DL (ref 6–20)
BUN/CREAT SERPL: 14 (ref 7–25)
CALCIUM SPEC-SCNC: 10.2 MG/DL (ref 8.6–10.5)
CHLORIDE SERPL-SCNC: 104 MMOL/L (ref 98–107)
CO2 SERPL-SCNC: 27 MMOL/L (ref 22–29)
CREAT SERPL-MCNC: 0.86 MG/DL (ref 0.57–1)
DEPRECATED RDW RBC AUTO: 48.3 FL (ref 37–54)
EGFRCR SERPLBLD CKD-EPI 2021: 80.9 ML/MIN/1.73
EOSINOPHIL # BLD AUTO: 0.32 10*3/MM3 (ref 0–0.4)
EOSINOPHIL NFR BLD AUTO: 6.7 % (ref 0.3–6.2)
ERYTHROCYTE [DISTWIDTH] IN BLOOD BY AUTOMATED COUNT: 15.9 % (ref 12.3–15.4)
FERRITIN SERPL-MCNC: 117.8 NG/ML (ref 13–150)
GLOBULIN UR ELPH-MCNC: 3.3 GM/DL
GLUCOSE SERPL-MCNC: 95 MG/DL (ref 65–99)
HCT VFR BLD AUTO: 34 % (ref 34–46.6)
HGB BLD-MCNC: 10.4 G/DL (ref 12–15.9)
IMM GRANULOCYTES # BLD AUTO: 0.01 10*3/MM3 (ref 0–0.05)
IMM GRANULOCYTES NFR BLD AUTO: 0.2 % (ref 0–0.5)
IRON 24H UR-MRATE: 40 MCG/DL (ref 37–145)
IRON SATN MFR SERPL: 11 % (ref 20–50)
LYMPHOCYTES # BLD AUTO: 1.48 10*3/MM3 (ref 0.7–3.1)
LYMPHOCYTES NFR BLD AUTO: 31 % (ref 19.6–45.3)
MAGNESIUM SERPL-MCNC: 2.1 MG/DL (ref 1.6–2.6)
MCH RBC QN AUTO: 25.7 PG (ref 26.6–33)
MCHC RBC AUTO-ENTMCNC: 30.6 G/DL (ref 31.5–35.7)
MCV RBC AUTO: 84.2 FL (ref 79–97)
MONOCYTES # BLD AUTO: 0.45 10*3/MM3 (ref 0.1–0.9)
MONOCYTES NFR BLD AUTO: 9.4 % (ref 5–12)
NEUTROPHILS NFR BLD AUTO: 2.51 10*3/MM3 (ref 1.7–7)
NEUTROPHILS NFR BLD AUTO: 52.5 % (ref 42.7–76)
NRBC BLD AUTO-RTO: 0 /100 WBC (ref 0–0.2)
PHOSPHATE SERPL-MCNC: 3.1 MG/DL (ref 2.5–4.5)
PLATELET # BLD AUTO: 355 10*3/MM3 (ref 140–450)
PMV BLD AUTO: 9.9 FL (ref 6–12)
POTASSIUM SERPL-SCNC: 3.9 MMOL/L (ref 3.5–5.2)
PROT SERPL-MCNC: 8.1 G/DL (ref 6–8.5)
RBC # BLD AUTO: 4.04 10*6/MM3 (ref 3.77–5.28)
SODIUM SERPL-SCNC: 140 MMOL/L (ref 136–145)
TIBC SERPL-MCNC: 352 MCG/DL (ref 298–536)
TRANSFERRIN SERPL-MCNC: 236 MG/DL (ref 200–360)
WBC NRBC COR # BLD: 4.78 10*3/MM3 (ref 3.4–10.8)

## 2022-03-30 PROCEDURE — 83540 ASSAY OF IRON: CPT | Performed by: INTERNAL MEDICINE

## 2022-03-30 PROCEDURE — 84466 ASSAY OF TRANSFERRIN: CPT | Performed by: INTERNAL MEDICINE

## 2022-03-30 PROCEDURE — 85025 COMPLETE CBC W/AUTO DIFF WBC: CPT | Performed by: INTERNAL MEDICINE

## 2022-03-30 PROCEDURE — 96374 THER/PROPH/DIAG INJ IV PUSH: CPT

## 2022-03-30 PROCEDURE — 96365 THER/PROPH/DIAG IV INF INIT: CPT

## 2022-03-30 PROCEDURE — 25010000002 ZOLEDRONIC ACID 4 MG/100ML SOLUTION: Performed by: INTERNAL MEDICINE

## 2022-03-30 PROCEDURE — 80053 COMPREHEN METABOLIC PANEL: CPT | Performed by: INTERNAL MEDICINE

## 2022-03-30 PROCEDURE — 82728 ASSAY OF FERRITIN: CPT | Performed by: INTERNAL MEDICINE

## 2022-03-30 PROCEDURE — 99214 OFFICE O/P EST MOD 30 MIN: CPT | Performed by: INTERNAL MEDICINE

## 2022-03-30 PROCEDURE — 84100 ASSAY OF PHOSPHORUS: CPT | Performed by: INTERNAL MEDICINE

## 2022-03-30 PROCEDURE — 83735 ASSAY OF MAGNESIUM: CPT | Performed by: INTERNAL MEDICINE

## 2022-03-30 RX ORDER — SODIUM CHLORIDE 9 MG/ML
250 INJECTION, SOLUTION INTRAVENOUS ONCE
Status: CANCELLED | OUTPATIENT
Start: 2022-03-30

## 2022-03-30 RX ORDER — ZOLEDRONIC ACID 0.04 MG/ML
4 INJECTION, SOLUTION INTRAVENOUS ONCE
Status: CANCELLED | OUTPATIENT
Start: 2022-04-28

## 2022-03-30 RX ORDER — ZOLEDRONIC ACID 0.04 MG/ML
4 INJECTION, SOLUTION INTRAVENOUS ONCE
Status: COMPLETED | OUTPATIENT
Start: 2022-03-30 | End: 2022-03-30

## 2022-03-30 RX ORDER — ZOLEDRONIC ACID 0.04 MG/ML
4 INJECTION, SOLUTION INTRAVENOUS ONCE
Status: CANCELLED | OUTPATIENT
Start: 2022-05-26

## 2022-03-30 RX ORDER — ZOLEDRONIC ACID 0.04 MG/ML
4 INJECTION, SOLUTION INTRAVENOUS ONCE
Status: CANCELLED | OUTPATIENT
Start: 2022-03-30

## 2022-03-30 RX ORDER — SODIUM CHLORIDE 9 MG/ML
250 INJECTION, SOLUTION INTRAVENOUS ONCE
Status: CANCELLED | OUTPATIENT
Start: 2022-05-26

## 2022-03-30 RX ORDER — SODIUM CHLORIDE 9 MG/ML
250 INJECTION, SOLUTION INTRAVENOUS ONCE
Status: COMPLETED | OUTPATIENT
Start: 2022-03-30 | End: 2022-03-30

## 2022-03-30 RX ORDER — SODIUM CHLORIDE 9 MG/ML
250 INJECTION, SOLUTION INTRAVENOUS ONCE
Status: CANCELLED | OUTPATIENT
Start: 2022-04-28

## 2022-03-30 RX ADMIN — SODIUM CHLORIDE 250 ML: 9 INJECTION, SOLUTION INTRAVENOUS at 14:50

## 2022-03-30 RX ADMIN — ZOLEDRONIC ACID 4 MG: 0.04 INJECTION, SOLUTION INTRAVENOUS at 15:05

## 2022-03-30 NOTE — PROGRESS NOTES
Subjective     CHIEF COMPLAINT:      Chief Complaint   Patient presents with   • Follow-up     Discuss labs       HISTORY OF PRESENT ILLNESS:     Ina Akers is a 51 y.o. female patient who returns today for follow up on her metastatic thyroid cancer and prior history of breast cancer.  She has iron deficiency anemia and hemoglobin decreased to 9.1 on 2/24/2022.  She underwent EGD.  There was no evidence of blood loss.  Biopsies were obtained.    Patient has not started the oral iron. Dr. Huddleston at  previously asked her not to take the oral iron.    Patient reports intermittent pain in the ischial area after prolonged sitting or after intense activity.  No new areas of pain.      ROS:  Pertinent ROS is in the HPI.     Past medical, surgical, social and family history were reviewed.     MEDICATIONS:    Current Outpatient Medications:   •  acetaminophen (TYLENOL) 500 MG tablet, Take 1,000 mg by mouth Every 6 (Six) Hours As Needed for Mild Pain ., Disp: , Rfl:   •  amLODIPine (NORVASC) 10 MG tablet, Take 10 mg by mouth Every Morning., Disp: , Rfl:   •  atorvastatin (LIPITOR) 10 MG tablet, Take 1 tablet by mouth Daily., Disp: 90 tablet, Rfl: 3  •  Blood Glucose Monitoring Suppl w/Device kit, Check blood sugar twice a day, Disp: 1 each, Rfl: 0  •  cholecalciferol (VITAMIN D3) 25 MCG (1000 UT) tablet, Take 1,000 Units by mouth Daily., Disp: , Rfl:   •  Docusate Calcium (STOOL SOFTENER PO), Take 1 tablet by mouth As Needed. As needed, Disp: , Rfl:   •  famotidine (PEPCID) 20 MG tablet, Take 20 mg by mouth Every Other Day., Disp: , Rfl:   •  Glucose Blood (BLOOD GLUCOSE TEST) strip, Check blood sugar twice a day., Disp: 100 each, Rfl: 1  •  labetalol (NORMODYNE) 100 MG tablet, Take 0.5 tablets by mouth 2 (Two) Times a Day., Disp: 180 tablet, Rfl: 1  •  Lancets (ACCU-CHEK SOFT TOUCH) lancets, Check blood sugar twice a day as needed, Disp: 100 each, Rfl: 1  •  lansoprazole (PREVACID) 30 MG capsule, TAKE ONE CAPSULE BY  "MOUTH DAILY (Patient taking differently: Every Other Day.), Disp: 90 capsule, Rfl: 2  •  levothyroxine (SYNTHROID, LEVOTHROID) 112 MCG tablet, Take 112 mcg by mouth Every Morning., Disp: , Rfl:   •  losartan (COZAAR) 100 MG tablet, Take 100 mg by mouth Daily., Disp: , Rfl:   •  metFORMIN ER (GLUCOPHAGE-XR) 500 MG 24 hr tablet, Take 2 tablets by mouth Daily With Dinner., Disp: 180 tablet, Rfl: 1  •  promethazine (PHENERGAN) 12.5 MG tablet, TAKE 1/2 TO 1 TABLET BY MOUTH EVERY 4 HOURS AS NEEDED FOR NAUSEA AND VOMITING MAY MAKE DROWSY, Disp: 30 tablet, Rfl: 3  •  triamcinolone (KENALOG) 0.1 % cream, Apply  topically to the appropriate area as directed 2 (Two) Times a Day. Sparingly shortest time possible avoid face, Disp: 80 g, Rfl: 0    Objective   VITAL SIGNS:     Vitals:    03/30/22 1340   BP: 129/78   Pulse: 71   Resp: 16   Temp: 97.2 °F (36.2 °C)   TempSrc: Temporal   SpO2: 92%   Weight: 80.7 kg (178 lb)   Height: 154.9 cm (60.98\")   PainSc: 0-No pain     Body mass index is 33.65 kg/m².     Wt Readings from Last 5 Encounters:   03/30/22 80.7 kg (178 lb)   03/25/22 81.2 kg (179 lb)   02/24/22 82.9 kg (182 lb 12.8 oz)   02/18/22 81.8 kg (180 lb 4.8 oz)   01/27/22 81.6 kg (180 lb)     PHYSICAL EXAMINATION:   GENERAL: The patient appears in good general condition, not in acute distress.   SKIN: No ecchymosis.  EYES: No jaundice. No pallor.  LYMPHATICS: No cervical supraclavicular or axillary lymphadenopathy.  CHEST: Normal respiratory effort.  Lungs clear bilaterally.  No added sounds.  CVS: Normal S1-S2.  No murmurs.  ABDOMEN: Soft. No tenderness. No Hepatomegaly. No Splenomegaly. No masses.  EXTREMITIES: No edema.  No calf tenderness.  No tenderness in the right hip area.    DIAGNOSTIC DATA:     Results from last 7 days   Lab Units 03/30/22  1312   WBC 10*3/mm3 4.78   NEUTROS ABS 10*3/mm3 2.51   HEMOGLOBIN g/dL 10.4*   HEMATOCRIT % 34.0   PLATELETS 10*3/mm3 355     Results from last 7 days   Lab Units 03/30/22  1312 " 03/30/22  1311   SODIUM mmol/L 140  --    POTASSIUM mmol/L 3.9  --    CHLORIDE mmol/L 104  --    CO2 mmol/L 27.0  --    BUN mg/dL 12  --    CREATININE mg/dL 0.86  --    CALCIUM mg/dL 10.2  --    ALBUMIN g/dL 4.80  --    BILIRUBIN mg/dL 0.4  --    ALK PHOS U/L 71  --    ALT (SGPT) U/L 16  --    AST (SGOT) U/L 18  --    GLUCOSE mg/dL 95  --    MAGNESIUM mg/dL  --  2.1         Lab 03/30/22  1312   IRON 40   IRON SATURATION 11*   TIBC 352   TRANSFERRIN 236   FERRITIN 117.80      PET scan on 12/16/2021:  1.Compared to PET/CT from 9/9/2020, near complete metabolic response with solitary site of mildly hypermetabolic small well-defined lytic lesion with sclerotic margin within the left ischium.   2.Complete metabolic resolution of previously seen lytic lesion with soft tissue component involving the medial right ninth rib.   3.No new sites of metabolically active metastatic disease.   4.Postsurgical changes within the L4 vertebroplasty and left femoral prosthesis.     EGD on 3/25/2022:  EGD with biopsy revealed normal mucosa throughout the esophagus stomach and duodenum.  Small hiatal hernia was identified and biopsies were obtained of the duodenum to rule out celiac sprue.    Pathology exam from 3/25/2022:  Small Bowel, Duodenum, Biopsy: Benign small bowel mucosa with                A. No blunting or atrophy of the villi.               B. Focal herniation of Brunner's glands.               C. No increase in intraepithelial lymphocytes.     Comment: The changes are relatively mild but would suggest a nonspecific/peptic duodenitis although mild.  The features would not support a diagnosis of celiac disease.    Assessment/Plan   *Metastatic thyroid cancer diagnosed on 10/6/2020.  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  · She had right thyroid lobectomy on 12/17/2018 at [place].    · The specimen was sent for outside consultation and the final conclusion was that there was no evidence of  malignancy.  · MRI on 8/27/2020 revealed an L4 lesion resulting in pathologic compression fracture.  · Bone scan on 9/2/2020 revealed increased activity at L4 and left side of the skull.  · PET scan on 9/9/2020 revealed abnormal activity at L4, right femoral neck, lower sacrum, left ischium and right ninth medial aspect.  · PET scan 9/9/2020 showed no evidence of visceral metastasis.  · Patient had kyphoplasty on 9/17/2020.  Pathology was nondiagnostic.  · CT-guided biopsy of the lesion in the right posterior ninth rib on 10/6/2020 revealed a clear cell neoplasm.  It is positive for PAX 8 and TTF-1 favoring metastatic thyroid cancer.  · Patient was referred to [facility].  · Patient underwent completion thyroidectomy on 11/9/2020.  · There was no evidence of uptake of radioactive iodine on SPECT imaging on 12/18/2020.  · This was indicative of the tumor not going to respond to radioactive iodine therapy.  · Patient was started on Lenvima 20 mg daily on 12/24/2020.  · Lenvima was held on 2/6/2021 due to drug induced anemia and thrombocytopenia.  She was unable to start back on it due to prolonged cytopenias.  · CT scans on 3/23/2021 showed stable disease.  · CBC was stable on 6/8/2021.  CT scans showed stable disease.  Thyroglobulin was stable at 3.4.    · PET scan on 9/1/2021 revealed decrease in the activity in the metastatic lesions.  Starting back on active treatment (Nexavar) was not recommended.  · PET scan on 12/16/2021 showed complete response except for mild hypermetabolism in the left ischium.  · She is going to have follow-up CT scans.  At .    *Left breast cancer, stage IIB, triple negative.   · S/P lumpectomy  · She received adjuvant chemotherapy on the NSABP B34 with AC followed by Taxotere.  · She received post-lumpectomy radiation therapy.   · She was placed on tamoxifen for 5 years and it was completed in January 2007.   · Bilateral mammograms from 8/14/2020 were benign.   · Bilateral  mammograms on 8/19/2021 were benign.  · She performs monthly breast self-exam.  · I recommended obtaining her annual mammograms in late August 2022.    *Bone metastasis.  · Patient was given Zometa on 9/29/2020.  · Patient underwent right femur medullary nailing on 10/23/2020.  · Patient was restarted back on Zometa on 2/24/2022.  She tolerated the infusion better than the treatments before that (bone achiness and low-grade fever lasted less time than before).  · I recommended proceeding with Zometa today.    * Iron deficiency anemia.    · Patient was previously treated with ferrous sulfate 325 mg daily.  · Hemoglobin decreased to 10.6 on 9/3/2020.    · Hemoglobin decreased to 9.4 in January 2021.  However, her iron studies did not show evidence of deficiency at this point.  The anemia is therefore attributed to underlying malignancy and  Lenvima.  Lenvima was put on hold in February 2021.  · Patient's hemoglobin improved to 10.4 on 4/16/2021.  · Hemoglobin decreased to 10.4 on 6/23/2021.  Iron stores decreased with transferrin saturation decreasing to 12%.  · She was restarted back on ferrous sulfate 325 mg daily.  However, Dr. Huddleston asked her to stop the iron.  · Hemoglobin decreased to 9.1 on 2/24/2022.  · EGD on 3/25/2022 showed no evidence of blood loss.  · Pathology exam revealed no typical features of celiac disease.  · Hemoglobin is 10.4 today.  Iron stores remain low based on transferrin saturation of 11%.    *Hypokalemia.  · She is on potassium chloride 20 mEq daily.  · Potassium is normal today at 3.9.      PLAN:    1.  Proceed with Zometa today.  2.  I recommended oral iron therapy with ferrous sulfate 325 mg daily.  She will check with Dr. Huddleston regarding this.  I explained to her that iron is usually taken several hours after the thyroid medicine and this prevents the development of drug interaction.  3.  Return in 1 in 2 months for Zometa.  CBC CMP magnesium phosphorus will be obtained.  4.  I will see  him in follow-up in 3 months.  She will be scheduled to receive Zometa.  Ferritin iron panel will be obtained.  5.  We will schedule her for mammograms in late August 2022.      Tim Muñoz MD  03/30/22

## 2022-04-06 ENCOUNTER — TELEPHONE (OUTPATIENT)
Dept: ONCOLOGY | Facility: CLINIC | Age: 52
End: 2022-04-06

## 2022-04-06 NOTE — TELEPHONE ENCOUNTER
Caller: Ina Akers    Relationship: Self    Best call back number: [phone]    What is the best time to reach you: ASAP    Who are you requesting to speak with (clinical staff, provider,  specific staff member):     Do you know the name of the person who called:     What was the call regarding: PT WANTS TO R/S 04/27    Do you require a callback: YES

## 2022-04-11 ENCOUNTER — TELEPHONE (OUTPATIENT)
Dept: GASTROENTEROLOGY | Facility: CLINIC | Age: 52
End: 2022-04-11

## 2022-04-11 NOTE — TELEPHONE ENCOUNTER
----- Message from Jefferson Newman MD sent at 4/11/2022 10:38 AM EDT -----  Biopsies normal, no evidence of celiac.  Confirm patient is scheduled for small bowel capsule endoscopy

## 2022-04-21 RX ORDER — METFORMIN HYDROCHLORIDE 500 MG/1
TABLET, EXTENDED RELEASE ORAL
Qty: 180 TABLET | Refills: 1 | Status: SHIPPED | OUTPATIENT
Start: 2022-04-21 | End: 2022-10-19

## 2022-04-25 ENCOUNTER — TELEPHONE (OUTPATIENT)
Dept: GASTROENTEROLOGY | Facility: CLINIC | Age: 52
End: 2022-04-25

## 2022-04-25 NOTE — TELEPHONE ENCOUNTER
----- Message from Ina Akers sent at 4/22/2022  4:51 PM EDT -----  Regarding: Upcoming Swallow capsule on 4/27/22  I tried calling the office today with no answer. Please have someone call me concerning paperwork sent to me  to be brought back to the office on day of visit.    Thanks,  Ina Akers  [phone]

## 2022-04-26 ENCOUNTER — TELEPHONE (OUTPATIENT)
Dept: GASTROENTEROLOGY | Facility: CLINIC | Age: 52
End: 2022-04-26

## 2022-04-26 NOTE — TELEPHONE ENCOUNTER
[facility].   [FreeTextEntry1] : Thank you for referring him for cardiovascular evaluation prior to his planned lithotripsy and cystoscopy.\par He is a 46-year-old with diabetes requiring insulin therapy who has been suffering from renal colic and has a significantly large enough stone to require surgical intervention.\par He was recently treated for a left second toe wound.\par He reports feeling well prior to this and wasn't able to exercise including aerobic activity for 30-40 minutes 3 times a week without any chest pain or shortness of breath.\par He reports that his hemoglobin A1c is below 6 and his lipid profile was "okay"\par He has no known history of coronary artery disease, hypertension, hypercholesterolemia or smoking.

## 2022-04-26 NOTE — TELEPHONE ENCOUNTER
----- Message from Ina Akers sent at 4/25/2022  3:09 PM EDT -----  Regarding: Upcoming Swallow capsule on 4/27/22  Ajith Mendoza thanks for your response. I am not going to be able to keep appointment for Wednesday April 27th. I will consult my oncologist and schedule later this year if oncology insists. Thanks so much!!    Ina Akers  [phone]

## 2022-04-28 ENCOUNTER — APPOINTMENT (OUTPATIENT)
Dept: ONCOLOGY | Facility: HOSPITAL | Age: 52
End: 2022-04-28

## 2022-04-28 ENCOUNTER — INFUSION (OUTPATIENT)
Dept: ONCOLOGY | Facility: HOSPITAL | Age: 52
End: 2022-04-28

## 2022-04-28 VITALS
BODY MASS INDEX: 33.61 KG/M2 | SYSTOLIC BLOOD PRESSURE: 105 MMHG | OXYGEN SATURATION: 99 % | RESPIRATION RATE: 18 BRPM | TEMPERATURE: 96.6 F | DIASTOLIC BLOOD PRESSURE: 70 MMHG | WEIGHT: 178 LBS | HEIGHT: 61 IN | HEART RATE: 65 BPM

## 2022-04-28 DIAGNOSIS — IMO0001 METASTATIC BONE CANCER: Primary | ICD-10-CM

## 2022-04-28 DIAGNOSIS — C50.412 MALIGNANT NEOPLASM OF UPPER-OUTER QUADRANT OF LEFT BREAST IN FEMALE, ESTROGEN RECEPTOR NEGATIVE: ICD-10-CM

## 2022-04-28 DIAGNOSIS — Z17.1 MALIGNANT NEOPLASM OF UPPER-OUTER QUADRANT OF LEFT BREAST IN FEMALE, ESTROGEN RECEPTOR NEGATIVE: ICD-10-CM

## 2022-04-28 LAB
ALBUMIN SERPL-MCNC: 4.3 G/DL (ref 3.5–5.2)
ALBUMIN/GLOB SERPL: 1.4 G/DL
ALP SERPL-CCNC: 65 U/L (ref 39–117)
ALT SERPL W P-5'-P-CCNC: 11 U/L (ref 1–33)
ANION GAP SERPL CALCULATED.3IONS-SCNC: 8.1 MMOL/L (ref 5–15)
AST SERPL-CCNC: 12 U/L (ref 1–32)
BASOPHILS # BLD AUTO: 0.02 10*3/MM3 (ref 0–0.2)
BASOPHILS NFR BLD AUTO: 0.4 % (ref 0–1.5)
BILIRUB SERPL-MCNC: 0.4 MG/DL (ref 0–1.2)
BUN SERPL-MCNC: 14 MG/DL (ref 6–20)
BUN/CREAT SERPL: 17.9 (ref 7–25)
CALCIUM SPEC-SCNC: 9.4 MG/DL (ref 8.6–10.5)
CHLORIDE SERPL-SCNC: 104 MMOL/L (ref 98–107)
CO2 SERPL-SCNC: 25.9 MMOL/L (ref 22–29)
CREAT SERPL-MCNC: 0.78 MG/DL (ref 0.57–1)
DEPRECATED RDW RBC AUTO: 46.8 FL (ref 37–54)
EGFRCR SERPLBLD CKD-EPI 2021: 91 ML/MIN/1.73
EOSINOPHIL # BLD AUTO: 0.27 10*3/MM3 (ref 0–0.4)
EOSINOPHIL NFR BLD AUTO: 5.2 % (ref 0.3–6.2)
ERYTHROCYTE [DISTWIDTH] IN BLOOD BY AUTOMATED COUNT: 15.5 % (ref 12.3–15.4)
GLOBULIN UR ELPH-MCNC: 3.1 GM/DL
GLUCOSE SERPL-MCNC: 115 MG/DL (ref 65–99)
HCT VFR BLD AUTO: 30.7 % (ref 34–46.6)
HGB BLD-MCNC: 9.6 G/DL (ref 12–15.9)
IMM GRANULOCYTES # BLD AUTO: 0.03 10*3/MM3 (ref 0–0.05)
IMM GRANULOCYTES NFR BLD AUTO: 0.6 % (ref 0–0.5)
LYMPHOCYTES # BLD AUTO: 1.65 10*3/MM3 (ref 0.7–3.1)
LYMPHOCYTES NFR BLD AUTO: 31.7 % (ref 19.6–45.3)
MAGNESIUM SERPL-MCNC: 1.8 MG/DL (ref 1.6–2.6)
MCH RBC QN AUTO: 25.9 PG (ref 26.6–33)
MCHC RBC AUTO-ENTMCNC: 31.3 G/DL (ref 31.5–35.7)
MCV RBC AUTO: 82.7 FL (ref 79–97)
MONOCYTES # BLD AUTO: 0.42 10*3/MM3 (ref 0.1–0.9)
MONOCYTES NFR BLD AUTO: 8.1 % (ref 5–12)
NEUTROPHILS NFR BLD AUTO: 2.82 10*3/MM3 (ref 1.7–7)
NEUTROPHILS NFR BLD AUTO: 54 % (ref 42.7–76)
NRBC BLD AUTO-RTO: 0 /100 WBC (ref 0–0.2)
PHOSPHATE SERPL-MCNC: 3.2 MG/DL (ref 2.5–4.5)
PLATELET # BLD AUTO: 304 10*3/MM3 (ref 140–450)
PMV BLD AUTO: 9.4 FL (ref 6–12)
POTASSIUM SERPL-SCNC: 3.4 MMOL/L (ref 3.5–5.2)
PROT SERPL-MCNC: 7.4 G/DL (ref 6–8.5)
RBC # BLD AUTO: 3.71 10*6/MM3 (ref 3.77–5.28)
SODIUM SERPL-SCNC: 138 MMOL/L (ref 136–145)
WBC NRBC COR # BLD: 5.21 10*3/MM3 (ref 3.4–10.8)

## 2022-04-28 PROCEDURE — 84100 ASSAY OF PHOSPHORUS: CPT | Performed by: INTERNAL MEDICINE

## 2022-04-28 PROCEDURE — 96374 THER/PROPH/DIAG INJ IV PUSH: CPT

## 2022-04-28 PROCEDURE — 25010000002 ZOLEDRONIC ACID 4 MG/100ML SOLUTION: Performed by: INTERNAL MEDICINE

## 2022-04-28 PROCEDURE — 85025 COMPLETE CBC W/AUTO DIFF WBC: CPT | Performed by: INTERNAL MEDICINE

## 2022-04-28 PROCEDURE — 83735 ASSAY OF MAGNESIUM: CPT | Performed by: INTERNAL MEDICINE

## 2022-04-28 PROCEDURE — 80053 COMPREHEN METABOLIC PANEL: CPT | Performed by: INTERNAL MEDICINE

## 2022-04-28 RX ORDER — ZOLEDRONIC ACID 0.04 MG/ML
4 INJECTION, SOLUTION INTRAVENOUS ONCE
Status: COMPLETED | OUTPATIENT
Start: 2022-04-28 | End: 2022-04-28

## 2022-04-28 RX ORDER — SODIUM CHLORIDE 9 MG/ML
250 INJECTION, SOLUTION INTRAVENOUS ONCE
Status: COMPLETED | OUTPATIENT
Start: 2022-04-28 | End: 2022-04-28

## 2022-04-28 RX ADMIN — SODIUM CHLORIDE 250 ML: 9 INJECTION, SOLUTION INTRAVENOUS at 14:43

## 2022-04-28 RX ADMIN — ZOLEDRONIC ACID 4 MG: 0.04 INJECTION, SOLUTION INTRAVENOUS at 14:41

## 2022-05-26 ENCOUNTER — INFUSION (OUTPATIENT)
Dept: ONCOLOGY | Facility: HOSPITAL | Age: 52
End: 2022-05-26

## 2022-05-26 ENCOUNTER — APPOINTMENT (OUTPATIENT)
Dept: ONCOLOGY | Facility: HOSPITAL | Age: 52
End: 2022-05-26

## 2022-05-26 VITALS
HEART RATE: 72 BPM | WEIGHT: 183.2 LBS | TEMPERATURE: 96.4 F | HEIGHT: 61 IN | SYSTOLIC BLOOD PRESSURE: 123 MMHG | OXYGEN SATURATION: 100 % | BODY MASS INDEX: 34.59 KG/M2 | RESPIRATION RATE: 16 BRPM | DIASTOLIC BLOOD PRESSURE: 75 MMHG

## 2022-05-26 DIAGNOSIS — IMO0001 METASTATIC BONE CANCER: Primary | ICD-10-CM

## 2022-05-26 DIAGNOSIS — C50.412 MALIGNANT NEOPLASM OF UPPER-OUTER QUADRANT OF LEFT BREAST IN FEMALE, ESTROGEN RECEPTOR NEGATIVE: ICD-10-CM

## 2022-05-26 DIAGNOSIS — Z17.1 MALIGNANT NEOPLASM OF UPPER-OUTER QUADRANT OF LEFT BREAST IN FEMALE, ESTROGEN RECEPTOR NEGATIVE: ICD-10-CM

## 2022-05-26 LAB
ALBUMIN SERPL-MCNC: 4.3 G/DL (ref 3.5–5.2)
ALBUMIN/GLOB SERPL: 1.3 G/DL
ALP SERPL-CCNC: 69 U/L (ref 39–117)
ALT SERPL W P-5'-P-CCNC: 11 U/L (ref 1–33)
ANION GAP SERPL CALCULATED.3IONS-SCNC: 10.2 MMOL/L (ref 5–15)
AST SERPL-CCNC: 13 U/L (ref 1–32)
BASOPHILS # BLD AUTO: 0.02 10*3/MM3 (ref 0–0.2)
BASOPHILS NFR BLD AUTO: 0.4 % (ref 0–1.5)
BILIRUB SERPL-MCNC: 0.4 MG/DL (ref 0–1.2)
BUN SERPL-MCNC: 18 MG/DL (ref 6–20)
BUN/CREAT SERPL: 21.7 (ref 7–25)
CALCIUM SPEC-SCNC: 9.5 MG/DL (ref 8.6–10.5)
CHLORIDE SERPL-SCNC: 104 MMOL/L (ref 98–107)
CO2 SERPL-SCNC: 24.8 MMOL/L (ref 22–29)
CREAT SERPL-MCNC: 0.83 MG/DL (ref 0.57–1)
DEPRECATED RDW RBC AUTO: 49.5 FL (ref 37–54)
EGFRCR SERPLBLD CKD-EPI 2021: 84.4 ML/MIN/1.73
EOSINOPHIL # BLD AUTO: 0.32 10*3/MM3 (ref 0–0.4)
EOSINOPHIL NFR BLD AUTO: 5.9 % (ref 0.3–6.2)
ERYTHROCYTE [DISTWIDTH] IN BLOOD BY AUTOMATED COUNT: 15.7 % (ref 12.3–15.4)
GLOBULIN UR ELPH-MCNC: 3.2 GM/DL
GLUCOSE SERPL-MCNC: 111 MG/DL (ref 65–99)
HCT VFR BLD AUTO: 32.6 % (ref 34–46.6)
HGB BLD-MCNC: 9.9 G/DL (ref 12–15.9)
IMM GRANULOCYTES # BLD AUTO: 0.02 10*3/MM3 (ref 0–0.05)
IMM GRANULOCYTES NFR BLD AUTO: 0.4 % (ref 0–0.5)
LYMPHOCYTES # BLD AUTO: 1.5 10*3/MM3 (ref 0.7–3.1)
LYMPHOCYTES NFR BLD AUTO: 27.7 % (ref 19.6–45.3)
MAGNESIUM SERPL-MCNC: 2 MG/DL (ref 1.6–2.6)
MCH RBC QN AUTO: 26.1 PG (ref 26.6–33)
MCHC RBC AUTO-ENTMCNC: 30.4 G/DL (ref 31.5–35.7)
MCV RBC AUTO: 86 FL (ref 79–97)
MONOCYTES # BLD AUTO: 0.58 10*3/MM3 (ref 0.1–0.9)
MONOCYTES NFR BLD AUTO: 10.7 % (ref 5–12)
NEUTROPHILS NFR BLD AUTO: 2.98 10*3/MM3 (ref 1.7–7)
NEUTROPHILS NFR BLD AUTO: 54.9 % (ref 42.7–76)
NRBC BLD AUTO-RTO: 0 /100 WBC (ref 0–0.2)
PHOSPHATE SERPL-MCNC: 2.6 MG/DL (ref 2.5–4.5)
PLATELET # BLD AUTO: 324 10*3/MM3 (ref 140–450)
PMV BLD AUTO: 9.5 FL (ref 6–12)
POTASSIUM SERPL-SCNC: 3.5 MMOL/L (ref 3.5–5.2)
PROT SERPL-MCNC: 7.5 G/DL (ref 6–8.5)
RBC # BLD AUTO: 3.79 10*6/MM3 (ref 3.77–5.28)
SODIUM SERPL-SCNC: 139 MMOL/L (ref 136–145)
WBC NRBC COR # BLD: 5.42 10*3/MM3 (ref 3.4–10.8)

## 2022-05-26 PROCEDURE — 96374 THER/PROPH/DIAG INJ IV PUSH: CPT

## 2022-05-26 PROCEDURE — 84100 ASSAY OF PHOSPHORUS: CPT

## 2022-05-26 PROCEDURE — 36415 COLL VENOUS BLD VENIPUNCTURE: CPT

## 2022-05-26 PROCEDURE — 80053 COMPREHEN METABOLIC PANEL: CPT | Performed by: INTERNAL MEDICINE

## 2022-05-26 PROCEDURE — 25010000002 ZOLEDRONIC ACID 4 MG/100ML SOLUTION: Performed by: INTERNAL MEDICINE

## 2022-05-26 PROCEDURE — 83735 ASSAY OF MAGNESIUM: CPT

## 2022-05-26 PROCEDURE — 85025 COMPLETE CBC W/AUTO DIFF WBC: CPT | Performed by: INTERNAL MEDICINE

## 2022-05-26 RX ORDER — SODIUM CHLORIDE 9 MG/ML
250 INJECTION, SOLUTION INTRAVENOUS ONCE
Status: COMPLETED | OUTPATIENT
Start: 2022-05-26 | End: 2022-05-26

## 2022-05-26 RX ORDER — ZOLEDRONIC ACID 0.04 MG/ML
4 INJECTION, SOLUTION INTRAVENOUS ONCE
Status: COMPLETED | OUTPATIENT
Start: 2022-05-26 | End: 2022-05-26

## 2022-05-26 RX ADMIN — SODIUM CHLORIDE 250 ML: 9 INJECTION, SOLUTION INTRAVENOUS at 15:55

## 2022-05-26 RX ADMIN — ZOLEDRONIC ACID 4 MG: 0.04 INJECTION, SOLUTION INTRAVENOUS at 15:56

## 2022-06-01 ENCOUNTER — TELEPHONE (OUTPATIENT)
Dept: FAMILY MEDICINE CLINIC | Facility: CLINIC | Age: 52
End: 2022-06-01

## 2022-06-01 NOTE — TELEPHONE ENCOUNTER
Caller: Ina Akers    Relationship: Self    Best call back number:[phone]    What form or medical record are you requesting: FMLA FROM [facility]    Who is requesting this form or medical record from you: [facility]    How would you like to receive the form or medical records (pick-up, mail, fax):   If fax, what is the fax number: RETURN FAX NUMBER WILL BE ON PAPERWORK    Timeframe paperwork needed: WITHIN THE NEXT WEEK.     Additional notes: PATIENT STATES THAT [facility] TOLD HER THEY FAXED THIS FOR HER TO THE OFFICE ON 5/18/22. STATES IF ANYONE NEEDS TO CALL HER FOR MORE INFO THEY ARE MORE THAN WELCOME TO

## 2022-06-01 NOTE — TELEPHONE ENCOUNTER
Called spoke to pt letting her know that I did not receive fax from [employer] she states she will give them a call to refax paperwork over

## 2022-06-13 ENCOUNTER — TELEPHONE (OUTPATIENT)
Dept: NEUROSURGERY | Facility: CLINIC | Age: 52
End: 2022-06-13

## 2022-06-23 ENCOUNTER — INFUSION (OUTPATIENT)
Dept: ONCOLOGY | Facility: HOSPITAL | Age: 52
End: 2022-06-23

## 2022-06-23 ENCOUNTER — OFFICE VISIT (OUTPATIENT)
Dept: ONCOLOGY | Facility: CLINIC | Age: 52
End: 2022-06-23

## 2022-06-23 VITALS
OXYGEN SATURATION: 100 % | TEMPERATURE: 97.3 F | DIASTOLIC BLOOD PRESSURE: 89 MMHG | HEART RATE: 70 BPM | RESPIRATION RATE: 16 BRPM | BODY MASS INDEX: 34.15 KG/M2 | HEIGHT: 61 IN | SYSTOLIC BLOOD PRESSURE: 128 MMHG | WEIGHT: 180.9 LBS

## 2022-06-23 DIAGNOSIS — Z17.1 MALIGNANT NEOPLASM OF UPPER-OUTER QUADRANT OF LEFT BREAST IN FEMALE, ESTROGEN RECEPTOR NEGATIVE: ICD-10-CM

## 2022-06-23 DIAGNOSIS — Z85.3 HISTORY OF LEFT BREAST CANCER: ICD-10-CM

## 2022-06-23 DIAGNOSIS — C50.412 MALIGNANT NEOPLASM OF UPPER-OUTER QUADRANT OF LEFT BREAST IN FEMALE, ESTROGEN RECEPTOR NEGATIVE: ICD-10-CM

## 2022-06-23 DIAGNOSIS — D50.9 IRON DEFICIENCY ANEMIA, UNSPECIFIED IRON DEFICIENCY ANEMIA TYPE: ICD-10-CM

## 2022-06-23 DIAGNOSIS — C73 THYROID CANCER: Primary | ICD-10-CM

## 2022-06-23 DIAGNOSIS — IMO0001 METASTATIC BONE CANCER: Primary | ICD-10-CM

## 2022-06-23 DIAGNOSIS — IMO0001 METASTATIC BONE CANCER: ICD-10-CM

## 2022-06-23 DIAGNOSIS — E87.6 HYPOKALEMIA: ICD-10-CM

## 2022-06-23 LAB
ALBUMIN SERPL-MCNC: 4.3 G/DL (ref 3.5–5.2)
ALBUMIN/GLOB SERPL: 1.3 G/DL (ref 1.1–2.4)
ALP SERPL-CCNC: 66 U/L (ref 38–116)
ALT SERPL W P-5'-P-CCNC: 15 U/L (ref 0–33)
ANION GAP SERPL CALCULATED.3IONS-SCNC: 10.7 MMOL/L (ref 5–15)
AST SERPL-CCNC: 13 U/L (ref 0–32)
BASOPHILS # BLD AUTO: 0.03 10*3/MM3 (ref 0–0.2)
BASOPHILS NFR BLD AUTO: 0.5 % (ref 0–1.5)
BILIRUB SERPL-MCNC: 0.6 MG/DL (ref 0.2–1.2)
BUN SERPL-MCNC: 15 MG/DL (ref 6–20)
BUN/CREAT SERPL: 15.2 (ref 7.3–30)
CALCIUM SPEC-SCNC: 9.5 MG/DL (ref 8.5–10.2)
CHLORIDE SERPL-SCNC: 103 MMOL/L (ref 98–107)
CO2 SERPL-SCNC: 25.3 MMOL/L (ref 22–29)
CREAT SERPL-MCNC: 0.99 MG/DL (ref 0.6–1.1)
DEPRECATED RDW RBC AUTO: 50 FL (ref 37–54)
EGFRCR SERPLBLD CKD-EPI 2021: 68.3 ML/MIN/1.73
EOSINOPHIL # BLD AUTO: 0.21 10*3/MM3 (ref 0–0.4)
EOSINOPHIL NFR BLD AUTO: 3.5 % (ref 0.3–6.2)
ERYTHROCYTE [DISTWIDTH] IN BLOOD BY AUTOMATED COUNT: 16 % (ref 12.3–15.4)
FERRITIN SERPL-MCNC: 102.6 NG/ML (ref 11–207)
GLOBULIN UR ELPH-MCNC: 3.4 GM/DL (ref 1.8–3.5)
GLUCOSE SERPL-MCNC: 116 MG/DL (ref 74–124)
HCT VFR BLD AUTO: 32.5 % (ref 34–46.6)
HGB BLD-MCNC: 10.2 G/DL (ref 12–15.9)
IMM GRANULOCYTES # BLD AUTO: 0.04 10*3/MM3 (ref 0–0.05)
IMM GRANULOCYTES NFR BLD AUTO: 0.7 % (ref 0–0.5)
IRON 24H UR-MRATE: 39 MCG/DL (ref 37–145)
IRON SATN MFR SERPL: 12 % (ref 14–48)
LYMPHOCYTES # BLD AUTO: 1.66 10*3/MM3 (ref 0.7–3.1)
LYMPHOCYTES NFR BLD AUTO: 27.8 % (ref 19.6–45.3)
MAGNESIUM SERPL-MCNC: 1.8 MG/DL (ref 1.8–2.5)
MCH RBC QN AUTO: 27.1 PG (ref 26.6–33)
MCHC RBC AUTO-ENTMCNC: 31.4 G/DL (ref 31.5–35.7)
MCV RBC AUTO: 86.2 FL (ref 79–97)
MONOCYTES # BLD AUTO: 0.46 10*3/MM3 (ref 0.1–0.9)
MONOCYTES NFR BLD AUTO: 7.7 % (ref 5–12)
NEUTROPHILS NFR BLD AUTO: 3.57 10*3/MM3 (ref 1.7–7)
NEUTROPHILS NFR BLD AUTO: 59.8 % (ref 42.7–76)
NRBC BLD AUTO-RTO: 0 /100 WBC (ref 0–0.2)
PHOSPHATE SERPL-MCNC: 3 MG/DL (ref 2.5–4.5)
PLATELET # BLD AUTO: 306 10*3/MM3 (ref 140–450)
PMV BLD AUTO: 8.6 FL (ref 6–12)
POTASSIUM SERPL-SCNC: 3.7 MMOL/L (ref 3.5–4.7)
PROT SERPL-MCNC: 7.7 G/DL (ref 6.3–8)
RBC # BLD AUTO: 3.77 10*6/MM3 (ref 3.77–5.28)
SODIUM SERPL-SCNC: 139 MMOL/L (ref 134–145)
TIBC SERPL-MCNC: 312 MCG/DL (ref 249–505)
TRANSFERRIN SERPL-MCNC: 223 MG/DL (ref 200–360)
WBC NRBC COR # BLD: 5.97 10*3/MM3 (ref 3.4–10.8)

## 2022-06-23 PROCEDURE — 83540 ASSAY OF IRON: CPT

## 2022-06-23 PROCEDURE — 96374 THER/PROPH/DIAG INJ IV PUSH: CPT

## 2022-06-23 PROCEDURE — 99214 OFFICE O/P EST MOD 30 MIN: CPT | Performed by: INTERNAL MEDICINE

## 2022-06-23 PROCEDURE — 80053 COMPREHEN METABOLIC PANEL: CPT

## 2022-06-23 PROCEDURE — 25010000002 ZOLEDRONIC ACID 4 MG/100ML SOLUTION: Performed by: INTERNAL MEDICINE

## 2022-06-23 PROCEDURE — 84466 ASSAY OF TRANSFERRIN: CPT

## 2022-06-23 PROCEDURE — 85025 COMPLETE CBC W/AUTO DIFF WBC: CPT

## 2022-06-23 PROCEDURE — 82728 ASSAY OF FERRITIN: CPT

## 2022-06-23 PROCEDURE — 83735 ASSAY OF MAGNESIUM: CPT

## 2022-06-23 PROCEDURE — 84100 ASSAY OF PHOSPHORUS: CPT

## 2022-06-23 RX ORDER — ZOLEDRONIC ACID 0.04 MG/ML
4 INJECTION, SOLUTION INTRAVENOUS ONCE
Status: COMPLETED | OUTPATIENT
Start: 2022-06-23 | End: 2022-06-23

## 2022-06-23 RX ORDER — ZOLEDRONIC ACID 0.04 MG/ML
4 INJECTION, SOLUTION INTRAVENOUS ONCE
Status: CANCELLED | OUTPATIENT
Start: 2022-06-23

## 2022-06-23 RX ORDER — SODIUM CHLORIDE 9 MG/ML
250 INJECTION, SOLUTION INTRAVENOUS ONCE
Status: COMPLETED | OUTPATIENT
Start: 2022-06-23 | End: 2022-06-23

## 2022-06-23 RX ORDER — SODIUM CHLORIDE 9 MG/ML
250 INJECTION, SOLUTION INTRAVENOUS ONCE
Status: CANCELLED | OUTPATIENT
Start: 2022-06-23

## 2022-06-23 RX ADMIN — SODIUM CHLORIDE 250 ML: 9 INJECTION, SOLUTION INTRAVENOUS at 13:14

## 2022-06-23 RX ADMIN — ZOLEDRONIC ACID 4 MG: 0.04 INJECTION, SOLUTION INTRAVENOUS at 13:14

## 2022-06-23 NOTE — PROGRESS NOTES
Subjective     CHIEF COMPLAINT:      Chief Complaint   Patient presents with   • Follow-up     No concerns     HISTORY OF PRESENT ILLNESS:     Ina Akers is a 51 y.o. female patient who returns today for follow up on her metastatic thyroid cancer and prior history of breast cancer.  She returns today for follow-up and reports that [place] and was doing a lot of walking without the use of the cane.  She developed pain in the right thigh.  She recently saw her orthopedic surgeon who recommended that she uses a cane lifelong.    ROS:  Pertinent ROS is in the HPI.     Past medical, surgical, social and family history were reviewed.     MEDICATIONS:    Current Outpatient Medications:   •  acetaminophen (TYLENOL) 500 MG tablet, Take 1,000 mg by mouth Every 6 (Six) Hours As Needed for Mild Pain ., Disp: , Rfl:   •  amLODIPine (NORVASC) 10 MG tablet, Take 10 mg by mouth Every Morning., Disp: , Rfl:   •  atorvastatin (LIPITOR) 10 MG tablet, Take 1 tablet by mouth Daily., Disp: 90 tablet, Rfl: 3  •  Blood Glucose Monitoring Suppl w/Device kit, Check blood sugar twice a day, Disp: 1 each, Rfl: 0  •  cholecalciferol (VITAMIN D3) 25 MCG (1000 UT) tablet, Take 1,000 Units by mouth Daily., Disp: , Rfl:   •  Docusate Calcium (STOOL SOFTENER PO), Take 1 tablet by mouth As Needed. As needed, Disp: , Rfl:   •  famotidine (PEPCID) 20 MG tablet, Take 20 mg by mouth Every Other Day., Disp: , Rfl:   •  Glucose Blood (BLOOD GLUCOSE TEST) strip, Check blood sugar twice a day., Disp: 100 each, Rfl: 1  •  labetalol (NORMODYNE) 100 MG tablet, Take 0.5 tablets by mouth 2 (Two) Times a Day., Disp: 180 tablet, Rfl: 1  •  Lancets (ACCU-CHEK SOFT TOUCH) lancets, Check blood sugar twice a day as needed, Disp: 100 each, Rfl: 1  •  lansoprazole (PREVACID) 30 MG capsule, TAKE ONE CAPSULE BY MOUTH DAILY (Patient taking differently: Every Other Day.), Disp: 90 capsule, Rfl: 2  •  levothyroxine (SYNTHROID, LEVOTHROID) 112 MCG  "tablet, Take 112 mcg by mouth Every Morning., Disp: , Rfl:   •  losartan (COZAAR) 100 MG tablet, Take 100 mg by mouth Daily., Disp: , Rfl:   •  metFORMIN ER (GLUCOPHAGE-XR) 500 MG 24 hr tablet, TAKE TWO TABLETS BY MOUTH DAILY WITH DINNER, Disp: 180 tablet, Rfl: 1  •  promethazine (PHENERGAN) 12.5 MG tablet, TAKE 1/2 TO 1 TABLET BY MOUTH EVERY 4 HOURS AS NEEDED FOR NAUSEA AND VOMITING MAY MAKE DROWSY, Disp: 30 tablet, Rfl: 3  •  triamcinolone (KENALOG) 0.1 % cream, Apply  topically to the appropriate area as directed 2 (Two) Times a Day. Sparingly shortest time possible avoid face, Disp: 80 g, Rfl: 0  No current facility-administered medications for this visit.  Objective     VITAL SIGNS:     Vitals:    06/23/22 1217   BP: 128/89   Pulse: 70   Resp: 16   Temp: 97.3 °F (36.3 °C)   TempSrc: Temporal   SpO2: 100%   Weight: 82.1 kg (180 lb 14.4 oz)   Height: 154.9 cm (60.98\")   PainSc: 0-No pain     Body mass index is 34.2 kg/m².     Wt Readings from Last 5 Encounters:   06/23/22 82.1 kg (180 lb 14.4 oz)   05/26/22 83.1 kg (183 lb 3.2 oz)   04/28/22 80.7 kg (178 lb)   03/30/22 80.7 kg (178 lb)   03/25/22 81.2 kg (179 lb)     PHYSICAL EXAMINATION:   GENERAL: The patient appears in fair general condition, not in acute distress.  She is ambulating using a cane.  SKIN: No ecchymosis.  EYES: No jaundice. No pallor.  LYMPHATICS: No cervical lymphadenopathy.  CHEST: Normal respiratory effort.  Lungs clear bilaterally.  No added sounds.  ABDOMEN: Nondistended.  EXTREMITIES: No edema.  No calf tenderness.  No leg erythema or warmth.    DIAGNOSTIC DATA:     Results from last 7 days   Lab Units 06/23/22  1141   WBC 10*3/mm3 5.97   NEUTROS ABS 10*3/mm3 3.57   HEMOGLOBIN g/dL 10.2*   HEMATOCRIT % 32.5*   PLATELETS 10*3/mm3 306     Results from last 7 days   Lab Units 06/23/22  1141   SODIUM mmol/L 139   POTASSIUM mmol/L 3.7   CHLORIDE mmol/L 103   CO2 mmol/L 25.3   BUN mg/dL 15   CREATININE mg/dL 0.99   CALCIUM mg/dL 9.5   ALBUMIN " g/dL 4.30   BILIRUBIN mg/dL 0.6   ALK PHOS U/L 66   ALT (SGPT) U/L 15   AST (SGOT) U/L 13   GLUCOSE mg/dL 116   MAGNESIUM mg/dL 1.8     Component      Latest Ref Rng & Units 6/23/2022   Phosphorus      2.5 - 4.5 mg/dL 3.0         Lab 06/23/22  1142 06/23/22  1141   IRON  --  39   IRON SATURATION  --  12*   TIBC  --  312   TRANSFERRIN  --  223   FERRITIN 102.60  --       CT chest on 4/8/2022:  No evidence of progressive thoracic malignancy.     CT bony pelvis on 4/8/2022:  Unchanged appearance of L4 lytic lesion/compression deformity, status post kyphoplasty.   Lytic lesions in the right femoral neck and left ischial tuberosity are unchanged.     CT lumbar spine on 4/8/2022:  Redemonstrated severe height loss and lytic/destructive changes of the L4 vertebral body, now with cortical bone along portions of the anterior aspect of the vertebral body when compared to the 1/21/2021 CT abdomen and pelvis. Redemonstration of vertebroplasty material and similar sclerosis of the posterior portions of the L4 vertebral body and pedicles.     The rest of the lumbar spine is not significantly changed, including the sclerotic density in the right posterior aspect of L1.     No evidence of acute fracture.     Assessment & Plan    *Metastatic thyroid cancer diagnosed on 10/6/2020.  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  · She had right thyroid lobectomy on 12/17/2018 at [place].    · The specimen was sent for outside consultation and the final conclusion was that there was no evidence of malignancy.  · MRI on 8/27/2020 revealed an L4 lesion resulting in pathologic compression fracture.  · Bone scan on 9/2/2020 revealed increased activity at L4 and left side of the skull.  · PET scan on 9/9/2020 revealed abnormal activity at L4, right femoral neck, lower sacrum, left ischium and right ninth medial aspect.  · PET scan 9/9/2020 showed no evidence of visceral metastasis.  · Patient had kyphoplasty on 9/17/2020.   Pathology was nondiagnostic.  · CT-guided biopsy of the lesion in the right posterior ninth rib on 10/6/2020 revealed a clear cell neoplasm.  It is positive for PAX 8 and TTF-1 favoring metastatic thyroid cancer.  · Patient was referred to [facility].  · Patient underwent completion thyroidectomy on 11/9/2020.  · There was no evidence of uptake of radioactive iodine on SPECT imaging on 12/18/2020.  · This was indicative of the tumor not going to respond to radioactive iodine therapy.  · Patient was started on Lenvima 20 mg daily on 12/24/2020.  · Lenvima was held on 2/6/2021 due to drug induced anemia and thrombocytopenia.  She was unable to start back on it due to prolonged cytopenias.  · CT scans on 3/23/2021 showed stable disease.  · CBC was stable on 6/8/2021.  CT scans showed stable disease.  Thyroglobulin was stable at 3.4.    · PET scan on 9/1/2021 revealed decrease in the activity in the metastatic lesions.  Starting back on active treatment (Nexavar) was not recommended.  · PET scan on 12/16/2021 showed complete response except for mild hypermetabolism in the left ischium.  · CT scans on 4/8/2022 showed stable bone metastasis.  No new metastatic lesions.  · Since her disease was stable, she continues to be off treatment.    *History of left breast cancer, stage IIB, triple negative.   · S/P lumpectomy  · She received adjuvant chemotherapy on the NSABP B34 with AC followed by Taxotere.  · She received post-lumpectomy radiation therapy.   · She was placed on tamoxifen for 5 years and it was completed in January 2007.   · Bilateral mammograms on 8/19/2021 were benign.  · Next mammograms are due in August 2022.    *Bone metastasis.  · Patient was given Zometa on 9/29/2020.  · Patient underwent right femur medullary nailing on 10/23/2020.  · Patient was restarted back on Zometa on 2/24/2022.   · She is tolerating the Zometa infusions well.  · CT scans on 4/8/2022 revealed no new metastatic lesions.   The metastatic lesions were stable.    * Iron deficiency anemia.    · Patient was previously treated with ferrous sulfate 325 mg daily.  · Hemoglobin decreased to 10.6 on 9/3/2020.    · Hemoglobin decreased to 9.4 in January 2021.  However, her iron studies did not show evidence of deficiency at this point.  The anemia is therefore attributed to underlying malignancy and  Lenvima.  Lenvima was put on hold in February 2021.  · Patient's hemoglobin improved to 10.4 on 4/16/2021.  · Hemoglobin decreased to 10.4 on 6/23/2021.  Iron stores decreased with transferrin saturation decreasing to 12%.  · She was restarted back on ferrous sulfate 325 mg daily.  However, Dr. Huddleston asked her to stop the iron.  · Hemoglobin decreased to 9.1 on 2/24/2022.  · EGD on 3/25/2022 showed no evidence of blood loss.  · Pathology exam revealed no typical features of celiac disease.  · Hemoglobin is 10.2 today.  · Iron studies revealed a transferrin saturation of 12%.  Ferritin is 102.  · She is having muscle cramps in the legs likely secondary to the iron deficiency anemia.    *Hypokalemia.  · Potassium is 3.7 today.    PLAN:    1.  Proceed with Zometa today.  2.  I recommended starting ferrous sulfate 325 mg once daily and I asked her to take it away from the time that she takes the thyroid replacement.   3.  We will schedule the patient to return monthly for Zometa infusions.  CBC CMP magnesium phosphorus levels will be obtained.   4.  Patient will have follow-up scans in October 2022.  I will see her in follow-up in 6 months.  She will be scheduled to receive Zometa.  Ferritin iron panel will be rechecked.      Tim Muñoz MD  06/23/22

## 2022-07-05 ENCOUNTER — HOSPITAL ENCOUNTER (OUTPATIENT)
Dept: MRI IMAGING | Facility: HOSPITAL | Age: 52
Discharge: HOME | End: 2022-07-05
Admitting: NEUROLOGICAL SURGERY

## 2022-07-05 DIAGNOSIS — D49.2 LUMBAR SPINE TUMOR: ICD-10-CM

## 2022-07-05 DIAGNOSIS — M89.9 SKULL LESION: ICD-10-CM

## 2022-07-05 PROCEDURE — A9577 INJ MULTIHANCE: HCPCS | Performed by: NEUROLOGICAL SURGERY

## 2022-07-05 PROCEDURE — 70553 MRI BRAIN STEM W/O & W/DYE: CPT

## 2022-07-05 PROCEDURE — 0 GADOBENATE DIMEGLUMINE 529 MG/ML SOLUTION: Performed by: NEUROLOGICAL SURGERY

## 2022-07-05 RX ADMIN — GADOBENATE DIMEGLUMINE 17 ML: 529 INJECTION, SOLUTION INTRAVENOUS at 08:43

## 2022-07-06 NOTE — PROGRESS NOTES
Subjective   History of Present Illness: Ina Akers is a 51 y.o. female is here today for follow-up on left parietal calvarial lesion. MRI brain was done on 22.  She is doing well. She denies any HA, dizziness or vision changes. She does have occasional back pain with certain movements that is tolerable.  She is doing well today and has no new complaints.  She is scheduled to follow-up with her oncologist at the [facility] in October.    History of Present Illness    The following portions of the patient's history were reviewed and updated as appropriate: allergies, past family history, past medical history, past social history, past surgical history and problem list.    Past Medical History:   Diagnosis Date   • Anemia     History of recurent iron deficiency   • Compression fracture of fourth lumbar vertebra (HCC)     RECENT KYPHOPLASTY 2020   • COVID-19 virus infection 2021   • Diabetes mellitus (HCC)    • Disease of thyroid gland    • GERD (gastroesophageal reflux disease)    • History of breast cancer     LEFT BREAST MASTECTOMY, CHEMO AND RADIATION    • History of cancer chemotherapy    • History of gastritis    • History of radiation therapy    • History of thyroid nodule 2018    cancer 2020   • Hyperlipidemia    • Hypertension    • Metastatic bone cancer (HCC)     RIB, HIP - AWAITING FINDINGS FOR PRIMARY SOURCE (THYROID VS BREAST)   • PONV (postoperative nausea and vomiting)         Past Surgical History:   Procedure Laterality Date   •  SECTION     • COLONOSCOPY N/A 2021    Procedure: COLONOSCOPY TO CECUM AND TI;  Surgeon: Jefferson Newman MD;  Location: [facility] ENDOSCOPY;  Service: Gastroenterology;  Laterality: N/A;  PRE:SCREENING  POST:DIVERTICULOSIS AND HEMORRHOIDS   • ENDOSCOPY N/A 3/25/2022    Procedure: ESOPHAGOGASTRODUODENOSCOPY WITH COLD BIOPSIES;  Surgeon: Jefferson Newman MD;  Location: [facility] ENDOSCOPY;  Service: Gastroenterology;   Laterality: N/A;  PRE: iron deficiency anemia  POST: HIATAL HERNIA   • FEMUR IM NAILING/RODDING Right 10/23/2020    Procedure: HIP INTERTROCHCHANTERIC NAILING;  Surgeon: Gretchen Connell MD;  Location: [facility] MAIN OR;  Service: Orthopedics;  Laterality: Right;   • KYPHOPLASTY N/A 09/17/2020    Procedure: Lumbar 4  osteo cool radiofrequency ablation and KYPHOPLASTY;  Surgeon: Vik Rios MD;  Location: [facility] HYBRID OR 18/19;  Service: Neurosurgery;  Laterality: N/A;   • MASTECTOMY, PARTIAL Left 2001   • RIB BIOPSY  10/2020   • THYROIDECTOMY Left 2020   • THYROIDECTOMY, PARTIAL Right 2018   • VENOUS ACCESS DEVICE (PORT) INSERTION AND REMOVAL            Current Outpatient Medications:   •  acetaminophen (TYLENOL) 500 MG tablet, Take 1,000 mg by mouth Every 6 (Six) Hours As Needed for Mild Pain ., Disp: , Rfl:   •  amLODIPine (NORVASC) 10 MG tablet, Take 10 mg by mouth Every Morning., Disp: , Rfl:   •  atorvastatin (LIPITOR) 10 MG tablet, Take 1 tablet by mouth Daily., Disp: 90 tablet, Rfl: 3  •  Blood Glucose Monitoring Suppl w/Device kit, Check blood sugar twice a day, Disp: 1 each, Rfl: 0  •  cholecalciferol (VITAMIN D3) 25 MCG (1000 UT) tablet, Take 1,000 Units by mouth Daily., Disp: , Rfl:   •  Docusate Calcium (STOOL SOFTENER PO), Take 1 tablet by mouth As Needed. As needed, Disp: , Rfl:   •  famotidine (PEPCID) 20 MG tablet, Take 20 mg by mouth Every Other Day., Disp: , Rfl:   •  ferrous sulfate 325 (65 FE) MG tablet, Take 325 mg by mouth Daily With Breakfast., Disp: , Rfl:   •  Glucose Blood (BLOOD GLUCOSE TEST) strip, Check blood sugar twice a day., Disp: 100 each, Rfl: 1  •  labetalol (NORMODYNE) 100 MG tablet, Take 0.5 tablets by mouth 2 (Two) Times a Day., Disp: 180 tablet, Rfl: 1  •  Lancets (ACCU-CHEK SOFT TOUCH) lancets, Check blood sugar twice a day as needed, Disp: 100 each, Rfl: 1  •  lansoprazole (PREVACID) 30 MG capsule, TAKE ONE CAPSULE BY MOUTH DAILY (Patient taking differently:  "Every Other Day.), Disp: 90 capsule, Rfl: 2  •  levothyroxine (SYNTHROID, LEVOTHROID) 112 MCG tablet, Take 112 mcg by mouth Every Morning., Disp: , Rfl:   •  losartan (COZAAR) 100 MG tablet, Take 100 mg by mouth Daily., Disp: , Rfl:   •  metFORMIN ER (GLUCOPHAGE-XR) 500 MG 24 hr tablet, TAKE TWO TABLETS BY MOUTH DAILY WITH DINNER, Disp: 180 tablet, Rfl: 1  •  promethazine (PHENERGAN) 12.5 MG tablet, TAKE 1/2 TO 1 TABLET BY MOUTH EVERY 4 HOURS AS NEEDED FOR NAUSEA AND VOMITING MAY MAKE DROWSY, Disp: 30 tablet, Rfl: 3  •  triamcinolone (KENALOG) 0.1 % cream, Apply  topically to the appropriate area as directed 2 (Two) Times a Day. Sparingly shortest time possible avoid face, Disp: 80 g, Rfl: 0     Allergies   Allergen Reactions   • Monoclonal Antibodies Anaphylaxis     Regeneron,  High b/p increased heart rate    • Hydrocodone Nausea Only   • Lenvatinib Unknown - Low Severity     Internal bleeding        Social History     Socioeconomic History   • Marital status:      Spouse name: Raghav   • Number of children: 2   Tobacco Use   • Smoking status: Never Smoker   • Smokeless tobacco: Never Used   Vaping Use   • Vaping Use: Never used   Substance and Sexual Activity   • Alcohol use: Yes     Comment: 1 drink monthly   • Drug use: Never   • Sexual activity: Defer        Family History   Problem Relation Age of Onset   • Hypertension Other    • Diabetes Other    • No Known Problems Mother    • No Known Problems Father    • Malig Hyperthermia Neg Hx         Review of Systems   Eyes: Negative for visual disturbance.   Musculoskeletal: Positive for back pain and gait problem (uses cane when walking long distances for R hip).   Neurological: Negative for dizziness and headaches.       Objective     Vitals:    07/08/22 0849   BP: 120/80   Pulse: 69   Temp: 97.8 °F (36.6 °C)   SpO2: 98%   Weight: 84.9 kg (187 lb 3.2 oz)   Height: 154.9 cm (61\")     Body mass index is 35.37 kg/m².      Physical Exam  Neurologic Exam  Awake, " alert, oriented x3  Pupils equal round reactive to light  Extraocular muscles intact  Face symmetric  Speech is fluent and clear  No pronator drift  Motor exam  Bilateral deltoids 5/5, bilateral biceps 5/5, bilateral triceps 5/5, bilateral wrist extension 5/5 bilateral hand  5/5  Bilateral hip flexion 5/5, bilateral knee extension 5/5, bilateral DF/PF 5/5  No clonus  No Alethea's reflex  Steady normal gait  Able to detect  light touch in all 4 extremities      Assessment & Plan   Independent Review of Radiographic Studies:      I personally reviewed the images from the following studies.  MRI brain with and without contrast from 2022  The MRI brain shows no enhancing lesions.  There are no new lesions.    Medical Decision Makin-year-old female s/p ostiocool ablation/kyphoplasty of an L4 lesion on 2020  -She was subsequently diagnosed with metastatic thyroid cancer and is being managed by an oncologist in [facility]  -She has made a great recovery.  There was a left parietal calvarial lesion which I have followed.  This is no longer enhancing with contrast and appears to have resolved.  -She had a repeat CT of her lumbar spine at an outside facility on 2022.  These images are not available to me today.  She reports it was reviewed by her oncologist told her there was nothing concerning.  The radiologist has indicated additional bony growth.  I have recommended an MRI spine with and without contrast to further evaluate for any underlying tumor.  -I will plan to see her back in 4 months after she has followed up with her oncologist    Diagnoses and all orders for this visit:    1. Lumbar spine tumor (Primary)    2. Skull lesion      Return in about 4 months (around 2022).

## 2022-07-08 ENCOUNTER — OFFICE VISIT (OUTPATIENT)
Dept: NEUROSURGERY | Facility: CLINIC | Age: 52
End: 2022-07-08

## 2022-07-08 VITALS
SYSTOLIC BLOOD PRESSURE: 120 MMHG | HEIGHT: 61 IN | OXYGEN SATURATION: 98 % | DIASTOLIC BLOOD PRESSURE: 80 MMHG | BODY MASS INDEX: 35.34 KG/M2 | WEIGHT: 187.2 LBS | TEMPERATURE: 97.8 F | HEART RATE: 69 BPM

## 2022-07-08 DIAGNOSIS — M89.9 SKULL LESION: ICD-10-CM

## 2022-07-08 DIAGNOSIS — D49.2 LUMBAR SPINE TUMOR: Primary | ICD-10-CM

## 2022-07-08 PROCEDURE — 99214 OFFICE O/P EST MOD 30 MIN: CPT | Performed by: NEUROLOGICAL SURGERY

## 2022-07-08 RX ORDER — FERROUS SULFATE 325(65) MG
325 TABLET ORAL
COMMUNITY
End: 2023-01-25 | Stop reason: HOSPADM

## 2022-07-18 DIAGNOSIS — I10 ESSENTIAL HYPERTENSION: ICD-10-CM

## 2022-07-18 DIAGNOSIS — E55.9 VITAMIN D DEFICIENCY: ICD-10-CM

## 2022-07-18 DIAGNOSIS — E78.5 HYPERLIPIDEMIA, UNSPECIFIED HYPERLIPIDEMIA TYPE: ICD-10-CM

## 2022-07-18 DIAGNOSIS — E11.9 CONTROLLED TYPE 2 DIABETES MELLITUS WITHOUT COMPLICATION, WITHOUT LONG-TERM CURRENT USE OF INSULIN: ICD-10-CM

## 2022-07-18 RX ORDER — ATORVASTATIN CALCIUM 10 MG/1
TABLET, FILM COATED ORAL
Qty: 90 TABLET | Refills: 3 | Status: SHIPPED | OUTPATIENT
Start: 2022-07-18 | End: 2022-08-30 | Stop reason: SDUPTHER

## 2022-07-21 ENCOUNTER — INFUSION (OUTPATIENT)
Dept: ONCOLOGY | Facility: HOSPITAL | Age: 52
End: 2022-07-21

## 2022-07-21 VITALS
RESPIRATION RATE: 18 BRPM | WEIGHT: 182.4 LBS | HEART RATE: 75 BPM | BODY MASS INDEX: 34.44 KG/M2 | OXYGEN SATURATION: 98 % | DIASTOLIC BLOOD PRESSURE: 72 MMHG | TEMPERATURE: 96.5 F | HEIGHT: 61 IN | SYSTOLIC BLOOD PRESSURE: 117 MMHG

## 2022-07-21 DIAGNOSIS — IMO0001 METASTATIC BONE CANCER: Primary | ICD-10-CM

## 2022-07-21 DIAGNOSIS — C50.412 MALIGNANT NEOPLASM OF UPPER-OUTER QUADRANT OF LEFT BREAST IN FEMALE, ESTROGEN RECEPTOR NEGATIVE: ICD-10-CM

## 2022-07-21 DIAGNOSIS — Z17.1 MALIGNANT NEOPLASM OF UPPER-OUTER QUADRANT OF LEFT BREAST IN FEMALE, ESTROGEN RECEPTOR NEGATIVE: ICD-10-CM

## 2022-07-21 LAB
ALBUMIN SERPL-MCNC: 4.4 G/DL (ref 3.5–5.2)
ALBUMIN/GLOB SERPL: 1.4 G/DL
ALP SERPL-CCNC: 68 U/L (ref 39–117)
ALT SERPL W P-5'-P-CCNC: 12 U/L (ref 1–33)
ANION GAP SERPL CALCULATED.3IONS-SCNC: 8.5 MMOL/L (ref 5–15)
AST SERPL-CCNC: 15 U/L (ref 1–32)
BASOPHILS # BLD AUTO: 0.04 10*3/MM3 (ref 0–0.2)
BASOPHILS NFR BLD AUTO: 0.7 % (ref 0–1.5)
BILIRUB SERPL-MCNC: 0.4 MG/DL (ref 0–1.2)
BUN SERPL-MCNC: 16 MG/DL (ref 6–20)
BUN/CREAT SERPL: 17.8 (ref 7–25)
CALCIUM SPEC-SCNC: 9.5 MG/DL (ref 8.6–10.5)
CHLORIDE SERPL-SCNC: 104 MMOL/L (ref 98–107)
CO2 SERPL-SCNC: 26.5 MMOL/L (ref 22–29)
CREAT SERPL-MCNC: 0.9 MG/DL (ref 0.57–1)
DEPRECATED RDW RBC AUTO: 49.3 FL (ref 37–54)
EGFRCR SERPLBLD CKD-EPI 2021: 76.6 ML/MIN/1.73
EOSINOPHIL # BLD AUTO: 0.29 10*3/MM3 (ref 0–0.4)
EOSINOPHIL NFR BLD AUTO: 5 % (ref 0.3–6.2)
ERYTHROCYTE [DISTWIDTH] IN BLOOD BY AUTOMATED COUNT: 15.9 % (ref 12.3–15.4)
GLOBULIN UR ELPH-MCNC: 3.1 GM/DL
GLUCOSE SERPL-MCNC: 89 MG/DL (ref 65–99)
HCT VFR BLD AUTO: 33.5 % (ref 34–46.6)
HGB BLD-MCNC: 10.4 G/DL (ref 12–15.9)
IMM GRANULOCYTES # BLD AUTO: 0.02 10*3/MM3 (ref 0–0.05)
IMM GRANULOCYTES NFR BLD AUTO: 0.3 % (ref 0–0.5)
LYMPHOCYTES # BLD AUTO: 2.01 10*3/MM3 (ref 0.7–3.1)
LYMPHOCYTES NFR BLD AUTO: 34.4 % (ref 19.6–45.3)
MAGNESIUM SERPL-MCNC: 1.9 MG/DL (ref 1.6–2.6)
MCH RBC QN AUTO: 26.7 PG (ref 26.6–33)
MCHC RBC AUTO-ENTMCNC: 31 G/DL (ref 31.5–35.7)
MCV RBC AUTO: 85.9 FL (ref 79–97)
MONOCYTES # BLD AUTO: 0.57 10*3/MM3 (ref 0.1–0.9)
MONOCYTES NFR BLD AUTO: 9.7 % (ref 5–12)
NEUTROPHILS NFR BLD AUTO: 2.92 10*3/MM3 (ref 1.7–7)
NEUTROPHILS NFR BLD AUTO: 49.9 % (ref 42.7–76)
NRBC BLD AUTO-RTO: 0 /100 WBC (ref 0–0.2)
PHOSPHATE SERPL-MCNC: 3 MG/DL (ref 2.5–4.5)
PLATELET # BLD AUTO: 355 10*3/MM3 (ref 140–450)
PMV BLD AUTO: 9.6 FL (ref 6–12)
POTASSIUM SERPL-SCNC: 3.8 MMOL/L (ref 3.5–5.2)
PROT SERPL-MCNC: 7.5 G/DL (ref 6–8.5)
RBC # BLD AUTO: 3.9 10*6/MM3 (ref 3.77–5.28)
SODIUM SERPL-SCNC: 139 MMOL/L (ref 136–145)
WBC NRBC COR # BLD: 5.85 10*3/MM3 (ref 3.4–10.8)

## 2022-07-21 PROCEDURE — 96365 THER/PROPH/DIAG IV INF INIT: CPT

## 2022-07-21 PROCEDURE — 83735 ASSAY OF MAGNESIUM: CPT | Performed by: INTERNAL MEDICINE

## 2022-07-21 PROCEDURE — 25010000002 ZOLEDRONIC ACID 4 MG/100ML SOLUTION: Performed by: INTERNAL MEDICINE

## 2022-07-21 PROCEDURE — 80053 COMPREHEN METABOLIC PANEL: CPT | Performed by: INTERNAL MEDICINE

## 2022-07-21 PROCEDURE — 85025 COMPLETE CBC W/AUTO DIFF WBC: CPT | Performed by: INTERNAL MEDICINE

## 2022-07-21 PROCEDURE — 84100 ASSAY OF PHOSPHORUS: CPT | Performed by: INTERNAL MEDICINE

## 2022-07-21 PROCEDURE — 96374 THER/PROPH/DIAG INJ IV PUSH: CPT

## 2022-07-21 RX ORDER — SODIUM CHLORIDE 9 MG/ML
250 INJECTION, SOLUTION INTRAVENOUS ONCE
Status: COMPLETED | OUTPATIENT
Start: 2022-07-21 | End: 2022-07-21

## 2022-07-21 RX ORDER — ZOLEDRONIC ACID 0.04 MG/ML
4 INJECTION, SOLUTION INTRAVENOUS ONCE
Status: COMPLETED | OUTPATIENT
Start: 2022-07-21 | End: 2022-07-21

## 2022-07-21 RX ADMIN — ZOLEDRONIC ACID 4 MG: 0.04 INJECTION, SOLUTION INTRAVENOUS at 15:57

## 2022-07-21 RX ADMIN — SODIUM CHLORIDE 250 ML: 9 INJECTION, SOLUTION INTRAVENOUS at 15:50

## 2022-08-18 ENCOUNTER — INFUSION (OUTPATIENT)
Dept: ONCOLOGY | Facility: HOSPITAL | Age: 52
End: 2022-08-18

## 2022-08-18 VITALS
DIASTOLIC BLOOD PRESSURE: 73 MMHG | TEMPERATURE: 96.6 F | BODY MASS INDEX: 34.1 KG/M2 | HEIGHT: 61 IN | OXYGEN SATURATION: 98 % | HEART RATE: 71 BPM | WEIGHT: 180.6 LBS | RESPIRATION RATE: 18 BRPM | SYSTOLIC BLOOD PRESSURE: 112 MMHG

## 2022-08-18 DIAGNOSIS — Z17.1 MALIGNANT NEOPLASM OF UPPER-OUTER QUADRANT OF LEFT BREAST IN FEMALE, ESTROGEN RECEPTOR NEGATIVE: ICD-10-CM

## 2022-08-18 DIAGNOSIS — E78.5 HYPERLIPIDEMIA, UNSPECIFIED HYPERLIPIDEMIA TYPE: Primary | ICD-10-CM

## 2022-08-18 DIAGNOSIS — E11.9 CONTROLLED TYPE 2 DIABETES MELLITUS WITHOUT COMPLICATION, WITHOUT LONG-TERM CURRENT USE OF INSULIN: ICD-10-CM

## 2022-08-18 DIAGNOSIS — IMO0001 METASTATIC BONE CANCER: Primary | ICD-10-CM

## 2022-08-18 DIAGNOSIS — C50.412 MALIGNANT NEOPLASM OF UPPER-OUTER QUADRANT OF LEFT BREAST IN FEMALE, ESTROGEN RECEPTOR NEGATIVE: ICD-10-CM

## 2022-08-18 LAB
ALBUMIN SERPL-MCNC: 4.6 G/DL (ref 3.5–5.2)
ALBUMIN/GLOB SERPL: 1.5 G/DL
ALP SERPL-CCNC: 71 U/L (ref 39–117)
ALT SERPL W P-5'-P-CCNC: 10 U/L (ref 1–33)
ANION GAP SERPL CALCULATED.3IONS-SCNC: 10.1 MMOL/L (ref 5–15)
AST SERPL-CCNC: 15 U/L (ref 1–32)
BASOPHILS # BLD AUTO: 0.02 10*3/MM3 (ref 0–0.2)
BASOPHILS NFR BLD AUTO: 0.4 % (ref 0–1.5)
BILIRUB SERPL-MCNC: 0.4 MG/DL (ref 0–1.2)
BUN SERPL-MCNC: 13 MG/DL (ref 6–20)
BUN/CREAT SERPL: 13.8 (ref 7–25)
CALCIUM SPEC-SCNC: 9.9 MG/DL (ref 8.6–10.5)
CHLORIDE SERPL-SCNC: 104 MMOL/L (ref 98–107)
CO2 SERPL-SCNC: 27.9 MMOL/L (ref 22–29)
CREAT SERPL-MCNC: 0.94 MG/DL (ref 0.57–1)
DEPRECATED RDW RBC AUTO: 47.8 FL (ref 37–54)
EGFRCR SERPLBLD CKD-EPI 2021: 72.7 ML/MIN/1.73
EOSINOPHIL # BLD AUTO: 0.29 10*3/MM3 (ref 0–0.4)
EOSINOPHIL NFR BLD AUTO: 5.5 % (ref 0.3–6.2)
ERYTHROCYTE [DISTWIDTH] IN BLOOD BY AUTOMATED COUNT: 15.5 % (ref 12.3–15.4)
GLOBULIN UR ELPH-MCNC: 3.1 GM/DL
GLUCOSE SERPL-MCNC: 113 MG/DL (ref 65–99)
HCT VFR BLD AUTO: 33.7 % (ref 34–46.6)
HGB BLD-MCNC: 10.6 G/DL (ref 12–15.9)
IMM GRANULOCYTES # BLD AUTO: 0.01 10*3/MM3 (ref 0–0.05)
IMM GRANULOCYTES NFR BLD AUTO: 0.2 % (ref 0–0.5)
LYMPHOCYTES # BLD AUTO: 1.61 10*3/MM3 (ref 0.7–3.1)
LYMPHOCYTES NFR BLD AUTO: 30.7 % (ref 19.6–45.3)
MAGNESIUM SERPL-MCNC: 1.9 MG/DL (ref 1.6–2.6)
MCH RBC QN AUTO: 26.8 PG (ref 26.6–33)
MCHC RBC AUTO-ENTMCNC: 31.5 G/DL (ref 31.5–35.7)
MCV RBC AUTO: 85.1 FL (ref 79–97)
MONOCYTES # BLD AUTO: 0.52 10*3/MM3 (ref 0.1–0.9)
MONOCYTES NFR BLD AUTO: 9.9 % (ref 5–12)
NEUTROPHILS NFR BLD AUTO: 2.79 10*3/MM3 (ref 1.7–7)
NEUTROPHILS NFR BLD AUTO: 53.3 % (ref 42.7–76)
NRBC BLD AUTO-RTO: 0 /100 WBC (ref 0–0.2)
PHOSPHATE SERPL-MCNC: 2.9 MG/DL (ref 2.5–4.5)
PLATELET # BLD AUTO: 356 10*3/MM3 (ref 140–450)
PMV BLD AUTO: 9.6 FL (ref 6–12)
POTASSIUM SERPL-SCNC: 3.9 MMOL/L (ref 3.5–5.2)
PROT SERPL-MCNC: 7.7 G/DL (ref 6–8.5)
RBC # BLD AUTO: 3.96 10*6/MM3 (ref 3.77–5.28)
SODIUM SERPL-SCNC: 142 MMOL/L (ref 136–145)
WBC NRBC COR # BLD: 5.24 10*3/MM3 (ref 3.4–10.8)

## 2022-08-18 PROCEDURE — 25010000002 ZOLEDRONIC ACID 4 MG/100ML SOLUTION: Performed by: INTERNAL MEDICINE

## 2022-08-18 PROCEDURE — 80053 COMPREHEN METABOLIC PANEL: CPT | Performed by: INTERNAL MEDICINE

## 2022-08-18 PROCEDURE — 85025 COMPLETE CBC W/AUTO DIFF WBC: CPT | Performed by: INTERNAL MEDICINE

## 2022-08-18 PROCEDURE — 84100 ASSAY OF PHOSPHORUS: CPT | Performed by: INTERNAL MEDICINE

## 2022-08-18 PROCEDURE — 96374 THER/PROPH/DIAG INJ IV PUSH: CPT

## 2022-08-18 PROCEDURE — 83735 ASSAY OF MAGNESIUM: CPT | Performed by: INTERNAL MEDICINE

## 2022-08-18 RX ORDER — ZOLEDRONIC ACID 0.04 MG/ML
4 INJECTION, SOLUTION INTRAVENOUS ONCE
Status: COMPLETED | OUTPATIENT
Start: 2022-08-18 | End: 2022-08-18

## 2022-08-18 RX ORDER — SODIUM CHLORIDE 9 MG/ML
250 INJECTION, SOLUTION INTRAVENOUS ONCE
Status: COMPLETED | OUTPATIENT
Start: 2022-08-18 | End: 2022-08-18

## 2022-08-18 RX ADMIN — SODIUM CHLORIDE 250 ML: 9 INJECTION, SOLUTION INTRAVENOUS at 15:40

## 2022-08-18 RX ADMIN — ZOLEDRONIC ACID 4 MG: 0.04 INJECTION, SOLUTION INTRAVENOUS at 15:40

## 2022-08-25 ENCOUNTER — LAB (OUTPATIENT)
Dept: LAB | Facility: HOSPITAL | Age: 52
End: 2022-08-25

## 2022-08-25 PROCEDURE — 80048 BASIC METABOLIC PNL TOTAL CA: CPT | Performed by: NURSE PRACTITIONER

## 2022-08-25 PROCEDURE — 80061 LIPID PANEL: CPT | Performed by: NURSE PRACTITIONER

## 2022-08-25 PROCEDURE — 83036 HEMOGLOBIN GLYCOSYLATED A1C: CPT | Performed by: NURSE PRACTITIONER

## 2022-08-30 ENCOUNTER — OFFICE VISIT (OUTPATIENT)
Dept: FAMILY MEDICINE CLINIC | Facility: CLINIC | Age: 52
End: 2022-08-30

## 2022-08-30 VITALS
BODY MASS INDEX: 34.36 KG/M2 | HEART RATE: 85 BPM | DIASTOLIC BLOOD PRESSURE: 83 MMHG | SYSTOLIC BLOOD PRESSURE: 127 MMHG | WEIGHT: 182 LBS | HEIGHT: 61 IN | TEMPERATURE: 96.9 F | OXYGEN SATURATION: 98 %

## 2022-08-30 DIAGNOSIS — I10 ESSENTIAL HYPERTENSION: ICD-10-CM

## 2022-08-30 DIAGNOSIS — E78.5 HYPERLIPIDEMIA, UNSPECIFIED HYPERLIPIDEMIA TYPE: ICD-10-CM

## 2022-08-30 DIAGNOSIS — E55.9 VITAMIN D DEFICIENCY: ICD-10-CM

## 2022-08-30 DIAGNOSIS — E11.9 CONTROLLED TYPE 2 DIABETES MELLITUS WITHOUT COMPLICATION, WITHOUT LONG-TERM CURRENT USE OF INSULIN: Primary | ICD-10-CM

## 2022-08-30 PROCEDURE — 99213 OFFICE O/P EST LOW 20 MIN: CPT | Performed by: NURSE PRACTITIONER

## 2022-08-30 RX ORDER — ATORVASTATIN CALCIUM 20 MG/1
20 TABLET, FILM COATED ORAL DAILY
Qty: 90 TABLET | Refills: 3 | Status: SHIPPED | OUTPATIENT
Start: 2022-08-30

## 2022-08-31 NOTE — PROGRESS NOTES
"Chief Complaint  Diabetes, Hyperlipidemia, Hypertension, and Hypothyroidism    Subjective        Ina Akers presents to [facility]  History of Present Illness  Ina Akers presents today for routine follow-up for diabetes, hyperlipidemia, hypertension, and thyroid cancer with metastasis to her right hip.     She states she has been doing \"pretty good.\" However, she is concerned about a lesion she developed on the evening of 08/29/2022 that appears to be like a chickenpox lesion located in the posterior aspect of her right shoulder. The lesion was initially pruritic. The lesion was painful and felt as though it opened and drained when she scratched this area. She subsequently applied topical triamcinolone cream. She also noticed a bump on her  leg. The patient has also applied topical triamcinolone cream to this area. She seems to be prone to insect bites and typically uses OFF! insect repellant prior to anticipated insect exposure.     She is taking a statin medication for hyperlipidemia.     The patient is hoping to begin an aquatic exercise program in the near future secondary to hip pain from thyroid cancer metastasis, for which she underwent a surgical procedure by Dr. Connell. She has a form from her orthopedist stating aquatic therapy is recommended, but she requires clearance from her primary physician. Her oncologist is prescribing thyroid medication.     The patient recently vacationed in [place] where she was walking frequently. Upon her return, she required a steroid injection in her right knee secondary to pain. She is using a cane with prolonged ambulation.     Blood pressure is within normal limits today.     The patient does not follow with an endocrinologist for her diabetes.     She was infected with COVID-19 in 12/2021. She has received her COVID-19 vaccine series as well as booster injections, most recently greater than 4 months ago. The patient is " "inquiring regarding the shingles vaccine. On chart review, the patient has received a Pneumovax vaccine.     The patient is allergic to the COVID-19 treatment manufactured by [employer].   Objective   Vital Signs:  /83   Pulse 85   Temp 96.9 °F (36.1 °C)   Ht 154.9 cm (61\")   Wt 82.6 kg (182 lb)   SpO2 98%   BMI 34.39 kg/m²   Estimated body mass index is 34.39 kg/m² as calculated from the following:    Height as of this encounter: 154.9 cm (61\").    Weight as of this encounter: 82.6 kg (182 lb).    BMI is >= 30 and <35. (Class 1 Obesity). The following options were offered after discussion;: exercise counseling/recommendations and nutrition counseling/recommendations      Physical Exam  Vitals reviewed.   Constitutional:       General: She is not in acute distress.     Appearance: She is well-developed. She is obese.      Comments: Pleasant.    HENT:      Head: Normocephalic.   Eyes:      Conjunctiva/sclera: Conjunctivae normal.      Pupils: Pupils are equal, round, and reactive to light.   Cardiovascular:      Rate and Rhythm: Normal rate and regular rhythm.      Heart sounds: No murmur heard.  Pulmonary:      Effort: Pulmonary effort is normal. No respiratory distress.      Breath sounds: No wheezing.   Abdominal:      Palpations: Abdomen is soft. There is no mass.      Hernia: No hernia is present.      Comments: No hepatosplenomegaly.    Musculoskeletal:      Cervical back: Neck supple.      Right lower leg: No edema.      Left lower leg: No edema.   Lymphadenopathy:      Cervical: No cervical adenopathy.   Skin:     General: Skin is warm and dry.      Comments: There is a lesion noted on the posterior aspect of her right shoulder, which is not pigmented and is predominantly flat, but it is slightly raised in the central portion. This area measures 2 to 3 mm x 4 mm.   Neurological:      Mental Status: She is alert and oriented to person, place, and time.   Psychiatric:         Mood and Affect: Affect " normal.      Comments: Pleasant, appropriate.           Result Review :  The following data was reviewed by: James Epley, APRN on 08/30/2022:  Common labs    Common Labsle 7/21/22 7/21/22 8/18/22 8/18/22 8/25/22 8/25/22 8/25/22    1512 1512 1439 1439 0813 0813 0813   Glucose  89  113 (A) 103 (A)     BUN  16  13 12     Creatinine  0.90  0.94 0.90     Sodium  139  142 140     Potassium  3.8  3.9 3.8     Chloride  104  104 105     Calcium  9.5  9.9 10.3     Albumin  4.40  4.60      Total Bilirubin  0.4  0.4      Alkaline Phosphatase  68  71      AST (SGOT)  15  15      ALT (SGPT)  12  10      WBC 5.85  5.24       Hemoglobin 10.4 (A)  10.6 (A)       Hematocrit 33.5 (A)  33.7 (A)       Platelets 355  356       Total Cholesterol      141    Triglycerides      58    HDL Cholesterol      44    LDL Cholesterol       85    Hemoglobin A1C       6.20 (A)   (A) Abnormal value            Data reviewed: today         Her most recent HbA1c was 6.2 percent, which has remained stable for 1 year or greater. Her HbA1c was 6.6 percent in 2018. Liver and kidney function were within normal limits. Magnesium and phosphorus were normal. Lipid panel showed an LDL of 85 mg/dl, which has decreased from 96 mg/dl.      Assessment and Plan   Diagnoses and all orders for this visit:    1. Controlled type 2 diabetes mellitus without complication, without long-term current use of insulin (HCC) (Primary)  -     atorvastatin (LIPITOR) 20 MG tablet; Take 1 tablet by mouth Daily.  Dispense: 90 tablet; Refill: 3    2. Hyperlipidemia, unspecified hyperlipidemia type  -     atorvastatin (LIPITOR) 20 MG tablet; Take 1 tablet by mouth Daily.  Dispense: 90 tablet; Refill: 3    3. Essential hypertension  -     atorvastatin (LIPITOR) 20 MG tablet; Take 1 tablet by mouth Daily.  Dispense: 90 tablet; Refill: 3    4. Vitamin D deficiency  -     atorvastatin (LIPITOR) 20 MG tablet; Take 1 tablet by mouth Daily.  Dispense: 90 tablet; Refill: 3      1. Insect  bite.  The patient developed a pruritic lesion on the posterior aspect of her right shoulder on 08/29/2022. On exam, this appears to be an insect bite. She will continue applying topical triamcinolone cream. She is encouraged to try [employer] or other family brand of insect repellant.     2. Essential hypertension.  Her blood pressure is well controlled today.     3. Hyperlipidemia.  She will continue atorvastatin medication, but we will increase the dose from 10 mg to 20 mg daily with the goal of decreasing LDL below 70 mg/dl to minimize her risk of stroke or myocardial infarction. We will consider additional titration if needed. Prescription for atorvastatin 20 mg daily will be ordered through her local [facility] pharmacy.     4. Diabetes.  Her blood glucose level is very well controlled with a stable HbA1c of 6.2 percent. The patient does not follow with an endocrinologist.    5. Thyroid cancer with metastasis to the right hip.  The patient is attempting to initiate aquatic therapy as recommended by her orthopedist. Clearance is provided for her today. She follows with an oncologist who prescribes her thyroid medication.     6. Right knee pain.  The patient recently required a steroid injection after increased ambulation during a vacation in [place]. She is using a cane with prolonged ambulation. She is encouraged to contact our office prior to any future planned activities that include extensive walking, and we will consider prescribing a Medrol Dosepak up to 3 times per year.     7. Immunizations.  The patient is recommended to obtain the new COVID-19 booster injection as well as the influenza vaccine when it is available. She should also obtain the shingles vaccine from her local pharmacy, but I would recommend a 6-week interval between the COVID-19 booster and the Shingrix vaccine series. The patient should also consider receiving the Prevnar 20 vaccine in the spring of 2023.     8. COVID-19 treatment.  We  discussed there are now antiviral treatments available for high-risk patients who contact the COVID-19 virus. She is advised to test for the virus if she develops cold symptoms, such as rhinitis, cough, or congestion and to repeat for 2 to 3 days if negative. The patient is encouraged to contact our office if she is infected with COVID-19 for either an in-office or telehealth visit, or she may present to urgent care. However, she is not guaranteed to receive a prescription for an antiviral medication at a [facility] urgent care facility.     The patient will follow up in 6 months.          Follow Up   No follow-ups on file.  Patient was given instructions and counseling regarding her condition or for health maintenance advice. Please see specific information pulled into the AVS if appropriate.     Transcribed from ambient dictation for James Epley, APRN by ELSA MORENO.  08/31/22   12:13 EDT    Patient verbalized consent to the visit recording.

## 2022-09-15 ENCOUNTER — INFUSION (OUTPATIENT)
Dept: ONCOLOGY | Facility: HOSPITAL | Age: 52
End: 2022-09-15

## 2022-09-15 VITALS
BODY MASS INDEX: 35.53 KG/M2 | SYSTOLIC BLOOD PRESSURE: 118 MMHG | TEMPERATURE: 96.3 F | HEIGHT: 60 IN | OXYGEN SATURATION: 97 % | WEIGHT: 181 LBS | RESPIRATION RATE: 18 BRPM | HEART RATE: 74 BPM | DIASTOLIC BLOOD PRESSURE: 74 MMHG

## 2022-09-15 DIAGNOSIS — IMO0001 METASTATIC BONE CANCER: Primary | ICD-10-CM

## 2022-09-15 DIAGNOSIS — Z17.1 MALIGNANT NEOPLASM OF UPPER-OUTER QUADRANT OF LEFT BREAST IN FEMALE, ESTROGEN RECEPTOR NEGATIVE: ICD-10-CM

## 2022-09-15 DIAGNOSIS — C50.412 MALIGNANT NEOPLASM OF UPPER-OUTER QUADRANT OF LEFT BREAST IN FEMALE, ESTROGEN RECEPTOR NEGATIVE: ICD-10-CM

## 2022-09-15 LAB
ALBUMIN SERPL-MCNC: 4.2 G/DL (ref 3.5–5.2)
ALBUMIN/GLOB SERPL: 1.2 G/DL
ALP SERPL-CCNC: 76 U/L (ref 39–117)
ALT SERPL W P-5'-P-CCNC: 10 U/L (ref 1–33)
ANION GAP SERPL CALCULATED.3IONS-SCNC: 7.2 MMOL/L (ref 5–15)
AST SERPL-CCNC: 13 U/L (ref 1–32)
BASOPHILS # BLD AUTO: 0.02 10*3/MM3 (ref 0–0.2)
BASOPHILS NFR BLD AUTO: 0.4 % (ref 0–1.5)
BILIRUB SERPL-MCNC: 0.3 MG/DL (ref 0–1.2)
BUN SERPL-MCNC: 14 MG/DL (ref 6–20)
BUN/CREAT SERPL: 14 (ref 7–25)
CALCIUM SPEC-SCNC: 9.6 MG/DL (ref 8.6–10.5)
CHLORIDE SERPL-SCNC: 103 MMOL/L (ref 98–107)
CO2 SERPL-SCNC: 27.8 MMOL/L (ref 22–29)
CREAT SERPL-MCNC: 1 MG/DL (ref 0.57–1)
DEPRECATED RDW RBC AUTO: 47.4 FL (ref 37–54)
EGFRCR SERPLBLD CKD-EPI 2021: 67.5 ML/MIN/1.73
EOSINOPHIL # BLD AUTO: 0.36 10*3/MM3 (ref 0–0.4)
EOSINOPHIL NFR BLD AUTO: 6.7 % (ref 0.3–6.2)
ERYTHROCYTE [DISTWIDTH] IN BLOOD BY AUTOMATED COUNT: 15.5 % (ref 12.3–15.4)
GLOBULIN UR ELPH-MCNC: 3.4 GM/DL
GLUCOSE SERPL-MCNC: 105 MG/DL (ref 65–99)
HCT VFR BLD AUTO: 33.6 % (ref 34–46.6)
HGB BLD-MCNC: 10.6 G/DL (ref 12–15.9)
IMM GRANULOCYTES # BLD AUTO: 0.02 10*3/MM3 (ref 0–0.05)
IMM GRANULOCYTES NFR BLD AUTO: 0.4 % (ref 0–0.5)
LYMPHOCYTES # BLD AUTO: 1.66 10*3/MM3 (ref 0.7–3.1)
LYMPHOCYTES NFR BLD AUTO: 30.8 % (ref 19.6–45.3)
MAGNESIUM SERPL-MCNC: 1.9 MG/DL (ref 1.6–2.6)
MCH RBC QN AUTO: 26.6 PG (ref 26.6–33)
MCHC RBC AUTO-ENTMCNC: 31.5 G/DL (ref 31.5–35.7)
MCV RBC AUTO: 84.4 FL (ref 79–97)
MONOCYTES # BLD AUTO: 0.49 10*3/MM3 (ref 0.1–0.9)
MONOCYTES NFR BLD AUTO: 9.1 % (ref 5–12)
NEUTROPHILS NFR BLD AUTO: 2.84 10*3/MM3 (ref 1.7–7)
NEUTROPHILS NFR BLD AUTO: 52.6 % (ref 42.7–76)
NRBC BLD AUTO-RTO: 0 /100 WBC (ref 0–0.2)
PHOSPHATE SERPL-MCNC: 3.3 MG/DL (ref 2.5–4.5)
PLATELET # BLD AUTO: 344 10*3/MM3 (ref 140–450)
PMV BLD AUTO: 9.1 FL (ref 6–12)
POTASSIUM SERPL-SCNC: 3.6 MMOL/L (ref 3.5–5.2)
PROT SERPL-MCNC: 7.6 G/DL (ref 6–8.5)
RBC # BLD AUTO: 3.98 10*6/MM3 (ref 3.77–5.28)
SODIUM SERPL-SCNC: 138 MMOL/L (ref 136–145)
WBC NRBC COR # BLD: 5.39 10*3/MM3 (ref 3.4–10.8)

## 2022-09-15 PROCEDURE — 85025 COMPLETE CBC W/AUTO DIFF WBC: CPT | Performed by: INTERNAL MEDICINE

## 2022-09-15 PROCEDURE — 80053 COMPREHEN METABOLIC PANEL: CPT | Performed by: INTERNAL MEDICINE

## 2022-09-15 PROCEDURE — 83735 ASSAY OF MAGNESIUM: CPT | Performed by: INTERNAL MEDICINE

## 2022-09-15 PROCEDURE — 25010000002 ZOLEDRONIC ACID 4 MG/100ML SOLUTION: Performed by: INTERNAL MEDICINE

## 2022-09-15 PROCEDURE — 84100 ASSAY OF PHOSPHORUS: CPT | Performed by: INTERNAL MEDICINE

## 2022-09-15 PROCEDURE — 96374 THER/PROPH/DIAG INJ IV PUSH: CPT

## 2022-09-15 RX ORDER — SODIUM CHLORIDE 9 MG/ML
250 INJECTION, SOLUTION INTRAVENOUS ONCE
Status: COMPLETED | OUTPATIENT
Start: 2022-09-15 | End: 2022-09-15

## 2022-09-15 RX ORDER — ZOLEDRONIC ACID 0.04 MG/ML
4 INJECTION, SOLUTION INTRAVENOUS ONCE
Status: COMPLETED | OUTPATIENT
Start: 2022-09-15 | End: 2022-09-15

## 2022-09-15 RX ADMIN — ZOLEDRONIC ACID 4 MG: 0.04 INJECTION, SOLUTION INTRAVENOUS at 15:45

## 2022-09-15 RX ADMIN — SODIUM CHLORIDE 250 ML: 9 INJECTION, SOLUTION INTRAVENOUS at 15:45

## 2022-09-23 ENCOUNTER — TELEPHONE (OUTPATIENT)
Dept: ONCOLOGY | Facility: CLINIC | Age: 52
End: 2022-09-23

## 2022-09-23 RX ORDER — PROMETHAZINE HYDROCHLORIDE 12.5 MG/1
TABLET ORAL
Qty: 30 TABLET | Refills: 3 | Status: SHIPPED | OUTPATIENT
Start: 2022-09-23

## 2022-09-23 NOTE — TELEPHONE ENCOUNTER
Rx Refill Note  Requested Prescriptions     Pending Prescriptions Disp Refills   • promethazine (PHENERGAN) 12.5 MG tablet [Pharmacy Med Name: PROMETHAZINE 12.5 MG TABLET] 30 tablet 3     Sig: TAKE 1/2 TO 1 TABLET BY MOUTH EVERY 4 HOURS AS NEEDED FOR NAUSEA AND VOMITING -MAY MAKE DROWSY      Last office visit with prescribing clinician: 8/30/2022      Next office visit with prescribing clinician: 3/3/2023            Manish Wray Rep  09/23/22, 14:20 EDT

## 2022-09-23 NOTE — TELEPHONE ENCOUNTER
Returned call to patient who is reporting rib tenderness on the right side of her rib cage.  She states that the area is slightly more tender than usual and is wondering if a scan needs to be done. This tenderness is in an area where she had the biopsy performed a few years ago. She stated that she usually has scans done in [place], but is not scheduled to have done for a while. She stated that she can have scans in [place] if needs to be done. I explained that Dr. Muñoz is out of the office this afternoon and will check with him and get back to her early next week.  She v/u.

## 2022-09-26 NOTE — TELEPHONE ENCOUNTER
Informed patient that Dr. Muñoz is agreeable to ordering the scans, but recommends that the patient reach out to her physician at  to see if they are able to move up the scans there so that the comparison is not skewed. She v/u and will let us know if [facility] cannot move up the scans.

## 2022-10-05 ENCOUNTER — APPOINTMENT (OUTPATIENT)
Dept: WOMENS IMAGING | Facility: HOSPITAL | Age: 52
End: 2022-10-05

## 2022-10-05 PROCEDURE — 77063 BREAST TOMOSYNTHESIS BI: CPT | Performed by: RADIOLOGY

## 2022-10-05 PROCEDURE — 77067 SCR MAMMO BI INCL CAD: CPT | Performed by: RADIOLOGY

## 2022-10-13 ENCOUNTER — INFUSION (OUTPATIENT)
Dept: ONCOLOGY | Facility: HOSPITAL | Age: 52
End: 2022-10-13

## 2022-10-13 VITALS
SYSTOLIC BLOOD PRESSURE: 115 MMHG | RESPIRATION RATE: 18 BRPM | TEMPERATURE: 96.3 F | HEART RATE: 73 BPM | DIASTOLIC BLOOD PRESSURE: 77 MMHG | WEIGHT: 181 LBS | OXYGEN SATURATION: 98 % | BODY MASS INDEX: 34.17 KG/M2 | HEIGHT: 61 IN

## 2022-10-13 DIAGNOSIS — C50.412 MALIGNANT NEOPLASM OF UPPER-OUTER QUADRANT OF LEFT BREAST IN FEMALE, ESTROGEN RECEPTOR NEGATIVE: ICD-10-CM

## 2022-10-13 DIAGNOSIS — Z17.1 MALIGNANT NEOPLASM OF UPPER-OUTER QUADRANT OF LEFT BREAST IN FEMALE, ESTROGEN RECEPTOR NEGATIVE: ICD-10-CM

## 2022-10-13 DIAGNOSIS — IMO0001 METASTATIC BONE CANCER: Primary | ICD-10-CM

## 2022-10-13 LAB
ALBUMIN SERPL-MCNC: 4.1 G/DL (ref 3.5–5.2)
ALBUMIN/GLOB SERPL: 1.2 G/DL
ALP SERPL-CCNC: 70 U/L (ref 39–117)
ALT SERPL W P-5'-P-CCNC: 11 U/L (ref 1–33)
ANION GAP SERPL CALCULATED.3IONS-SCNC: 6.9 MMOL/L (ref 5–15)
AST SERPL-CCNC: 13 U/L (ref 1–32)
BASOPHILS # BLD AUTO: 0.01 10*3/MM3 (ref 0–0.2)
BASOPHILS NFR BLD AUTO: 0.2 % (ref 0–1.5)
BILIRUB SERPL-MCNC: 0.3 MG/DL (ref 0–1.2)
BUN SERPL-MCNC: 14 MG/DL (ref 6–20)
BUN/CREAT SERPL: 15.6 (ref 7–25)
CALCIUM SPEC-SCNC: 9.7 MG/DL (ref 8.6–10.5)
CHLORIDE SERPL-SCNC: 105 MMOL/L (ref 98–107)
CO2 SERPL-SCNC: 26.1 MMOL/L (ref 22–29)
CREAT SERPL-MCNC: 0.9 MG/DL (ref 0.57–1)
DEPRECATED RDW RBC AUTO: 46.7 FL (ref 37–54)
EGFRCR SERPLBLD CKD-EPI 2021: 76.6 ML/MIN/1.73
EOSINOPHIL # BLD AUTO: 0.32 10*3/MM3 (ref 0–0.4)
EOSINOPHIL NFR BLD AUTO: 6 % (ref 0.3–6.2)
ERYTHROCYTE [DISTWIDTH] IN BLOOD BY AUTOMATED COUNT: 15.2 % (ref 12.3–15.4)
GLOBULIN UR ELPH-MCNC: 3.5 GM/DL
GLUCOSE SERPL-MCNC: 95 MG/DL (ref 65–99)
HCT VFR BLD AUTO: 31 % (ref 34–46.6)
HGB BLD-MCNC: 9.5 G/DL (ref 12–15.9)
IMM GRANULOCYTES # BLD AUTO: 0.01 10*3/MM3 (ref 0–0.05)
IMM GRANULOCYTES NFR BLD AUTO: 0.2 % (ref 0–0.5)
LYMPHOCYTES # BLD AUTO: 1.77 10*3/MM3 (ref 0.7–3.1)
LYMPHOCYTES NFR BLD AUTO: 33.1 % (ref 19.6–45.3)
MAGNESIUM SERPL-MCNC: 1.9 MG/DL (ref 1.6–2.6)
MCH RBC QN AUTO: 25.9 PG (ref 26.6–33)
MCHC RBC AUTO-ENTMCNC: 30.6 G/DL (ref 31.5–35.7)
MCV RBC AUTO: 84.5 FL (ref 79–97)
MONOCYTES # BLD AUTO: 0.52 10*3/MM3 (ref 0.1–0.9)
MONOCYTES NFR BLD AUTO: 9.7 % (ref 5–12)
NEUTROPHILS NFR BLD AUTO: 2.72 10*3/MM3 (ref 1.7–7)
NEUTROPHILS NFR BLD AUTO: 50.8 % (ref 42.7–76)
NRBC BLD AUTO-RTO: 0 /100 WBC (ref 0–0.2)
PHOSPHATE SERPL-MCNC: 2.7 MG/DL (ref 2.5–4.5)
PLATELET # BLD AUTO: 326 10*3/MM3 (ref 140–450)
PMV BLD AUTO: 9.5 FL (ref 6–12)
POTASSIUM SERPL-SCNC: 3.7 MMOL/L (ref 3.5–5.2)
PROT SERPL-MCNC: 7.6 G/DL (ref 6–8.5)
RBC # BLD AUTO: 3.67 10*6/MM3 (ref 3.77–5.28)
SODIUM SERPL-SCNC: 138 MMOL/L (ref 136–145)
WBC NRBC COR # BLD: 5.35 10*3/MM3 (ref 3.4–10.8)

## 2022-10-13 PROCEDURE — 25010000002 ZOLEDRONIC ACID 4 MG/100ML SOLUTION: Performed by: NURSE PRACTITIONER

## 2022-10-13 PROCEDURE — 85025 COMPLETE CBC W/AUTO DIFF WBC: CPT | Performed by: NURSE PRACTITIONER

## 2022-10-13 PROCEDURE — 84100 ASSAY OF PHOSPHORUS: CPT | Performed by: NURSE PRACTITIONER

## 2022-10-13 PROCEDURE — 96374 THER/PROPH/DIAG INJ IV PUSH: CPT

## 2022-10-13 PROCEDURE — 80053 COMPREHEN METABOLIC PANEL: CPT | Performed by: NURSE PRACTITIONER

## 2022-10-13 PROCEDURE — 83735 ASSAY OF MAGNESIUM: CPT | Performed by: NURSE PRACTITIONER

## 2022-10-13 RX ORDER — SODIUM CHLORIDE 9 MG/ML
250 INJECTION, SOLUTION INTRAVENOUS ONCE
Status: COMPLETED | OUTPATIENT
Start: 2022-10-13 | End: 2022-10-13

## 2022-10-13 RX ORDER — ZOLEDRONIC ACID 0.04 MG/ML
4 INJECTION, SOLUTION INTRAVENOUS ONCE
Status: COMPLETED | OUTPATIENT
Start: 2022-10-13 | End: 2022-10-13

## 2022-10-13 RX ADMIN — ZOLEDRONIC ACID 4 MG: 0.04 INJECTION, SOLUTION INTRAVENOUS at 15:58

## 2022-10-13 RX ADMIN — SODIUM CHLORIDE 250 ML: 9 INJECTION, SOLUTION INTRAVENOUS at 15:57

## 2022-10-19 RX ORDER — METFORMIN HYDROCHLORIDE 500 MG/1
TABLET, EXTENDED RELEASE ORAL
Qty: 180 TABLET | Refills: 1 | Status: SHIPPED | OUTPATIENT
Start: 2022-10-19 | End: 2023-03-20

## 2022-10-19 NOTE — TELEPHONE ENCOUNTER
Rx Refill Note  Requested Prescriptions     Pending Prescriptions Disp Refills   • metFORMIN ER (GLUCOPHAGE-XR) 500 MG 24 hr tablet [Pharmacy Med Name: METFORMIN HCL  MG TABLET] 180 tablet 1     Sig: TAKE TWO TABLETS BY MOUTH DAILY WITH DINNER      Last office visit with prescribing clinician: 8/30/2022      Next office visit with prescribing clinician: 3/3/2023            Manish Wray Rep  10/19/22, 12:37 EDT

## 2022-10-20 ENCOUNTER — APPOINTMENT (OUTPATIENT)
Dept: WOMENS IMAGING | Facility: HOSPITAL | Age: 52
End: 2022-10-20

## 2022-10-20 PROCEDURE — 77065 DX MAMMO INCL CAD UNI: CPT | Performed by: RADIOLOGY

## 2022-10-20 PROCEDURE — 77061 BREAST TOMOSYNTHESIS UNI: CPT | Performed by: RADIOLOGY

## 2022-10-20 PROCEDURE — 76642 ULTRASOUND BREAST LIMITED: CPT | Performed by: RADIOLOGY

## 2022-10-20 PROCEDURE — G0279 TOMOSYNTHESIS, MAMMO: HCPCS | Performed by: RADIOLOGY

## 2022-10-25 ENCOUNTER — TELEPHONE (OUTPATIENT)
Dept: ONCOLOGY | Facility: CLINIC | Age: 52
End: 2022-10-25

## 2022-10-25 NOTE — TELEPHONE ENCOUNTER
----- Message from Tim Muñoz MD sent at 10/24/2022 12:15 PM EDT -----  Please inform the patient that the mammogram on 10/20/2022 revealed a suspicious in the left breast.  The radiologist recommended a left ultrasound-guided biopsy.  Please order this test if not already done by her GYN.    Thank you

## 2022-10-25 NOTE — TELEPHONE ENCOUNTER
Reviewed Dr. Muñoz's note with pt, she stated that she has an appointment to have her biopsy done on 11/4. She is also seeing Dr. Huddleston this Friday. The patient will call us if she needs anything before then.

## 2022-11-01 NOTE — PROGRESS NOTES
Subjective   History of Present Illness: Ina Akers is a 51 y.o. female is here today for follow-up on left parietal calvarial lesion. PET scan done on 10/6/22.  She is doing well today.  No new complaints.  Denies any changes in the frequency or severity of her headaches.  Denies any changes in vision.  Denies any strokelike episodes.  Denies any changes in strength or sensation.      This was a televideo visit and we spoke for 5 minutes.  She agreed to receiving her care today by televideo visit.    History of Present Illness    The following portions of the patient's history were reviewed and updated as appropriate: allergies, past family history, past medical history, past social history, past surgical history and problem list.    Past Medical History:   Diagnosis Date   • Anemia     History of recurent iron deficiency   • Compression fracture of fourth lumbar vertebra (HCC)     RECENT KYPHOPLASTY 2020   • COVID-19 virus infection 2021   • Diabetes mellitus (HCC)    • Disease of thyroid gland    • GERD (gastroesophageal reflux disease)    • History of breast cancer 2001    LEFT BREAST MASTECTOMY, CHEMO AND RADIATION    • History of cancer chemotherapy    • History of gastritis    • History of radiation therapy    • History of thyroid nodule     cancer    • Hyperlipidemia    • Hypertension    • Metastatic bone cancer (HCC)     RIB, HIP - AWAITING FINDINGS FOR PRIMARY SOURCE (THYROID VS BREAST)   • PONV (postoperative nausea and vomiting)         Past Surgical History:   Procedure Laterality Date   •  SECTION     • COLONOSCOPY N/A 2021    Procedure: COLONOSCOPY TO CECUM AND TI;  Surgeon: Jefferson Newman MD;  Location: [facility] ENDOSCOPY;  Service: Gastroenterology;  Laterality: N/A;  PRE:SCREENING  POST:DIVERTICULOSIS AND HEMORRHOIDS   • ENDOSCOPY N/A 3/25/2022    Procedure: ESOPHAGOGASTRODUODENOSCOPY WITH COLD BIOPSIES;  Surgeon: Jefferson Newman MD;  Location: [facility]  ENDOSCOPY;  Service: Gastroenterology;  Laterality: N/A;  PRE: iron deficiency anemia  POST: HIATAL HERNIA   • FEMUR IM NAILING/RODDING Right 10/23/2020    Procedure: HIP INTERTROCHCHANTERIC NAILING;  Surgeon: Gretchen Connell MD;  Location: [facility] MAIN OR;  Service: Orthopedics;  Laterality: Right;   • KYPHOPLASTY N/A 09/17/2020    Procedure: Lumbar 4  osteo cool radiofrequency ablation and KYPHOPLASTY;  Surgeon: Vik Rios MD;  Location: [facility] OR 18/19;  Service: Neurosurgery;  Laterality: N/A;   • MASTECTOMY, PARTIAL Left 2001   • RIB BIOPSY  10/2020   • THYROIDECTOMY Left 2020   • THYROIDECTOMY, PARTIAL Right 2018   • VENOUS ACCESS DEVICE (PORT) INSERTION AND REMOVAL            Current Outpatient Medications:   •  acetaminophen (TYLENOL) 500 MG tablet, Take 1,000 mg by mouth Every 6 (Six) Hours As Needed for Mild Pain ., Disp: , Rfl:   •  amLODIPine (NORVASC) 10 MG tablet, Take 10 mg by mouth Every Morning., Disp: , Rfl:   •  atorvastatin (LIPITOR) 20 MG tablet, Take 1 tablet by mouth Daily., Disp: 90 tablet, Rfl: 3  •  Blood Glucose Monitoring Suppl w/Device kit, Check blood sugar twice a day, Disp: 1 each, Rfl: 0  •  cholecalciferol (VITAMIN D3) 25 MCG (1000 UT) tablet, Take 1,000 Units by mouth Daily., Disp: , Rfl:   •  Docusate Calcium (STOOL SOFTENER PO), Take 1 tablet by mouth As Needed. As needed, Disp: , Rfl:   •  famotidine (PEPCID) 20 MG tablet, Take 20 mg by mouth Every Other Day., Disp: , Rfl:   •  ferrous sulfate 325 (65 FE) MG tablet, Take 325 mg by mouth Daily With Breakfast., Disp: , Rfl:   •  Glucose Blood (BLOOD GLUCOSE TEST) strip, Check blood sugar twice a day., Disp: 100 each, Rfl: 1  •  labetalol (NORMODYNE) 100 MG tablet, Take 0.5 tablets by mouth 2 (Two) Times a Day., Disp: 180 tablet, Rfl: 1  •  Lancets (ACCU-CHEK SOFT TOUCH) lancets, Check blood sugar twice a day as needed, Disp: 100 each, Rfl: 1  •  lansoprazole (PREVACID) 30 MG capsule, TAKE ONE CAPSULE BY  MOUTH DAILY (Patient taking differently: Every Other Day.), Disp: 90 capsule, Rfl: 2  •  levothyroxine (SYNTHROID, LEVOTHROID) 112 MCG tablet, Take 112 mcg by mouth Every Morning., Disp: , Rfl:   •  losartan (COZAAR) 100 MG tablet, Take 100 mg by mouth Daily., Disp: , Rfl:   •  metFORMIN ER (GLUCOPHAGE-XR) 500 MG 24 hr tablet, TAKE TWO TABLETS BY MOUTH DAILY WITH DINNER, Disp: 180 tablet, Rfl: 1  •  promethazine (PHENERGAN) 12.5 MG tablet, TAKE 1/2 TO 1 TABLET BY MOUTH EVERY 4 HOURS AS NEEDED FOR NAUSEA AND VOMITING -MAY MAKE DROWSY, Disp: 30 tablet, Rfl: 3  •  triamcinolone (KENALOG) 0.1 % cream, Apply  topically to the appropriate area as directed 2 (Two) Times a Day. Sparingly shortest time possible avoid face, Disp: 80 g, Rfl: 0     Allergies   Allergen Reactions   • Monoclonal Antibodies Anaphylaxis     Regeneron,  High b/p increased heart rate    • Hydrocodone Nausea Only   • Lenvatinib Unknown - Low Severity     Internal bleeding        Social History     Socioeconomic History   • Marital status:      Spouse name: Raghav   • Number of children: 2   Tobacco Use   • Smoking status: Never   • Smokeless tobacco: Never   Vaping Use   • Vaping Use: Never used   Substance and Sexual Activity   • Alcohol use: Yes     Comment: 1 drink monthly   • Drug use: Never   • Sexual activity: Defer        Family History   Problem Relation Age of Onset   • Hypertension Other    • Diabetes Other    • No Known Problems Mother    • No Known Problems Father    • Malig Hyperthermia Neg Hx         Review of Systems    Objective     There were no vitals filed for this visit.  There is no height or weight on file to calculate BMI.      Physical Exam  Neurologic Exam  Face is symmetric  Speech is clear and coherent  No formal physical exam obtained due to being a televideo visit      Assessment & Plan   Independent Review of Radiographic Studies:      I personally reviewed the images from the following studies.  CT/PET scan from  10/6/2022  The CT/PET images were reviewed.  There is no new fractures in the cervical, thoracic, lumbar spine.  No evidence of new lesions.  No further collapse of the previously treated lumbar lesion    Medical Decision Makin-year-old female s/p ostial cool ablation/kyphoplasty of an L4 lesion on 2020  -She has been diagnosed with metastatic thyroid cancer and is being managed by oncology in [facility].  I reviewed the most recent PET scan images.  There is no new lesions in her spine or skull.  She also has a history of a left calvarial lesion.  She reports that she is following up with her oncologist in April with a new CT head.  She would like to follow-up with me after that CT images available.  -I will plan to see her back in 6 months after her new CT head images available    Diagnoses and all orders for this visit:    1. Lumbar spine tumor (Primary)    2. Skull lesion      Return in about 6 months (around 2023).

## 2022-11-04 ENCOUNTER — TELEMEDICINE (OUTPATIENT)
Dept: NEUROSURGERY | Facility: CLINIC | Age: 52
End: 2022-11-04

## 2022-11-04 DIAGNOSIS — D49.2 LUMBAR SPINE TUMOR: Primary | ICD-10-CM

## 2022-11-04 DIAGNOSIS — M89.9 SKULL LESION: ICD-10-CM

## 2022-11-04 PROCEDURE — 99212 OFFICE O/P EST SF 10 MIN: CPT | Performed by: NEUROLOGICAL SURGERY

## 2022-11-23 ENCOUNTER — TELEPHONE (OUTPATIENT)
Dept: FAMILY MEDICINE CLINIC | Facility: CLINIC | Age: 52
End: 2022-11-23

## 2022-11-23 ENCOUNTER — TELEPHONE (OUTPATIENT)
Dept: ONCOLOGY | Facility: CLINIC | Age: 52
End: 2022-11-23

## 2022-11-23 NOTE — TELEPHONE ENCOUNTER
Please call patient today  So sorry to hear of this but encouraged her to keep me in strong nonetheless    If there is something we can do easily  If you and her can complete the form for me   Otherwise schedule appointment soon whether she wants to talk about her new diagnosis or whether she needs to do the FMLA   we will take care of what ever she needs    Thank you

## 2022-11-23 NOTE — TELEPHONE ENCOUNTER
Returned call to pt regarding results. She was just informed by the breast care center at [facility] that her biopsy performed on 11/18 revealed invasive ductal carcinoma on the left breast. All records in Care Everywhere.  She has an apt with an oncologist at  on 12/16. She was thinking she may have her surgery at  but then come here for treatment. She would like Dr. Muñoz's opinion and would like to know when he would like to see her regarding this new diagnosis. Advised pt Dr. Muñoz is out of the office but that I would send him a message to see when he would like to see pt. She v/u. Message sent.

## 2022-11-23 NOTE — TELEPHONE ENCOUNTER
Called spoke to pt in detail. Pt states as of now she is fine and in good spirits as of now doesn't need to come in wants to wait till she sees her dr regarding her diagnosis and will keep us updated. As far as FMLA she is needing to have paperwork for thyroid completed and will do FMLA for her recent diagnosis later.

## 2022-11-23 NOTE — TELEPHONE ENCOUNTER
Caller: Ina Akers    Relationship to patient: Self    Best call back number:[phone]    Patient is needing:   PATIENT IS CALLING TO INFORM APRN-EPLEY THAT SHE WAS DIAGNOSED WITH BREAST CANCER ON HER LEFT SIDE.      PATIENT HAS CONCERNS AND QUESTION ABOUT LA PAPERWORK.

## 2022-11-28 NOTE — TELEPHONE ENCOUNTER
Called pt to let her know that per Dr. Muñoz, he would like to move her apt up 1 week to see her the week of Dec 19th. She v/u. Scheduling to call and get this scheduled for her.

## 2022-12-01 ENCOUNTER — INFUSION (OUTPATIENT)
Dept: ONCOLOGY | Facility: HOSPITAL | Age: 52
End: 2022-12-01
Payer: COMMERCIAL

## 2022-12-01 ENCOUNTER — TELEPHONE (OUTPATIENT)
Dept: ONCOLOGY | Facility: CLINIC | Age: 52
End: 2022-12-01

## 2022-12-01 VITALS
BODY MASS INDEX: 33.57 KG/M2 | WEIGHT: 177.8 LBS | OXYGEN SATURATION: 96 % | HEIGHT: 61 IN | HEART RATE: 72 BPM | RESPIRATION RATE: 18 BRPM | DIASTOLIC BLOOD PRESSURE: 72 MMHG | SYSTOLIC BLOOD PRESSURE: 113 MMHG | TEMPERATURE: 96.5 F

## 2022-12-01 DIAGNOSIS — Z17.1 MALIGNANT NEOPLASM OF UPPER-OUTER QUADRANT OF LEFT BREAST IN FEMALE, ESTROGEN RECEPTOR NEGATIVE: Primary | ICD-10-CM

## 2022-12-01 DIAGNOSIS — C50.412 MALIGNANT NEOPLASM OF UPPER-OUTER QUADRANT OF LEFT BREAST IN FEMALE, ESTROGEN RECEPTOR NEGATIVE: Primary | ICD-10-CM

## 2022-12-01 DIAGNOSIS — IMO0001 METASTATIC BONE CANCER: ICD-10-CM

## 2022-12-01 DIAGNOSIS — C50.412 MALIGNANT NEOPLASM OF UPPER-OUTER QUADRANT OF LEFT BREAST IN FEMALE, ESTROGEN RECEPTOR NEGATIVE: ICD-10-CM

## 2022-12-01 DIAGNOSIS — IMO0001 METASTATIC BONE CANCER: Primary | ICD-10-CM

## 2022-12-01 DIAGNOSIS — Z17.1 MALIGNANT NEOPLASM OF UPPER-OUTER QUADRANT OF LEFT BREAST IN FEMALE, ESTROGEN RECEPTOR NEGATIVE: ICD-10-CM

## 2022-12-01 LAB
ALBUMIN SERPL-MCNC: 4.4 G/DL (ref 3.5–5.2)
ALBUMIN/GLOB SERPL: 1.3 G/DL
ALP SERPL-CCNC: 69 U/L (ref 39–117)
ALT SERPL W P-5'-P-CCNC: 12 U/L (ref 1–33)
ANION GAP SERPL CALCULATED.3IONS-SCNC: 8.3 MMOL/L (ref 5–15)
AST SERPL-CCNC: 14 U/L (ref 1–32)
BASOPHILS # BLD AUTO: 0.04 10*3/MM3 (ref 0–0.2)
BASOPHILS NFR BLD AUTO: 0.7 % (ref 0–1.5)
BILIRUB SERPL-MCNC: 0.4 MG/DL (ref 0–1.2)
BUN SERPL-MCNC: 15 MG/DL (ref 6–20)
BUN/CREAT SERPL: 15.6 (ref 7–25)
CALCIUM SPEC-SCNC: 10.3 MG/DL (ref 8.6–10.5)
CHLORIDE SERPL-SCNC: 104 MMOL/L (ref 98–107)
CO2 SERPL-SCNC: 27.7 MMOL/L (ref 22–29)
CREAT SERPL-MCNC: 0.96 MG/DL (ref 0.57–1)
DEPRECATED RDW RBC AUTO: 48.2 FL (ref 37–54)
EGFRCR SERPLBLD CKD-EPI 2021: 70.5 ML/MIN/1.73
EOSINOPHIL # BLD AUTO: 0.34 10*3/MM3 (ref 0–0.4)
EOSINOPHIL NFR BLD AUTO: 5.9 % (ref 0.3–6.2)
ERYTHROCYTE [DISTWIDTH] IN BLOOD BY AUTOMATED COUNT: 15.8 % (ref 12.3–15.4)
GLOBULIN UR ELPH-MCNC: 3.5 GM/DL
GLUCOSE SERPL-MCNC: 89 MG/DL (ref 65–99)
HCT VFR BLD AUTO: 35.1 % (ref 34–46.6)
HGB BLD-MCNC: 10.7 G/DL (ref 12–15.9)
IMM GRANULOCYTES # BLD AUTO: 0.03 10*3/MM3 (ref 0–0.05)
IMM GRANULOCYTES NFR BLD AUTO: 0.5 % (ref 0–0.5)
LYMPHOCYTES # BLD AUTO: 1.84 10*3/MM3 (ref 0.7–3.1)
LYMPHOCYTES NFR BLD AUTO: 31.8 % (ref 19.6–45.3)
MAGNESIUM SERPL-MCNC: 1.9 MG/DL (ref 1.6–2.6)
MCH RBC QN AUTO: 25.9 PG (ref 26.6–33)
MCHC RBC AUTO-ENTMCNC: 30.5 G/DL (ref 31.5–35.7)
MCV RBC AUTO: 85 FL (ref 79–97)
MONOCYTES # BLD AUTO: 0.6 10*3/MM3 (ref 0.1–0.9)
MONOCYTES NFR BLD AUTO: 10.4 % (ref 5–12)
NEUTROPHILS NFR BLD AUTO: 2.94 10*3/MM3 (ref 1.7–7)
NEUTROPHILS NFR BLD AUTO: 50.7 % (ref 42.7–76)
NRBC BLD AUTO-RTO: 0 /100 WBC (ref 0–0.2)
PHOSPHATE SERPL-MCNC: 3.7 MG/DL (ref 2.5–4.5)
PLATELET # BLD AUTO: 359 10*3/MM3 (ref 140–450)
PMV BLD AUTO: 9.3 FL (ref 6–12)
POTASSIUM SERPL-SCNC: 4 MMOL/L (ref 3.5–5.2)
PROT SERPL-MCNC: 7.9 G/DL (ref 6–8.5)
RBC # BLD AUTO: 4.13 10*6/MM3 (ref 3.77–5.28)
SODIUM SERPL-SCNC: 140 MMOL/L (ref 136–145)
WBC NRBC COR # BLD: 5.79 10*3/MM3 (ref 3.4–10.8)

## 2022-12-01 PROCEDURE — 80053 COMPREHEN METABOLIC PANEL: CPT | Performed by: NURSE PRACTITIONER

## 2022-12-01 PROCEDURE — 85025 COMPLETE CBC W/AUTO DIFF WBC: CPT | Performed by: NURSE PRACTITIONER

## 2022-12-01 PROCEDURE — 96374 THER/PROPH/DIAG INJ IV PUSH: CPT

## 2022-12-01 PROCEDURE — 83735 ASSAY OF MAGNESIUM: CPT | Performed by: NURSE PRACTITIONER

## 2022-12-01 PROCEDURE — 25010000002 ZOLEDRONIC ACID 4 MG/100ML SOLUTION: Performed by: NURSE PRACTITIONER

## 2022-12-01 PROCEDURE — 84100 ASSAY OF PHOSPHORUS: CPT | Performed by: NURSE PRACTITIONER

## 2022-12-01 RX ORDER — SODIUM CHLORIDE 9 MG/ML
250 INJECTION, SOLUTION INTRAVENOUS ONCE
Status: COMPLETED | OUTPATIENT
Start: 2022-12-01 | End: 2022-12-01

## 2022-12-01 RX ORDER — ZOLEDRONIC ACID 0.04 MG/ML
4 INJECTION, SOLUTION INTRAVENOUS ONCE
Status: COMPLETED | OUTPATIENT
Start: 2022-12-01 | End: 2022-12-01

## 2022-12-01 RX ADMIN — ZOLEDRONIC ACID 4 MG: 0.04 INJECTION, SOLUTION INTRAVENOUS at 15:52

## 2022-12-01 RX ADMIN — SODIUM CHLORIDE 250 ML: 9 INJECTION, SOLUTION INTRAVENOUS at 15:30

## 2022-12-01 NOTE — TELEPHONE ENCOUNTER
Patient stated she wants to have her breast surgery in [place], so [facility] has offered to refer her to our breast surgeons. Pt would like Dr. Muñoz's opinion on that plan. Patient also wanted to know if anyone in our office is able to treat her thyroid cancer with radioactive iodine therapy. I will fax the pathology report to [facility] so they can refer the pt and discuss all questions with Dr. Muñoz. She v/u.

## 2022-12-01 NOTE — TELEPHONE ENCOUNTER
Left message on patients identified voicemail that Dr. Muñoz would like to refer the pt to Dr. Sharif for breast surgery consultation. I informed her that we do not offer the radioactive iodine therapy. Left my direct number if pt has any questions, but let her know that I will be out of the office tomorrow.

## 2022-12-02 ENCOUNTER — TELEPHONE (OUTPATIENT)
Dept: SURGERY | Facility: CLINIC | Age: 52
End: 2022-12-02

## 2022-12-02 NOTE — TELEPHONE ENCOUNTER
New Patient appointment with Dr. Sharif:   12/22/22 @ 9:00 am . Patient is aware to arrive 30 minutes prior with paperwork filled out     Patient expressed v/u of appointment date and time.     New patient packet mailed.  12/2/22 @ 9:00 am

## 2022-12-05 ENCOUNTER — HOSPITAL ENCOUNTER (OUTPATIENT)
Dept: MRI IMAGING | Facility: HOSPITAL | Age: 52
Discharge: HOME | End: 2022-12-05
Admitting: SURGERY

## 2022-12-05 ENCOUNTER — TELEPHONE (OUTPATIENT)
Dept: SURGERY | Facility: CLINIC | Age: 52
End: 2022-12-05

## 2022-12-05 DIAGNOSIS — Z17.1 MALIGNANT NEOPLASM OF UPPER-OUTER QUADRANT OF LEFT BREAST IN FEMALE, ESTROGEN RECEPTOR NEGATIVE: ICD-10-CM

## 2022-12-05 DIAGNOSIS — C50.412 MALIGNANT NEOPLASM OF UPPER-OUTER QUADRANT OF LEFT BREAST IN FEMALE, ESTROGEN RECEPTOR NEGATIVE: ICD-10-CM

## 2022-12-05 PROCEDURE — 77049 MRI BREAST C-+ W/CAD BI: CPT

## 2022-12-05 PROCEDURE — A9577 INJ MULTIHANCE: HCPCS | Performed by: SURGERY

## 2022-12-05 PROCEDURE — 0 GADOBENATE DIMEGLUMINE 529 MG/ML SOLUTION: Performed by: SURGERY

## 2022-12-05 RX ADMIN — GADOBENATE DIMEGLUMINE 16.14 ML: 529 INJECTION, SOLUTION INTRAVENOUS at 18:52

## 2022-12-05 NOTE — TELEPHONE ENCOUNTER
Patient is scheduled for a Breast MRI on 12/5 arrival time is 5:15 PM at [facility]    Patient gave a verbal understanding of date/ time / location [place]

## 2022-12-07 ENCOUNTER — LAB REQUISITION (OUTPATIENT)
Dept: LAB | Facility: HOSPITAL | Age: 52
End: 2022-12-07

## 2022-12-07 ENCOUNTER — TELEPHONE (OUTPATIENT)
Dept: NEUROSURGERY | Facility: CLINIC | Age: 52
End: 2022-12-07

## 2022-12-07 DIAGNOSIS — Z00.00 ENCOUNTER FOR GENERAL ADULT MEDICAL EXAMINATION WITHOUT ABNORMAL FINDINGS: ICD-10-CM

## 2022-12-07 NOTE — TELEPHONE ENCOUNTER
Caller: Ina Akers    Relationship: Self    Best call back number: [phone]      Do you require a callback: YES. PT STATES SHE DROPPED OFF A DISC ON 11-3-22 FOR DR. GAMBOA AND SHE WOULD LIKE TO COME BY OFFICE TO PICK IT UP. SHE IN TOWN TODAY SO THAT IS WHY HUB MARKED HIGH PRIORITY.    PLEASE CALL PT TO ADVISE    THANK YOU,

## 2022-12-08 ENCOUNTER — APPOINTMENT (OUTPATIENT)
Dept: MRI IMAGING | Facility: HOSPITAL | Age: 52
End: 2022-12-08

## 2022-12-15 ENCOUNTER — TELEPHONE (OUTPATIENT)
Dept: ONCOLOGY | Facility: CLINIC | Age: 52
End: 2022-12-15

## 2022-12-15 NOTE — TELEPHONE ENCOUNTER
Spoke with patient and informed her that Dr. Muñoz would like to see her after the visit with Dr. Sharif. I have sent a message to scheduling to make these changes. Pt v/u.

## 2022-12-20 ENCOUNTER — APPOINTMENT (OUTPATIENT)
Dept: LAB | Facility: HOSPITAL | Age: 52
End: 2022-12-20
Payer: COMMERCIAL

## 2022-12-22 ENCOUNTER — TRANSCRIBE ORDERS (OUTPATIENT)
Dept: SURGERY | Facility: CLINIC | Age: 52
End: 2022-12-22

## 2022-12-22 ENCOUNTER — OFFICE VISIT (OUTPATIENT)
Dept: SURGERY | Facility: CLINIC | Age: 52
End: 2022-12-22

## 2022-12-22 VITALS
HEIGHT: 61 IN | BODY MASS INDEX: 33.42 KG/M2 | WEIGHT: 177 LBS | RESPIRATION RATE: 18 BRPM | DIASTOLIC BLOOD PRESSURE: 78 MMHG | SYSTOLIC BLOOD PRESSURE: 130 MMHG | OXYGEN SATURATION: 99 % | HEART RATE: 86 BPM

## 2022-12-22 DIAGNOSIS — IMO0001 METASTATIC BONE CANCER: ICD-10-CM

## 2022-12-22 DIAGNOSIS — C50.412 MALIGNANT NEOPLASM OF UPPER-OUTER QUADRANT OF LEFT BREAST IN FEMALE, ESTROGEN RECEPTOR NEGATIVE: Primary | ICD-10-CM

## 2022-12-22 DIAGNOSIS — Z17.1 MALIGNANT NEOPLASM OF UPPER-OUTER QUADRANT OF LEFT BREAST IN FEMALE, ESTROGEN RECEPTOR NEGATIVE: Primary | ICD-10-CM

## 2022-12-22 PROCEDURE — 99205 OFFICE O/P NEW HI 60 MIN: CPT | Performed by: SURGERY

## 2022-12-22 PROCEDURE — G2212 PROLONG OUTPT/OFFICE VIS: HCPCS | Performed by: SURGERY

## 2022-12-22 NOTE — PROGRESS NOTES
[facility]     Referring Provider: Tim Muñoz MD     Chief complaint: Newly diagnosed breast cancer     HPI: Ms. Ina Akers is a 53 yo woman, seen at the request of Dr. Tim Muñoz, for a new diagnosis of left breast cancer. She has a past history of left breast cancer in 2001 (age 30), stage IIB triple negative. She underwent a lumpectomy and axillary dissection, followed by AC and Taxotere, followed by radiation, then tamoxifen for 5 years. She believes she had somewhere around 9-13 left axillary lymph nodes removed and 2 of them had cancer. In 2020, she was diagnosed with metastatic thyroid cancer to the bone. She underwent treatment in 2021 with Lenvima before this was held due to cytopenias. Her disease has been stable off treatment since 2/2021.    Recent screening mammogram in October 2022 showed a new left breast focal asymmetry separate from her lumpectomy site. Around this time she also underwent a PET scan because she was complaining of bone pain. This showed stable bone metastasis and a new FDG avid left breast lesion in the same region as the focal asymmetry. Diagnostic breast imaging confirmed a small hypoechoic mass and subsequent biopsy showed triple negative invasive ductal carcinoma. Her work-up is detailed in the oncologic history below. She denies any breast lumps, pain, skin changes, or nipple discharge. She also has a past history of a benign left breast lumpectomy in 1991.    Her only family history of cancer is oral cancer in her father. The patient has not undergone genetic testing. She was joined today in clinic by her . She gave consent for him to be present during her examination and participate in the discussion.       Oncology/Hematology History   Malignant neoplasm of upper-outer quadrant of left breast in female, estrogen receptor negative (HCC)   11/23/2016 Initial Diagnosis    Malignant neoplasm of upper-outer quadrant of left breast in female,  estrogen receptor negative (CMS/HCC)     9/23/2020 Cancer Staged    Staging form: Breast, AJCC V7  - Pathologic stage from 9/23/2020: Stage IIB (T2, N1, cM0) - Signed by Tim Muñoz MD on 10/9/2020     9/29/2020 - 9/29/2020 Chemotherapy    OP SUPPORTIVE Denosumab (Xgeva) Q28D     9/29/2020 -  Chemotherapy    OP SUPPORTIVE Zoledronic Acid Q28D     8/19/2021 Imaging    Screening MMG with Teodoro ([facility])  There are scattered areas of fibroglandular density. Left breast post-operative change is again seen. There are no suspicious masses, significant calcifications, or other abnormalities seen in either breast.  BI-RADS 2: Benign     10/5/2022 Imaging    Screening MMG with Teodoro ([facility])  There are scattered areas of fibroglandular density.  Finding 1: There is a new fat containing focal asymmetry measuring 6 mm seen in the middle one-third region of the left breast at 12 o'clock  Finding 2: There is a focal asymmetry with associated dystrophic calcifications and post-surgical scar seen in the upper outer region of the left breast. Focal asymmetry in in an area of prior lumpectomy. There are no significant changes from the prior exam(s).   In the right breast, no suspicious masses, significant calcifications or other abnormalities are seen.  BI-RADS 0: Incomplete     10/6/2022 Imaging    PET CT FDG ( [facility])  Chest:  New intensely FDG avid left breast ill-defined nodular focus at the 12 o'clock position measuring .0 x 0.7 cm with a maximum SUV of 5.8 (series 4, image 122).  No significant change in 19 x 15 mm non FDG avid speculated mass within the posterior upper outer quadrant of the left breast with intralesional fat and a peripherally located biopsy clip, consistent with known a post-lumpectomy scar.  Impression:  1. New FDG avid left breast ill-defined nodular focus at the 12 o-clock position, suspicious for malignancy. This could be metastic disease given the increase in non-stimulated  thyroglobulin level. Tissue sampling can be considered for further characterization.  2. No significant change in hyper metabolic well-defined lytic lesion with sclerotic margin within the left ischium compared to the prior 2 PET/CT studies from  yet showing evidence of treatment response compared to outside study from 2020.  3. No new metabolically active osseous metastatic disease.     10/20/2022 Imaging    Left Diagnostic MMG with Teodoro & Left Breast US ([facility])  MMG:  On the present examination, there is isodense focal asymmetry measuring 6 mm in the left breast at 12 o'clock located 5 cm from the nipple.  US:  Demonstrates a round non-parallel complex cystic and solid mass with circumscribed margins measuring 6 x 5 x 5 mm in the left breast at 12 o'clock located 5 cm from the nipple.  BI-RADS 4B: Suspicious     2022 Imaging    Left Diagnostic MMG with Teodoro & Left Breast US ([facility]):  MM. Re-demonstration of 7 mm mass at approximately 12:00-1:00, 6 to 8 cm from the nipple.  2. Stable post-lumpectomy changes in the upper outer left breast.  US:  Focused ultrasound was performed at 1:00, 7 cm from the nipple. Ultrasound demonstrates a hypoechoic round mass with indistinct margins and without internal vascularity measuring 5 x 5 x 4 mm. There is an indistinct hyperechoic halo surrounding the mass. This correlates with mammographic finding.  BI-RADS 4: Suspicious     2022 Biopsy    Left Breast, US-Guided Biopsy ():    LEFT BREAST, 1:00 (7 CM FROM NIPPLE), ULTRASOUND GUIDED CORE NEEDLE BIOPSY FOR 0.7 CM MASS (U CLIP):     - INVASIVE DUCTAL CARCINOMA, poorly differentiated ([place] grade III/III: tubule score=3, nuclear score=3, mitoses score=3), with necrosis, measuring up to 0.5 cm.      - No lymphovascular invasion or in situ carcinoma identified.      ER Negative (<1%)  ID Negative (<1%)  HER2 Negative (IHC 0)      [facility] PATHOLOGY REVIEW    1. Left Breast, 1  o'clock, 7 cm from Nipple, Ultrasound-Guided Core Biopsies for a Mass (Outside Consult [id number]):       INVASIVE MAMMARY CARCINOMA, NO SPECIAL TYPE (INVASIVE DUCTAL CARCINOMA).               A. Histologic grade: Carlos histologic score III (tubules = 3, nuclei = 3, mitosis = 3).               B. Largest contiguous focus measures up to 5 mm.   C. No associated in situ component identified.               D. No lymphovascular nor perineural invasion identified.     12/5/2022 Imaging    Bilateral Breast MRI ([facility]):  In the posterior one third of the left breast at the 12:30 position centered on the order of 8.5 cm from the nipple there is a focal signal void seen within an irregular enhancing mass that measures on the order of 0.9 cm in the superior-inferior dimension, 0.8 cm in anterior to posterior dimension and 0.8 cm and the mediolateral dimension. This represents the biopsy-proven site of malignancy with the internal biopsy clip.  In the anterior one third of the left breast located medial to the plane of the nipple at the 9 o'clock position on the order of 3 cm from the nipple there is an irregular enhancing lesion that measures on the order of 0.5 cm in the superior-inferior dimension, 0.5 cm in anterior to posterior dimension and 0.5 cm in the medial to lateral dimension. A possible mammographic correlate is seen on the CC image of the left breast on the examination of 11/18/2022. This area of enhancement is not seen on the prior breast MRI examination from 12/26/2012.  There is post surgical change of the left breast associated with prior breast conservation therapy. No other areas of abnormal enhancement or morphology are seen within the left breast. I see no evidence for  abnormal left breast skin, nipple or chest wall enhancement and there is no evidence for left axillary or internal mammary chain adenopathy.   There are no areas of abnormal enhancement or morphology in the right breast. I see no  evidence for abnormal right breast skin, nipple or chest wall enhancement and there is no evidence for right axillary or internal mammary chain adenopathy.  BI-RADS 4: Suspicious.     Metastatic bone cancer (HCC)   9/23/2020 Initial Diagnosis    Bone metastasis (CMS/HCC)     9/29/2020 - 9/29/2020 Chemotherapy    OP SUPPORTIVE Denosumab (Xgeva) Q28D     9/29/2020 -  Chemotherapy    OP SUPPORTIVE Zoledronic Acid Q28D         Review of Systems   Constitutional: Negative for appetite change, chills, diaphoresis, fatigue, fever and unexpected weight change.   HENT:   Negative for hearing loss, lump/mass, mouth sores, nosebleeds, sore throat, tinnitus, trouble swallowing and voice change.         History of thyroidectomy   Eyes: Negative for eye problems and icterus.   Respiratory: Negative for chest tightness, cough, hemoptysis, shortness of breath and wheezing.    Cardiovascular: Negative for chest pain, leg swelling and palpitations.   Gastrointestinal: Negative for abdominal distention, abdominal pain, blood in stool, constipation, diarrhea, nausea, rectal pain and vomiting.   Endocrine: Negative for hot flashes.   Genitourinary: Negative for bladder incontinence, difficulty urinating, dyspareunia, dysuria, frequency, hematuria, menstrual problem, nocturia, pelvic pain, vaginal bleeding and vaginal discharge.    Musculoskeletal: Negative for arthralgias, back pain, flank pain, gait problem, myalgias, neck pain and neck stiffness.   Skin: Negative for itching, rash and wound.   Neurological: Negative for dizziness, extremity weakness, gait problem, headaches, light-headedness, numbness, seizures and speech difficulty.   Hematological: Negative for adenopathy. Does not bruise/bleed easily.   Psychiatric/Behavioral: Negative for confusion, decreased concentration, depression, sleep disturbance and suicidal ideas. The patient is not nervous/anxious.    I personally reviewed and updated the  ROS.      Medications:    Current Outpatient Medications:   •  acetaminophen (TYLENOL) 500 MG tablet, Take 1,000 mg by mouth Every 6 (Six) Hours As Needed for Mild Pain ., Disp: , Rfl:   •  amLODIPine (NORVASC) 10 MG tablet, Take 10 mg by mouth Every Morning., Disp: , Rfl:   •  atorvastatin (LIPITOR) 20 MG tablet, Take 1 tablet by mouth Daily., Disp: 90 tablet, Rfl: 3  •  Blood Glucose Monitoring Suppl w/Device kit, Check blood sugar twice a day, Disp: 1 each, Rfl: 0  •  cholecalciferol (VITAMIN D3) 25 MCG (1000 UT) tablet, Take 1,000 Units by mouth Daily., Disp: , Rfl:   •  Docusate Calcium (STOOL SOFTENER PO), Take 1 tablet by mouth As Needed. As needed, Disp: , Rfl:   •  famotidine (PEPCID) 20 MG tablet, Take 20 mg by mouth Every Other Day., Disp: , Rfl:   •  ferrous sulfate 325 (65 FE) MG tablet, Take 325 mg by mouth Daily With Breakfast., Disp: , Rfl:   •  Glucose Blood (BLOOD GLUCOSE TEST) strip, Check blood sugar twice a day., Disp: 100 each, Rfl: 1  •  labetalol (NORMODYNE) 100 MG tablet, Take 0.5 tablets by mouth 2 (Two) Times a Day., Disp: 180 tablet, Rfl: 1  •  Lancets (ACCU-CHEK SOFT TOUCH) lancets, Check blood sugar twice a day as needed, Disp: 100 each, Rfl: 1  •  lansoprazole (PREVACID) 30 MG capsule, TAKE ONE CAPSULE BY MOUTH DAILY (Patient taking differently: Every Other Day.), Disp: 90 capsule, Rfl: 2  •  levothyroxine (SYNTHROID, LEVOTHROID) 112 MCG tablet, Take 112 mcg by mouth Every Morning., Disp: , Rfl:   •  losartan (COZAAR) 100 MG tablet, Take 100 mg by mouth Daily., Disp: , Rfl:   •  metFORMIN ER (GLUCOPHAGE-XR) 500 MG 24 hr tablet, TAKE TWO TABLETS BY MOUTH DAILY WITH DINNER, Disp: 180 tablet, Rfl: 1  •  promethazine (PHENERGAN) 12.5 MG tablet, TAKE 1/2 TO 1 TABLET BY MOUTH EVERY 4 HOURS AS NEEDED FOR NAUSEA AND VOMITING -MAY MAKE DROWSY, Disp: 30 tablet, Rfl: 3  •  triamcinolone (KENALOG) 0.1 % cream, Apply  topically to the appropriate area as directed 2 (Two) Times a Day. Sparingly  shortest time possible avoid face, Disp: 80 g, Rfl: 0      Allergies   Allergen Reactions   • Monoclonal Antibodies Anaphylaxis     Regeneron,  High b/p increased heart rate    • Hydrocodone Nausea Only   • Lenvatinib Unknown - Low Severity     Internal bleeding       Past Medical History:   Diagnosis Date   • Anemia     History of recurent iron deficiency   • Compression fracture of fourth lumbar vertebra (HCC)     RECENT KYPHOPLASTY 2020   • COVID-19 virus infection 2021   • Diabetes mellitus (HCC)    • Disease of thyroid gland    • GERD (gastroesophageal reflux disease)    • History of breast cancer 2001    LEFT BREAST MASTECTOMY, CHEMO AND RADIATION    • History of cancer chemotherapy    • History of gastritis    • History of radiation therapy    • History of thyroid nodule 2018    cancer 2020   • Hyperlipidemia    • Hypertension    • Metastatic bone cancer (HCC)     RIB, HIP - AWAITING FINDINGS FOR PRIMARY SOURCE (THYROID VS BREAST)   • PONV (postoperative nausea and vomiting)        Past Surgical History:   Procedure Laterality Date   •  SECTION     • COLONOSCOPY N/A 2021    Procedure: COLONOSCOPY TO CECUM AND TI;  Surgeon: Jefferson Newman MD;  Location: [facility] ENDOSCOPY;  Service: Gastroenterology;  Laterality: N/A;  PRE:SCREENING  POST:DIVERTICULOSIS AND HEMORRHOIDS   • ENDOSCOPY N/A 3/25/2022    Procedure: ESOPHAGOGASTRODUODENOSCOPY WITH COLD BIOPSIES;  Surgeon: Jefferson Newman MD;  Location: [facility] ENDOSCOPY;  Service: Gastroenterology;  Laterality: N/A;  PRE: iron deficiency anemia  POST: HIATAL HERNIA   • FEMUR IM NAILING/RODDING Right 10/23/2020    Procedure: HIP INTERTROCHCHANTERIC NAILING;  Surgeon: Gretchen Connell MD;  Location: [facility] OR;  Service: Orthopedics;  Laterality: Right;   • KYPHOPLASTY N/A 2020    Procedure: Lumbar 4  osteo cool radiofrequency ablation and KYPHOPLASTY;  Surgeon: Vik Rios MD;  Location: [facility] OR ;   Service: Neurosurgery;  Laterality: N/A;   • MASTECTOMY, PARTIAL Left    • RIB BIOPSY  10/2020   • THYROIDECTOMY Left    • THYROIDECTOMY, PARTIAL Right    • VENOUS ACCESS DEVICE (PORT) INSERTION AND REMOVAL         Family History   Problem Relation Age of Onset   • No Known Problems Mother    • Cancer Father         Oral   • Hypertension Other    • Diabetes Other    • Malig Hyperthermia Neg Hx        Social History     Socioeconomic History   • Marital status:      Spouse name: Raghav   • Number of children: 2   Tobacco Use   • Smoking status: Never   • Smokeless tobacco: Never   Vaping Use   • Vaping Use: Never used   Substance and Sexual Activity   • Alcohol use: Yes     Comment: 1 drink monthly   • Drug use: Never   • Sexual activity: Defer     Patient drinks 4 servings of caffeine per day.       GYNECOLOGIC HISTORY:   . P: 2. AB: 0.  Last menstrual period: Postmenopausal  Age at menarche: 8  Age at first childbirth: 17  Lactation/How lon weeks   Age at menopause: 45  Total years of oral contraceptive use: 9  Total years of hormone replacement therapy: 0      PHYSICAL EXAMINATION:   Vitals:    22 0906   BP: 130/78   Pulse: 86   Resp: 18   SpO2: 99%     ECOG 0 - Asymptomatic  General: NAD, well appearing  Psych: a&o x 3, normal mood and affect  Eyes: EOMI, no scleral icterus  ENMT: neck supple without masses, mucus membranes moist  Resp: normal effort, CTAB  CV: RRR, no murmurs, no edema  GI: soft, NT, ND  MSK: normal gait, normal ROM in bilateral shoulders  Lymph nodes: no cervical, supraclavicular or axillary lymphadenopathy; left axillary postradiation hyperpigmentation  Breast: Significant asymmetry  Right: Very large size, pendulous. Port scar in the upper inner quadrant, otherwise no visible abnormalities on inspection while seated, with arms raised or hands on hips. No masses or nipple abnormalities.  Left: Moderate size, grade 3 ptosis. On inspection there is a medial  radial scar (1991 lumpectomy) and an upper outer quadrant horizontal scar (2001 surgery). At 2:00, there is a nodular area at the lumpectomy site. New mass is not palpable. No nipple abnormalities.    Left breast, in-office ultrasound: At 12:00, 5 cm FN, there is a small hypoechoic mass with biopsy clip visualized.  This is located about 2 cm deep to the skin.  At 9:00, 2 cm FN, there is a small hypoechoic mass with surrounding echogenic halo.  This measures about 2 mm and is located about 5 mm deep to the skin.  This is a possible correlate for the additional lesion seen on MRI.      I have independently reviewed her imaging and here are my findings:   In the left upper breast, there is an irregular mass which measures about 6 mm on ultrasound (5 mm on core), and 9 mm on MRI.  This represents the biopsy-proven malignancy.  Additionally in the left breast at 9:00, 2 to 3 cm FN, there is a suspicious 5 mm mass seen on MRI.  Likely correlate seen on ultrasound today is located immediately beneath her medial areola.      Assessment:  52 y.o. F with a new diagnosis of left breast cancer: High grade, triple negative, possibly multifocal, invasive ductal carcinoma; clinical T1N0, anatomic stage IA, prognostic stage IB.    -She has an additional suspicious lesion in the left breast at 9:00 which has not been biopsied.  -She has a past history of left breast cancer in 2001 (age 30), stage IIB triple negative. She underwent a lumpectomy and axillary dissection, followed by AC and Taxotere, followed by radiation, then tamoxifen for 5 years.  -She has metastatic thyroid cancer diagnosed in 2020 with stable bone metastasis.    Discussion:  I had an extensive discussion with the patient and her family about the nature of her breast cancer diagnosis. We reviewed the components of breast tissue including ducts and lobules. We reviewed her pathology report in detail. We reviewed breast cancer histology, including stage, grade,  ER/MT receptors, HER2 receptors and how this applies to her diagnosis. We reviewed the basics of systemic and local/regional management of breast cancer. I explained that given the time interval between diagnoses, this is likely a new second primary breast cancer.    I explained that sometimes with triple negative breast cancer, we give neoadjuvant chemotherapy. In her case with a small cancer size, a surgery first approach is preferred. She has an appointment scheduled with Dr. Muñoz next week to discuss the role of adjuvant chemotherapy.    Because of her prior left breast radiation, she is not a candidate for breast conservation and will require a mastectomy. We discussed the second lesion seen on MRI. This does not need to undergo biopsy since she will undergo a mastectomy regardless. Because of the retroareolar location of the second lesion and her degree of ptosis, mastectomy will include removal of the nipple-areola-complex. I will refer her to plastic surgery to discuss options for reconstruction.    We discussed the right breast and the role of a contralateral mastectomy. I explained that most breast cancer is not hereditary, however given her personal history of 2 primary breast cancers, as well as triple negative diagnoses, this may play a role in her case. Genetic testing is warranted and was sent today. This could affect surgical decision making. Should the results show a pathologic mutation, I would recommend a contralateral risk-reducing mastectomy. She understands that this would not be for the treatment of her left breast cancer and will not improve her prognosis long term. This would be to reduce her risk of a third, primary breast cancer. With her history of 2 primary breast cancers, even if she is negative for mutation, we can still consider a contralateral risk-reducing mastectomy. For now, she would like to only proceed with the left side.    We discussed axillary staging. Since she has  already undergone an axillary dissection, a sentinel lymph node biopsy may not be possible given the altered lymphatic anatomy. I do think it is worth trying though. I described the procedure for sentinel lymph node biopsy in detail, including the preoperative injection of a radiotracer and intraoperative injection of lymphazurin blue dye. I explained that this is a mapping test and not a cancer test, that all of the lymph nodes containing these dyes will be removed for complete testing by pathology, and that the results could impact the decision for adjuvant treatment or additional surgery.    In her case, if the lymphatics do not map or if they do map, but the sentinel nodes are positive for cancer intraoperatively, I will proceed with a completion dissection. Even if the sentinel nodes are negative intraoperatively, there is a ~10-15% risk that they are positive on final pathology (false negative rate). In this case, she may require a second operation for completion dissection. I explained that axillary node dissection is associated with an increased risk of lymphedema versus sentinel lymph node biopsy (15-30% vs. <10%), that there is an increased risk of permanent upper inner arm numbness, and a risk of injury to motor nerves that control the shoulder muscles.     I described additional risks and potential complications associated with surgery, including, but not limited to, bleeding, infection, complications related to blue dye, lymphedema, deformity/poor cosmetic result, chronic pain, neurovascular injury, numbness, seroma, hematoma, deep venous thrombosis, skin flap necrosis, disease recurrence and the possibility of requiring additional surgery. We also discussed other treatment options including the option of not undergoing any surgical treatment and the risks associated with this including disease progression. She expressed an understanding of these factors and wished to proceed.    Plan:  -F/u genetic  testing. I will call her with results.  -Preoperative lymphedema clinic evaluation due to the reoperative axilla.  -Plastic surgery referral.   -Left skin-sparing mastectomy and sentinel lymph node biopsy, possible axillary dissection, with immediate reconstruction.     Shavonne Sharif MD    I spent Sherria minutes caring for 110 on this date of service. This time includes time spent by me in the following activities: preparing for the visit, performing a medically appropriate examination and/or evaluation , counseling and educating the patient/family/caregiver, ordering medications, tests, or procedures, referring and communicating with other health care professionals , documenting information in the medical record and independently interpreting results and communicating that information with the patient/family/caregiver.      CC:  Khalid Z. Ghosheh, MD James Epley, APRN Lori Warren, MD Ryan Wermeling, MD

## 2022-12-27 ENCOUNTER — LAB (OUTPATIENT)
Dept: LAB | Facility: HOSPITAL | Age: 52
End: 2022-12-27
Payer: COMMERCIAL

## 2022-12-27 ENCOUNTER — OFFICE VISIT (OUTPATIENT)
Dept: ONCOLOGY | Facility: CLINIC | Age: 52
End: 2022-12-27

## 2022-12-27 ENCOUNTER — APPOINTMENT (OUTPATIENT)
Dept: ONCOLOGY | Facility: HOSPITAL | Age: 52
End: 2022-12-27
Payer: COMMERCIAL

## 2022-12-27 VITALS
RESPIRATION RATE: 18 BRPM | WEIGHT: 179.6 LBS | HEART RATE: 80 BPM | OXYGEN SATURATION: 96 % | SYSTOLIC BLOOD PRESSURE: 136 MMHG | BODY MASS INDEX: 33.91 KG/M2 | HEIGHT: 61 IN | DIASTOLIC BLOOD PRESSURE: 92 MMHG | TEMPERATURE: 97.3 F

## 2022-12-27 DIAGNOSIS — Z17.1 MALIGNANT NEOPLASM OF UPPER-OUTER QUADRANT OF LEFT BREAST IN FEMALE, ESTROGEN RECEPTOR NEGATIVE: Primary | ICD-10-CM

## 2022-12-27 DIAGNOSIS — IMO0001 METASTATIC BONE CANCER: ICD-10-CM

## 2022-12-27 DIAGNOSIS — Z85.3 HISTORY OF LEFT BREAST CANCER: ICD-10-CM

## 2022-12-27 DIAGNOSIS — C73 THYROID CANCER: ICD-10-CM

## 2022-12-27 DIAGNOSIS — C79.51 BONE METASTASIS: ICD-10-CM

## 2022-12-27 DIAGNOSIS — D50.9 IRON DEFICIENCY ANEMIA, UNSPECIFIED IRON DEFICIENCY ANEMIA TYPE: ICD-10-CM

## 2022-12-27 DIAGNOSIS — C50.412 MALIGNANT NEOPLASM OF UPPER-OUTER QUADRANT OF LEFT BREAST IN FEMALE, ESTROGEN RECEPTOR NEGATIVE: Primary | ICD-10-CM

## 2022-12-27 DIAGNOSIS — Z17.1 MALIGNANT NEOPLASM OF UPPER-OUTER QUADRANT OF LEFT BREAST IN FEMALE, ESTROGEN RECEPTOR NEGATIVE: ICD-10-CM

## 2022-12-27 DIAGNOSIS — C50.412 MALIGNANT NEOPLASM OF UPPER-OUTER QUADRANT OF LEFT BREAST IN FEMALE, ESTROGEN RECEPTOR NEGATIVE: ICD-10-CM

## 2022-12-27 LAB
ALBUMIN SERPL-MCNC: 4.3 G/DL (ref 3.5–5.2)
ALBUMIN/GLOB SERPL: 1.3 G/DL (ref 1.1–2.4)
ALP SERPL-CCNC: 69 U/L (ref 38–116)
ALT SERPL W P-5'-P-CCNC: 14 U/L (ref 0–33)
ANION GAP SERPL CALCULATED.3IONS-SCNC: 12.6 MMOL/L (ref 5–15)
AST SERPL-CCNC: 16 U/L (ref 0–32)
BASOPHILS # BLD AUTO: 0.02 10*3/MM3 (ref 0–0.2)
BASOPHILS NFR BLD AUTO: 0.4 % (ref 0–1.5)
BILIRUB SERPL-MCNC: 0.3 MG/DL (ref 0.2–1.2)
BUN SERPL-MCNC: 16 MG/DL (ref 6–20)
BUN/CREAT SERPL: 17.2 (ref 7.3–30)
CALCIUM SPEC-SCNC: 9.7 MG/DL (ref 8.5–10.2)
CHLORIDE SERPL-SCNC: 106 MMOL/L (ref 98–107)
CO2 SERPL-SCNC: 23.4 MMOL/L (ref 22–29)
CREAT SERPL-MCNC: 0.93 MG/DL (ref 0.6–1.1)
DEPRECATED RDW RBC AUTO: 48.5 FL (ref 37–54)
EGFRCR SERPLBLD CKD-EPI 2021: 73.2 ML/MIN/1.73
EOSINOPHIL # BLD AUTO: 0.32 10*3/MM3 (ref 0–0.4)
EOSINOPHIL NFR BLD AUTO: 5.8 % (ref 0.3–6.2)
ERYTHROCYTE [DISTWIDTH] IN BLOOD BY AUTOMATED COUNT: 15.4 % (ref 12.3–15.4)
FERRITIN SERPL-MCNC: 95.4 NG/ML (ref 11–207)
GLOBULIN UR ELPH-MCNC: 3.4 GM/DL (ref 1.8–3.5)
GLUCOSE SERPL-MCNC: 102 MG/DL (ref 74–124)
HCT VFR BLD AUTO: 34.6 % (ref 34–46.6)
HGB BLD-MCNC: 10.4 G/DL (ref 12–15.9)
IMM GRANULOCYTES # BLD AUTO: 0.02 10*3/MM3 (ref 0–0.05)
IMM GRANULOCYTES NFR BLD AUTO: 0.4 % (ref 0–0.5)
IRON 24H UR-MRATE: 46 MCG/DL (ref 37–145)
IRON SATN MFR SERPL: 14 % (ref 14–48)
LYMPHOCYTES # BLD AUTO: 1.99 10*3/MM3 (ref 0.7–3.1)
LYMPHOCYTES NFR BLD AUTO: 36.1 % (ref 19.6–45.3)
MCH RBC QN AUTO: 25.8 PG (ref 26.6–33)
MCHC RBC AUTO-ENTMCNC: 30.1 G/DL (ref 31.5–35.7)
MCV RBC AUTO: 85.9 FL (ref 79–97)
MONOCYTES # BLD AUTO: 0.47 10*3/MM3 (ref 0.1–0.9)
MONOCYTES NFR BLD AUTO: 8.5 % (ref 5–12)
NEUTROPHILS NFR BLD AUTO: 2.7 10*3/MM3 (ref 1.7–7)
NEUTROPHILS NFR BLD AUTO: 48.8 % (ref 42.7–76)
NRBC BLD AUTO-RTO: 0 /100 WBC (ref 0–0.2)
PLATELET # BLD AUTO: 331 10*3/MM3 (ref 140–450)
PMV BLD AUTO: 8.7 FL (ref 6–12)
POTASSIUM SERPL-SCNC: 4 MMOL/L (ref 3.5–4.7)
PROT SERPL-MCNC: 7.7 G/DL (ref 6.3–8)
RBC # BLD AUTO: 4.03 10*6/MM3 (ref 3.77–5.28)
SODIUM SERPL-SCNC: 142 MMOL/L (ref 134–145)
TIBC SERPL-MCNC: 319 MCG/DL (ref 249–505)
TRANSFERRIN SERPL-MCNC: 228 MG/DL (ref 200–360)
WBC NRBC COR # BLD: 5.52 10*3/MM3 (ref 3.4–10.8)

## 2022-12-27 PROCEDURE — 85025 COMPLETE CBC W/AUTO DIFF WBC: CPT

## 2022-12-27 PROCEDURE — 82728 ASSAY OF FERRITIN: CPT

## 2022-12-27 PROCEDURE — 80053 COMPREHEN METABOLIC PANEL: CPT

## 2022-12-27 PROCEDURE — 36415 COLL VENOUS BLD VENIPUNCTURE: CPT

## 2022-12-27 PROCEDURE — 84466 ASSAY OF TRANSFERRIN: CPT

## 2022-12-27 PROCEDURE — 99215 OFFICE O/P EST HI 40 MIN: CPT | Performed by: INTERNAL MEDICINE

## 2022-12-27 PROCEDURE — 83540 ASSAY OF IRON: CPT

## 2022-12-27 NOTE — PROGRESS NOTES
Subjective     CHIEF COMPLAINT:      Chief Complaint   Patient presents with   • Follow-up       HISTORY OF PRESENT ILLNESS:     Ina Akers is a 52 y.o. female patient who returns today for follow up on her newly diagnosed left breast cancer.  She has prior history of left breast cancer, triple negative and status postlumpectomy, adjuvant chemotherapy and radiation therapy.    Patient had her annual mammogram when she had her visit with her gynecologist.  The initial mammogram was on 10/5/2022.  She had a diagnostic mammogram and ultrasound which confirmed the presence of the lesion which measured 6 x 5 x 5 mm.  No suspicious lymphadenopathy.    Patient had a PET scan at [facility] on 10/6/2022 to evaluate the status of her thyroid cancer given that she was having intermittent pain in her bones.  It showed the abnormal left breast lesion.  There were no new bone lesions.  No evidence of metastatic disease.    Patient was referred to breast surgery and was seen by Dr. Sharif.  She had genetic testing requested.  She is going to have left mastectomy on 1/24/2023.    ROS:  Pertinent ROS is in the HPI.     Past medical, surgical, social and family history were reviewed.     MEDICATIONS:    Current Outpatient Medications:   •  acetaminophen (TYLENOL) 500 MG tablet, Take 1,000 mg by mouth Every 6 (Six) Hours As Needed for Mild Pain ., Disp: , Rfl:   •  amLODIPine (NORVASC) 10 MG tablet, Take 10 mg by mouth Every Morning., Disp: , Rfl:   •  atorvastatin (LIPITOR) 20 MG tablet, Take 1 tablet by mouth Daily., Disp: 90 tablet, Rfl: 3  •  Blood Glucose Monitoring Suppl w/Device kit, Check blood sugar twice a day, Disp: 1 each, Rfl: 0  •  cholecalciferol (VITAMIN D3) 25 MCG (1000 UT) tablet, Take 1,000 Units by mouth Daily., Disp: , Rfl:   •  Docusate Calcium (STOOL SOFTENER PO), Take 1 tablet by mouth As Needed. As needed, Disp: , Rfl:   •  famotidine (PEPCID) 20 MG tablet, Take 20 mg by mouth Every Other  "Day., Disp: , Rfl:   •  ferrous sulfate 325 (65 FE) MG tablet, Take 325 mg by mouth Daily With Breakfast., Disp: , Rfl:   •  Glucose Blood (BLOOD GLUCOSE TEST) strip, Check blood sugar twice a day., Disp: 100 each, Rfl: 1  •  labetalol (NORMODYNE) 100 MG tablet, Take 0.5 tablets by mouth 2 (Two) Times a Day., Disp: 180 tablet, Rfl: 1  •  Lancets (ACCU-CHEK SOFT TOUCH) lancets, Check blood sugar twice a day as needed, Disp: 100 each, Rfl: 1  •  lansoprazole (PREVACID) 30 MG capsule, TAKE ONE CAPSULE BY MOUTH DAILY (Patient taking differently: Every Other Day.), Disp: 90 capsule, Rfl: 2  •  levothyroxine (SYNTHROID, LEVOTHROID) 112 MCG tablet, Take 112 mcg by mouth Every Morning., Disp: , Rfl:   •  losartan (COZAAR) 100 MG tablet, Take 100 mg by mouth Daily., Disp: , Rfl:   •  metFORMIN ER (GLUCOPHAGE-XR) 500 MG 24 hr tablet, TAKE TWO TABLETS BY MOUTH DAILY WITH DINNER, Disp: 180 tablet, Rfl: 1  •  promethazine (PHENERGAN) 12.5 MG tablet, TAKE 1/2 TO 1 TABLET BY MOUTH EVERY 4 HOURS AS NEEDED FOR NAUSEA AND VOMITING -MAY MAKE DROWSY, Disp: 30 tablet, Rfl: 3  •  triamcinolone (KENALOG) 0.1 % cream, Apply  topically to the appropriate area as directed 2 (Two) Times a Day. Sparingly shortest time possible avoid face, Disp: 80 g, Rfl: 0  Objective     VITAL SIGNS:     Vitals:    12/27/22 1327   BP: 136/92   Pulse: 80   Resp: 18   Temp: 97.3 °F (36.3 °C)   TempSrc: Temporal   SpO2: 96%   Weight: 81.5 kg (179 lb 9.6 oz)   Height: 154.9 cm (60.98\")   PainSc: 0-No pain     Body mass index is 33.95 kg/m².     Wt Readings from Last 5 Encounters:   12/27/22 81.5 kg (179 lb 9.6 oz)   12/22/22 80.3 kg (177 lb)   12/01/22 80.6 kg (177 lb 12.8 oz)   10/13/22 82.1 kg (181 lb)   09/15/22 82.1 kg (181 lb)     PHYSICAL EXAMINATION:   GENERAL: The patient appears in good general condition, not in acute distress.   SKIN: No ecchymosis.  EYES: No jaundice. No pallor.  LYMPHATICS: No cervical, supraclavicular or axillary " lymphadenopathy.  BREASTS: Left breast exam revealed a tender area at the 12 o'clock position measuring 1 cm in size.  CHEST: Normal respiratory effort.  Lungs clear bilaterally.  No added sounds.  Mild tenderness in the right lateral ribs.  CVS: Normal S1-S2.  No murmurs.  ABDOMEN: Nondistended.  EXTREMITIES: No noted deformity.     DIAGNOSTIC DATA:     Results from last 7 days   Lab Units 12/27/22  1324   WBC 10*3/mm3 5.52   NEUTROS ABS 10*3/mm3 2.70   HEMOGLOBIN g/dL 10.4*   HEMATOCRIT % 34.6   PLATELETS 10*3/mm3 331     Results from last 7 days   Lab Units 12/27/22  1324   SODIUM mmol/L 142   POTASSIUM mmol/L 4.0   CHLORIDE mmol/L 106   CO2 mmol/L 23.4   BUN mg/dL 16   CREATININE mg/dL 0.93   CALCIUM mg/dL 9.7   ALBUMIN g/dL 4.3   BILIRUBIN mg/dL 0.3   ALK PHOS U/L 69   ALT (SGPT) U/L 14   AST (SGOT) U/L 16   GLUCOSE mg/dL 102         Lab 12/27/22  1324   IRON 46   IRON SATURATION 14   TIBC 319   TRANSFERRIN 228   FERRITIN 95.40      FDG PET scan on 10/6/2022:  1.New FDG avid left breast ill-defined nodular focus at the 12 o-clock position, suspicious for malignancy. This could be metastatic disease given the increase in non-stimulated thyroglobulin level. Tissue sampling cn be considered for further characterization.     2.No significant change in hypermetabolic well-defined lytic lesion with sclerotic margin within the left ischium compared to the prior 2 PET/CT studies from 2021 yet showing evidence of treatment response compared to outside study from 9/9/2020.     3.No new metabolically active osseous metastatic disease.     Left breast diagnostic mammogram on 10/20/2022:  6 X 5 x 5 mm in the left breast at the 12 o'clock position 5 cm from the nipple. Mass was considered suspicious.    Ultrasound-guided biopsy is recommended.    MRI breasts on 12/5/2022:  1. Biopsy-proven malignancy in the left breast at the 12:30 position in  the posterior one third that measures on the order of 0.9 cm in  greatest  dimension and contains an internal metallic clip. No evidence for left  axillary adenopathy is appreciated.  2. There is a 0.5 cm irregular enhancing focus within the anterior one  third of the left breast at the 9 o'clock position on the order 3 cm  from nipple that is not seen on a prior breast MRI from 2012. A possible  mammographic correlate is seen on the CC image of the left breast dated  11/18/2022. I have marked that image designating the suspected  correlate. Biopsy of the lesion is recommended. Further evaluation with  diagnostic mammography and left breast sonography is recommended to  determine a correlate for possible biopsy as this area is close to the  nipple and is likely best biopsied under sonographic or  stereotactic/Tomosynthesis guidance. However, if no reliable correlate  is seen following additional diagnostic imaging then MR guided biopsy  can be attempted.  3. There are no findings suspicious for malignancy in the right breast.    Pathology exam from 12/7/2022:  1. Left Breast, 1 o'clock, 7 cm from Nipple, Ultrasound-Guided Core Biopsies for a Mass (Outside Consult [id number]):       INVASIVE MAMMARY CARCINOMA, NO SPECIAL TYPE (INVASIVE DUCTAL CARCINOMA).               A. Histologic grade: Browning histologic score III (tubules = 3, nuclei = 3, mitosis = 3).               B. Largest contiguous focus measures up to 5 mm.   C. No associated in situ component identified.               D. No lymphovascular nor perineural invasion identified.      Assessment & Plan    *Metastatic thyroid cancer diagnosed on 10/6/2020.  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  · She had right thyroid lobectomy on 12/17/2018 at [place].    · The specimen was sent for outside consultation and the final conclusion was that there was no evidence of malignancy.  · MRI on 8/27/2020 revealed an L4 lesion resulting in pathologic compression fracture.  · Bone scan on 9/2/2020 revealed  increased activity at L4 and left side of the skull.  · PET scan on 9/9/2020 revealed abnormal activity at L4, right femoral neck, lower sacrum, left ischium and right ninth medial aspect.  · PET scan 9/9/2020 showed no evidence of visceral metastasis.  · Patient had kyphoplasty on 9/17/2020.  Pathology was nondiagnostic.  · CT-guided biopsy of the lesion in the right posterior ninth rib on 10/6/2020 revealed a clear cell neoplasm.  It is positive for PAX 8 and TTF-1 favoring metastatic thyroid cancer.  · Patient was [facility].  · Patient underwent completion thyroidectomy on 11/9/2020.  · There was no evidence of uptake of radioactive iodine on SPECT imaging on 12/18/2020.  · This was indicative of the tumor not going to respond to radioactive iodine therapy.  · Patient was started on Lenvima 20 mg daily on 12/24/2020.  · Lenvima was held on 2/6/2021 due to drug induced anemia and thrombocytopenia.  She was unable to start back on it due to prolonged cytopenias.  · CT scans on 3/23/2021 showed stable disease.  · CBC was stable on 6/8/2021.  CT scans showed stable disease.  Thyroglobulin was stable at 3.4.    · PET scan on 9/1/2021 revealed decrease in the activity in the metastatic lesions.  Starting back on active treatment (Nexavar) was not recommended.  · PET scan on 12/16/2021 showed complete response except for mild hypermetabolism in the left ischium.  · CT scans on 4/8/2022 showed stable bone metastasis.  No new metastatic lesions.  · PET scan on 10/6/2022 revealed no new areas of metastasis.  · Due to the stable metastatic disease, she is not currently on treatment for her thyroid cancer.   · She continues thyroid replacement with Synthroid 112 mcg daily.      *Newly diagnosed left breast cancer.  · It was initially identified on her screening mammograms on 10/5/2022.  · This is located at 12 o'clock position.  · It measured 6 x 5 by 5 mm on mammogram and ultrasound on  10/20/2022.  · PET scan on 10/6/2022 showed the lesion to be hypermetabolic. It measured 10 x 7 mm with an SUV of 5.8.  · No evidence of metastasis on PET scan.  · MRI on 12/5/2022 showed the lesion to measure 9 mm.    · No suspicious lymphadenopathy.   · Since the patient's thyroid cancer is stable, I recommended proceeding with a left mastectomy with goal of cure from the breast cancer.    *History of left breast cancer, stage IIB, triIple negative.   · S/P lumpectomy in 2001.  · She received adjuvant chemotherapy on the NSABP B34 with AC followed by Taxotere.  · She received post-lumpectomy radiation therapy.   · She was placed on tamoxifen for 5 years and it was completed in January 2007.   · With the patient's history of breast cancer at a young age and development of a second new breast cancer in the left breast, I recommended repeating the genetic testing.  · If it is positive, I would recommend prophylactic right mastectomy.    *Bone metastasis.  · Patient was given Zometa on 9/29/2020.  · Patient underwent right femur medullary nailing on 10/23/2020.  · Patient was restarted back on Zometa on 2/24/2022.   · She is tolerating the Zometa infusions well.  · CT scans on 4/8/2022 revealed no new metastatic lesions.  The metastatic lesions were stable.  · PET scan on 10/6/2022 revealed no change in the areas of activity in the bone metastasis.  No new lesions.    * Iron deficiency anemia.    · Patient was previously treated with ferrous sulfate 325 mg daily.  · Hemoglobin decreased to 10.6 on 9/3/2020.    · Hemoglobin decreased to 9.4 in January 2021.  However, her iron studies did not show evidence of deficiency at this point.  The anemia is therefore attributed to underlying malignancy and  Lenvima.  Lenvima was put on hold in February 2021.  · Patient's hemoglobin improved to 10.4 on 4/16/2021.  · Hemoglobin decreased to 10.4 on 6/23/2021.  Iron stores decreased with transferrin saturation decreasing to  12%.  · She was restarted back on ferrous sulfate 325 mg daily.  However, Dr. Huddleston asked her to stop the iron.  · Hemoglobin decreased to 9.1 on 2/24/2022.  · EGD on 3/25/2022 showed no evidence of blood loss.  · Pathology exam revealed no typical features of celiac disease.  · Hemoglobin is 10.4 today.  · Ferritin is 95 and transferrin saturation is at 14%.  · She is not taking oral iron at present.      PLAN:    1.  Patient will have her left mastectomy on 1/24/2023.  We will review the pathology exam of the mastectomy for final determination of the size of the tumor and the status of the lymph nodes.  2.  I did not recommend having the port placed at the same time as the mastectomy.  3.  I explained to the patient that the mastectomy is goal is to achieve local control.  I explained that systemic chemotherapy goal is to decrease the risk of systemic recurrence.   4.  Start back on ferrous sulfate 325 mg daily.  5.  Follow-up in 4 weeks to review the pathology exam.  CBC CMP ferritin iron panel will be obtained.  6.  Plan to give the next dose of Zometa in February 2023.    I spent 60 minutes caring for Ina on this date of service. This time includes time spent by me in the following activities: preparing for the visit, reviewing tests, obtaining and/or reviewing a separately obtained history, performing a medically appropriate examination and/or evaluation, counseling and educating the patient/family/caregiver, ordering medications, tests, or procedures, documenting information in the medical record, independently interpreting results and communicating that information with the patient/family/caregiver and care coordination      Tim Muñoz MD  12/27/22

## 2022-12-29 ENCOUNTER — APPOINTMENT (OUTPATIENT)
Dept: ONCOLOGY | Facility: HOSPITAL | Age: 52
End: 2022-12-29
Payer: COMMERCIAL

## 2022-12-29 ENCOUNTER — TELEPHONE (OUTPATIENT)
Dept: ONCOLOGY | Facility: CLINIC | Age: 52
End: 2022-12-29

## 2022-12-29 ENCOUNTER — PREP FOR SURGERY (OUTPATIENT)
Dept: OTHER | Facility: HOSPITAL | Age: 52
End: 2022-12-29
Payer: COMMERCIAL

## 2022-12-29 DIAGNOSIS — C50.412 MALIGNANT NEOPLASM OF UPPER-OUTER QUADRANT OF LEFT BREAST IN FEMALE, ESTROGEN RECEPTOR NEGATIVE: Primary | ICD-10-CM

## 2022-12-29 DIAGNOSIS — Z17.1 MALIGNANT NEOPLASM OF UPPER-OUTER QUADRANT OF LEFT BREAST IN FEMALE, ESTROGEN RECEPTOR NEGATIVE: Primary | ICD-10-CM

## 2022-12-29 RX ORDER — HEPARIN SODIUM 5000 [USP'U]/ML
5000 INJECTION, SOLUTION INTRAVENOUS; SUBCUTANEOUS
Status: CANCELLED | OUTPATIENT
Start: 2023-01-24 | End: 2023-01-25

## 2022-12-29 RX ORDER — DIAZEPAM 5 MG/1
10 TABLET ORAL ONCE
Status: CANCELLED | OUTPATIENT
Start: 2023-01-24 | End: 2022-12-29

## 2022-12-29 NOTE — TELEPHONE ENCOUNTER
Caller: Ina Akers    Relationship to patient: Self    Best call back number: [phone]    Type of visit: LAB    Requested date:2/1/2023 NEEDS TO BE AT [facility]     If rescheduling, when is the original appointment: 2/1/2023

## 2023-01-03 ENCOUNTER — TRANSCRIBE ORDERS (OUTPATIENT)
Dept: SURGERY | Facility: CLINIC | Age: 53
End: 2023-01-03
Payer: COMMERCIAL

## 2023-01-03 ENCOUNTER — TELEPHONE (OUTPATIENT)
Dept: SURGERY | Facility: CLINIC | Age: 53
End: 2023-01-03
Payer: COMMERCIAL

## 2023-01-03 DIAGNOSIS — C50.412 MALIGNANT NEOPLASM OF UPPER-OUTER QUADRANT OF LEFT BREAST IN FEMALE, ESTROGEN RECEPTOR NEGATIVE: Primary | ICD-10-CM

## 2023-01-03 DIAGNOSIS — Z17.1 MALIGNANT NEOPLASM OF UPPER-OUTER QUADRANT OF LEFT BREAST IN FEMALE, ESTROGEN RECEPTOR NEGATIVE: Primary | ICD-10-CM

## 2023-01-03 NOTE — TELEPHONE ENCOUNTER
Surgery schedule and patient surgery info packet mailed.    Spoke with patient and she is aware.

## 2023-01-06 ENCOUNTER — TELEPHONE (OUTPATIENT)
Dept: SURGERY | Facility: CLINIC | Age: 53
End: 2023-01-06
Payer: COMMERCIAL

## 2023-01-06 NOTE — TELEPHONE ENCOUNTER
I called patient and let her know that her genetic testing was negative for a mutation.      Dr. Sharif will give her a copy of the results at her postoperative appointment.

## 2023-01-09 ENCOUNTER — PATIENT OUTREACH (OUTPATIENT)
Dept: OTHER | Facility: HOSPITAL | Age: 53
End: 2023-01-09
Payer: COMMERCIAL

## 2023-01-09 NOTE — PROGRESS NOTES
Referral received from Dr. Sharif's office. Called Ms. Akers and left a message introducing myself and navigational services. Asked her to call me back at her convenience and left my contact information.        Ms. Robinson called me back and I introduced myself and navigational services. She stated the consult with Dr. Sharif went well and she will have a mastectomy with reconstruction on Jan 24th.  lumpectomy. She has a good understanding of her pathology and treatment options. She was able to verbalize teach back on her plan of care.      She stated she has a wonderful support system with her family and [employer] family. She stated she feels comfortable talking to them about needs or issues.      She stated she has no financial or transportation concerns at this time. She has no resource needs or ongoing concerns at this time.      We discussed we have support options if the need arises. She was thankful for the information.      We discussed integrative therapies and other services at [facility]. She will received a navigation folder with the following information in the mail:     [facility], Cancer and Restorative Exercise (CARE), [employer] Exercise program, Guide for the Newly Diagnosed, [facility], [facility], Massage Therapy, Reiki Therapy, [facility] [employer] [place], Cancer Nutrition, and Survivorship Clinic.     She verbalized appreciation for navigational services and she has my contact information and will call with any questions that arise.

## 2023-01-12 ENCOUNTER — TELEPHONE (OUTPATIENT)
Dept: SURGERY | Facility: CLINIC | Age: 53
End: 2023-01-12
Payer: COMMERCIAL

## 2023-01-12 NOTE — TELEPHONE ENCOUNTER
I called 30 gram tube 2.5% EMLA Cream to the patients following pharmacy    Directions are to apply typically to the LEFT areola and the skin just beyond it on the morning of surgery.     I also called and let patient know that I have called in the following prescription the preferred pharmacy.    [facility] [id number] - [facility] [address] [facility] [phone] [facility] [phone] FX

## 2023-01-16 ENCOUNTER — PRE-ADMISSION TESTING (OUTPATIENT)
Dept: PREADMISSION TESTING | Facility: HOSPITAL | Age: 53
End: 2023-01-16
Payer: COMMERCIAL

## 2023-01-16 VITALS
DIASTOLIC BLOOD PRESSURE: 67 MMHG | RESPIRATION RATE: 16 BRPM | HEIGHT: 60 IN | TEMPERATURE: 98.3 F | OXYGEN SATURATION: 97 % | WEIGHT: 178 LBS | SYSTOLIC BLOOD PRESSURE: 104 MMHG | HEART RATE: 69 BPM | BODY MASS INDEX: 34.95 KG/M2

## 2023-01-16 DIAGNOSIS — C50.412 MALIGNANT NEOPLASM OF UPPER-OUTER QUADRANT OF LEFT BREAST IN FEMALE, ESTROGEN RECEPTOR NEGATIVE: ICD-10-CM

## 2023-01-16 DIAGNOSIS — Z17.1 MALIGNANT NEOPLASM OF UPPER-OUTER QUADRANT OF LEFT BREAST IN FEMALE, ESTROGEN RECEPTOR NEGATIVE: ICD-10-CM

## 2023-01-16 LAB
ANION GAP SERPL CALCULATED.3IONS-SCNC: 10.7 MMOL/L (ref 5–15)
BUN SERPL-MCNC: 18 MG/DL (ref 6–20)
BUN/CREAT SERPL: 19.6 (ref 7–25)
CALCIUM SPEC-SCNC: 9.1 MG/DL (ref 8.6–10.5)
CHLORIDE SERPL-SCNC: 104 MMOL/L (ref 98–107)
CO2 SERPL-SCNC: 25.3 MMOL/L (ref 22–29)
CREAT SERPL-MCNC: 0.92 MG/DL (ref 0.57–1)
DEPRECATED RDW RBC AUTO: 42.4 FL (ref 37–54)
EGFRCR SERPLBLD CKD-EPI 2021: 74.1 ML/MIN/1.73
ERYTHROCYTE [DISTWIDTH] IN BLOOD BY AUTOMATED COUNT: 14.1 % (ref 12.3–15.4)
GLUCOSE SERPL-MCNC: 94 MG/DL (ref 65–99)
HCT VFR BLD AUTO: 30.8 % (ref 34–46.6)
HGB BLD-MCNC: 9.6 G/DL (ref 12–15.9)
MCH RBC QN AUTO: 25.9 PG (ref 26.6–33)
MCHC RBC AUTO-ENTMCNC: 31.2 G/DL (ref 31.5–35.7)
MCV RBC AUTO: 83.2 FL (ref 79–97)
PLATELET # BLD AUTO: 314 10*3/MM3 (ref 140–450)
PMV BLD AUTO: 9.6 FL (ref 6–12)
POTASSIUM SERPL-SCNC: 3.9 MMOL/L (ref 3.5–5.2)
QT INTERVAL: 388 MS
RBC # BLD AUTO: 3.7 10*6/MM3 (ref 3.77–5.28)
SODIUM SERPL-SCNC: 140 MMOL/L (ref 136–145)
WBC NRBC COR # BLD: 5.3 10*3/MM3 (ref 3.4–10.8)

## 2023-01-16 PROCEDURE — 93010 ELECTROCARDIOGRAM REPORT: CPT | Performed by: INTERNAL MEDICINE

## 2023-01-16 PROCEDURE — 36415 COLL VENOUS BLD VENIPUNCTURE: CPT

## 2023-01-16 PROCEDURE — 85027 COMPLETE CBC AUTOMATED: CPT

## 2023-01-16 PROCEDURE — 93005 ELECTROCARDIOGRAM TRACING: CPT

## 2023-01-16 PROCEDURE — 80048 BASIC METABOLIC PNL TOTAL CA: CPT

## 2023-01-16 NOTE — DISCHARGE INSTRUCTIONS
Take the following medications the morning of surgery:        PREVACID,  AMLODIPINE ,  LABETALOL AND LEVOTHYROXINE AM OF SURGERY    TIME OF ARRIVAL 5:15    If you are on prescription narcotic pain medication to control your pain you may also take that medication the morning of surgery.    General Instructions:  Do not eat solid food after midnight the night before surgery.  You may drink clear liquids day of surgery but must stop at least one hour before your hospital arrival time.  It is beneficial for you to have a clear drink that contains carbohydrates the day of surgery.  We suggest a 12 to 20 ounce bottle of Gatorade or Powerade for non-diabetic patients or a 12 to 20 ounce bottle of G2 or Powerade Zero for diabetic patients. (Pediatric patients, are not advised to drink a 12 to 20 ounce carbohydrate drink)    Clear liquids are liquids you can see through.  Nothing red in color.     Plain water                               Sports drinks  Sodas                                   Gelatin (Jell-O)  Fruit juices without pulp such as white grape juice and apple juice  Popsicles that contain no fruit or yogurt  Tea or coffee (no cream or milk added)  Gatorade / Powerade  G2 / Powerade Zero      Patients who avoid smoking, chewing tobacco and alcohol for 4 weeks prior to surgery have a reduced risk of post-operative complications.  Quit smoking as many days before surgery as you can.  Do not smoke, use chewing tobacco or drink alcohol the day of surgery.   If applicable bring your C-PAP/ BI-PAP machine.  Bring any papers given to you in the doctor’s office.  Wear clean comfortable clothes.  Do not wear contact lenses, false eyelashes or make-up.  Bring a case for your glasses.   Bring crutches or walker if applicable.  Remove all piercings.  Leave jewelry and any other valuables at home.  Hair extensions with metal clips must be removed prior to surgery.  The Pre-Admission Testing nurse will instruct you to bring  medications if unable to obtain an accurate list in Pre-Admission Testing.          Preventing a Surgical Site Infection:  For 2 to 3 days before surgery, avoid shaving with a razor because the razor can irritate skin and make it easier to develop an infection.    Any areas of open skin can increase the risk of a post-operative wound infection by allowing bacteria to enter and travel throughout the body.  Notify your surgeon if you have any skin wounds / rashes even if it is not near the expected surgical site.  The area will need assessed to determine if surgery should be delayed until it is healed.  The night prior to surgery shower using a fresh bar of anti-bacterial soap (such as Dial) and clean washcloth.  Sleep in a clean bed with clean clothing.  Do not allow pets to sleep with you.  Shower on the morning of surgery using a fresh bar of anti-bacterial soap (such as Dial) and clean washcloth.  Dry with a clean towel and dress in clean clothing.  Ask your surgeon if you will be receiving antibiotics prior to surgery.  Make sure you, your family, and all healthcare providers clean their hands with soap and water or an alcohol based hand  before caring for you or your wound.    Day of surgery:  Your arrival time is approximately two hours before your scheduled surgery time.  Upon arrival, a Pre-op nurse and Anesthesiologist will review your health history, obtain vital signs, and answer questions you may have.  The only belongings needed at this time will be a list of your home medications and if applicable your C-PAP/BI-PAP machine.  A Pre-op nurse will start an IV and you may receive medication in preparation for surgery, including something to help you relax.     Please be aware that surgery does come with discomfort.  We want to make every effort to control your discomfort so please discuss any uncontrolled symptoms with your nurse.   Your doctor will most likely have prescribed pain medications.       If you are going home after surgery you will receive individualized written care instructions before being discharged.  A responsible adult must drive you to and from the hospital on the day of your surgery and stay with you for 24 hours.  Discharge prescriptions can be filled by the hospital pharmacy during regular pharmacy hours.  If you are having surgery late in the day/evening your prescription may be e-prescribed to your pharmacy.  Please verify your pharmacy hours or chose a 24 hour pharmacy to avoid not having access to your prescription because your pharmacy has closed for the day.    If you are staying overnight following surgery, you will be transported to your hospital room following the recovery period.  The Medical Center has all private rooms.    If you have any questions please call Pre-Admission Testing at [phone].  Deductibles and co-payments are collected on the day of service. Please be prepared to pay the required co-pay, deductible or deposit on the day of service as defined by your plan.    Call your surgeon immediately if you experience any of the following symptoms:  Sore Throat  Shortness of Breath or difficulty breathing  Cough  Chills  Body soreness or muscle pain  Headache  Fever  New loss of taste or smell  Do not arrive for your surgery ill.  Your procedure will need to be rescheduled to another time.  You will need to call your physician before the day of surgery to avoid any unnecessary exposure to hospital staff as well as other patients.

## 2023-01-17 ENCOUNTER — HOSPITAL ENCOUNTER (OUTPATIENT)
Dept: PHYSICAL THERAPY | Facility: HOSPITAL | Age: 53
Setting detail: THERAPIES SERIES
Discharge: HOME | End: 2023-01-17
Payer: COMMERCIAL

## 2023-01-17 DIAGNOSIS — Z98.890 HISTORY OF LUMPECTOMY OF LEFT BREAST: ICD-10-CM

## 2023-01-17 DIAGNOSIS — Z01.818 PREOPERATIVE EXAMINATION: Primary | ICD-10-CM

## 2023-01-17 DIAGNOSIS — Z85.850 HISTORY OF THYROID CANCER: ICD-10-CM

## 2023-01-17 DIAGNOSIS — Z91.89 AT RISK FOR LYMPHEDEMA: ICD-10-CM

## 2023-01-17 PROCEDURE — 97161 PT EVAL LOW COMPLEX 20 MIN: CPT | Performed by: PHYSICAL THERAPIST

## 2023-01-17 PROCEDURE — 93702 BIS XTRACELL FLUID ANALYSIS: CPT | Performed by: PHYSICAL THERAPIST

## 2023-01-17 PROCEDURE — 97535 SELF CARE MNGMENT TRAINING: CPT | Performed by: PHYSICAL THERAPIST

## 2023-01-17 NOTE — THERAPY EVALUATION
Physical Therapy Lymphedema Initial Evaluation  [facility]     Patient Name: Ina Akers  : 1970  MRN: [id number]  Today's Date: 2023      Visit Date: 2023    Visit Dx:    ICD-10-CM ICD-9-CM   1. Preoperative examination  Z01.818 V72.84   2. At risk for lymphedema  Z91.89 V49.89   3. History of lumpectomy of left breast  Z98.890 V45.89   4. History of thyroid cancer  Z85.850 V10.87       Patient Active Problem List   Diagnosis   • Abnormal perimenopausal bleeding   • Amenorrhea   • Atopic dermatitis   • External hemorrhoids   • Gastritis   • Hypertension   • Iron deficiency anemia   • Irritable bowel syndrome   • Melanocytic nevus   • Goiter   • Tinea corporis   • Type 2 diabetes mellitus (HCC)   • Lipid screening   • Malignant neoplasm of upper-outer quadrant of left breast in female, estrogen receptor negative (HCC)   • Acute pain of both knees   • Controlled type 2 diabetes mellitus without complication, without long-term current use of insulin (HCC)   • Hyperlipidemia   • Vitamin D deficiency   • Multiple thyroid nodules   • Subclinical hypothyroidism   • Lumbar spine tumor   • Metastatic bone cancer (HCC)   • Fracture, pathologic, femur, neck, right, initial encounter (HCC)   • Immunocompromised (HCC)   • Encounter for screening for malignant neoplasm of colon   • Screen for colon cancer   • COVID-19 virus infection   • Gastroesophageal reflux disease   • Primary cancer of thyroid gland metastatic to bone (HCC)   • Acute pain of left shoulder        Past Medical History:   Diagnosis Date   • Anemia     History of recurent iron deficiency   • Compression fracture of fourth lumbar vertebra (HCC)     RECENT KYPHOPLASTY 2020   • COVID-19 virus infection 2021   • Diabetes mellitus (HCC)    • Disease of thyroid gland    • Diverticulosis    • GERD (gastroesophageal reflux disease)    • History of breast cancer 2001    LEFT BREAST MASTECTOMY, CHEMO AND RADIATION    • History of  cancer chemotherapy     MAY 2002   • History of gastritis    • History of radiation therapy     BREAST , OCT 2020 SPINE   • History of thyroid nodule 2018    cancer 2020   • Hyperlipidemia    • Hypertension    • Metastatic bone cancer (HCC)     RIB, HIP - AWAITING FINDINGS FOR PRIMARY SOURCE (THYROID VS BREAST)   • Murmur    • PONV (postoperative nausea and vomiting)         Past Surgical History:   Procedure Laterality Date   •  SECTION     • COLONOSCOPY N/A 2021    Procedure: COLONOSCOPY TO CECUM AND TI;  Surgeon: Jefferson Newman MD;  Location: [facility] ENDOSCOPY;  Service: Gastroenterology;  Laterality: N/A;  PRE:SCREENING  POST:DIVERTICULOSIS AND HEMORRHOIDS   • ENDOSCOPY N/A 3/25/2022    Procedure: ESOPHAGOGASTRODUODENOSCOPY WITH COLD BIOPSIES;  Surgeon: Jefferson Newman MD;  Location: [facility] ENDOSCOPY;  Service: Gastroenterology;  Laterality: N/A;  PRE: iron deficiency anemia  POST: HIATAL HERNIA   • FEMUR IM NAILING/RODDING Right 10/23/2020    Procedure: HIP INTERTROCHCHANTERIC NAILING;  Surgeon: Gretchen Connell MD;  Location: [facility] MAIN OR;  Service: Orthopedics;  Laterality: Right;   • KYPHOPLASTY N/A 2020    Procedure: Lumbar 4  osteo cool radiofrequency ablation and KYPHOPLASTY;  Surgeon: Vik Rios MD;  Location: [facility] OR ;  Service: Neurosurgery;  Laterality: N/A;   • MASTECTOMY, PARTIAL Left    • RIB BIOPSY  10/2020   • THYROIDECTOMY Left    • THYROIDECTOMY, PARTIAL Right    • VENOUS ACCESS DEVICE (PORT) INSERTION AND REMOVAL         Visit Dx:    ICD-10-CM ICD-9-CM   1. Preoperative examination  Z01.818 V72.84   2. At risk for lymphedema  Z91.89 V49.89   3. History of lumpectomy of left breast  Z98.890 V45.89   4. History of thyroid cancer  Z85.850 V10.87        Patient History     Row Name 23 1100             History    Chief Complaint Pain  -JS      Type of Pain Hip pain  history of hip surgery due to metastatic thyroid  CA  -JS      Brief Description of Current Complaint Pt with history of L breast CA, s/p lumpectomy with lymph node removal (pt recalls 2 positive of possibly 9 lymph nodes) in 2001.  Pt had chemo & radiation following this surgery.  Pt without problem of infection or lymphedema after this surgery.  Pt diagnosed with thryoid CA, s/p thyroid surgery in 2018 & 2020, s/p back surgery in Sept 2020, R hip surgery in Oct 2020 & thyroid surgery in Nov 2020 due to metastatic disease from thyroid CA. Now presents with new diagnosis of triple negative L breast CA, scheduled for L mastectomy possible SLNB/complete dissection on 1/24/23. Pt reports having discussions of R mastectomy as well, but states that she has opted to forego surgery on the right. Pt will receive adjuvant chemotherapy. Referred to PT for pre-op examination.  -JS      Patient/Caregiver Goals Comment Pre-operative evaluation.  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.  -JS      Surgery/Hospitalization L mastectomy, possible SLNB scheduled for 1/24/23  -JS         Pain     Pain Location Hip;Back  -JS      Pain at Present 1  -JS      Pain at Best 1  -JS      Pain at Worst 5  -JS      What Performance Factors Make the Current Problem(s) WORSE? Repetitive movement, housework, bending, traveling  -JS      Pain Comments Denies shoulder pain, swelling, problems with ROM.  -JS      Is your sleep disturbed? No  -JS      Difficulties at work? Prolonged walking  -JS      Difficulties with ADL's? Housework, bending  -JS      Difficulties with recreational activities? No current exercise.  -JS         Fall Risk Assessment    Any falls in the past year: No  -JS         Daily Activities    Primary Language English  -JS      Pt Participated in POC and Goals Yes  -JS         Safety    Are you being hurt, hit, or frightened by anyone at home or in your life? No  -JS      Are you being neglected by a  caregiver No  -JS            User Key  (r) = Recorded By, (t) = Taken By, (c) = Cosigned By    Initials Name Provider Type    JS Melani Vieyra PT Physical Therapist                 Lymphedema     Row Name 01/17/23 1100             Subjective Pain    Able to rate subjective pain? yes  -JS      Pre-Treatment Pain Level 1  -JS      Subjective Pain Comment R hip/LBP.  -JS         Subjective Comments    Subjective Comments Pt denies current issues with swelling, ROM, shoulder pain.  -JS         Lymphedema Assessment    Lymphedema Surgery Comments Scheduled for L mastectomy, SLNB vs complete dissection on 1/24/23.  History of L lumpectomy in 2001, and history of metastatic thyroid cancer with thyroid surgery in 2018 & 2020, related R hip & back surgery in 2020  -JS      Lymphedema Assessment Comments Chemo & radiation following lumpectomy in 2001.  -JS         Posture/Observations    Alignment Options Forward head;Rounded shoulders  -JS      Forward Head Mild  -JS      Rounded Shoulders Mild  -JS         General ROM    GENERAL ROM COMMENTS B shoulder AROM WNL  -JS         MMT (Manual Muscle Testing)    General MMT Comments B shoulder strength, elbow flex/ext 4+/5  -JS         Lymphedema Edema Assessment    Edema Assessment Comment No current signs of edema  -JS         Lymphedema Measurements    Measurement Type(s) Quick Girth  -JS      Quick Girth Areas Upper extremities  -JS         LUE Quick Girth (cm)    Axilla 34 cm  -JS      Mid upper arm 33 cm  -JS      Elbow 28 cm  -JS      Mid forearm 24 cm  10 cm above wrist crease  -JS      Wrist crease 17.5 cm  -JS      Other 1 39 cm  length  -JS      Other 2 20 cm  5 cm above wrist creast  -JS      LUE Quick Girth Total 195.5  -JS         RUE Quick Girth (cm)    Axilla 34 cm  -JS      Mid upper arm 33 cm  -JS      Elbow 27.5 cm  -JS      Mid forearm 23.5 cm  10 cm above wrist crease  -JS      Wrist crease 17.5 cm  -JS      Other 2 20 cm  5 cm above wrist creast  -JS      RUE  Quick Girth Total 155.5  -JS         Compression/Skin Care    Compression/Skin Care Comments Discussion of obtaining [employer] Size III UE compression arm sleeve.  Education on rationale for use, pt to bring to post-op visit for further instruction on wear schedule as indicated.  -JS         L-Dex Bioimpedence Screening    L-Dex Measurement Extremity LUE  -JS      L-Dex Patient Position Standing  -JS      L-Dex UE Dominate Side Right  -JS      L-Dex UE At Risk Side Left  -JS      L-Dex UE Pre Surgical Value Yes  -JS      L-Dex UE Score 4.6  -JS      L-Dex UE Baseline Score 4.6  -JS      L-Dex UE Value Change 0  -JS      $ L-Dex Charge yes  -JS            User Key  (r) = Recorded By, (t) = Taken By, (c) = Cosigned By    Initials Name Provider Type    Melani Ambrosio, PT Physical Therapist                                Therapy Education  Education Details: Education on post-operative movement restrictions as guided by surgeon, including education on limitation of functional reaching with L UE post-operatively. Discussed means to prepare for surgery, including arranging for post-operative assistance at home, moving items down from shelving, means to tie shoes by crossing LEs, avoiding reaching into laundry/refridgerator. Education on bioimpedance pre-operative testing and results. Discussed obtaining UE compression sleeve from [facility] pharmacy ([employer] Size III) to be brought to post-operative appointment for further instruction. Discussed gradual walking program for exercise during recovery. Discussed scheduling post-operative PT visit, including need for no drains/active infection at post-operative appointment.       OP Exercises     Row Name 01/17/23 1100             Subjective Comments    Subjective Comments Pt denies current issues with swelling, ROM, shoulder pain.  -JS         Subjective Pain    Able to rate subjective pain? yes  -JS      Pre-Treatment Pain Level 1  -JS      Subjective Pain Comment R  hip/LBP.  -JS         Exercise 1    Exercise Name 1 Education on post-operative ROM restrictions (see education)  -JS            User Key  (r) = Recorded By, (t) = Taken By, (c) = Cosigned By    Initials Name Provider Type    Melani Ambrosio PT Physical Therapist                             PT OP Goals     Row Name 01/17/23 1100          Long Term Goals    LTG Date to Achieve 04/17/23  -JS     LTG 1 Pt will be independent in comprehensive HEP to allow ongoing symptom management.  -JS     LTG 1 Progress New  -JS     LTG 2 Pt will obtain properly fitted UE compression sleeve and verbalize appropriate wear schedule.  -JS     LTG 2 Progress New  -JS     LTG 3 Pt will maintain L-Dex bioimpedance score WNL.  -JS     LTG 3 Progress New  -JS     LTG 4 Pt will demonstrate normal L shoulder AROM.  -JS     LTG 4 Progress New  -JS        Time Calculation    PT Goal Re-Cert Due Date 04/17/23  -JS           User Key  (r) = Recorded By, (t) = Taken By, (c) = Cosigned By    Initials Name Provider Type    Melani Ambrosio PT Physical Therapist                 PT Assessment/Plan     Row Name 01/17/23 1100          PT Assessment    Impairments Impaired lymphatic circulation;Posture;Muscle strength  -     Assessment Comments Pt is a 51 y/o female recently diagnosed with new triple negative L breast CA, scheduled for L mastectomy and possible SLNB/complete dissection on 1/24/23.  Pt presents to physical therapy for pre-operative evaluation, 15 min late due to traveling to wrong building. Pt with history of L breast CA, s/p lumpectomy and axillary dissection (2/9 lymph nodes) in 2001. At that time, pt received chemo and radiation. Pt diagnosed with thyroid CA, s/p thyroid surgery in 2018 & 2020, with additional R hip surgery and back surgery due to metastatic disease. Pt demonstrates slight forward/rounded shoulders posture, normal B shoulder AROM & strength (4+/5) upon pre-operative assessment. Baseline circumfrential measurements  taken B UEs, and bioimpedance baseline score measured 4.6.  Education provided in post-operative expectations, including initial limitations of ROM, drain placement and post-operative scheduled of PT appointment. Pt advised to obtain UE compression garment, with plans to bring the garment to her post-operative PT appointment. Pt will benefit from skilled PT to address impairments and progress toward goals. Pt advised to contact PT with any questions prior to her next scheduled visit.  -JS     Please refer to paper survey for additional self-reported information Yes  -JS     Rehab Potential Good  -JS     Patient/caregiver participated in establishment of treatment plan and goals Yes  -JS     Patient would benefit from skilled therapy intervention Yes  -JS        PT Plan    Predicted Duration of Therapy Intervention (PT) Pt to return to PT 1 month following her surgery, pending drain removal and no active infection.  -JS     Planned CPT's? PT EVAL LOW COMPLEXITY: 23544;PT RE-EVAL: 38800;PT THER PROC EA 15 MIN: 19399;PT THER ACT EA 15 MIN: 75343;PT MANUAL THERAPY EA 15 MIN: 60861;PT NEUROMUSC RE-EDUCATION EA 15 MIN: 78249;PT SELF CARE/HOME MGMT/TRAIN EA 15: 24882;PT BIS XTRACELL FLUID ANALYSIS: 75705;PT SELF CARE/MGMT/TRAIN 15 MIN: 31395  -JS     Physical Therapy Interventions (Optional Details) bioimpedance assessment;home exercise program;manual lymphatic drainage;manual therapy techniques;patient/family education;postural re-education;ROM (Range of Motion);strengthening;stretching  -JS     PT Plan Comments Pt to obtain [facility] Size [facility] UE compression sleeve from [facility] pharmacy. Pt advised to follow post-operative surgical instructions regarding ROM and movement restriction. Scheduled to return to PT on 2/24/23 pending no drains nor active infection where post-operative assessment will be performed.  -JS           User Key  (r) = Recorded By, (t) = Taken By, (c) = Cosigned By    Initials Name Provider Type     JS Melani Vieyra, PT Physical Therapist                   Outcome Measure Options: Quick DASH  Quick DASH  Open a tight or new jar.: Mild Difficulty  Do heavy household chores (e.g., wash walls, wash floors): Mild Difficulty  Carry a shopping bag or briefcase: No Difficulty  Wash your back: No Difficulty  Use a knife to cut food: No Difficulty  Recreational activities in which you take some force or impact through your arm, should or hand (e.g. golf, hammering, tennis, etc.): Mild Difficulty  During the past week, to what extent has your arm, shoulder, or hand problem interfered with your normal social activites with family, friends, neighbors or groups?: Not at all  During the past week, were you limited in your work or other regular daily activities as a result of your arm, shoulder or hand problem?: Not limited at all  Arm, Shoulder, or hand pain: None  Tingling (pins and needles) in your arm, shoulder, or hand: None  During the past week, how much difficulty have you had sleeping because of the pain in your arm, shoulder or hand?: No difficulty  Number of Questions Answered: 11  Quick DASH Score: 6.82         Time Calculation:   Start Time: 1100  Stop Time: 1145  Time Calculation (min): 45 min   Therapy Charges for Today     Code Description Service Date Service Provider Modifiers Qty    19780875909 HC PT BIS XTRACELL FLUID ANALYSIS 1/17/2023 Melani Vieyra, PT  1    20656190945 HC PT EVAL LOW COMPLEXITY 2 1/17/2023 Melani Vieyra, PT GP 1    56700288046 HC PT SELF CARE/MGMT/TRAIN EA 15 MIN 1/17/2023 Mealni Vieyra, PT GP 1          PT G-Codes  Outcome Measure Options: Quick DASH  Quick DASH Score: 6.82         Melani Vieyra PT  1/17/2023

## 2023-01-23 ENCOUNTER — TELEPHONE (OUTPATIENT)
Dept: SURGERY | Facility: CLINIC | Age: 53
End: 2023-01-23
Payer: COMMERCIAL

## 2023-01-24 ENCOUNTER — HOSPITAL ENCOUNTER (OUTPATIENT)
Dept: NUCLEAR MEDICINE | Facility: HOSPITAL | Age: 53
Discharge: HOME | End: 2023-01-24
Payer: COMMERCIAL

## 2023-01-24 ENCOUNTER — HOSPITAL ENCOUNTER (OUTPATIENT)
Facility: HOSPITAL | Age: 53
Discharge: HOME | End: 2023-01-25
Attending: SURGERY | Admitting: SURGERY
Payer: COMMERCIAL

## 2023-01-24 ENCOUNTER — ANESTHESIA (OUTPATIENT)
Dept: PERIOP | Facility: HOSPITAL | Age: 53
End: 2023-01-24
Payer: COMMERCIAL

## 2023-01-24 ENCOUNTER — ANESTHESIA EVENT (OUTPATIENT)
Dept: PERIOP | Facility: HOSPITAL | Age: 53
End: 2023-01-24
Payer: COMMERCIAL

## 2023-01-24 DIAGNOSIS — Z17.1 MALIGNANT NEOPLASM OF UPPER-OUTER QUADRANT OF LEFT BREAST IN FEMALE, ESTROGEN RECEPTOR NEGATIVE: ICD-10-CM

## 2023-01-24 DIAGNOSIS — C50.412 MALIGNANT NEOPLASM OF UPPER-OUTER QUADRANT OF LEFT BREAST IN FEMALE, ESTROGEN RECEPTOR NEGATIVE: ICD-10-CM

## 2023-01-24 LAB
ABO GROUP BLD: NORMAL
BLD GP AB SCN SERPL QL: NEGATIVE
GLUCOSE BLDC GLUCOMTR-MCNC: 116 MG/DL (ref 70–130)
GLUCOSE BLDC GLUCOMTR-MCNC: 149 MG/DL (ref 70–130)
GLUCOSE BLDC GLUCOMTR-MCNC: 98 MG/DL (ref 70–130)
RH BLD: POSITIVE
T&S EXPIRATION DATE: NORMAL

## 2023-01-24 PROCEDURE — 25010000002 GENTAMICIN PER 80 MG: Performed by: PLASTIC SURGERY

## 2023-01-24 PROCEDURE — A9520 TC99 TILMANOCEPT DIAG 0.5MCI: HCPCS | Performed by: SURGERY

## 2023-01-24 PROCEDURE — G0378 HOSPITAL OBSERVATION PER HR: HCPCS

## 2023-01-24 PROCEDURE — 86900 BLOOD TYPING SEROLOGIC ABO: CPT | Performed by: ANESTHESIOLOGY

## 2023-01-24 PROCEDURE — 25010000002 ISOSULFAN BLUE 1 % SOLUTION: Performed by: SURGERY

## 2023-01-24 PROCEDURE — 63710000001 PROMETHAZINE PER 25 MG: Performed by: PLASTIC SURGERY

## 2023-01-24 PROCEDURE — C1789 PROSTHESIS, BREAST, IMP: HCPCS | Performed by: SURGERY

## 2023-01-24 PROCEDURE — 25010000002 CEFAZOLIN PER 500 MG: Performed by: PLASTIC SURGERY

## 2023-01-24 PROCEDURE — 88302 TISSUE EXAM BY PATHOLOGIST: CPT | Performed by: PLASTIC SURGERY

## 2023-01-24 PROCEDURE — 82962 GLUCOSE BLOOD TEST: CPT

## 2023-01-24 PROCEDURE — 19307 MAST MOD RAD: CPT | Performed by: SURGERY

## 2023-01-24 PROCEDURE — 86850 RBC ANTIBODY SCREEN: CPT | Performed by: ANESTHESIOLOGY

## 2023-01-24 PROCEDURE — 88341 IMHCHEM/IMCYTCHM EA ADD ANTB: CPT | Performed by: SURGERY

## 2023-01-24 PROCEDURE — 88360 TUMOR IMMUNOHISTOCHEM/MANUAL: CPT | Performed by: SURGERY

## 2023-01-24 PROCEDURE — S0260 H&P FOR SURGERY: HCPCS | Performed by: SURGERY

## 2023-01-24 PROCEDURE — 88309 TISSUE EXAM BY PATHOLOGIST: CPT | Performed by: SURGERY

## 2023-01-24 PROCEDURE — 25010000002 ROPIVACAINE PER 1 MG: Performed by: PLASTIC SURGERY

## 2023-01-24 PROCEDURE — 25010000002 PROPOFOL 10 MG/ML EMULSION: Performed by: REGISTERED NURSE

## 2023-01-24 PROCEDURE — 86901 BLOOD TYPING SEROLOGIC RH(D): CPT | Performed by: ANESTHESIOLOGY

## 2023-01-24 PROCEDURE — 0 TECHETIUM TC99M TILMANOCEPT: Performed by: SURGERY

## 2023-01-24 PROCEDURE — 25010000002 DEXAMETHASONE SODIUM PHOSPHATE 20 MG/5ML SOLUTION: Performed by: REGISTERED NURSE

## 2023-01-24 PROCEDURE — 38792 RA TRACER ID OF SENTINL NODE: CPT

## 2023-01-24 PROCEDURE — 25010000002 HEPARIN (PORCINE) PER 1000 UNITS: Performed by: SURGERY

## 2023-01-24 PROCEDURE — 19307 MAST MOD RAD: CPT | Performed by: SPECIALIST/TECHNOLOGIST, OTHER

## 2023-01-24 DEVICE — LIGACLIP MCA MULTIPLE CLIP APPLIERS, 20 SMALL CLIPS
Type: IMPLANTABLE DEVICE | Site: BREAST | Status: FUNCTIONAL
Brand: LIGACLIP

## 2023-01-24 DEVICE — LIGACLIP MCA MULTIPLE CLIP APPLIERS, 20 MEDIUM CLIPS
Type: IMPLANTABLE DEVICE | Site: BREAST | Status: FUNCTIONAL
Brand: LIGACLIP

## 2023-01-24 DEVICE — GRFT TISS ALLODERM RTU 128SQ/CM MD 1.6TO0.4MM: Type: IMPLANTABLE DEVICE | Site: BREAST | Status: FUNCTIONAL

## 2023-01-24 DEVICE — EXPNDR TISS BRST MODHT XPROJ STL 133S MX/T 700CC: Type: IMPLANTABLE DEVICE | Site: BREAST | Status: FUNCTIONAL

## 2023-01-24 RX ORDER — OXYCODONE AND ACETAMINOPHEN 7.5; 325 MG/1; MG/1
1 TABLET ORAL EVERY 4 HOURS PRN
Status: DISCONTINUED | OUTPATIENT
Start: 2023-01-24 | End: 2023-01-24 | Stop reason: HOSPADM

## 2023-01-24 RX ORDER — LABETALOL 100 MG/1
50 TABLET, FILM COATED ORAL 2 TIMES DAILY
Status: DISCONTINUED | OUTPATIENT
Start: 2023-01-24 | End: 2023-01-25 | Stop reason: HOSPADM

## 2023-01-24 RX ORDER — AMLODIPINE BESYLATE 10 MG/1
10 TABLET ORAL EVERY MORNING
Status: DISCONTINUED | OUTPATIENT
Start: 2023-01-25 | End: 2023-01-25 | Stop reason: HOSPADM

## 2023-01-24 RX ORDER — KETAMINE HCL IN NACL, ISO-OSM 100MG/10ML
SYRINGE (ML) INJECTION AS NEEDED
Status: DISCONTINUED | OUTPATIENT
Start: 2023-01-24 | End: 2023-01-24 | Stop reason: SURG

## 2023-01-24 RX ORDER — FLUMAZENIL 0.1 MG/ML
0.2 INJECTION INTRAVENOUS AS NEEDED
Status: DISCONTINUED | OUTPATIENT
Start: 2023-01-24 | End: 2023-01-24 | Stop reason: HOSPADM

## 2023-01-24 RX ORDER — LIDOCAINE HYDROCHLORIDE 10 MG/ML
0.5 INJECTION, SOLUTION EPIDURAL; INFILTRATION; INTRACAUDAL; PERINEURAL ONCE AS NEEDED
Status: DISCONTINUED | OUTPATIENT
Start: 2023-01-24 | End: 2023-01-24 | Stop reason: HOSPADM

## 2023-01-24 RX ORDER — ONDANSETRON HYDROCHLORIDE 8 MG/1
8 TABLET, FILM COATED ORAL ONCE
Status: DISCONTINUED | OUTPATIENT
Start: 2023-01-24 | End: 2023-01-24 | Stop reason: HOSPADM

## 2023-01-24 RX ORDER — HYDRALAZINE HYDROCHLORIDE 20 MG/ML
5 INJECTION INTRAMUSCULAR; INTRAVENOUS
Status: DISCONTINUED | OUTPATIENT
Start: 2023-01-24 | End: 2023-01-24 | Stop reason: HOSPADM

## 2023-01-24 RX ORDER — HYDROMORPHONE HYDROCHLORIDE 1 MG/ML
0.25 INJECTION, SOLUTION INTRAMUSCULAR; INTRAVENOUS; SUBCUTANEOUS
Status: DISCONTINUED | OUTPATIENT
Start: 2023-01-24 | End: 2023-01-25 | Stop reason: HOSPADM

## 2023-01-24 RX ORDER — LABETALOL HYDROCHLORIDE 5 MG/ML
5 INJECTION, SOLUTION INTRAVENOUS
Status: DISCONTINUED | OUTPATIENT
Start: 2023-01-24 | End: 2023-01-24 | Stop reason: HOSPADM

## 2023-01-24 RX ORDER — FENTANYL CITRATE 50 UG/ML
50 INJECTION, SOLUTION INTRAMUSCULAR; INTRAVENOUS
Status: DISCONTINUED | OUTPATIENT
Start: 2023-01-24 | End: 2023-01-24 | Stop reason: HOSPADM

## 2023-01-24 RX ORDER — SODIUM CHLORIDE, SODIUM LACTATE, POTASSIUM CHLORIDE, CALCIUM CHLORIDE 600; 310; 30; 20 MG/100ML; MG/100ML; MG/100ML; MG/100ML
9 INJECTION, SOLUTION INTRAVENOUS CONTINUOUS
Status: DISCONTINUED | OUTPATIENT
Start: 2023-01-24 | End: 2023-01-25

## 2023-01-24 RX ORDER — CELECOXIB 200 MG/1
400 CAPSULE ORAL ONCE
Status: COMPLETED | OUTPATIENT
Start: 2023-01-24 | End: 2023-01-24

## 2023-01-24 RX ORDER — SODIUM CHLORIDE 0.9 % (FLUSH) 0.9 %
3 SYRINGE (ML) INJECTION EVERY 12 HOURS SCHEDULED
Status: DISCONTINUED | OUTPATIENT
Start: 2023-01-24 | End: 2023-01-25 | Stop reason: HOSPADM

## 2023-01-24 RX ORDER — HEPARIN SODIUM 5000 [USP'U]/ML
5000 INJECTION, SOLUTION INTRAVENOUS; SUBCUTANEOUS
Status: COMPLETED | OUTPATIENT
Start: 2023-01-24 | End: 2023-01-24

## 2023-01-24 RX ORDER — DIPHENHYDRAMINE HYDROCHLORIDE 50 MG/ML
12.5 INJECTION INTRAMUSCULAR; INTRAVENOUS
Status: DISCONTINUED | OUTPATIENT
Start: 2023-01-24 | End: 2023-01-24 | Stop reason: HOSPADM

## 2023-01-24 RX ORDER — ONDANSETRON 2 MG/ML
4 INJECTION INTRAMUSCULAR; INTRAVENOUS EVERY 6 HOURS PRN
Status: DISCONTINUED | OUTPATIENT
Start: 2023-01-24 | End: 2023-01-25 | Stop reason: HOSPADM

## 2023-01-24 RX ORDER — DOXYCYCLINE 100 MG/1
200 CAPSULE ORAL ONCE
Status: COMPLETED | OUTPATIENT
Start: 2023-01-24 | End: 2023-01-24

## 2023-01-24 RX ORDER — ROPIVACAINE HYDROCHLORIDE 5 MG/ML
INJECTION, SOLUTION EPIDURAL; INFILTRATION; PERINEURAL AS NEEDED
Status: DISCONTINUED | OUTPATIENT
Start: 2023-01-24 | End: 2023-01-24 | Stop reason: HOSPADM

## 2023-01-24 RX ORDER — ROCURONIUM BROMIDE 10 MG/ML
INJECTION, SOLUTION INTRAVENOUS AS NEEDED
Status: DISCONTINUED | OUTPATIENT
Start: 2023-01-24 | End: 2023-01-24 | Stop reason: SURG

## 2023-01-24 RX ORDER — HYDROMORPHONE HYDROCHLORIDE 1 MG/ML
0.5 INJECTION, SOLUTION INTRAMUSCULAR; INTRAVENOUS; SUBCUTANEOUS
Status: DISCONTINUED | OUTPATIENT
Start: 2023-01-24 | End: 2023-01-24 | Stop reason: HOSPADM

## 2023-01-24 RX ORDER — MAGNESIUM HYDROXIDE 1200 MG/15ML
LIQUID ORAL AS NEEDED
Status: DISCONTINUED | OUTPATIENT
Start: 2023-01-24 | End: 2023-01-24 | Stop reason: HOSPADM

## 2023-01-24 RX ORDER — GABAPENTIN 300 MG/1
300 CAPSULE ORAL ONCE
Status: COMPLETED | OUTPATIENT
Start: 2023-01-24 | End: 2023-01-24

## 2023-01-24 RX ORDER — SCOLOPAMINE TRANSDERMAL SYSTEM 1 MG/1
1 PATCH, EXTENDED RELEASE TRANSDERMAL ONCE
Status: COMPLETED | OUTPATIENT
Start: 2023-01-24 | End: 2023-01-25

## 2023-01-24 RX ORDER — ACETAMINOPHEN 500 MG
1000 TABLET ORAL ONCE
Status: COMPLETED | OUTPATIENT
Start: 2023-01-24 | End: 2023-01-24

## 2023-01-24 RX ORDER — MIDAZOLAM HYDROCHLORIDE 1 MG/ML
1 INJECTION INTRAMUSCULAR; INTRAVENOUS
Status: DISCONTINUED | OUTPATIENT
Start: 2023-01-24 | End: 2023-01-24 | Stop reason: HOSPADM

## 2023-01-24 RX ORDER — OXYCODONE HYDROCHLORIDE 5 MG/1
10 TABLET ORAL ONCE
Status: COMPLETED | OUTPATIENT
Start: 2023-01-24 | End: 2023-01-24

## 2023-01-24 RX ORDER — SODIUM CHLORIDE 0.9 % (FLUSH) 0.9 %
3-10 SYRINGE (ML) INJECTION AS NEEDED
Status: DISCONTINUED | OUTPATIENT
Start: 2023-01-24 | End: 2023-01-25 | Stop reason: HOSPADM

## 2023-01-24 RX ORDER — GLYCOPYRROLATE 0.2 MG/ML
0.1 INJECTION INTRAMUSCULAR; INTRAVENOUS ONCE
Status: COMPLETED | OUTPATIENT
Start: 2023-01-24 | End: 2023-01-24

## 2023-01-24 RX ORDER — FAMOTIDINE 10 MG/ML
20 INJECTION, SOLUTION INTRAVENOUS ONCE
Status: COMPLETED | OUTPATIENT
Start: 2023-01-24 | End: 2023-01-24

## 2023-01-24 RX ORDER — ONDANSETRON 4 MG/1
4 TABLET, FILM COATED ORAL EVERY 6 HOURS PRN
Status: DISCONTINUED | OUTPATIENT
Start: 2023-01-24 | End: 2023-01-25 | Stop reason: HOSPADM

## 2023-01-24 RX ORDER — NALOXONE HCL 0.4 MG/ML
0.1 VIAL (ML) INJECTION
Status: DISCONTINUED | OUTPATIENT
Start: 2023-01-24 | End: 2023-01-25 | Stop reason: HOSPADM

## 2023-01-24 RX ORDER — DIPHENHYDRAMINE HCL 25 MG
25 CAPSULE ORAL
Status: DISCONTINUED | OUTPATIENT
Start: 2023-01-24 | End: 2023-01-24 | Stop reason: HOSPADM

## 2023-01-24 RX ORDER — SODIUM CHLORIDE 0.9 % (FLUSH) 0.9 %
3 SYRINGE (ML) INJECTION EVERY 12 HOURS SCHEDULED
Status: DISCONTINUED | OUTPATIENT
Start: 2023-01-24 | End: 2023-01-24 | Stop reason: HOSPADM

## 2023-01-24 RX ORDER — LIDOCAINE HYDROCHLORIDE 20 MG/ML
INJECTION, SOLUTION INFILTRATION; PERINEURAL AS NEEDED
Status: DISCONTINUED | OUTPATIENT
Start: 2023-01-24 | End: 2023-01-24 | Stop reason: SURG

## 2023-01-24 RX ORDER — SODIUM CHLORIDE, SODIUM LACTATE, POTASSIUM CHLORIDE, CALCIUM CHLORIDE 600; 310; 30; 20 MG/100ML; MG/100ML; MG/100ML; MG/100ML
125 INJECTION, SOLUTION INTRAVENOUS CONTINUOUS
Status: DISCONTINUED | OUTPATIENT
Start: 2023-01-24 | End: 2023-01-24

## 2023-01-24 RX ORDER — DOXYCYCLINE 100 MG/1
100 CAPSULE ORAL EVERY 12 HOURS SCHEDULED
Status: DISCONTINUED | OUTPATIENT
Start: 2023-01-24 | End: 2023-01-25 | Stop reason: HOSPADM

## 2023-01-24 RX ORDER — LOSARTAN POTASSIUM 100 MG/1
100 TABLET ORAL DAILY
Status: DISCONTINUED | OUTPATIENT
Start: 2023-01-24 | End: 2023-01-25 | Stop reason: HOSPADM

## 2023-01-24 RX ORDER — EPHEDRINE SULFATE 50 MG/ML
5 INJECTION, SOLUTION INTRAVENOUS ONCE AS NEEDED
Status: DISCONTINUED | OUTPATIENT
Start: 2023-01-24 | End: 2023-01-24 | Stop reason: HOSPADM

## 2023-01-24 RX ORDER — GINSENG 100 MG
CAPSULE ORAL AS NEEDED
Status: DISCONTINUED | OUTPATIENT
Start: 2023-01-24 | End: 2023-01-24 | Stop reason: HOSPADM

## 2023-01-24 RX ORDER — DIAZEPAM 5 MG/1
10 TABLET ORAL ONCE
Status: COMPLETED | OUTPATIENT
Start: 2023-01-24 | End: 2023-01-24

## 2023-01-24 RX ORDER — SODIUM CHLORIDE 0.9 % (FLUSH) 0.9 %
3-10 SYRINGE (ML) INJECTION AS NEEDED
Status: DISCONTINUED | OUTPATIENT
Start: 2023-01-24 | End: 2023-01-24 | Stop reason: HOSPADM

## 2023-01-24 RX ORDER — ACETAMINOPHEN 325 MG/1
650 TABLET ORAL EVERY 4 HOURS PRN
Status: DISCONTINUED | OUTPATIENT
Start: 2023-01-24 | End: 2023-01-25 | Stop reason: HOSPADM

## 2023-01-24 RX ORDER — FAMOTIDINE 20 MG/1
20 TABLET, FILM COATED ORAL EVERY OTHER DAY
Status: DISCONTINUED | OUTPATIENT
Start: 2023-01-24 | End: 2023-01-25 | Stop reason: HOSPADM

## 2023-01-24 RX ORDER — ONDANSETRON 2 MG/ML
4 INJECTION INTRAMUSCULAR; INTRAVENOUS ONCE AS NEEDED
Status: DISCONTINUED | OUTPATIENT
Start: 2023-01-24 | End: 2023-01-24 | Stop reason: HOSPADM

## 2023-01-24 RX ORDER — NALOXONE HCL 0.4 MG/ML
0.2 VIAL (ML) INJECTION AS NEEDED
Status: DISCONTINUED | OUTPATIENT
Start: 2023-01-24 | End: 2023-01-24 | Stop reason: HOSPADM

## 2023-01-24 RX ORDER — DEXAMETHASONE SODIUM PHOSPHATE 4 MG/ML
INJECTION, SOLUTION INTRA-ARTICULAR; INTRALESIONAL; INTRAMUSCULAR; INTRAVENOUS; SOFT TISSUE AS NEEDED
Status: DISCONTINUED | OUTPATIENT
Start: 2023-01-24 | End: 2023-01-24 | Stop reason: SURG

## 2023-01-24 RX ORDER — GABAPENTIN 100 MG/1
100 CAPSULE ORAL EVERY 8 HOURS SCHEDULED
Status: DISCONTINUED | OUTPATIENT
Start: 2023-01-24 | End: 2023-01-25 | Stop reason: HOSPADM

## 2023-01-24 RX ORDER — PROMETHAZINE HYDROCHLORIDE 25 MG/1
25 TABLET ORAL ONCE AS NEEDED
Status: DISCONTINUED | OUTPATIENT
Start: 2023-01-24 | End: 2023-01-24 | Stop reason: HOSPADM

## 2023-01-24 RX ORDER — PROMETHAZINE HYDROCHLORIDE 25 MG/1
25 SUPPOSITORY RECTAL ONCE AS NEEDED
Status: DISCONTINUED | OUTPATIENT
Start: 2023-01-24 | End: 2023-01-24 | Stop reason: HOSPADM

## 2023-01-24 RX ORDER — ENOXAPARIN SODIUM 100 MG/ML
40 INJECTION SUBCUTANEOUS DAILY
Status: DISCONTINUED | OUTPATIENT
Start: 2023-01-25 | End: 2023-01-25 | Stop reason: HOSPADM

## 2023-01-24 RX ORDER — HYDROCODONE BITARTRATE AND ACETAMINOPHEN 7.5; 325 MG/1; MG/1
1 TABLET ORAL ONCE AS NEEDED
Status: DISCONTINUED | OUTPATIENT
Start: 2023-01-24 | End: 2023-01-24 | Stop reason: HOSPADM

## 2023-01-24 RX ORDER — ISOSULFAN BLUE 50 MG/5ML
INJECTION, SOLUTION SUBCUTANEOUS AS NEEDED
Status: DISCONTINUED | OUTPATIENT
Start: 2023-01-24 | End: 2023-01-24 | Stop reason: HOSPADM

## 2023-01-24 RX ORDER — PANTOPRAZOLE SODIUM 40 MG/1
40 TABLET, DELAYED RELEASE ORAL
Status: DISCONTINUED | OUTPATIENT
Start: 2023-01-25 | End: 2023-01-25 | Stop reason: HOSPADM

## 2023-01-24 RX ORDER — SODIUM CHLORIDE 9 MG/ML
40 INJECTION, SOLUTION INTRAVENOUS AS NEEDED
Status: DISCONTINUED | OUTPATIENT
Start: 2023-01-24 | End: 2023-01-25 | Stop reason: HOSPADM

## 2023-01-24 RX ORDER — TRANEXAMIC ACID 100 MG/ML
INJECTION, SOLUTION INTRAVENOUS AS NEEDED
Status: DISCONTINUED | OUTPATIENT
Start: 2023-01-24 | End: 2023-01-24 | Stop reason: HOSPADM

## 2023-01-24 RX ORDER — OXYCODONE HYDROCHLORIDE 5 MG/1
5 TABLET ORAL EVERY 4 HOURS PRN
Status: DISCONTINUED | OUTPATIENT
Start: 2023-01-24 | End: 2023-01-25 | Stop reason: HOSPADM

## 2023-01-24 RX ORDER — PROMETHAZINE HYDROCHLORIDE 25 MG/1
25 TABLET ORAL EVERY 8 HOURS PRN
Status: DISCONTINUED | OUTPATIENT
Start: 2023-01-24 | End: 2023-01-25 | Stop reason: HOSPADM

## 2023-01-24 RX ORDER — PROPOFOL 10 MG/ML
VIAL (ML) INTRAVENOUS AS NEEDED
Status: DISCONTINUED | OUTPATIENT
Start: 2023-01-24 | End: 2023-01-24 | Stop reason: SURG

## 2023-01-24 RX ADMIN — FAMOTIDINE 20 MG: 20 TABLET, FILM COATED ORAL at 15:09

## 2023-01-24 RX ADMIN — Medication 3 ML: at 15:11

## 2023-01-24 RX ADMIN — PROPOFOL 25 MCG/KG/MIN: 10 INJECTION, EMULSION INTRAVENOUS at 08:08

## 2023-01-24 RX ADMIN — PROMETHAZINE HYDROCHLORIDE 25 MG: 25 TABLET ORAL at 18:13

## 2023-01-24 RX ADMIN — PROPOFOL 160 MG: 10 INJECTION, EMULSION INTRAVENOUS at 08:04

## 2023-01-24 RX ADMIN — SCOPALAMINE 1 PATCH: 1 PATCH, EXTENDED RELEASE TRANSDERMAL at 06:25

## 2023-01-24 RX ADMIN — DOXYCYCLINE 200 MG: 100 CAPSULE ORAL at 07:43

## 2023-01-24 RX ADMIN — TILMANOCEPT 1 DOSE: KIT at 07:23

## 2023-01-24 RX ADMIN — Medication 25 MG: at 08:04

## 2023-01-24 RX ADMIN — LIDOCAINE HYDROCHLORIDE 80 MG: 20 INJECTION, SOLUTION INFILTRATION; PERINEURAL at 08:04

## 2023-01-24 RX ADMIN — OXYCODONE HYDROCHLORIDE 5 MG: 5 TABLET ORAL at 20:43

## 2023-01-24 RX ADMIN — DOXYCYCLINE 100 MG: 100 CAPSULE ORAL at 20:43

## 2023-01-24 RX ADMIN — SODIUM CHLORIDE, POTASSIUM CHLORIDE, SODIUM LACTATE AND CALCIUM CHLORIDE 125 ML/HR: 600; 310; 30; 20 INJECTION, SOLUTION INTRAVENOUS at 06:29

## 2023-01-24 RX ADMIN — GLYCOPYRROLATE 0.1 MG: 0.2 INJECTION INTRAMUSCULAR; INTRAVENOUS at 06:24

## 2023-01-24 RX ADMIN — ROCURONIUM BROMIDE 40 MG: 50 INJECTION INTRAVENOUS at 08:04

## 2023-01-24 RX ADMIN — DEXAMETHASONE SODIUM PHOSPHATE 12 MG: 4 INJECTION, SOLUTION INTRAMUSCULAR; INTRAVENOUS at 08:17

## 2023-01-24 RX ADMIN — OXYCODONE HYDROCHLORIDE 10 MG: 5 TABLET ORAL at 07:43

## 2023-01-24 RX ADMIN — CELECOXIB 400 MG: 200 CAPSULE ORAL at 06:24

## 2023-01-24 RX ADMIN — SODIUM CHLORIDE, POTASSIUM CHLORIDE, SODIUM LACTATE AND CALCIUM CHLORIDE: 600; 310; 30; 20 INJECTION, SOLUTION INTRAVENOUS at 11:14

## 2023-01-24 RX ADMIN — GABAPENTIN 300 MG: 300 CAPSULE ORAL at 07:44

## 2023-01-24 RX ADMIN — HEPARIN SODIUM 5000 UNITS: 5000 INJECTION INTRAVENOUS; SUBCUTANEOUS at 07:44

## 2023-01-24 RX ADMIN — SUGAMMADEX 200 MG: 100 INJECTION, SOLUTION INTRAVENOUS at 09:00

## 2023-01-24 RX ADMIN — Medication 15 MG: at 08:48

## 2023-01-24 RX ADMIN — GABAPENTIN 100 MG: 100 CAPSULE ORAL at 15:09

## 2023-01-24 RX ADMIN — LOSARTAN POTASSIUM 100 MG: 100 TABLET, FILM COATED ORAL at 15:09

## 2023-01-24 RX ADMIN — DOXYCYCLINE 100 MG: 100 CAPSULE ORAL at 15:09

## 2023-01-24 RX ADMIN — ACETAMINOPHEN 1000 MG: 500 TABLET, FILM COATED ORAL at 06:24

## 2023-01-24 RX ADMIN — DIAZEPAM 10 MG: 5 TABLET ORAL at 06:25

## 2023-01-24 RX ADMIN — Medication 10 MG: at 10:03

## 2023-01-24 RX ADMIN — FAMOTIDINE 20 MG: 10 INJECTION INTRAVENOUS at 06:24

## 2023-01-24 RX ADMIN — LABETALOL HYDROCHLORIDE 50 MG: 100 TABLET, FILM COATED ORAL at 20:42

## 2023-01-24 NOTE — ANESTHESIA POSTPROCEDURE EVALUATION
Patient: Ina Akers    Procedure Summary     Date: 01/24/23 Room / Location: [facility] OR  / [facility] MAIN OR    Anesthesia Start: 0755 Anesthesia Stop: 1129    Procedures:       LEFT MODIFIED RADICAL MASTECTOMY (Left: Breast)      LEFT PREPECTORAL PLACEMENT TISSUE EXPANDER AND ALLODERM (Left: Breast) Diagnosis:       Malignant neoplasm of upper-outer quadrant of left breast in female, estrogen receptor negative (HCC)      (Malignant neoplasm of upper-outer quadrant of left breast in female, estrogen receptor negative (HCC) [C50.412, Z17.1])    Surgeons: Shavonne Sharif MD; Yony Morse MD Provider: Lit Mathew MD    Anesthesia Type: general ASA Status: 3          Anesthesia Type: general    Vitals  Vitals Value Taken Time   /71 01/24/23 1146   Temp 36.3 °C (97.4 °F) 01/24/23 1125   Pulse 88 01/24/23 1150   Resp 14 01/24/23 1128   SpO2 95 % 01/24/23 1150   Vitals shown include unvalidated device data.        Post Anesthesia Care and Evaluation    Patient location during evaluation: PACU  Patient participation: complete - patient participated  Level of consciousness: awake and alert  Pain management: adequate    Airway patency: patent  Anesthetic complications: No anesthetic complications    Cardiovascular status: acceptable  Respiratory status: acceptable  Hydration status: acceptable    Comments: --------------------            01/24/23               1128     --------------------   BP:                  Pulse:               Resp:       14       Temp:                SpO2:             VSS  --------------------

## 2023-01-24 NOTE — ANESTHESIA PREPROCEDURE EVALUATION
Anesthesia Evaluation     Patient summary reviewed and Nursing notes reviewed   history of anesthetic complications: PONV               Airway   Mallampati: II  TM distance: >3 FB  Neck ROM: full  No difficulty expected  Dental      Pulmonary - negative pulmonary ROS   Cardiovascular     ECG reviewed  Rhythm: regular  Rate: normal    (+) hypertension, valvular problems/murmurs murmur and MR, hyperlipidemia,       Neuro/Psych- negative ROS  GI/Hepatic/Renal/Endo    (+) obesity,  GERD,  diabetes mellitus type 2, thyroid problem hypothyroidism    Musculoskeletal (-) negative ROS    Abdominal    Substance History - negative use     OB/GYN negative ob/gyn ROS         Other      history of cancer active                    Anesthesia Plan    ASA 3     general     (I have reviewed the patient's history with the patient and the chart, including all pertinent laboratory results and imaging. I have explained the risks of anesthesia including but not limited to dental damage, corneal abrasion, nerve injury, MI, stroke, and death. Questions asked and answered. Anesthetic plan discussed with patient and team as indicated. Patient expressed understanding of the above.  )  intravenous induction     Anesthetic plan, risks, benefits, and alternatives have been provided, discussed and informed consent has been obtained with: patient.        CODE STATUS:

## 2023-01-24 NOTE — ANESTHESIA POSTPROCEDURE EVALUATION
Patient: Ina Akers    Procedure Summary     Date: 01/24/23 Room / Location: [facility] OR  / [facility] MAIN OR    Anesthesia Start: 0755 Anesthesia Stop: 1129    Procedures:       LEFT MODIFIED RADICAL MASTECTOMY (Left: Breast)      LEFT PREPECTORAL PLACEMENT TISSUE EXPANDER AND ALLODERM (Left: Breast) Diagnosis:       Malignant neoplasm of upper-outer quadrant of left breast in female, estrogen receptor negative (HCC)      (Malignant neoplasm of upper-outer quadrant of left breast in female, estrogen receptor negative (HCC) [C50.412, Z17.1])    Surgeons: Shavonne Sharif MD; Yony Morse MD Provider: Lit Mathew MD    Anesthesia Type: general ASA Status: 3          Anesthesia Type: general    Vitals  Vitals Value Taken Time   /71 01/24/23 1146   Temp 36.3 °C (97.4 °F) 01/24/23 1125   Pulse 87 01/24/23 1151   Resp 14 01/24/23 1128   SpO2 95 % 01/24/23 1150   Vitals shown include unvalidated device data.        Post Anesthesia Care and Evaluation    Patient location during evaluation: PACU  Patient participation: complete - patient participated  Level of consciousness: awake and alert  Pain management: adequate    Airway patency: patent  Anesthetic complications: No anesthetic complications    Cardiovascular status: acceptable  Respiratory status: acceptable  Hydration status: acceptable    Comments: --------------------            01/24/23               1128     --------------------   BP:                  Pulse:               Resp:       14       Temp:                SpO2:             VSS  --------------------

## 2023-01-24 NOTE — ANESTHESIA PROCEDURE NOTES
Airway  Urgency: elective    Date/Time: 1/24/2023 8:06 AM  Airway not difficult    General Information and Staff    Patient location during procedure: OR  CRNA/CAA: Cesilia Prescott CRNA    Indications and Patient Condition  Indications for airway management: airway protection    Preoxygenated: yes  MILS maintained throughout  Mask difficulty assessment: 1 - vent by mask    Final Airway Details  Final airway type: endotracheal airway      Successful airway: ETT  Cuffed: yes   Successful intubation technique: direct laryngoscopy  Facilitating devices/methods: Bougie and cricoid pressure  Endotracheal tube insertion site: oral  Blade: Lory  Blade size: 3  ETT size (mm): 7.0  Cormack-Lehane Classification: grade IIb - view of arytenoids or posterior of glottis only  Placement verified by: chest auscultation and capnometry   Measured from: lips  ETT/EBT  to lips (cm): 20  Number of attempts at approach: 1  Assessment: lips, teeth, and gum same as pre-op and atraumatic intubation    Additional Comments  Atraumatic insertion

## 2023-01-25 ENCOUNTER — READMISSION MANAGEMENT (OUTPATIENT)
Dept: CALL CENTER | Facility: HOSPITAL | Age: 53
End: 2023-01-25
Payer: COMMERCIAL

## 2023-01-25 VITALS
HEART RATE: 91 BPM | TEMPERATURE: 97.4 F | HEIGHT: 60 IN | OXYGEN SATURATION: 95 % | BODY MASS INDEX: 34.93 KG/M2 | SYSTOLIC BLOOD PRESSURE: 93 MMHG | DIASTOLIC BLOOD PRESSURE: 56 MMHG | WEIGHT: 177.91 LBS | RESPIRATION RATE: 16 BRPM

## 2023-01-25 PROCEDURE — 25010000002 ENOXAPARIN PER 10 MG: Performed by: PLASTIC SURGERY

## 2023-01-25 PROCEDURE — 99024 POSTOP FOLLOW-UP VISIT: CPT | Performed by: SURGERY

## 2023-01-25 PROCEDURE — G0378 HOSPITAL OBSERVATION PER HR: HCPCS

## 2023-01-25 RX ORDER — GABAPENTIN 100 MG/1
100 CAPSULE ORAL EVERY 8 HOURS
Qty: 60 CAPSULE | Refills: 2 | Status: SHIPPED | OUTPATIENT
Start: 2023-01-25 | End: 2023-02-08

## 2023-01-25 RX ORDER — ACETAMINOPHEN 325 MG/1
650 TABLET ORAL EVERY 4 HOURS PRN
Qty: 60 TABLET | Refills: 0 | Status: SHIPPED | OUTPATIENT
Start: 2023-01-25 | End: 2023-02-21 | Stop reason: HOSPADM

## 2023-01-25 RX ORDER — PROMETHAZINE HYDROCHLORIDE 12.5 MG/1
12.5 TABLET ORAL EVERY 6 HOURS PRN
Qty: 10 TABLET | Refills: 0 | Status: SHIPPED | OUTPATIENT
Start: 2023-01-25

## 2023-01-25 RX ORDER — DOXYCYCLINE 100 MG/1
100 CAPSULE ORAL 2 TIMES DAILY
Qty: 10 CAPSULE | Refills: 0 | Status: SHIPPED | OUTPATIENT
Start: 2023-01-25 | End: 2023-01-30

## 2023-01-25 RX ORDER — HYDROCODONE BITARTRATE AND ACETAMINOPHEN 5; 325 MG/1; MG/1
1-2 TABLET ORAL EVERY 4 HOURS PRN
Qty: 20 TABLET | Refills: 0 | Status: SHIPPED | OUTPATIENT
Start: 2023-01-25 | End: 2023-02-08

## 2023-01-25 RX ORDER — DOCUSATE SODIUM 250 MG
250 CAPSULE ORAL 2 TIMES DAILY PRN
Qty: 60 CAPSULE | Refills: 1 | Status: SHIPPED | OUTPATIENT
Start: 2023-01-25 | End: 2023-02-08

## 2023-01-25 RX ADMIN — Medication 3 ML: at 08:02

## 2023-01-25 RX ADMIN — ACETAMINOPHEN 650 MG: 325 TABLET, FILM COATED ORAL at 06:38

## 2023-01-25 RX ADMIN — PANTOPRAZOLE SODIUM 40 MG: 40 TABLET, DELAYED RELEASE ORAL at 06:38

## 2023-01-25 RX ADMIN — ENOXAPARIN SODIUM 40 MG: 100 INJECTION SUBCUTANEOUS at 08:02

## 2023-01-25 RX ADMIN — DOXYCYCLINE 100 MG: 100 CAPSULE ORAL at 08:02

## 2023-01-25 NOTE — OUTREACH NOTE
Prep Survey    Flowsheet Row Responses   [facility] patient discharged from? [facility]   Is LACE score < 7 ? No   Eligibility [facility]   Date of Admission 01/24/23   Date of Discharge 01/25/23   Discharge Disposition Home or Self Care   Discharge diagnosis Malignant neoplasm of upper-outer quadrant of left breast,    LEFT MODIFIED RADICAL MASTECTOMY   Does the patient have one of the following disease processes/diagnoses(primary or secondary)? General Surgery   Does the patient have Home health ordered? No   Is there a DME ordered? No   Prep survey completed? Yes          VILLA FLOWER - Registered Nurse

## 2023-01-26 ENCOUNTER — TRANSITIONAL CARE MANAGEMENT TELEPHONE ENCOUNTER (OUTPATIENT)
Dept: CALL CENTER | Facility: HOSPITAL | Age: 53
End: 2023-01-26
Payer: COMMERCIAL

## 2023-01-26 ENCOUNTER — TELEPHONE (OUTPATIENT)
Dept: SURGERY | Facility: CLINIC | Age: 53
End: 2023-01-26
Payer: COMMERCIAL

## 2023-01-26 LAB
LAB AP CASE REPORT: NORMAL
LAB AP CASE REPORT: NORMAL
LAB AP CLINICAL INFORMATION: NORMAL
LAB AP CLINICAL INFORMATION: NORMAL
LAB AP DIAGNOSIS COMMENT: NORMAL
LAB AP DIAGNOSIS COMMENT: NORMAL
LAB AP SYNOPTIC CHECKLIST: NORMAL
LAB AP SYNOPTIC CHECKLIST: NORMAL
PATH REPORT.FINAL DX SPEC: NORMAL
PATH REPORT.FINAL DX SPEC: NORMAL
PATH REPORT.GROSS SPEC: NORMAL
PATH REPORT.GROSS SPEC: NORMAL

## 2023-01-26 NOTE — OUTREACH NOTE
Call Center TCM Note    Flowsheet Row Responses   [facility] patient discharged from? [facility]   Does the patient have one of the following disease processes/diagnoses(primary or secondary)? General Surgery   TCM attempt successful? Yes   Call start time 1250   Call end time 1256   Person spoke with today (if not patient) and relationship Patient   Meds reviewed with patient/caregiver? Yes   Prescription comments Patient has some clots in mahogany drain- wanting to know if she should start asa a message from patient has already been seen to Dr. Sharif.   Is the patient taking all medications as directed (includes completed medication regime)? Yes   Comments Patient is following closely with breast surgeon and oncology.   Does the patient have an appointment with their PCP within 7 days of discharge? Yes   Has home health visited the patient within 72 hours of discharge? N/A   Psychosocial issues? No   Did the patient receive a copy of their discharge instructions? Yes   Nursing interventions Reviewed instructions with patient   What is the patient's perception of their health status since discharge? Improving   Nursing interventions Nurse provided patient education   Is the patient /caregiver able to teach back basic post-op care? Drive as instructed by MD in discharge instructions, No tub bath, swimming, or hot tub until instructed by MD, Keep incision areas clean,dry and protected, Lifting as instructed by MD in discharge instructions   Is the patient/caregiver able to teach back signs and symptoms of incisional infection? Increased redness, swelling or pain at the incisonal site, Increased drainage or bleeding, Pus or odor from incision, Incisional warmth, Fever  [Patient does report clots in mahogany drain patient to follow up with Dr. sharif- Message was sent to provider on the concern]   Is the patient/caregiver able to teach back steps to recovery at home? Set small, achievable goals for return to baseline  health, Rest and rebuild strength, gradually increase activity   Is the patient/caregiver able to teach back the hierarchy of who to call/visit for symptoms/problems? PCP, Specialist, Home health nurse, Urgent Care, ED, 911 Yes   TCM call completed? Yes   Wrap up additional comments Patient doing well. Has concerns with clots in mahogany drain patient has a message into Dr. Sharif no other concerns or questions at this time.   Call end time 1256   Would this patient benefit from a Referral to [facility] Social Work? No   Is the patient interested in additional calls from an ambulatory ?  NOTE:  applies to high risk patients requiring additional follow-up. No          Afia Salas RN    1/26/2023, 12:57 EST

## 2023-02-01 ENCOUNTER — LAB (OUTPATIENT)
Dept: OTHER | Facility: HOSPITAL | Age: 53
End: 2023-02-01
Payer: COMMERCIAL

## 2023-02-01 ENCOUNTER — OFFICE VISIT (OUTPATIENT)
Dept: ONCOLOGY | Facility: CLINIC | Age: 53
End: 2023-02-01
Payer: COMMERCIAL

## 2023-02-01 ENCOUNTER — PREP FOR SURGERY (OUTPATIENT)
Dept: OTHER | Facility: HOSPITAL | Age: 53
End: 2023-02-01
Payer: COMMERCIAL

## 2023-02-01 VITALS
DIASTOLIC BLOOD PRESSURE: 82 MMHG | SYSTOLIC BLOOD PRESSURE: 126 MMHG | BODY MASS INDEX: 33.72 KG/M2 | OXYGEN SATURATION: 95 % | TEMPERATURE: 96.9 F | WEIGHT: 178.6 LBS | RESPIRATION RATE: 16 BRPM | HEIGHT: 61 IN | HEART RATE: 80 BPM

## 2023-02-01 DIAGNOSIS — D50.9 IRON DEFICIENCY ANEMIA, UNSPECIFIED IRON DEFICIENCY ANEMIA TYPE: ICD-10-CM

## 2023-02-01 DIAGNOSIS — C79.51 BONE METASTASIS: ICD-10-CM

## 2023-02-01 DIAGNOSIS — Z17.1 MALIGNANT NEOPLASM OF UPPER-OUTER QUADRANT OF LEFT BREAST IN FEMALE, ESTROGEN RECEPTOR NEGATIVE: Primary | ICD-10-CM

## 2023-02-01 DIAGNOSIS — Z17.1 MALIGNANT NEOPLASM OF UPPER-OUTER QUADRANT OF LEFT BREAST IN FEMALE, ESTROGEN RECEPTOR NEGATIVE: ICD-10-CM

## 2023-02-01 DIAGNOSIS — C73 THYROID CANCER: ICD-10-CM

## 2023-02-01 DIAGNOSIS — C50.412 MALIGNANT NEOPLASM OF UPPER-OUTER QUADRANT OF LEFT BREAST IN FEMALE, ESTROGEN RECEPTOR NEGATIVE: Primary | ICD-10-CM

## 2023-02-01 DIAGNOSIS — Z85.3 HISTORY OF LEFT BREAST CANCER: ICD-10-CM

## 2023-02-01 DIAGNOSIS — C50.412 MALIGNANT NEOPLASM OF UPPER-OUTER QUADRANT OF LEFT BREAST IN FEMALE, ESTROGEN RECEPTOR NEGATIVE: ICD-10-CM

## 2023-02-01 LAB
ALBUMIN SERPL-MCNC: 3.9 G/DL (ref 3.5–5.2)
ALBUMIN/GLOB SERPL: 1.2 G/DL
ALP SERPL-CCNC: 72 U/L (ref 39–117)
ALT SERPL W P-5'-P-CCNC: 13 U/L (ref 1–33)
ANION GAP SERPL CALCULATED.3IONS-SCNC: 6.5 MMOL/L (ref 5–15)
AST SERPL-CCNC: 13 U/L (ref 1–32)
BASOPHILS # BLD AUTO: 0.03 10*3/MM3 (ref 0–0.2)
BASOPHILS NFR BLD AUTO: 0.5 % (ref 0–1.5)
BILIRUB SERPL-MCNC: 0.2 MG/DL (ref 0–1.2)
BUN SERPL-MCNC: 17 MG/DL (ref 6–20)
BUN/CREAT SERPL: 17.3 (ref 7–25)
CALCIUM SPEC-SCNC: 9.3 MG/DL (ref 8.6–10.5)
CHLORIDE SERPL-SCNC: 105 MMOL/L (ref 98–107)
CO2 SERPL-SCNC: 27.5 MMOL/L (ref 22–29)
CREAT SERPL-MCNC: 0.98 MG/DL (ref 0.57–1)
DEPRECATED RDW RBC AUTO: 50.7 FL (ref 37–54)
EGFRCR SERPLBLD CKD-EPI 2021: 68.7 ML/MIN/1.73
EOSINOPHIL # BLD AUTO: 0.42 10*3/MM3 (ref 0–0.4)
EOSINOPHIL NFR BLD AUTO: 6.8 % (ref 0.3–6.2)
ERYTHROCYTE [DISTWIDTH] IN BLOOD BY AUTOMATED COUNT: 15.7 % (ref 12.3–15.4)
FERRITIN SERPL-MCNC: 111.8 NG/ML (ref 13–150)
GLOBULIN UR ELPH-MCNC: 3.2 GM/DL
GLUCOSE SERPL-MCNC: 104 MG/DL (ref 65–99)
HCT VFR BLD AUTO: 32.3 % (ref 34–46.6)
HGB BLD-MCNC: 9.6 G/DL (ref 12–15.9)
IMM GRANULOCYTES # BLD AUTO: 0.07 10*3/MM3 (ref 0–0.05)
IMM GRANULOCYTES NFR BLD AUTO: 1.1 % (ref 0–0.5)
IRON 24H UR-MRATE: 37 MCG/DL (ref 37–145)
IRON SATN MFR SERPL: 13 % (ref 20–50)
LYMPHOCYTES # BLD AUTO: 1.95 10*3/MM3 (ref 0.7–3.1)
LYMPHOCYTES NFR BLD AUTO: 31.4 % (ref 19.6–45.3)
MCH RBC QN AUTO: 26 PG (ref 26.6–33)
MCHC RBC AUTO-ENTMCNC: 29.7 G/DL (ref 31.5–35.7)
MCV RBC AUTO: 87.5 FL (ref 79–97)
MONOCYTES # BLD AUTO: 0.66 10*3/MM3 (ref 0.1–0.9)
MONOCYTES NFR BLD AUTO: 10.6 % (ref 5–12)
NEUTROPHILS NFR BLD AUTO: 3.08 10*3/MM3 (ref 1.7–7)
NEUTROPHILS NFR BLD AUTO: 49.6 % (ref 42.7–76)
NRBC BLD AUTO-RTO: 0 /100 WBC (ref 0–0.2)
PLATELET # BLD AUTO: 316 10*3/MM3 (ref 140–450)
PMV BLD AUTO: 8.4 FL (ref 6–12)
POTASSIUM SERPL-SCNC: 3.8 MMOL/L (ref 3.5–5.2)
PROT SERPL-MCNC: 7.1 G/DL (ref 6–8.5)
RBC # BLD AUTO: 3.69 10*6/MM3 (ref 3.77–5.28)
SODIUM SERPL-SCNC: 139 MMOL/L (ref 136–145)
TIBC SERPL-MCNC: 280 MCG/DL (ref 298–536)
TRANSFERRIN SERPL-MCNC: 188 MG/DL (ref 200–360)
WBC NRBC COR # BLD: 6.21 10*3/MM3 (ref 3.4–10.8)

## 2023-02-01 PROCEDURE — 84466 ASSAY OF TRANSFERRIN: CPT | Performed by: INTERNAL MEDICINE

## 2023-02-01 PROCEDURE — G2212 PROLONG OUTPT/OFFICE VIS: HCPCS | Performed by: INTERNAL MEDICINE

## 2023-02-01 PROCEDURE — 36415 COLL VENOUS BLD VENIPUNCTURE: CPT

## 2023-02-01 PROCEDURE — 85025 COMPLETE CBC W/AUTO DIFF WBC: CPT | Performed by: INTERNAL MEDICINE

## 2023-02-01 PROCEDURE — 80053 COMPREHEN METABOLIC PANEL: CPT | Performed by: INTERNAL MEDICINE

## 2023-02-01 PROCEDURE — 83540 ASSAY OF IRON: CPT | Performed by: INTERNAL MEDICINE

## 2023-02-01 PROCEDURE — 99215 OFFICE O/P EST HI 40 MIN: CPT | Performed by: INTERNAL MEDICINE

## 2023-02-01 PROCEDURE — 82728 ASSAY OF FERRITIN: CPT | Performed by: INTERNAL MEDICINE

## 2023-02-01 RX ORDER — FERROUS SULFATE 325(65) MG
325 TABLET ORAL DAILY
Start: 2023-02-01

## 2023-02-01 RX ORDER — CEFAZOLIN SODIUM 2 G/100ML
2 INJECTION, SOLUTION INTRAVENOUS ONCE
Status: CANCELLED | OUTPATIENT
Start: 2023-02-14 | End: 2023-02-01

## 2023-02-01 NOTE — PROGRESS NOTES
Subjective     CHIEF COMPLAINT:      Chief Complaint   Patient presents with   • Follow-up     DISCUSS PATHOLOGY      HISTORY OF PRESENT ILLNESS:     Ina Akers is a 52 y.o. female patient who returns today for follow up on her breast cancer. She is accompanied by her . She had her left mastectomy surgery on 1/24/2023. She had expander placed. She had 3 drains placed intraoperatively. One was removed. She is following up weekly with her plastic surgeon. She reports that she will have the removal of the expander and placement of implant in 6 months.     Patient did not start back on the oral iron since she had the surgery (was held before surgery).    ROS:  Pertinent ROS is in the HPI.     Past medical, surgical, social and family history were reviewed.     MEDICATIONS:    Current Outpatient Medications:   •  acetaminophen (TYLENOL) 500 MG tablet, Take 1,000 mg by mouth Every 6 (Six) Hours As Needed for Mild Pain ., Disp: , Rfl:   •  amLODIPine (NORVASC) 10 MG tablet, Take 10 mg by mouth Every Morning., Disp: , Rfl:   •  atorvastatin (LIPITOR) 20 MG tablet, Take 1 tablet by mouth Daily. (Patient taking differently: Take 20 mg by mouth Every Night.), Disp: 90 tablet, Rfl: 3  •  Blood Glucose Monitoring Suppl w/Device kit, Check blood sugar twice a day, Disp: 1 each, Rfl: 0  •  cholecalciferol (VITAMIN D3) 25 MCG (1000 UT) tablet, Take 1,000 Units by mouth Daily., Disp: , Rfl:   •  docusate sodium (COLACE) 250 MG capsule, Take 1 capsule by mouth 2 (Two) Times a Day As Needed for Constipation., Disp: 60 capsule, Rfl: 1  •  famotidine (PEPCID) 20 MG tablet, Take 20 mg by mouth Every Other Day., Disp: , Rfl:   •  Glucose Blood (BLOOD GLUCOSE TEST) strip, Check blood sugar twice a day., Disp: 100 each, Rfl: 1  •  HYDROcodone-acetaminophen (NORCO) 5-325 MG per tablet, Take 1-2 tablets by mouth Every 4 (Four) Hours As Needed (Pain)., Disp: 20 tablet, Rfl: 0  •  labetalol (NORMODYNE) 100 MG tablet, Take 0.5  tablets by mouth 2 (Two) Times a Day., Disp: 180 tablet, Rfl: 1  •  Lancets (ACCU-CHEK SOFT TOUCH) lancets, Check blood sugar twice a day as needed, Disp: 100 each, Rfl: 1  •  lansoprazole (PREVACID) 30 MG capsule, TAKE ONE CAPSULE BY MOUTH DAILY (Patient taking differently: Take 30 mg by mouth Every Other Day.), Disp: 90 capsule, Rfl: 2  •  levothyroxine (SYNTHROID, LEVOTHROID) 112 MCG tablet, Take 112 mcg by mouth Every Morning., Disp: , Rfl:   •  losartan (COZAAR) 100 MG tablet, Take 100 mg by mouth Daily., Disp: , Rfl:   •  metFORMIN ER (GLUCOPHAGE-XR) 500 MG 24 hr tablet, TAKE TWO TABLETS BY MOUTH DAILY WITH DINNER (Patient taking differently: Take 1,000 mg by mouth Every Evening.), Disp: 180 tablet, Rfl: 1  •  promethazine (PHENERGAN) 12.5 MG tablet, TAKE 1/2 TO 1 TABLET BY MOUTH EVERY 4 HOURS AS NEEDED FOR NAUSEA AND VOMITING -MAY MAKE DROWSY (Patient taking differently: Take 12.5-25 mg by mouth Every 6 (Six) Hours As Needed. TAKE 1/2 TO 1 TABLET BY MOUTH EVERY 4 HOURS AS NEEDED FOR NAUSEA AND VOMITING -MAY MAKE DROWSY), Disp: 30 tablet, Rfl: 3  •  promethazine (PHENERGAN) 12.5 MG tablet, Take 1 tablet by mouth Every 6 (Six) Hours As Needed for Nausea or Vomiting., Disp: 10 tablet, Rfl: 0  •  triamcinolone (KENALOG) 0.1 % cream, Apply  topically to the appropriate area as directed 2 (Two) Times a Day. Sparingly shortest time possible avoid face (Patient taking differently: Apply  topically to the appropriate area as directed As Needed. Sparingly shortest time possible avoid face), Disp: 80 g, Rfl: 0  •  acetaminophen (TYLENOL) 325 MG tablet, Take 2 tablets by mouth Every 4 (Four) Hours As Needed for Mild Pain., Disp: 60 tablet, Rfl: 0  •  gabapentin (Neurontin) 100 MG capsule, Take 1 capsule by mouth Every 8 (Eight) Hours., Disp: 60 capsule, Rfl: 2  Objective     VITAL SIGNS:     Vitals:    02/01/23 0812   BP: 126/82   Pulse: 80   Resp: 16   Temp: 96.9 °F (36.1 °C)   TempSrc: Temporal   SpO2: 95%   Weight: 81  "kg (178 lb 9.6 oz)   Height: 154.9 cm (60.98\")   PainSc: 2  Comment: LEFT SIDE FROM SURGERY 1/24/23     Body mass index is 33.76 kg/m².     Wt Readings from Last 5 Encounters:   02/01/23 81 kg (178 lb 9.6 oz)   01/24/23 80.7 kg (177 lb 14.6 oz)   01/16/23 80.7 kg (178 lb)   12/27/22 81.5 kg (179 lb 9.6 oz)   12/22/22 80.3 kg (177 lb)     PHYSICAL EXAMINATION:   GENERAL: The patient appears in good general condition, not in acute distress.   SKIN: No ecchymosis.  EYES: No jaundice. No pallor.  LYMPHATICS: No cervical or supraclavicular lymphadenopathy.  BREASTS: Left breast s/p mastectomy with expander in place. The incision is C/D/I.  CHEST: Normal respiratory effort. Lungs clear bilaterally. No added sounds.  CVS: Normal S1 and S2. No murmurs.  ABDOMEN: Soft. No tenderness. No Hepatomegaly. No Splenomegaly. No masses.  EXTREMITIES: NO upper ext edema.    DIAGNOSTIC DATA:     Results from last 7 days   Lab Units 02/01/23  0801   WBC 10*3/mm3 6.21   NEUTROS ABS 10*3/mm3 3.08   HEMOGLOBIN g/dL 9.6*   HEMATOCRIT % 32.3*   PLATELETS 10*3/mm3 316     Results from last 7 days   Lab Units 02/01/23  0801   SODIUM mmol/L 139   POTASSIUM mmol/L 3.8   CHLORIDE mmol/L 105   CO2 mmol/L 27.5   BUN mg/dL 17   CREATININE mg/dL 0.98   CALCIUM mg/dL 9.3   ALBUMIN g/dL 3.9   BILIRUBIN mg/dL 0.2   ALK PHOS U/L 72   ALT (SGPT) U/L 13   AST (SGOT) U/L 13   GLUCOSE mg/dL 104*         Lab 02/01/23  0801   IRON 37   IRON SATURATION 13*   TIBC 280*   TRANSFERRIN 188*   FERRITIN 111.80      Pathology exam from 1/24/2023:  Procedure  Total mastectomy    Specimen Laterality  Left    TUMOR   Tumor Site  Upper outer quadrant    Histologic Type  Invasive carcinoma with mixed ductal and lobular features    Histologic Grade (Carlos Histologic Score)     Glandular (Acinar) / Tubular Differentiation  Score 3    Nuclear Pleomorphism  Score 3    Mitotic Rate  Score 3    Overall Grade  Grade 3 (scores of 8 or 9)    Tumor Size  Greatest dimension of " "largest invasive focus (Millimeters): 15 mm   Additional Dimension (Millimeters)  9 mm     5 mm   Tumor Focality  Single focus of invasive carcinoma    Ductal Carcinoma In Situ (DCIS)  Present      Negative for extensive intraductal component (EIC)    Size (Extent) of DCIS  Estimated size (extent) of DCIS is at least (Millimeters): 20 mm   Architectural Patterns  Comedo      Solid    Nuclear Grade  Grade III (high)    Necrosis  Present, central (expansive \"comedo\" necrosis)    Lobular Carcinoma In Situ (LCIS)  Not identified    Lymphovascular Invasion  Not identified    Dermal Lymphovascular Invasion  Not identified    Microcalcifications  Not identified    Treatment Effect in the Breast  No known presurgical therapy    MARGINS   Margin Status for Invasive Carcinoma  All margins negative for invasive carcinoma    Distance from Invasive Carcinoma to Closest Margin  25 mm   Closest Margin(s) to Invasive Carcinoma  Posterior    Margin Status for DCIS  All margins negative for DCIS    Distance from DCIS to Closest Margin  25 mm   Closest Margin(s) to DCIS  Posterior    REGIONAL LYMPH NODES   Regional Lymph Node Status  All regional lymph nodes negative for tumor    Total Number of Lymph Nodes Examined (sentinel and non-sentinel)  2    PATHOLOGIC STAGE CLASSIFICATION (pTNM, AJCC 8th Edition)   Reporting of pT, pN, and (when applicable) pM categories is based on information available to the pathologist at the time the report is issued. As per the AJCC (Chapter 1, 8th Ed.) it is the managing physician's responsibility to establish the final pathologic stage based upon all pertinent information, including but potentially not limited to this pathology report.   pT Category  pT1c    pN Category  pN0         Estrogen Receptor (ER) Status  Negative         Progesterone Receptor (PgR) Status  Negative         HER2 (by immunohistochemistry)  Negative (Score 0)    Testing Performed on Case Number  [id number] ([id number])    Comment(s) "  Ki67 performed on [id number] estimated at 85%          Assessment & Plan    *Left breast cancer.  · It was identified on screening mammograms on 10/5/2022.  · It was located at 12 o'clock position.  · It measured 6 x 5 by 5 mm on mammogram and ultrasound on 10/20/2022.  · PET scan on 10/6/2022 showed the lesion to be hypermetabolic. It measured 10 x 7 mm with an SUV of 5.8.  · No evidence of metastasis on PET scan.  · MRI on 12/5/2022 showed the lesion to measure 9 mm.    · No suspicious lymphadenopathy.   · Since her metastatic thyroid cancer was stable, we recommended left mastectomy with goal of cure.  · She had mastectomy with reconstruction on 1/24/2023.  · Pathology exam revealed the primary tumor to measure 15 mm.  · It was grade 3.  Triple negative.  HER2 was negative-0 by IHC.  · Ki-67 was 85%.  · It was considered pT1cN0.  · 2 sentinel lymph nodes were examined and were negative.    · No lymphovascular invasion.  · There was associated DCIS.  · I explained the findings of the pathology exam to the patient and her . The size of the tumor increased since the initial diagnosis and the biopsy was obtained.  · Ki 67 is very high at 85% indicating rapid tumor growth and aggressive behavior.  · Due to the increase in size and the high Ki 67, I recommended adjuvant chemotherapy.  · The patient received Adriamycin in the past and would not be able to receive full dose of Adriamycin now.   · I recommended adjuvant chemotherapy with Taxotere and Cytoxan with Neulasta support every 3 weeksx 4 cycles.  · I explained that immunotherapy is not recommended since the tumor was <2 cm in size.  · I explained the side effects including neuropathy, infusion reaction, cytopenias and hair loss.  I discussed the use of ice cap to decrease the chance of hair loss.  · She agreed to proceed.  · I recommended placement of a port.      *History of left breast cancer, stage IIB, triIple negative.   · S/P lumpectomy in  2001.  · She received adjuvant chemotherapy on the NSABP B34 with AC followed by Taxotere.  · S/P post-lumpectomy radiation therapy.   · She was placed on tamoxifen for 5 years and it was completed in January 2007.   · With the patient's history of breast cancer at a young age and development of a second new breast cancer in the left breast, repeat genetic testing was recommended.  · Gene testing on 12/22/2022 revealed variants of unknown significance.    *Metastatic thyroid cancer diagnosed on 10/6/2020.  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  · She had right thyroid lobectomy on 12/17/2018 at [facility].    · The specimen was sent for outside consultation and the final conclusion was that there was no evidence of malignancy.  · MRI on 8/27/2020 revealed an L4 lesion resulting in pathologic compression fracture.  · Bone scan on 9/2/2020 revealed increased activity at L4 and left side of the skull.  · PET scan on 9/9/2020 revealed abnormal activity at L4, right femoral neck, lower sacrum, left ischium and right ninth medial aspect.  · PET scan 9/9/2020 showed no evidence of visceral metastasis.  · Patient had kyphoplasty on 9/17/2020.  Pathology was nondiagnostic.  · CT-guided biopsy of the lesion in the right posterior ninth rib on 10/6/2020 revealed a clear cell neoplasm.  It is positive for PAX 8 and TTF-1 favoring metastatic thyroid cancer.  · Patient was referred to [facility].  · Patient underwent completion thyroidectomy on 11/9/2020.  · There was no evidence of uptake of radioactive iodine on SPECT imaging on 12/18/2020.  · This was indicative of the tumor not going to respond to radioactive iodine therapy.  · Patient was started on Lenvima 20 mg daily on 12/24/2020.  · Lenvima was held on 2/6/2021 due to drug induced anemia and thrombocytopenia.  She was unable to start back on it due to prolonged cytopenias.  · CT scans on 3/23/2021 showed stable disease.  · CBC was  stable on 6/8/2021.  CT scans showed stable disease.  Thyroglobulin was stable at 3.4.    · PET scan on 9/1/2021 revealed decrease in the activity in the metastatic lesions.  Starting back on active treatment (Nexavar) was not recommended.  · PET scan on 12/16/2021 showed complete response except for mild hypermetabolism in the left ischium.  · CT scans on 4/8/2022 showed stable bone metastasis.  No new metastatic lesions.  · PET scan on 10/6/2022 revealed no new areas of metastasis.  · Due to the stable metastatic disease, she is not currently on treatment for her thyroid cancer.   · She continues thyroid replacement with Synthroid 112 mcg daily.      *Bone metastasis.  · Patient was given Zometa on 9/29/2020.  · Patient underwent right femur medullary nailing on 10/23/2020.  · Patient was restarted back on Zometa on 2/24/2022.   · She is tolerating the Zometa infusions well.  · CT scans on 4/8/2022 revealed no new metastatic lesions.  The metastatic lesions were stable.  · PET scan on 10/6/2022 revealed no change in the areas of activity in the bone metastasis.  No new lesions.  · Last dose of Zometa was given on 12/1/2022.    * Iron deficiency anemia.    · Patient was previously treated with ferrous sulfate 325 mg daily.  · Hemoglobin decreased to 10.4 on 6/23/2021.  Iron stores decreased with transferrin saturation decreasing to 12%.  · She was restarted back on ferrous sulfate 325 mg daily.  However, Dr. Huddleston asked her to stop the iron.  · Hemoglobin decreased to 9.1 on 2/24/2022.  · EGD on 3/25/2022 showed no evidence of blood loss.  · Pathology exam revealed no typical features of celiac disease.  · Hemoglobin was 10.4 on 12/27/2022.  · Ferritin was 95 and transferrin saturation was 14%.  · She was started on ferrous sulfate 325 mg daily.  · Hemoglobin decreased to 9.6 today, secondary to recent surgical blood losses.  · She did not start back on the ferrous sulfate after the surgery.    PLAN:    1.  We will  ask Dr. East to place a port.  2.  Restart Ferrous Sulfate 325 mg daily.  3.  We will schedule her for follow up in 4 weeks and plan to start chemotherapy treatment on that day.     I spent 75 minutes caring for Ina on this date of service. This time includes time spent by me in the following activities: preparing for the visit, reviewing tests, obtaining and/or reviewing a separately obtained history, performing a medically appropriate examination and/or evaluation, counseling and educating the patient/family/caregiver, ordering medications, tests, or procedures, referring and communicating with other health care professionals, documenting information in the medical record, independently interpreting results and communicating that information with the patient/family/caregiver and care coordination       Tim Muñoz MD  02/01/23

## 2023-02-02 ENCOUNTER — TELEPHONE (OUTPATIENT)
Dept: ONCOLOGY | Facility: CLINIC | Age: 53
End: 2023-02-02
Payer: COMMERCIAL

## 2023-02-02 NOTE — TELEPHONE ENCOUNTER
Caller: Ina Akers    Relationship: Self    Best call back number: [phone]    What is the best time to reach you: ANYTIME    Who are you requesting to speak with (clinical staff, provider, specific staff member): DR LORD OR NURSE    What was the call regarding: PT WOULD LIKE TO SPEAK WITH THE NURSE ABOUT CHANGING HER APPT DAYS TO Friday INSTEAD OF Wednesday. PLEASE CALL PT TO DISCUSS FURTHER. SHE DOES PREFER THE EP LOCATION.     Do you require a callback: YES

## 2023-02-03 NOTE — TELEPHONE ENCOUNTER
Patient states she prefers to have treatment on Wednesdays since Dr. Muñoz is at the EP office. Pt had no further questions.

## 2023-02-07 ENCOUNTER — TELEMEDICINE (OUTPATIENT)
Dept: ONCOLOGY | Facility: CLINIC | Age: 53
End: 2023-02-07
Payer: COMMERCIAL

## 2023-02-07 DIAGNOSIS — Z17.1 MALIGNANT NEOPLASM OF UPPER-OUTER QUADRANT OF LEFT BREAST IN FEMALE, ESTROGEN RECEPTOR NEGATIVE: Primary | ICD-10-CM

## 2023-02-07 DIAGNOSIS — C50.412 MALIGNANT NEOPLASM OF UPPER-OUTER QUADRANT OF LEFT BREAST IN FEMALE, ESTROGEN RECEPTOR NEGATIVE: Primary | ICD-10-CM

## 2023-02-07 PROCEDURE — 99215 OFFICE O/P EST HI 40 MIN: CPT | Performed by: NURSE PRACTITIONER

## 2023-02-07 RX ORDER — DEXAMETHASONE 4 MG/1
TABLET ORAL
Qty: 12 TABLET | Refills: 3 | Status: SHIPPED | OUTPATIENT
Start: 2023-02-07

## 2023-02-07 NOTE — PROGRESS NOTES
TREATMENT  PREPARATION  This was an audio and video enabled telemedicine encounter.    Ina Akers  [id number]  1970    Chief Complaint: Treatment preparation and needs assessment    History of present illness:  Ina Akers is a 52 y.o. year old female who is here today for treatment preparation and needs assessment.  The patient has been diagnosed with   Encounter Diagnosis   Name Primary?   • Malignant neoplasm of upper-outer quadrant of left breast in female, estrogen receptor negative (HCC) Yes    and is scheduled to begin treatment with:     Oncology History:    Oncology/Hematology History   Malignant neoplasm of upper-outer quadrant of left breast in female, estrogen receptor negative (HCC)   11/23/2016 Initial Diagnosis    Malignant neoplasm of upper-outer quadrant of left breast in female, estrogen receptor negative (CMS/HCC)     9/23/2020 Cancer Staged    Staging form: Breast, AJCC V7  - Pathologic stage from 9/23/2020: Stage IIB (T2, N1, cM0) - Signed by Tim Muñoz MD on 10/9/2020     9/29/2020 - 9/29/2020 Chemotherapy    OP SUPPORTIVE Denosumab (Xgeva) Q28D     9/29/2020 -  Chemotherapy    OP SUPPORTIVE Zoledronic Acid Q28D     8/19/2021 Imaging    Screening MMG with Teodoro ([facility])  There are scattered areas of fibroglandular density. Left breast post-operative change is again seen. There are no suspicious masses, significant calcifications, or other abnormalities seen in either breast.  BI-RADS 2: Benign     10/5/2022 Imaging    Screening MMG with Teodoro ([facility])  There are scattered areas of fibroglandular density.  Finding 1: There is a new fat containing focal asymmetry measuring 6 mm seen in the middle one-third region of the left breast at 12 o'clock  Finding 2: There is a focal asymmetry with associated dystrophic calcifications and post-surgical scar seen in the upper outer region of the left breast. Focal asymmetry in in an area of prior lumpectomy. There are  no significant changes from the prior exam(s).   In the right breast, no suspicious masses, significant calcifications or other abnormalities are seen.  BI-RADS 0: Incomplete     10/6/2022 Imaging    PET CT FDG ([facility])  Chest:  New intensely FDG avid left breast ill-defined nodular focus at the 12 o'clock position measuring .0 x 0.7 cm with a maximum SUV of 5.8 (series 4, image 122).  No significant change in 19 x 15 mm non FDG avid speculated mass within the posterior upper outer quadrant of the left breast with intralesional fat and a peripherally located biopsy clip, consistent with known a post-lumpectomy scar.  Impression:  1. New FDG avid left breast ill-defined nodular focus at the 12 o-clock position, suspicious for malignancy. This could be metastic disease given the increase in non-stimulated thyroglobulin level. Tissue sampling can be considered for further characterization.  2. No significant change in hyper metabolic well-defined lytic lesion with sclerotic margin within the left ischium compared to the prior 2 PET/CT studies from  yet showing evidence of treatment response compared to outside study from 2020.  3. No new metabolically active osseous metastatic disease.     10/20/2022 Imaging    Left Diagnostic MMG with Teodoro & Left Breast US ([facility])  MMG:  On the present examination, there is isodense focal asymmetry measuring 6 mm in the left breast at 12 o'clock located 5 cm from the nipple.  US:  Demonstrates a round non-parallel complex cystic and solid mass with circumscribed margins measuring 6 x 5 x 5 mm in the left breast at 12 o'clock located 5 cm from the nipple.  BI-RADS 4B: Suspicious     2022 Imaging    Left Diagnostic MMG with Teodoro & Left Breast US ([facility]):  MM. Re-demonstration of 7 mm mass at approximately 12:00-1:00, 6 to 8 cm from the nipple.  2. Stable post-lumpectomy changes in the upper outer left breast.  US:  Focused ultrasound was performed at  1:00, 7 cm from the nipple. Ultrasound demonstrates a hypoechoic round mass with indistinct margins and without internal vascularity measuring 5 x 5 x 4 mm. There is an indistinct hyperechoic halo surrounding the mass. This correlates with mammographic finding.  BI-RADS 4: Suspicious     11/18/2022 Biopsy    Left Breast, US-Guided Biopsy ():    LEFT BREAST, 1:00 (7 CM FROM NIPPLE), ULTRASOUND GUIDED CORE NEEDLE BIOPSY FOR 0.7 CM MASS (U CLIP):     - INVASIVE DUCTAL CARCINOMA, poorly differentiated (Carlos grade III/III: tubule score=3, nuclear score=3, mitoses score=3), with necrosis, measuring up to 0.5 cm.      - No lymphovascular invasion or in situ carcinoma identified.      ER Negative (<1%)  MD Negative (<1%)  HER2 Negative (IHC 0)      [facility] PATHOLOGY REVIEW    1. Left Breast, 1 o'clock, 7 cm from Nipple, Ultrasound-Guided Core Biopsies for a Mass (Outside Consult [id number]):       INVASIVE MAMMARY CARCINOMA, NO SPECIAL TYPE (INVASIVE DUCTAL CARCINOMA).               A. Histologic grade: Carlos histologic score III (tubules = 3, nuclei = 3, mitosis = 3).               B. Largest contiguous focus measures up to 5 mm.   C. No associated in situ component identified.               D. No lymphovascular nor perineural invasion identified.     12/5/2022 Imaging    Bilateral Breast MRI ([facility]):  In the posterior one third of the left breast at the 12:30 position centered on the order of 8.5 cm from the nipple there is a focal signal void seen within an irregular enhancing mass that measures on the order of 0.9 cm in the superior-inferior dimension, 0.8 cm in anterior to posterior dimension and 0.8 cm and the mediolateral dimension. This represents the biopsy-proven site of malignancy with the internal biopsy clip.  In the anterior one third of the left breast located medial to the plane of the nipple at the 9 o'clock position on the order of 3 cm from the nipple there is an irregular  enhancing lesion that measures on the order of 0.5 cm in the superior-inferior dimension, 0.5 cm in anterior to posterior dimension and 0.5 cm in the medial to lateral dimension. A possible mammographic correlate is seen on the CC image of the left breast on the examination of 11/18/2022. This area of enhancement is not seen on the prior breast MRI examination from 12/26/2012.  There is post surgical change of the left breast associated with prior breast conservation therapy. No other areas of abnormal enhancement or morphology are seen within the left breast. I see no evidence for  abnormal left breast skin, nipple or chest wall enhancement and there is no evidence for left axillary or internal mammary chain adenopathy.   There are no areas of abnormal enhancement or morphology in the right breast. I see no evidence for abnormal right breast skin, nipple or chest wall enhancement and there is no evidence for right axillary or internal mammary chain adenopathy.  BI-RADS 4: Suspicious.     12/23/2022 Genetic Testing    [employer] Multi-Cancer Panel (84 genes):    VUS in NF1  VUS in POT1  VUS in SMAD4     3/1/2023 -  Chemotherapy    OP BREAST TC DOCEtaxel / Cyclophosphamide     Metastatic bone cancer (HCC)   9/23/2020 Initial Diagnosis    Bone metastasis (CMS/HCC)     9/29/2020 - 9/29/2020 Chemotherapy    OP SUPPORTIVE Denosumab (Xgeva) Q28D     9/29/2020 -  Chemotherapy    OP SUPPORTIVE Zoledronic Acid Q28D         The current medication list and allergy list were reviewed and reconciled.     Past Medical History, Past Surgical History, Social History, Family History have been reviewed and are without significant changes except as mentioned.    Physical Exam:    There were no vitals filed for this visit.  Vitals:    02/07/23 1341   PainSc: 0-No pain                      Physical Exam      NEEDS ASSESSMENTS    Genetics  The patient's new diagnosis and family history have been reviewed for genetic counseling needs. A  genetic referral is not recommended.     Psychosocial and Barriers to care  The patient has completed a PHQ-9 Depression Screening and the Distress Thermometer (DT) today.  PHQ-9 results show PHQ-2 Total Score: 0 PHQ-9 Total Score: PHQ-9 Total Score: 0     The patient scored their distress today as   on a scale of 0-10 with 0 being no distress and 10 being extreme distress. Problems marked by the patient as being an issue for them within the last week include   .      Results were reviewed along with psychosocial resources offered by our cancer center.  Our Supportive Oncology team will be flagged for a score of 4 or above, and a same day call will be made for a score of 9 or 10.  A mental health referral is offered at that time. Patients who score less than 4 have been educated on our support services and can be referred to our  upon request.  The patient will not be referred to our .       Nutrition  The patient has completed the malnutrition screening today. They scored Malnutrition Screening Tool  Have you recently lost weight without trying?  If yes, how much weight have you lost?: 0--> No  Have you been eating poorly because of a decreased appetite?: 0--> No  MST score: 0   with a score of 0-1 meaning not at risk in a score of 2 or greater meaning at risk.  Patients with a score of 3 or higher will be referred to our oncology dietitian for support. Patients beginning at risk treatment regimens or who have dietary concerns will also be referred to our oncology dietitian. The patient will not be referred.    Functional Assessment  Persons who are age 70 or greater will be screened for qualification of a comprehensive geriatric assessment by our survivorship nurse practitioner.  Older adults with cancer face unique challenges. These may include an increased risk of drug reactions, financial burdens, and caregiver stress. The patient scored   . Patients scoring 14 or lower will referred  "for an older adult functional assessment with the survivorship advanced practice registered nurse to ensure all needed support is provided as patients plan for their treatments. NOT APPLICABLE    Intravenous Access Assessment  The patient and I discussed planned intravenous chemo/biotherapy as well as other IV treatments that are often needed throughout the course of treatment. These may include, but are not limited to blood transfusions, antibiotics, and IV hydration. Discussed that depending on selected treatment and vein assessment, patient may require venous access device (VAD) which could include but not limited to a Mediport or PICC line. Risks and benefits of VADs reviewed. The patient will be treated via Port.    Reproductive/Sexual Activity   People should avoid becoming pregnant and should not get a partner pregnant while undergoing chemo/biotherapy.  People of childbearing age should use effective contraception during active therapy. The best recommendation for all people is to use a barrier method for a minimum of 1 week after the last infusion of chemo/biotherapy to prevent your partner being exposed to byproducts from treatment medications in bodily fluids. Effective contraception should be discussed with your oncology team to make sure it is safe to take based on your diagnosis. Possible options include oral contraceptives, barrier methods. Chemo/biotherapy can change your ability to reproduce children in the future.  There are options for fertility preservation. NOT APPLICABLE    Advanced Care Planning  Advance Care Planning   The patient and I discussed advanced care planning, \"Conversations that Matter\".   This service is offered for development of advance directives with a certified ACP facilitator.  The patient does have an up-to-date advanced directive. This document is on file with our office. The patient is not interested in an appointment with one of our facilitators to create or update their " advanced directives.     Smoking cessation  Tobacco Use: Low Risk    • Smoking Tobacco Use: Never   • Smokeless Tobacco Use: Never   • Passive Exposure: Not on file       Patient and I discussed their tobacco use history. Referral will not be made for smoking cessation.      Palliative Care  When appropriate, the patient and I discussed the availability palliative care services and when appropriate Hospice care. Palliative care is not the same as Hospice care which was explained to the patient.  The patient is not interested in additional information from our  on these services.     Survivorship   When appropriate, we discussed that we will refer the patient to survivorship clinic to discuss next steps following completion of planned treatment.  Reviewed this visit will include assessment of your physical, psychological, functional, and spiritual needs as a survivor and the need at attend this visit when scheduled.    TREATMENT EDUCATION    Today I met with the patient to discuss the chemo/biotherapy regimen recommended for treatment of Malignant neoplasm of upper-outer quadrant of left breast in female, estrogen receptor negative (HCC)  - dexamethasone (DECADRON) 4 MG tablet  .  The patient was given explanation of treatment premed side effects including office policy that prohibits patients to drive if sedating medications are administered, MD explanation given regarding benefits, side effects, toxicities and goals of treatment.  The patient received a Chemotherapy/Biotherapy Plan Summary including diagnosis and explanation of specific treatment plan.    SIDE EFFECTS:  Common side effects were discussed with the patient and/or significant other.  Discussion included where applicable hair loss/discoloration, anemia/fatigue, infection/chills/fever, appetite, bleeding risk/precautions, constipation, diarrhea, mouth sores, taste alteration, loss of appetite, nausea/vomiting, peripheral neuropathy,  skin/nail changes, rash, muscle aches/weakness, photosensitivity, weight gain/loss, hearing loss, dizziness, menopausal symptoms, menstrual irregularity, sterility, high blood pressure, heart damage, liver damage, lung damage, kidney damage, DVT/PE risk, fluid retention, pleural/pericardial effusion, somnolence, electrolyte/LFT imbalance, vein exercises and/or the possible need for vascular access/port placement.  The patient was advised that although uncommon, leakage of an infused medication from the vein or venous access device may lead to skin breakdown and/or other tissue damage.  The patient was advised that he/she may have pain, bleeding, and/or bruising from the insertion of a needle in their vein or venous access device (port).  The patient was further advised that, in spite of proper technique, infection with redness and irritation may rarely occur at the site where the needle was inserted.  The patient was advised that if complications occur, additional medical treatment is available.  Finally, where applicable we have reviewed rare but potential immune mediated side effects including shortness of breath, cough, chest pain (pneumonitis), abdominal pain, diarrhea (colitis), thyroiditis (hypothyroid or hyperthyroid), hepatitis and liver dysfunction, nephritis and renal dysfunction.    Discussion also included side effects specific to drugs in the treatment plan, specifically:    Treatment Plans     Name Type Hold Status Plan Dates Plan Provider       Active    OP SUPPORTIVE Zoledronic Acid Q28D ONCOLOGY SUPPORTIVE CARE 1 On Automatic Hold  9/29/2020 - Present Tim Muñoz MD    OP BREAST TC DOCEtaxel / Cyclophosphamide ONCOLOGY TREATMENT Not on Hold  2/28/2023 - Present Tim Muñoz MD                   Questions answered and additional information discussed on topics including:  Anemia, Thrombocytopenia, Neutropenia, Nutrition and appetite changes, Constipation, Diarrhea, Nausea & vomiting,  Mouth sores, Alopecia, Nervous system changes, Skin & nail changes, Organ toxicities, Paxman and Hand/Foot Cooling       Assessment and Plan:    Diagnoses and all orders for this visit:    1. Malignant neoplasm of upper-outer quadrant of left breast in female, estrogen receptor negative (HCC) (Primary)  -     dexamethasone (DECADRON) 4 MG tablet; Take 2 tablets oral twice a day for 3 consecutive days beginning the day before chemotherapy and continue for 6 doses.  Dispense: 12 tablet; Refill: 3      No orders of the defined types were placed in this encounter.        1. The patient and I have reviewed their diagnosis and scheduled treatment plan. Needs assessment was completed where applicable including genetics, psychosocial needs, barriers to care, VAD evaluation, advanced care planning, survivorship, and palliative care services where indicated. Referrals have been ordered as appropriate based upon evaluation today and patient desires.   2. Chemo/biotherapy teaching was completed today and consent obtained. See separate documentation for further details.  3. Adequate time was given to answer questions.  Patient made aware of their care team members and contact information if they have questions or problems throughout the treatment course.  4. Discussion held and written information provided describing frequency of office visits and ongoing monitoring throughout the treatment plan.     5. Reviewed with patient any prescribed medication sent to pharmacy.  Education provided regarding proper storage, safe handling, and proper disposal of unused medication.  6. Proper handling of body fluids and waste discussed and written information provided.  7. If appropriate, patient had pretreatment labs drawn today.    Learning assessment completed at initial patient encounter. See separate flowsheet. Chemo/biotherapy education comprehension assessed at today's visit.    I spent 45 minutes caring for Ina on this date of  service. This time includes time spent by me in the following activities: preparing for the visit, reviewing tests, obtaining and/or reviewing a separately obtained history, performing a medically appropriate examination and/or evaluation, counseling and educating the patient/family/caregiver, ordering medications, tests, or procedures, referring and communicating with other health care professionals and documenting information in the medical record.     Jacqueline Hathaway, MAGO   02/07/23        Mode of Visit: Video  Location of patient: home  You have chosen to receive care through a telehealth visit.  Does the patient consent to use a video/audio connection for your medical care today? Yes  The visit included audio and video interaction. No technical issues occurred during this visit.

## 2023-02-07 NOTE — PROGRESS NOTES
[facility] Hematology/Oncology Treatment Plan Summary    [facility] Akers  [id number]  MD: Dr. Muñoz    Diagnosis:     ICD-10-CM ICD-9-CM   1. Malignant neoplasm of upper-outer quadrant of left breast in female, estrogen receptor negative (HCC)  C50.412 174.4    Z17.1 V86.1       Goal of treatment: adjuvant    Treatment Medication(s):   1. Taxotere  2. Cytoxan  3. Udenyca    Frequency: every 3 weeks    Number of cycles: 4    Starting on: 3/1/2023    Items for home use: Thermometer    Rx written for: [x] Nausea    [] Pre-Treatment   dexamethasone    Notes:     Next Steps: PORT     Completing Provider: MAGO Cortes           Date/time: 02/07/2023      Please note: You will be seen by a provider frequently with your treatment plan. This plan may change depending on many factors, if so, this will be discussed with you by your physician.  Last update 03/2022.

## 2023-02-08 ENCOUNTER — OFFICE VISIT (OUTPATIENT)
Dept: SURGERY | Facility: CLINIC | Age: 53
End: 2023-02-08
Payer: COMMERCIAL

## 2023-02-08 VITALS
HEIGHT: 60 IN | HEART RATE: 78 BPM | RESPIRATION RATE: 17 BRPM | BODY MASS INDEX: 34.95 KG/M2 | DIASTOLIC BLOOD PRESSURE: 74 MMHG | OXYGEN SATURATION: 99 % | SYSTOLIC BLOOD PRESSURE: 122 MMHG | WEIGHT: 178 LBS

## 2023-02-08 DIAGNOSIS — Z17.1 MALIGNANT NEOPLASM OF UPPER-OUTER QUADRANT OF LEFT BREAST IN FEMALE, ESTROGEN RECEPTOR NEGATIVE: ICD-10-CM

## 2023-02-08 DIAGNOSIS — C50.412 MALIGNANT NEOPLASM OF UPPER-OUTER QUADRANT OF LEFT BREAST IN FEMALE, ESTROGEN RECEPTOR NEGATIVE: ICD-10-CM

## 2023-02-08 DIAGNOSIS — Z48.89 POSTOPERATIVE VISIT: Primary | ICD-10-CM

## 2023-02-08 PROCEDURE — 99024 POSTOP FOLLOW-UP VISIT: CPT | Performed by: SURGERY

## 2023-02-08 NOTE — H&P (VIEW-ONLY)
[facility]     Referring Provider: Tim Muñoz MD     Chief complaint: Postoperative visit     Subjective   HPI:   12/22/2022:   Ms. Ina Akers is a 53 yo woman, seen at the request of Dr. Tim Muñoz, for a new diagnosis of left breast cancer. She has a past history of left breast cancer in 2001 (age 30), stage IIB triple negative. She underwent a lumpectomy and axillary dissection, followed by AC and Taxotere, followed by radiation, then tamoxifen for 5 years. She believes she had somewhere around 9-13 left axillary lymph nodes removed and 2 of them had cancer. In 2020, she was diagnosed with metastatic thyroid cancer to the bone. She underwent treatment in 2021 with Lenvima before this was held due to cytopenias. Her disease has been stable off treatment since 2/2021.     Recent screening mammogram in October 2022 showed a new left breast focal asymmetry separate from her lumpectomy site. Around this time she also underwent a PET scan because she was complaining of bone pain. This showed stable bone metastasis and a new FDG avid left breast lesion in the same region as the focal asymmetry. Diagnostic breast imaging confirmed a small hypoechoic mass and subsequent biopsy showed triple negative invasive ductal carcinoma. Her work-up is detailed in the oncologic history below. She denies any breast lumps, pain, skin changes, or nipple discharge. She also has a past history of a benign left breast lumpectomy in 1991.     Her only family history of cancer is oral cancer in her father. The patient has not undergone genetic testing.      2/8/2023, Interval History:  She underwent left modified radical mastectomy with prepectoral tissue expander placement on 1/24/23. See surgery & pathology details below in oncologic history. She has already seen Dr. Morse back postoperatively and had 2 drains removed. She also has already seen Dr. Muñoz and discussed proceeding with TC x4. She is  scheduled for a port placement next week. She was seen at the lymphedema clinic preoperatively and her bioimpedance score was within normal limits. She did have some early arm swelling while in the hospital, however this has already improved.       Oncology/Hematology History   Malignant neoplasm of upper-outer quadrant of left breast in female, estrogen receptor negative (HCC)   11/23/2016 Initial Diagnosis    Malignant neoplasm of upper-outer quadrant of left breast in female, estrogen receptor negative (CMS/HCC)     9/23/2020 Cancer Staged    Staging form: Breast, AJCC V7  - Pathologic stage from 9/23/2020: Stage IIB (T2, N1, cM0) - Signed by Tim Muñoz MD on 10/9/2020     9/29/2020 - 9/29/2020 Chemotherapy    OP SUPPORTIVE Denosumab (Xgeva) Q28D     9/29/2020 -  Chemotherapy    OP SUPPORTIVE Zoledronic Acid Q28D     8/19/2021 Imaging    Screening MMG with Teodoro ([facility])  There are scattered areas of fibroglandular density. Left breast post-operative change is again seen. There are no suspicious masses, significant calcifications, or other abnormalities seen in either breast.  BI-RADS 2: Benign     10/5/2022 Imaging    Screening MMG with Teodoro ([facility])  There are scattered areas of fibroglandular density.  Finding 1: There is a new fat containing focal asymmetry measuring 6 mm seen in the middle one-third region of the left breast at 12 o'clock  Finding 2: There is a focal asymmetry with associated dystrophic calcifications and post-surgical scar seen in the upper outer region of the left breast. Focal asymmetry in in an area of prior lumpectomy. There are no significant changes from the prior exam(s).   In the right breast, no suspicious masses, significant calcifications or other abnormalities are seen.  BI-RADS 0: Incomplete     10/6/2022 Imaging    PET CT FDG ( [facility])  Chest:  New intensely FDG avid left breast ill-defined nodular focus at the 12 o'clock position measuring .0 x 0.7 cm  with a maximum SUV of 5.8 (series 4, image 122).  No significant change in 19 x 15 mm non FDG avid speculated mass within the posterior upper outer quadrant of the left breast with intralesional fat and a peripherally located biopsy clip, consistent with known a post-lumpectomy scar.  Impression:  1. New FDG avid left breast ill-defined nodular focus at the 12 o-clock position, suspicious for malignancy. This could be metastic disease given the increase in non-stimulated thyroglobulin level. Tissue sampling can be considered for further characterization.  2. No significant change in hyper metabolic well-defined lytic lesion with sclerotic margin within the left ischium compared to the prior 2 PET/CT studies from  yet showing evidence of treatment response compared to outside study from 2020.  3. No new metabolically active osseous metastatic disease.     10/20/2022 Imaging    Left Diagnostic MMG with Teodoro & Left Breast US ([facility])  MMG:  On the present examination, there is isodense focal asymmetry measuring 6 mm in the left breast at 12 o'clock located 5 cm from the nipple.  US:  Demonstrates a round non-parallel complex cystic and solid mass with circumscribed margins measuring 6 x 5 x 5 mm in the left breast at 12 o'clock located 5 cm from the nipple.  BI-RADS 4B: Suspicious     2022 Imaging    Left Diagnostic MMG with Teodoro & Left Breast US ([facility]):  MM. Re-demonstration of 7 mm mass at approximately 12:00-1:00, 6 to 8 cm from the nipple.  2. Stable post-lumpectomy changes in the upper outer left breast.  US:  Focused ultrasound was performed at 1:00, 7 cm from the nipple. Ultrasound demonstrates a hypoechoic round mass with indistinct margins and without internal vascularity measuring 5 x 5 x 4 mm. There is an indistinct hyperechoic halo surrounding the mass. This correlates with mammographic finding.  BI-RADS 4: Suspicious     2022 Biopsy    Left Breast, US-Guided Biopsy  ():    LEFT BREAST, 1:00 (7 CM FROM NIPPLE), ULTRASOUND GUIDED CORE NEEDLE BIOPSY FOR 0.7 CM MASS (U CLIP):     - INVASIVE DUCTAL CARCINOMA, poorly differentiated (Carlos grade III/III: tubule score=3, nuclear score=3, mitoses score=3), with necrosis, measuring up to 0.5 cm.      - No lymphovascular invasion or in situ carcinoma identified.      ER Negative (<1%)  TX Negative (<1%)  HER2 Negative (IHC 0)      [facility] PATHOLOGY REVIEW    1. Left Breast, 1 o'clock, 7 cm from Nipple, Ultrasound-Guided Core Biopsies for a Mass (Outside Consult [id number]):       INVASIVE MAMMARY CARCINOMA, NO SPECIAL TYPE (INVASIVE DUCTAL CARCINOMA).               A. Histologic grade: [place] histologic score III (tubules = 3, nuclei = 3, mitosis = 3).               B. Largest contiguous focus measures up to 5 mm.   C. No associated in situ component identified.               D. No lymphovascular nor perineural invasion identified.     12/5/2022 Imaging    Bilateral Breast MRI ([facility]):  In the posterior one third of the left breast at the 12:30 position centered on the order of 8.5 cm from the nipple there is a focal signal void seen within an irregular enhancing mass that measures on the order of 0.9 cm in the superior-inferior dimension, 0.8 cm in anterior to posterior dimension and 0.8 cm and the mediolateral dimension. This represents the biopsy-proven site of malignancy with the internal biopsy clip.  In the anterior one third of the left breast located medial to the plane of the nipple at the 9 o'clock position on the order of 3 cm from the nipple there is an irregular enhancing lesion that measures on the order of 0.5 cm in the superior-inferior dimension, 0.5 cm in anterior to posterior dimension and 0.5 cm in the medial to lateral dimension. A possible mammographic correlate is seen on the CC image of the left breast on the examination of 11/18/2022. This area of enhancement is not seen on the prior  breast MRI examination from 12/26/2012.  There is post surgical change of the left breast associated with prior breast conservation therapy. No other areas of abnormal enhancement or morphology are seen within the left breast. I see no evidence for  abnormal left breast skin, nipple or chest wall enhancement and there is no evidence for left axillary or internal mammary chain adenopathy.   There are no areas of abnormal enhancement or morphology in the right breast. I see no evidence for abnormal right breast skin, nipple or chest wall enhancement and there is no evidence for right axillary or internal mammary chain adenopathy.  BI-RADS 4: Suspicious.     12/23/2022 Genetic Testing    [employer] Multi-Cancer Panel (84 genes):    VUS in NF1  VUS in POT1  VUS in SMAD4     1/24/2023 Surgery    Left modified radical mastectomy with prepectoral tissue expander placement    1. Left Breast Modified Radical Mastectomy:               A. Invasive carcinoma with mixed ductal and lobular features:                            1. Invasive carcinoma measures 15 mm x 9 mm x 5 mm.                            2. Overall Carlos grade III (tubular score = 3, nuclear score = 3, mitotic score = 3).                            3. No definitive lymphovascular space invasion identified.               B. Associated ductal carcinoma in situ (DCIS):                            1. DCIS spans an area estimated at 20 mm x 5 mm x 2 mm.                            2. High-grade solid and comedo DCIS.               C. All margins are negative for invasive carcinoma.                    Invasive carcinoma measures 25 mm from the closest (Posterior) margin of excision.                                  D. All margins are negative for ductal carcinoma in-situ (DCIS).                    DCIS measures 25 mm from the closest (Posterior) margin of excision                              E. Focal biopsy site changes are identified, and metallic clip retrieved.                F. No Pagetoid involvement of skin nor nipple by malignancy identified.               G. No lobular neoplasia (LCIS, ALH) identified.               H. Two incidental lymph nodes identified, negative for metastatic carcinoma by routine and immunostaining (0/2).               I.  Focal dense dermal and stromal scar consistent with previous surgical procedure identified.  J. Previous Biomarkers: Estrogen receptors: negative, Progesterone receptors: negative,                    HER/2-stacey: negative (Score 0), Ki-67 = Not reported.  [id number] ([id number]).     2. Left Breast Augmentation:                 A. Benign skin and subcutaneous tissue.     3/1/2023 -  Chemotherapy    OP BREAST TC DOCEtaxel / Cyclophosphamide     Metastatic bone cancer (HCC)   9/23/2020 Initial Diagnosis    Bone metastasis (CMS/HCC)     9/29/2020 - 9/29/2020 Chemotherapy    OP SUPPORTIVE Denosumab (Xgeva) Q28D     9/29/2020 -  Chemotherapy    OP SUPPORTIVE Zoledronic Acid Q28D         Review of Systems:  See interval history.       Medications:    Current Outpatient Medications:   •  acetaminophen (TYLENOL) 325 MG tablet, Take 2 tablets by mouth Every 4 (Four) Hours As Needed for Mild Pain., Disp: 60 tablet, Rfl: 0  •  acetaminophen (TYLENOL) 500 MG tablet, Take 1,000 mg by mouth Every 6 (Six) Hours As Needed for Mild Pain ., Disp: , Rfl:   •  amLODIPine (NORVASC) 10 MG tablet, Take 10 mg by mouth Every Morning., Disp: , Rfl:   •  atorvastatin (LIPITOR) 20 MG tablet, Take 1 tablet by mouth Daily. (Patient taking differently: Take 20 mg by mouth Every Night.), Disp: 90 tablet, Rfl: 3  •  Blood Glucose Monitoring Suppl w/Device kit, Check blood sugar twice a day, Disp: 1 each, Rfl: 0  •  cholecalciferol (VITAMIN D3) 25 MCG (1000 UT) tablet, Take 1,000 Units by mouth Daily., Disp: , Rfl:   •  dexamethasone (DECADRON) 4 MG tablet, Take 2 tablets oral twice a day for 3 consecutive days beginning the day before chemotherapy and continue for 6  doses., Disp: 12 tablet, Rfl: 3  •  famotidine (PEPCID) 20 MG tablet, Take 20 mg by mouth Every Other Day., Disp: , Rfl:   •  ferrous sulfate 325 (65 FE) MG tablet, Take 1 tablet by mouth Daily., Disp: , Rfl:   •  Glucose Blood (BLOOD GLUCOSE TEST) strip, Check blood sugar twice a day., Disp: 100 each, Rfl: 1  •  labetalol (NORMODYNE) 100 MG tablet, Take 0.5 tablets by mouth 2 (Two) Times a Day., Disp: 180 tablet, Rfl: 1  •  Lancets (ACCU-CHEK SOFT TOUCH) lancets, Check blood sugar twice a day as needed, Disp: 100 each, Rfl: 1  •  lansoprazole (PREVACID) 30 MG capsule, TAKE ONE CAPSULE BY MOUTH DAILY (Patient taking differently: Take 30 mg by mouth Every Other Day.), Disp: 90 capsule, Rfl: 2  •  levothyroxine (SYNTHROID, LEVOTHROID) 112 MCG tablet, Take 112 mcg by mouth Every Morning., Disp: , Rfl:   •  losartan (COZAAR) 100 MG tablet, Take 100 mg by mouth Daily., Disp: , Rfl:   •  metFORMIN ER (GLUCOPHAGE-XR) 500 MG 24 hr tablet, TAKE TWO TABLETS BY MOUTH DAILY WITH DINNER (Patient taking differently: Take 1,000 mg by mouth Every Evening.), Disp: 180 tablet, Rfl: 1  •  promethazine (PHENERGAN) 12.5 MG tablet, TAKE 1/2 TO 1 TABLET BY MOUTH EVERY 4 HOURS AS NEEDED FOR NAUSEA AND VOMITING -MAY MAKE DROWSY (Patient taking differently: Take 12.5-25 mg by mouth Every 6 (Six) Hours As Needed. TAKE 1/2 TO 1 TABLET BY MOUTH EVERY 4 HOURS AS NEEDED FOR NAUSEA AND VOMITING -MAY MAKE DROWSY), Disp: 30 tablet, Rfl: 3  •  promethazine (PHENERGAN) 12.5 MG tablet, Take 1 tablet by mouth Every 6 (Six) Hours As Needed for Nausea or Vomiting., Disp: 10 tablet, Rfl: 0  •  triamcinolone (KENALOG) 0.1 % cream, Apply  topically to the appropriate area as directed 2 (Two) Times a Day. Sparingly shortest time possible avoid face (Patient taking differently: Apply  topically to the appropriate area as directed As Needed. Sparingly shortest time possible avoid face), Disp: 80 g, Rfl: 0      Allergies   Allergen Reactions   • Monoclonal  Antibodies Anaphylaxis and Shortness Of Breath     Regeneron,  High b/p increased heart rate    • Zofran [Ondansetron] Nausea And Vomiting     DOES NOT HELP WITH NAUSEA AND VOMITING MAKES IT WORSE   • Hydrocodone Nausea And Vomiting   • Lenvatinib Unknown - Low Severity     Internal bleeding       Family History   Problem Relation Age of Onset   • No Known Problems Mother    • Cancer Father         Oral   • Hypertension Other    • Diabetes Other    • Malig Hyperthermia Neg Hx        Objective   PHYSICAL EXAMINATION:   Vitals:    02/08/23 1026   BP: 122/74   Pulse: 78   Resp: 17   SpO2: 99%     ECOG 0 - Asymptomatic  General: NAD, well appearing  Psych: a&o x3, normal mood and affect  Eyes: EOMI, no scleral icterus  ENMT: neck supple without masses or thyromegaly, mucous membranes moist  MSK: normal gait, normal ROM in bilateral shoulders  Lymph nodes: no axillary swelling  Breast:   Right: deferred.  Left: Sp mastectomy with well-healing incision and TE in place.  Flaps healthy.      Assessment & Plan   Assessment:   1) 52 y.o. F with a diagnosis of left breast cancer: High grade, triple negative, invasive ductal carcinoma. She underwent left modified radical mastectomy with prepectoral tissue expander placement on 1/24/23, pT1cN0, anatomic stage IA, prognostic stage IB.    2) She has a past history of left breast cancer in 2001 (age 30), stage IIB triple negative. She underwent a lumpectomy and axillary dissection, followed by AC and Taxotere, followed by radiation, then tamoxifen for 5 years.  3) She has metastatic thyroid cancer diagnosed in 2020 with stable bone metastasis.    Discussion:  Regarding port placement, I described risks and potential complications associated with surgery, including, but not limited to, bleeding, infection, deformity, chronic pain, numbness, seroma, hematoma, deep venous thrombosis, pneumothorax, port malfunction, and the possibility of requiring additional surgery. She expressed an  understanding of these factors and wished to proceed.    Plan:  -Port placement next week.  -Follow-up with lymphedema clinic.  -Continue follow-up with Dr. Morse.  -Follow-up with me in 3 months for clinical exam.  -She was instructed to call sooner with any questions, concerns or changes on self-exam.    Shavonne Sharif MD      CC:  Khalid Z. Ghosheh, MD James Epley, APRN Lori Warren, MD

## 2023-02-08 NOTE — PROGRESS NOTES
[facility]     Referring Provider: Tim Muñoz MD     Chief complaint: Postoperative visit     Subjective   HPI:   12/22/2022:   Ms. Ina Akers is a 53 yo woman, seen at the request of Dr. Tim Muñoz, for a new diagnosis of left breast cancer. She has a past history of left breast cancer in 2001 (age 30), stage IIB triple negative. She underwent a lumpectomy and axillary dissection, followed by AC and Taxotere, followed by radiation, then tamoxifen for 5 years. She believes she had somewhere around 9-13 left axillary lymph nodes removed and 2 of them had cancer. In 2020, she was diagnosed with metastatic thyroid cancer to the bone. She underwent treatment in 2021 with Lenvima before this was held due to cytopenias. Her disease has been stable off treatment since 2/2021.     Recent screening mammogram in October 2022 showed a new left breast focal asymmetry separate from her lumpectomy site. Around this time she also underwent a PET scan because she was complaining of bone pain. This showed stable bone metastasis and a new FDG avid left breast lesion in the same region as the focal asymmetry. Diagnostic breast imaging confirmed a small hypoechoic mass and subsequent biopsy showed triple negative invasive ductal carcinoma. Her work-up is detailed in the oncologic history below. She denies any breast lumps, pain, skin changes, or nipple discharge. She also has a past history of a benign left breast lumpectomy in 1991.     Her only family history of cancer is oral cancer in her father. The patient has not undergone genetic testing.      2/8/2023, Interval History:  She underwent left modified radical mastectomy with prepectoral tissue expander placement on 1/24/23. See surgery & pathology details below in oncologic history. She has already seen Dr. Morse back postoperatively and had 2 drains removed. She also has already seen Dr. Muñoz and discussed proceeding with TC x4. She is  scheduled for a port placement next week. She was seen at the lymphedema clinic preoperatively and her bioimpedance score was within normal limits. She did have some early arm swelling while in the hospital, however this has already improved.       Oncology/Hematology History   Malignant neoplasm of upper-outer quadrant of left breast in female, estrogen receptor negative (HCC)   11/23/2016 Initial Diagnosis    Malignant neoplasm of upper-outer quadrant of left breast in female, estrogen receptor negative (CMS/HCC)     9/23/2020 Cancer Staged    Staging form: Breast, AJCC V7  - Pathologic stage from 9/23/2020: Stage IIB (T2, N1, cM0) - Signed by Tim Muñoz MD on 10/9/2020     9/29/2020 - 9/29/2020 Chemotherapy    OP SUPPORTIVE Denosumab (Xgeva) Q28D     9/29/2020 -  Chemotherapy    OP SUPPORTIVE Zoledronic Acid Q28D     8/19/2021 Imaging    Screening MMG with Teodoro ([facility])  There are scattered areas of fibroglandular density. Left breast post-operative change is again seen. There are no suspicious masses, significant calcifications, or other abnormalities seen in either breast.  BI-RADS 2: Benign     10/5/2022 Imaging    Screening MMG with Teodoro ([facility])  There are scattered areas of fibroglandular density.  Finding 1: There is a new fat containing focal asymmetry measuring 6 mm seen in the middle one-third region of the left breast at 12 o'clock  Finding 2: There is a focal asymmetry with associated dystrophic calcifications and post-surgical scar seen in the upper outer region of the left breast. Focal asymmetry in in an area of prior lumpectomy. There are no significant changes from the prior exam(s).   In the right breast, no suspicious masses, significant calcifications or other abnormalities are seen.  BI-RADS 0: Incomplete     10/6/2022 Imaging    PET CT FDG ( "[employer]")  Chest:  New intensely FDG avid left breast ill-defined nodular focus at the 12 o'clock position measuring .0 x 0.7 cm  with a maximum SUV of 5.8 (series 4, image 122).  No significant change in 19 x 15 mm non FDG avid speculated mass within the posterior upper outer quadrant of the left breast with intralesional fat and a peripherally located biopsy clip, consistent with known a post-lumpectomy scar.  Impression:  1. New FDG avid left breast ill-defined nodular focus at the 12 o-clock position, suspicious for malignancy. This could be metastic disease given the increase in non-stimulated thyroglobulin level. Tissue sampling can be considered for further characterization.  2. No significant change in hyper metabolic well-defined lytic lesion with sclerotic margin within the left ischium compared to the prior 2 PET/CT studies from  yet showing evidence of treatment response compared to outside study from 2020.  3. No new metabolically active osseous metastatic disease.     10/20/2022 Imaging    Left Diagnostic MMG with Teodoro & Left Breast US ([facility])  MMG:  On the present examination, there is isodense focal asymmetry measuring 6 mm in the left breast at 12 o'clock located 5 cm from the nipple.  US:  Demonstrates a round non-parallel complex cystic and solid mass with circumscribed margins measuring 6 x 5 x 5 mm in the left breast at 12 o'clock located 5 cm from the nipple.  BI-RADS 4B: Suspicious     2022 Imaging    Left Diagnostic MMG with Teodoro & Left Breast US ([facility]):  MM. Re-demonstration of 7 mm mass at approximately 12:00-1:00, 6 to 8 cm from the nipple.  2. Stable post-lumpectomy changes in the upper outer left breast.  US:  Focused ultrasound was performed at 1:00, 7 cm from the nipple. Ultrasound demonstrates a hypoechoic round mass with indistinct margins and without internal vascularity measuring 5 x 5 x 4 mm. There is an indistinct hyperechoic halo surrounding the mass. This correlates with mammographic finding.  BI-RADS 4: Suspicious     2022 Biopsy    Left Breast, US-Guided Biopsy  ():    LEFT BREAST, 1:00 (7 CM FROM NIPPLE), ULTRASOUND GUIDED CORE NEEDLE BIOPSY FOR 0.7 CM MASS (U CLIP):     - INVASIVE DUCTAL CARCINOMA, poorly differentiated (Carlos grade III/III: tubule score=3, nuclear score=3, mitoses score=3), with necrosis, measuring up to 0.5 cm.      - No lymphovascular invasion or in situ carcinoma identified.      ER Negative (<1%)  OH Negative (<1%)  HER2 Negative (IHC 0)      [facility] PATHOLOGY REVIEW    1. Left Breast, 1 o'clock, 7 cm from Nipple, Ultrasound-Guided Core Biopsies for a Mass (Outside Consult [id number]):       INVASIVE MAMMARY CARCINOMA, NO SPECIAL TYPE (INVASIVE DUCTAL CARCINOMA).               A. Histologic grade: [place] histologic score III (tubules = 3, nuclei = 3, mitosis = 3).               B. Largest contiguous focus measures up to 5 mm.   C. No associated in situ component identified.               D. No lymphovascular nor perineural invasion identified.     12/5/2022 Imaging    Bilateral Breast MRI ([facility]):  In the posterior one third of the left breast at the 12:30 position centered on the order of 8.5 cm from the nipple there is a focal signal void seen within an irregular enhancing mass that measures on the order of 0.9 cm in the superior-inferior dimension, 0.8 cm in anterior to posterior dimension and 0.8 cm and the mediolateral dimension. This represents the biopsy-proven site of malignancy with the internal biopsy clip.  In the anterior one third of the left breast located medial to the plane of the nipple at the 9 o'clock position on the order of 3 cm from the nipple there is an irregular enhancing lesion that measures on the order of 0.5 cm in the superior-inferior dimension, 0.5 cm in anterior to posterior dimension and 0.5 cm in the medial to lateral dimension. A possible mammographic correlate is seen on the CC image of the left breast on the examination of 11/18/2022. This area of enhancement is not seen on the prior  breast MRI examination from 12/26/2012.  There is post surgical change of the left breast associated with prior breast conservation therapy. No other areas of abnormal enhancement or morphology are seen within the left breast. I see no evidence for  abnormal left breast skin, nipple or chest wall enhancement and there is no evidence for left axillary or internal mammary chain adenopathy.   There are no areas of abnormal enhancement or morphology in the right breast. I see no evidence for abnormal right breast skin, nipple or chest wall enhancement and there is no evidence for right axillary or internal mammary chain adenopathy.  BI-RADS 4: Suspicious.     12/23/2022 Genetic Testing    [employer] Multi-Cancer Panel (84 genes):    VUS in NF1  VUS in POT1  VUS in SMAD4     1/24/2023 Surgery    Left modified radical mastectomy with prepectoral tissue expander placement    1. Left Breast Modified Radical Mastectomy:               A. Invasive carcinoma with mixed ductal and lobular features:                            1. Invasive carcinoma measures 15 mm x 9 mm x 5 mm.                            2. Overall Carlos grade III (tubular score = 3, nuclear score = 3, mitotic score = 3).                            3. No definitive lymphovascular space invasion identified.               B. Associated ductal carcinoma in situ (DCIS):                            1. DCIS spans an area estimated at 20 mm x 5 mm x 2 mm.                            2. High-grade solid and comedo DCIS.               C. All margins are negative for invasive carcinoma.                    Invasive carcinoma measures 25 mm from the closest (Posterior) margin of excision.                                  D. All margins are negative for ductal carcinoma in-situ (DCIS).                    DCIS measures 25 mm from the closest (Posterior) margin of excision                              E. Focal biopsy site changes are identified, and metallic clip retrieved.                F. No Pagetoid involvement of skin nor nipple by malignancy identified.               G. No lobular neoplasia (LCIS, ALH) identified.               H. Two incidental lymph nodes identified, negative for metastatic carcinoma by routine and immunostaining (0/2).               I.  Focal dense dermal and stromal scar consistent with previous surgical procedure identified.  J. Previous Biomarkers: Estrogen receptors: negative, Progesterone receptors: negative,                    HER/2-stacey: negative (Score 0), Ki-67 = Not reported.  [id number] ([id number]).     2. Left Breast Augmentation:                 A. Benign skin and subcutaneous tissue.     3/1/2023 -  Chemotherapy    OP BREAST TC DOCEtaxel / Cyclophosphamide     Metastatic bone cancer (HCC)   9/23/2020 Initial Diagnosis    Bone metastasis (CMS/HCC)     9/29/2020 - 9/29/2020 Chemotherapy    OP SUPPORTIVE Denosumab (Xgeva) Q28D     9/29/2020 -  Chemotherapy    OP SUPPORTIVE Zoledronic Acid Q28D         Review of Systems:  See interval history.       Medications:    Current Outpatient Medications:   •  acetaminophen (TYLENOL) 325 MG tablet, Take 2 tablets by mouth Every 4 (Four) Hours As Needed for Mild Pain., Disp: 60 tablet, Rfl: 0  •  acetaminophen (TYLENOL) 500 MG tablet, Take 1,000 mg by mouth Every 6 (Six) Hours As Needed for Mild Pain ., Disp: , Rfl:   •  amLODIPine (NORVASC) 10 MG tablet, Take 10 mg by mouth Every Morning., Disp: , Rfl:   •  atorvastatin (LIPITOR) 20 MG tablet, Take 1 tablet by mouth Daily. (Patient taking differently: Take 20 mg by mouth Every Night.), Disp: 90 tablet, Rfl: 3  •  Blood Glucose Monitoring Suppl w/Device kit, Check blood sugar twice a day, Disp: 1 each, Rfl: 0  •  cholecalciferol (VITAMIN D3) 25 MCG (1000 UT) tablet, Take 1,000 Units by mouth Daily., Disp: , Rfl:   •  dexamethasone (DECADRON) 4 MG tablet, Take 2 tablets oral twice a day for 3 consecutive days beginning the day before chemotherapy and continue for 6  doses., Disp: 12 tablet, Rfl: 3  •  famotidine (PEPCID) 20 MG tablet, Take 20 mg by mouth Every Other Day., Disp: , Rfl:   •  ferrous sulfate 325 (65 FE) MG tablet, Take 1 tablet by mouth Daily., Disp: , Rfl:   •  Glucose Blood (BLOOD GLUCOSE TEST) strip, Check blood sugar twice a day., Disp: 100 each, Rfl: 1  •  labetalol (NORMODYNE) 100 MG tablet, Take 0.5 tablets by mouth 2 (Two) Times a Day., Disp: 180 tablet, Rfl: 1  •  Lancets (ACCU-CHEK SOFT TOUCH) lancets, Check blood sugar twice a day as needed, Disp: 100 each, Rfl: 1  •  lansoprazole (PREVACID) 30 MG capsule, TAKE ONE CAPSULE BY MOUTH DAILY (Patient taking differently: Take 30 mg by mouth Every Other Day.), Disp: 90 capsule, Rfl: 2  •  levothyroxine (SYNTHROID, LEVOTHROID) 112 MCG tablet, Take 112 mcg by mouth Every Morning., Disp: , Rfl:   •  losartan (COZAAR) 100 MG tablet, Take 100 mg by mouth Daily., Disp: , Rfl:   •  metFORMIN ER (GLUCOPHAGE-XR) 500 MG 24 hr tablet, TAKE TWO TABLETS BY MOUTH DAILY WITH DINNER (Patient taking differently: Take 1,000 mg by mouth Every Evening.), Disp: 180 tablet, Rfl: 1  •  promethazine (PHENERGAN) 12.5 MG tablet, TAKE 1/2 TO 1 TABLET BY MOUTH EVERY 4 HOURS AS NEEDED FOR NAUSEA AND VOMITING -MAY MAKE DROWSY (Patient taking differently: Take 12.5-25 mg by mouth Every 6 (Six) Hours As Needed. TAKE 1/2 TO 1 TABLET BY MOUTH EVERY 4 HOURS AS NEEDED FOR NAUSEA AND VOMITING -MAY MAKE DROWSY), Disp: 30 tablet, Rfl: 3  •  promethazine (PHENERGAN) 12.5 MG tablet, Take 1 tablet by mouth Every 6 (Six) Hours As Needed for Nausea or Vomiting., Disp: 10 tablet, Rfl: 0  •  triamcinolone (KENALOG) 0.1 % cream, Apply  topically to the appropriate area as directed 2 (Two) Times a Day. Sparingly shortest time possible avoid face (Patient taking differently: Apply  topically to the appropriate area as directed As Needed. Sparingly shortest time possible avoid face), Disp: 80 g, Rfl: 0      Allergies   Allergen Reactions   • Monoclonal  Antibodies Anaphylaxis and Shortness Of Breath     Regeneron,  High b/p increased heart rate    • Zofran [Ondansetron] Nausea And Vomiting     DOES NOT HELP WITH NAUSEA AND VOMITING MAKES IT WORSE   • Hydrocodone Nausea And Vomiting   • Lenvatinib Unknown - Low Severity     Internal bleeding       Family History   Problem Relation Age of Onset   • No Known Problems Mother    • Cancer Father         Oral   • Hypertension Other    • Diabetes Other    • Malig Hyperthermia Neg Hx        Objective   PHYSICAL EXAMINATION:   Vitals:    02/08/23 1026   BP: 122/74   Pulse: 78   Resp: 17   SpO2: 99%     ECOG 0 - Asymptomatic  General: NAD, well appearing  Psych: a&o x3, normal mood and affect  Eyes: EOMI, no scleral icterus  ENMT: neck supple without masses or thyromegaly, mucous membranes moist  MSK: normal gait, normal ROM in bilateral shoulders  Lymph nodes: no axillary swelling  Breast:   Right: deferred.  Left: Sp mastectomy with well-healing incision and TE in place.  Flaps healthy.      Assessment & Plan   Assessment:   1) 52 y.o. F with a diagnosis of left breast cancer: High grade, triple negative, invasive ductal carcinoma. She underwent left modified radical mastectomy with prepectoral tissue expander placement on 1/24/23, pT1cN0, anatomic stage IA, prognostic stage IB.    2) She has a past history of left breast cancer in 2001 (age 30), stage IIB triple negative. She underwent a lumpectomy and axillary dissection, followed by AC and Taxotere, followed by radiation, then tamoxifen for 5 years.  3) She has metastatic thyroid cancer diagnosed in 2020 with stable bone metastasis.    Discussion:  Regarding port placement, I described risks and potential complications associated with surgery, including, but not limited to, bleeding, infection, deformity, chronic pain, numbness, seroma, hematoma, deep venous thrombosis, pneumothorax, port malfunction, and the possibility of requiring additional surgery. She expressed an  understanding of these factors and wished to proceed.    Plan:  -Port placement next week.  -Follow-up with lymphedema clinic.  -Continue follow-up with Dr. Morse.  -Follow-up with me in 3 months for clinical exam.  -She was instructed to call sooner with any questions, concerns or changes on self-exam.    Shavonne Sharif MD      CC:  Khalid Z. Ghosheh, MD James Epley, APRN Lori Warren, MD

## 2023-02-13 ENCOUNTER — PATIENT OUTREACH (OUTPATIENT)
Dept: OTHER | Facility: HOSPITAL | Age: 53
End: 2023-02-13
Payer: COMMERCIAL

## 2023-02-13 NOTE — PROGRESS NOTES
Called patient. Introduced myself, explained I work with her breast navigator Loreta. The patient states she will have a port placed tomorrow by Dr. Sharif and is scheduled to start chemotherapy @ [place] on 3/1, which she will receive once every 21 days.  She is waiting to hear back from the APRN about the [employer] cooling system. She also wants to discuss the medication she will be receiving for neutropenia; She wants to make sure her insurance will cover it.    We discussed the supportive oncology services available through the Cancer Center. She denies any needs at this time. She did receive the navigation packet that Loreta mailed.  Loreta will call next month; she will call Loreta as needed

## 2023-02-14 ENCOUNTER — HOSPITAL ENCOUNTER (OUTPATIENT)
Facility: HOSPITAL | Age: 53
Setting detail: HOSPITAL OUTPATIENT SURGERY
Discharge: HOME | End: 2023-02-14
Attending: SURGERY | Admitting: SURGERY
Payer: COMMERCIAL

## 2023-02-14 ENCOUNTER — ANESTHESIA (OUTPATIENT)
Dept: PERIOP | Facility: HOSPITAL | Age: 53
End: 2023-02-14
Payer: COMMERCIAL

## 2023-02-14 ENCOUNTER — ANESTHESIA EVENT (OUTPATIENT)
Dept: PERIOP | Facility: HOSPITAL | Age: 53
End: 2023-02-14
Payer: COMMERCIAL

## 2023-02-14 ENCOUNTER — APPOINTMENT (OUTPATIENT)
Dept: GENERAL RADIOLOGY | Facility: HOSPITAL | Age: 53
End: 2023-02-14
Payer: COMMERCIAL

## 2023-02-14 VITALS
RESPIRATION RATE: 18 BRPM | OXYGEN SATURATION: 94 % | TEMPERATURE: 98 F | WEIGHT: 178 LBS | HEART RATE: 64 BPM | HEIGHT: 60 IN | SYSTOLIC BLOOD PRESSURE: 139 MMHG | DIASTOLIC BLOOD PRESSURE: 100 MMHG | BODY MASS INDEX: 34.95 KG/M2

## 2023-02-14 DIAGNOSIS — C50.412 MALIGNANT NEOPLASM OF UPPER-OUTER QUADRANT OF LEFT BREAST IN FEMALE, ESTROGEN RECEPTOR NEGATIVE: ICD-10-CM

## 2023-02-14 DIAGNOSIS — Z17.1 MALIGNANT NEOPLASM OF UPPER-OUTER QUADRANT OF LEFT BREAST IN FEMALE, ESTROGEN RECEPTOR NEGATIVE: ICD-10-CM

## 2023-02-14 LAB
GLUCOSE BLDC GLUCOMTR-MCNC: 107 MG/DL (ref 70–130)
GLUCOSE BLDC GLUCOMTR-MCNC: 97 MG/DL (ref 70–130)

## 2023-02-14 PROCEDURE — 25010000002 FENTANYL CITRATE (PF) 50 MCG/ML SOLUTION: Performed by: NURSE ANESTHETIST, CERTIFIED REGISTERED

## 2023-02-14 PROCEDURE — 25010000002 PROPOFOL 10 MG/ML EMULSION: Performed by: NURSE ANESTHETIST, CERTIFIED REGISTERED

## 2023-02-14 PROCEDURE — 76000 FLUOROSCOPY <1 HR PHYS/QHP: CPT

## 2023-02-14 PROCEDURE — 36561 INSERT TUNNELED CV CATH: CPT | Performed by: SURGERY

## 2023-02-14 PROCEDURE — 25010000002 ONDANSETRON PER 1 MG: Performed by: NURSE ANESTHETIST, CERTIFIED REGISTERED

## 2023-02-14 PROCEDURE — 77001 FLUOROGUIDE FOR VEIN DEVICE: CPT | Performed by: SURGERY

## 2023-02-14 PROCEDURE — 76937 US GUIDE VASCULAR ACCESS: CPT | Performed by: SURGERY

## 2023-02-14 PROCEDURE — 25010000002 HEPARIN (PORCINE) PER 1000 UNITS: Performed by: SURGERY

## 2023-02-14 PROCEDURE — 82962 GLUCOSE BLOOD TEST: CPT

## 2023-02-14 PROCEDURE — 25010000002 HYDROMORPHONE PER 4 MG: Performed by: NURSE ANESTHETIST, CERTIFIED REGISTERED

## 2023-02-14 PROCEDURE — C1788 PORT, INDWELLING, IMP: HCPCS | Performed by: SURGERY

## 2023-02-14 PROCEDURE — 25010000002 CEFAZOLIN IN DEXTROSE 2-4 GM/100ML-% SOLUTION: Performed by: SURGERY

## 2023-02-14 DEVICE — POWERPORT CLEARVUE IMPLANTABLE PORT WITH ATTACHABLE 8F POLYURETHANE OPEN-ENDED SINGLE-LUMEN VENOUS CATHETER INTERMEDIATE KIT
Type: IMPLANTABLE DEVICE | Site: CHEST | Status: FUNCTIONAL
Brand: POWERPORT CLEARVUE

## 2023-02-14 RX ORDER — DIPHENHYDRAMINE HYDROCHLORIDE 50 MG/ML
12.5 INJECTION INTRAMUSCULAR; INTRAVENOUS
Status: DISCONTINUED | OUTPATIENT
Start: 2023-02-14 | End: 2023-02-21 | Stop reason: HOSPADM

## 2023-02-14 RX ORDER — HYDRALAZINE HYDROCHLORIDE 20 MG/ML
5 INJECTION INTRAMUSCULAR; INTRAVENOUS
Status: DISCONTINUED | OUTPATIENT
Start: 2023-02-14 | End: 2023-02-21 | Stop reason: HOSPADM

## 2023-02-14 RX ORDER — DIPHENHYDRAMINE HCL 25 MG
25 CAPSULE ORAL
Status: DISCONTINUED | OUTPATIENT
Start: 2023-02-14 | End: 2023-02-21 | Stop reason: HOSPADM

## 2023-02-14 RX ORDER — PROMETHAZINE HYDROCHLORIDE 25 MG/1
25 TABLET ORAL ONCE AS NEEDED
Status: DISCONTINUED | OUTPATIENT
Start: 2023-02-14 | End: 2023-02-21 | Stop reason: HOSPADM

## 2023-02-14 RX ORDER — SCOLOPAMINE TRANSDERMAL SYSTEM 1 MG/1
1 PATCH, EXTENDED RELEASE TRANSDERMAL ONCE
Status: COMPLETED | OUTPATIENT
Start: 2023-02-14 | End: 2023-02-15

## 2023-02-14 RX ORDER — LABETALOL HYDROCHLORIDE 5 MG/ML
5 INJECTION, SOLUTION INTRAVENOUS
Status: DISCONTINUED | OUTPATIENT
Start: 2023-02-14 | End: 2023-02-21 | Stop reason: HOSPADM

## 2023-02-14 RX ORDER — HYDROCODONE BITARTRATE AND ACETAMINOPHEN 5; 325 MG/1; MG/1
1 TABLET ORAL ONCE AS NEEDED
Status: DISCONTINUED | OUTPATIENT
Start: 2023-02-14 | End: 2023-02-21 | Stop reason: HOSPADM

## 2023-02-14 RX ORDER — OXYCODONE AND ACETAMINOPHEN 7.5; 325 MG/1; MG/1
1 TABLET ORAL EVERY 4 HOURS PRN
Status: DISCONTINUED | OUTPATIENT
Start: 2023-02-14 | End: 2023-02-21 | Stop reason: HOSPADM

## 2023-02-14 RX ORDER — PROMETHAZINE HYDROCHLORIDE 25 MG/1
25 SUPPOSITORY RECTAL ONCE AS NEEDED
Status: DISCONTINUED | OUTPATIENT
Start: 2023-02-14 | End: 2023-02-21 | Stop reason: HOSPADM

## 2023-02-14 RX ORDER — PROPOFOL 10 MG/ML
VIAL (ML) INTRAVENOUS AS NEEDED
Status: DISCONTINUED | OUTPATIENT
Start: 2023-02-14 | End: 2023-02-14 | Stop reason: SURG

## 2023-02-14 RX ORDER — SODIUM CHLORIDE, SODIUM LACTATE, POTASSIUM CHLORIDE, CALCIUM CHLORIDE 600; 310; 30; 20 MG/100ML; MG/100ML; MG/100ML; MG/100ML
9 INJECTION, SOLUTION INTRAVENOUS CONTINUOUS
Status: DISCONTINUED | OUTPATIENT
Start: 2023-02-14 | End: 2023-02-21 | Stop reason: HOSPADM

## 2023-02-14 RX ORDER — LIDOCAINE HYDROCHLORIDE 10 MG/ML
0.5 INJECTION, SOLUTION EPIDURAL; INFILTRATION; INTRACAUDAL; PERINEURAL ONCE AS NEEDED
Status: DISCONTINUED | OUTPATIENT
Start: 2023-02-14 | End: 2023-02-14 | Stop reason: HOSPADM

## 2023-02-14 RX ORDER — FLUMAZENIL 0.1 MG/ML
0.2 INJECTION INTRAVENOUS AS NEEDED
Status: DISCONTINUED | OUTPATIENT
Start: 2023-02-14 | End: 2023-02-21 | Stop reason: HOSPADM

## 2023-02-14 RX ORDER — FENTANYL CITRATE 50 UG/ML
50 INJECTION, SOLUTION INTRAMUSCULAR; INTRAVENOUS
Status: DISCONTINUED | OUTPATIENT
Start: 2023-02-14 | End: 2023-02-14 | Stop reason: HOSPADM

## 2023-02-14 RX ORDER — ONDANSETRON 2 MG/ML
4 INJECTION INTRAMUSCULAR; INTRAVENOUS ONCE AS NEEDED
Status: COMPLETED | OUTPATIENT
Start: 2023-02-14 | End: 2023-02-14

## 2023-02-14 RX ORDER — FENTANYL CITRATE 50 UG/ML
50 INJECTION, SOLUTION INTRAMUSCULAR; INTRAVENOUS
Status: DISCONTINUED | OUTPATIENT
Start: 2023-02-14 | End: 2023-02-21 | Stop reason: HOSPADM

## 2023-02-14 RX ORDER — FAMOTIDINE 10 MG/ML
20 INJECTION, SOLUTION INTRAVENOUS ONCE
Status: COMPLETED | OUTPATIENT
Start: 2023-02-14 | End: 2023-02-14

## 2023-02-14 RX ORDER — CEFAZOLIN SODIUM 2 G/100ML
2 INJECTION, SOLUTION INTRAVENOUS ONCE
Status: COMPLETED | OUTPATIENT
Start: 2023-02-14 | End: 2023-02-14

## 2023-02-14 RX ORDER — SODIUM CHLORIDE 0.9 % (FLUSH) 0.9 %
3-10 SYRINGE (ML) INJECTION AS NEEDED
Status: DISCONTINUED | OUTPATIENT
Start: 2023-02-14 | End: 2023-02-14 | Stop reason: HOSPADM

## 2023-02-14 RX ORDER — NALOXONE HCL 0.4 MG/ML
0.2 VIAL (ML) INJECTION AS NEEDED
Status: DISCONTINUED | OUTPATIENT
Start: 2023-02-14 | End: 2023-02-21 | Stop reason: HOSPADM

## 2023-02-14 RX ORDER — MIDAZOLAM HYDROCHLORIDE 1 MG/ML
1 INJECTION INTRAMUSCULAR; INTRAVENOUS
Status: DISCONTINUED | OUTPATIENT
Start: 2023-02-14 | End: 2023-02-14 | Stop reason: HOSPADM

## 2023-02-14 RX ORDER — HYDROMORPHONE HYDROCHLORIDE 1 MG/ML
0.5 INJECTION, SOLUTION INTRAMUSCULAR; INTRAVENOUS; SUBCUTANEOUS
Status: DISCONTINUED | OUTPATIENT
Start: 2023-02-14 | End: 2023-02-21 | Stop reason: HOSPADM

## 2023-02-14 RX ORDER — EPHEDRINE SULFATE 50 MG/ML
5 INJECTION, SOLUTION INTRAVENOUS ONCE AS NEEDED
Status: DISCONTINUED | OUTPATIENT
Start: 2023-02-14 | End: 2023-02-21 | Stop reason: HOSPADM

## 2023-02-14 RX ORDER — LIDOCAINE HYDROCHLORIDE 20 MG/ML
INJECTION, SOLUTION INFILTRATION; PERINEURAL AS NEEDED
Status: DISCONTINUED | OUTPATIENT
Start: 2023-02-14 | End: 2023-02-14 | Stop reason: SURG

## 2023-02-14 RX ORDER — BUPIVACAINE HYDROCHLORIDE AND EPINEPHRINE 2.5; 5 MG/ML; UG/ML
INJECTION, SOLUTION INFILTRATION; PERINEURAL AS NEEDED
Status: DISCONTINUED | OUTPATIENT
Start: 2023-02-14 | End: 2023-02-14 | Stop reason: HOSPADM

## 2023-02-14 RX ORDER — SODIUM CHLORIDE 0.9 % (FLUSH) 0.9 %
3 SYRINGE (ML) INJECTION EVERY 12 HOURS SCHEDULED
Status: DISCONTINUED | OUTPATIENT
Start: 2023-02-14 | End: 2023-02-14 | Stop reason: HOSPADM

## 2023-02-14 RX ADMIN — PROPOFOL 200 MG: 10 INJECTION, EMULSION INTRAVENOUS at 11:15

## 2023-02-14 RX ADMIN — CEFAZOLIN SODIUM 2 G: 2 INJECTION, SOLUTION INTRAVENOUS at 10:56

## 2023-02-14 RX ADMIN — FENTANYL CITRATE 50 MCG: 50 INJECTION, SOLUTION INTRAMUSCULAR; INTRAVENOUS at 12:41

## 2023-02-14 RX ADMIN — LIDOCAINE HYDROCHLORIDE 60 MG: 20 INJECTION, SOLUTION INFILTRATION; PERINEURAL at 11:15

## 2023-02-14 RX ADMIN — FAMOTIDINE 20 MG: 10 INJECTION INTRAVENOUS at 10:37

## 2023-02-14 RX ADMIN — SCOPALAMINE 1 PATCH: 1 PATCH, EXTENDED RELEASE TRANSDERMAL at 10:37

## 2023-02-14 RX ADMIN — ONDANSETRON 4 MG: 2 INJECTION INTRAMUSCULAR; INTRAVENOUS at 12:44

## 2023-02-14 RX ADMIN — SODIUM CHLORIDE, POTASSIUM CHLORIDE, SODIUM LACTATE AND CALCIUM CHLORIDE 9 ML/HR: 600; 310; 30; 20 INJECTION, SOLUTION INTRAVENOUS at 10:56

## 2023-02-14 RX ADMIN — OXYCODONE HYDROCHLORIDE AND ACETAMINOPHEN 1 TABLET: 7.5; 325 TABLET ORAL at 13:28

## 2023-02-14 RX ADMIN — HYDROMORPHONE HYDROCHLORIDE 0.5 MG: 1 INJECTION, SOLUTION INTRAMUSCULAR; INTRAVENOUS; SUBCUTANEOUS at 13:09

## 2023-02-14 NOTE — ANESTHESIA POSTPROCEDURE EVALUATION
Patient: Ina Akers    Procedure Summary     Date: 02/14/23 Room / Location: [facility] OR 09 / [facility] MAIN OR    Anesthesia Start: 1110 Anesthesia Stop: 1221    Procedure: INSERTION VENOUS ACCESS DEVICE (Right) Diagnosis:       Malignant neoplasm of upper-outer quadrant of left breast in female, estrogen receptor negative (HCC)      (Malignant neoplasm of upper-outer quadrant of left breast in female, estrogen receptor negative (HCC) [C50.412, Z17.1])    Surgeons: Shavonne Sharif MD Provider: Libertad Jenkins MD    Anesthesia Type: general ASA Status: 3          Anesthesia Type: general    Vitals  Vitals Value Taken Time   /92 02/14/23 1401   Temp 36.7 °C (98 °F) 02/14/23 1218   Pulse 60 02/14/23 1400   Resp 18 02/14/23 1400   SpO2 88 % 02/14/23 1401   Vitals shown include unvalidated device data.        Post Anesthesia Care and Evaluation    Patient location during evaluation: bedside  Patient participation: complete - patient participated  Level of consciousness: awake  Pain management: adequate    Airway patency: patent  Anesthetic complications: No anesthetic complications    Cardiovascular status: acceptable  Respiratory status: acceptable  Hydration status: acceptable

## 2023-02-14 NOTE — OP NOTE
Operative Report    Patient Name:  Ina Akers  YOB: 1970    Date of Surgery:  2/14/2023    Pre-op Diagnosis:   Malignant neoplasm of upper-outer quadrant of left breast in female, estrogen receptor negative (HCC) [C50.412, Z17.1]       Post-Op Diagnosis:  Malignant neoplasm of upper-outer quadrant of left breast in female, estrogen receptor negative (HCC) [C50.412, Z17.1]    Procedure:  INSERTION RIGHT INTERNAL JUGULAR VENOUS ACCESS DEVICE      Staff:  Surgeon(s):  Shavonne Sharif MD       Anesthesia: General    Estimated Blood Loss: 5 mL    Implants:    Implant Name Type Inv. Item Serial No.  Lot No. LRB No. Used Action   KT PORT CLEARVUE POWERPORT ISP W/8F POLY CATH - [id number] Implant KT PORT CLEARVUE POWERPORT ISP W/8F POLY CATH  [employer] [id number] 1 Implanted       Specimen:          None        Findings: The guidewire initially wanted to flip up into the brachiocephalic vein suggesting some degree of central stenosis, possibly from her previous port. Ultimately catheter tip was placed past the cavoatrial junction. Port flushed well, but would not draw blood and this will be communicated to her medical oncologist.    Complications: None     Indications: The patient is a 52 y.o. F with a diagnosis of left breast cancer: High grade, triple negative, invasive ductal carcinoma. She underwent left modified radical mastectomy with prepectoral tissue expander placement on 1/24/23, pT1cN0, anatomic stage IA, prognostic stage IB. Her treatment will include adjuvant chemotherapy. She presents today for port placement. The risks and benefits were discussed preoperatively and she understood and agreed to proceed.      Description of Procedure:  The patient was identified in the preoperative area and brought to the operating room and placed supine on the table. Patient verification was performed and anesthesia was induced. The patient was positioned with a  shoulder roll and both arms tucked. The anterior chest and neck were prepped and draped in standard sterile fashion. A time out was performed and all were in agreement. I confirmed that the patient had received preoperative antibiotics.     The patient was placed in Trendelenburg position and I used ultrasound to identify the right internal jugular vein. The skin above this area was infiltrated with local anesthesia. A small stab incision was made at the site of needle entry. The right internal jugular vein was accessed with the needle on the first stick. The guidewire was advanced using a Seldinger technique until I met some resistance. I verified that the wire was within the vein using ultrasound. Fluoroscopy demonstrated that the wire was flipping up into the brachiocephalic vein. After some manipulation, I was able to advance the wire into her right heart under fluoroscopy. A transverse incision was made on the right chest two finger breadths below the clavicle and an anterior chest wall pocket was created. The port was placed inside with a snug fit and two 2-0 prolene stitches were placed from the chest wall to each side of the port. These were tagged with hemostats. The catheter was tunneled to the area of wire entry in the neck. The 2-0 prolene was then tied.     Fluoroscopy was used to verify wire placement and to tailor the length of the catheter. The patient was again placed in Trendelenburg position and I advanced the dilator and introducer over the guidewire under fluoroscopic guidance. The dilator and the wire were removed and the catheter was placed into the introducer, which was then peeled away. Fluoroscopy demonstrated good placement of the port with no kinks in the catheter. I accessed the port and flushed easily, however despite multiple attempts, it would not draw blood. I again verified accurate position with fluoroscopy. The chest incision was closed with interrupted 3-0 Vicryl deep dermal  sutures and a running subcuticular 4-0 Monocryl suture. The neck incision was closed with a single interrupted 3-0 Vicryl deep dermal suture. Dermabond was applied to both wounds. Counts were correct at the end of the case. The patient was awakened from anesthesia and taken to the PACU in stable condition.    Shavonne Sharif MD     Date: 2/14/2023  Time: 13:09 EST

## 2023-02-14 NOTE — ANESTHESIA PREPROCEDURE EVALUATION
Anesthesia Evaluation     Patient summary reviewed and Nursing notes reviewed   history of anesthetic complications: PONV  NPO Solid Status: > 8 hours  NPO Liquid Status: > 2 hours           Airway   Mallampati: II  TM distance: >3 FB  Neck ROM: full  Dental      Pulmonary - negative pulmonary ROS and normal exam   Cardiovascular     ECG reviewed  Rhythm: regular  Rate: normal    (+) hypertension, valvular problems/murmurs murmur and MR, hyperlipidemia,       Neuro/Psych- negative ROS  GI/Hepatic/Renal/Endo    (+) obesity,  GERD,  diabetes mellitus type 2, thyroid problem hypothyroidism    Musculoskeletal (-) negative ROS    Abdominal    Substance History - negative use     OB/GYN negative ob/gyn ROS         Other      history of cancer active                      Anesthesia Plan    ASA 3     general     (Grade 2B view requiring bougie with Mac 3, last intubation)  intravenous induction     Anesthetic plan, risks, benefits, and alternatives have been provided, discussed and informed consent has been obtained with: patient.        CODE STATUS:

## 2023-02-14 NOTE — ANESTHESIA PROCEDURE NOTES
Airway  Urgency: elective    Date/Time: 2/14/2023 11:15 AM    General Information and Staff    Patient location during procedure: OR    Indications and Patient Condition  Indications for airway management: airway protection    Preoxygenated: yes  MILS maintained throughout  Mask difficulty assessment: 0 - not attempted    Final Airway Details  Final airway type: supraglottic airway      Successful airway: unique  Size 3     Number of attempts at approach: 1  Assessment: lips, teeth, and gum same as pre-op and atraumatic intubation

## 2023-02-21 ENCOUNTER — INFUSION (OUTPATIENT)
Dept: ONCOLOGY | Facility: HOSPITAL | Age: 53
End: 2023-02-21
Payer: COMMERCIAL

## 2023-02-21 DIAGNOSIS — C73 PRIMARY CANCER OF THYROID GLAND METASTATIC TO BONE: Primary | ICD-10-CM

## 2023-02-21 DIAGNOSIS — E78.2 MIXED HYPERLIPIDEMIA: ICD-10-CM

## 2023-02-21 DIAGNOSIS — I10 HYPERTENSION, UNSPECIFIED TYPE: ICD-10-CM

## 2023-02-21 DIAGNOSIS — C50.412 MALIGNANT NEOPLASM OF UPPER-OUTER QUADRANT OF LEFT BREAST IN FEMALE, ESTROGEN RECEPTOR NEGATIVE: ICD-10-CM

## 2023-02-21 DIAGNOSIS — Z17.1 MALIGNANT NEOPLASM OF UPPER-OUTER QUADRANT OF LEFT BREAST IN FEMALE, ESTROGEN RECEPTOR NEGATIVE: ICD-10-CM

## 2023-02-21 DIAGNOSIS — E11.9 CONTROLLED TYPE 2 DIABETES MELLITUS WITHOUT COMPLICATION, WITHOUT LONG-TERM CURRENT USE OF INSULIN: ICD-10-CM

## 2023-02-21 DIAGNOSIS — C79.51 PRIMARY CANCER OF THYROID GLAND METASTATIC TO BONE: Primary | ICD-10-CM

## 2023-02-21 DIAGNOSIS — U07.1 COVID-19 VIRUS INFECTION: ICD-10-CM

## 2023-02-21 PROCEDURE — 96523 IRRIG DRUG DELIVERY DEVICE: CPT

## 2023-02-21 PROCEDURE — 25010000002 HEPARIN LOCK FLUSH PER 10 UNITS: Performed by: INTERNAL MEDICINE

## 2023-02-21 RX ORDER — HEPARIN SODIUM (PORCINE) LOCK FLUSH IV SOLN 100 UNIT/ML 100 UNIT/ML
500 SOLUTION INTRAVENOUS AS NEEDED
Status: CANCELLED | OUTPATIENT
Start: 2023-02-21

## 2023-02-21 RX ORDER — SODIUM CHLORIDE 0.9 % (FLUSH) 0.9 %
10 SYRINGE (ML) INJECTION AS NEEDED
Status: CANCELLED | OUTPATIENT
Start: 2023-02-21

## 2023-02-21 RX ORDER — SODIUM CHLORIDE 0.9 % (FLUSH) 0.9 %
10 SYRINGE (ML) INJECTION AS NEEDED
Status: DISCONTINUED | OUTPATIENT
Start: 2023-02-21 | End: 2023-02-21 | Stop reason: HOSPADM

## 2023-02-21 RX ORDER — HEPARIN SODIUM (PORCINE) LOCK FLUSH IV SOLN 100 UNIT/ML 100 UNIT/ML
500 SOLUTION INTRAVENOUS AS NEEDED
Status: DISCONTINUED | OUTPATIENT
Start: 2023-02-21 | End: 2023-02-21 | Stop reason: HOSPADM

## 2023-02-21 RX ADMIN — Medication 10 ML: at 11:26

## 2023-02-21 RX ADMIN — Medication 500 UNITS: at 11:26

## 2023-02-23 ENCOUNTER — TELEPHONE (OUTPATIENT)
Dept: ONCOLOGY | Facility: CLINIC | Age: 53
End: 2023-02-23
Payer: COMMERCIAL

## 2023-02-23 NOTE — TELEPHONE ENCOUNTER
Informed patient that Neulasta was not approved by her insurance, so we will use Udenyca with her treatment plan. She v/u.

## 2023-02-23 NOTE — TELEPHONE ENCOUNTER
----- Message from Imani Boothe RN sent at 2/22/2023 10:32 AM EST -----  RECEIVED CALL FROM DR EMILEE DOUGLAS WITH [facility].  SHE IS UNABLE TO APPROVE NEULASTA OBI BECAUSE PATIENT'S MEDICAL POLICY HAS STEP THERAPY AND REQUIRES THE USE AND FAILURE OF UDENYCA AND FULPHILA BEFORE NEULASTA WOULD BE CONSIDERED.     Thank you.

## 2023-02-24 ENCOUNTER — HOSPITAL ENCOUNTER (OUTPATIENT)
Dept: PHYSICAL THERAPY | Facility: HOSPITAL | Age: 53
Discharge: HOME | End: 2023-02-24
Payer: COMMERCIAL

## 2023-02-24 DIAGNOSIS — Z85.850 HISTORY OF THYROID CANCER: ICD-10-CM

## 2023-02-24 DIAGNOSIS — Z98.890 HISTORY OF LUMPECTOMY OF LEFT BREAST: ICD-10-CM

## 2023-02-24 DIAGNOSIS — Z91.89 AT RISK FOR LYMPHEDEMA: ICD-10-CM

## 2023-02-24 DIAGNOSIS — Z90.13 S/P MASTECTOMY, BILATERAL: Primary | ICD-10-CM

## 2023-02-24 PROCEDURE — 97164 PT RE-EVAL EST PLAN CARE: CPT | Performed by: PHYSICAL THERAPIST

## 2023-02-24 PROCEDURE — 93702 BIS XTRACELL FLUID ANALYSIS: CPT | Performed by: PHYSICAL THERAPIST

## 2023-02-24 NOTE — THERAPY RE-EVALUATION
Outpatient Physical Therapy Lymphedema Progress Note  [facility]     Patient Name: Ina Akers  : 1970  MRN: [id number]  Today's Date: 2023        Visit Date: 2023    Visit Dx:    ICD-10-CM ICD-9-CM   1. S/P mastectomy, bilateral  Z90.13 V45.71   2. At risk for lymphedema  Z91.89 V49.89   3. History of lumpectomy of left breast  Z98.890 V45.89   4. History of thyroid cancer  Z85.850 V10.87       Patient Active Problem List   Diagnosis   • Abnormal perimenopausal bleeding   • Amenorrhea   • Atopic dermatitis   • External hemorrhoids   • Gastritis   • Hypertension   • Iron deficiency anemia   • Irritable bowel syndrome   • Melanocytic nevus   • Goiter   • Tinea corporis   • Type 2 diabetes mellitus (HCC)   • Lipid screening   • Malignant neoplasm of upper-outer quadrant of left breast in female, estrogen receptor negative (HCC)   • Acute pain of both knees   • Controlled type 2 diabetes mellitus without complication, without long-term current use of insulin (HCC)   • Hyperlipidemia   • Vitamin D deficiency   • Multiple thyroid nodules   • Subclinical hypothyroidism   • Lumbar spine tumor   • Metastatic bone cancer (HCC)   • Fracture, pathologic, femur, neck, right, initial encounter (HCC)   • Immunocompromised (HCC)   • Encounter for screening for malignant neoplasm of colon   • Screen for colon cancer   • COVID-19 virus infection   • Gastroesophageal reflux disease   • Primary cancer of thyroid gland metastatic to bone (HCC)   • Acute pain of left shoulder         Lymphedema     Row Name 23 1000             Subjective Pain    Able to rate subjective pain? yes  -JS      Pre-Treatment Pain Level 0  -JS      Subjective Pain Comment L shoulder pain/tightness with overhead reaching  -JS         Subjective Comments    Subjective Comments No pain at rest, increased L shoulder/axillary pain with overhead reaching. Notes restrictions of movement  -JS         Lymphedema Assessment     Lymphedema Surgery Comments S/p L mastectomy, repeat axillary dissection on 1/24/23. Port placement 2/14/23. History of L lumpectomy, axillary dissection in 2001, and history of metastatic thyroid cancer with thyroid surgery in 2018 & 2020, related R hip & back surgery in 2020  -JS      Lymph Nodes Removed # --  repeat axillary dissection  -JS      Cancer Comments Chemotherapy to begin March 1. Will receive 4 cycles.  -JS      Radiation Therapy Received no  -JS      Infections or Cellulitis? no  -JS      Lymphedema Assessment Comments Chemo & radiation following 2001 lumpectomy  -JS         General ROM    RT Upper Ext Rt Shoulder ABduction;Rt Shoulder Flexion;Rt Shoulder External Rotation;Rt Shoulder Internal Rotation  -JS      LT Upper Ext Lt Shoulder ABduction;Lt Shoulder Flexion;Lt Shoulder External Rotation;Lt Shoulder Internal Rotation  -JS         Right Upper Ext    Rt Shoulder Abduction AROM 150  -JS      Rt Shoulder Flexion AROM 155  -JS      Rt Shoulder External Rotation AROM THAIS to T2  -JS      Rt Shoulder Internal Rotation AROM FIR to T9  -JS         Left Upper Ext    Lt Shoulder Abduction AROM 90  -JS      Lt Shoulder Flexion AROM 105  -JS      Lt Shoulder External Rotation AROM THAIS to post head  -JS      Lt Shoulder Internal Rotation AROM FIR to L 5  -JS         Compression/Skin Care    Compression/Skin Care Comments Pt brought properly fitted UE compression sleeve. Reviewed proper use/wear schedule, including heavy exercise/activity, air travel with with lymphedema symptoms.  -JS         L-Dex Bioimpedence Screening    L-Dex Measurement Extremity LUE  -JS      L-Dex Patient Position Standing  -JS      L-Dex UE Dominate Side Right  -JS      L-Dex UE At Risk Side Left  -JS      L-Dex UE Pre Surgical Value Yes  -      L-Dex UE Score 4.6  -JS      L-Dex UE Baseline Score 4.4  -JS      L-Dex UE Value Change 0.2  -JS      L-Dex UE Comment WNL, stable  -JS      $ L-Dex Charge yes  -JS            User Key   (r) = Recorded By, (t) = Taken By, (c) = Cosigned By    Initials Name Provider Type    Melani Ambrosio, PT Physical Therapist                               PT Assessment/Plan     Row Name 02/24/23 1000          PT Assessment    Assessment Comments Pt is a 51 y/o female recently diagnosed with new triple negative L breast CA, s/p L mastectomy with tissue expander placement and repeat axillary dissection on 1/24/23. Port placement on 2/14/23 with chemo scheduled to begin on 3/1/23. Pt presents to physical therapy for post-operative re-evaluation.  Patient demonstrates decreased shoulder ROM wtih c/o tightness in axillary region with overhead activity. Repeat bioimpedance performed with L-Dex bioimpedance score of 4.4 which is WNL and stable with pre-operative readings. Patient has obtained UE compression sleeve, using it once since surgery when she noted slight swelling in L UE, which resolved quickly with use of sleeve.  Currently not using UE compression garment, though education provided in wear schedule & appropriate use, as well as prevention techniques and signs/symptoms of lymphedema. Pt with history of L breast CA, s/p lumpectomy and axillary dissection (2/9 lymph nodes) in 2001. At that time, pt received chemo and radiation. PMH includes thyroid CA, s/p thyroid surgery in 2018 & 2020, with additional R hip surgery and back surgery due to metastatic disease.  Patient will benefit from skilled PT to address impairments and progress toward goals. Patient advised to contact PT with any questions, concerns or changes in the interim.  -JS     Please refer to paper survey for additional self-reported information Yes  -JS     Rehab Potential Good  -JS     Patient/caregiver participated in establishment of treatment plan and goals Yes  -JS     Patient would benefit from skilled therapy intervention Yes  -JS        PT Plan    PT Frequency Other (comment)  -JS     Predicted Duration of Therapy Intervention (PT) Return in  1 month.  Repeat bioimpedance in 3 months.  -JS     PT Plan Comments Pt scheduled to begin chemotherapy on 3/1/23. Pt to initiate HEP to address ROM & initial postural strengthening. Return in 1 month to recheck ROM and strength. Assess need for gauntlet. Repeat bioimpedance in 3 months.  -JS           User Key  (r) = Recorded By, (t) = Taken By, (c) = Cosigned By    Initials Name Provider Type    Melain Ambrosio, PT Physical Therapist                    OP Exercises     Row Name 02/24/23 1000             Subjective Comments    Subjective Comments No pain at rest, increased L shoulder/axillary pain with overhead reaching. Notes restrictions of movement  -JS         Subjective Pain    Able to rate subjective pain? yes  -JS      Pre-Treatment Pain Level 0  -JS      Subjective Pain Comment L shoulder pain/tightness with overhead reaching  -JS         Exercise 1    Exercise Name 1 Post shoulder rolls  -JS      Cueing 1 Verbal;Demo  -JS      Reps 1 10  -JS         Exercise 2    Exercise Name 2 Scapular retraction  -JS      Cueing 2 Verbal;Demo  -JS      Reps 2 10  -JS         Exercise 3    Exercise Name 3 B shoulder flex AAROM- supine hands clasped  -JS      Cueing 3 Verbal;Demo  -JS      Reps 3 10  -JS         Exercise 4    Exercise Name 4 B shoulder ER- supine butterfly stretch  -JS      Cueing 4 Verbal;Demo  -JS      Reps 4 10  -JS         Exercise 5    Exercise Name 5 UE wall slides  -JS      Cueing 5 Verbal;Demo  -JS      Reps 5 10  -JS            User Key  (r) = Recorded By, (t) = Taken By, (c) = Cosigned By    Initials Name Provider Type    Melani Ambrosio, PT Physical Therapist                             PT OP Goals     Row Name 02/24/23 1000          Long Term Goals    LTG Date to Achieve 05/25/23  -JS     LTG 1 Pt will be independent in comprehensive HEP to allow ongoing symptom management.  -JS     LTG 1 Progress Ongoing  -JS     LTG 1 Progress Comments Initial HEP issued  -JS     LTG 2 Pt will obtain properly  fitted UE compression sleeve and verbalize appropriate wear schedule.  -JS     LTG 2 Progress Met  -JS     LTG 3 Pt will maintain L-Dex bioimpedance score WNL.  -JS     LTG 3 Progress Ongoing  -JS     LTG 3 Progress Comments current score 4.4, which is stable & WNL  -JS     LTG 4 Pt will demonstrate normal L shoulder AROM.  -JS     LTG 4 Progress Ongoing  -JS     LTG 5 Pt will score 10 or less on Quick Dash to return to previously reported level of function.  -JS     LTG 5 Progress New  -JS        Time Calculation    PT Goal Re-Cert Due Date 05/25/23  -JS           User Key  (r) = Recorded By, (t) = Taken By, (c) = Cosigned By    Initials Name Provider Type    Melani Ambrosio, PT Physical Therapist                Therapy Education  Education Details: Bioimpedance results & significance discussed.  Issued information regarding initial HEP via [employer] [id number].  Reviewed proper donning/doffing UE compression sleeve, washing instructions for garments, wear schedule with heavy exercise/activity, air travel or symptoms.  Discussed lymphedema signs/symptoms and prevention techniques with [employer] for lymphedema prevention handout, NLN lymphedema risk reduction handout & general lymphedema handout discussed and provided. Encouraged pt to continue to wear supportive bra.    Outcome Measure Options: Quick DASH  Quick DASH  Open a tight or new jar.: Severe Difficulty  Do heavy household chores (e.g., wash walls, wash floors): Unable  Carry a shopping bag or briefcase: Severe Difficulty  Wash your back: Unable  Use a knife to cut food: No Difficulty  Recreational activities in which you take some force or impact through your arm, should or hand (e.g. golf, hammering, tennis, etc.): Unable  During the past week, to what extent has your arm, shoulder, or hand problem interfered with your normal social activites with family, friends, neighbors or groups?: Slightly  During the past week, were you limited in your work or  other regular daily activities as a result of your arm, shoulder or hand problem?: Very limited  Arm, Shoulder, or hand pain: Mild  Tingling (pins and needles) in your arm, shoulder, or hand: Moderate  During the past week, how much difficulty have you had sleeping because of the pain in your arm, shoulder or hand?: No difficulty  Number of Questions Answered: 11  Quick DASH Score: 56.82         Time Calculation:   Start Time: 1000  Stop Time: 1045  Time Calculation (min): 45 min   Therapy Charges for Today     Code Description Service Date Service Provider Modifiers Qty    44384931438 HC PT BIS XTRACELL FLUID ANALYSIS 2/24/2023 Melani Vieyra, PT  1    64281402766 HC PT RE-EVAL ESTABLISHED PLAN 2 2/24/2023 Melani Vieyra, PT GP 1          PT G-Codes  Outcome Measure Options: Quick DASH  Quick DASH Score: 56.82         Melani Vieyra PT  2/24/2023

## 2023-03-01 ENCOUNTER — INFUSION (OUTPATIENT)
Dept: ONCOLOGY | Facility: HOSPITAL | Age: 53
End: 2023-03-01
Payer: COMMERCIAL

## 2023-03-01 ENCOUNTER — OFFICE VISIT (OUTPATIENT)
Dept: ONCOLOGY | Facility: CLINIC | Age: 53
End: 2023-03-01
Payer: COMMERCIAL

## 2023-03-01 VITALS
HEIGHT: 61 IN | OXYGEN SATURATION: 98 % | DIASTOLIC BLOOD PRESSURE: 78 MMHG | HEART RATE: 87 BPM | RESPIRATION RATE: 16 BRPM | BODY MASS INDEX: 34.04 KG/M2 | SYSTOLIC BLOOD PRESSURE: 123 MMHG | WEIGHT: 180.3 LBS | TEMPERATURE: 96.9 F

## 2023-03-01 DIAGNOSIS — C73 PRIMARY CANCER OF THYROID GLAND METASTATIC TO BONE: ICD-10-CM

## 2023-03-01 DIAGNOSIS — C50.412 MALIGNANT NEOPLASM OF UPPER-OUTER QUADRANT OF LEFT BREAST IN FEMALE, ESTROGEN RECEPTOR NEGATIVE: ICD-10-CM

## 2023-03-01 DIAGNOSIS — C79.51 BONE METASTASIS: ICD-10-CM

## 2023-03-01 DIAGNOSIS — D50.9 IRON DEFICIENCY ANEMIA, UNSPECIFIED IRON DEFICIENCY ANEMIA TYPE: ICD-10-CM

## 2023-03-01 DIAGNOSIS — Z17.1 MALIGNANT NEOPLASM OF UPPER-OUTER QUADRANT OF LEFT BREAST IN FEMALE, ESTROGEN RECEPTOR NEGATIVE: Primary | ICD-10-CM

## 2023-03-01 DIAGNOSIS — C50.412 MALIGNANT NEOPLASM OF UPPER-OUTER QUADRANT OF LEFT BREAST IN FEMALE, ESTROGEN RECEPTOR NEGATIVE: Primary | ICD-10-CM

## 2023-03-01 DIAGNOSIS — Z17.1 MALIGNANT NEOPLASM OF UPPER-OUTER QUADRANT OF LEFT BREAST IN FEMALE, ESTROGEN RECEPTOR NEGATIVE: ICD-10-CM

## 2023-03-01 DIAGNOSIS — C79.51 PRIMARY CANCER OF THYROID GLAND METASTATIC TO BONE: ICD-10-CM

## 2023-03-01 LAB
ALBUMIN SERPL-MCNC: 4.2 G/DL (ref 3.5–5.2)
ALBUMIN/GLOB SERPL: 1.3 G/DL
ALP SERPL-CCNC: 68 U/L (ref 39–117)
ALT SERPL W P-5'-P-CCNC: 13 U/L (ref 1–33)
ANION GAP SERPL CALCULATED.3IONS-SCNC: 10.7 MMOL/L (ref 5–15)
AST SERPL-CCNC: 15 U/L (ref 1–32)
BASOPHILS # BLD AUTO: 0.03 10*3/MM3 (ref 0–0.2)
BASOPHILS NFR BLD AUTO: 0.3 % (ref 0–1.5)
BILIRUB SERPL-MCNC: 0.2 MG/DL (ref 0–1.2)
BUN SERPL-MCNC: 17 MG/DL (ref 6–20)
BUN/CREAT SERPL: 21.3 (ref 7–25)
CALCIUM SPEC-SCNC: 9.4 MG/DL (ref 8.6–10.5)
CHLORIDE SERPL-SCNC: 106 MMOL/L (ref 98–107)
CO2 SERPL-SCNC: 23.3 MMOL/L (ref 22–29)
CREAT SERPL-MCNC: 0.8 MG/DL (ref 0.57–1)
DEPRECATED RDW RBC AUTO: 49.1 FL (ref 37–54)
EGFRCR SERPLBLD CKD-EPI 2021: 87.7 ML/MIN/1.73
EOSINOPHIL # BLD AUTO: 0.01 10*3/MM3 (ref 0–0.4)
EOSINOPHIL NFR BLD AUTO: 0.1 % (ref 0.3–6.2)
ERYTHROCYTE [DISTWIDTH] IN BLOOD BY AUTOMATED COUNT: 15.7 % (ref 12.3–15.4)
GLOBULIN UR ELPH-MCNC: 3.3 GM/DL
GLUCOSE SERPL-MCNC: 158 MG/DL (ref 65–99)
HCT VFR BLD AUTO: 32.3 % (ref 34–46.6)
HGB BLD-MCNC: 9.9 G/DL (ref 12–15.9)
IMM GRANULOCYTES # BLD AUTO: 0.06 10*3/MM3 (ref 0–0.05)
IMM GRANULOCYTES NFR BLD AUTO: 0.6 % (ref 0–0.5)
LYMPHOCYTES # BLD AUTO: 1.19 10*3/MM3 (ref 0.7–3.1)
LYMPHOCYTES NFR BLD AUTO: 12 % (ref 19.6–45.3)
MCH RBC QN AUTO: 26.4 PG (ref 26.6–33)
MCHC RBC AUTO-ENTMCNC: 30.7 G/DL (ref 31.5–35.7)
MCV RBC AUTO: 86.1 FL (ref 79–97)
MONOCYTES # BLD AUTO: 0.29 10*3/MM3 (ref 0.1–0.9)
MONOCYTES NFR BLD AUTO: 2.9 % (ref 5–12)
NEUTROPHILS NFR BLD AUTO: 8.34 10*3/MM3 (ref 1.7–7)
NEUTROPHILS NFR BLD AUTO: 84.1 % (ref 42.7–76)
NRBC BLD AUTO-RTO: 0 /100 WBC (ref 0–0.2)
PLATELET # BLD AUTO: 266 10*3/MM3 (ref 140–450)
PMV BLD AUTO: 9.3 FL (ref 6–12)
POTASSIUM SERPL-SCNC: 3.8 MMOL/L (ref 3.5–5.2)
PROT SERPL-MCNC: 7.5 G/DL (ref 6–8.5)
RBC # BLD AUTO: 3.75 10*6/MM3 (ref 3.77–5.28)
SODIUM SERPL-SCNC: 140 MMOL/L (ref 136–145)
WBC NRBC COR # BLD: 9.92 10*3/MM3 (ref 3.4–10.8)

## 2023-03-01 PROCEDURE — 25010000002 HEPARIN LOCK FLUSH PER 10 UNITS: Performed by: INTERNAL MEDICINE

## 2023-03-01 PROCEDURE — 25010000002 CYCLOPHOSPHAMIDE 1 GM/5ML SOLUTION 5 ML VIAL: Performed by: INTERNAL MEDICINE

## 2023-03-01 PROCEDURE — 25010000002 PALONOSETRON PER 25 MCG: Performed by: INTERNAL MEDICINE

## 2023-03-01 PROCEDURE — 85025 COMPLETE CBC W/AUTO DIFF WBC: CPT | Performed by: INTERNAL MEDICINE

## 2023-03-01 PROCEDURE — 96415 CHEMO IV INFUSION ADDL HR: CPT

## 2023-03-01 PROCEDURE — 99215 OFFICE O/P EST HI 40 MIN: CPT | Performed by: INTERNAL MEDICINE

## 2023-03-01 PROCEDURE — 96417 CHEMO IV INFUS EACH ADDL SEQ: CPT

## 2023-03-01 PROCEDURE — 96375 TX/PRO/DX INJ NEW DRUG ADDON: CPT

## 2023-03-01 PROCEDURE — 96413 CHEMO IV INFUSION 1 HR: CPT

## 2023-03-01 PROCEDURE — 25010000002 HYDROCORTISONE SOD SUC (PF) 100 MG RECONSTITUTED SOLUTION: Performed by: NURSE PRACTITIONER

## 2023-03-01 PROCEDURE — 25010000002 DOCETAXEL 20 MG/ML SOLUTION 8 ML VIAL: Performed by: INTERNAL MEDICINE

## 2023-03-01 PROCEDURE — 25010000002 DIPHENHYDRAMINE PER 50 MG: Performed by: INTERNAL MEDICINE

## 2023-03-01 PROCEDURE — 80053 COMPREHEN METABOLIC PANEL: CPT | Performed by: INTERNAL MEDICINE

## 2023-03-01 RX ORDER — FAMOTIDINE 10 MG/ML
20 INJECTION, SOLUTION INTRAVENOUS AS NEEDED
Status: CANCELLED | OUTPATIENT
Start: 2023-03-01

## 2023-03-01 RX ORDER — HEPARIN SODIUM (PORCINE) LOCK FLUSH IV SOLN 100 UNIT/ML 100 UNIT/ML
500 SOLUTION INTRAVENOUS AS NEEDED
Status: DISCONTINUED | OUTPATIENT
Start: 2023-03-01 | End: 2023-03-01 | Stop reason: HOSPADM

## 2023-03-01 RX ORDER — SODIUM CHLORIDE 0.9 % (FLUSH) 0.9 %
10 SYRINGE (ML) INJECTION AS NEEDED
Status: DISCONTINUED | OUTPATIENT
Start: 2023-03-01 | End: 2023-03-01 | Stop reason: HOSPADM

## 2023-03-01 RX ORDER — PALONOSETRON 0.05 MG/ML
0.25 INJECTION, SOLUTION INTRAVENOUS ONCE
Status: COMPLETED | OUTPATIENT
Start: 2023-03-01 | End: 2023-03-01

## 2023-03-01 RX ORDER — DIPHENHYDRAMINE HYDROCHLORIDE 50 MG/ML
50 INJECTION INTRAMUSCULAR; INTRAVENOUS AS NEEDED
Status: CANCELLED | OUTPATIENT
Start: 2023-03-01

## 2023-03-01 RX ORDER — SODIUM CHLORIDE 0.9 % (FLUSH) 0.9 %
10 SYRINGE (ML) INJECTION AS NEEDED
Status: CANCELLED | OUTPATIENT
Start: 2023-03-01

## 2023-03-01 RX ORDER — SODIUM CHLORIDE 9 MG/ML
250 INJECTION, SOLUTION INTRAVENOUS ONCE
Status: COMPLETED | OUTPATIENT
Start: 2023-03-01 | End: 2023-03-01

## 2023-03-01 RX ORDER — SODIUM CHLORIDE 9 MG/ML
250 INJECTION, SOLUTION INTRAVENOUS ONCE
Status: CANCELLED | OUTPATIENT
Start: 2023-03-01

## 2023-03-01 RX ORDER — HEPARIN SODIUM (PORCINE) LOCK FLUSH IV SOLN 100 UNIT/ML 100 UNIT/ML
500 SOLUTION INTRAVENOUS AS NEEDED
Status: CANCELLED | OUTPATIENT
Start: 2023-03-01

## 2023-03-01 RX ORDER — FAMOTIDINE 10 MG/ML
20 INJECTION, SOLUTION INTRAVENOUS ONCE
Status: COMPLETED | OUTPATIENT
Start: 2023-03-01 | End: 2023-03-01

## 2023-03-01 RX ORDER — PALONOSETRON 0.05 MG/ML
0.25 INJECTION, SOLUTION INTRAVENOUS ONCE
Status: CANCELLED | OUTPATIENT
Start: 2023-03-01

## 2023-03-01 RX ADMIN — Medication 500 UNITS: at 14:16

## 2023-03-01 RX ADMIN — DOCETAXEL 135 MG: 20 INJECTION, SOLUTION, CONCENTRATE INTRAVENOUS at 11:19

## 2023-03-01 RX ADMIN — FAMOTIDINE 20 MG: 10 INJECTION INTRAVENOUS at 10:28

## 2023-03-01 RX ADMIN — SODIUM CHLORIDE 250 ML: 9 INJECTION, SOLUTION INTRAVENOUS at 10:01

## 2023-03-01 RX ADMIN — CYCLOPHOSPHAMIDE 1080 MG: 200 INJECTION, SOLUTION INTRAVENOUS at 13:41

## 2023-03-01 RX ADMIN — DIPHENHYDRAMINE HYDROCHLORIDE 50 MG: 50 INJECTION, SOLUTION INTRAMUSCULAR; INTRAVENOUS at 10:28

## 2023-03-01 RX ADMIN — PALONOSETRON 0.25 MG: 0.05 INJECTION, SOLUTION INTRAVENOUS at 10:28

## 2023-03-01 RX ADMIN — Medication 10 ML: at 14:16

## 2023-03-01 RX ADMIN — HYDROCORTISONE SODIUM SUCCINATE 100 MG: 100 INJECTION, POWDER, FOR SOLUTION INTRAMUSCULAR; INTRAVENOUS at 11:29

## 2023-03-01 NOTE — NURSING NOTE
"Taxotere reaction:    1127 pt c/o pressure, cough, back pain, numb lips, taxotere stoppped  143/84, hr 92  1128 Leyla Phillips at bedside, v/o solu cortef 100mg IV  1129 Solucortef given  1131 132/83, HR 91, pt states back pain better and lips less numb  1133: back pain better and \"feels much better\"  Taxotere restarted at half the rate per  Dr Muñoz. Patient with no voiced complaints at present    "

## 2023-03-01 NOTE — PROGRESS NOTES
Subjective     CHIEF COMPLAINT:      Chief Complaint   Patient presents with   • Follow-up     SLIGHT HEADACHE/DISCUSS FERROUS SULFATE      HISTORY OF PRESENT ILLNESS:     Ina Akers is a 52 y.o. female patient who returns today for follow up on her left breast cancer, thyroid cancer and anemia. She has been taking the oral iron regularly. She reports episodes of headaches. No vision changes. She thinks that the headaches were due to drinking a lot of crystal light. No extremity weakness.     ROS:  Pertinent ROS is in the HPI.     Past medical, surgical, social and family history were reviewed.     MEDICATIONS:    Current Outpatient Medications:   •  acetaminophen (TYLENOL) 500 MG tablet, Take 2 tablets by mouth Every 6 (Six) Hours As Needed for Mild Pain., Disp: , Rfl:   •  amLODIPine (NORVASC) 10 MG tablet, Take 1 tablet by mouth Every Morning., Disp: , Rfl:   •  atorvastatin (LIPITOR) 20 MG tablet, Take 1 tablet by mouth Daily. (Patient taking differently: Take 1 tablet by mouth Every Night.), Disp: 90 tablet, Rfl: 3  •  Blood Glucose Monitoring Suppl w/Device kit, Check blood sugar twice a day, Disp: 1 each, Rfl: 0  •  cholecalciferol (VITAMIN D3) 25 MCG (1000 UT) tablet, Take 1 tablet by mouth Daily., Disp: , Rfl:   •  dexamethasone (DECADRON) 4 MG tablet, Take 2 tablets oral twice a day for 3 consecutive days beginning the day before chemotherapy and continue for 6 doses., Disp: 12 tablet, Rfl: 3  •  famotidine (PEPCID) 20 MG tablet, Take 1 tablet by mouth Every Other Day., Disp: , Rfl:   •  ferrous sulfate 325 (65 FE) MG tablet, Take 1 tablet by mouth Daily., Disp: , Rfl:   •  Glucose Blood (BLOOD GLUCOSE TEST) strip, Check blood sugar twice a day., Disp: 100 each, Rfl: 1  •  labetalol (NORMODYNE) 100 MG tablet, Take 0.5 tablets by mouth 2 (Two) Times a Day., Disp: 180 tablet, Rfl: 1  •  Lancets (ACCU-CHEK SOFT TOUCH) lancets, Check blood sugar twice a day as needed, Disp: 100 each, Rfl: 1  •   "lansoprazole (PREVACID) 30 MG capsule, TAKE ONE CAPSULE BY MOUTH DAILY (Patient taking differently: Take 1 capsule by mouth Every Other Day.), Disp: 90 capsule, Rfl: 2  •  levothyroxine (SYNTHROID, LEVOTHROID) 112 MCG tablet, Take 1 tablet by mouth Every Morning., Disp: , Rfl:   •  losartan (COZAAR) 100 MG tablet, Take 1 tablet by mouth Daily., Disp: , Rfl:   •  metFORMIN ER (GLUCOPHAGE-XR) 500 MG 24 hr tablet, TAKE TWO TABLETS BY MOUTH DAILY WITH DINNER (Patient taking differently: Take 2 tablets by mouth Every Evening.), Disp: 180 tablet, Rfl: 1  •  promethazine (PHENERGAN) 12.5 MG tablet, TAKE 1/2 TO 1 TABLET BY MOUTH EVERY 4 HOURS AS NEEDED FOR NAUSEA AND VOMITING -MAY MAKE DROWSY (Patient taking differently: Take 1-2 tablets by mouth Every 6 (Six) Hours As Needed. TAKE 1/2 TO 1 TABLET BY MOUTH EVERY 4 HOURS AS NEEDED FOR NAUSEA AND VOMITING -MAY MAKE DROWSY), Disp: 30 tablet, Rfl: 3  •  triamcinolone (KENALOG) 0.1 % cream, Apply  topically to the appropriate area as directed 2 (Two) Times a Day. Sparingly shortest time possible avoid face (Patient taking differently: Apply  topically to the appropriate area as directed As Needed. Sparingly shortest time possible avoid face), Disp: 80 g, Rfl: 0  •  promethazine (PHENERGAN) 12.5 MG tablet, Take 1 tablet by mouth Every 6 (Six) Hours As Needed for Nausea or Vomiting., Disp: 10 tablet, Rfl: 0  Objective     VITAL SIGNS:     Vitals:    03/01/23 0913   BP: 123/78   Pulse: 87   Resp: 16   Temp: 96.9 °F (36.1 °C)   TempSrc: Temporal   SpO2: 98%   Weight: 81.8 kg (180 lb 4.8 oz)   Height: 154.9 cm (60.98\")   PainSc: 0-No pain     Body mass index is 34.09 kg/m².     Wt Readings from Last 5 Encounters:   03/01/23 81.8 kg (180 lb 4.8 oz)   02/14/23 80.7 kg (178 lb)   02/08/23 80.7 kg (178 lb)   02/01/23 81 kg (178 lb 9.6 oz)   01/24/23 80.7 kg (177 lb 14.6 oz)     PHYSICAL EXAMINATION:   GENERAL: The patient appears in good general condition, not in acute distress.   SKIN: " No ecchymosis.  EYES: No jaundice. No pallor.  LYMPHATICS: No cervical lymphadenopathy.  BREASTS: Left reconstructed breast exam revealed incision to be C/D/I. No tenderness. No discharge. No erythema or warmth.  CHEST: Normal respiratory effort. Lungs clear bilaterally. No added sounds. Port in the right upper chest. No tenderness.  CVS: Normal S1 and S2. No murmurs.  ABDOMEN: Soft. No tenderness. No Hepatomegaly. No Splenomegaly. No masses.  EXTREMITIES: No edema.     DIAGNOSTIC DATA:     Results from last 7 days   Lab Units 03/01/23  0901   WBC 10*3/mm3 9.92   NEUTROS ABS 10*3/mm3 8.34*   HEMOGLOBIN g/dL 9.9*   HEMATOCRIT % 32.3*   PLATELETS 10*3/mm3 266     Results from last 7 days   Lab Units 03/01/23  0901   SODIUM mmol/L 140   POTASSIUM mmol/L 3.8   CHLORIDE mmol/L 106   CO2 mmol/L 23.3   BUN mg/dL 17   CREATININE mg/dL 0.80   CALCIUM mg/dL 9.4   ALBUMIN g/dL 4.2   BILIRUBIN mg/dL 0.2   ALK PHOS U/L 68   ALT (SGPT) U/L 13   AST (SGOT) U/L 15   GLUCOSE mg/dL 158*     Chest x-ray on 2/14/2023:  Right chest port extends to the right atrium. The heart size is normal.  Pulmonary vasculature is unremarkable. No focal pulmonary consolidation,  pleural effusion, or pneumothorax. Left-sided tissue expander is  apparent. No acute osseous process.     IMPRESSION:     Chest port placement. No pneumothorax.    Assessment & Plan    *Left breast cancer.  · It was identified on screening mammograms on 10/5/2022.  · It was located at 12 o'clock position.  · It measured 6 x 5 by 5 mm on mammogram and ultrasound on 10/20/2022.  · PET scan on 10/6/2022 showed the lesion to be hypermetabolic. It measured 10 x 7 mm with an SUV of 5.8.  · No evidence of metastasis on PET scan.  · MRI on 12/5/2022 showed the lesion to measure 9 mm.    · No suspicious lymphadenopathy.   · Since her metastatic thyroid cancer was stable, we recommended left mastectomy with goal of cure.  · S/P mastectomy with reconstruction on 1/24/2023.  · Pathology  exam revealed the primary tumor to measure 15 mm.  · It was grade 3.  Triple negative.  HER2 was negative-0 by IHC.  · Ki-67 was 85%.  · It was considered pT1cN0.  · 2 sentinel lymph nodes were examined and were negative.    · No lymphovascular invasion.  · There was associated DCIS.  · I explained the findings of the pathology exam to the patient and her . The size of the tumor increased since the initial diagnosis and the biopsy was obtained.  · Ki 67 is very high at 85% indicating rapid tumor growth and aggressive behavior.  · Due to the increase in size and the high Ki 67, adjuvant chemotherapy was recommended.    · Due to the patient receiving Adriamycin in the past, she is unable to receive additional Adriamycin.  · Taxotere and Cytoxan with Neulasta support every 3 weeks x 4 cycles was recommended.  · She had the port placed on 2/14/2023.  · She had chemotherapy education. She is ready to start chemotherapy today.     *Metastatic thyroid cancer diagnosed on 10/6/2020.  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  · She had right thyroid lobectomy on 12/17/2018 at [facility].    · The specimen was sent for outside consultation and the final conclusion was that there was no evidence of malignancy.  · MRI on 8/27/2020 revealed an L4 lesion resulting in pathologic compression fracture.  · Bone scan on 9/2/2020 revealed increased activity at L4 and left side of the skull.  · PET scan on 9/9/2020 revealed abnormal activity at L4, right femoral neck, lower sacrum, left ischium and right ninth medial aspect.  · PET scan 9/9/2020 showed no evidence of visceral metastasis.  · Patient had kyphoplasty on 9/17/2020.  Pathology was nondiagnostic.  · CT-guided biopsy of the lesion in the right posterior ninth rib on 10/6/2020 revealed a clear cell neoplasm.  It is positive for PAX 8 and TTF-1 favoring metastatic thyroid cancer.  · Patient was referred to [facility].  · Patient underwent  completion thyroidectomy on 11/9/2020.  · There was no evidence of uptake of radioactive iodine on SPECT imaging on 12/18/2020.  · This was indicative of the tumor not going to respond to radioactive iodine therapy.  · Patient was started on Lenvima 20 mg daily on 12/24/2020.  · Lenvima was held on 2/6/2021 due to drug induced anemia and thrombocytopenia.  She was unable to start back on it due to prolonged cytopenias.  · CT scans on 3/23/2021 showed stable disease.  · CBC was stable on 6/8/2021.  CT scans showed stable disease.  Thyroglobulin was stable at 3.4.    · PET scan on 9/1/2021 revealed decrease in the activity in the metastatic lesions.    · Starting back on active treatment (Nexavar) was not recommended.  · PET scan on 12/16/2021 showed complete response except for mild hypermetabolism in the left ischium.  · CT scans on 4/8/2022 showed stable bone metastasis.  No new metastatic lesions.  · PET scan on 10/6/2022 revealed no new areas of metastasis.  · Due to the stable metastatic disease, she is not currently on treatment for her thyroid cancer.   · She continues thyroid replacement with Synthroid 112 mcg daily.      *Bone metastasis.  · Patient was given Zometa on 9/29/2020.  · Patient underwent right femur medullary nailing on 10/23/2020.  · Patient was restarted back on Zometa on 2/24/2022.   · She is tolerating the Zometa infusions well.  · CT scans on 4/8/2022 revealed no new metastatic lesions.  The metastatic lesions were stable.  · PET scan on 10/6/2022 revealed no change in the areas of activity in the bone metastasis.  No new lesions.  · Last dose of Zometa was given on 12/1/2022.  · No new areas of bone pain.     * Iron deficiency anemia.    · Patient was previously treated with ferrous sulfate 325 mg daily.  · Hemoglobin decreased to 10.4 on 6/23/2021.  Iron stores decreased with transferrin saturation decreasing to 12%.  · She was restarted back on ferrous sulfate 325 mg daily.  However,   Karo asked her to stop the iron.  · Hemoglobin decreased to 9.1 on 2/24/2022.  · EGD on 3/25/2022 showed no evidence of blood loss.  · Pathology exam revealed no typical features of celiac disease.  · Hemoglobin was 10.4 on 12/27/2022.  · Ferritin was 95 and transferrin saturation was 14%.  · She was started on ferrous sulfate 325 mg daily.  · Hemoglobin decreased to 9.6 on 2/1/2023, secondary to recent surgical blood losses.  · She was restarted on ferrous sulfate 325 mg daily on 2/1/2023.  · Hemoglobin is 9.9 today.    *History of left breast cancer, stage IIB, triIple negative.   · S/P lumpectomy in 2001.  · She received adjuvant chemotherapy on the NSABP B34 with AC followed by Taxotere.  · S/P post-lumpectomy radiation therapy.   · She was placed on tamoxifen for 5 years and it was completed in January 2007.   · With the patient's history of breast cancer at a young age and development of a second new breast cancer in the left breast, repeat genetic testing was recommended.  · Gene testing on 12/22/2022 revealed variants of unknown significance.    PLAN:    1.  Proceed with Taxotere and Cytoxan today.   2.  Patient decided not to have the [place] system to reduce hair loss and she is going to use an ice cap that she brought with her.  3.  We will give Udenyca injection tomorrow.  4.  Continue Ferrous Sulfate 325 mg daily.  5.  Return in 1 and 2 weeks for a CBC with RN review.   6.  Follow up in 3 weeks. We will schedule her to receive cycle #2.  7.  I recommended taking Claritin to reduce the development of bone pain from Udenyca.     Patient developed infusion reaction after Taxotere was started. She was given Hydrocortisone 100 mg. Taxotere was restarted after 30 minutes at half the rate. She tolerated Taxotere when it was restarted. We will give Hydrocortisone 100 mg IV prior to her subsequent chemotherapy treatments.     I spent 42 minutes caring for Ina on this date of service. This time includes time  spent by me in the following activities: preparing for the visit, reviewing tests, obtaining and/or reviewing a separately obtained history, performing a medically appropriate examination and/or evaluation, counseling and educating the patient/family/caregiver, ordering medications, tests, or procedures, documenting information in the medical record, independently interpreting results and communicating that information with the patient/family/caregiver and care coordination       Tim Muñoz MD  03/01/23

## 2023-03-02 ENCOUNTER — INFUSION (OUTPATIENT)
Dept: ONCOLOGY | Facility: HOSPITAL | Age: 53
End: 2023-03-02
Payer: COMMERCIAL

## 2023-03-02 DIAGNOSIS — C50.412 MALIGNANT NEOPLASM OF UPPER-OUTER QUADRANT OF LEFT BREAST IN FEMALE, ESTROGEN RECEPTOR NEGATIVE: Primary | ICD-10-CM

## 2023-03-02 DIAGNOSIS — Z17.1 MALIGNANT NEOPLASM OF UPPER-OUTER QUADRANT OF LEFT BREAST IN FEMALE, ESTROGEN RECEPTOR NEGATIVE: Primary | ICD-10-CM

## 2023-03-02 PROCEDURE — 25010000002 PEGFILGRASTIM-CBQV 6 MG/0.6ML SOLUTION PREFILLED SYRINGE: Performed by: INTERNAL MEDICINE

## 2023-03-02 PROCEDURE — 96372 THER/PROPH/DIAG INJ SC/IM: CPT

## 2023-03-02 RX ORDER — AMLODIPINE BESYLATE 10 MG/1
TABLET ORAL
Qty: 90 TABLET | Refills: 1 | Status: SHIPPED | OUTPATIENT
Start: 2023-03-02

## 2023-03-02 RX ADMIN — PEGFILGRASTIM-CBQV 6 MG: 6 INJECTION, SOLUTION SUBCUTANEOUS at 11:57

## 2023-03-02 NOTE — NURSING NOTE
Udenyca given without incidence.  Pt denies complaints.  Reminded pt to take claritin for bony pain.  Next appt reviewed and pt instructed to call sooner with concerns and v/u.

## 2023-03-02 NOTE — TELEPHONE ENCOUNTER
Rx Refill Note  Requested Prescriptions     Pending Prescriptions Disp Refills   • amLODIPine (NORVASC) 10 MG tablet [Pharmacy Med Name: amLODIPine BESYLATE 10 MG TAB] 90 tablet      Sig: TAKE ONE TABLET BY MOUTH DAILY      Last office visit with prescribing clinician: 8/30/2022   Last telemedicine visit with prescribing clinician: 5/12/2023   Next office visit with prescribing clinician: 5/12/2023                         Would you like a call back once the refill request has been completed: [] Yes [] No    If the office needs to give you a call back, can they leave a voicemail: [] Yes [] No    Manish Wray Rep  03/02/23, 12:27 EST

## 2023-03-08 ENCOUNTER — LAB (OUTPATIENT)
Dept: OTHER | Facility: HOSPITAL | Age: 53
End: 2023-03-08
Payer: COMMERCIAL

## 2023-03-08 ENCOUNTER — CLINICAL SUPPORT (OUTPATIENT)
Dept: ONCOLOGY | Facility: HOSPITAL | Age: 53
End: 2023-03-08
Payer: COMMERCIAL

## 2023-03-08 VITALS — WEIGHT: 179.2 LBS | TEMPERATURE: 96.6 F | RESPIRATION RATE: 18 BRPM | HEIGHT: 61 IN | BODY MASS INDEX: 33.83 KG/M2

## 2023-03-08 DIAGNOSIS — C50.412 MALIGNANT NEOPLASM OF UPPER-OUTER QUADRANT OF LEFT BREAST IN FEMALE, ESTROGEN RECEPTOR NEGATIVE: ICD-10-CM

## 2023-03-08 DIAGNOSIS — Z17.1 MALIGNANT NEOPLASM OF UPPER-OUTER QUADRANT OF LEFT BREAST IN FEMALE, ESTROGEN RECEPTOR NEGATIVE: ICD-10-CM

## 2023-03-08 LAB
BASOPHILS # BLD AUTO: 0.04 10*3/MM3 (ref 0–0.2)
BASOPHILS NFR BLD AUTO: 0.3 % (ref 0–1.5)
C3 FRG RBC-MCNC: ABNORMAL
DEPRECATED RDW RBC AUTO: 47.9 FL (ref 37–54)
EOSINOPHIL # BLD AUTO: 0.12 10*3/MM3 (ref 0–0.4)
EOSINOPHIL # BLD MANUAL: 0.3 10*3/MM3 (ref 0–0.4)
EOSINOPHIL NFR BLD AUTO: 0.8 % (ref 0.3–6.2)
EOSINOPHIL NFR BLD MANUAL: 2 % (ref 0.3–6.2)
ERYTHROCYTE [DISTWIDTH] IN BLOOD BY AUTOMATED COUNT: 15.8 % (ref 12.3–15.4)
HCT VFR BLD AUTO: 32.6 % (ref 34–46.6)
HGB BLD-MCNC: 10.1 G/DL (ref 12–15.9)
IMM GRANULOCYTES # BLD AUTO: 2.17 10*3/MM3 (ref 0–0.05)
IMM GRANULOCYTES NFR BLD AUTO: 14.3 % (ref 0–0.5)
LYMPHOCYTES # BLD AUTO: 2.35 10*3/MM3 (ref 0.7–3.1)
LYMPHOCYTES # BLD MANUAL: 2.12 10*3/MM3 (ref 0.7–3.1)
LYMPHOCYTES NFR BLD AUTO: 15.5 % (ref 19.6–45.3)
LYMPHOCYTES NFR BLD MANUAL: 21 % (ref 5–12)
MCH RBC QN AUTO: 26.1 PG (ref 26.6–33)
MCHC RBC AUTO-ENTMCNC: 31 G/DL (ref 31.5–35.7)
MCV RBC AUTO: 84.2 FL (ref 79–97)
MONOCYTES # BLD AUTO: 2.21 10*3/MM3 (ref 0.1–0.9)
MONOCYTES # BLD: 3.19 10*3/MM3 (ref 0.1–0.9)
MONOCYTES NFR BLD AUTO: 14.6 % (ref 5–12)
NEUTROPHILS # BLD AUTO: 9.56 10*3/MM3 (ref 1.7–7)
NEUTROPHILS NFR BLD AUTO: 54.5 % (ref 42.7–76)
NEUTROPHILS NFR BLD AUTO: 8.28 10*3/MM3 (ref 1.7–7)
NEUTROPHILS NFR BLD MANUAL: 63 % (ref 42.7–76)
NRBC BLD AUTO-RTO: 0.3 /100 WBC (ref 0–0.2)
NRBC SPEC MANUAL: 2 /100 WBC (ref 0–0.2)
PLAT MORPH BLD: NORMAL
PLATELET # BLD AUTO: 212 10*3/MM3 (ref 140–450)
PMV BLD AUTO: 11.2 FL (ref 6–12)
POIKILOCYTOSIS BLD QL SMEAR: ABNORMAL
RBC # BLD AUTO: 3.87 10*6/MM3 (ref 3.77–5.28)
VARIANT LYMPHS NFR BLD MANUAL: 14 % (ref 19.6–45.3)
WBC MORPH BLD: NORMAL
WBC NRBC COR # BLD: 15.17 10*3/MM3 (ref 3.4–10.8)

## 2023-03-08 PROCEDURE — G0463 HOSPITAL OUTPT CLINIC VISIT: HCPCS

## 2023-03-08 PROCEDURE — 85007 BL SMEAR W/DIFF WBC COUNT: CPT | Performed by: INTERNAL MEDICINE

## 2023-03-08 PROCEDURE — 36415 COLL VENOUS BLD VENIPUNCTURE: CPT

## 2023-03-08 PROCEDURE — 85025 COMPLETE CBC W/AUTO DIFF WBC: CPT | Performed by: INTERNAL MEDICINE

## 2023-03-08 NOTE — NURSING NOTE
Patient is here for lab review with RN.  CBC reviewed, counts are stable for this patient at this time. Patient has no complaints. Copy of labs given to patient and follow up appointment reviewed. Patient is instructed to call the office with any concerns or new symptoms prior to next visit. Patient verbalized understanding and discharged in stable condition.    Lab Results   Component Value Date    WBC 15.17 (H) 03/08/2023    HGB 10.1 (L) 03/08/2023    HCT 32.6 (L) 03/08/2023    MCV 84.2 03/08/2023     03/08/2023

## 2023-03-10 NOTE — PROGRESS NOTES
Polina's Magic Mouthwash prescription called into [facility] and added to patient's medication list.  · Benadryl Elixir 60 cc  · Maalox 200 cc  · Viscous Lidocaine 30 cc  · Nystatin 120 cc  · Dispense 410 cc  Swish and spit 10 cc every 4 hours PRN as needed for mouth pain, mucositis, or thrush.

## 2023-03-13 ENCOUNTER — PATIENT OUTREACH (OUTPATIENT)
Dept: OTHER | Facility: HOSPITAL | Age: 53
End: 2023-03-13
Payer: COMMERCIAL

## 2023-03-13 NOTE — PROGRESS NOTES
Called Ms. Akers to see how she was doing. Left a message with my contact information and asked her to call back at her convenience.        Ms. Robinson called me back and stated she had her first chemo on March 1st. She stated she was severely fatigued after her treatment but was feeling better a few days later. She will have 4 rounds of chemo 3 weeks apart. She is scheduled to go back March 22nd. She stated she is going to call Dr. Morse's office to ask about post op restrictions after a reduction and see how long she needs to be off work. She is trying to decide what to do with work and whether to take long term disability until June after her last surgery. She is also using a cold cap she purchased to help with hair loss. She stated she has sufficient hair prosthesis if needed. Otherwise, she is doing well and has no other needs at this time. She was thankful for the call and will reach out if any questions or needs arise.

## 2023-03-15 ENCOUNTER — LAB (OUTPATIENT)
Dept: OTHER | Facility: HOSPITAL | Age: 53
End: 2023-03-15
Payer: COMMERCIAL

## 2023-03-15 ENCOUNTER — CLINICAL SUPPORT (OUTPATIENT)
Dept: ONCOLOGY | Facility: HOSPITAL | Age: 53
End: 2023-03-15
Payer: COMMERCIAL

## 2023-03-15 DIAGNOSIS — C50.412 MALIGNANT NEOPLASM OF UPPER-OUTER QUADRANT OF LEFT BREAST IN FEMALE, ESTROGEN RECEPTOR NEGATIVE: ICD-10-CM

## 2023-03-15 DIAGNOSIS — Z17.1 MALIGNANT NEOPLASM OF UPPER-OUTER QUADRANT OF LEFT BREAST IN FEMALE, ESTROGEN RECEPTOR NEGATIVE: ICD-10-CM

## 2023-03-15 LAB
BASOPHILS # BLD AUTO: 0.07 10*3/MM3 (ref 0–0.2)
BASOPHILS NFR BLD AUTO: 1.2 % (ref 0–1.5)
DEPRECATED RDW RBC AUTO: 50.1 FL (ref 37–54)
EOSINOPHIL # BLD AUTO: 0.02 10*3/MM3 (ref 0–0.4)
EOSINOPHIL NFR BLD AUTO: 0.4 % (ref 0.3–6.2)
ERYTHROCYTE [DISTWIDTH] IN BLOOD BY AUTOMATED COUNT: 16.7 % (ref 12.3–15.4)
HCT VFR BLD AUTO: 31.9 % (ref 34–46.6)
HGB BLD-MCNC: 9.8 G/DL (ref 12–15.9)
IMM GRANULOCYTES # BLD AUTO: 0.21 10*3/MM3 (ref 0–0.05)
IMM GRANULOCYTES NFR BLD AUTO: 3.7 % (ref 0–0.5)
LYMPHOCYTES # BLD AUTO: 1.79 10*3/MM3 (ref 0.7–3.1)
LYMPHOCYTES NFR BLD AUTO: 31.6 % (ref 19.6–45.3)
MCH RBC QN AUTO: 26 PG (ref 26.6–33)
MCHC RBC AUTO-ENTMCNC: 30.7 G/DL (ref 31.5–35.7)
MCV RBC AUTO: 84.6 FL (ref 79–97)
MONOCYTES # BLD AUTO: 0.56 10*3/MM3 (ref 0.1–0.9)
MONOCYTES NFR BLD AUTO: 9.9 % (ref 5–12)
NEUTROPHILS NFR BLD AUTO: 3.01 10*3/MM3 (ref 1.7–7)
NEUTROPHILS NFR BLD AUTO: 53.2 % (ref 42.7–76)
NRBC BLD AUTO-RTO: 0.4 /100 WBC (ref 0–0.2)
PLATELET # BLD AUTO: 244 10*3/MM3 (ref 140–450)
PMV BLD AUTO: 9.8 FL (ref 6–12)
RBC # BLD AUTO: 3.77 10*6/MM3 (ref 3.77–5.28)
WBC NRBC COR # BLD: 5.66 10*3/MM3 (ref 3.4–10.8)

## 2023-03-15 PROCEDURE — 36415 COLL VENOUS BLD VENIPUNCTURE: CPT

## 2023-03-15 PROCEDURE — G0463 HOSPITAL OUTPT CLINIC VISIT: HCPCS

## 2023-03-15 PROCEDURE — 85025 COMPLETE CBC W/AUTO DIFF WBC: CPT | Performed by: INTERNAL MEDICINE

## 2023-03-15 NOTE — NURSING NOTE
Patient is here for lab review with RN.  CBC reviewed, counts are stable for this patient at this time. Patient has no complaints. Copy of labs given to patient and follow up appointment reviewed. Patient is instructed to call the office with any concerns or new symptoms prior to next visit. Patient verbalized understanding and discharged in stable condition.    Lab Results   Component Value Date    WBC 5.66 03/15/2023    HGB 9.8 (L) 03/15/2023    HCT 31.9 (L) 03/15/2023    MCV 84.6 03/15/2023     03/15/2023     Lab Results   Component Value Date    NEUTROABS 3.01 03/15/2023

## 2023-03-20 RX ORDER — METFORMIN HYDROCHLORIDE 500 MG/1
TABLET, EXTENDED RELEASE ORAL
Qty: 180 TABLET | Refills: 3 | Status: SHIPPED | OUTPATIENT
Start: 2023-03-20

## 2023-03-20 NOTE — TELEPHONE ENCOUNTER
Rx Refill Note  Requested Prescriptions     Pending Prescriptions Disp Refills   • metFORMIN ER (GLUCOPHAGE-XR) 500 MG 24 hr tablet [Pharmacy Med Name: METFORMIN HCL  MG TABLET] 180 tablet 1     Sig: TAKE TWO TABLETS BY MOUTH DAILY WITH DINNER      Last office visit with prescribing clinician: 8/30/2022   Last telemedicine visit with prescribing clinician: 5/12/2023   Next office visit with prescribing clinician: 5/12/2023                         Would you like a call back once the refill request has been completed: [] Yes [] No    If the office needs to give you a call back, can they leave a voicemail: [] Yes [] No    Manish Wray Rep  03/20/23, 10:40 EDT

## 2023-03-21 ENCOUNTER — TELEPHONE (OUTPATIENT)
Dept: ONCOLOGY | Facility: CLINIC | Age: 53
End: 2023-03-21
Payer: COMMERCIAL

## 2023-03-21 RX ORDER — MUPIROCIN CALCIUM 20 MG/G
1 CREAM TOPICAL 3 TIMES DAILY
Qty: 30 G | Refills: 0 | Status: SHIPPED | OUTPATIENT
Start: 2023-03-21 | End: 2023-03-28

## 2023-03-21 NOTE — TELEPHONE ENCOUNTER
She has raised area near her pubic area and wants to know about getting something for it.  Possible ingrown hair.

## 2023-03-21 NOTE — TELEPHONE ENCOUNTER
Tim Muñoz MD  You 1 minute ago (2:14 PM)       I recommend mupirocin ointment 3 times daily x7 days.      Left patient a voicemail reviewing Dr. Muñoz's recommendations. I will send this prescription to her preferred pharmacy. My direct number was left if pt has any additional questions.

## 2023-03-22 ENCOUNTER — INFUSION (OUTPATIENT)
Dept: ONCOLOGY | Facility: HOSPITAL | Age: 53
End: 2023-03-22
Payer: COMMERCIAL

## 2023-03-22 ENCOUNTER — OFFICE VISIT (OUTPATIENT)
Dept: ONCOLOGY | Facility: CLINIC | Age: 53
End: 2023-03-22
Payer: COMMERCIAL

## 2023-03-22 VITALS — SYSTOLIC BLOOD PRESSURE: 129 MMHG | DIASTOLIC BLOOD PRESSURE: 77 MMHG

## 2023-03-22 VITALS
HEART RATE: 93 BPM | OXYGEN SATURATION: 97 % | TEMPERATURE: 97.1 F | RESPIRATION RATE: 16 BRPM | WEIGHT: 181.9 LBS | SYSTOLIC BLOOD PRESSURE: 128 MMHG | DIASTOLIC BLOOD PRESSURE: 77 MMHG | BODY MASS INDEX: 34.34 KG/M2 | HEIGHT: 61 IN

## 2023-03-22 DIAGNOSIS — C79.51 PRIMARY CANCER OF THYROID GLAND METASTATIC TO BONE: ICD-10-CM

## 2023-03-22 DIAGNOSIS — C50.412 MALIGNANT NEOPLASM OF UPPER-OUTER QUADRANT OF LEFT BREAST IN FEMALE, ESTROGEN RECEPTOR NEGATIVE: Primary | ICD-10-CM

## 2023-03-22 DIAGNOSIS — D64.81 ANEMIA DUE TO CHEMOTHERAPY: ICD-10-CM

## 2023-03-22 DIAGNOSIS — D50.9 IRON DEFICIENCY ANEMIA, UNSPECIFIED IRON DEFICIENCY ANEMIA TYPE: ICD-10-CM

## 2023-03-22 DIAGNOSIS — Z17.1 MALIGNANT NEOPLASM OF UPPER-OUTER QUADRANT OF LEFT BREAST IN FEMALE, ESTROGEN RECEPTOR NEGATIVE: Primary | ICD-10-CM

## 2023-03-22 DIAGNOSIS — T45.1X5A ANEMIA DUE TO CHEMOTHERAPY: ICD-10-CM

## 2023-03-22 DIAGNOSIS — Z17.1 MALIGNANT NEOPLASM OF UPPER-OUTER QUADRANT OF LEFT BREAST IN FEMALE, ESTROGEN RECEPTOR NEGATIVE: ICD-10-CM

## 2023-03-22 DIAGNOSIS — D63.0 ANEMIA IN NEOPLASTIC DISEASE: ICD-10-CM

## 2023-03-22 DIAGNOSIS — C73 PRIMARY CANCER OF THYROID GLAND METASTATIC TO BONE: ICD-10-CM

## 2023-03-22 DIAGNOSIS — C50.412 MALIGNANT NEOPLASM OF UPPER-OUTER QUADRANT OF LEFT BREAST IN FEMALE, ESTROGEN RECEPTOR NEGATIVE: ICD-10-CM

## 2023-03-22 LAB
ALBUMIN SERPL-MCNC: 4.3 G/DL (ref 3.5–5.2)
ALBUMIN/GLOB SERPL: 1.4 G/DL
ALP SERPL-CCNC: 68 U/L (ref 39–117)
ALT SERPL W P-5'-P-CCNC: 12 U/L (ref 1–33)
ANION GAP SERPL CALCULATED.3IONS-SCNC: 10.6 MMOL/L (ref 5–15)
AST SERPL-CCNC: 13 U/L (ref 1–32)
BASOPHILS # BLD AUTO: 0.01 10*3/MM3 (ref 0–0.2)
BASOPHILS NFR BLD AUTO: 0.1 % (ref 0–1.5)
BILIRUB SERPL-MCNC: 0.3 MG/DL (ref 0–1.2)
BUN SERPL-MCNC: 16 MG/DL (ref 6–20)
BUN/CREAT SERPL: 22.2 (ref 7–25)
CALCIUM SPEC-SCNC: 9.4 MG/DL (ref 8.6–10.5)
CHLORIDE SERPL-SCNC: 107 MMOL/L (ref 98–107)
CO2 SERPL-SCNC: 21.4 MMOL/L (ref 22–29)
CREAT SERPL-MCNC: 0.72 MG/DL (ref 0.57–1)
DEPRECATED RDW RBC AUTO: 52.6 FL (ref 37–54)
EGFRCR SERPLBLD CKD-EPI 2021: 99.5 ML/MIN/1.73
EOSINOPHIL # BLD AUTO: 0 10*3/MM3 (ref 0–0.4)
EOSINOPHIL NFR BLD AUTO: 0 % (ref 0.3–6.2)
ERYTHROCYTE [DISTWIDTH] IN BLOOD BY AUTOMATED COUNT: 17 % (ref 12.3–15.4)
GLOBULIN UR ELPH-MCNC: 3 GM/DL
GLUCOSE SERPL-MCNC: 153 MG/DL (ref 65–99)
HCT VFR BLD AUTO: 30.9 % (ref 34–46.6)
HGB BLD-MCNC: 9.7 G/DL (ref 12–15.9)
IMM GRANULOCYTES # BLD AUTO: 0.08 10*3/MM3 (ref 0–0.05)
IMM GRANULOCYTES NFR BLD AUTO: 0.9 % (ref 0–0.5)
LYMPHOCYTES # BLD AUTO: 0.79 10*3/MM3 (ref 0.7–3.1)
LYMPHOCYTES NFR BLD AUTO: 8.6 % (ref 19.6–45.3)
MCH RBC QN AUTO: 27.1 PG (ref 26.6–33)
MCHC RBC AUTO-ENTMCNC: 31.4 G/DL (ref 31.5–35.7)
MCV RBC AUTO: 86.3 FL (ref 79–97)
MONOCYTES # BLD AUTO: 0.29 10*3/MM3 (ref 0.1–0.9)
MONOCYTES NFR BLD AUTO: 3.2 % (ref 5–12)
NEUTROPHILS NFR BLD AUTO: 7.98 10*3/MM3 (ref 1.7–7)
NEUTROPHILS NFR BLD AUTO: 87.2 % (ref 42.7–76)
NRBC BLD AUTO-RTO: 0 /100 WBC (ref 0–0.2)
PLATELET # BLD AUTO: 361 10*3/MM3 (ref 140–450)
PMV BLD AUTO: 9.6 FL (ref 6–12)
POTASSIUM SERPL-SCNC: 3.8 MMOL/L (ref 3.5–5.2)
PROT SERPL-MCNC: 7.3 G/DL (ref 6–8.5)
RBC # BLD AUTO: 3.58 10*6/MM3 (ref 3.77–5.28)
SODIUM SERPL-SCNC: 139 MMOL/L (ref 136–145)
WBC NRBC COR # BLD: 9.15 10*3/MM3 (ref 3.4–10.8)

## 2023-03-22 PROCEDURE — 99214 OFFICE O/P EST MOD 30 MIN: CPT | Performed by: INTERNAL MEDICINE

## 2023-03-22 PROCEDURE — 96415 CHEMO IV INFUSION ADDL HR: CPT

## 2023-03-22 PROCEDURE — 80053 COMPREHEN METABOLIC PANEL: CPT | Performed by: INTERNAL MEDICINE

## 2023-03-22 PROCEDURE — 25010000002 DOCETAXEL 20 MG/ML SOLUTION 8 ML VIAL: Performed by: INTERNAL MEDICINE

## 2023-03-22 PROCEDURE — 25010000002 PALONOSETRON PER 25 MCG: Performed by: INTERNAL MEDICINE

## 2023-03-22 PROCEDURE — 25010000002 DIPHENHYDRAMINE PER 50 MG: Performed by: INTERNAL MEDICINE

## 2023-03-22 PROCEDURE — 85025 COMPLETE CBC W/AUTO DIFF WBC: CPT | Performed by: INTERNAL MEDICINE

## 2023-03-22 PROCEDURE — 96367 TX/PROPH/DG ADDL SEQ IV INF: CPT

## 2023-03-22 PROCEDURE — 25010000002 CYCLOPHOSPHAMIDE 1 GM/5ML SOLUTION 5 ML VIAL: Performed by: INTERNAL MEDICINE

## 2023-03-22 PROCEDURE — 96417 CHEMO IV INFUS EACH ADDL SEQ: CPT

## 2023-03-22 PROCEDURE — 25010000002 HEPARIN LOCK FLUSH PER 10 UNITS: Performed by: INTERNAL MEDICINE

## 2023-03-22 PROCEDURE — 96413 CHEMO IV INFUSION 1 HR: CPT

## 2023-03-22 PROCEDURE — 25010000002 HYDROCORTISONE SOD SUC (PF) 100 MG RECONSTITUTED SOLUTION: Performed by: INTERNAL MEDICINE

## 2023-03-22 PROCEDURE — 96375 TX/PRO/DX INJ NEW DRUG ADDON: CPT

## 2023-03-22 RX ORDER — FAMOTIDINE 10 MG/ML
20 INJECTION, SOLUTION INTRAVENOUS ONCE
Status: COMPLETED | OUTPATIENT
Start: 2023-03-22 | End: 2023-03-22

## 2023-03-22 RX ORDER — HEPARIN SODIUM (PORCINE) LOCK FLUSH IV SOLN 100 UNIT/ML 100 UNIT/ML
500 SOLUTION INTRAVENOUS AS NEEDED
Status: DISCONTINUED | OUTPATIENT
Start: 2023-03-22 | End: 2023-03-22 | Stop reason: HOSPADM

## 2023-03-22 RX ORDER — PALONOSETRON 0.05 MG/ML
0.25 INJECTION, SOLUTION INTRAVENOUS ONCE
Status: CANCELLED | OUTPATIENT
Start: 2023-03-22

## 2023-03-22 RX ORDER — SODIUM CHLORIDE 0.9 % (FLUSH) 0.9 %
10 SYRINGE (ML) INJECTION AS NEEDED
OUTPATIENT
Start: 2023-03-22

## 2023-03-22 RX ORDER — SODIUM CHLORIDE 9 MG/ML
250 INJECTION, SOLUTION INTRAVENOUS ONCE
Status: COMPLETED | OUTPATIENT
Start: 2023-03-22 | End: 2023-03-22

## 2023-03-22 RX ORDER — DIPHENHYDRAMINE HYDROCHLORIDE 50 MG/ML
50 INJECTION INTRAMUSCULAR; INTRAVENOUS AS NEEDED
Status: CANCELLED | OUTPATIENT
Start: 2023-03-22

## 2023-03-22 RX ORDER — FAMOTIDINE 10 MG/ML
20 INJECTION, SOLUTION INTRAVENOUS ONCE
Status: CANCELLED | OUTPATIENT
Start: 2023-03-22

## 2023-03-22 RX ORDER — SODIUM CHLORIDE 0.9 % (FLUSH) 0.9 %
10 SYRINGE (ML) INJECTION AS NEEDED
Status: DISCONTINUED | OUTPATIENT
Start: 2023-03-22 | End: 2023-03-22 | Stop reason: HOSPADM

## 2023-03-22 RX ORDER — FAMOTIDINE 10 MG/ML
20 INJECTION, SOLUTION INTRAVENOUS AS NEEDED
Status: CANCELLED | OUTPATIENT
Start: 2023-03-22

## 2023-03-22 RX ORDER — PALONOSETRON 0.05 MG/ML
0.25 INJECTION, SOLUTION INTRAVENOUS ONCE
Status: COMPLETED | OUTPATIENT
Start: 2023-03-22 | End: 2023-03-22

## 2023-03-22 RX ORDER — HEPARIN SODIUM (PORCINE) LOCK FLUSH IV SOLN 100 UNIT/ML 100 UNIT/ML
500 SOLUTION INTRAVENOUS AS NEEDED
OUTPATIENT
Start: 2023-03-22

## 2023-03-22 RX ORDER — SODIUM CHLORIDE 9 MG/ML
250 INJECTION, SOLUTION INTRAVENOUS ONCE
Status: CANCELLED | OUTPATIENT
Start: 2023-03-22

## 2023-03-22 RX ADMIN — PALONOSETRON 0.25 MG: 0.05 INJECTION, SOLUTION INTRAVENOUS at 10:41

## 2023-03-22 RX ADMIN — SODIUM CHLORIDE 250 ML: 9 INJECTION, SOLUTION INTRAVENOUS at 10:41

## 2023-03-22 RX ADMIN — CYCLOPHOSPHAMIDE 1080 MG: 200 INJECTION, SOLUTION INTRAVENOUS at 13:54

## 2023-03-22 RX ADMIN — FAMOTIDINE 20 MG: 10 INJECTION INTRAVENOUS at 10:42

## 2023-03-22 RX ADMIN — Medication 10 ML: at 14:29

## 2023-03-22 RX ADMIN — DOCETAXEL 135 MG: 20 INJECTION, SOLUTION, CONCENTRATE INTRAVENOUS at 11:39

## 2023-03-22 RX ADMIN — HYDROCORTISONE SODIUM SUCCINATE 100 MG: 100 INJECTION, POWDER, FOR SOLUTION INTRAMUSCULAR; INTRAVENOUS at 10:41

## 2023-03-22 RX ADMIN — Medication 500 UNITS: at 14:28

## 2023-03-22 RX ADMIN — DIPHENHYDRAMINE HYDROCHLORIDE 25 MG: 50 INJECTION, SOLUTION INTRAMUSCULAR; INTRAVENOUS at 10:42

## 2023-03-22 NOTE — PROGRESS NOTES
Subjective     CHIEF COMPLAINT:      Chief Complaint   Patient presents with   • Follow-up     HISTORY OF PRESENT ILLNESS:     Ina Akers is a 52 y.o. female patient who returns today for follow up on her breast cancer and anemia.  She returns today for follow-up reporting fatigue.  She has been having difficulty working since the start of chemotherapy.  After she received Udenyca, she developed bone pain that was more in the pelvic bone and was in the form of throbbing.  What was at the same site as the previous site of bone involvement.  She took Claritin as instructed and this helps with pain.    Patient is not noticing any problems with the wound healing.  No redness or drainage from the incision of the left mastectomy and reconstruction.    Patient developed a small area of skin infection yesterday.  She was prescribed mupirocin ointment and it started to improve after she applied the ointment.    ROS:  Pertinent ROS is in the HPI.     Past medical, surgical, social and family history were reviewed.     MEDICATIONS:    Current Outpatient Medications:   •  acetaminophen (TYLENOL) 500 MG tablet, Take 2 tablets by mouth Every 6 (Six) Hours As Needed for Mild Pain., Disp: , Rfl:   •  amLODIPine (NORVASC) 10 MG tablet, TAKE ONE TABLET BY MOUTH DAILY, Disp: 90 tablet, Rfl: 1  •  atorvastatin (LIPITOR) 20 MG tablet, Take 1 tablet by mouth Daily. (Patient taking differently: Take 1 tablet by mouth Every Night.), Disp: 90 tablet, Rfl: 3  •  Blood Glucose Monitoring Suppl w/Device kit, Check blood sugar twice a day, Disp: 1 each, Rfl: 0  •  cholecalciferol (VITAMIN D3) 25 MCG (1000 UT) tablet, Take 1 tablet by mouth Daily., Disp: , Rfl:   •  dexamethasone (DECADRON) 4 MG tablet, Take 2 tablets oral twice a day for 3 consecutive days beginning the day before chemotherapy and continue for 6 doses., Disp: 12 tablet, Rfl: 3  •  famotidine (PEPCID) 20 MG tablet, Take 1 tablet by mouth Every Other Day., Disp: , Rfl:    •  ferrous sulfate 325 (65 FE) MG tablet, Take 1 tablet by mouth Daily., Disp: , Rfl:   •  Glucose Blood (BLOOD GLUCOSE TEST) strip, Check blood sugar twice a day., Disp: 100 each, Rfl: 1  •  labetalol (NORMODYNE) 100 MG tablet, Take 0.5 tablets by mouth 2 (Two) Times a Day., Disp: 180 tablet, Rfl: 1  •  Lancets (ACCU-CHEK SOFT TOUCH) lancets, Check blood sugar twice a day as needed, Disp: 100 each, Rfl: 1  •  lansoprazole (PREVACID) 30 MG capsule, TAKE ONE CAPSULE BY MOUTH DAILY (Patient taking differently: Take 1 capsule by mouth Every Other Day.), Disp: 90 capsule, Rfl: 2  •  levothyroxine (SYNTHROID, LEVOTHROID) 112 MCG tablet, Take 1 tablet by mouth Every Morning., Disp: , Rfl:   •  losartan (COZAAR) 100 MG tablet, Take 1 tablet by mouth Daily., Disp: , Rfl:   •  metFORMIN ER (GLUCOPHAGE-XR) 500 MG 24 hr tablet, TAKE TWO TABLETS BY MOUTH DAILY WITH DINNER, Disp: 180 tablet, Rfl: 3  •  mupirocin (Bactroban) 2 % cream, Apply 1 application topically to the appropriate area as directed 3 (Three) Times a Day for 7 days., Disp: 30 g, Rfl: 0  •  nystatin-Lidocaine Viscous HCl-diphenhydrAMINE in aluminum-magnesium hydroxide-simethicone suspension, Swish and spit 10 mL Every 4 (Four) Hours As Needed for pain or thrush, Disp: 410 mL, Rfl: 0  •  promethazine (PHENERGAN) 12.5 MG tablet, TAKE 1/2 TO 1 TABLET BY MOUTH EVERY 4 HOURS AS NEEDED FOR NAUSEA AND VOMITING -MAY MAKE DROWSY (Patient taking differently: Take 1-2 tablets by mouth Every 6 (Six) Hours As Needed. TAKE 1/2 TO 1 TABLET BY MOUTH EVERY 4 HOURS AS NEEDED FOR NAUSEA AND VOMITING -MAY MAKE DROWSY), Disp: 30 tablet, Rfl: 3  •  promethazine (PHENERGAN) 12.5 MG tablet, Take 1 tablet by mouth Every 6 (Six) Hours As Needed for Nausea or Vomiting., Disp: 10 tablet, Rfl: 0  •  triamcinolone (KENALOG) 0.1 % cream, Apply  topically to the appropriate area as directed 2 (Two) Times a Day. Sparingly shortest time possible avoid face (Patient taking differently: Apply   "topically to the appropriate area as directed As Needed. Sparingly shortest time possible avoid face), Disp: 80 g, Rfl: 0  Objective     VITAL SIGNS:     Vitals:    03/22/23 0945   BP: 128/77   Pulse: 93   Resp: 16   Temp: 97.1 °F (36.2 °C)   TempSrc: Temporal   SpO2: 97%   Weight: 82.5 kg (181 lb 14.4 oz)   Height: 154 cm (60.63\")   PainSc:   2   PainLoc: Hip  Comment: RIGHT HIP     Body mass index is 34.79 kg/m².     Wt Readings from Last 5 Encounters:   03/22/23 82.5 kg (181 lb 14.4 oz)   03/08/23 81.3 kg (179 lb 3.2 oz)   03/01/23 81.8 kg (180 lb 4.8 oz)   02/14/23 80.7 kg (178 lb)   02/08/23 80.7 kg (178 lb)     PHYSICAL EXAMINATION:   GENERAL: The patient appears in good general condition, not in acute distress.   SKIN: No ecchymosis.  EYES: No jaundice. No pallor.  LYMPHATICS: No cervical lymphadenopathy.  BREASTS: Exam of the left reconstructed breast reveals the incisions to be well-healed.  No erythema or discharge.  CHEST: Normal respiratory effort.  Lungs clear bilaterally.  No added sounds.  CVS: Normal S1-S2.  No murmurs.  ABDOMEN: Soft. No tenderness. No Hepatomegaly. No Splenomegaly. No masses.  EXTREMITIES: No edema.    DIAGNOSTIC DATA:     Results from last 7 days   Lab Units 03/22/23  0924   WBC 10*3/mm3 9.15   NEUTROS ABS 10*3/mm3 7.98*   HEMOGLOBIN g/dL 9.7*   HEMATOCRIT % 30.9*   PLATELETS 10*3/mm3 361     Results from last 7 days   Lab Units 03/22/23  0924   SODIUM mmol/L 139   POTASSIUM mmol/L 3.8   CHLORIDE mmol/L 107   CO2 mmol/L 21.4*   BUN mg/dL 16   CREATININE mg/dL 0.72   CALCIUM mg/dL 9.4   ALBUMIN g/dL 4.3   BILIRUBIN mg/dL 0.3   ALK PHOS U/L 68   ALT (SGPT) U/L 12   AST (SGOT) U/L 13   GLUCOSE mg/dL 153*     Assessment & Plan    *Left breast cancer.  · It was identified on screening mammograms on 10/5/2022.  · It was located at 12 o'clock position.  · It measured 6 x 5 by 5 mm on mammogram and ultrasound on 10/20/2022.  · PET scan on 10/6/2022 showed the lesion to be hypermetabolic. " It measured 10 x 7 mm with an SUV of 5.8.  · No evidence of metastasis on PET scan.  · MRI on 12/5/2022 showed the lesion to measure 9 mm.    · No suspicious lymphadenopathy.   · Since her metastatic thyroid cancer was stable, we recommended left mastectomy with goal of cure.  · S/P mastectomy with reconstruction on 1/24/2023.  · Pathology exam revealed the primary tumor to measure 15 mm.  · It was grade 3.  Triple negative.  HER2 was negative-0 by IHC.  · Ki-67 was 85%.  · It was considered pT1cN0.  · 2 sentinel lymph nodes were examined and were negative.    · No lymphovascular invasion.  · There was associated DCIS.  · I explained the findings of the pathology exam to the patient and her . The size of the tumor increased since the initial diagnosis and the biopsy was obtained.  · Ki 67 is very high at 85% indicating rapid tumor growth and aggressive behavior.  · Due to the increase in size and the high Ki 67, adjuvant chemotherapy was recommended.    · Due to the patient receiving Adriamycin in the past, she is unable to receive additional Adriamycin.  · Taxotere and Cytoxan with Neulasta support every 3 weeks x 4 cycles was recommended.  · She had the port placed on 2/14/2023.  · She started Taxotere and Cytoxan on 3/8/2023.  · She had infusion reaction to Taxotere which was briefly held.  She was given IV Solu-Cortef.  It was restarted at a slower rate and she tolerated the infusion.    *Metastatic thyroid cancer diagnosed on 10/6/2020.  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  · She had right thyroid lobectomy on 12/17/2018 at [place].    · The specimen was sent for outside consultation and the final conclusion was that there was no evidence of malignancy.  · MRI on 8/27/2020 revealed an L4 lesion resulting in pathologic compression fracture.  · Bone scan on 9/2/2020 revealed increased activity at L4 and left side of the skull.  · PET scan on 9/9/2020 revealed abnormal activity  at L4, right femoral neck, lower sacrum, left ischium and right ninth medial aspect.  · PET scan 9/9/2020 showed no evidence of visceral metastasis.  · Patient had kyphoplasty on 9/17/2020.  Pathology was nondiagnostic.  · CT-guided biopsy of the lesion in the right posterior ninth rib on 10/6/2020 revealed a clear cell neoplasm.  It is positive for PAX 8 and TTF-1 favoring metastatic thyroid cancer.  · Patient was referred to [facility].  · Patient underwent completion thyroidectomy on 11/9/2020.  · There was no evidence of uptake of radioactive iodine on SPECT imaging on 12/18/2020.  · This was indicative of the tumor not going to respond to radioactive iodine therapy.  · Patient was started on Lenvima 20 mg daily on 12/24/2020.  · Lenvima was held on 2/6/2021 due to drug induced anemia and thrombocytopenia.  She was unable to start back on it due to prolonged cytopenias.  · CT scans on 3/23/2021 showed stable disease.  · CBC was stable on 6/8/2021.  CT scans showed stable disease.  Thyroglobulin was stable at 3.4.    · PET scan on 9/1/2021 revealed decrease in the activity in the metastatic lesions.    · Starting back on active treatment (Nexavar) was not recommended.  · PET scan on 12/16/2021 showed complete response except for mild hypermetabolism in the left ischium.  · CT scans on 4/8/2022 showed stable bone metastasis.  No new metastatic lesions.  · PET scan on 10/6/2022 revealed no new areas of metastasis.  · Due to the stable metastatic disease, she is not currently on treatment for her thyroid cancer.   · She is on thyroid replacement with Synthroid 112 mcg daily.    *Bone metastasis.  · Patient was given Zometa on 9/29/2020.  · Patient underwent right femur medullary nailing on 10/23/2020.  · Patient was restarted back on Zometa on 2/24/2022.   · She is tolerating the Zometa infusions well.  · CT scans on 4/8/2022 revealed no new metastatic lesions.  The metastatic lesions were stable.  · PET  scan on 10/6/2022 revealed no change in the areas of activity in the bone metastasis.  No new lesions.  · Last dose of Zometa was given on 12/1/2022.  · I recommended keeping Zometa on hold for now.    * Iron deficiency anemia.    · Patient was previously treated with ferrous sulfate 325 mg daily.  · Hemoglobin decreased to 10.4 on 6/23/2021.  Iron stores decreased with transferrin saturation decreasing to 12%.  · She was restarted back on ferrous sulfate 325 mg daily.  However, Dr. Huddleston asked her to stop the iron.  · Hemoglobin decreased to 9.1 on 2/24/2022.  · EGD on 3/25/2022 showed no evidence of blood loss.  · Pathology exam revealed no typical features of celiac disease.  · Hemoglobin was 10.4 on 12/27/2022.  · Ferritin was 95 and transferrin saturation was 14%.  · She was started on ferrous sulfate 325 mg daily.  · Hemoglobin decreased to 9.6 on 2/1/2023, secondary to recent surgical blood losses.  · She was restarted on ferrous sulfate 325 mg daily on 2/1/2023.  · Hemoglobin is 9.7 today but this represents anemia secondary to chemotherapy.    *History of left breast cancer, stage IIB, triIple negative.   · S/P lumpectomy in 2001.  · She received adjuvant chemotherapy on the NSABP B34 with AC followed by Taxotere.  · S/P post-lumpectomy radiation therapy.   · She was placed on tamoxifen for 5 years and it was completed in January 2007.   · With the patient's history of breast cancer at a young age and development of a second new breast cancer in the left breast, repeat genetic testing was recommended.  · Gene testing on 12/22/2022 revealed variants of unknown significance.    PLAN:    1.  Proceed with cycle #2 of Taxotere and Cytoxan today.  Taxotere will be given over 120 minutes.    2.  We will give IV Solu-Cortef 100 mg IV as a premedication.    3.  Return tomorrow for Udenyca injection.    4.  Continue ferrous sulfate 325 mg daily.  She will continue to take Claritin to decrease the bone pain associated  with Udenyca.  5.  Return in 2 weeks for CBC with RN review.    6.  Follow-up in 3 weeks.  She will be scheduled for cycle #3 of chemotherapy.          Tim Muñoz MD  03/22/23

## 2023-03-23 ENCOUNTER — INFUSION (OUTPATIENT)
Dept: ONCOLOGY | Facility: HOSPITAL | Age: 53
End: 2023-03-23
Payer: COMMERCIAL

## 2023-03-23 DIAGNOSIS — Z17.1 MALIGNANT NEOPLASM OF UPPER-OUTER QUADRANT OF LEFT BREAST IN FEMALE, ESTROGEN RECEPTOR NEGATIVE: Primary | ICD-10-CM

## 2023-03-23 DIAGNOSIS — C50.412 MALIGNANT NEOPLASM OF UPPER-OUTER QUADRANT OF LEFT BREAST IN FEMALE, ESTROGEN RECEPTOR NEGATIVE: Primary | ICD-10-CM

## 2023-03-23 PROCEDURE — 96372 THER/PROPH/DIAG INJ SC/IM: CPT

## 2023-03-23 PROCEDURE — 25010000002 PEGFILGRASTIM-CBQV 6 MG/0.6ML SOLUTION PREFILLED SYRINGE: Performed by: INTERNAL MEDICINE

## 2023-03-23 RX ADMIN — PEGFILGRASTIM-CBQV 6 MG: 6 INJECTION, SOLUTION SUBCUTANEOUS at 14:45

## 2023-03-28 DIAGNOSIS — C79.51 PRIMARY CANCER OF THYROID GLAND METASTATIC TO BONE: ICD-10-CM

## 2023-03-28 DIAGNOSIS — C50.412 MALIGNANT NEOPLASM OF UPPER-OUTER QUADRANT OF LEFT BREAST IN FEMALE, ESTROGEN RECEPTOR NEGATIVE: Primary | ICD-10-CM

## 2023-03-28 DIAGNOSIS — Z17.1 MALIGNANT NEOPLASM OF UPPER-OUTER QUADRANT OF LEFT BREAST IN FEMALE, ESTROGEN RECEPTOR NEGATIVE: Primary | ICD-10-CM

## 2023-03-28 DIAGNOSIS — C73 PRIMARY CANCER OF THYROID GLAND METASTATIC TO BONE: ICD-10-CM

## 2023-03-30 ENCOUNTER — HOSPITAL ENCOUNTER (OUTPATIENT)
Dept: PHYSICAL THERAPY | Facility: HOSPITAL | Age: 53
Setting detail: THERAPIES SERIES
Discharge: HOME | End: 2023-03-30
Payer: COMMERCIAL

## 2023-03-30 DIAGNOSIS — Z91.89 AT RISK FOR LYMPHEDEMA: ICD-10-CM

## 2023-03-30 DIAGNOSIS — Z85.850 HISTORY OF THYROID CANCER: ICD-10-CM

## 2023-03-30 DIAGNOSIS — Z98.890 HISTORY OF LUMPECTOMY OF LEFT BREAST: ICD-10-CM

## 2023-03-30 DIAGNOSIS — Z90.13 S/P MASTECTOMY, BILATERAL: Primary | ICD-10-CM

## 2023-03-30 PROCEDURE — 97535 SELF CARE MNGMENT TRAINING: CPT | Performed by: PHYSICAL THERAPIST

## 2023-03-30 PROCEDURE — 97110 THERAPEUTIC EXERCISES: CPT | Performed by: PHYSICAL THERAPIST

## 2023-03-30 PROCEDURE — 93702 BIS XTRACELL FLUID ANALYSIS: CPT | Performed by: PHYSICAL THERAPIST

## 2023-03-30 NOTE — THERAPY PROGRESS REPORT/RE-CERT
Outpatient Physical Therapy Lymphedema Progress Note  [facility]     Patient Name: Ina Akers  : 1970  MRN: [id number]  Today's Date: 3/30/2023        Visit Date: 2023    Visit Dx:    ICD-10-CM ICD-9-CM   1. S/P mastectomy, bilateral  Z90.13 V45.71   2. At risk for lymphedema  Z91.89 V49.89   3. History of lumpectomy of left breast  Z98.890 V45.89   4. History of thyroid cancer  Z85.850 V10.87       Patient Active Problem List   Diagnosis   • Abnormal perimenopausal bleeding   • Amenorrhea   • Atopic dermatitis   • External hemorrhoids   • Gastritis   • Hypertension   • Iron deficiency anemia   • Irritable bowel syndrome   • Melanocytic nevus   • Goiter   • Tinea corporis   • Type 2 diabetes mellitus (HCC)   • Lipid screening   • Malignant neoplasm of upper-outer quadrant of left breast in female, estrogen receptor negative (HCC)   • Acute pain of both knees   • Controlled type 2 diabetes mellitus without complication, without long-term current use of insulin (HCC)   • Hyperlipidemia   • Vitamin D deficiency   • Multiple thyroid nodules   • Subclinical hypothyroidism   • Lumbar spine tumor   • Metastatic bone cancer (HCC)   • Fracture, pathologic, femur, neck, right, initial encounter (HCC)   • Immunocompromised (HCC)   • Encounter for screening for malignant neoplasm of colon   • Screen for colon cancer   • COVID-19 virus infection   • Gastroesophageal reflux disease   • Primary cancer of thyroid gland metastatic to bone (HCC)   • Acute pain of left shoulder         Lymphedema     Row Name 23 1010             Subjective Pain    Able to rate subjective pain? yes  -JS      Pre-Treatment Pain Level 0  -JS         Subjective Comments    Subjective Comments Feels that ROM is improving. Able to reach overhead during ADLs without difficulty.  Notes excessive tissue on L side that she has addressed with her plastic surgeon. Feels that it is related to surgery- does not change with  activity.  Denies symptoms, including no compliants of swelling.  Walking for exercise 15 min, 3-4x/week.  -JS         Lymphedema Assessment    Lymphedema Surgery Comments S/p L mastectomy, repeat axillary dissection on 1/24/23. Port placement 2/14/23. History of L lumpectomy, axillary dissection in 2001, and history of metastatic thyroid cancer with thyroid surgery in 2018 & 2020, related R hip & back surgery in 2020  -JS      Lymph Nodes Removed # --  repeat axillary dissection  -JS      Cancer Comments Currently receiving chemotherapy. Has completed 2/4 rounds currently.  -JS      Radiation Therapy Received no  -JS      Infections or Cellulitis? no  -JS      Lymphedema Assessment Comments Prior chemo & radiation following 2001 lumpectomy  -JS         Left Upper Ext    Lt Shoulder Abduction AROM 155  -JS      Lt Shoulder Flexion AROM 155  -JS      Lt Shoulder External Rotation AROM THAIS to T2  -JS      Lt Shoulder Internal Rotation AROM FIR to T10  -JS         Lymphedema Edema Assessment    Edema Assessment Comment L UE swelling immediately proximal to L wrist. No swelling L hand, nor in other areas of L UE.  -JS         Manual Lymphatic Drainage    Manual Therapy Instruction provided in self MLD for L UE.  Self MLD video shared with patient.  -JS         Compression/Skin Care    Compression/Skin Care Comments Instruction for patient to use UE compression sleeve daily for L UE.  Instruction to be aware of any onset of L hand swelling.  -JS         L-Dex Bioimpedence Screening    L-Dex Measurement Extremity LUE  -JS      L-Dex Patient Position Standing  -JS      L-Dex UE Dominate Side Right  -JS      L-Dex UE At Risk Side Left  -JS      L-Dex UE Pre Surgical Value Yes  -      L-Dex UE Score 8.2  -      L-Dex UE Baseline Score 4.4  -      L-Dex UE Value Change 3.8  -      L-Dex UE Comment Increased.  -JS      $ L-Dex Charge yes  -JS            User Key  (r) = Recorded By, (t) = Taken By, (c) = Cosigned By     Initials Name Provider Type    Melani Ambrosio, PT Physical Therapist                               PT Assessment/Plan     Row Name 03/30/23 1010          PT Assessment    Assessment Comments Pt is a 53 y/o female recently diagnosed with new triple negative L breast CA, s/p L mastectomy with tissue expander placement and repeat axillary dissection on 1/24/23. Port placement on 2/14/23 with pt currently undergoing chemotherapy treatment, having completed 2/4 rounds currently. PMH includes history of L breast CA, s/p lumpectomy and axillary dissection (2/9 lymph nodes) in 2001. At that time, pt received chemo and radiation. Further PMH includes thyroid CA, s/p thyroid surgery in 2018 & 2020, with additional R hip surgery and back surgery due to metastatic disease.  Patient presents with improved L shoulder ROM and has begun walking for exercise 3-4x/week. Reviewed UE ROM HEP with patient demonstrating proper understanding, technique.  Denies symptoms though upon evaluation, pt presents with swelling immediately proximal to L wrist.  No further UE swelling, nor L hand swelling noted. Repeat bioimpedance performed with score increased to 8.2 today.  Advised pt to begin daily use of UE compression sleeve with education provided in wearing schedule, reviewed monitoring L UE for swelling including being mindful of any onset of L hand swelling and instruction provided in self MLD with video shared for patient to perform daily.  Held on issuing postural strengthening exercises at this time until L UE swelling has resolved. Patient will benefit from ongoing skilled PT in the lymphedema clinic with pt advised to contact PT with any questions/concerns or worsening or change in symptoms prior to next scheduled appointment.  -        PT Plan    Predicted Duration of Therapy Intervention (PT) Return in 1 month for repeat bioimpedance.  -     PT Plan Comments Repeat bioimpedance in 1 month. Pt to continue ROM HEP, continue  walking for exercise.  Pt to begin daily use of L UE compression sleeve and initiate performance of self MLD while continuing to monitor signs/symptoms of lymphedema.  Plan to reassess QuickDash at next visit.  -JS           User Key  (r) = Recorded By, (t) = Taken By, (c) = Cosigned By    Initials Name Provider Type    Melani Ambrosio PT Physical Therapist                    OP Exercises     Row Name 03/30/23 1010             Subjective Comments    Subjective Comments Feels that ROM is improving. Able to reach overhead during ADLs without difficulty.  Notes excessive tissue on L side that she has addressed with her plastic surgeon. Feels that it is related to surgery- does not change with activity.  Denies symptoms, including no compliants of swelling.  Walking for exercise 15 min, 3-4x/week.  -JS         Subjective Pain    Able to rate subjective pain? yes  -JS      Pre-Treatment Pain Level 0  -JS         Total Minutes    87455 - PT Therapeutic Exercise Minutes 25  -JS         Exercise 1    Exercise Name 1 Post shoulder rolls  -JS      Cueing 1 Verbal;Demo  -JS      Reps 1 10  -JS         Exercise 2    Exercise Name 2 Scapular retraction  -JS      Cueing 2 Verbal;Demo  -JS      Reps 2 10  -JS         Exercise 3    Exercise Name 3 B shoulder flex AAROM- supine hands clasped  -JS      Cueing 3 Verbal;Demo  -JS      Reps 3 10  -JS         Exercise 4    Exercise Name 4 B shoulder ER- supine butterfly stretch  -JS      Cueing 4 Verbal;Demo  -JS      Reps 4 10  -JS         Exercise 5    Exercise Name 5 UE wall slides  -JS      Cueing 5 Verbal;Demo  -JS      Reps 5 10  -JS         Exercise 6    Exercise Name 6 --  -JS      Cueing 6 --  -JS      Reps 6 --  -JS      Time 6 --  -JS         Exercise 7    Exercise Name 7 --  -JS      Cueing 7 --  -JS      Reps 7 --  -JS      Time 7 --  -JS            User Key  (r) = Recorded By, (t) = Taken By, (c) = Cosigned By    Initials Name Provider Type    Melani Ambrosio PT Physical  Therapist                             PT OP Goals     Row Name 03/30/23 1010          Long Term Goals    LTG Date to Achieve 05/25/23  -JS     LTG 1 Pt will be independent in comprehensive HEP to allow ongoing symptom management.  -JS     LTG 1 Progress Ongoing  -JS     LTG 2 Pt will obtain properly fitted UE compression sleeve and verbalize appropriate wear schedule.  -JS     LTG 2 Progress Met  -JS     LTG 3 Pt will maintain L-Dex bioimpedance score WNL.  -JS     LTG 3 Progress Ongoing  -JS     LTG 3 Progress Comments Current score 8.2 which is increased  -JS     LTG 4 Pt will demonstrate normal L shoulder AROM.  -JS     LTG 4 Progress Met  -JS     LTG 4 Progress Comments Symmetrical with opposite side  -JS     LTG 5 Pt will score 10 or less on Quick Dash to return to previously reported level of function.  -     LTG 5 Progress Ongoing  -           User Key  (r) = Recorded By, (t) = Taken By, (c) = Cosigned By    Initials Name Provider Type    Melani Ambrosio, LINDSEY Physical Therapist                Therapy Education  Education Details: Bioimpedance score and significance reviewed.  Instructed pt to begin daily use of L UE compression sleeve, avoiding use at night/when sleeping.  Patient to elevate L UEs on pillow at night. Reviewed signs/symptoms of lymphedema, reminders to be mindful of changes in current swelling & awareness of any onset of L hand swelling with pt advised to contact PT with any increases or worsening of symptoms. Instruction provided in self MLD for L UE, with YouTube video shared with patient via email.  Reviewed ROM HEP.              Time Calculation:   Start Time: 1000  Stop Time: 1045  Time Calculation (min): 45 min  Timed Charges  15610 - PT Therapeutic Exercise Minutes: 25  Total Minutes  Timed Charges Total Minutes: 25   Total Minutes: 25   Therapy Charges for Today     Code Description Service Date Service Provider Modifiers Qty    82213522661 HC PT BIS XTRACELL FLUID ANALYSIS 3/30/2023  Melani Vieyra, PT  1    04754008990 HC PT THER PROC EA 15 MIN 3/30/2023 Melani Vieyra, PT GP 1    74235318631 HC PT SELF CARE/MGMT/TRAIN EA 15 MIN 3/30/2023 Melani Vieyra, PT GP 1                    Melani Vieyra, LINDSEY  3/30/2023

## 2023-04-05 ENCOUNTER — CLINICAL SUPPORT (OUTPATIENT)
Dept: ONCOLOGY | Facility: HOSPITAL | Age: 53
End: 2023-04-05
Payer: COMMERCIAL

## 2023-04-05 ENCOUNTER — LAB (OUTPATIENT)
Dept: OTHER | Facility: HOSPITAL | Age: 53
End: 2023-04-05
Payer: COMMERCIAL

## 2023-04-05 DIAGNOSIS — Z17.1 MALIGNANT NEOPLASM OF UPPER-OUTER QUADRANT OF LEFT BREAST IN FEMALE, ESTROGEN RECEPTOR NEGATIVE: Primary | ICD-10-CM

## 2023-04-05 DIAGNOSIS — C50.412 MALIGNANT NEOPLASM OF UPPER-OUTER QUADRANT OF LEFT BREAST IN FEMALE, ESTROGEN RECEPTOR NEGATIVE: Primary | ICD-10-CM

## 2023-04-05 LAB
BASOPHILS # BLD AUTO: 0.03 10*3/MM3 (ref 0–0.2)
BASOPHILS NFR BLD AUTO: 0.6 % (ref 0–1.5)
DEPRECATED RDW RBC AUTO: 54.1 FL (ref 37–54)
EOSINOPHIL # BLD AUTO: 0.04 10*3/MM3 (ref 0–0.4)
EOSINOPHIL NFR BLD AUTO: 0.8 % (ref 0.3–6.2)
ERYTHROCYTE [DISTWIDTH] IN BLOOD BY AUTOMATED COUNT: 17.7 % (ref 12.3–15.4)
HCT VFR BLD AUTO: 31.2 % (ref 34–46.6)
HGB BLD-MCNC: 10 G/DL (ref 12–15.9)
IMM GRANULOCYTES # BLD AUTO: 0.18 10*3/MM3 (ref 0–0.05)
IMM GRANULOCYTES NFR BLD AUTO: 3.6 % (ref 0–0.5)
LYMPHOCYTES # BLD AUTO: 1.01 10*3/MM3 (ref 0.7–3.1)
LYMPHOCYTES NFR BLD AUTO: 20.4 % (ref 19.6–45.3)
MCH RBC QN AUTO: 27.3 PG (ref 26.6–33)
MCHC RBC AUTO-ENTMCNC: 32.1 G/DL (ref 31.5–35.7)
MCV RBC AUTO: 85.2 FL (ref 79–97)
MONOCYTES # BLD AUTO: 0.53 10*3/MM3 (ref 0.1–0.9)
MONOCYTES NFR BLD AUTO: 10.7 % (ref 5–12)
NEUTROPHILS NFR BLD AUTO: 3.15 10*3/MM3 (ref 1.7–7)
NEUTROPHILS NFR BLD AUTO: 63.9 % (ref 42.7–76)
NRBC BLD AUTO-RTO: 0.4 /100 WBC (ref 0–0.2)
PLATELET # BLD AUTO: 227 10*3/MM3 (ref 140–450)
PMV BLD AUTO: 10.2 FL (ref 6–12)
RBC # BLD AUTO: 3.66 10*6/MM3 (ref 3.77–5.28)
WBC NRBC COR # BLD: 4.94 10*3/MM3 (ref 3.4–10.8)

## 2023-04-05 PROCEDURE — 36415 COLL VENOUS BLD VENIPUNCTURE: CPT

## 2023-04-05 PROCEDURE — 85025 COMPLETE CBC W/AUTO DIFF WBC: CPT | Performed by: NURSE PRACTITIONER

## 2023-04-05 NOTE — NURSING NOTE
Lab Results   Component Value Date    WBC 4.94 04/05/2023    HGB 10.0 (L) 04/05/2023    HCT 31.2 (L) 04/05/2023    MCV 85.2 04/05/2023     04/05/2023     Called and reviewed labs with patient.  She voiced no complaints.   Next appt reviewed and pt instructed to call sooner with concerns and v/u.

## 2023-04-12 ENCOUNTER — INFUSION (OUTPATIENT)
Dept: ONCOLOGY | Facility: HOSPITAL | Age: 53
End: 2023-04-12
Payer: COMMERCIAL

## 2023-04-12 ENCOUNTER — OFFICE VISIT (OUTPATIENT)
Dept: ONCOLOGY | Facility: CLINIC | Age: 53
End: 2023-04-12
Payer: COMMERCIAL

## 2023-04-12 VITALS
HEIGHT: 61 IN | RESPIRATION RATE: 16 BRPM | DIASTOLIC BLOOD PRESSURE: 75 MMHG | BODY MASS INDEX: 35.01 KG/M2 | SYSTOLIC BLOOD PRESSURE: 115 MMHG | HEART RATE: 104 BPM | WEIGHT: 185.4 LBS | TEMPERATURE: 97.3 F | OXYGEN SATURATION: 96 %

## 2023-04-12 DIAGNOSIS — Z17.1 MALIGNANT NEOPLASM OF UPPER-OUTER QUADRANT OF LEFT BREAST IN FEMALE, ESTROGEN RECEPTOR NEGATIVE: Primary | ICD-10-CM

## 2023-04-12 DIAGNOSIS — D50.9 IRON DEFICIENCY ANEMIA, UNSPECIFIED IRON DEFICIENCY ANEMIA TYPE: ICD-10-CM

## 2023-04-12 DIAGNOSIS — C79.51 PRIMARY CANCER OF THYROID GLAND METASTATIC TO BONE: ICD-10-CM

## 2023-04-12 DIAGNOSIS — C73 PRIMARY CANCER OF THYROID GLAND METASTATIC TO BONE: ICD-10-CM

## 2023-04-12 DIAGNOSIS — C50.412 MALIGNANT NEOPLASM OF UPPER-OUTER QUADRANT OF LEFT BREAST IN FEMALE, ESTROGEN RECEPTOR NEGATIVE: ICD-10-CM

## 2023-04-12 DIAGNOSIS — C50.412 MALIGNANT NEOPLASM OF UPPER-OUTER QUADRANT OF LEFT BREAST IN FEMALE, ESTROGEN RECEPTOR NEGATIVE: Primary | ICD-10-CM

## 2023-04-12 DIAGNOSIS — D64.81 ANEMIA DUE TO CHEMOTHERAPY: ICD-10-CM

## 2023-04-12 DIAGNOSIS — T45.1X5A ANEMIA DUE TO CHEMOTHERAPY: ICD-10-CM

## 2023-04-12 DIAGNOSIS — Z17.1 MALIGNANT NEOPLASM OF UPPER-OUTER QUADRANT OF LEFT BREAST IN FEMALE, ESTROGEN RECEPTOR NEGATIVE: ICD-10-CM

## 2023-04-12 LAB
ALBUMIN SERPL-MCNC: 4.1 G/DL (ref 3.5–5.2)
ALBUMIN/GLOB SERPL: 1.4 G/DL
ALP SERPL-CCNC: 67 U/L (ref 39–117)
ALT SERPL W P-5'-P-CCNC: 15 U/L (ref 1–33)
ANION GAP SERPL CALCULATED.3IONS-SCNC: 10.9 MMOL/L (ref 5–15)
AST SERPL-CCNC: 17 U/L (ref 1–32)
BASOPHILS # BLD AUTO: 0.01 10*3/MM3 (ref 0–0.2)
BASOPHILS NFR BLD AUTO: 0.1 % (ref 0–1.5)
BILIRUB SERPL-MCNC: 0.3 MG/DL (ref 0–1.2)
BUN SERPL-MCNC: 16 MG/DL (ref 6–20)
BUN/CREAT SERPL: 21.1 (ref 7–25)
CALCIUM SPEC-SCNC: 9.5 MG/DL (ref 8.6–10.5)
CHLORIDE SERPL-SCNC: 107 MMOL/L (ref 98–107)
CO2 SERPL-SCNC: 21.1 MMOL/L (ref 22–29)
CREAT SERPL-MCNC: 0.76 MG/DL (ref 0.57–1)
DEPRECATED RDW RBC AUTO: 57.7 FL (ref 37–54)
EGFRCR SERPLBLD CKD-EPI 2021: 93.3 ML/MIN/1.73
EOSINOPHIL # BLD AUTO: 0 10*3/MM3 (ref 0–0.4)
EOSINOPHIL NFR BLD AUTO: 0 % (ref 0.3–6.2)
ERYTHROCYTE [DISTWIDTH] IN BLOOD BY AUTOMATED COUNT: 17.9 % (ref 12.3–15.4)
GLOBULIN UR ELPH-MCNC: 3 GM/DL
GLUCOSE SERPL-MCNC: 145 MG/DL (ref 65–99)
HCT VFR BLD AUTO: 30 % (ref 34–46.6)
HGB BLD-MCNC: 9.5 G/DL (ref 12–15.9)
IMM GRANULOCYTES # BLD AUTO: 0.04 10*3/MM3 (ref 0–0.05)
IMM GRANULOCYTES NFR BLD AUTO: 0.5 % (ref 0–0.5)
LYMPHOCYTES # BLD AUTO: 0.78 10*3/MM3 (ref 0.7–3.1)
LYMPHOCYTES NFR BLD AUTO: 10.5 % (ref 19.6–45.3)
MCH RBC QN AUTO: 27.6 PG (ref 26.6–33)
MCHC RBC AUTO-ENTMCNC: 31.7 G/DL (ref 31.5–35.7)
MCV RBC AUTO: 87.2 FL (ref 79–97)
MONOCYTES # BLD AUTO: 0.33 10*3/MM3 (ref 0.1–0.9)
MONOCYTES NFR BLD AUTO: 4.4 % (ref 5–12)
NEUTROPHILS NFR BLD AUTO: 6.27 10*3/MM3 (ref 1.7–7)
NEUTROPHILS NFR BLD AUTO: 84.5 % (ref 42.7–76)
NRBC BLD AUTO-RTO: 0 /100 WBC (ref 0–0.2)
PLATELET # BLD AUTO: 346 10*3/MM3 (ref 140–450)
PMV BLD AUTO: 9.7 FL (ref 6–12)
POTASSIUM SERPL-SCNC: 4 MMOL/L (ref 3.5–5.2)
PROT SERPL-MCNC: 7.1 G/DL (ref 6–8.5)
RBC # BLD AUTO: 3.44 10*6/MM3 (ref 3.77–5.28)
SODIUM SERPL-SCNC: 139 MMOL/L (ref 136–145)
WBC NRBC COR # BLD: 7.43 10*3/MM3 (ref 3.4–10.8)

## 2023-04-12 PROCEDURE — 99214 OFFICE O/P EST MOD 30 MIN: CPT | Performed by: INTERNAL MEDICINE

## 2023-04-12 PROCEDURE — 25010000002 HEPARIN LOCK FLUSH PER 10 UNITS: Performed by: INTERNAL MEDICINE

## 2023-04-12 PROCEDURE — 96417 CHEMO IV INFUS EACH ADDL SEQ: CPT

## 2023-04-12 PROCEDURE — 25010000002 HYDROCORTISONE SOD SUC (PF) 100 MG RECONSTITUTED SOLUTION: Performed by: INTERNAL MEDICINE

## 2023-04-12 PROCEDURE — 25010000002 PALONOSETRON PER 25 MCG: Performed by: INTERNAL MEDICINE

## 2023-04-12 PROCEDURE — 25010000002 CYCLOPHOSPHAMIDE 1 GM/5ML SOLUTION 5 ML VIAL: Performed by: INTERNAL MEDICINE

## 2023-04-12 PROCEDURE — 96375 TX/PRO/DX INJ NEW DRUG ADDON: CPT

## 2023-04-12 PROCEDURE — 96413 CHEMO IV INFUSION 1 HR: CPT

## 2023-04-12 PROCEDURE — 80053 COMPREHEN METABOLIC PANEL: CPT | Performed by: INTERNAL MEDICINE

## 2023-04-12 PROCEDURE — 96415 CHEMO IV INFUSION ADDL HR: CPT

## 2023-04-12 PROCEDURE — 25010000002 DOCETAXEL 20 MG/ML SOLUTION 8 ML VIAL: Performed by: INTERNAL MEDICINE

## 2023-04-12 PROCEDURE — 25010000002 DIPHENHYDRAMINE PER 50 MG: Performed by: INTERNAL MEDICINE

## 2023-04-12 PROCEDURE — 85025 COMPLETE CBC W/AUTO DIFF WBC: CPT | Performed by: INTERNAL MEDICINE

## 2023-04-12 RX ORDER — PALONOSETRON 0.05 MG/ML
0.25 INJECTION, SOLUTION INTRAVENOUS ONCE
Status: CANCELLED | OUTPATIENT
Start: 2023-04-12

## 2023-04-12 RX ORDER — HEPARIN SODIUM (PORCINE) LOCK FLUSH IV SOLN 100 UNIT/ML 100 UNIT/ML
500 SOLUTION INTRAVENOUS AS NEEDED
Status: DISCONTINUED | OUTPATIENT
Start: 2023-04-12 | End: 2023-04-12 | Stop reason: HOSPADM

## 2023-04-12 RX ORDER — SODIUM CHLORIDE 9 MG/ML
250 INJECTION, SOLUTION INTRAVENOUS ONCE
Status: CANCELLED | OUTPATIENT
Start: 2023-04-12

## 2023-04-12 RX ORDER — FAMOTIDINE 10 MG/ML
20 INJECTION, SOLUTION INTRAVENOUS AS NEEDED
Status: CANCELLED | OUTPATIENT
Start: 2023-04-12

## 2023-04-12 RX ORDER — DIPHENHYDRAMINE HYDROCHLORIDE 50 MG/ML
50 INJECTION INTRAMUSCULAR; INTRAVENOUS AS NEEDED
Status: CANCELLED | OUTPATIENT
Start: 2023-04-12

## 2023-04-12 RX ORDER — SODIUM CHLORIDE 0.9 % (FLUSH) 0.9 %
10 SYRINGE (ML) INJECTION AS NEEDED
OUTPATIENT
Start: 2023-04-12

## 2023-04-12 RX ORDER — HEPARIN SODIUM (PORCINE) LOCK FLUSH IV SOLN 100 UNIT/ML 100 UNIT/ML
500 SOLUTION INTRAVENOUS AS NEEDED
OUTPATIENT
Start: 2023-04-12

## 2023-04-12 RX ORDER — SODIUM CHLORIDE 0.9 % (FLUSH) 0.9 %
10 SYRINGE (ML) INJECTION AS NEEDED
Status: DISCONTINUED | OUTPATIENT
Start: 2023-04-12 | End: 2023-04-12 | Stop reason: HOSPADM

## 2023-04-12 RX ORDER — SODIUM CHLORIDE 9 MG/ML
250 INJECTION, SOLUTION INTRAVENOUS ONCE
Status: COMPLETED | OUTPATIENT
Start: 2023-04-12 | End: 2023-04-12

## 2023-04-12 RX ORDER — FAMOTIDINE 10 MG/ML
20 INJECTION, SOLUTION INTRAVENOUS ONCE
Status: COMPLETED | OUTPATIENT
Start: 2023-04-12 | End: 2023-04-12

## 2023-04-12 RX ORDER — PALONOSETRON 0.05 MG/ML
0.25 INJECTION, SOLUTION INTRAVENOUS ONCE
Status: COMPLETED | OUTPATIENT
Start: 2023-04-12 | End: 2023-04-12

## 2023-04-12 RX ORDER — FAMOTIDINE 10 MG/ML
20 INJECTION, SOLUTION INTRAVENOUS ONCE
Status: CANCELLED | OUTPATIENT
Start: 2023-04-12

## 2023-04-12 RX ADMIN — FAMOTIDINE 20 MG: 10 INJECTION INTRAVENOUS at 09:33

## 2023-04-12 RX ADMIN — Medication 10 ML: at 13:19

## 2023-04-12 RX ADMIN — Medication 500 UNITS: at 13:19

## 2023-04-12 RX ADMIN — PALONOSETRON 0.25 MG: 0.05 INJECTION, SOLUTION INTRAVENOUS at 09:33

## 2023-04-12 RX ADMIN — CYCLOPHOSPHAMIDE 1080 MG: 200 INJECTION, SOLUTION INTRAVENOUS at 12:40

## 2023-04-12 RX ADMIN — DOCETAXEL 135 MG: 20 INJECTION, SOLUTION, CONCENTRATE INTRAVENOUS at 10:38

## 2023-04-12 RX ADMIN — DIPHENHYDRAMINE HYDROCHLORIDE 25 MG: 50 INJECTION INTRAMUSCULAR; INTRAVENOUS at 09:41

## 2023-04-12 RX ADMIN — HYDROCORTISONE SODIUM SUCCINATE 100 MG: 100 INJECTION, POWDER, FOR SOLUTION INTRAMUSCULAR; INTRAVENOUS at 09:33

## 2023-04-12 RX ADMIN — SODIUM CHLORIDE 250 ML: 9 INJECTION, SOLUTION INTRAVENOUS at 09:33

## 2023-04-12 NOTE — PROGRESS NOTES
Subjective     CHIEF COMPLAINT:      Chief Complaint   Patient presents with   • Follow-up     Discuss FMLA        HISTORY OF PRESENT ILLNESS:     Ina Akers is a 52 y.o. female patient who returns today for follow up on her left breast cancer.  She returns today for follow-up reporting fatigue.  She states that the bone pain she developed after cycle #2 was less than cycle #1.  She started taking Claritin a few days before the initial injection.    Patient states that the left reconstructed breast incision is continuing to heal.  No redness or discharge.    ROS:  Pertinent ROS is in the HPI.     Past medical, surgical, social and family history were reviewed.     MEDICATIONS:    Current Outpatient Medications:   •  acetaminophen (TYLENOL) 500 MG tablet, Take 2 tablets by mouth Every 6 (Six) Hours As Needed for Mild Pain., Disp: , Rfl:   •  amLODIPine (NORVASC) 10 MG tablet, TAKE ONE TABLET BY MOUTH DAILY, Disp: 90 tablet, Rfl: 1  •  atorvastatin (LIPITOR) 20 MG tablet, Take 1 tablet by mouth Daily. (Patient taking differently: Take 1 tablet by mouth Every Night.), Disp: 90 tablet, Rfl: 3  •  Blood Glucose Monitoring Suppl w/Device kit, Check blood sugar twice a day, Disp: 1 each, Rfl: 0  •  cholecalciferol (VITAMIN D3) 25 MCG (1000 UT) tablet, Take 1 tablet by mouth Daily., Disp: , Rfl:   •  dexamethasone (DECADRON) 4 MG tablet, Take 2 tablets oral twice a day for 3 consecutive days beginning the day before chemotherapy and continue for 6 doses., Disp: 12 tablet, Rfl: 3  •  famotidine (PEPCID) 20 MG tablet, Take 1 tablet by mouth Every Other Day., Disp: , Rfl:   •  ferrous sulfate 325 (65 FE) MG tablet, Take 1 tablet by mouth Daily., Disp: , Rfl:   •  Glucose Blood (BLOOD GLUCOSE TEST) strip, Check blood sugar twice a day., Disp: 100 each, Rfl: 1  •  labetalol (NORMODYNE) 100 MG tablet, Take 0.5 tablets by mouth 2 (Two) Times a Day., Disp: 180 tablet, Rfl: 1  •  Lancets (ACCU-CHEK SOFT TOUCH) lancets, Check  "blood sugar twice a day as needed, Disp: 100 each, Rfl: 1  •  lansoprazole (PREVACID) 30 MG capsule, TAKE ONE CAPSULE BY MOUTH DAILY (Patient taking differently: Take 1 capsule by mouth Every Other Day.), Disp: 90 capsule, Rfl: 2  •  levothyroxine (SYNTHROID, LEVOTHROID) 112 MCG tablet, Take 1 tablet by mouth Every Morning., Disp: , Rfl:   •  losartan (COZAAR) 100 MG tablet, Take 1 tablet by mouth Daily., Disp: , Rfl:   •  metFORMIN ER (GLUCOPHAGE-XR) 500 MG 24 hr tablet, TAKE TWO TABLETS BY MOUTH DAILY WITH DINNER, Disp: 180 tablet, Rfl: 3  •  nystatin-Lidocaine Viscous HCl-diphenhydrAMINE in aluminum-magnesium hydroxide-simethicone suspension, Swish and spit 10 mL Every 4 (Four) Hours As Needed for pain or thrush, Disp: 410 mL, Rfl: 0  •  promethazine (PHENERGAN) 12.5 MG tablet, TAKE 1/2 TO 1 TABLET BY MOUTH EVERY 4 HOURS AS NEEDED FOR NAUSEA AND VOMITING -MAY MAKE DROWSY (Patient taking differently: Take 1-2 tablets by mouth Every 6 (Six) Hours As Needed. TAKE 1/2 TO 1 TABLET BY MOUTH EVERY 4 HOURS AS NEEDED FOR NAUSEA AND VOMITING -MAY MAKE DROWSY), Disp: 30 tablet, Rfl: 3  •  triamcinolone (KENALOG) 0.1 % cream, Apply  topically to the appropriate area as directed 2 (Two) Times a Day. Sparingly shortest time possible avoid face (Patient taking differently: Apply  topically to the appropriate area as directed As Needed. Sparingly shortest time possible avoid face), Disp: 80 g, Rfl: 0  •  promethazine (PHENERGAN) 12.5 MG tablet, Take 1 tablet by mouth Every 6 (Six) Hours As Needed for Nausea or Vomiting. (Patient not taking: Reported on 4/12/2023), Disp: 10 tablet, Rfl: 0  Objective     VITAL SIGNS:     Vitals:    04/12/23 0808   BP: 115/75   Pulse: 104   Resp: 16   Temp: 97.3 °F (36.3 °C)   TempSrc: Temporal   SpO2: 96%   Weight: 84.1 kg (185 lb 6.4 oz)   Height: 154.9 cm (60.98\")   PainSc: 0-No pain     Body mass index is 35.05 kg/m².     Wt Readings from Last 5 Encounters:   04/12/23 84.1 kg (185 lb 6.4 oz) "   03/22/23 82.5 kg (181 lb 14.4 oz)   03/08/23 81.3 kg (179 lb 3.2 oz)   03/01/23 81.8 kg (180 lb 4.8 oz)   02/14/23 80.7 kg (178 lb)     PHYSICAL EXAMINATION:   GENERAL: The patient appears in good general condition, not in acute distress.   SKIN: No ecchymosis.  EYES: No jaundice. No pallor.  LYMPHATICS: No cervical lymphadenopathy.  BREASTS: Exam of the reconstructed left breast reveals the incision to be CDI.   No discharge.  No erythema or warmth.  CHEST: Normal respiratory effort.  Lungs clear bilaterally.  No added sounds.  CVS: Normal S1-S2.  No murmurs.  ABDOMEN: Soft. No tenderness. No Hepatomegaly. No Splenomegaly. No masses.  EXTREMITIES: No edema.    DIAGNOSTIC DATA:     Results from last 7 days   Lab Units 04/12/23  0811   WBC 10*3/mm3 7.43   NEUTROS ABS 10*3/mm3 6.27   HEMOGLOBIN g/dL 9.5*   HEMATOCRIT % 30.0*   PLATELETS 10*3/mm3 346     Results from last 7 days   Lab Units 04/12/23  0812   SODIUM mmol/L 139   POTASSIUM mmol/L 4.0   CHLORIDE mmol/L 107   CO2 mmol/L 21.1*   BUN mg/dL 16   CREATININE mg/dL 0.76   CALCIUM mg/dL 9.5   ALBUMIN g/dL 4.1   BILIRUBIN mg/dL 0.3   ALK PHOS U/L 67   ALT (SGPT) U/L 15   AST (SGOT) U/L 17   GLUCOSE mg/dL 145*       Assessment & Plan    *Left breast cancer.  · It was identified on screening mammograms on 10/5/2022.  · It was located at 12 o'clock position.  · It measured 6 x 5 by 5 mm on mammogram and ultrasound on 10/20/2022.  · PET scan on 10/6/2022 showed the lesion to be hypermetabolic. It measured 10 x 7 mm with an SUV of 5.8.  · No evidence of metastasis on PET scan.  · MRI on 12/5/2022 showed the lesion to measure 9 mm.    · No suspicious lymphadenopathy.   · Since her metastatic thyroid cancer was stable, we recommended left mastectomy with goal of cure.  · S/P mastectomy with reconstruction on 1/24/2023.  · Pathology exam revealed the primary tumor to measure 15 mm.  · It was grade 3.  Triple negative.  HER2 was negative-0 by IHC.  · Ki-67 was 85%.  · It  was considered pT1cN0.  · 2 sentinel lymph nodes were examined and were negative.    · No lymphovascular invasion.  · There was associated DCIS.  · I explained the findings of the pathology exam to the patient and her . The size of the tumor increased since the initial diagnosis and the biopsy was obtained.  · Ki 67 is very high at 85% indicating rapid tumor growth and aggressive behavior.  · Due to the increase in size and the high Ki 67, adjuvant chemotherapy was recommended.    · Due to the patient receiving Adriamycin in the past, she is unable to receive additional Adriamycin.  · Taxotere and Cytoxan with Neulasta support every 3 weeks x 4 cycles was recommended.  · She started Taxotere and Cytoxan on 3/8/2023.  · She had infusion reaction to Taxotere which was briefly held.  She was given IV Solu-Cortef.  It was restarted at a slower rate and she tolerated the infusion.  · She tolerated cycle #2 infusion without problem of infusion reaction.  · She is ready for cycle #3 today.    *Metastatic thyroid cancer diagnosed on 10/6/2020.  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  · She had right thyroid lobectomy on 12/17/2018 at [place].    · The specimen was sent for outside consultation and the final conclusion was that there was no evidence of malignancy.  · MRI on 8/27/2020 revealed an L4 lesion resulting in pathologic compression fracture.  · Bone scan on 9/2/2020 revealed increased activity at L4 and left side of the skull.  · PET scan on 9/9/2020 revealed abnormal activity at L4, right femoral neck, lower sacrum, left ischium and right ninth medial aspect.  · PET scan 9/9/2020 showed no evidence of visceral metastasis.  · Patient had kyphoplasty on 9/17/2020.  Pathology was nondiagnostic.  · CT-guided biopsy of the lesion in the right posterior ninth rib on 10/6/2020 revealed a clear cell neoplasm.  It is positive for PAX 8 and TTF-1 favoring metastatic thyroid cancer.  · Patient  was referred to [facility].  · Patient underwent completion thyroidectomy on 11/9/2020.  · There was no evidence of uptake of radioactive iodine on SPECT imaging on 12/18/2020.  · This was indicative of the tumor not going to respond to radioactive iodine therapy.  · Patient was started on Lenvima 20 mg daily on 12/24/2020.  · Lenvima was held on 2/6/2021 due to drug induced anemia and thrombocytopenia.  She was unable to start back on it due to prolonged cytopenias.  · CT scans on 3/23/2021 showed stable disease.  · CBC was stable on 6/8/2021.  CT scans showed stable disease.  Thyroglobulin was stable at 3.4.    · PET scan on 9/1/2021 revealed decrease in the activity in the metastatic lesions.    · Starting back on active treatment (Nexavar) was not recommended.  · PET scan on 12/16/2021 showed complete response except for mild hypermetabolism in the left ischium.  · CT scans on 4/8/2022 showed stable bone metastasis.  No new metastatic lesions.  · PET scan on 10/6/2022 revealed no new areas of metastasis.  · Due to the stable metastatic disease, she is not currently on treatment for her thyroid cancer.   · She is on Synthroid 112 mcg daily.    *Bone metastasis.  · Patient was given Zometa on 9/29/2020.  · Patient underwent right femur medullary nailing on 10/23/2020.  · Patient was restarted back on Zometa on 2/24/2022.   · She is tolerating the Zometa infusions well.  · CT scans on 4/8/2022 revealed no new metastatic lesions.  The metastatic lesions were stable.  · PET scan on 10/6/2022 revealed no change in the areas of activity in the bone metastasis.  No new lesions.  · Last dose of Zometa was given on 12/1/2022.  · Plan to resume Zometa after completion of chemotherapy breast cancer.    * Iron deficiency anemia.    · Patient was previously treated with ferrous sulfate 325 mg daily.  · Hemoglobin decreased to 10.4 on 6/23/2021.  Iron stores decreased with transferrin saturation decreasing to  12%.  · She was restarted back on ferrous sulfate 325 mg daily.  However, Dr. Huddleston asked her to stop the iron.  · Hemoglobin decreased to 9.1 on 2/24/2022.  · EGD on 3/25/2022 showed no evidence of blood loss.  · Pathology exam revealed no typical features of celiac disease.  · Hemoglobin was 10.4 on 12/27/2022.  · Ferritin was 95 and transferrin saturation was 14%.  · She was started on ferrous sulfate 325 mg daily.  · Hemoglobin decreased to 9.6 on 2/1/2023, secondary to recent surgical blood losses.  · She was restarted on ferrous sulfate 325 mg daily on 2/1/2023.  · Hemoglobin is 9.5 today.  · She is taking the oral iron daily regularly.    *History of left breast cancer, stage IIB, triIple negative.   · S/P lumpectomy in 2001.  · She received adjuvant chemotherapy on the NSABP B34 with AC followed by Taxotere.  · S/P post-lumpectomy radiation therapy.   · She was placed on tamoxifen for 5 years and it was completed in January 2007.   · With the patient's history of breast cancer at a young age and development of a second new breast cancer in the left breast, repeat genetic testing was recommended.  · Gene testing on 12/22/2022 revealed variants of unknown significance.    PLAN:    1.  Proceed with cycle #3 of Taxotere and Cytoxan today.   2.  We will infuse Taxotere over 120 minutes.  She will receive IV Solu-Cortef 100 mg IV as premedication.   3.  She will receive a Udenyca injection tomorrow.   4.  Continue ferrous sulfate 325 mg once daily.   5.  She started taking Claritin I will continue to take it daily to decrease bone pain.   6.  Follow-up in 3 weeks.  She will be scheduled for cycle #4 of Taxotere and Cytoxan.         Tim Muñoz MD  04/12/23

## 2023-04-13 ENCOUNTER — INFUSION (OUTPATIENT)
Dept: ONCOLOGY | Facility: HOSPITAL | Age: 53
End: 2023-04-13
Payer: COMMERCIAL

## 2023-04-13 DIAGNOSIS — Z17.1 MALIGNANT NEOPLASM OF UPPER-OUTER QUADRANT OF LEFT BREAST IN FEMALE, ESTROGEN RECEPTOR NEGATIVE: Primary | ICD-10-CM

## 2023-04-13 DIAGNOSIS — C50.412 MALIGNANT NEOPLASM OF UPPER-OUTER QUADRANT OF LEFT BREAST IN FEMALE, ESTROGEN RECEPTOR NEGATIVE: Primary | ICD-10-CM

## 2023-04-13 PROCEDURE — 96372 THER/PROPH/DIAG INJ SC/IM: CPT

## 2023-04-13 PROCEDURE — 25010000002 PEGFILGRASTIM-CBQV 6 MG/0.6ML SOLUTION PREFILLED SYRINGE: Performed by: INTERNAL MEDICINE

## 2023-04-13 RX ADMIN — PEGFILGRASTIM-CBQV 6 MG: 6 INJECTION, SOLUTION SUBCUTANEOUS at 13:40

## 2023-04-27 ENCOUNTER — HOSPITAL ENCOUNTER (OUTPATIENT)
Dept: PHYSICAL THERAPY | Facility: HOSPITAL | Age: 53
Setting detail: THERAPIES SERIES
Discharge: HOME | End: 2023-04-27
Payer: COMMERCIAL

## 2023-04-27 ENCOUNTER — TELEPHONE (OUTPATIENT)
Dept: FAMILY MEDICINE CLINIC | Facility: CLINIC | Age: 53
End: 2023-04-27
Payer: COMMERCIAL

## 2023-04-27 DIAGNOSIS — E11.9 CONTROLLED TYPE 2 DIABETES MELLITUS WITHOUT COMPLICATION, WITHOUT LONG-TERM CURRENT USE OF INSULIN: Primary | ICD-10-CM

## 2023-04-27 DIAGNOSIS — Z98.890 HISTORY OF LUMPECTOMY OF LEFT BREAST: ICD-10-CM

## 2023-04-27 DIAGNOSIS — Z91.89 AT RISK FOR LYMPHEDEMA: ICD-10-CM

## 2023-04-27 DIAGNOSIS — Z90.13 S/P MASTECTOMY, BILATERAL: Primary | ICD-10-CM

## 2023-04-27 DIAGNOSIS — Z85.850 HISTORY OF THYROID CANCER: ICD-10-CM

## 2023-04-27 PROCEDURE — 93702 BIS XTRACELL FLUID ANALYSIS: CPT | Performed by: PHYSICAL THERAPIST

## 2023-04-27 PROCEDURE — 97110 THERAPEUTIC EXERCISES: CPT | Performed by: PHYSICAL THERAPIST

## 2023-04-27 NOTE — THERAPY PROGRESS REPORT/RE-CERT
"    Outpatient Physical Therapy Lymphedema Progress Note  [facility]     Patient Name: Ina Akers  : 1970  MRN: [id number]  Today's Date: 2023        Visit Date: 2023    Visit Dx:    ICD-10-CM ICD-9-CM   1. S/P mastectomy, bilateral  Z90.13 V45.71   2. At risk for lymphedema  Z91.89 V49.89   3. History of lumpectomy of left breast  Z98.890 V45.89   4. History of thyroid cancer  Z85.850 V10.87       Patient Active Problem List   Diagnosis   • Abnormal perimenopausal bleeding   • Amenorrhea   • Atopic dermatitis   • External hemorrhoids   • Gastritis   • Hypertension   • Iron deficiency anemia   • Irritable bowel syndrome   • Melanocytic nevus   • Goiter   • Tinea corporis   • Type 2 diabetes mellitus   • Lipid screening   • Malignant neoplasm of upper-outer quadrant of left breast in female, estrogen receptor negative   • Acute pain of both knees   • Controlled type 2 diabetes mellitus without complication, without long-term current use of insulin   • Hyperlipidemia   • Vitamin D deficiency   • Multiple thyroid nodules   • Subclinical hypothyroidism   • Lumbar spine tumor   • Metastatic bone cancer   • Fracture, pathologic, femur, neck, right, initial encounter   • Immunocompromised   • Encounter for screening for malignant neoplasm of colon   • Screen for colon cancer   • COVID-19 virus infection   • Gastroesophageal reflux disease   • Primary cancer of thyroid gland metastatic to bone   • Acute pain of left shoulder         Lymphedema     Row Name 23 1140             Subjective Pain    Able to rate subjective pain? yes  -JS      Pre-Treatment Pain Level 0  -JS         Subjective Comments    Subjective Comments Wearing sleeve and performing MLD \"when I think about about it, usually 3-4x/week\". Sometimes feels temperature change in L hand when wearing sleeve. Reaching overhead without issue. Denies pain/tightness  -JS         Lymphedema Assessment    Lymphedema Surgery Comments " S/p L mastectomy, repeat axillary dissection on 1/24/23. Port placement 2/14/23. History of L lumpectomy, axillary dissection in 2001, and history of metastatic thyroid cancer with thyroid surgery in 2018 & 2020, related R hip & back surgery in 2020  -JS      Lymph Nodes Removed # --  repeat axillary dissection  -JS      Cancer Comments Currently receiving chemotherapy. Has completed 3/4 rounds currently, scheduled for last round of chemotherapy next week.  -JS      Radiation Therapy Received no  -JS      Infections or Cellulitis? no  -JS      Lymphedema Assessment Comments Prior chemo & radiation following 2001 lumpectomy  -JS         Posture/Observations    Posture/Observations Comments Pt wearing L UE compression sleeve, with top band folded over at the axilla  -JS         Right Upper Ext    Rt Shoulder Abduction AROM 155  -JS      Rt Shoulder Flexion AROM 158  -JS      Rt Shoulder External Rotation AROM THAIS to T2  -JS      Rt Shoulder Internal Rotation AROM FIR to T9  -JS         Left Upper Ext    Lt Shoulder Abduction AROM 155  -JS      Lt Shoulder Flexion AROM 158  -JS      Lt Shoulder External Rotation AROM THAIS to T2  -JS      Lt Shoulder Internal Rotation AROM FIR to T10  -JS         Manual Lymphatic Drainage    Manual Therapy Reviewed self MLD with pt to continue to perform at home.  -JS         Compression/Skin Care    Compression/Skin Care Comments Instruction in weaning from sleeve (see education).  Patient to adjust fit of compression sleeve, by avoiding rolling top band.  Patient to contact PT if hand symptoms persist despite modification of fit, to discuss reorder of larger size.  -JS         L-Dex Bioimpedence Screening    L-Dex Measurement Extremity LUE  -JS      L-Dex Patient Position Standing  -JS      L-Dex UE Dominate Side Right  -JS      L-Dex UE At Risk Side Left  -JS      L-Dex UE Pre Surgical Value Yes  -JS      L-Dex UE Score 4.7  -JS      L-Dex UE Baseline Score 4.4  -JS      L-Dex UE  Value Change 0.3  -JS      L-Dex UE Comment Decreased, WNL  -JS      $ L-Dex Charge yes  -JS            User Key  (r) = Recorded By, (t) = Taken By, (c) = Cosigned By    Initials Name Provider Type    Melani Ambrosio, PT Physical Therapist                               PT Assessment/Plan     Row Name 04/27/23 6440          PT Assessment    Assessment Comments Pt is a 51 y/o female recently diagnosed with new triple negative L breast CA, s/p L mastectomy with tissue expander placement and repeat axillary dissection on 1/24/23. Port placement on 2/14/23 with pt currently undergoing chemotherapy treatment, having completed 3/4 rounds currently. PMH includes history of L breast CA, s/p lumpectomy and axillary dissection (2/9 lymph nodes) in 2001. At that time, pt received chemo and radiation. Further PMH includes thyroid CA, s/p thyroid surgery in 2018 & 2020, with additional R hip surgery and back surgery due to metastatic disease. Patient presents for 1 month bioimpedance reassessment, as last visit patient was with  increased score & proximal wrist swelling.  Patient has been fairly compliant with UE compression garment use and self MLD, 3-4x/week.  Patient demonstrates improvement in proximal wrist swelling today and bioimpedance score reduced to 4.7 (from 8.2 last visit).  Patient reports intermittent temperature change in L hand with compression garment use, but found to be rolling sleeve at the top.  Education provided on proper donning/use of sleeve, modifying fit to avoid rolling the top band of the sleeve. If hand symptoms persist despite modifying how sleeve fit, pt advised to contact PT to consider ordering larger size for improved comfort (pt currently in [facility] Size III, obtained from [facility] pharmacy).  Education provided on weaning from sleeve over the next month as long as swelling remains reduced & pt remains assymptomatic (see education).  Patient will benefit from ongoing skilled PT, with repeat  "bioimpedance in 1 month.  Patient encouraged to contact PT with any questions, concerns or changes in the interim.  -JS     Please refer to paper survey for additional self-reported information Yes  -JS     Rehab Potential Good  -JS     Patient/caregiver participated in establishment of treatment plan and goals Yes  -JS     Patient would benefit from skilled therapy intervention Yes  -JS        PT Plan    Predicted Duration of Therapy Intervention (PT) Repeat bioimpedance in 1 month.  -JS     PT Plan Comments Patient to wean from compression garment as indicated over the next month (see education). Patient to continue HEP, and self MLD. Repeat bioimpedance in 1 month, if score remains WNL, consider weaning from sleeve except during heavy exercise/activity, air travel.  -JS           User Key  (r) = Recorded By, (t) = Taken By, (c) = Cosigned By    Initials Name Provider Type    Melani Ambrosio, PT Physical Therapist                    OP Exercises     Row Name 04/27/23 1140             Subjective Comments    Subjective Comments Wearing sleeve and performing MLD \"when I think about about it, usually 3-4x/week\". Sometimes feels temperature change in L hand when wearing sleeve. Reaching overhead without issue. Denies pain/tightness  -JS         Subjective Pain    Able to rate subjective pain? yes  -JS      Pre-Treatment Pain Level 0  -JS         Total Minutes    73993 - PT Therapeutic Exercise Minutes 15  -JS         Exercise 1    Exercise Name 1 Post shoulder rolls  -JS      Cueing 1 Verbal;Demo  -JS      Reps 1 10  -JS         Exercise 2    Exercise Name 2 Scapular retraction  -JS      Cueing 2 Verbal;Demo  -JS      Reps 2 10  -JS         Exercise 3    Exercise Name 3 B shoulder flex AAROM- supine hands clasped  -JS      Cueing 3 Verbal;Demo  -JS      Reps 3 10  -JS         Exercise 4    Exercise Name 4 B shoulder ER- supine butterfly stretch  -JS      Cueing 4 Verbal;Demo  -JS      Reps 4 10  -JS         Exercise 5 "    Exercise Name 5 UE wall slides  -JS      Cueing 5 Verbal;Demo  -JS      Reps 5 10  -JS            User Key  (r) = Recorded By, (t) = Taken By, (c) = Cosigned By    Initials Name Provider Type    Melani Ambrosio, PT Physical Therapist                             PT OP Goals     Row Name 04/27/23 1140          Long Term Goals    LTG Date to Achieve 05/25/23  -     LTG 1 Pt will be independent in comprehensive HEP to allow ongoing symptom management.  -     LTG 1 Progress Ongoing  -     LTG 2 Pt will obtain properly fitted UE compression sleeve and verbalize appropriate wear schedule.  -JS     LTG 2 Progress Met  -     LTG 3 Pt will maintain L-Dex bioimpedance score WNL.  -JS     LTG 3 Progress Ongoing;Progressing  -     LTG 3 Progress Comments Score 4.7, decreased from last assessment  -JS     LTG 4 Pt will demonstrate normal L shoulder AROM.  -     LTG 4 Progress Met  -     LTG 5 Pt will score 10 or less on Quick Dash to return to previously reported level of function.  -JS     LTG 5 Progress Ongoing;Progressing  -     LTG 5 Progress Comments Current score 18.18  -JS           User Key  (r) = Recorded By, (t) = Taken By, (c) = Cosigned By    Initials Name Provider Type    Melani Ambrosio PT Physical Therapist                Therapy Education  Education Details: Bioimpedance score and significance discussed.  Education provided in weaning from compression sleeve to 1/2 days (4-5 hours/day) x 2 weeks with current schedule of 3-4x/week.  Patient advised to monitor symptoms and swelling.  If tolerated, wean from sleeve completely over following 2 weeks until returning to PT for reassessment.  Patient to continue use of compression sleeve for heavy exercise/activity and air travel. Patient to continue self-MLD.  HEP reviewed with recommendation of continued exercise.    Outcome Measure Options: Quick DASH  Quick DASH  Open a tight or new jar.: No Difficulty  Do heavy household chores (e.g., wash walls,  wash floors): Moderate Difficulty  Carry a shopping bag or briefcase: Mild Difficulty  Wash your back: No Difficulty  Use a knife to cut food: No Difficulty  Recreational activities in which you take some force or impact through your arm, should or hand (e.g. golf, hammering, tennis, etc.): Mild Difficulty  During the past week, to what extent has your arm, shoulder, or hand problem interfered with your normal social activites with family, friends, neighbors or groups?: Slightly  During the past week, were you limited in your work or other regular daily activities as a result of your arm, shoulder or hand problem?: Slightly Limited  Arm, Shoulder, or hand pain: Mild  Tingling (pins and needles) in your arm, shoulder, or hand: Mild  During the past week, how much difficulty have you had sleeping because of the pain in your arm, shoulder or hand?: No difficulty  Number of Questions Answered: 11  Quick DASH Score: 18.18         Time Calculation:   Start Time: 1140  Stop Time: 1215  Time Calculation (min): 35 min  Timed Charges  07628 - PT Therapeutic Exercise Minutes: 15  Total Minutes  Timed Charges Total Minutes: 15   Total Minutes: 15   Therapy Charges for Today     Code Description Service Date Service Provider Modifiers Qty    32093434308 HC PT THER PROC EA 15 MIN 4/27/2023 Melani Vieyra, PT GP 1    53253130994 HC PT BIS XTRACELL FLUID ANALYSIS 4/27/2023 Melani Vieyra, PT  1          PT G-Codes  Outcome Measure Options: Quick DASH  Quick DASH Score: 18.18         Melani Vieyra PT  4/27/2023

## 2023-05-02 ENCOUNTER — PATIENT OUTREACH (OUTPATIENT)
Dept: OTHER | Facility: HOSPITAL | Age: 53
End: 2023-05-02
Payer: COMMERCIAL

## 2023-05-02 NOTE — PROGRESS NOTES
Called Ms. Akers to see how she was doing. She stated she will have her last chemo tomorrow and is doing well. She will follow up with Dr. Sharif next week. She had questions about cleaning resources and we discussed cleaning for a reason. She stated she would look into that option and let me know how it goes. Otherwise, she has no needs at this time. She was thankful for the call and will reach out if any questions or needs arise.

## 2023-05-03 ENCOUNTER — INFUSION (OUTPATIENT)
Dept: ONCOLOGY | Facility: HOSPITAL | Age: 53
End: 2023-05-03
Payer: COMMERCIAL

## 2023-05-03 ENCOUNTER — OFFICE VISIT (OUTPATIENT)
Dept: ONCOLOGY | Facility: CLINIC | Age: 53
End: 2023-05-03
Payer: COMMERCIAL

## 2023-05-03 VITALS
OXYGEN SATURATION: 96 % | BODY MASS INDEX: 34.97 KG/M2 | HEIGHT: 61 IN | DIASTOLIC BLOOD PRESSURE: 74 MMHG | SYSTOLIC BLOOD PRESSURE: 122 MMHG | TEMPERATURE: 97.7 F | RESPIRATION RATE: 16 BRPM | HEART RATE: 89 BPM | WEIGHT: 185.2 LBS

## 2023-05-03 DIAGNOSIS — C79.51 PRIMARY CANCER OF THYROID GLAND METASTATIC TO BONE: ICD-10-CM

## 2023-05-03 DIAGNOSIS — C50.412 MALIGNANT NEOPLASM OF UPPER-OUTER QUADRANT OF LEFT BREAST IN FEMALE, ESTROGEN RECEPTOR NEGATIVE: Primary | ICD-10-CM

## 2023-05-03 DIAGNOSIS — Z17.1 MALIGNANT NEOPLASM OF UPPER-OUTER QUADRANT OF LEFT BREAST IN FEMALE, ESTROGEN RECEPTOR NEGATIVE: Primary | ICD-10-CM

## 2023-05-03 DIAGNOSIS — C79.51 METASTASIS TO BONE: ICD-10-CM

## 2023-05-03 DIAGNOSIS — D64.81 ANEMIA DUE TO CHEMOTHERAPY: ICD-10-CM

## 2023-05-03 DIAGNOSIS — Z17.1 MALIGNANT NEOPLASM OF UPPER-OUTER QUADRANT OF LEFT BREAST IN FEMALE, ESTROGEN RECEPTOR NEGATIVE: ICD-10-CM

## 2023-05-03 DIAGNOSIS — T45.1X5A ANEMIA DUE TO CHEMOTHERAPY: ICD-10-CM

## 2023-05-03 DIAGNOSIS — C50.412 MALIGNANT NEOPLASM OF UPPER-OUTER QUADRANT OF LEFT BREAST IN FEMALE, ESTROGEN RECEPTOR NEGATIVE: ICD-10-CM

## 2023-05-03 DIAGNOSIS — D50.9 IRON DEFICIENCY ANEMIA, UNSPECIFIED IRON DEFICIENCY ANEMIA TYPE: ICD-10-CM

## 2023-05-03 DIAGNOSIS — C73 PRIMARY CANCER OF THYROID GLAND METASTATIC TO BONE: ICD-10-CM

## 2023-05-03 LAB
ALBUMIN SERPL-MCNC: 4.2 G/DL (ref 3.5–5.2)
ALBUMIN/GLOB SERPL: 1.4 G/DL
ALP SERPL-CCNC: 63 U/L (ref 39–117)
ALT SERPL W P-5'-P-CCNC: 18 U/L (ref 1–33)
ANION GAP SERPL CALCULATED.3IONS-SCNC: 14.8 MMOL/L (ref 5–15)
AST SERPL-CCNC: 15 U/L (ref 1–32)
BASOPHILS # BLD AUTO: 0 10*3/MM3 (ref 0–0.2)
BASOPHILS NFR BLD AUTO: 0 % (ref 0–1.5)
BILIRUB SERPL-MCNC: 0.3 MG/DL (ref 0–1.2)
BUN SERPL-MCNC: 19 MG/DL (ref 6–20)
BUN/CREAT SERPL: 25 (ref 7–25)
CALCIUM SPEC-SCNC: 9.3 MG/DL (ref 8.6–10.5)
CHLORIDE SERPL-SCNC: 105 MMOL/L (ref 98–107)
CO2 SERPL-SCNC: 19.2 MMOL/L (ref 22–29)
CREAT SERPL-MCNC: 0.76 MG/DL (ref 0.57–1)
DEPRECATED RDW RBC AUTO: 60.1 FL (ref 37–54)
EGFRCR SERPLBLD CKD-EPI 2021: 93.3 ML/MIN/1.73
EOSINOPHIL # BLD AUTO: 0 10*3/MM3 (ref 0–0.4)
EOSINOPHIL NFR BLD AUTO: 0 % (ref 0.3–6.2)
ERYTHROCYTE [DISTWIDTH] IN BLOOD BY AUTOMATED COUNT: 18.4 % (ref 12.3–15.4)
GLOBULIN UR ELPH-MCNC: 3 GM/DL
GLUCOSE SERPL-MCNC: 136 MG/DL (ref 65–99)
HCT VFR BLD AUTO: 29.7 % (ref 34–46.6)
HGB BLD-MCNC: 9.4 G/DL (ref 12–15.9)
IMM GRANULOCYTES # BLD AUTO: 0.03 10*3/MM3 (ref 0–0.05)
IMM GRANULOCYTES NFR BLD AUTO: 0.4 % (ref 0–0.5)
LYMPHOCYTES # BLD AUTO: 0.63 10*3/MM3 (ref 0.7–3.1)
LYMPHOCYTES NFR BLD AUTO: 8.8 % (ref 19.6–45.3)
MCH RBC QN AUTO: 28 PG (ref 26.6–33)
MCHC RBC AUTO-ENTMCNC: 31.6 G/DL (ref 31.5–35.7)
MCV RBC AUTO: 88.4 FL (ref 79–97)
MONOCYTES # BLD AUTO: 0.46 10*3/MM3 (ref 0.1–0.9)
MONOCYTES NFR BLD AUTO: 6.4 % (ref 5–12)
NEUTROPHILS NFR BLD AUTO: 6.03 10*3/MM3 (ref 1.7–7)
NEUTROPHILS NFR BLD AUTO: 84.4 % (ref 42.7–76)
NRBC BLD AUTO-RTO: 0 /100 WBC (ref 0–0.2)
PLATELET # BLD AUTO: 310 10*3/MM3 (ref 140–450)
PMV BLD AUTO: 9.3 FL (ref 6–12)
POTASSIUM SERPL-SCNC: 4 MMOL/L (ref 3.5–5.2)
PROT SERPL-MCNC: 7.2 G/DL (ref 6–8.5)
RBC # BLD AUTO: 3.36 10*6/MM3 (ref 3.77–5.28)
SODIUM SERPL-SCNC: 139 MMOL/L (ref 136–145)
WBC NRBC COR # BLD: 7.15 10*3/MM3 (ref 3.4–10.8)

## 2023-05-03 PROCEDURE — 25010000002 METHYLPREDNISOLONE PER 125 MG: Performed by: INTERNAL MEDICINE

## 2023-05-03 PROCEDURE — 25010000002 DOCETAXEL 20 MG/ML SOLUTION 8 ML VIAL: Performed by: INTERNAL MEDICINE

## 2023-05-03 PROCEDURE — 25010000002 CYCLOPHOSPHAMIDE 1 GM/5ML SOLUTION 5 ML VIAL: Performed by: INTERNAL MEDICINE

## 2023-05-03 PROCEDURE — 80053 COMPREHEN METABOLIC PANEL: CPT | Performed by: INTERNAL MEDICINE

## 2023-05-03 PROCEDURE — 83036 HEMOGLOBIN GLYCOSYLATED A1C: CPT | Performed by: NURSE PRACTITIONER

## 2023-05-03 PROCEDURE — 25010000002 DIPHENHYDRAMINE PER 50 MG: Performed by: INTERNAL MEDICINE

## 2023-05-03 PROCEDURE — 85025 COMPLETE CBC W/AUTO DIFF WBC: CPT | Performed by: INTERNAL MEDICINE

## 2023-05-03 PROCEDURE — 96415 CHEMO IV INFUSION ADDL HR: CPT

## 2023-05-03 PROCEDURE — 96375 TX/PRO/DX INJ NEW DRUG ADDON: CPT

## 2023-05-03 PROCEDURE — 96417 CHEMO IV INFUS EACH ADDL SEQ: CPT

## 2023-05-03 PROCEDURE — 25010000002 HEPARIN LOCK FLUSH PER 10 UNITS: Performed by: INTERNAL MEDICINE

## 2023-05-03 PROCEDURE — 25010000002 PALONOSETRON PER 25 MCG: Performed by: INTERNAL MEDICINE

## 2023-05-03 PROCEDURE — 96413 CHEMO IV INFUSION 1 HR: CPT

## 2023-05-03 RX ORDER — PALONOSETRON 0.05 MG/ML
0.25 INJECTION, SOLUTION INTRAVENOUS ONCE
Status: CANCELLED | OUTPATIENT
Start: 2023-05-03

## 2023-05-03 RX ORDER — SODIUM CHLORIDE 9 MG/ML
250 INJECTION, SOLUTION INTRAVENOUS ONCE
Status: COMPLETED | OUTPATIENT
Start: 2023-05-03 | End: 2023-05-03

## 2023-05-03 RX ORDER — SODIUM CHLORIDE 0.9 % (FLUSH) 0.9 %
10 SYRINGE (ML) INJECTION AS NEEDED
Status: DISCONTINUED | OUTPATIENT
Start: 2023-05-03 | End: 2023-05-03 | Stop reason: HOSPADM

## 2023-05-03 RX ORDER — METHYLPREDNISOLONE SODIUM SUCCINATE 125 MG/2ML
40 INJECTION, POWDER, LYOPHILIZED, FOR SOLUTION INTRAMUSCULAR; INTRAVENOUS ONCE
Status: COMPLETED | OUTPATIENT
Start: 2023-05-03 | End: 2023-05-03

## 2023-05-03 RX ORDER — HEPARIN SODIUM (PORCINE) LOCK FLUSH IV SOLN 100 UNIT/ML 100 UNIT/ML
500 SOLUTION INTRAVENOUS AS NEEDED
OUTPATIENT
Start: 2023-05-03

## 2023-05-03 RX ORDER — SODIUM CHLORIDE 9 MG/ML
250 INJECTION, SOLUTION INTRAVENOUS ONCE
Status: CANCELLED | OUTPATIENT
Start: 2023-05-03

## 2023-05-03 RX ORDER — ZOLEDRONIC ACID 0.04 MG/ML
4 INJECTION, SOLUTION INTRAVENOUS ONCE
OUTPATIENT
Start: 2023-06-14

## 2023-05-03 RX ORDER — FAMOTIDINE 10 MG/ML
20 INJECTION, SOLUTION INTRAVENOUS ONCE
Status: CANCELLED | OUTPATIENT
Start: 2023-05-03

## 2023-05-03 RX ORDER — DIPHENHYDRAMINE HYDROCHLORIDE 50 MG/ML
50 INJECTION INTRAMUSCULAR; INTRAVENOUS AS NEEDED
Status: CANCELLED | OUTPATIENT
Start: 2023-05-03

## 2023-05-03 RX ORDER — FAMOTIDINE 10 MG/ML
20 INJECTION, SOLUTION INTRAVENOUS ONCE
Status: COMPLETED | OUTPATIENT
Start: 2023-05-03 | End: 2023-05-03

## 2023-05-03 RX ORDER — PALONOSETRON 0.05 MG/ML
0.25 INJECTION, SOLUTION INTRAVENOUS ONCE
Status: COMPLETED | OUTPATIENT
Start: 2023-05-03 | End: 2023-05-03

## 2023-05-03 RX ORDER — SODIUM CHLORIDE 9 MG/ML
250 INJECTION, SOLUTION INTRAVENOUS ONCE
OUTPATIENT
Start: 2023-06-14

## 2023-05-03 RX ORDER — HEPARIN SODIUM (PORCINE) LOCK FLUSH IV SOLN 100 UNIT/ML 100 UNIT/ML
500 SOLUTION INTRAVENOUS AS NEEDED
Status: DISCONTINUED | OUTPATIENT
Start: 2023-05-03 | End: 2023-05-03 | Stop reason: HOSPADM

## 2023-05-03 RX ORDER — FAMOTIDINE 10 MG/ML
20 INJECTION, SOLUTION INTRAVENOUS AS NEEDED
Status: CANCELLED | OUTPATIENT
Start: 2023-05-03

## 2023-05-03 RX ORDER — SODIUM CHLORIDE 0.9 % (FLUSH) 0.9 %
10 SYRINGE (ML) INJECTION AS NEEDED
OUTPATIENT
Start: 2023-05-03

## 2023-05-03 RX ADMIN — FAMOTIDINE 20 MG: 10 INJECTION INTRAVENOUS at 09:26

## 2023-05-03 RX ADMIN — PALONOSETRON HYDROCHLORIDE 0.25 MG: 0.25 INJECTION INTRAVENOUS at 09:31

## 2023-05-03 RX ADMIN — METHYLPREDNISOLONE SODIUM SUCCINATE 40 MG: 125 INJECTION, POWDER, FOR SOLUTION INTRAMUSCULAR; INTRAVENOUS at 09:28

## 2023-05-03 RX ADMIN — Medication 10 ML: at 13:44

## 2023-05-03 RX ADMIN — HEPARIN 500 UNITS: 100 SYRINGE at 13:44

## 2023-05-03 RX ADMIN — SODIUM CHLORIDE 250 ML: 9 INJECTION, SOLUTION INTRAVENOUS at 09:24

## 2023-05-03 RX ADMIN — DOCETAXEL 135 MG: 20 INJECTION, SOLUTION, CONCENTRATE INTRAVENOUS at 10:29

## 2023-05-03 RX ADMIN — DIPHENHYDRAMINE HYDROCHLORIDE 25 MG: 50 INJECTION, SOLUTION INTRAMUSCULAR; INTRAVENOUS at 09:33

## 2023-05-03 RX ADMIN — CYCLOPHOSPHAMIDE 1080 MG: 200 INJECTION, SOLUTION INTRAVENOUS at 12:44

## 2023-05-03 NOTE — PROGRESS NOTES
Subjective     CHIEF COMPLAINT:      Chief Complaint   Patient presents with   • Follow-up     No concerns     HISTORY OF PRESENT ILLNESS:     Ina Akers is a 52 y.o. female patient who returns today for follow up on her breast cancer.  She returns today for follow-up reporting that she tolerated the last 2 infusions of Taxotere without infusion reaction.  She had less bone pain following a Udenyca injection.  She took Claritin regularly.    Patient continues to take ferrous sulfate 325 mg daily.  No problem with constipation or abdominal pain.  She has occasional soft bowel movements.    ROS:  Pertinent ROS is in the HPI.     Past medical, surgical, social and family history were reviewed.     MEDICATIONS:    Current Outpatient Medications:   •  acetaminophen (TYLENOL) 500 MG tablet, Take 2 tablets by mouth Every 6 (Six) Hours As Needed for Mild Pain., Disp: , Rfl:   •  amLODIPine (NORVASC) 10 MG tablet, TAKE ONE TABLET BY MOUTH DAILY, Disp: 90 tablet, Rfl: 1  •  atorvastatin (LIPITOR) 20 MG tablet, Take 1 tablet by mouth Daily. (Patient taking differently: Take 1 tablet by mouth Every Night.), Disp: 90 tablet, Rfl: 3  •  Blood Glucose Monitoring Suppl w/Device kit, Check blood sugar twice a day, Disp: 1 each, Rfl: 0  •  cholecalciferol (VITAMIN D3) 25 MCG (1000 UT) tablet, Take 1 tablet by mouth Daily., Disp: , Rfl:   •  dexamethasone (DECADRON) 4 MG tablet, Take 2 tablets oral twice a day for 3 consecutive days beginning the day before chemotherapy and continue for 6 doses., Disp: 12 tablet, Rfl: 3  •  famotidine (PEPCID) 20 MG tablet, Take 1 tablet by mouth Every Other Day., Disp: , Rfl:   •  ferrous sulfate 325 (65 FE) MG tablet, Take 1 tablet by mouth Daily., Disp: , Rfl:   •  Glucose Blood (BLOOD GLUCOSE TEST) strip, Check blood sugar twice a day., Disp: 100 each, Rfl: 1  •  labetalol (NORMODYNE) 100 MG tablet, Take 0.5 tablets by mouth 2 (Two) Times a Day., Disp: 180 tablet, Rfl: 1  •  Lancets  "(ACCU-CHEK SOFT TOUCH) lancets, Check blood sugar twice a day as needed, Disp: 100 each, Rfl: 1  •  lansoprazole (PREVACID) 30 MG capsule, TAKE ONE CAPSULE BY MOUTH DAILY (Patient taking differently: Take 1 capsule by mouth Every Other Day.), Disp: 90 capsule, Rfl: 2  •  levothyroxine (SYNTHROID, LEVOTHROID) 112 MCG tablet, Take 1 tablet by mouth Every Morning., Disp: , Rfl:   •  losartan (COZAAR) 100 MG tablet, Take 1 tablet by mouth Daily., Disp: , Rfl:   •  metFORMIN ER (GLUCOPHAGE-XR) 500 MG 24 hr tablet, TAKE TWO TABLETS BY MOUTH DAILY WITH DINNER, Disp: 180 tablet, Rfl: 3  •  nystatin-Lidocaine Viscous HCl-diphenhydrAMINE in aluminum-magnesium hydroxide-simethicone suspension, Swish and spit 10 mL Every 4 (Four) Hours As Needed for pain or thrush, Disp: 410 mL, Rfl: 0  •  promethazine (PHENERGAN) 12.5 MG tablet, TAKE 1/2 TO 1 TABLET BY MOUTH EVERY 4 HOURS AS NEEDED FOR NAUSEA AND VOMITING -MAY MAKE DROWSY (Patient taking differently: Take 1-2 tablets by mouth Every 6 (Six) Hours As Needed. TAKE 1/2 TO 1 TABLET BY MOUTH EVERY 4 HOURS AS NEEDED FOR NAUSEA AND VOMITING -MAY MAKE DROWSY), Disp: 30 tablet, Rfl: 3  •  triamcinolone (KENALOG) 0.1 % cream, Apply  topically to the appropriate area as directed 2 (Two) Times a Day. Sparingly shortest time possible avoid face (Patient taking differently: Apply  topically to the appropriate area as directed As Needed. Sparingly shortest time possible avoid face), Disp: 80 g, Rfl: 0  •  promethazine (PHENERGAN) 12.5 MG tablet, Take 1 tablet by mouth Every 6 (Six) Hours As Needed for Nausea or Vomiting. (Patient not taking: Reported on 4/12/2023), Disp: 10 tablet, Rfl: 0  Objective     VITAL SIGNS:     Vitals:    05/03/23 0813   BP: 122/74   Pulse: 89   Resp: 16   Temp: 97.7 °F (36.5 °C)   TempSrc: Temporal   SpO2: 96%   Weight: 84 kg (185 lb 3.2 oz)   Height: 154.9 cm (60.98\")   PainSc: 0-No pain     Body mass index is 35.01 kg/m².     Wt Readings from Last 5 Encounters: "   05/03/23 84 kg (185 lb 3.2 oz)   04/12/23 84.1 kg (185 lb 6.4 oz)   03/22/23 82.5 kg (181 lb 14.4 oz)   03/08/23 81.3 kg (179 lb 3.2 oz)   03/01/23 81.8 kg (180 lb 4.8 oz)     PHYSICAL EXAMINATION:  GENERAL: The patient appears in good general condition, not in acute distress.   SKIN: No ecchymosis.  EYES: No jaundice. No pallor.  LYMPHATICS: No cervical lymphadenopathy.  CHEST: Normal respiratory effort.  Lungs clear bilaterally.  No added sounds.  CVS: Normal S1-S2.  No murmurs.  ABDOMEN: Soft. No tenderness. No Hepatomegaly. No Splenomegaly. No masses.  EXTREMITIES: No edema.    DIAGNOSTIC DATA:     Results from last 7 days   Lab Units 05/03/23  0803   WBC 10*3/mm3 7.15   NEUTROS ABS 10*3/mm3 6.03   HEMOGLOBIN g/dL 9.4*   HEMATOCRIT % 29.7*   PLATELETS 10*3/mm3 310     Results from last 7 days   Lab Units 05/03/23  0803   SODIUM mmol/L 139   POTASSIUM mmol/L 4.0   CHLORIDE mmol/L 105   CO2 mmol/L 19.2*   BUN mg/dL 19   CREATININE mg/dL 0.76   CALCIUM mg/dL 9.3   ALBUMIN g/dL 4.2   BILIRUBIN mg/dL 0.3   ALK PHOS U/L 63   ALT (SGPT) U/L 18   AST (SGOT) U/L 15   GLUCOSE mg/dL 136*       Assessment & Plan    *Left breast cancer.  · It was identified on screening mammograms on 10/5/2022.  · It was located at 12 o'clock position.  · It measured 6 x 5 by 5 mm on mammogram and ultrasound on 10/20/2022.  · PET scan on 10/6/2022 showed the lesion to be hypermetabolic. It measured 10 x 7 mm with an SUV of 5.8.  · No evidence of metastasis on PET scan.  · MRI on 12/5/2022 showed the lesion to measure 9 mm.    · S/P mastectomy with reconstruction on 1/24/2023.  · Pathology exam revealed the primary tumor to measure 15 mm.  · It was grade 3.  Triple negative.  HER2 was negative-0 by IHC.  · Ki-67 was 85%.  · It was considered pT1cN0.  · 2 sentinel lymph nodes were examined and were negative.    · No lymphovascular invasion. 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  · Due to the patient receiving Adriamycin in the past, she is unable to  receive additional Adriamycin.  · Taxotere and Cytoxan with Neulasta support every 3 weeks x 4 cycles was recommended.  · She started Taxotere and Cytoxan on 3/8/2023.  · She had infusion reaction to Taxotere which was briefly held.  She was given IV Solu-Cortef.  It was restarted at a slower rate and she tolerated the infusion.  · She tolerated cycle #2 and 3 infusion without problem of infusion reaction.  · She is ready to proceed with the fourth and final cycle of Taxotere and Cytoxan today.    *Metastatic thyroid cancer diagnosed on 10/6/2020.  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  · She had right thyroid lobectomy on 12/17/2018 at [place].    · The specimen was sent for outside consultation and the final conclusion was that there was no evidence of malignancy.  · MRI on 8/27/2020 revealed an L4 lesion resulting in pathologic compression fracture.  · Bone scan on 9/2/2020 revealed increased activity at L4 and left side of the skull.  · PET scan on 9/9/2020 revealed abnormal activity at L4, right femoral neck, lower sacrum, left ischium and right ninth medial aspect.  · PET scan 9/9/2020 showed no evidence of visceral metastasis.  · Patient had kyphoplasty on 9/17/2020.  Pathology was nondiagnostic.  · CT-guided biopsy of the lesion in the right posterior ninth rib on 10/6/2020 revealed a clear cell neoplasm.  It is positive for PAX 8 and TTF-1 favoring metastatic thyroid cancer.  · Patient was referred to [facility].  · Patient underwent completion thyroidectomy on 11/9/2020.  · There was no evidence of uptake of radioactive iodine on SPECT imaging on 12/18/2020.  · This was indicative of the tumor not going to respond to radioactive iodine therapy.  · Patient was started on Lenvima 20 mg daily on 12/24/2020.  · Lenvima was held on 2/6/2021 due to drug induced anemia and thrombocytopenia.  She was unable to start back on it due to prolonged cytopenias.  · CT scans on  3/23/2021 showed stable disease.  · CBC was stable on 6/8/2021.  CT scans showed stable disease.  Thyroglobulin was stable at 3.4.    · PET scan on 9/1/2021 revealed decrease in the activity in the metastatic lesions.    · Starting back on active treatment (Nexavar) was not recommended.  · PET scan on 12/16/2021 showed complete response except for mild hypermetabolism in the left ischium.  · CT scans on 4/8/2022 showed stable bone metastasis.  No new metastatic lesions.  · PET scan on 10/6/2022 revealed no new areas of metastasis.  · Due to the stable metastatic disease, she is not currently on treatment for her thyroid cancer.   · She is on Synthroid 112 mcg daily.    *Bone metastasis.  · Patient was given Zometa on 9/29/2020.  · Patient underwent right femur medullary nailing on 10/23/2020.  · Patient was restarted back on Zometa on 2/24/2022.   · She is tolerating the Zometa infusions well.  · CT scans on 4/8/2022 revealed no new metastatic lesions.  The metastatic lesions were stable.  · PET scan on 10/6/2022 revealed no change in the areas of activity in the bone metastasis.  No new lesions.  · Last dose of Zometa was given on 12/1/2022.  · Plan to resume Zometa after completion of chemotherapy breast cancer.    * Iron deficiency anemia.    · Patient was previously treated with ferrous sulfate 325 mg daily.  · Hemoglobin decreased to 9.1 on 2/24/2022.  · EGD on 3/25/2022 showed no evidence of blood loss.  · Pathology exam revealed no typical features of celiac disease.  · Hemoglobin was 10.4 on 12/27/2022.  · Ferritin was 95 and transferrin saturation was 14%.  · She was started on ferrous sulfate 325 mg daily.  · Hemoglobin decreased to 9.6 on 2/1/2023, secondary to recent surgical blood losses.  · She was restarted on ferrous sulfate 325 mg daily on 2/1/2023.  · Hemoglobin is 9.4 today.    *History of left breast cancer, stage IIB, triIple negative.   · S/P lumpectomy in 2001.  · She received adjuvant  chemotherapy on the NSABP B34 with AC followed by Taxotere.  · S/P post-lumpectomy radiation therapy.   · She was placed on tamoxifen for 5 years and it was completed in January 2007.   · With the patient's history of breast cancer at a young age and development of a second new breast cancer in the left breast, repeat genetic testing was recommended.  · Gene testing on 12/22/2022 revealed variants of unknown significance.    PLAN:    1.  Proceed with cycle #4 of Taxotere and Cytoxan today.    2.  Taxotere will be infused over 120 minutes.  She will receive IV Solu-Cortef as a premedication.    3.  Return tomorrow for Udenyca injection.  4.  Continue ferrous sulfate 325 mg once daily.  5.  She will be scheduled to receive Zometa in 6 weeks.  CBC CMP magnesium phosphorus levels will be obtained.  6.  She has follow-up with Dr. Huddleston in late June 2023.  7.  I will see him in follow-up in 3 months.  She will be scheduled to receive Zometa.  CBC CMP magnesium phosphorus levels will be obtained.          Tim Muñoz MD  05/03/23

## 2023-05-04 ENCOUNTER — INFUSION (OUTPATIENT)
Dept: ONCOLOGY | Facility: HOSPITAL | Age: 53
End: 2023-05-04
Payer: COMMERCIAL

## 2023-05-04 VITALS — TEMPERATURE: 97.8 F | RESPIRATION RATE: 18 BRPM

## 2023-05-04 DIAGNOSIS — Z17.1 MALIGNANT NEOPLASM OF UPPER-OUTER QUADRANT OF LEFT BREAST IN FEMALE, ESTROGEN RECEPTOR NEGATIVE: Primary | ICD-10-CM

## 2023-05-04 DIAGNOSIS — C50.412 MALIGNANT NEOPLASM OF UPPER-OUTER QUADRANT OF LEFT BREAST IN FEMALE, ESTROGEN RECEPTOR NEGATIVE: Primary | ICD-10-CM

## 2023-05-04 PROCEDURE — 96372 THER/PROPH/DIAG INJ SC/IM: CPT

## 2023-05-04 PROCEDURE — 25010000002 PEGFILGRASTIM-CBQV 6 MG/0.6ML SOLUTION PREFILLED SYRINGE: Performed by: INTERNAL MEDICINE

## 2023-05-04 RX ADMIN — PEGFILGRASTIM-CBQV 6 MG: 6 INJECTION, SOLUTION SUBCUTANEOUS at 14:26

## 2023-05-04 NOTE — NURSING NOTE
Pt arrived for undenyca injection with no complaints or concerns voiced at this time.  Injection administered without incidence. F/u appt reviewed with pt and instructed to call the office for any concerns prior to next appt. Pt vu and discharged in stable condition.

## 2023-05-09 ENCOUNTER — TELEPHONE (OUTPATIENT)
Dept: ONCOLOGY | Facility: CLINIC | Age: 53
End: 2023-05-09
Payer: COMMERCIAL

## 2023-05-09 NOTE — TELEPHONE ENCOUNTER
Spoke with patient and let her know this has been sent to the appropriate department. We will let her know when the paperwork is received. She v/u.

## 2023-05-09 NOTE — TELEPHONE ENCOUNTER
Caller: Ina Akers    Relationship: Self    Best call back number: [phone]    What is the best time to reach you: ANYTIME    Who are you requesting to speak with (clinical staff, provider,  specific staff member): CLINICAL    What was the call regarding: PT EMPLOYER ([employer]) WILL BE SENDING ADDITIONAL PAPERWORK OVER REGARDING PART TIME SHORT TERM DISABILITY THROUGH [employer].     THIS WILL BE FAXED OVER TODAY 05/09/23    Do you require a callback: YES - TO LET PT KNOW THAT PAPERWORK WAS RECEIVED.

## 2023-05-12 ENCOUNTER — OFFICE VISIT (OUTPATIENT)
Dept: FAMILY MEDICINE CLINIC | Facility: CLINIC | Age: 53
End: 2023-05-12
Payer: COMMERCIAL

## 2023-05-12 VITALS
WEIGHT: 183.5 LBS | SYSTOLIC BLOOD PRESSURE: 112 MMHG | DIASTOLIC BLOOD PRESSURE: 74 MMHG | HEIGHT: 61 IN | TEMPERATURE: 97.3 F | HEART RATE: 98 BPM | BODY MASS INDEX: 34.64 KG/M2 | RESPIRATION RATE: 14 BRPM | OXYGEN SATURATION: 98 %

## 2023-05-12 DIAGNOSIS — Z00.00 HEALTH MAINTENANCE EXAMINATION: Primary | ICD-10-CM

## 2023-05-12 PROCEDURE — 99396 PREV VISIT EST AGE 40-64: CPT | Performed by: NURSE PRACTITIONER

## 2023-05-12 RX ORDER — LOSARTAN POTASSIUM 100 MG/1
100 TABLET ORAL DAILY
Qty: 90 TABLET | Refills: 3 | Status: SHIPPED | OUTPATIENT
Start: 2023-05-12

## 2023-05-12 RX ORDER — LABETALOL 100 MG/1
50 TABLET, FILM COATED ORAL 2 TIMES DAILY
Qty: 180 TABLET | Refills: 3 | Status: SHIPPED | OUTPATIENT
Start: 2023-05-12

## 2023-05-12 RX ORDER — METFORMIN HYDROCHLORIDE 500 MG/1
1000 TABLET, EXTENDED RELEASE ORAL
Qty: 180 TABLET | Refills: 3 | Status: SHIPPED | OUTPATIENT
Start: 2023-05-12

## 2023-05-12 NOTE — PATIENT INSTRUCTIONS
Discharge instructions    Continue present care you are doing spectacular,  Follow-up with oncology hematology  Continue present medications as long as you are doing well  Follow-up in 6 months,    And labs before your next visit.

## 2023-05-12 NOTE — PROGRESS NOTES
"Chief Complaint  Annual Exam    Subjective        Ina Akers presents to [facility]  History of Present Illness  Patient is here today for annual physical, she is presently being treated for breast cancer, left, she had cancer 20 years ago and unfortunately returned, just finished chemo she had 4 rounds last 8 days ago she had some complications and some nausea did not feel well but overall she is pull through.  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  For which she has received radiation treatments as well.    Right hip reconstruction she still has some pins and rods, but overall she is doing well she is supposed to use a cane if she walks long distances to prevent too much stress, she may be a hip replacement candidate at some point.  So far everything is stable here presently.  Acquired hypothyroid due to thyroid cancer,  Patient is compliant with all her medications  Controlled diabetes mellitus type 2  Vitamin D deficiency takes supplement vitamin D    Patient has good family support,  is supportive, thankfully she is gifted with a positive attitude and bita.        Objective   Vital Signs:  /74   Pulse 98   Temp 97.3 °F (36.3 °C) (Infrared)   Resp 14   Ht 154.9 cm (60.98\")   Wt 83.2 kg (183 lb 8 oz)   SpO2 98%   BMI 34.69 kg/m²   Estimated body mass index is 34.69 kg/m² as calculated from the following:    Height as of this encounter: 154.9 cm (60.98\").    Weight as of this encounter: 83.2 kg (183 lb 8 oz).          Physical Exam  Vitals reviewed.   Constitutional:       General: She is not in acute distress.     Appearance: Normal appearance. She is well-developed. She is not ill-appearing, toxic-appearing or diaphoretic.      Comments: Pleasant appears well smiling   HENT:      Head: Normocephalic.      Nose: Nose normal. "      Mouth/Throat:      Mouth: Mucous membranes are moist.   Eyes:      General: No scleral icterus.     Conjunctiva/sclera: Conjunctivae normal.      Pupils: Pupils are equal, round, and reactive to light.   Neck:      Thyroid: No thyromegaly.      Vascular: No JVD.      Comments: Surgical scar thyroid, neck is supple  No cervical adenopathy  Cardiovascular:      Rate and Rhythm: Normal rate and regular rhythm.      Heart sounds: Normal heart sounds. No murmur heard.    No friction rub. No gallop.   Pulmonary:      Effort: Pulmonary effort is normal. No respiratory distress.      Breath sounds: Normal breath sounds. No stridor. No wheezing or rales.   Abdominal:      General: Bowel sounds are normal. There is no distension.      Palpations: Abdomen is soft.      Tenderness: There is no abdominal tenderness.      Comments: No hepatosplenomegaly, no ascites,   Musculoskeletal:         General: No tenderness.      Cervical back: Neck supple.   Lymphadenopathy:      Cervical: No cervical adenopathy.   Skin:     General: Skin is warm and dry.      Capillary Refill: Capillary refill takes less than 2 seconds.      Findings: No erythema or rash.   Neurological:      General: No focal deficit present.      Mental Status: She is alert and oriented to person, place, and time. Mental status is at baseline.      Deep Tendon Reflexes: Reflexes are normal and symmetric.   Psychiatric:         Mood and Affect: Mood normal.         Behavior: Behavior normal.         Thought Content: Thought content normal.         Judgment: Judgment normal.        Result Review :                Assessment and Plan   Diagnoses and all orders for this visit:    1. Health maintenance examination (Primary)    Other orders  -     labetalol (NORMODYNE) 100 MG tablet; Take 0.5 tablets by mouth 2 (Two) Times a Day.  Dispense: 180 tablet; Refill: 3  -     losartan (COZAAR) 100 MG tablet; Take 1 tablet by mouth Daily.  Dispense: 90 tablet; Refill: 3  -      metFORMIN ER (GLUCOPHAGE-XR) 500 MG 24 hr tablet; Take 2 tablets by mouth Daily With Dinner.  Dispense: 180 tablet; Refill: 3             Follow Up   Return in about 6 months (around 11/12/2023) for Labs before next visit.  Patient was given instructions and counseling regarding her condition or for health maintenance advice. Please see specific information pulled into the AVS if appropriate.     Patient Instructions   Discharge instructions    Continue present care you are doing spectacular,  Follow-up with oncology hematology  Continue present medications as long as you are doing well  Follow-up in 6 months,    And labs before your next visit.         Patient continue present plan she is doing very well encouraged patient as she already eats healthy, no new stress needed just continue what she is doing presently she can always cut back a little especially breads and [place] but no need to be trying to increase exercise etc.    She will follow-up with    Oncologist we discussed other potential medications  Likely she would not be a candidate for L same-day she has follicular thyroid cancer left medullary  I would assume it is contraindicated as well and she will discuss this with her oncologist    As long as she is doing well she will follow-up in 6 months

## 2023-05-25 ENCOUNTER — HOSPITAL ENCOUNTER (OUTPATIENT)
Dept: PHYSICAL THERAPY | Facility: HOSPITAL | Age: 53
Setting detail: THERAPIES SERIES
Discharge: HOME | End: 2023-05-25
Payer: COMMERCIAL

## 2023-05-25 DIAGNOSIS — Z90.13 S/P MASTECTOMY, BILATERAL: Primary | ICD-10-CM

## 2023-05-25 DIAGNOSIS — Z98.890 HISTORY OF LUMPECTOMY OF LEFT BREAST: ICD-10-CM

## 2023-05-25 DIAGNOSIS — Z91.89 AT RISK FOR LYMPHEDEMA: ICD-10-CM

## 2023-05-25 DIAGNOSIS — Z85.850 HISTORY OF THYROID CANCER: ICD-10-CM

## 2023-05-25 PROCEDURE — 97535 SELF CARE MNGMENT TRAINING: CPT | Performed by: PHYSICAL THERAPIST

## 2023-05-25 PROCEDURE — 93702 BIS XTRACELL FLUID ANALYSIS: CPT | Performed by: PHYSICAL THERAPIST

## 2023-05-25 NOTE — THERAPY PROGRESS REPORT/RE-CERT
Outpatient Physical Therapy Lymphedema Progress Note  [facility]     Patient Name: Ina Akers  : 1970  MRN: [id number]  Today's Date: 2023        Visit Date: 2023    Visit Dx:    ICD-10-CM ICD-9-CM   1. S/P mastectomy, bilateral  Z90.13 V45.71   2. At risk for lymphedema  Z91.89 V49.89   3. History of lumpectomy of left breast  Z98.890 V45.89   4. History of thyroid cancer  Z85.850 V10.87       Patient Active Problem List   Diagnosis   • Abnormal perimenopausal bleeding   • Amenorrhea   • Atopic dermatitis   • External hemorrhoids   • Gastritis   • Hypertension   • Iron deficiency anemia   • Irritable bowel syndrome   • Melanocytic nevus   • Goiter   • Tinea corporis   • Type 2 diabetes mellitus   • Lipid screening   • Malignant neoplasm of upper-outer quadrant of left breast in female, estrogen receptor negative   • Acute pain of both knees   • Controlled type 2 diabetes mellitus without complication, without long-term current use of insulin   • Hyperlipidemia   • Vitamin D deficiency   • Multiple thyroid nodules   • Subclinical hypothyroidism   • Lumbar spine tumor   • Metastasis to bone   • Fracture, pathologic, femur, neck, right, initial encounter   • Immunocompromised   • Encounter for screening for malignant neoplasm of colon   • Screen for colon cancer   • COVID-19 virus infection   • Gastroesophageal reflux disease   • Primary cancer of thyroid gland metastatic to bone   • Acute pain of left shoulder         Lymphedema     Row Name 23 1000             Subjective Pain    Able to rate subjective pain? yes  -JS      Pre-Treatment Pain Level 0  -JS         Lymphedema Assessment    Lymphedema Surgery Comments S/p L mastectomy, repeat axillary dissection on 23. Port placement 23. History of L lumpectomy, axillary dissection in , and history of metastatic thyroid cancer with thyroid surgery in  & , related R hip & back surgery in   -JS      Lymph  Nodes Removed # --  repeat axillary dissection  -JS      Cancer Comments Plans to discuss with surgeon scheduling implant exchange surgery for Dec 2023  -JS      Chemo Received yes  -JS      Chemo Treatments #/Timeframe Completed  -JS      Radiation Therapy Received no  -JS      Infections or Cellulitis? no  -JS      Lymphedema Assessment Comments Prior chemo & radiation following 2001 lumpectomy  -JS         Right Upper Ext    Rt Shoulder Abduction AROM 158  -JS      Rt Shoulder Flexion AROM 158  -JS      Rt Shoulder External Rotation AROM THAIS to T2  -JS      Rt Shoulder Internal Rotation AROM FIR to T9  -JS         Left Upper Ext    Lt Shoulder Abduction AROM 156  -JS      Lt Shoulder Flexion AROM 158  -JS      Lt Shoulder External Rotation AROM THAIS to T2  -JS      Lt Shoulder Internal Rotation AROM FIR to T9  -JS      Lt Upper Extremity Comments  Mild tightness endrange L shoulder flex, abd  -JS         MMT (Manual Muscle Testing)    General MMT Comments B shoulder/elbow strength 4+/5 B  -JS         Lymphedema Edema Assessment    Edema Assessment Comment L UE swelling immediately proximal to L wrist. No swelling L hand, nor in other areas of L UE.  -JS         Manual Lymphatic Drainage    Manual Therapy Reviewed self MLD with pt to continue to perform at home.  -JS         Compression/Skin Care    Compression/Skin Care Comments Discussed weaning from UE compression sleeve in the next 2 weeks, then completely in the following 2 weeks as long as pt remains symptom free.  -JS         L-Dex Bioimpedence Screening    L-Dex Measurement Extremity LUE  -JS      L-Dex Patient Position Standing  -JS      L-Dex UE Dominate Side Right  -JS      L-Dex UE At Risk Side Left  -JS      L-Dex UE Pre Surgical Value Yes  -JS      L-Dex UE Score 4.3  -JS      L-Dex UE Baseline Score 4.4  -JS      L-Dex UE Value Change -0.1  -JS      L-Dex UE Comment Decreased, WNL  -JS      $ L-Dex Charge yes  -JS            User Key  (r) = Recorded  By, (t) = Taken By, (c) = Cosigned By    Initials Name Provider Type    Melani Ambrosio, PT Physical Therapist                   PT Ortho     Row Name 05/25/23 1000       Subjective Comments    Subjective Comments Weaning from sleeve to 3x/week for 1/2 days at this time. Patient reports no change in swelling L proximal wrist, but is assymptomic.  Walking with daughter a few times/week, has returned to work 3x/week. Daughter is pregnant and due in August, therefore hopes to schedule implant exchange surgery in December so she can initially help with the baby.  -JERMAINE          User Key  (r) = Recorded By, (t) = Taken By, (c) = Cosigned By    Initials Name Provider Type    Melani Ambrosio PT Physical Therapist                         PT Assessment/Plan     Row Name 05/25/23 1000          PT Assessment    Assessment Comments Pt is a 51 y/o female recently diagnosed with triple negative L breast CA, s/p L mastectomy with tissue expander placement and repeat axillary dissection on 1/24/23. Pt has now completed chemotherapy treatment. PMH includes history of L breast CA, s/p lumpectomy and axillary dissection (2/9 lymph nodes) in 2001. At that time, pt received chemo and radiation. Further PMH includes thyroid CA, s/p thyroid surgery in 2018 & 2020, with additional R hip surgery and back surgery due to metastatic disease. Patient presents for 1 month bioimpedance reassessment, following improvement in score last visit and initial weaning from sleeve.  Patient is compliant with self MLD & currently using sleeve 3x/week for half days and is assymptomatic though mild L proximal wrist swelling remains present compared to opposite side. Repeat bioimpedance indicates further improvement of score to 4.2 which is WNL & improved from last assessment.  Patient with improved tolerance and awareness of use of compression sleeve, including ensuring proper donning technique to prevent rolling of silicone band at the top over the past  month.Education provided on further weaning from sleeve over the next month as long as swelling remains reduced & pt remains assymptomatic (see education). Patient will benefit from ongoing skilled PT, with repeat bioimpedance in 1 month to ensure stability of score as pt weans completely from sleeve. Patient encouraged to contact PT with any questions, concerns or changes in the interim.  -JS        PT Plan    PT Plan Comments Pt to wean from sleeve in the next month as tolerated if remaining symptom free.  Repeat bioimpedance in 1 month.  Reassess Quick Dash next visit.  -JS           User Key  (r) = Recorded By, (t) = Taken By, (c) = Cosigned By    Initials Name Provider Type    Melani Ambrosio, PT Physical Therapist                    OP Exercises     Row Name 05/25/23 1000             Subjective Comments    Subjective Comments Weaning from sleeve to 3x/week for 1/2 days at this time. Patient reports no change in swelling L proximal wrist, but is assymptomic.  Walking with daughter a few times/week, has returned to work 3x/week. Daughter is pregnant and due in August, therefore hopes to schedule implant exchange surgery in December so she can initially help with the baby.  -JS         Subjective Pain    Able to rate subjective pain? yes  -JS      Pre-Treatment Pain Level 0  -JS         Total Minutes    85143 - PT Therapeutic Exercise Minutes 5  -JS         Exercise 1    Exercise Name 1 Post shoulder rolls  -JS      Reps 1 Reviewed  -JS         Exercise 2    Exercise Name 2 Scapular retraction  -JS      Reps 2 Reviewed  -JS         Exercise 3    Exercise Name 3 B shoulder flex AAROM  -JS      Reps 3 Reviewed in sitting  -JS         Exercise 4    Exercise Name 4 B shoulder ER- supine butterfly stretch  -JS      Reps 4 reviewed in sitting  -JS         Exercise 5    Exercise Name 5 UE wall slides  -JS      Reps 5 reviewed  -JS         Exercise 6    Exercise Name 6 --  -JS            User Key  (r) = Recorded By, (t) =  Taken By, (c) = Cosigned By    Initials Name Provider Type    Melani Ambrosio, PT Physical Therapist                             PT OP Goals     Row Name 05/25/23 1000          Long Term Goals    LTG Date to Achieve 05/25/23  -     LTG 1 Pt will be independent in comprehensive HEP to allow ongoing symptom management.  -JS     LTG 1 Progress Ongoing  -     LTG 1 Progress Comments Reviewed postural, ROM exercises  -     LTG 2 Pt will obtain properly fitted UE compression sleeve and verbalize appropriate wear schedule.  -JS     LTG 2 Progress Met  -JS     LTG 3 Pt will maintain L-Dex bioimpedance score WNL.  -JS     LTG 3 Progress Ongoing;Progressing  -JS     LTG 3 Progress Comments Score 4.3, WNL & decreased  -JS     LTG 4 Pt will demonstrate normal L shoulder AROM.  -     LTG 4 Progress Met  -     LTG 5 Pt will score 10 or less on Quick Dash to return to previously reported level of function.  -     LTG 5 Progress Ongoing;Progressing  -           User Key  (r) = Recorded By, (t) = Taken By, (c) = Cosigned By    Initials Name Provider Type    Melani Ambrosio, PT Physical Therapist                Therapy Education  Education Details: Discussed score & significance of bioimpedance assessment.  Advised pt to wean from UE compression sleeve over the next 2 weeks, then completely out of sleeve following weeks if remaining assymptomatic.  Pt to continue self MLD L UE. Reviewed signs/symptoms of lymphedema and discussed use of UE compression sleeve during heavy exercise/activity, air travel.  Reviewed HEP & discussed postural awareness during work activity/computer use.              Time Calculation:   Start Time: 1005  Stop Time: 1040  Time Calculation (min): 35 min  Timed Charges  32337 - PT Therapeutic Exercise Minutes: 5  Total Minutes  Timed Charges Total Minutes: 5   Total Minutes: 5   Therapy Charges for Today     Code Description Service Date Service Provider Modifiers Qty    [id number] HC PT BIS  XTRACELL FLUID ANALYSIS 5/25/2023 Melani Vieyra, PT  1    35724177714 HC PT SELF CARE/MGMT/TRAIN EA 15 MIN 5/25/2023 Melani Vieyra, PT GP 1                    Melani Vieyra, PT  5/25/2023

## 2023-05-31 ENCOUNTER — OFFICE VISIT (OUTPATIENT)
Dept: SURGERY | Facility: CLINIC | Age: 53
End: 2023-05-31

## 2023-05-31 VITALS
HEART RATE: 92 BPM | HEIGHT: 60 IN | SYSTOLIC BLOOD PRESSURE: 118 MMHG | WEIGHT: 183 LBS | DIASTOLIC BLOOD PRESSURE: 72 MMHG | RESPIRATION RATE: 17 BRPM | OXYGEN SATURATION: 100 % | BODY MASS INDEX: 35.93 KG/M2

## 2023-05-31 DIAGNOSIS — Z17.1 MALIGNANT NEOPLASM OF UPPER-OUTER QUADRANT OF LEFT BREAST IN FEMALE, ESTROGEN RECEPTOR NEGATIVE: Primary | ICD-10-CM

## 2023-05-31 DIAGNOSIS — C50.412 MALIGNANT NEOPLASM OF UPPER-OUTER QUADRANT OF LEFT BREAST IN FEMALE, ESTROGEN RECEPTOR NEGATIVE: Primary | ICD-10-CM

## 2023-05-31 DIAGNOSIS — Z12.31 ENCOUNTER FOR SCREENING MAMMOGRAM FOR MALIGNANT NEOPLASM OF BREAST: ICD-10-CM

## 2023-05-31 PROCEDURE — 99213 OFFICE O/P EST LOW 20 MIN: CPT | Performed by: SURGERY

## 2023-05-31 NOTE — LETTER
May 31, 2023       No Recipients    Patient: Ina Akers   YOB: 1970   Date of Visit: 5/31/2023       Dear Dr. Cespedes Recipients:    Thank you for referring Ina Akers to me for evaluation. Below are the relevant portions of my assessment and plan of care.    If you have questions, please do not hesitate to call me. I look forward to following Ina along with you.         Sincerely,        Shavonne Sharif MD        CC:   No Recipients    Shavonne Sharif MD  05/31/23 1108  Signed  [facility] – [place], [place]     Referring Provider: Tim Muñoz MD     Chief complaint: Routine follow up breast cancer     Subjective    HPI:   12/22/2022:   Ms. Ina Akers is a 51 yo woman, seen at the request of Dr. Tim Muñoz, for a new diagnosis of left breast cancer. She has a past history of left breast cancer in 2001 (age 30), stage IIB triple negative. She underwent a lumpectomy and axillary dissection, followed by AC and Taxotere, followed by radiation, then tamoxifen for 5 years. She believes she had somewhere around 9-13 left axillary lymph nodes removed and 2 of them had cancer. In 2020, she was diagnosed with metastatic thyroid cancer to the bone. She underwent treatment in 2021 with Lenvima before this was held due to cytopenias. Her disease has been stable off treatment since 2/2021.     Recent screening mammogram in October 2022 showed a new left breast focal asymmetry separate from her lumpectomy site. Around this time she also underwent a PET scan because she was complaining of bone pain. This showed stable bone metastasis and a new FDG avid left breast lesion in the same region as the focal asymmetry. Diagnostic breast imaging confirmed a small hypoechoic mass and subsequent biopsy showed triple negative invasive ductal carcinoma. Her work-up is detailed in the oncologic history below. She denies any breast lumps, pain, skin changes, or nipple discharge. She also has a  past history of a benign left breast lumpectomy in 1991.     Her only family history of cancer is oral cancer in her father. The patient has not undergone genetic testing.      2/8/2023:  She underwent left modified radical mastectomy with prepectoral tissue expander placement on 1/24/23. See surgery & pathology details below in oncologic history. She has already seen Dr. Morse back postoperatively and had 2 drains removed. She also has already seen Dr. Muñoz and discussed proceeding with TC x4. She is scheduled for a port placement next week. She was seen at the lymphedema clinic preoperatively and her bioimpedance score was within normal limits. She did have some early arm swelling while in the hospital, however this has already improved.    5/31/2023, Interval History:  She returns today for scheduled follow-up.  She completed cycle 4 of TC on 5/3/2023.  Dr. Siddiqui plans to resume Zometa which she was previously on for her thyroid bone metastasis.  Her left arm swelling has improved and her bioimpedance score last week was within normal limits.  She has been wearing a sleeve as needed.  She completed some expansion with Dr. Morse prior to starting chemotherapy and she sees him again in a couple of weeks.  She plans on waiting until December for her second stage surgery because she has a grandchild due in August.      Oncology/Hematology History   Malignant neoplasm of upper-outer quadrant of left breast in female, estrogen receptor negative   11/23/2016 Initial Diagnosis    Malignant neoplasm of upper-outer quadrant of left breast in female, estrogen receptor negative (CMS/[facility])     9/23/2020 Cancer Staged    Staging form: Breast, AJCC V7  - Pathologic stage from 9/23/2020: Stage IIB (T2, N1, cM0) - Signed by Tim Muñoz MD on 10/9/2020     9/29/2020 - 9/29/2020 Chemotherapy    OP SUPPORTIVE Denosumab (Xgeva) Q28D     9/29/2020 -  Chemotherapy    OP SUPPORTIVE Zoledronic Acid Q28D     8/19/2021  Imaging    Screening MMG with Teodoro ([facility])  There are scattered areas of fibroglandular density. Left breast post-operative change is again seen. There are no suspicious masses, significant calcifications, or other abnormalities seen in either breast.  BI-RADS 2: Benign     10/5/2022 Imaging    Screening MMG with Teodoro ([facility])  There are scattered areas of fibroglandular density.  Finding 1: There is a new fat containing focal asymmetry measuring 6 mm seen in the middle one-third region of the left breast at 12 o'clock  Finding 2: There is a focal asymmetry with associated dystrophic calcifications and post-surgical scar seen in the upper outer region of the left breast. Focal asymmetry in in an area of prior lumpectomy. There are no significant changes from the prior exam(s).   In the right breast, no suspicious masses, significant calcifications or other abnormalities are seen.  BI-RADS 0: Incomplete     10/6/2022 Imaging    PET CT FDG ( [facility])  Chest:  New intensely FDG avid left breast ill-defined nodular focus at the 12 o'clock position measuring .0 x 0.7 cm with a maximum SUV of 5.8 (series 4, image 122).  No significant change in 19 x 15 mm non FDG avid speculated mass within the posterior upper outer quadrant of the left breast with intralesional fat and a peripherally located biopsy clip, consistent with known a post-lumpectomy scar.  Impression:  1. New FDG avid left breast ill-defined nodular focus at the 12 o-clock position, suspicious for malignancy. This could be metastic disease given the increase in non-stimulated thyroglobulin level. Tissue sampling can be considered for further characterization.  2. No significant change in hyper metabolic well-defined lytic lesion with sclerotic margin within the left ischium compared to the prior 2 PET/CT studies from 2021 yet showing evidence of treatment response compared to outside study from 09/09/2020.  3. No new metabolically active  osseous metastatic disease.     10/20/2022 Imaging    Left Diagnostic MMG with Teodoro & Left Breast US ([facility])  MMG:  On the present examination, there is isodense focal asymmetry measuring 6 mm in the left breast at 12 o'clock located 5 cm from the nipple.  US:  Demonstrates a round non-parallel complex cystic and solid mass with circumscribed margins measuring 6 x 5 x 5 mm in the left breast at 12 o'clock located 5 cm from the nipple.  BI-RADS 4B: Suspicious     2022 Imaging    Left Diagnostic MMG with Teodoro & Left Breast US ([facility]):  MM. Re-demonstration of 7 mm mass at approximately 12:00-1:00, 6 to 8 cm from the nipple.  2. Stable post-lumpectomy changes in the upper outer left breast.  US:  Focused ultrasound was performed at 1:00, 7 cm from the nipple. Ultrasound demonstrates a hypoechoic round mass with indistinct margins and without internal vascularity measuring 5 x 5 x 4 mm. There is an indistinct hyperechoic halo surrounding the mass. This correlates with mammographic finding.  BI-RADS 4: Suspicious     2022 Biopsy    Left Breast, US-Guided Biopsy ():    LEFT BREAST, 1:00 (7 CM FROM NIPPLE), ULTRASOUND GUIDED CORE NEEDLE BIOPSY FOR 0.7 CM MASS (U CLIP):     - INVASIVE DUCTAL CARCINOMA, poorly differentiated (Carlos grade III/III: tubule score=3, nuclear score=3, mitoses score=3), with necrosis, measuring up to 0.5 cm.      - No lymphovascular invasion or in situ carcinoma identified.      ER Negative (<1%)  TN Negative (<1%)  HER2 Negative (IHC 0)      [facility] PATHOLOGY REVIEW    1. Left Breast, 1 o'clock, 7 cm from Nipple, Ultrasound-Guided Core Biopsies for a Mass (Outside Consult [id number]):       INVASIVE MAMMARY CARCINOMA, NO SPECIAL TYPE (INVASIVE DUCTAL CARCINOMA).               A. Histologic grade: Carlos histologic score III (tubules = 3, nuclei = 3, mitosis = 3).               B. Largest contiguous focus measures up to 5 mm.   C. No associated in  situ component identified.               D. No lymphovascular nor perineural invasion identified.     12/5/2022 Imaging    Bilateral Breast MRI ([facility]):  In the posterior one third of the left breast at the 12:30 position centered on the order of 8.5 cm from the nipple there is a focal signal void seen within an irregular enhancing mass that measures on the order of 0.9 cm in the superior-inferior dimension, 0.8 cm in anterior to posterior dimension and 0.8 cm and the mediolateral dimension. This represents the biopsy-proven site of malignancy with the internal biopsy clip.  In the anterior one third of the left breast located medial to the plane of the nipple at the 9 o'clock position on the order of 3 cm from the nipple there is an irregular enhancing lesion that measures on the order of 0.5 cm in the superior-inferior dimension, 0.5 cm in anterior to posterior dimension and 0.5 cm in the medial to lateral dimension. A possible mammographic correlate is seen on the CC image of the left breast on the examination of 11/18/2022. This area of enhancement is not seen on the prior breast MRI examination from 12/26/2012.  There is post surgical change of the left breast associated with prior breast conservation therapy. No other areas of abnormal enhancement or morphology are seen within the left breast. I see no evidence for  abnormal left breast skin, nipple or chest wall enhancement and there is no evidence for left axillary or internal mammary chain adenopathy.   There are no areas of abnormal enhancement or morphology in the right breast. I see no evidence for abnormal right breast skin, nipple or chest wall enhancement and there is no evidence for right axillary or internal mammary chain adenopathy.  BI-RADS 4: Suspicious.     12/23/2022 Genetic Testing    [employer] Multi-Cancer Panel (84 genes):    VUS in NF1  VUS in POT1  VUS in SMAD4     1/24/2023 Surgery    Left modified radical mastectomy with prepectoral  tissue expander placement    1. Left Breast Modified Radical Mastectomy:               A. Invasive carcinoma with mixed ductal and lobular features:                            1. Invasive carcinoma measures 15 mm x 9 mm x 5 mm.                            2. Overall Carlos grade III (tubular score = 3, nuclear score = 3, mitotic score = 3).                            3. No definitive lymphovascular space invasion identified.               B. Associated ductal carcinoma in situ (DCIS):                            1. DCIS spans an area estimated at 20 mm x 5 mm x 2 mm.                            2. High-grade solid and comedo DCIS.               C. All margins are negative for invasive carcinoma.                    Invasive carcinoma measures 25 mm from the closest (Posterior) margin of excision.                                  D. All margins are negative for ductal carcinoma in-situ (DCIS).                    DCIS measures 25 mm from the closest (Posterior) margin of excision                              E. Focal biopsy site changes are identified, and metallic clip retrieved.               F. No Pagetoid involvement of skin nor nipple by malignancy identified.               G. No lobular neoplasia (LCIS, ALH) identified.               H. Two incidental lymph nodes identified, negative for metastatic carcinoma by routine and immunostaining (0/2).               I.  Focal dense dermal and stromal scar consistent with previous surgical procedure identified.  J. Previous Biomarkers: Estrogen receptors: negative, Progesterone receptors: negative,                    HER/2-stacey: negative (Score 0), Ki-67 = Not reported.  [id number] ([id number]).     2. Left Breast Augmentation:                 A. Benign skin and subcutaneous tissue.     2/21/2023 -  Chemotherapy    OP CENTRAL VENOUS ACCESS DEVICE ACCESS, CARE, AND MAINTENANCE ([facility])     3/1/2023 -  Chemotherapy    OP BREAST TC DOCEtaxel / Cyclophosphamide     Metastasis to  bone   9/23/2020 Initial Diagnosis    Bone metastasis (CMS/HCC)     9/29/2020 - 9/29/2020 Chemotherapy    OP SUPPORTIVE Denosumab (Xgeva) Q28D     9/29/2020 -  Chemotherapy    OP SUPPORTIVE Zoledronic Acid Q28D     Primary cancer of thyroid gland metastatic to bone   3/2/2022 Initial Diagnosis    Primary cancer of thyroid gland metastatic to bone (HCC)         Review of Systems:  See interval history.       Medications:    Current Outpatient Medications:   •  acetaminophen (TYLENOL) 500 MG tablet, Take 2 tablets by mouth Every 6 (Six) Hours As Needed for Mild Pain., Disp: , Rfl:   •  amLODIPine (NORVASC) 10 MG tablet, TAKE ONE TABLET BY MOUTH DAILY, Disp: 90 tablet, Rfl: 1  •  atorvastatin (LIPITOR) 20 MG tablet, Take 1 tablet by mouth Daily. (Patient taking differently: Take 1 tablet by mouth Every Night.), Disp: 90 tablet, Rfl: 3  •  Blood Glucose Monitoring Suppl w/Device kit, Check blood sugar twice a day, Disp: 1 each, Rfl: 0  •  cholecalciferol (VITAMIN D3) 25 MCG (1000 UT) tablet, Take 1 tablet by mouth Daily., Disp: , Rfl:   •  dexamethasone (DECADRON) 4 MG tablet, Take 2 tablets oral twice a day for 3 consecutive days beginning the day before chemotherapy and continue for 6 doses., Disp: 12 tablet, Rfl: 3  •  famotidine (PEPCID) 20 MG tablet, Take 1 tablet by mouth Every Other Day., Disp: , Rfl:   •  ferrous sulfate 325 (65 FE) MG tablet, Take 1 tablet by mouth Daily., Disp: , Rfl:   •  Glucose Blood (BLOOD GLUCOSE TEST) strip, Check blood sugar twice a day., Disp: 100 each, Rfl: 1  •  labetalol (NORMODYNE) 100 MG tablet, Take 0.5 tablets by mouth 2 (Two) Times a Day., Disp: 180 tablet, Rfl: 3  •  Lancets (ACCU-CHEK SOFT TOUCH) lancets, Check blood sugar twice a day as needed, Disp: 100 each, Rfl: 1  •  lansoprazole (PREVACID) 30 MG capsule, TAKE ONE CAPSULE BY MOUTH DAILY (Patient taking differently: Take 1 capsule by mouth Every Other Day.), Disp: 90 capsule, Rfl: 2  •  levothyroxine (SYNTHROID,  LEVOTHROID) 112 MCG tablet, Take 1 tablet by mouth Every Morning., Disp: , Rfl:   •  losartan (COZAAR) 100 MG tablet, Take 1 tablet by mouth Daily., Disp: 90 tablet, Rfl: 3  •  metFORMIN ER (GLUCOPHAGE-XR) 500 MG 24 hr tablet, Take 2 tablets by mouth Daily With Dinner., Disp: 180 tablet, Rfl: 3  •  nystatin-Lidocaine Viscous HCl-diphenhydrAMINE in aluminum-magnesium hydroxide-simethicone suspension, Swish and spit 10 mL Every 4 (Four) Hours As Needed for pain or thrush, Disp: 410 mL, Rfl: 0  •  promethazine (PHENERGAN) 12.5 MG tablet, TAKE 1/2 TO 1 TABLET BY MOUTH EVERY 4 HOURS AS NEEDED FOR NAUSEA AND VOMITING -MAY MAKE DROWSY (Patient taking differently: Take 1-2 tablets by mouth Every 6 (Six) Hours As Needed. TAKE 1/2 TO 1 TABLET BY MOUTH EVERY 4 HOURS AS NEEDED FOR NAUSEA AND VOMITING -MAY MAKE DROWSY), Disp: 30 tablet, Rfl: 3  •  promethazine (PHENERGAN) 12.5 MG tablet, Take 1 tablet by mouth Every 6 (Six) Hours As Needed for Nausea or Vomiting., Disp: 10 tablet, Rfl: 0  •  triamcinolone (KENALOG) 0.1 % cream, Apply  topically to the appropriate area as directed 2 (Two) Times a Day. Sparingly shortest time possible avoid face (Patient taking differently: Apply  topically to the appropriate area as directed As Needed. Sparingly shortest time possible avoid face), Disp: 80 g, Rfl: 0      Allergies   Allergen Reactions   • Monoclonal Antibodies Anaphylaxis and Shortness Of Breath     Regeneron,  High b/p increased heart rate    • Zofran [Ondansetron] Nausea And Vomiting     DOES NOT HELP WITH NAUSEA AND VOMITING MAKES IT WORSE   • Hydrocodone Nausea And Vomiting   • Lenvatinib Unknown - Low Severity     Internal bleeding       Family History   Problem Relation Age of Onset   • No Known Problems Mother    • Cancer Father         Oral   • Hypertension Other    • Diabetes Other    • Malig Hyperthermia Neg Hx        Objective    PHYSICAL EXAMINATION:   Vitals:    05/31/23 1052   BP: 118/72   Pulse: 92   Resp: 17    SpO2: 100%     ECOG 0 - Asymptomatic  General: NAD, well appearing  Psych: a&o x3, normal mood and affect  Eyes: EOMI, no scleral icterus  ENMT: neck supple without masses or thyromegaly, mucous membranes moist  MSK: normal gait, normal ROM in bilateral shoulders  Lymph nodes: no cervical, supraclavicular or axillary lymphadenopathy;   Breast:   Right: Large size, pendulous.  Current port in place and previous port scar in the upper inner quadrant. No masses or nipple abnormalities.  Left: Sp mastectomy with TE in place.  Well-healed, soft scar.  No masses.      Assessment & Plan   Assessment:   1) 52 y.o. F with a diagnosis of left breast cancer: High grade, triple negative, invasive ductal carcinoma. She underwent left modified radical mastectomy with prepectoral tissue expander placement on 1/24/23, pT1cN0, anatomic stage IA, prognostic stage IB.  She completed TC x4 on 5/3/2023.  2) She has a past history of left breast cancer in 2001 (age 30), stage IIB triple negative. She underwent a lumpectomy and axillary dissection, followed by AC and Taxotere, followed by radiation, then tamoxifen for 5 years.  3) She has metastatic thyroid cancer diagnosed in 2020 with stable bone metastasis.    Plan:  -Dr. Muñoz to decide whether he wants her port removed, or if she will keep it for Zometa.  If it needs to be removed, Dr. Morse can remove it during her second stage surgery.  -Continue follow-up with Dr. Morse.  -Continue follow-up with lymphedema clinic.  -Follow-up in 10/2023 with right mammogram.  -She was instructed to call sooner with any questions, concerns or changes on self-exam.    Shavonne Sharif MD      CC:  Khalid Z. Ghosheh, MD James Epley, APRN Lori Warren, MD

## 2023-05-31 NOTE — PROGRESS NOTES
[facility]     Referring Provider: Tim Muñoz MD     Chief complaint: Routine follow up breast cancer     Subjective   HPI:   12/22/2022:   Ms. Ina Akers is a 53 yo woman, seen at the request of Dr. Tim Muñoz, for a new diagnosis of left breast cancer. She has a past history of left breast cancer in 2001 (age 30), stage IIB triple negative. She underwent a lumpectomy and axillary dissection, followed by AC and Taxotere, followed by radiation, then tamoxifen for 5 years. She believes she had somewhere around 9-13 left axillary lymph nodes removed and 2 of them had cancer. In 2020, she was diagnosed with metastatic thyroid cancer to the bone. She underwent treatment in 2021 with Lenvima before this was held due to cytopenias. Her disease has been stable off treatment since 2/2021.     Recent screening mammogram in October 2022 showed a new left breast focal asymmetry separate from her lumpectomy site. Around this time she also underwent a PET scan because she was complaining of bone pain. This showed stable bone metastasis and a new FDG avid left breast lesion in the same region as the focal asymmetry. Diagnostic breast imaging confirmed a small hypoechoic mass and subsequent biopsy showed triple negative invasive ductal carcinoma. Her work-up is detailed in the oncologic history below. She denies any breast lumps, pain, skin changes, or nipple discharge. She also has a past history of a benign left breast lumpectomy in 1991.     Her only family history of cancer is oral cancer in her father. The patient has not undergone genetic testing.      2/8/2023:  She underwent left modified radical mastectomy with prepectoral tissue expander placement on 1/24/23. See surgery & pathology details below in oncologic history. She has already seen Dr. Morse back postoperatively and had 2 drains removed. She also has already seen Dr. Muñoz and discussed proceeding with TC x4. She is scheduled for  a port placement next week. She was seen at the lymphedema clinic preoperatively and her bioimpedance score was within normal limits. She did have some early arm swelling while in the hospital, however this has already improved.    5/31/2023, Interval History:  She returns today for scheduled follow-up.  She completed cycle 4 of TC on 5/3/2023.  Dr. Siddiqui plans to resume Zometa which she was previously on for her thyroid bone metastasis.  Her left arm swelling has improved and her bioimpedance score last week was within normal limits.  She has been wearing a sleeve as needed.  She completed some expansion with Dr. Morse prior to starting chemotherapy and she sees him again in a couple of weeks.  She plans on waiting until December for her second stage surgery because she has a grandchild due in August.      Oncology/Hematology History   Malignant neoplasm of upper-outer quadrant of left breast in female, estrogen receptor negative   11/23/2016 Initial Diagnosis    Malignant neoplasm of upper-outer quadrant of left breast in female, estrogen receptor negative (CMS/HCC)     9/23/2020 Cancer Staged    Staging form: Breast, AJCC V7  - Pathologic stage from 9/23/2020: Stage IIB (T2, N1, cM0) - Signed by Tim Muñoz MD on 10/9/2020     9/29/2020 - 9/29/2020 Chemotherapy    OP SUPPORTIVE Denosumab (Xgeva) Q28D     9/29/2020 -  Chemotherapy    OP SUPPORTIVE Zoledronic Acid Q28D     8/19/2021 Imaging    Screening MMG with Teodoro ([facility])  There are scattered areas of fibroglandular density. Left breast post-operative change is again seen. There are no suspicious masses, significant calcifications, or other abnormalities seen in either breast.  BI-RADS 2: Benign     10/5/2022 Imaging    Screening MMG with Teodoro ([facility])  There are scattered areas of fibroglandular density.  Finding 1: There is a new fat containing focal asymmetry measuring 6 mm seen in the middle one-third region of the left breast at 12  o'clock  Finding 2: There is a focal asymmetry with associated dystrophic calcifications and post-surgical scar seen in the upper outer region of the left breast. Focal asymmetry in in an area of prior lumpectomy. There are no significant changes from the prior exam(s).   In the right breast, no suspicious masses, significant calcifications or other abnormalities are seen.  BI-RADS 0: Incomplete     10/6/2022 Imaging    PET CT FDG ([facility])  Chest:  New intensely FDG avid left breast ill-defined nodular focus at the 12 o'clock position measuring .0 x 0.7 cm with a maximum SUV of 5.8 (series 4, image 122).  No significant change in 19 x 15 mm non FDG avid speculated mass within the posterior upper outer quadrant of the left breast with intralesional fat and a peripherally located biopsy clip, consistent with known a post-lumpectomy scar.  Impression:  1. New FDG avid left breast ill-defined nodular focus at the 12 o-clock position, suspicious for malignancy. This could be metastic disease given the increase in non-stimulated thyroglobulin level. Tissue sampling can be considered for further characterization.  2. No significant change in hyper metabolic well-defined lytic lesion with sclerotic margin within the left ischium compared to the prior 2 PET/CT studies from 2021 yet showing evidence of treatment response compared to outside study from 09/09/2020.  3. No new metabolically active osseous metastatic disease.     10/20/2022 Imaging    Left Diagnostic MMG with Teodoro & Left Breast US ([facility])  MMG:  On the present examination, there is isodense focal asymmetry measuring 6 mm in the left breast at 12 o'clock located 5 cm from the nipple.  US:  Demonstrates a round non-parallel complex cystic and solid mass with circumscribed margins measuring 6 x 5 x 5 mm in the left breast at 12 o'clock located 5 cm from the nipple.  BI-RADS 4B: Suspicious     11/18/2022 Imaging    Left Diagnostic MMG with Teodoro & Left Breast US  ([facility]):  MM. Re-demonstration of 7 mm mass at approximately 12:00-1:00, 6 to 8 cm from the nipple.  2. Stable post-lumpectomy changes in the upper outer left breast.  US:  Focused ultrasound was performed at 1:00, 7 cm from the nipple. Ultrasound demonstrates a hypoechoic round mass with indistinct margins and without internal vascularity measuring 5 x 5 x 4 mm. There is an indistinct hyperechoic halo surrounding the mass. This correlates with mammographic finding.  BI-RADS 4: Suspicious     2022 Biopsy    Left Breast, US-Guided Biopsy ():    LEFT BREAST, 1:00 (7 CM FROM NIPPLE), ULTRASOUND GUIDED CORE NEEDLE BIOPSY FOR 0.7 CM MASS (U CLIP):     - INVASIVE DUCTAL CARCINOMA, poorly differentiated (Bronx grade III/III: tubule score=3, nuclear score=3, mitoses score=3), with necrosis, measuring up to 0.5 cm.      - No lymphovascular invasion or in situ carcinoma identified.      ER Negative (<1%)  SD Negative (<1%)  HER2 Negative (IHC 0)      [facility] PATHOLOGY REVIEW    1. Left Breast, 1 o'clock, 7 cm from Nipple, Ultrasound-Guided Core Biopsies for a Mass (Outside Consult [id number]):       INVASIVE MAMMARY CARCINOMA, NO SPECIAL TYPE (INVASIVE DUCTAL CARCINOMA).               A. Histologic grade: Carlos histologic score III (tubules = 3, nuclei = 3, mitosis = 3).               B. Largest contiguous focus measures up to 5 mm.   C. No associated in situ component identified.               D. No lymphovascular nor perineural invasion identified.     2022 Imaging    Bilateral Breast MRI ([facility]):  In the posterior one third of the left breast at the 12:30 position centered on the order of 8.5 cm from the nipple there is a focal signal void seen within an irregular enhancing mass that measures on the order of 0.9 cm in the superior-inferior dimension, 0.8 cm in anterior to posterior dimension and 0.8 cm and the mediolateral dimension. This represents the biopsy-proven  site of malignancy with the internal biopsy clip.  In the anterior one third of the left breast located medial to the plane of the nipple at the 9 o'clock position on the order of 3 cm from the nipple there is an irregular enhancing lesion that measures on the order of 0.5 cm in the superior-inferior dimension, 0.5 cm in anterior to posterior dimension and 0.5 cm in the medial to lateral dimension. A possible mammographic correlate is seen on the CC image of the left breast on the examination of 11/18/2022. This area of enhancement is not seen on the prior breast MRI examination from 12/26/2012.  There is post surgical change of the left breast associated with prior breast conservation therapy. No other areas of abnormal enhancement or morphology are seen within the left breast. I see no evidence for  abnormal left breast skin, nipple or chest wall enhancement and there is no evidence for left axillary or internal mammary chain adenopathy.   There are no areas of abnormal enhancement or morphology in the right breast. I see no evidence for abnormal right breast skin, nipple or chest wall enhancement and there is no evidence for right axillary or internal mammary chain adenopathy.  BI-RADS 4: Suspicious.     12/23/2022 Genetic Testing    [employer] Multi-Cancer Panel (84 genes):    VUS in NF1  VUS in POT1  VUS in SMAD4     1/24/2023 Surgery    Left modified radical mastectomy with prepectoral tissue expander placement    1. Left Breast Modified Radical Mastectomy:               A. Invasive carcinoma with mixed ductal and lobular features:                            1. Invasive carcinoma measures 15 mm x 9 mm x 5 mm.                            2. Overall Carlos grade III (tubular score = 3, nuclear score = 3, mitotic score = 3).                            3. No definitive lymphovascular space invasion identified.               B. Associated ductal carcinoma in situ (DCIS):                            1. DCIS spans an  area estimated at 20 mm x 5 mm x 2 mm.                            2. High-grade solid and comedo DCIS.               C. All margins are negative for invasive carcinoma.                    Invasive carcinoma measures 25 mm from the closest (Posterior) margin of excision.                                  D. All margins are negative for ductal carcinoma in-situ (DCIS).                    DCIS measures 25 mm from the closest (Posterior) margin of excision                              E. Focal biopsy site changes are identified, and metallic clip retrieved.               F. No Pagetoid involvement of skin nor nipple by malignancy identified.               G. No lobular neoplasia (LCIS, ALH) identified.               H. Two incidental lymph nodes identified, negative for metastatic carcinoma by routine and immunostaining (0/2).               I.  Focal dense dermal and stromal scar consistent with previous surgical procedure identified.  J. Previous Biomarkers: Estrogen receptors: negative, Progesterone receptors: negative,                    HER/2-stacey: negative (Score 0), Ki-67 = Not reported.  [id number] ([id number]).     2. Left Breast Augmentation:                 A. Benign skin and subcutaneous tissue.     2/21/2023 -  Chemotherapy    OP CENTRAL VENOUS ACCESS DEVICE ACCESS, CARE, AND MAINTENANCE ([facility])     3/1/2023 -  Chemotherapy    OP BREAST TC DOCEtaxel / Cyclophosphamide     Metastasis to bone   9/23/2020 Initial Diagnosis    Bone metastasis (CMS/HCC)     9/29/2020 - 9/29/2020 Chemotherapy    OP SUPPORTIVE Denosumab (Xgeva) Q28D     9/29/2020 -  Chemotherapy    OP SUPPORTIVE Zoledronic Acid Q28D     Primary cancer of thyroid gland metastatic to bone   3/2/2022 Initial Diagnosis    Primary cancer of thyroid gland metastatic to bone (HCC)         Review of Systems:  See interval history.       Medications:    Current Outpatient Medications:   •  acetaminophen (TYLENOL) 500 MG tablet, Take 2 tablets by mouth Every 6  (Six) Hours As Needed for Mild Pain., Disp: , Rfl:   •  amLODIPine (NORVASC) 10 MG tablet, TAKE ONE TABLET BY MOUTH DAILY, Disp: 90 tablet, Rfl: 1  •  atorvastatin (LIPITOR) 20 MG tablet, Take 1 tablet by mouth Daily. (Patient taking differently: Take 1 tablet by mouth Every Night.), Disp: 90 tablet, Rfl: 3  •  Blood Glucose Monitoring Suppl w/Device kit, Check blood sugar twice a day, Disp: 1 each, Rfl: 0  •  cholecalciferol (VITAMIN D3) 25 MCG (1000 UT) tablet, Take 1 tablet by mouth Daily., Disp: , Rfl:   •  dexamethasone (DECADRON) 4 MG tablet, Take 2 tablets oral twice a day for 3 consecutive days beginning the day before chemotherapy and continue for 6 doses., Disp: 12 tablet, Rfl: 3  •  famotidine (PEPCID) 20 MG tablet, Take 1 tablet by mouth Every Other Day., Disp: , Rfl:   •  ferrous sulfate 325 (65 FE) MG tablet, Take 1 tablet by mouth Daily., Disp: , Rfl:   •  Glucose Blood (BLOOD GLUCOSE TEST) strip, Check blood sugar twice a day., Disp: 100 each, Rfl: 1  •  labetalol (NORMODYNE) 100 MG tablet, Take 0.5 tablets by mouth 2 (Two) Times a Day., Disp: 180 tablet, Rfl: 3  •  Lancets (ACCU-CHEK SOFT TOUCH) lancets, Check blood sugar twice a day as needed, Disp: 100 each, Rfl: 1  •  lansoprazole (PREVACID) 30 MG capsule, TAKE ONE CAPSULE BY MOUTH DAILY (Patient taking differently: Take 1 capsule by mouth Every Other Day.), Disp: 90 capsule, Rfl: 2  •  levothyroxine (SYNTHROID, LEVOTHROID) 112 MCG tablet, Take 1 tablet by mouth Every Morning., Disp: , Rfl:   •  losartan (COZAAR) 100 MG tablet, Take 1 tablet by mouth Daily., Disp: 90 tablet, Rfl: 3  •  metFORMIN ER (GLUCOPHAGE-XR) 500 MG 24 hr tablet, Take 2 tablets by mouth Daily With Dinner., Disp: 180 tablet, Rfl: 3  •  nystatin-Lidocaine Viscous HCl-diphenhydrAMINE in aluminum-magnesium hydroxide-simethicone suspension, Swish and spit 10 mL Every 4 (Four) Hours As Needed for pain or thrush, Disp: 410 mL, Rfl: 0  •  promethazine (PHENERGAN) 12.5 MG tablet,  TAKE 1/2 TO 1 TABLET BY MOUTH EVERY 4 HOURS AS NEEDED FOR NAUSEA AND VOMITING -MAY MAKE DROWSY (Patient taking differently: Take 1-2 tablets by mouth Every 6 (Six) Hours As Needed. TAKE 1/2 TO 1 TABLET BY MOUTH EVERY 4 HOURS AS NEEDED FOR NAUSEA AND VOMITING -MAY MAKE DROWSY), Disp: 30 tablet, Rfl: 3  •  promethazine (PHENERGAN) 12.5 MG tablet, Take 1 tablet by mouth Every 6 (Six) Hours As Needed for Nausea or Vomiting., Disp: 10 tablet, Rfl: 0  •  triamcinolone (KENALOG) 0.1 % cream, Apply  topically to the appropriate area as directed 2 (Two) Times a Day. Sparingly shortest time possible avoid face (Patient taking differently: Apply  topically to the appropriate area as directed As Needed. Sparingly shortest time possible avoid face), Disp: 80 g, Rfl: 0      Allergies   Allergen Reactions   • Monoclonal Antibodies Anaphylaxis and Shortness Of Breath     Regeneron,  High b/p increased heart rate    • Zofran [Ondansetron] Nausea And Vomiting     DOES NOT HELP WITH NAUSEA AND VOMITING MAKES IT WORSE   • Hydrocodone Nausea And Vomiting   • Lenvatinib Unknown - Low Severity     Internal bleeding       Family History   Problem Relation Age of Onset   • No Known Problems Mother    • Cancer Father         Oral   • Hypertension Other    • Diabetes Other    • Malig Hyperthermia Neg Hx        Objective   PHYSICAL EXAMINATION:   Vitals:    05/31/23 1052   BP: 118/72   Pulse: 92   Resp: 17   SpO2: 100%     ECOG 0 - Asymptomatic  General: NAD, well appearing  Psych: a&o x3, normal mood and affect  Eyes: EOMI, no scleral icterus  ENMT: neck supple without masses or thyromegaly, mucous membranes moist  MSK: normal gait, normal ROM in bilateral shoulders  Lymph nodes: no cervical, supraclavicular or axillary lymphadenopathy;   Breast:   Right: Large size, pendulous.  Current port in place and previous port scar in the upper inner quadrant. No masses or nipple abnormalities.  Left: Sp mastectomy with TE in place.  Well-healed, soft  scar.  No masses.      Assessment & Plan   Assessment:   1) 52 y.o. F with a diagnosis of left breast cancer: High grade, triple negative, invasive ductal carcinoma. She underwent left modified radical mastectomy with prepectoral tissue expander placement on 1/24/23, pT1cN0, anatomic stage IA, prognostic stage IB.  She completed TC x4 on 5/3/2023.  2) She has a past history of left breast cancer in 2001 (age 30), stage IIB triple negative. She underwent a lumpectomy and axillary dissection, followed by AC and Taxotere, followed by radiation, then tamoxifen for 5 years.  3) She has metastatic thyroid cancer diagnosed in 2020 with stable bone metastasis.    Plan:  -Dr. Muñoz to decide whether he wants her port removed, or if she will keep it for Zometa.  If it needs to be removed, Dr. Morse can remove it during her second stage surgery.  -Continue follow-up with Dr. Morse.  -Continue follow-up with lymphedema clinic.  -Follow-up in 10/2023 with right mammogram.  -She was instructed to call sooner with any questions, concerns or changes on self-exam.    Shavonne Sharif MD      CC:  Khalid Z. Ghosheh, MD James Epley, APRN Lori Warren, MD

## 2023-06-01 ENCOUNTER — PATIENT OUTREACH (OUTPATIENT)
Dept: OTHER | Facility: HOSPITAL | Age: 53
End: 2023-06-01

## 2023-06-01 DIAGNOSIS — C50.412 MALIGNANT NEOPLASM OF UPPER-OUTER QUADRANT OF LEFT BREAST IN FEMALE, ESTROGEN RECEPTOR NEGATIVE: Primary | ICD-10-CM

## 2023-06-01 DIAGNOSIS — Z17.1 MALIGNANT NEOPLASM OF UPPER-OUTER QUADRANT OF LEFT BREAST IN FEMALE, ESTROGEN RECEPTOR NEGATIVE: Primary | ICD-10-CM

## 2023-06-01 NOTE — PROGRESS NOTES
Called Ms. Akers to see how she was doing. She stated she is doing well and is back at work. She stated she has no needs at this time. We discussed our survivorship program and she was interested in getting an appointment. That appointment has been made for July 17th.  She was thankful for the call and will reach out if any questions or needs arise.

## 2023-06-14 ENCOUNTER — INFUSION (OUTPATIENT)
Dept: ONCOLOGY | Facility: HOSPITAL | Age: 53
End: 2023-06-14
Payer: COMMERCIAL

## 2023-06-14 VITALS
HEART RATE: 71 BPM | WEIGHT: 184.4 LBS | SYSTOLIC BLOOD PRESSURE: 120 MMHG | TEMPERATURE: 96.8 F | HEIGHT: 61 IN | BODY MASS INDEX: 34.81 KG/M2 | RESPIRATION RATE: 15 BRPM | DIASTOLIC BLOOD PRESSURE: 80 MMHG | OXYGEN SATURATION: 97 %

## 2023-06-14 DIAGNOSIS — Z17.1 MALIGNANT NEOPLASM OF UPPER-OUTER QUADRANT OF LEFT BREAST IN FEMALE, ESTROGEN RECEPTOR NEGATIVE: ICD-10-CM

## 2023-06-14 DIAGNOSIS — C79.51 METASTASIS TO BONE: Primary | ICD-10-CM

## 2023-06-14 DIAGNOSIS — C50.412 MALIGNANT NEOPLASM OF UPPER-OUTER QUADRANT OF LEFT BREAST IN FEMALE, ESTROGEN RECEPTOR NEGATIVE: ICD-10-CM

## 2023-06-14 LAB
ALBUMIN SERPL-MCNC: 3.7 G/DL (ref 3.5–5.2)
ALBUMIN/GLOB SERPL: 1.2 G/DL
ALP SERPL-CCNC: 64 U/L (ref 39–117)
ALT SERPL W P-5'-P-CCNC: 9 U/L (ref 1–33)
ANION GAP SERPL CALCULATED.3IONS-SCNC: 10.8 MMOL/L (ref 5–15)
AST SERPL-CCNC: 13 U/L (ref 1–32)
BASOPHILS # BLD AUTO: 0.02 10*3/MM3 (ref 0–0.2)
BASOPHILS NFR BLD AUTO: 0.5 % (ref 0–1.5)
BILIRUB SERPL-MCNC: 0.3 MG/DL (ref 0–1.2)
BUN SERPL-MCNC: 15 MG/DL (ref 6–20)
BUN/CREAT SERPL: 19.5 (ref 7–25)
CALCIUM SPEC-SCNC: 9 MG/DL (ref 8.6–10.5)
CHLORIDE SERPL-SCNC: 107 MMOL/L (ref 98–107)
CO2 SERPL-SCNC: 23.2 MMOL/L (ref 22–29)
CREAT SERPL-MCNC: 0.77 MG/DL (ref 0.57–1)
DEPRECATED RDW RBC AUTO: 52.8 FL (ref 37–54)
EGFRCR SERPLBLD CKD-EPI 2021: 91.8 ML/MIN/1.73
EOSINOPHIL # BLD AUTO: 0.45 10*3/MM3 (ref 0–0.4)
EOSINOPHIL NFR BLD AUTO: 11.5 % (ref 0.3–6.2)
ERYTHROCYTE [DISTWIDTH] IN BLOOD BY AUTOMATED COUNT: 15.9 % (ref 12.3–15.4)
GLOBULIN UR ELPH-MCNC: 3 GM/DL
GLUCOSE SERPL-MCNC: 123 MG/DL (ref 65–99)
HCT VFR BLD AUTO: 31.4 % (ref 34–46.6)
HGB BLD-MCNC: 9.8 G/DL (ref 12–15.9)
IMM GRANULOCYTES # BLD AUTO: 0.05 10*3/MM3 (ref 0–0.05)
IMM GRANULOCYTES NFR BLD AUTO: 1.3 % (ref 0–0.5)
LYMPHOCYTES # BLD AUTO: 0.97 10*3/MM3 (ref 0.7–3.1)
LYMPHOCYTES NFR BLD AUTO: 24.7 % (ref 19.6–45.3)
MAGNESIUM SERPL-MCNC: 1.9 MG/DL (ref 1.6–2.6)
MCH RBC QN AUTO: 27.8 PG (ref 26.6–33)
MCHC RBC AUTO-ENTMCNC: 31.2 G/DL (ref 31.5–35.7)
MCV RBC AUTO: 89.2 FL (ref 79–97)
MONOCYTES # BLD AUTO: 0.56 10*3/MM3 (ref 0.1–0.9)
MONOCYTES NFR BLD AUTO: 14.3 % (ref 5–12)
NEUTROPHILS NFR BLD AUTO: 1.87 10*3/MM3 (ref 1.7–7)
NEUTROPHILS NFR BLD AUTO: 47.7 % (ref 42.7–76)
NRBC BLD AUTO-RTO: 0 /100 WBC (ref 0–0.2)
PHOSPHATE SERPL-MCNC: 3 MG/DL (ref 2.5–4.5)
PLATELET # BLD AUTO: 299 10*3/MM3 (ref 140–450)
PMV BLD AUTO: 9.3 FL (ref 6–12)
POTASSIUM SERPL-SCNC: 3.9 MMOL/L (ref 3.5–5.2)
PROT SERPL-MCNC: 6.7 G/DL (ref 6–8.5)
RBC # BLD AUTO: 3.52 10*6/MM3 (ref 3.77–5.28)
SODIUM SERPL-SCNC: 141 MMOL/L (ref 136–145)
WBC NRBC COR # BLD: 3.92 10*3/MM3 (ref 3.4–10.8)

## 2023-06-14 PROCEDURE — 80053 COMPREHEN METABOLIC PANEL: CPT | Performed by: INTERNAL MEDICINE

## 2023-06-14 PROCEDURE — 85025 COMPLETE CBC W/AUTO DIFF WBC: CPT | Performed by: INTERNAL MEDICINE

## 2023-06-14 PROCEDURE — 84100 ASSAY OF PHOSPHORUS: CPT | Performed by: INTERNAL MEDICINE

## 2023-06-14 PROCEDURE — 25010000002 HEPARIN LOCK FLUSH PER 10 UNITS: Performed by: INTERNAL MEDICINE

## 2023-06-14 PROCEDURE — 25010000002 ZOLEDRONIC ACID 4 MG/100ML SOLUTION: Performed by: INTERNAL MEDICINE

## 2023-06-14 PROCEDURE — 96374 THER/PROPH/DIAG INJ IV PUSH: CPT

## 2023-06-14 PROCEDURE — 83735 ASSAY OF MAGNESIUM: CPT | Performed by: INTERNAL MEDICINE

## 2023-06-14 RX ORDER — SODIUM CHLORIDE 0.9 % (FLUSH) 0.9 %
10 SYRINGE (ML) INJECTION AS NEEDED
OUTPATIENT
Start: 2023-06-14

## 2023-06-14 RX ORDER — SODIUM CHLORIDE 0.9 % (FLUSH) 0.9 %
10 SYRINGE (ML) INJECTION AS NEEDED
Status: DISCONTINUED | OUTPATIENT
Start: 2023-06-14 | End: 2023-06-14 | Stop reason: HOSPADM

## 2023-06-14 RX ORDER — SODIUM CHLORIDE 9 MG/ML
250 INJECTION, SOLUTION INTRAVENOUS ONCE
Status: COMPLETED | OUTPATIENT
Start: 2023-06-14 | End: 2023-06-14

## 2023-06-14 RX ORDER — HEPARIN SODIUM (PORCINE) LOCK FLUSH IV SOLN 100 UNIT/ML 100 UNIT/ML
500 SOLUTION INTRAVENOUS AS NEEDED
Status: DISCONTINUED | OUTPATIENT
Start: 2023-06-14 | End: 2023-06-14 | Stop reason: HOSPADM

## 2023-06-14 RX ORDER — HEPARIN SODIUM (PORCINE) LOCK FLUSH IV SOLN 100 UNIT/ML 100 UNIT/ML
500 SOLUTION INTRAVENOUS AS NEEDED
OUTPATIENT
Start: 2023-06-14

## 2023-06-14 RX ORDER — ZOLEDRONIC ACID 0.04 MG/ML
4 INJECTION, SOLUTION INTRAVENOUS ONCE
Status: COMPLETED | OUTPATIENT
Start: 2023-06-14 | End: 2023-06-14

## 2023-06-14 RX ADMIN — SODIUM CHLORIDE 250 ML: 9 INJECTION, SOLUTION INTRAVENOUS at 10:49

## 2023-06-14 RX ADMIN — Medication 10 ML: at 11:12

## 2023-06-14 RX ADMIN — Medication 500 UNITS: at 11:12

## 2023-06-14 RX ADMIN — ZOLEDRONIC ACID 4 MG: 0.04 INJECTION, SOLUTION INTRAVENOUS at 10:49

## 2023-06-23 ENCOUNTER — TELEPHONE (OUTPATIENT)
Dept: ONCOLOGY | Facility: CLINIC | Age: 53
End: 2023-06-23

## 2023-06-23 NOTE — TELEPHONE ENCOUNTER
Caller: Ina Akers    Relationship: Self    Best call back number: [phone]     What is the best time to reach you: ANYTIME    Who are you requesting to speak with (clinical staff, provider,  specific staff member): CLINICAL    What was the call regarding: PT THINKS THAT IT IS TIME TO RENEW ADA AND FMLA PAPERWORK TO HER EMPLOYER AT [employer]. PT BELIEVES  THE LAST TIME IT WAS FILLED OUT WAS IN DECEMBER.    JUST NEED A NOTE FROM DR LORD STATING EVERYTHING IS THE SAME AS BEFORE.     Is it okay if the provider responds through [employer]: YES - BUT PT DOES HAVE A COPY OF THE ADA PAPERWORK FROM DECEMBER IF NEEDED. MAY WANT TO CALL PT BACK.

## 2023-07-14 ENCOUNTER — TELEPHONE (OUTPATIENT)
Dept: ONCOLOGY | Facility: CLINIC | Age: 53
End: 2023-07-14

## 2023-07-14 NOTE — TELEPHONE ENCOUNTER
Caller: BRUNILDA    Relationship: Other    Best call back number: [phone]    What is the best time to reach you: ANYTIME    Who are you requesting to speak with (clinical staff, provider, specific staff member): DR LORD     What was the call regarding: BRUNILDA NEEDS CLARIFICATION ON PATIENT'S RESTRICTIONS AND RETURN TO WORK DATE FULL TIME/PART TIME ETC.

## 2023-07-26 ENCOUNTER — OFFICE VISIT (OUTPATIENT)
Dept: ONCOLOGY | Facility: CLINIC | Age: 53
End: 2023-07-26
Payer: COMMERCIAL

## 2023-07-26 ENCOUNTER — INFUSION (OUTPATIENT)
Dept: ONCOLOGY | Facility: HOSPITAL | Age: 53
End: 2023-07-26
Payer: COMMERCIAL

## 2023-07-26 ENCOUNTER — TELEPHONE (OUTPATIENT)
Dept: ONCOLOGY | Facility: CLINIC | Age: 53
End: 2023-07-26

## 2023-07-26 VITALS
HEIGHT: 61 IN | WEIGHT: 182.1 LBS | DIASTOLIC BLOOD PRESSURE: 81 MMHG | RESPIRATION RATE: 18 BRPM | SYSTOLIC BLOOD PRESSURE: 122 MMHG | BODY MASS INDEX: 34.38 KG/M2 | HEART RATE: 76 BPM | TEMPERATURE: 97.8 F | OXYGEN SATURATION: 99 %

## 2023-07-26 DIAGNOSIS — C79.51 PRIMARY CANCER OF THYROID GLAND METASTATIC TO BONE: ICD-10-CM

## 2023-07-26 DIAGNOSIS — C79.51 METASTASIS TO BONE: ICD-10-CM

## 2023-07-26 DIAGNOSIS — D50.9 IRON DEFICIENCY ANEMIA, UNSPECIFIED IRON DEFICIENCY ANEMIA TYPE: ICD-10-CM

## 2023-07-26 DIAGNOSIS — Z17.1 MALIGNANT NEOPLASM OF UPPER-OUTER QUADRANT OF LEFT BREAST IN FEMALE, ESTROGEN RECEPTOR NEGATIVE: Primary | ICD-10-CM

## 2023-07-26 DIAGNOSIS — C50.412 MALIGNANT NEOPLASM OF UPPER-OUTER QUADRANT OF LEFT BREAST IN FEMALE, ESTROGEN RECEPTOR NEGATIVE: Primary | ICD-10-CM

## 2023-07-26 DIAGNOSIS — T45.1X5A ANEMIA DUE TO CHEMOTHERAPY: ICD-10-CM

## 2023-07-26 DIAGNOSIS — C50.412 MALIGNANT NEOPLASM OF UPPER-OUTER QUADRANT OF LEFT BREAST IN FEMALE, ESTROGEN RECEPTOR NEGATIVE: ICD-10-CM

## 2023-07-26 DIAGNOSIS — D64.81 ANEMIA DUE TO CHEMOTHERAPY: ICD-10-CM

## 2023-07-26 DIAGNOSIS — Z17.1 MALIGNANT NEOPLASM OF UPPER-OUTER QUADRANT OF LEFT BREAST IN FEMALE, ESTROGEN RECEPTOR NEGATIVE: ICD-10-CM

## 2023-07-26 DIAGNOSIS — C73 PRIMARY CANCER OF THYROID GLAND METASTATIC TO BONE: ICD-10-CM

## 2023-07-26 LAB
ALBUMIN SERPL-MCNC: 3.7 G/DL (ref 3.5–5.2)
ALBUMIN/GLOB SERPL: 1.2 G/DL
ALP SERPL-CCNC: 59 U/L (ref 39–117)
ALT SERPL W P-5'-P-CCNC: 12 U/L (ref 1–33)
ANION GAP SERPL CALCULATED.3IONS-SCNC: 8 MMOL/L (ref 5–15)
AST SERPL-CCNC: 13 U/L (ref 1–32)
BASOPHILS # BLD AUTO: 0.02 10*3/MM3 (ref 0–0.2)
BASOPHILS NFR BLD AUTO: 0.6 % (ref 0–1.5)
BILIRUB SERPL-MCNC: 0.3 MG/DL (ref 0–1.2)
BUN SERPL-MCNC: 18 MG/DL (ref 6–20)
BUN/CREAT SERPL: 23.1 (ref 7–25)
CALCIUM SPEC-SCNC: 9.4 MG/DL (ref 8.6–10.5)
CHLORIDE SERPL-SCNC: 106 MMOL/L (ref 98–107)
CO2 SERPL-SCNC: 25 MMOL/L (ref 22–29)
CREAT SERPL-MCNC: 0.78 MG/DL (ref 0.57–1)
DEPRECATED RDW RBC AUTO: 44.4 FL (ref 37–54)
EGFRCR SERPLBLD CKD-EPI 2021: 90.4 ML/MIN/1.73
EOSINOPHIL # BLD AUTO: 0.19 10*3/MM3 (ref 0–0.4)
EOSINOPHIL NFR BLD AUTO: 5.9 % (ref 0.3–6.2)
ERYTHROCYTE [DISTWIDTH] IN BLOOD BY AUTOMATED COUNT: 13.7 % (ref 12.3–15.4)
FERRITIN SERPL-MCNC: 71.1 NG/ML (ref 13–150)
GLOBULIN UR ELPH-MCNC: 3.1 GM/DL
GLUCOSE SERPL-MCNC: 116 MG/DL (ref 65–99)
HCT VFR BLD AUTO: 32.6 % (ref 34–46.6)
HGB BLD-MCNC: 10 G/DL (ref 12–15.9)
IMM GRANULOCYTES # BLD AUTO: 0.01 10*3/MM3 (ref 0–0.05)
IMM GRANULOCYTES NFR BLD AUTO: 0.3 % (ref 0–0.5)
IRON 24H UR-MRATE: 38 MCG/DL (ref 37–145)
IRON SATN MFR SERPL: 13 % (ref 20–50)
LYMPHOCYTES # BLD AUTO: 1.25 10*3/MM3 (ref 0.7–3.1)
LYMPHOCYTES NFR BLD AUTO: 38.7 % (ref 19.6–45.3)
MAGNESIUM SERPL-MCNC: 1.8 MG/DL (ref 1.6–2.6)
MCH RBC QN AUTO: 27 PG (ref 26.6–33)
MCHC RBC AUTO-ENTMCNC: 30.7 G/DL (ref 31.5–35.7)
MCV RBC AUTO: 88.1 FL (ref 79–97)
MONOCYTES # BLD AUTO: 0.55 10*3/MM3 (ref 0.1–0.9)
MONOCYTES NFR BLD AUTO: 17 % (ref 5–12)
NEUTROPHILS NFR BLD AUTO: 1.21 10*3/MM3 (ref 1.7–7)
NEUTROPHILS NFR BLD AUTO: 37.5 % (ref 42.7–76)
NRBC BLD AUTO-RTO: 0 /100 WBC (ref 0–0.2)
PHOSPHATE SERPL-MCNC: 4 MG/DL (ref 2.5–4.5)
PLATELET # BLD AUTO: 252 10*3/MM3 (ref 140–450)
PMV BLD AUTO: 9.2 FL (ref 6–12)
POTASSIUM SERPL-SCNC: 3.8 MMOL/L (ref 3.5–5.2)
PROT SERPL-MCNC: 6.8 G/DL (ref 6–8.5)
RBC # BLD AUTO: 3.7 10*6/MM3 (ref 3.77–5.28)
SODIUM SERPL-SCNC: 139 MMOL/L (ref 136–145)
TIBC SERPL-MCNC: 302 MCG/DL (ref 298–536)
TRANSFERRIN SERPL-MCNC: 203 MG/DL (ref 200–360)
WBC NRBC COR # BLD: 3.23 10*3/MM3 (ref 3.4–10.8)

## 2023-07-26 PROCEDURE — 96374 THER/PROPH/DIAG INJ IV PUSH: CPT

## 2023-07-26 PROCEDURE — 84100 ASSAY OF PHOSPHORUS: CPT | Performed by: INTERNAL MEDICINE

## 2023-07-26 PROCEDURE — 25010000002 HEPARIN LOCK FLUSH PER 10 UNITS: Performed by: INTERNAL MEDICINE

## 2023-07-26 PROCEDURE — 82728 ASSAY OF FERRITIN: CPT | Performed by: INTERNAL MEDICINE

## 2023-07-26 PROCEDURE — 85025 COMPLETE CBC W/AUTO DIFF WBC: CPT | Performed by: INTERNAL MEDICINE

## 2023-07-26 PROCEDURE — 84466 ASSAY OF TRANSFERRIN: CPT | Performed by: INTERNAL MEDICINE

## 2023-07-26 PROCEDURE — 83735 ASSAY OF MAGNESIUM: CPT | Performed by: INTERNAL MEDICINE

## 2023-07-26 PROCEDURE — 83540 ASSAY OF IRON: CPT | Performed by: INTERNAL MEDICINE

## 2023-07-26 PROCEDURE — 25010000002 ZOLEDRONIC ACID 4 MG/100ML SOLUTION: Performed by: NURSE PRACTITIONER

## 2023-07-26 PROCEDURE — 80053 COMPREHEN METABOLIC PANEL: CPT | Performed by: INTERNAL MEDICINE

## 2023-07-26 RX ORDER — HEPARIN SODIUM (PORCINE) LOCK FLUSH IV SOLN 100 UNIT/ML 100 UNIT/ML
500 SOLUTION INTRAVENOUS AS NEEDED
OUTPATIENT
Start: 2023-07-26

## 2023-07-26 RX ORDER — SODIUM CHLORIDE 0.9 % (FLUSH) 0.9 %
10 SYRINGE (ML) INJECTION AS NEEDED
Status: DISCONTINUED | OUTPATIENT
Start: 2023-07-26 | End: 2023-07-26 | Stop reason: HOSPADM

## 2023-07-26 RX ORDER — ZOLEDRONIC ACID 0.04 MG/ML
4 INJECTION, SOLUTION INTRAVENOUS ONCE
Status: COMPLETED | OUTPATIENT
Start: 2023-07-26 | End: 2023-07-26

## 2023-07-26 RX ORDER — HEPARIN SODIUM (PORCINE) LOCK FLUSH IV SOLN 100 UNIT/ML 100 UNIT/ML
500 SOLUTION INTRAVENOUS AS NEEDED
Status: DISCONTINUED | OUTPATIENT
Start: 2023-07-26 | End: 2023-07-26 | Stop reason: HOSPADM

## 2023-07-26 RX ORDER — SODIUM CHLORIDE 0.9 % (FLUSH) 0.9 %
10 SYRINGE (ML) INJECTION AS NEEDED
OUTPATIENT
Start: 2023-07-26

## 2023-07-26 RX ORDER — SODIUM CHLORIDE 9 MG/ML
250 INJECTION, SOLUTION INTRAVENOUS ONCE
Status: COMPLETED | OUTPATIENT
Start: 2023-07-26 | End: 2023-07-26

## 2023-07-26 RX ORDER — ZOLEDRONIC ACID 0.04 MG/ML
4 INJECTION, SOLUTION INTRAVENOUS ONCE
Status: CANCELLED | OUTPATIENT
Start: 2023-07-26

## 2023-07-26 RX ORDER — DEXTROMETHORPHAN HYDROBROMIDE AND PROMETHAZINE HYDROCHLORIDE 15; 6.25 MG/5ML; MG/5ML
SYRUP ORAL
COMMUNITY
End: 2023-07-26

## 2023-07-26 RX ORDER — SODIUM CHLORIDE 9 MG/ML
250 INJECTION, SOLUTION INTRAVENOUS ONCE
Status: CANCELLED | OUTPATIENT
Start: 2023-07-26

## 2023-07-26 RX ADMIN — ZOLEDRONIC ACID 4 MG: 0.04 INJECTION, SOLUTION INTRAVENOUS at 08:51

## 2023-07-26 RX ADMIN — Medication 10 ML: at 09:10

## 2023-07-26 RX ADMIN — SODIUM CHLORIDE 250 ML: 9 INJECTION, SOLUTION INTRAVENOUS at 08:49

## 2023-07-26 RX ADMIN — Medication 500 UNITS: at 09:10

## 2023-07-26 NOTE — PROGRESS NOTES
Subjective     CHIEF COMPLAINT:      Chief Complaint   Patient presents with    Follow-up     HISTORY OF PRESENT ILLNESS:     Ina Akers is a 52 y.o. female with history of breast cancer and metastatic thyroid cancer who is here today for lab review and evaluation due for Zometa.     She continues on oral iron 325 mg daily, and reports she is tolerating it well.    Patient has another reconstruction surgery scheduled in November.    ROS:  Pertinent ROS is in the HPI.     Past medical, surgical, social and family history were reviewed.     MEDICATIONS:    Current Outpatient Medications:     acetaminophen (TYLENOL) 500 MG tablet, Take 2 tablets by mouth Every 6 (Six) Hours As Needed for Mild Pain., Disp: , Rfl:     amLODIPine (NORVASC) 10 MG tablet, TAKE ONE TABLET BY MOUTH DAILY, Disp: 90 tablet, Rfl: 1    atorvastatin (LIPITOR) 20 MG tablet, Take 1 tablet by mouth Daily. (Patient taking differently: Take 1 tablet by mouth Every Night.), Disp: 90 tablet, Rfl: 3    Blood Glucose Monitoring Suppl w/Device kit, Check blood sugar twice a day, Disp: 1 each, Rfl: 0    cholecalciferol (VITAMIN D3) 25 MCG (1000 UT) tablet, Take 1 tablet by mouth Daily., Disp: , Rfl:     famotidine (PEPCID) 20 MG tablet, Take 1 tablet by mouth Every Other Day., Disp: , Rfl:     ferrous sulfate 325 (65 FE) MG tablet, Take 1 tablet by mouth Daily., Disp: , Rfl:     Glucose Blood (BLOOD GLUCOSE TEST) strip, Check blood sugar twice a day., Disp: 100 each, Rfl: 1    labetalol (NORMODYNE) 100 MG tablet, Take 0.5 tablets by mouth 2 (Two) Times a Day., Disp: 180 tablet, Rfl: 3    Lancets (ACCU-CHEK SOFT TOUCH) lancets, Check blood sugar twice a day as needed, Disp: 100 each, Rfl: 1    lansoprazole (PREVACID) 30 MG capsule, TAKE ONE CAPSULE BY MOUTH DAILY, Disp: 90 capsule, Rfl: 0    levothyroxine (SYNTHROID, LEVOTHROID) 112 MCG tablet, Take 1 tablet by mouth Every Morning., Disp: , Rfl:     losartan (COZAAR) 100 MG tablet, Take 1 tablet by  "mouth Daily., Disp: 90 tablet, Rfl: 3    metFORMIN ER (GLUCOPHAGE-XR) 500 MG 24 hr tablet, Take 2 tablets by mouth Daily With Dinner., Disp: 180 tablet, Rfl: 3    promethazine (PHENERGAN) 12.5 MG tablet, TAKE 1/2 TO 1 TABLET BY MOUTH EVERY 4 HOURS AS NEEDED FOR NAUSEA AND VOMITING -MAY MAKE DROWSY (Patient taking differently: Take 1-2 tablets by mouth Every 6 (Six) Hours As Needed. TAKE 1/2 TO 1 TABLET BY MOUTH EVERY 4 HOURS AS NEEDED FOR NAUSEA AND VOMITING -MAY MAKE DROWSY), Disp: 30 tablet, Rfl: 3    triamcinolone (KENALOG) 0.1 % cream, Apply  topically to the appropriate area as directed 2 (Two) Times a Day. Sparingly shortest time possible avoid face (Patient taking differently: Apply  topically to the appropriate area as directed As Needed. Sparingly shortest time possible avoid face), Disp: 80 g, Rfl: 0  No current facility-administered medications for this visit.  Objective     VITAL SIGNS:     Vitals:    07/26/23 0757   BP: 122/81   Pulse: 76   Resp: 18   Temp: 97.8 °F (36.6 °C)   TempSrc: Temporal   SpO2: 99%   Weight: 82.6 kg (182 lb 1.6 oz)   Height: 154.9 cm (60.98\")   PainSc: 0-No pain     Body mass index is 34.43 kg/m².     Wt Readings from Last 5 Encounters:   07/26/23 82.6 kg (182 lb 1.6 oz)   07/17/23 82.1 kg (181 lb)   06/14/23 83.6 kg (184 lb 6.4 oz)   05/31/23 83 kg (183 lb)   05/12/23 83.2 kg (183 lb 8 oz)     Physical Exam  Vitals reviewed.   Constitutional:       General: She is not in acute distress.     Appearance: Normal appearance. She is well-developed.   HENT:      Head: Normocephalic and atraumatic.   Eyes:      Pupils: Pupils are equal, round, and reactive to light.   Cardiovascular:      Rate and Rhythm: Normal rate and regular rhythm.      Heart sounds: Normal heart sounds. No murmur heard.  Pulmonary:      Effort: Pulmonary effort is normal. No respiratory distress.      Breath sounds: Normal breath sounds. No wheezing, rhonchi or rales.   Abdominal:      General: Bowel sounds are " normal. There is no distension.      Palpations: Abdomen is soft.   Musculoskeletal:         General: Normal range of motion.      Cervical back: Normal range of motion.   Skin:     General: Skin is warm and dry.      Findings: No rash.   Neurological:      Mental Status: She is alert and oriented to person, place, and time.       DIAGNOSTIC DATA:     Results from last 7 days   Lab Units 07/26/23  0745   WBC 10*3/mm3 3.23*   NEUTROS ABS 10*3/mm3 1.21*   HEMOGLOBIN g/dL 10.0*   HEMATOCRIT % 32.6*   PLATELETS 10*3/mm3 252       Results from last 7 days   Lab Units 07/26/23  0745   SODIUM mmol/L 139   POTASSIUM mmol/L 3.8   CHLORIDE mmol/L 106   CO2 mmol/L 25.0   BUN mg/dL 18   CREATININE mg/dL 0.78   CALCIUM mg/dL 9.4   ALBUMIN g/dL 3.7   BILIRUBIN mg/dL 0.3   ALK PHOS U/L 59   ALT (SGPT) U/L 12   AST (SGOT) U/L 13   GLUCOSE mg/dL 116*   MAGNESIUM mg/dL 1.8         Assessment & Plan    *Left breast cancer.  It was identified on screening mammograms on 10/5/2022.  It was located at 12 o'clock position.  It measured 6 x 5 by 5 mm on mammogram and ultrasound on 10/20/2022.  PET scan on 10/6/2022 showed the lesion to be hypermetabolic. It measured 10 x 7 mm with an SUV of 5.8.  No evidence of metastasis on PET scan.  MRI on 12/5/2022 showed the lesion to measure 9 mm.    S/P mastectomy with reconstruction on 1/24/2023.  Pathology exam revealed the primary tumor to measure 15 mm.  It was grade 3.  Triple negative.  HER2 was negative-0 by IHC.  Ki-67 was 85%.  It was considered pT1cN0.  2 sentinel lymph nodes were examined and were negative.    No lymphovascular invasion. 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  Due to the patient receiving Adriamycin in the past, she is unable to receive additional Adriamycin.  Taxotere and Cytoxan with Neulasta support every 3 weeks x 4 cycles was recommended.  She started Taxotere and Cytoxan on 3/8/2023.  She had infusion reaction to Taxotere which was briefly held.  She was given IV Solu-Cortef.   It was restarted at a slower rate and she tolerated the infusion.  She tolerated cycle #2 and 3 infusion without problem of infusion reaction.  5/3/2023 ourth and final cycle of Taxotere and Cytoxan    *Metastatic thyroid cancer diagnosed on 10/6/2020.  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  She had right thyroid lobectomy on 12/17/2018 at [place].    The specimen was sent for outside consultation and the final conclusion was that there was no evidence of malignancy.  MRI on 8/27/2020 revealed an L4 lesion resulting in pathologic compression fracture.  Bone scan on 9/2/2020 revealed increased activity at L4 and left side of the skull.  PET scan on 9/9/2020 revealed abnormal activity at L4, right femoral neck, lower sacrum, left ischium and right ninth medial aspect.  PET scan 9/9/2020 showed no evidence of visceral metastasis.  Patient had kyphoplasty on 9/17/2020.  Pathology was nondiagnostic.  CT-guided biopsy of the lesion in the right posterior ninth rib on 10/6/2020 revealed a clear cell neoplasm.  It is positive for PAX 8 and TTF-1 favoring metastatic thyroid cancer.  Patient was referred to [facility].  Patient underwent completion thyroidectomy on 11/9/2020.  There was no evidence of uptake of radioactive iodine on SPECT imaging on 12/18/2020.  This was indicative of the tumor not going to respond to radioactive iodine therapy.  Patient was started on Lenvima 20 mg daily on 12/24/2020.  Lenvima was held on 2/6/2021 due to drug induced anemia and thrombocytopenia.  She was unable to start back on it due to prolonged cytopenias.  CT scans on 3/23/2021 showed stable disease.  CBC was stable on 6/8/2021.  CT scans showed stable disease.  Thyroglobulin was stable at 3.4.    PET scan on 9/1/2021 revealed decrease in the activity in the metastatic lesions.    Starting back on active treatment (Nexavar) was not recommended.  PET scan on 12/16/2021 showed complete response except  for mild hypermetabolism in the left ischium.  CT scans on 4/8/2022 showed stable bone metastasis.  No new metastatic lesions.  PET scan on 10/6/2022 revealed no new areas of metastasis.  Due to the stable metastatic disease, she is not currently on treatment for her thyroid cancer.   She is on Synthroid 112 mcg daily.  CT scans done at the [facility] 6/27/2023 of the head, neck, chest, abdomen, and pelvis.  No evidence of intracranial metastases, stable treated sclerotic lesion in the left parietal calvarium with no evidence of disease progression.  Total thyroidectomy without evidence of tumor recurrence, no cervical lymphadenopathy.  No evidence of disease progression in the chest, interval left mastectomy with left breast reconstruction.  Unchanged lytic focus in the thin sclerotic borders compared to October 2022 which showed mild FDG avidity on that October 22 PET/CT.  Known L4 and right femoral neck lytic osseous lesions not well visualized secondary to prior kyphoplasty and right femoral intramedullary nailing respectively.  No other osseous lesions visualized.  No evidence of solid organ or ally metastatic disease in the abdomen or pelvis.    *Bone metastasis.  Patient was given Zometa on 9/29/2020.  Patient underwent right femur medullary nailing on 10/23/2020.  Patient was restarted back on Zometa on 2/24/2022.   She is tolerating the Zometa infusions well.  CT scans on 4/8/2022 revealed no new metastatic lesions.  The metastatic lesions were stable.  PET scan on 10/6/2022 revealed no change in the areas of activity in the bone metastasis.  No new lesions.  Last dose of Zometa was given on 12/1/2022.  Plan to resume Zometa after completion of chemotherapy breast cancer.  7/26/2023 here for Zometa    * Iron deficiency anemia.    Patient was previously treated with ferrous sulfate 325 mg daily.  Hemoglobin decreased to 9.1 on 2/24/2022.  EGD on 3/25/2022 showed no evidence of blood  loss.  Pathology exam revealed no typical features of celiac disease.  Hemoglobin was 10.4 on 12/27/2022.  Ferritin was 95 and transferrin saturation was 14%.  She was started on ferrous sulfate 325 mg daily.  Hemoglobin decreased to 9.6 on 2/1/2023, secondary to recent surgical blood losses.  She was restarted on ferrous sulfate 325 mg daily on 2/1/2023.  Hemoglobin is 10.0 7/26/2023, continues on ferrous sulfate 325 mg daily.      *History of left breast cancer, stage IIB, triIple negative.   S/P lumpectomy in 2001.  She received adjuvant chemotherapy on the NSABP B34 with AC followed by Taxotere.  S/P post-lumpectomy radiation therapy.   She was placed on tamoxifen for 5 years and it was completed in January 2007.   With the patient's history of breast cancer at a young age and development of a second new breast cancer in the left breast, repeat genetic testing was recommended.  Gene testing on 12/22/2022 revealed variants of unknown significance.    PLAN:    Zometa today.  Continue ferrous sulfate 325 mg daily.  Continues to follow with Dr. Huddleston at the [facility] with recent scans 6/27/2023, and plans to do repeat scans in 6 months.  Follow-up in 6 weeks with Dr. Muñoz with repeat CBC, CMP, magnesium, phosphorus, possible Zometa.  Call/ return sooner should the patient develop any new concerns or problems.      Patient is on a high risk medication requiring close monitoring for toxicity.        Jacqueline Hathaway, APRN  07/26/23

## 2023-07-26 NOTE — TELEPHONE ENCOUNTER
Caller: Ina Akers    Relationship: Self    Best call back number: [phone]     What is the best time to reach you: ASAP    Who are you requesting to speak with (clinical staff, provider,  specific staff member): SADIA    What was the call regarding: PT WOULD LIKE SADIA TO CALL HER BACK TODAY IF POSSIBLE. PT IS GOING BACK TO WORK TOMORROW AND HAD SOME QUESTIONS FOR SADIA.

## 2023-07-27 ENCOUNTER — TELEPHONE (OUTPATIENT)
Dept: ONCOLOGY | Facility: CLINIC | Age: 53
End: 2023-07-27
Payer: COMMERCIAL

## 2023-07-27 NOTE — TELEPHONE ENCOUNTER
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 I will complete [employer] form, type up letter and have another MD in the office to sign, then fax to [employer] at Dr Muñoz out until 8/1/23.

## 2023-08-08 ENCOUNTER — PATIENT OUTREACH (OUTPATIENT)
Dept: OTHER | Facility: HOSPITAL | Age: 53
End: 2023-08-08
Payer: COMMERCIAL

## 2023-08-08 NOTE — PROGRESS NOTES
M.s Robinson called with counseling needs for family. Talked through those needs and emailed supportive care team to figure out best appropriate resources. Will update her with options. She was thankful for the help.

## 2023-08-11 ENCOUNTER — PATIENT OUTREACH (OUTPATIENT)
Dept: OTHER | Facility: HOSPITAL | Age: 53
End: 2023-08-11
Payer: COMMERCIAL

## 2023-08-11 NOTE — PROGRESS NOTES
Spoke with Ms. Akers about family counseling resources. Emailed her a list of options our team recommends and encouraged her to call back with any other questions or concerns.

## 2023-08-14 ENCOUNTER — PATIENT OUTREACH (OUTPATIENT)
Dept: OTHER | Facility: HOSPITAL | Age: 53
End: 2023-08-14
Payer: COMMERCIAL

## 2023-08-14 NOTE — PROGRESS NOTES
Ms. Akers came in to the cancer resource center asking for her progress notes from Tiffany HERNANDEZ to be faxed to her short term disability company. She gave contact info for [employer] her  and their fax number. Confirmed with [employer] the contact info and her claim number. Notes faxed.

## 2023-08-24 ENCOUNTER — HOSPITAL ENCOUNTER (OUTPATIENT)
Dept: PHYSICAL THERAPY | Facility: HOSPITAL | Age: 53
Setting detail: THERAPIES SERIES
Discharge: HOME | End: 2023-08-24
Payer: COMMERCIAL

## 2023-08-24 DIAGNOSIS — Z90.12 S/P LEFT MASTECTOMY: Primary | ICD-10-CM

## 2023-08-24 DIAGNOSIS — Z98.890 HISTORY OF LUMPECTOMY OF LEFT BREAST: ICD-10-CM

## 2023-08-24 DIAGNOSIS — Z91.89 AT RISK FOR LYMPHEDEMA: ICD-10-CM

## 2023-08-24 DIAGNOSIS — Z85.850 HISTORY OF THYROID CANCER: ICD-10-CM

## 2023-08-24 PROCEDURE — 97110 THERAPEUTIC EXERCISES: CPT | Performed by: PHYSICAL THERAPIST

## 2023-08-24 PROCEDURE — 97535 SELF CARE MNGMENT TRAINING: CPT | Performed by: PHYSICAL THERAPIST

## 2023-08-24 PROCEDURE — 93702 BIS XTRACELL FLUID ANALYSIS: CPT | Performed by: PHYSICAL THERAPIST

## 2023-08-24 NOTE — THERAPY PROGRESS REPORT/RE-CERT
Outpatient Physical Therapy Lymphedema Progress Note  [facility]     Patient Name: Ina Akers  : 1970  MRN: [id number]  Today's Date: 2023        Visit Date: 2023    Visit Dx:    ICD-10-CM ICD-9-CM   1. S/P left mastectomy  Z90.12 V45.71   2. At risk for lymphedema  Z91.89 V49.89   3. History of lumpectomy of left breast  Z98.890 V45.89   4. History of thyroid cancer  Z85.850 V10.87       Patient Active Problem List   Diagnosis    Abnormal perimenopausal bleeding    Amenorrhea    Atopic dermatitis    External hemorrhoids    Gastritis    Hypertension    Iron deficiency anemia    Irritable bowel syndrome    Melanocytic nevus    Goiter    Tinea corporis    Type 2 diabetes mellitus    Lipid screening    Malignant neoplasm of upper-outer quadrant of left breast in female, estrogen receptor negative    Acute pain of both knees    Controlled type 2 diabetes mellitus without complication, without long-term current use of insulin    Hyperlipidemia    Vitamin D deficiency    Multiple thyroid nodules    Subclinical hypothyroidism    Lumbar spine tumor    Metastasis to bone    Fracture, pathologic, femur, neck, right, initial encounter    Immunocompromised    Encounter for screening for malignant neoplasm of colon    Screen for colon cancer    COVID-19 virus infection    Gastroesophageal reflux disease    Primary cancer of thyroid gland metastatic to bone    Acute pain of left shoulder         Lymphedema       Row Name 23 1140             Subjective Pain    Able to rate subjective pain? yes  -JS      Pre-Treatment Pain Level 0  -JS         Subjective Comments    Subjective Comments Reports using sleeve 3x/week for 6-8 hours, and performing self MLD.  Scheduled return to office at least 2x/week for work (continung to work from home remaining days) starting Sept.  Has been helping daughter with new baby in NICU.  Scheduled for L breast reconstruction, R reduction on 23,  -JS          Lymphedema Assessment    Lymphedema Classification LUE:;at risk/stage 0  -JS      Lymphedema Cancer Related Sx left;modified radical mastectomy;lumpectomy;axillary dissection  -JS      Lymphedema Surgery Comments S/p L mastectomy, repeat axillary dissection on 1/24/23. Port placement 2/14/23. History of L lumpectomy, axillary dissection in 2001, and history of metastatic thyroid cancer with thyroid surgery in 2018 & 2020, related R hip & back surgery in 2020  -JS      Lymph Nodes Removed # --  repeat axillary dissection  -JS      Positive Lymph Nodes # 0  -JS      Chemo Received yes  -JS      Radiation Therapy Received no  -JS      Infections or Cellulitis? no  -JS      Lymphedema Assessment Comments Prior chemo & radiation following 2001 lumpectomy  -JS         Right Upper Ext    Rt Shoulder Abduction AROM 156  -JS      Rt Shoulder Flexion AROM 156  -JS      Rt Shoulder External Rotation AROM THAIS T2  -JS      Rt Shoulder Internal Rotation AROM FIR T10  -JS         Left Upper Ext    Lt Shoulder Abduction AROM 156  -JS      Lt Shoulder Flexion AROM 158  -JS      Lt Shoulder External Rotation AROM THAIS to T2  -JS      Lt Shoulder Internal Rotation AROM FIR T10  -JS      Lt Upper Extremity Comments  Mild tightness endrange flex, ER, abd  -JS         MMT (Manual Muscle Testing)    General MMT Comments B UE 4+/5  -JS         LUE Quick Girth (cm)    Axilla 34.4 cm  -JS      Mid upper arm 32.5 cm  -JS      Elbow 28.8 cm  -JS      Mid forearm 24 cm  -JS      Wrist crease 17 cm  -JS      LUE Quick Girth Total 136.7  -JS         RUE Quick Girth (cm)    Axilla 34.5 cm  -JS      Mid upper arm 32.5 cm  -JS      Elbow 27 cm  -JS      Mid forearm 24 cm  -JS      Wrist crease 17.2 cm  -JS      RUE Quick Girth Total 135.2  -JS         L-Dex Bioimpedence Screening    L-Dex Measurement Extremity LUE  -JS      L-Dex Patient Position Standing  -JS      L-Dex UE Dominate Side Right  -JS      L-Dex UE At Risk Side Left  -JS      L-Dex UE  Pre Surgical Value Yes  -JS      L-Dex UE Score 5.7  -JS      L-Dex UE Baseline Score 4.4  -JS      L-Dex UE Value Change 1.3  -JS      L-Dex UE Comment WNL, decreased from last assessment  -JS      $ L-Dex Charge yes  -JS                User Key  (r) = Recorded By, (t) = Taken By, (c) = Cosigned By      Initials Name Provider Type    Melani Ambrosio, PT Physical Therapist                                   PT Assessment/Plan       Row Name 23 1140          PT Assessment    Assessment Comments Pt is a 51 y/o female recently diagnosed with triple negative L breast CA, s/p L mastectomy with tissue expander placement and repeat axillary dissection on 23. Pt has now completed chemotherapy treatment. PMH includes history of L breast CA, s/p lumpectomy and axillary dissection (2/9 lymph nodes) in . At that time, pt received chemo and radiation. Further PMH includes thyroid CA, s/p thyroid surgery in  & , with additional R hip surgery and back surgery due to metastatic disease.  Patient presents for follow-up assessment and repeat bioimpedance.   Patient has been using sleeve 3x/week.  Bioimpedance score is 5.7 which is decreased from last visit and WNL compared to baseline score.  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  Plan to follow-up 1 month after pt's return to work to assess status and repeat bioimpedance. If score remains stable, plan to wean from sleeve as indicated.  Patient advised to contact PT with any questions, concerns and changes in the interim.  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 Return in 6 weeks for repeat bioimpedance, reassessment of status. If score/symptoms remain stable, further wean from sleeve.  Pt scheduled for  surgery with Dr. Morse on 11/30/23.  -JS               User Key  (r) = Recorded By, (t) = Taken By, (c) = Cosigned By      Initials Name Provider Type    Melani Ambrosio, PT Physical Therapist                        OP Exercises       Row Name 08/24/23 1140             Subjective Comments    Subjective Comments Reports using sleeve 3x/week for 6-8 hours, and performing self MLD.  Scheduled return to office at least 2x/week for work (continung to work from home remaining days) starting Sept.  Has been helping daughter with new baby in NICU.  Scheduled for L breast reconstruction, R reduction on 11/30/23,  -JS         Subjective Pain    Able to rate subjective pain? yes  -JS      Pre-Treatment Pain Level 0  -JS         Total Minutes    46663 - PT Therapeutic Exercise Minutes 10  -JS         Exercise 1    Exercise Name 1 Post shoulder rolls  -JS      Reps 1 Reviewed  -JS         Exercise 2    Exercise Name 2 Scapular retraction  -JS      Reps 2 Reviewed  -JS         Exercise 5    Exercise Name 5 UE wall slides  -JS      Reps 5 Reviewed  -JS                User Key  (r) = Recorded By, (t) = Taken By, (c) = Cosigned By      Initials Name Provider Type    Melani Ambrosio, PT Physical Therapist                                 PT OP Goals       Row Name 08/24/23 1140          Long Term Goals    LTG Date to Achieve 05/25/23  -JS     LTG 1 Pt will be independent in comprehensive HEP to allow ongoing symptom management.  -JS     LTG 1 Progress Ongoing  -JS     LTG 2 Pt will obtain properly fitted UE compression sleeve and verbalize appropriate wear schedule.  -JS     LTG 2 Progress Met  -JS     LTG 3 Pt will maintain L-Dex bioimpedance score WNL.  -JS     LTG 3 Progress Ongoing  -JS     LTG 3 Progress Comments Score 5.7 which is reduced and WNL  -JS     LTG 4 Pt will demonstrate normal L shoulder AROM.  -JS     LTG 4 Progress Met  -JS     LTG 5 Pt will score 10 or less on Quick Dash to return to previously reported level of  function.  -JERMAINE     LTG 5 Progress Ongoing  -JERMAINE               User Key  (r) = Recorded By, (t) = Taken By, (c) = Cosigned By      Initials Name Provider Type    Melani Ambrosio PT Physical Therapist                    Therapy Education  Education Details: Bioimpedance score and significance reviewed.  Discussed patient returning to work, and added lifting that will be required at work (including lifting laptop) as well as anticipated assistance that daughter will require when bringing baby home from the NICU, therefore encouraged pt to continue use of sleeve as currently used, including 3x/week and during activities that will require lifting, increased activity.  Patient to continue to monitor signs/symptoms for swelling.  Reviewed HEP.              Time Calculation:   Start Time: 1140  Stop Time: 1225  Time Calculation (min): 45 min  Timed Charges  93309 - PT Therapeutic Exercise Minutes: 10  Total Minutes  Timed Charges Total Minutes: 10   Total Minutes: 10   Therapy Charges for Today       Code Description Service Date Service Provider Modifiers Qty    88224619475 HC PT BIS XTRACELL FLUID ANALYSIS 8/24/2023 Melani Vieyra, PT  1    21284791132 HC PT THER PROC EA 15 MIN 8/24/2023 Melani Vieyra, PT GP 1    05420377769 HC PT SELF CARE/MGMT/TRAIN EA 15 MIN 8/24/2023 Melani Vieyra, PT GP 1                      Melani Vieyra PT  8/24/2023

## 2023-09-06 ENCOUNTER — INFUSION (OUTPATIENT)
Dept: ONCOLOGY | Facility: HOSPITAL | Age: 53
End: 2023-09-06
Payer: COMMERCIAL

## 2023-09-06 ENCOUNTER — TELEPHONE (OUTPATIENT)
Dept: FAMILY MEDICINE CLINIC | Facility: CLINIC | Age: 53
End: 2023-09-06
Payer: COMMERCIAL

## 2023-09-06 ENCOUNTER — OFFICE VISIT (OUTPATIENT)
Dept: ONCOLOGY | Facility: CLINIC | Age: 53
End: 2023-09-06
Payer: COMMERCIAL

## 2023-09-06 VITALS
HEIGHT: 61 IN | HEART RATE: 67 BPM | RESPIRATION RATE: 18 BRPM | TEMPERATURE: 97.7 F | WEIGHT: 177 LBS | DIASTOLIC BLOOD PRESSURE: 73 MMHG | SYSTOLIC BLOOD PRESSURE: 113 MMHG | BODY MASS INDEX: 33.42 KG/M2 | OXYGEN SATURATION: 98 %

## 2023-09-06 DIAGNOSIS — Z17.1 MALIGNANT NEOPLASM OF UPPER-OUTER QUADRANT OF LEFT BREAST IN FEMALE, ESTROGEN RECEPTOR NEGATIVE: ICD-10-CM

## 2023-09-06 DIAGNOSIS — Z17.1 MALIGNANT NEOPLASM OF UPPER-OUTER QUADRANT OF LEFT BREAST IN FEMALE, ESTROGEN RECEPTOR NEGATIVE: Primary | ICD-10-CM

## 2023-09-06 DIAGNOSIS — D50.9 IRON DEFICIENCY ANEMIA, UNSPECIFIED IRON DEFICIENCY ANEMIA TYPE: ICD-10-CM

## 2023-09-06 DIAGNOSIS — E11.00 TYPE 2 DIABETES MELLITUS WITH HYPEROSMOLARITY WITHOUT COMA, WITHOUT LONG-TERM CURRENT USE OF INSULIN: Primary | ICD-10-CM

## 2023-09-06 DIAGNOSIS — C79.51 METASTASIS TO BONE: Primary | ICD-10-CM

## 2023-09-06 DIAGNOSIS — C50.412 MALIGNANT NEOPLASM OF UPPER-OUTER QUADRANT OF LEFT BREAST IN FEMALE, ESTROGEN RECEPTOR NEGATIVE: Primary | ICD-10-CM

## 2023-09-06 DIAGNOSIS — C79.51 METASTASIS TO BONE: ICD-10-CM

## 2023-09-06 DIAGNOSIS — C50.412 MALIGNANT NEOPLASM OF UPPER-OUTER QUADRANT OF LEFT BREAST IN FEMALE, ESTROGEN RECEPTOR NEGATIVE: ICD-10-CM

## 2023-09-06 LAB
ALBUMIN SERPL-MCNC: 4.2 G/DL (ref 3.5–5.2)
ALBUMIN/GLOB SERPL: 1.4 G/DL
ALP SERPL-CCNC: 65 U/L (ref 39–117)
ALT SERPL W P-5'-P-CCNC: 14 U/L (ref 1–33)
ANION GAP SERPL CALCULATED.3IONS-SCNC: 11.5 MMOL/L (ref 5–15)
AST SERPL-CCNC: 13 U/L (ref 1–32)
BASOPHILS # BLD AUTO: 0.02 10*3/MM3 (ref 0–0.2)
BASOPHILS NFR BLD AUTO: 0.4 % (ref 0–1.5)
BILIRUB SERPL-MCNC: 0.3 MG/DL (ref 0–1.2)
BUN SERPL-MCNC: 17 MG/DL (ref 6–20)
BUN/CREAT SERPL: 20.7 (ref 7–25)
CALCIUM SPEC-SCNC: 9.7 MG/DL (ref 8.6–10.5)
CHLORIDE SERPL-SCNC: 105 MMOL/L (ref 98–107)
CO2 SERPL-SCNC: 24.5 MMOL/L (ref 22–29)
CREAT SERPL-MCNC: 0.82 MG/DL (ref 0.57–1)
DEPRECATED RDW RBC AUTO: 44.6 FL (ref 37–54)
EGFRCR SERPLBLD CKD-EPI 2021: 85.1 ML/MIN/1.73
EOSINOPHIL # BLD AUTO: 0.25 10*3/MM3 (ref 0–0.4)
EOSINOPHIL NFR BLD AUTO: 5.1 % (ref 0.3–6.2)
ERYTHROCYTE [DISTWIDTH] IN BLOOD BY AUTOMATED COUNT: 14.2 % (ref 12.3–15.4)
GLOBULIN UR ELPH-MCNC: 3 GM/DL
GLUCOSE SERPL-MCNC: 108 MG/DL (ref 65–99)
HCT VFR BLD AUTO: 32.4 % (ref 34–46.6)
HGB BLD-MCNC: 10.1 G/DL (ref 12–15.9)
IMM GRANULOCYTES # BLD AUTO: 0.01 10*3/MM3 (ref 0–0.05)
IMM GRANULOCYTES NFR BLD AUTO: 0.2 % (ref 0–0.5)
LYMPHOCYTES # BLD AUTO: 1.51 10*3/MM3 (ref 0.7–3.1)
LYMPHOCYTES NFR BLD AUTO: 30.8 % (ref 19.6–45.3)
MAGNESIUM SERPL-MCNC: 1.8 MG/DL (ref 1.6–2.6)
MCH RBC QN AUTO: 26.9 PG (ref 26.6–33)
MCHC RBC AUTO-ENTMCNC: 31.2 G/DL (ref 31.5–35.7)
MCV RBC AUTO: 86.4 FL (ref 79–97)
MONOCYTES # BLD AUTO: 0.48 10*3/MM3 (ref 0.1–0.9)
MONOCYTES NFR BLD AUTO: 9.8 % (ref 5–12)
NEUTROPHILS NFR BLD AUTO: 2.64 10*3/MM3 (ref 1.7–7)
NEUTROPHILS NFR BLD AUTO: 53.7 % (ref 42.7–76)
NRBC BLD AUTO-RTO: 0 /100 WBC (ref 0–0.2)
PHOSPHATE SERPL-MCNC: 4.1 MG/DL (ref 2.5–4.5)
PLATELET # BLD AUTO: 308 10*3/MM3 (ref 140–450)
PMV BLD AUTO: 8.8 FL (ref 6–12)
POTASSIUM SERPL-SCNC: 3.7 MMOL/L (ref 3.5–5.2)
PROT SERPL-MCNC: 7.2 G/DL (ref 6–8.5)
RBC # BLD AUTO: 3.75 10*6/MM3 (ref 3.77–5.28)
SODIUM SERPL-SCNC: 141 MMOL/L (ref 136–145)
WBC NRBC COR # BLD: 4.91 10*3/MM3 (ref 3.4–10.8)

## 2023-09-06 PROCEDURE — 83735 ASSAY OF MAGNESIUM: CPT | Performed by: INTERNAL MEDICINE

## 2023-09-06 PROCEDURE — 25010000002 HEPARIN LOCK FLUSH PER 10 UNITS: Performed by: INTERNAL MEDICINE

## 2023-09-06 PROCEDURE — 80053 COMPREHEN METABOLIC PANEL: CPT | Performed by: INTERNAL MEDICINE

## 2023-09-06 PROCEDURE — 96374 THER/PROPH/DIAG INJ IV PUSH: CPT

## 2023-09-06 PROCEDURE — 99214 OFFICE O/P EST MOD 30 MIN: CPT | Performed by: INTERNAL MEDICINE

## 2023-09-06 PROCEDURE — 25010000002 ZOLEDRONIC ACID 4 MG/100ML SOLUTION: Performed by: INTERNAL MEDICINE

## 2023-09-06 PROCEDURE — 85025 COMPLETE CBC W/AUTO DIFF WBC: CPT | Performed by: INTERNAL MEDICINE

## 2023-09-06 PROCEDURE — 84100 ASSAY OF PHOSPHORUS: CPT | Performed by: INTERNAL MEDICINE

## 2023-09-06 RX ORDER — SODIUM CHLORIDE 0.9 % (FLUSH) 0.9 %
10 SYRINGE (ML) INJECTION AS NEEDED
OUTPATIENT
Start: 2023-09-06

## 2023-09-06 RX ORDER — SODIUM CHLORIDE 0.9 % (FLUSH) 0.9 %
10 SYRINGE (ML) INJECTION AS NEEDED
Status: DISCONTINUED | OUTPATIENT
Start: 2023-09-06 | End: 2023-09-06 | Stop reason: HOSPADM

## 2023-09-06 RX ORDER — HEPARIN SODIUM (PORCINE) LOCK FLUSH IV SOLN 100 UNIT/ML 100 UNIT/ML
500 SOLUTION INTRAVENOUS AS NEEDED
Status: DISCONTINUED | OUTPATIENT
Start: 2023-09-06 | End: 2023-09-06 | Stop reason: HOSPADM

## 2023-09-06 RX ORDER — HEPARIN SODIUM (PORCINE) LOCK FLUSH IV SOLN 100 UNIT/ML 100 UNIT/ML
500 SOLUTION INTRAVENOUS AS NEEDED
OUTPATIENT
Start: 2023-09-06

## 2023-09-06 RX ORDER — SODIUM CHLORIDE 9 MG/ML
250 INJECTION, SOLUTION INTRAVENOUS ONCE
OUTPATIENT
Start: 2023-11-01

## 2023-09-06 RX ORDER — ZOLEDRONIC ACID 0.04 MG/ML
4 INJECTION, SOLUTION INTRAVENOUS ONCE
OUTPATIENT
Start: 2023-11-01

## 2023-09-06 RX ORDER — ZOLEDRONIC ACID 0.04 MG/ML
4 INJECTION, SOLUTION INTRAVENOUS ONCE
Status: CANCELLED | OUTPATIENT
Start: 2023-09-06

## 2023-09-06 RX ORDER — SODIUM CHLORIDE 9 MG/ML
250 INJECTION, SOLUTION INTRAVENOUS ONCE
Status: COMPLETED | OUTPATIENT
Start: 2023-09-06 | End: 2023-09-06

## 2023-09-06 RX ORDER — SODIUM CHLORIDE 9 MG/ML
250 INJECTION, SOLUTION INTRAVENOUS ONCE
Status: CANCELLED | OUTPATIENT
Start: 2023-09-06

## 2023-09-06 RX ORDER — SODIUM CHLORIDE 9 MG/ML
250 INJECTION, SOLUTION INTRAVENOUS ONCE
OUTPATIENT
Start: 2023-10-04

## 2023-09-06 RX ORDER — ZOLEDRONIC ACID 0.04 MG/ML
4 INJECTION, SOLUTION INTRAVENOUS ONCE
Status: COMPLETED | OUTPATIENT
Start: 2023-09-06 | End: 2023-09-06

## 2023-09-06 RX ORDER — ZOLEDRONIC ACID 0.04 MG/ML
4 INJECTION, SOLUTION INTRAVENOUS ONCE
OUTPATIENT
Start: 2023-10-04

## 2023-09-06 RX ADMIN — SODIUM CHLORIDE 250 ML: 9 INJECTION, SOLUTION INTRAVENOUS at 08:50

## 2023-09-06 RX ADMIN — Medication 500 UNITS: at 09:06

## 2023-09-06 RX ADMIN — Medication 10 ML: at 09:06

## 2023-09-06 RX ADMIN — ZOLEDRONIC ACID 4 MG: 0.04 INJECTION, SOLUTION INTRAVENOUS at 08:50

## 2023-09-06 NOTE — TELEPHONE ENCOUNTER
Caller: Ina Akers    Relationship: Self    Best call back number: [phone]     What orders are you requesting (i.e. lab or imaging): BLOOD WORK - A1C     In what timeframe would the patient need to come in: ASAP

## 2023-09-06 NOTE — PROGRESS NOTES
Subjective     CHIEF COMPLAINT:      Chief Complaint   Patient presents with    Follow-up     Discuss port removal and breast surgery      HISTORY OF PRESENT ILLNESS:     Ina Akers is a 52 y.o. female patient who returns today for follow up on her left breast cancer and metastatic thyroid cancer.  She returns today for follow-up reporting that she will be undergoing breast reconstructive surgery on 11/30/2023.  She will undergo CT scans requested by Dr. Huddleston at [facility] the week prior to that.  She is asking if the port can be removed during her surgery.    Patient reported developing pain in the buttock in June 2023.  It was after she had a long car trip.  The pain subsequently resolved.  However, she reports intermittent pain that develops with prolonged sitting.    ROS:  Pertinent ROS is in the HPI.     Past medical, surgical, social and family history were reviewed.     MEDICATIONS:    Current Outpatient Medications:     acetaminophen (TYLENOL) 500 MG tablet, Take 2 tablets by mouth Every 6 (Six) Hours As Needed for Mild Pain., Disp: , Rfl:     amLODIPine (NORVASC) 10 MG tablet, TAKE ONE TABLET BY MOUTH DAILY, Disp: 90 tablet, Rfl: 1    atorvastatin (LIPITOR) 20 MG tablet, Take 1 tablet by mouth Daily. (Patient taking differently: Take 1 tablet by mouth Every Night.), Disp: 90 tablet, Rfl: 3    Blood Glucose Monitoring Suppl w/Device kit, Check blood sugar twice a day, Disp: 1 each, Rfl: 0    cholecalciferol (VITAMIN D3) 25 MCG (1000 UT) tablet, Take 1 tablet by mouth Daily., Disp: , Rfl:     famotidine (PEPCID) 20 MG tablet, Take 1 tablet by mouth Every Other Day., Disp: , Rfl:     ferrous sulfate 325 (65 FE) MG tablet, Take 1 tablet by mouth Daily., Disp: , Rfl:     Glucose Blood (BLOOD GLUCOSE TEST) strip, Check blood sugar twice a day., Disp: 100 each, Rfl: 1    labetalol (NORMODYNE) 100 MG tablet, Take 0.5 tablets by mouth 2 (Two) Times a Day., Disp: 180 tablet, Rfl: 3    Lancets  "(ACCU-CHEK SOFT TOUCH) lancets, Check blood sugar twice a day as needed, Disp: 100 each, Rfl: 1    lansoprazole (PREVACID) 30 MG capsule, TAKE ONE CAPSULE BY MOUTH DAILY (Patient taking differently: Take 1 capsule by mouth Every Other Day.), Disp: 90 capsule, Rfl: 0    levothyroxine (SYNTHROID, LEVOTHROID) 112 MCG tablet, Take 1 tablet by mouth Every Morning., Disp: , Rfl:     losartan (COZAAR) 100 MG tablet, Take 1 tablet by mouth Daily., Disp: 90 tablet, Rfl: 3    metFORMIN ER (GLUCOPHAGE-XR) 500 MG 24 hr tablet, Take 2 tablets by mouth Daily With Dinner., Disp: 180 tablet, Rfl: 3    promethazine (PHENERGAN) 12.5 MG tablet, TAKE 1/2 TO 1 TABLET BY MOUTH EVERY 4 HOURS AS NEEDED FOR NAUSEA AND VOMITING -MAY MAKE DROWSY (Patient taking differently: Take 1-2 tablets by mouth Every 6 (Six) Hours As Needed. TAKE 1/2 TO 1 TABLET BY MOUTH EVERY 4 HOURS AS NEEDED FOR NAUSEA AND VOMITING -MAY MAKE DROWSY), Disp: 30 tablet, Rfl: 3    triamcinolone (KENALOG) 0.1 % cream, Apply  topically to the appropriate area as directed 2 (Two) Times a Day. Sparingly shortest time possible avoid face (Patient taking differently: Apply  topically to the appropriate area as directed As Needed. Sparingly shortest time possible avoid face), Disp: 80 g, Rfl: 0  Objective     VITAL SIGNS:     Vitals:    09/06/23 0809   BP: 113/73   Pulse: 67   Resp: 18   Temp: 97.7 °F (36.5 °C)   TempSrc: Temporal   SpO2: 98%   Weight: 80.3 kg (177 lb)   Height: 154.9 cm (60.98\")   PainSc:   2   PainLoc: Hip  Comment: rt     Body mass index is 33.46 kg/m².     Wt Readings from Last 5 Encounters:   09/06/23 80.3 kg (177 lb)   07/26/23 82.6 kg (182 lb 1.6 oz)   07/17/23 82.1 kg (181 lb)   06/14/23 83.6 kg (184 lb 6.4 oz)   05/31/23 83 kg (183 lb)     PHYSICAL EXAMINATION:   GENERAL: The patient appears in good general condition, not in acute distress.   SKIN: No ecchymosis.  EYES: No jaundice. No pallor.  LYMPHATICS: No cervical, supraclavicular or axillary " lymphadenopathy.  CHEST: Normal respiratory effort.  Lungs clear bilaterally.  No added sounds.  CVS: Normal S1-S2.  No murmurs.  ABDOMEN: Soft. No tenderness. No Hepatomegaly. No Splenomegaly. No masses.  EXTREMITIES: No edema.    DIAGNOSTIC DATA:     Results from last 7 days   Lab Units 09/06/23  0747   WBC 10*3/mm3 4.91   NEUTROS ABS 10*3/mm3 2.64   HEMOGLOBIN g/dL 10.1*   HEMATOCRIT % 32.4*   PLATELETS 10*3/mm3 308     Results from last 7 days   Lab Units 09/06/23  0747   SODIUM mmol/L 141   POTASSIUM mmol/L 3.7   CHLORIDE mmol/L 105   CO2 mmol/L 24.5   BUN mg/dL 17   CREATININE mg/dL 0.82   CALCIUM mg/dL 9.7   ALBUMIN g/dL 4.2   BILIRUBIN mg/dL 0.3   ALK PHOS U/L 65   ALT (SGPT) U/L 14   AST (SGOT) U/L 13   GLUCOSE mg/dL 108*   MAGNESIUM mg/dL 1.8     Component      Latest Ref Rng 9/6/2023   Phosphorus      2.5 - 4.5 mg/dL 4.1      Assessment & Plan    *Left breast cancer.  It was identified on screening mammograms on 10/5/2022.  It was located at 12 o'clock position.  It measured 6 x 5 by 5 mm on mammogram and ultrasound on 10/20/2022.  PET scan on 10/6/2022 showed the lesion to be hypermetabolic. It measured 10 x 7 mm with an SUV of 5.8.  No evidence of metastasis on PET scan.  MRI on 12/5/2022 showed the lesion to measure 9 mm.    S/P mastectomy with reconstruction on 1/24/2023.  Pathology exam revealed the primary tumor to measure 15 mm.  It was grade 3.  Triple negative.  HER2 was negative-0 by IHC.  Ki-67 was 85%.  It was considered pT1cN0.  2 sentinel lymph nodes were examined and were negative.    No lymphovascular invasion. 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  Due to the patient receiving Adriamycin in the past, she is unable to receive additional Adriamycin.  Taxotere and Cytoxan with Neulasta support every 3 weeks x 4 cycles was recommended.  She started Taxotere and Cytoxan on 3/8/2023.  She had infusion reaction to Taxotere which was briefly held.  She was given IV Solu-Cortef.  It was restarted at a slower  rate and she tolerated the infusion.  She received cycle #4 on 5/3/2023.    She is doing well with no clinical evidence of recurrence.    *Metastatic thyroid cancer diagnosed on 10/6/2020.  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  She had right thyroid lobectomy on 12/17/2018 at [place].    The specimen was sent for outside consultation and the final conclusion was that there was no evidence of malignancy.  MRI on 8/27/2020 revealed an L4 lesion resulting in pathologic compression fracture.  Bone scan on 9/2/2020 revealed increased activity at L4 and left side of the skull.  PET scan on 9/9/2020 revealed abnormal activity at L4, right femoral neck, lower sacrum, left ischium and right ninth medial aspect.  PET scan 9/9/2020 showed no evidence of visceral metastasis.  Patient had kyphoplasty on 9/17/2020.  Pathology was nondiagnostic.  CT-guided biopsy of the lesion in the right posterior ninth rib on 10/6/2020 revealed a clear cell neoplasm.  It is positive for PAX 8 and TTF-1 favoring metastatic thyroid cancer.  Patient was referred to [facility].  Patient underwent completion thyroidectomy on 11/9/2020.  There was no evidence of uptake of radioactive iodine on SPECT imaging on 12/18/2020.  This was indicative of the tumor not going to respond to radioactive iodine therapy.  Patient was started on Lenvima 20 mg daily on 12/24/2020.  Lenvima was held on 2/6/2021 due to drug induced anemia and thrombocytopenia.  She was unable to start back on it due to prolonged cytopenias.  CT scans on 3/23/2021 showed stable disease.  CT scans showed stable disease.  Thyroglobulin was stable at 3.4.    PET scan on 9/1/2021 revealed decrease in the activity in the metastatic lesions.    Starting back on active treatment (Nexavar) was not recommended.  PET scan on 12/16/2021 showed complete response except for mild hypermetabolism in the left ischium.  CT scans on 4/8/2022 showed stable bone  metastasis.  No new metastatic lesions.  PET scan on 10/6/2022 revealed no new areas of metastasis.  Due to the stable metastatic disease, she was not placed ontreatment for her thyroid cancer.   She is on Synthroid 112 mcg daily.  Goal is to maintain TSH <0.1.  Thyroglobulin minimal increased to 7.5 on 6/27/2023.  Dr. Huddleston is planning a CT scan of the head neck chest abdomen pelvis in November 2023.     *Bone metastasis.  Patient was given Zometa on 9/29/2020.  Patient underwent right femur medullary nailing on 10/23/2020.  Patient was restarted back on Zometa on 2/24/2022.   She is tolerating the Zometa infusions well.  CT scans on 4/8/2022 revealed no new metastatic lesions.  The metastatic lesions were stable.  PET scan on 10/6/2022 revealed no change in the areas of activity in the bone metastasis.  No new lesions.  Zometa was restarted on 6/14/2023.  Last dose of Zometa was on 7/26/2023.  I recommended proceeding with Zometa today.    * Iron deficiency anemia.    Patient was previously treated with ferrous sulfate 325 mg daily.  Hemoglobin decreased to 9.1 on 2/24/2022.  EGD on 3/25/2022 showed no evidence of blood loss.  Pathology exam revealed no typical features of celiac disease.  Hemoglobin was 10.4 on 12/27/2022.  Ferritin was 95 and transferrin saturation was 14%.  She was started on ferrous sulfate 325 mg daily.  Hemoglobin decreased to 9.6 on 2/1/2023, secondary to recent surgical blood losses.  She was restarted on ferrous sulfate 325 mg daily on 2/1/2023.  5/3/2023: Hemoglobin 9.4.  She was continued on ferrous sulfate 325 mg once daily.  7/26/2023: Hemoglobin decreased to 10.0.  9/6/2023: Hemoglobin 10.1.    *History of left breast cancer, stage IIB, triIple negative.   S/P lumpectomy in 2001.  She received adjuvant chemotherapy on the NSABP B34 with AC followed by Taxotere.  S/P post-lumpectomy radiation therapy.   She was placed on tamoxifen for 5 years and it was completed in January 2007.   With  the patient's history of breast cancer at a young age and development of a second new breast cancer in the left breast, repeat genetic testing was recommended.  Gene testing on 12/22/2022 revealed variants of unknown significance.    PLAN:    1.  We will have Zometa today and 4 mg and continue every 4 weeks.   2.  Continue ferrous sulfate 325 mg daily and add vitamin C 1000 mg daily.  3.  Return in 4 and 8 weeks for the next doses of Zometa.  4.  She is going to have CT scans at [facility] in late November 2023.  5.  We will schedule her for follow-up on 11/29/2023 to review the CT scan and recommendations from Dr. Huddleston.   6.  If there is no evidence of recurrence, I would recommend removal of the port which can be done by her plastic surgeon on 11/30/2023.        Tim Muñoz MD  09/06/23

## 2023-09-13 ENCOUNTER — TELEPHONE (OUTPATIENT)
Dept: OTHER | Facility: HOSPITAL | Age: 53
End: 2023-09-13
Payer: COMMERCIAL

## 2023-09-13 NOTE — TELEPHONE ENCOUNTER
Oncology Social Work  Supportive Oncology Services    Referral received from Dr. Muñoz for psychosocial support/ counseling. OSW called and spoke to patient.  Patient to call me back next week to schedule an appt.     ANTONY BarberW, LCSW

## 2023-09-18 RX ORDER — AMLODIPINE BESYLATE 10 MG/1
TABLET ORAL
Qty: 90 TABLET | Refills: 1 | Status: SHIPPED | OUTPATIENT
Start: 2023-09-18

## 2023-09-26 ENCOUNTER — TELEPHONE (OUTPATIENT)
Dept: ONCOLOGY | Facility: CLINIC | Age: 53
End: 2023-09-26
Payer: COMMERCIAL

## 2023-09-26 NOTE — TELEPHONE ENCOUNTER
Patient called with questions about her insurance and [facility] recent contract. I have sent a message to Ifrah NEW To address these concerns with the patient. Pt v/u.

## 2023-09-27 ENCOUNTER — TELEPHONE (OUTPATIENT)
Dept: ONCOLOGY | Facility: CLINIC | Age: 53
End: 2023-09-27
Payer: COMMERCIAL

## 2023-09-27 NOTE — TELEPHONE ENCOUNTER
Caller: Ina Akers    Relationship to patient: Self    Best call back number: [phone]    Chief complaint: NEEDS TO CANCEL OR RESCHEDULE INFUSION IS CHANGING INSURANCE FROM [employer] AND NOT SURE IF WILL HAVE PRE AUTHORIZED BY THEN .    CHANGING TO [employer] TH[employer]OUGH [employer] AND BELIEVES WILL TAKE EFFECT OCTOBER 1ST     Type of visit: INFUSION         If rescheduling, when is the original appointment: 10/04    Additional notes: PLEASE CALL TO DISCUSS

## 2023-10-02 DIAGNOSIS — E78.5 HYPERLIPIDEMIA, UNSPECIFIED HYPERLIPIDEMIA TYPE: ICD-10-CM

## 2023-10-02 DIAGNOSIS — I10 ESSENTIAL HYPERTENSION: ICD-10-CM

## 2023-10-02 DIAGNOSIS — E11.9 CONTROLLED TYPE 2 DIABETES MELLITUS WITHOUT COMPLICATION, WITHOUT LONG-TERM CURRENT USE OF INSULIN: ICD-10-CM

## 2023-10-02 DIAGNOSIS — E55.9 VITAMIN D DEFICIENCY: ICD-10-CM

## 2023-10-02 RX ORDER — ATORVASTATIN CALCIUM 20 MG/1
TABLET, FILM COATED ORAL
Qty: 90 TABLET | Refills: 0 | Status: SHIPPED | OUTPATIENT
Start: 2023-10-02

## 2023-10-02 NOTE — TELEPHONE ENCOUNTER
Rx Refill Note  Requested Prescriptions     Pending Prescriptions Disp Refills    atorvastatin (LIPITOR) 20 MG tablet [Pharmacy Med Name: ATORVASTATIN 20 MG TABLET] 90 tablet 3     Sig: TAKE ONE TABLET BY MOUTH DAILY      Last office visit with prescribing clinician: 5/12/2023   Last telemedicine visit with prescribing clinician: Visit date not found   Next office visit with prescribing clinician: 11/17/2023                         Would you like a call back once the refill request has been completed: [] Yes [] No    If the office needs to give you a call back, can they leave a voicemail: [] Yes [] No    Natan López MA  10/02/23, 11:32 EDT

## 2023-10-04 ENCOUNTER — SOCIAL WORK (OUTPATIENT)
Dept: PSYCHIATRY | Facility: HOSPITAL | Age: 53
End: 2023-10-04
Payer: COMMERCIAL

## 2023-10-06 NOTE — PROGRESS NOTES
Oncology Social Work   Supportive Oncology Services     Chief Complaint: Anxiety, Sadness, Overwhelmed with emotions    Subjective  Patient ID: Ina Akers is a 52 y.o. female who presents to the Supportive Oncology Services (SOS) clinic for initial consultation at the request of Tim Muñoz MD . Chart reviewed.  Patient was initially diagnosed with Right Breast Cancer in  - s/p lumpectomy, underwent chemo and radiation.  In  She was dx with Thyroid cancer and underwent right thyroid lobectomy .  In  patient was diagnosed with *Metastatic thyroid cancer - she underwent a complete thyroidectomy on 2020. In  she was diagnosed with Left breast cancer and underwent chemotherapy.  CT scan scheduled in 2023.    At this time she is on Zometa for the bone mets. Patient met with OSW on 10/4 for supportive therapy to process the last couple years and to navigate the many emotions she is experiencing.     Social History  Marital Status:   Children: Yes - 2 Adult Children - 1 grandchild.   Support Community: Spouse and Children  Highest Level of Education: post college graduate work or degree  Career: NURSE    Tobacco Use: The patient denies current or previous tobacco use.  Alcohol Use: does not drink  Marijuana/ Other drug Use: denied.    Medical History  Psychiatric History: None    Family History  Family Psychiatric History:  patient reports that there is a family hx of depression with her Mom   Family Cancer History: Dad  of cancer when he was 50 yrs old.     Objective   Mental Status Exam  Appearance:  clean and casually dressed, appropriate  Attitude toward clinician:  cooperative and agreeable   Speech:    Rate:  regular rate and rhythm   Volume:  normal  Motor:  no abnormal movements present  Mood:  Sad  Affect:  dysphoric  Thought Processes:  linear, logical, and goal directed  Thought Content:  normal  Suicidal Thoughts:  absent  Homicidal Thoughts:   absent  Perceptual Disturbance: no perceptual disturbance  Attention and Concentration:  good  Insight and Judgement:  good  Memory:  memory appears to be intact    Physical Exam  Station and gait observed to be WNL - patient uses a cane to ambulate sometimes.  She is independent with ADL's.    Plan of Care    Patient interested in supportive therapy to reduce anxiety, work through her feelings/emotions.   Will see again on 10/20/23 at 3pm    ANTONY BarberW, LCSW

## 2023-10-09 ENCOUNTER — TELEPHONE (OUTPATIENT)
Dept: ONCOLOGY | Facility: CLINIC | Age: 53
End: 2023-10-09
Payer: COMMERCIAL

## 2023-10-09 NOTE — TELEPHONE ENCOUNTER
Returned pts call and LM for pt to call back. Advised pt that we cannot schedule infusion a week earlier. We would have to push out appt.

## 2023-10-09 NOTE — TELEPHONE ENCOUNTER
Caller: Ina Akers    Relationship to patient: Self    Best call back number: [phone]     Chief complaint: PT WILL BE OUT OF TOWN     Type of visit: INFUSION    Requested date: WOULD LIKE TO GET APPT A WEEK EARLIER IF POSSIBLE     If rescheduling, when is the original appointment: 11/01/23    Additional notes:

## 2023-10-09 NOTE — TELEPHONE ENCOUNTER
Caller: Ina Akers    Relationship: Self    Best call back number: [phone]    What is the best time to reach you: ANYTIME    Who are you requesting to speak with (clinical staff, provider,  specific staff member): RYLEY     What was the call regarding: PATIENT RETURNING CALL FOR RYLEY - TRIED TO W/T TO OFFICE BUT WAS UNSUCCESSFUL. PATIENT WOULD LIKE 10/25 IF POSSIBLE. (10/4 APPT WAS CAN).

## 2023-10-09 NOTE — TELEPHONE ENCOUNTER
Caller: Carrie Akers    Relationship: Self    Best call back number: [phone]    What was the call regarding: CARRIE WAS RETURNING BEBE'S CALL TO RESCHEDULE HER INFUSION.

## 2023-10-09 NOTE — TELEPHONE ENCOUNTER
Returned pts call and LM with new date and time for appts. Advised pt to call back. Mailing out appt reminder for appt.

## 2023-10-11 ENCOUNTER — TELEPHONE (OUTPATIENT)
Dept: ONCOLOGY | Facility: CLINIC | Age: 53
End: 2023-10-11
Payer: COMMERCIAL

## 2023-10-11 DIAGNOSIS — C79.51 PRIMARY CANCER OF THYROID GLAND METASTATIC TO BONE: ICD-10-CM

## 2023-10-11 DIAGNOSIS — C73 PRIMARY CANCER OF THYROID GLAND METASTATIC TO BONE: ICD-10-CM

## 2023-10-11 DIAGNOSIS — C79.51 METASTASIS TO BONE: ICD-10-CM

## 2023-10-11 DIAGNOSIS — C50.412 MALIGNANT NEOPLASM OF UPPER-OUTER QUADRANT OF LEFT BREAST IN FEMALE, ESTROGEN RECEPTOR NEGATIVE: Primary | ICD-10-CM

## 2023-10-11 DIAGNOSIS — Z17.1 MALIGNANT NEOPLASM OF UPPER-OUTER QUADRANT OF LEFT BREAST IN FEMALE, ESTROGEN RECEPTOR NEGATIVE: Primary | ICD-10-CM

## 2023-10-11 NOTE — TELEPHONE ENCOUNTER
----- Message from Tim Muñoz MD sent at 10/10/2023  5:03 PM EDT -----  Regarding: RE:  Due to the pain, I recommend moving up the scans to be done within 1 week.     Thank you  ----- Message -----  From: Coby Claire RN  Sent: 10/10/2023   4:56 PM EDT  To: MD Dr. Toni Willard patient called with new pain in her right groin area and her lower back. It is a constant dull ache, no swelling or redness noted in the groin. Taking ibuprofen and tylenol at night to relieve the pain. She has a new grandbaby at home that she has been helping out with and thinks it could be a pulled muscle. She has scans scheduled in November in [place], but is wondering if the scans need to be done sooner due to this new pain?     Thank you!

## 2023-10-12 NOTE — TELEPHONE ENCOUNTER
Reviewed Dr. Muñoz's note with the patient, we will schedule the scans that she was to get in [place] (CT head, neck, chest, abd/pelvis). She will call us we need to modify any of the orders per Dr. Huddleston. A message has been sent to scheduling.

## 2023-10-20 ENCOUNTER — HOSPITAL ENCOUNTER (OUTPATIENT)
Dept: CT IMAGING | Facility: HOSPITAL | Age: 53
Discharge: HOME | End: 2023-10-20
Admitting: INTERNAL MEDICINE
Payer: COMMERCIAL

## 2023-10-20 DIAGNOSIS — C73 PRIMARY CANCER OF THYROID GLAND METASTATIC TO BONE: ICD-10-CM

## 2023-10-20 DIAGNOSIS — Z17.1 MALIGNANT NEOPLASM OF UPPER-OUTER QUADRANT OF LEFT BREAST IN FEMALE, ESTROGEN RECEPTOR NEGATIVE: ICD-10-CM

## 2023-10-20 DIAGNOSIS — C50.412 MALIGNANT NEOPLASM OF UPPER-OUTER QUADRANT OF LEFT BREAST IN FEMALE, ESTROGEN RECEPTOR NEGATIVE: ICD-10-CM

## 2023-10-20 DIAGNOSIS — C79.51 PRIMARY CANCER OF THYROID GLAND METASTATIC TO BONE: ICD-10-CM

## 2023-10-20 DIAGNOSIS — C79.51 METASTASIS TO BONE: ICD-10-CM

## 2023-10-20 PROCEDURE — 70470 CT HEAD/BRAIN W/O & W/DYE: CPT

## 2023-10-20 PROCEDURE — 71260 CT THORAX DX C+: CPT

## 2023-10-20 PROCEDURE — 0 DIATRIZOATE MEGLUMINE & SODIUM PER 1 ML: Performed by: INTERNAL MEDICINE

## 2023-10-20 PROCEDURE — 74177 CT ABD & PELVIS W/CONTRAST: CPT

## 2023-10-20 PROCEDURE — 25510000001 IOPAMIDOL 61 % SOLUTION: Performed by: INTERNAL MEDICINE

## 2023-10-20 PROCEDURE — 70491 CT SOFT TISSUE NECK W/DYE: CPT

## 2023-10-20 RX ADMIN — DIATRIZOATE MEGLUMINE AND DIATRIZOATE SODIUM 30 ML: 660; 100 LIQUID ORAL; RECTAL at 09:28

## 2023-10-20 RX ADMIN — IOPAMIDOL 100 ML: 612 INJECTION, SOLUTION INTRAVENOUS at 10:39

## 2023-10-23 LAB — CREAT BLDA-MCNC: 0.9 MG/DL (ref 0.6–1.3)

## 2023-10-24 ENCOUNTER — TELEPHONE (OUTPATIENT)
Dept: ONCOLOGY | Facility: CLINIC | Age: 53
End: 2023-10-24
Payer: COMMERCIAL

## 2023-10-24 NOTE — TELEPHONE ENCOUNTER
Spoke with patient regarding Zometa infusion scheduled for 10/25/2023. Patient states she does not have [employer] insurance anymore and the [facility] PPO is her primary insurance. I relayed this information to Rebekah NEW RN in pre-cert who stated under [place] patient is compliance for Zometa and therefore okay to come in tomorrow for the infusion. I will send a message to Ifrah ENW To speak with patient and remove [employer] from her primary. Pt v/u to all.

## 2023-10-25 ENCOUNTER — INFUSION (OUTPATIENT)
Dept: ONCOLOGY | Facility: HOSPITAL | Age: 53
End: 2023-10-25
Payer: COMMERCIAL

## 2023-10-25 VITALS
DIASTOLIC BLOOD PRESSURE: 83 MMHG | WEIGHT: 178.8 LBS | HEIGHT: 61 IN | BODY MASS INDEX: 33.76 KG/M2 | OXYGEN SATURATION: 98 % | HEART RATE: 71 BPM | TEMPERATURE: 97.3 F | SYSTOLIC BLOOD PRESSURE: 128 MMHG

## 2023-10-25 DIAGNOSIS — C79.51 METASTASIS TO BONE: ICD-10-CM

## 2023-10-25 DIAGNOSIS — E11.00 TYPE 2 DIABETES MELLITUS WITH HYPEROSMOLARITY WITHOUT COMA, WITHOUT LONG-TERM CURRENT USE OF INSULIN: ICD-10-CM

## 2023-10-25 DIAGNOSIS — C50.412 MALIGNANT NEOPLASM OF UPPER-OUTER QUADRANT OF LEFT BREAST IN FEMALE, ESTROGEN RECEPTOR NEGATIVE: Primary | ICD-10-CM

## 2023-10-25 DIAGNOSIS — Z17.1 MALIGNANT NEOPLASM OF UPPER-OUTER QUADRANT OF LEFT BREAST IN FEMALE, ESTROGEN RECEPTOR NEGATIVE: Primary | ICD-10-CM

## 2023-10-25 LAB
ALBUMIN SERPL-MCNC: 4.2 G/DL (ref 3.5–5.2)
ALBUMIN/GLOB SERPL: 1.5 G/DL
ALP SERPL-CCNC: 59 U/L (ref 39–117)
ALT SERPL W P-5'-P-CCNC: 12 U/L (ref 1–33)
ANION GAP SERPL CALCULATED.3IONS-SCNC: 8.7 MMOL/L (ref 5–15)
AST SERPL-CCNC: 13 U/L (ref 1–32)
BASOPHILS # BLD AUTO: 0.02 10*3/MM3 (ref 0–0.2)
BASOPHILS NFR BLD AUTO: 0.4 % (ref 0–1.5)
BILIRUB SERPL-MCNC: 0.4 MG/DL (ref 0–1.2)
BUN SERPL-MCNC: 17 MG/DL (ref 6–20)
BUN/CREAT SERPL: 17.7 (ref 7–25)
CALCIUM SPEC-SCNC: 9.5 MG/DL (ref 8.6–10.5)
CHLORIDE SERPL-SCNC: 105 MMOL/L (ref 98–107)
CO2 SERPL-SCNC: 24.3 MMOL/L (ref 22–29)
CREAT SERPL-MCNC: 0.96 MG/DL (ref 0.57–1)
DEPRECATED RDW RBC AUTO: 48.2 FL (ref 37–54)
EGFRCR SERPLBLD CKD-EPI 2021: 70.5 ML/MIN/1.73
EOSINOPHIL # BLD AUTO: 0.3 10*3/MM3 (ref 0–0.4)
EOSINOPHIL NFR BLD AUTO: 6.1 % (ref 0.3–6.2)
ERYTHROCYTE [DISTWIDTH] IN BLOOD BY AUTOMATED COUNT: 15.6 % (ref 12.3–15.4)
GLOBULIN UR ELPH-MCNC: 2.8 GM/DL
GLUCOSE SERPL-MCNC: 113 MG/DL (ref 65–99)
HBA1C MFR BLD: 6.4 % (ref 4.8–5.6)
HCT VFR BLD AUTO: 32.9 % (ref 34–46.6)
HGB BLD-MCNC: 10 G/DL (ref 12–15.9)
IMM GRANULOCYTES # BLD AUTO: 0.01 10*3/MM3 (ref 0–0.05)
IMM GRANULOCYTES NFR BLD AUTO: 0.2 % (ref 0–0.5)
LYMPHOCYTES # BLD AUTO: 1.66 10*3/MM3 (ref 0.7–3.1)
LYMPHOCYTES NFR BLD AUTO: 34 % (ref 19.6–45.3)
MAGNESIUM SERPL-MCNC: 2 MG/DL (ref 1.6–2.6)
MCH RBC QN AUTO: 26 PG (ref 26.6–33)
MCHC RBC AUTO-ENTMCNC: 30.4 G/DL (ref 31.5–35.7)
MCV RBC AUTO: 85.5 FL (ref 79–97)
MONOCYTES # BLD AUTO: 0.47 10*3/MM3 (ref 0.1–0.9)
MONOCYTES NFR BLD AUTO: 9.6 % (ref 5–12)
NEUTROPHILS NFR BLD AUTO: 2.42 10*3/MM3 (ref 1.7–7)
NEUTROPHILS NFR BLD AUTO: 49.7 % (ref 42.7–76)
NRBC BLD AUTO-RTO: 0 /100 WBC (ref 0–0.2)
PHOSPHATE SERPL-MCNC: 3.4 MG/DL (ref 2.5–4.5)
PLATELET # BLD AUTO: 282 10*3/MM3 (ref 140–450)
PMV BLD AUTO: 9.3 FL (ref 6–12)
POTASSIUM SERPL-SCNC: 3.7 MMOL/L (ref 3.5–5.2)
PROT SERPL-MCNC: 7 G/DL (ref 6–8.5)
RBC # BLD AUTO: 3.85 10*6/MM3 (ref 3.77–5.28)
SODIUM SERPL-SCNC: 138 MMOL/L (ref 136–145)
WBC NRBC COR # BLD: 4.88 10*3/MM3 (ref 3.4–10.8)

## 2023-10-25 PROCEDURE — 83735 ASSAY OF MAGNESIUM: CPT | Performed by: INTERNAL MEDICINE

## 2023-10-25 PROCEDURE — 83036 HEMOGLOBIN GLYCOSYLATED A1C: CPT

## 2023-10-25 PROCEDURE — 25010000002 ZOLEDRONIC ACID 4 MG/100ML SOLUTION: Performed by: INTERNAL MEDICINE

## 2023-10-25 PROCEDURE — 25810000003 SODIUM CHLORIDE 0.9 % SOLUTION: Performed by: INTERNAL MEDICINE

## 2023-10-25 PROCEDURE — 80053 COMPREHEN METABOLIC PANEL: CPT | Performed by: INTERNAL MEDICINE

## 2023-10-25 PROCEDURE — 96374 THER/PROPH/DIAG INJ IV PUSH: CPT

## 2023-10-25 PROCEDURE — 84100 ASSAY OF PHOSPHORUS: CPT | Performed by: INTERNAL MEDICINE

## 2023-10-25 PROCEDURE — 85025 COMPLETE CBC W/AUTO DIFF WBC: CPT | Performed by: INTERNAL MEDICINE

## 2023-10-25 PROCEDURE — 25010000002 HEPARIN LOCK FLUSH PER 10 UNITS: Performed by: INTERNAL MEDICINE

## 2023-10-25 RX ORDER — SODIUM CHLORIDE 0.9 % (FLUSH) 0.9 %
10 SYRINGE (ML) INJECTION AS NEEDED
Status: DISCONTINUED | OUTPATIENT
Start: 2023-10-25 | End: 2023-10-25 | Stop reason: HOSPADM

## 2023-10-25 RX ORDER — SODIUM CHLORIDE 0.9 % (FLUSH) 0.9 %
10 SYRINGE (ML) INJECTION AS NEEDED
OUTPATIENT
Start: 2023-10-25

## 2023-10-25 RX ORDER — HEPARIN SODIUM (PORCINE) LOCK FLUSH IV SOLN 100 UNIT/ML 100 UNIT/ML
500 SOLUTION INTRAVENOUS AS NEEDED
Status: DISCONTINUED | OUTPATIENT
Start: 2023-10-25 | End: 2023-10-25 | Stop reason: HOSPADM

## 2023-10-25 RX ORDER — HEPARIN SODIUM (PORCINE) LOCK FLUSH IV SOLN 100 UNIT/ML 100 UNIT/ML
500 SOLUTION INTRAVENOUS AS NEEDED
OUTPATIENT
Start: 2023-10-25

## 2023-10-25 RX ORDER — SODIUM CHLORIDE 9 MG/ML
250 INJECTION, SOLUTION INTRAVENOUS ONCE
Status: COMPLETED | OUTPATIENT
Start: 2023-10-25 | End: 2023-10-25

## 2023-10-25 RX ORDER — ZOLEDRONIC ACID 0.04 MG/ML
4 INJECTION, SOLUTION INTRAVENOUS ONCE
Status: COMPLETED | OUTPATIENT
Start: 2023-10-25 | End: 2023-10-25

## 2023-10-25 RX ADMIN — SODIUM CHLORIDE 250 ML: 9 INJECTION, SOLUTION INTRAVENOUS at 09:08

## 2023-10-25 RX ADMIN — ZOLEDRONIC ACID 4 MG: 0.04 INJECTION, SOLUTION INTRAVENOUS at 09:08

## 2023-10-25 RX ADMIN — Medication 500 UNITS: at 09:33

## 2023-10-25 RX ADMIN — Medication 10 ML: at 09:32

## 2023-11-08 ENCOUNTER — SOCIAL WORK (OUTPATIENT)
Dept: PSYCHIATRY | Facility: HOSPITAL | Age: 53
End: 2023-11-08
Payer: COMMERCIAL

## 2023-11-08 NOTE — PROGRESS NOTES
Oncology Social Work   Supportive Oncology Services            Chief Complaint: Anxiety, Sadness, Overwhelmed with emotions     Subjective  Patient ID: Ina Akers is a 52 y.o. female who presents to the Supportive Oncology Services (SOS) clinic for initial consultation at the request of Tim Muñoz MD . Chart reviewed.  Patient was initially diagnosed with Right Breast Cancer in 2001 - s/p lumpectomy, underwent chemo and radiation.  In 2018 She was dx with Thyroid cancer and underwent right thyroid lobectomy .  In 2020 patient was diagnosed with *Metastatic thyroid cancer - she underwent a complete thyroidectomy on 11/9/2020. In 2022 she was diagnosed with Left breast cancer and underwent chemotherapy.  CT scan scheduled in November 2023.    At this time she is on Zometa for the bone mets. Patient met with OSW on 10/4 for supportive therapy to process the last couple years and to navigate the many emotions she is experiencing.  On 11/8/2023 met with patient again for continued emotional support. Doing much better emotionally, coping has improved.       Objective   Mental Status Exam  Appearance:  clean and casually dressed, appropriate  Attitude toward clinician:  cooperative and agreeable   Speech:               Rate:  regular rate and rhythm              Volume:  normal  Motor:  no abnormal movements present  Mood:  Sad  Affect:  euthymic   Thought Processes:  linear, logical, and goal directed  Thought Content:  normal  Suicidal Thoughts:  absent  Homicidal Thoughts:  absent  Perceptual Disturbance: no perceptual disturbance  Attention and Concentration:  good  Insight and Judgement:  good  Memory:  memory appears to be intact        Plan of Care     Patient interested in supportive therapy to reduce anxiety, work through her feelings/emotions.   Will see again on 12/19/2023 at 1pm     ANTONY BarberW, LCSW

## 2023-11-17 ENCOUNTER — OFFICE VISIT (OUTPATIENT)
Dept: FAMILY MEDICINE CLINIC | Facility: CLINIC | Age: 53
End: 2023-11-17
Payer: COMMERCIAL

## 2023-11-17 VITALS
SYSTOLIC BLOOD PRESSURE: 134 MMHG | BODY MASS INDEX: 32.76 KG/M2 | DIASTOLIC BLOOD PRESSURE: 84 MMHG | HEIGHT: 61 IN | TEMPERATURE: 97.7 F | RESPIRATION RATE: 16 BRPM | WEIGHT: 173.5 LBS | OXYGEN SATURATION: 97 % | HEART RATE: 81 BPM

## 2023-11-17 DIAGNOSIS — I10 HYPERTENSION, UNSPECIFIED TYPE: Primary | ICD-10-CM

## 2023-11-17 DIAGNOSIS — E78.2 MIXED HYPERLIPIDEMIA: ICD-10-CM

## 2023-11-17 DIAGNOSIS — C73 PRIMARY CANCER OF THYROID GLAND METASTATIC TO BONE: ICD-10-CM

## 2023-11-17 DIAGNOSIS — C79.51 METASTASIS TO BONE: ICD-10-CM

## 2023-11-17 DIAGNOSIS — C79.51 PRIMARY CANCER OF THYROID GLAND METASTATIC TO BONE: ICD-10-CM

## 2023-11-17 DIAGNOSIS — E11.00 TYPE 2 DIABETES MELLITUS WITH HYPEROSMOLARITY WITHOUT COMA, WITHOUT LONG-TERM CURRENT USE OF INSULIN: ICD-10-CM

## 2023-11-17 PROCEDURE — 99214 OFFICE O/P EST MOD 30 MIN: CPT | Performed by: NURSE PRACTITIONER

## 2023-11-17 RX ORDER — CYCLOBENZAPRINE HCL 5 MG
5 TABLET ORAL NIGHTLY PRN
Qty: 20 TABLET | Refills: 2 | Status: SHIPPED | OUTPATIENT
Start: 2023-11-17

## 2023-11-17 NOTE — PATIENT INSTRUCTIONS
Discharge instructions continue steady as she goes, Prevnar 20 pneumonia vaccine in about 6 weeks but plans this away from surgery just when you ready to do this no hurry    Let me know how you doing a couple weeks after surgery at any time as long as you are doing well see back in 6 months

## 2023-11-17 NOTE — PROGRESS NOTES
"Chief Complaint  Follow-up (6 month follow up )    Subjective        Ina Akers presents to [facility] PRIMARY CARE  History of Present Illness  Pleasant patient here today follow-up   Type 2 diabetes stable mixed hyperlipidemia takes statin appropriately,  Hypertension up-to-date with her medications, fortunately she has had breast cancer, recurrence as well as thyroid cancers  Metastasis right hip, she has had surgery, really does not have a lot of chronic pain here it is manageable she said is quite surprising when they are evaluated today she is not having pain she may be candidate for replacement?    Breast reconstruction surgery scheduled soon blood pressure is controlled numbers are good with diabetes  breast reconstruct nov 30     Rt hip pain mild  reconstruction    Nocturnal leg cramps at times better when she gets up and walk does not radiate from her back feels more localized she can feel the spasm no severe cramping or chronic      Objective   Vital Signs:  /84   Pulse 81   Temp 97.7 °F (36.5 °C) (Infrared)   Resp 16   Ht 154.9 cm (61\")   Wt 78.7 kg (173 lb 8 oz)   SpO2 97%   BMI 32.78 kg/m²   Estimated body mass index is 32.78 kg/m² as calculated from the following:    Height as of this encounter: 154.9 cm (61\").    Weight as of this encounter: 78.7 kg (173 lb 8 oz).          Physical Exam  Vitals reviewed.   Constitutional:       General: She is not in acute distress.     Appearance: Normal appearance. She is well-developed. She is not ill-appearing, toxic-appearing or diaphoretic.   HENT:      Head: Normocephalic.      Nose: Nose normal.   Eyes:      General: No scleral icterus.     Conjunctiva/sclera: Conjunctivae normal.      Pupils: Pupils are equal, round, and reactive to light.   Neck:      Thyroid: No thyromegaly.      Vascular: No JVD.   Cardiovascular:      Rate and Rhythm: Normal rate and regular rhythm.      Heart sounds: Normal heart sounds. No murmur " heard.     No friction rub. No gallop.   Pulmonary:      Effort: Pulmonary effort is normal. No respiratory distress.      Breath sounds: Normal breath sounds. No stridor. No wheezing or rales.   Abdominal:      General: Bowel sounds are normal. There is no distension.      Palpations: Abdomen is soft.      Tenderness: There is no abdominal tenderness.      Comments: No hepatosplenomegaly, no ascites,   Musculoskeletal:         General: No tenderness.      Cervical back: Neck supple.   Lymphadenopathy:      Cervical: No cervical adenopathy.   Skin:     General: Skin is warm and dry.      Findings: No erythema or rash.   Neurological:      General: No focal deficit present.      Mental Status: She is alert and oriented to person, place, and time. Mental status is at baseline.      Deep Tendon Reflexes: Reflexes are normal and symmetric.   Psychiatric:         Mood and Affect: Mood normal.         Behavior: Behavior normal.         Thought Content: Thought content normal.         Judgment: Judgment normal.      Result Review :                Assessment and Plan   Diagnoses and all orders for this visit:    1. Hypertension, unspecified type (Primary)    2. Metastasis to bone    3. Primary cancer of thyroid gland metastatic to bone    4. Mixed hyperlipidemia    5. Type 2 diabetes mellitus with hyperosmolarity without coma, without long-term current use of insulin    Other orders  -     Magnesium 400 MG capsule; Take 90 mg by mouth every night at bedtime.  Dispense: 90 capsule; Refill: 3  -     cyclobenzaprine (FLEXERIL) 5 MG tablet; Take 1 tablet by mouth At Night As Needed for Muscle Spasms. 1 or 2 tablets by mouth at bedtime if needed for leg cramps  Dispense: 20 tablet; Refill: 2             Follow Up   Return in about 6 months (around 5/17/2024) for Labs before next visit.  Patient was given instructions and counseling regarding her condition or for health maintenance advice. Please see specific information pulled  into the AVS if appropriate.     Patient Instructions   Discharge instructions continue steady as she goes, Prevnar 20 pneumonia vaccine in about 6 weeks but plans this away from surgery just when you ready to do this no hurry    Let me know how you doing a couple weeks after surgery at any time as long as you are doing well see back in 6 months

## 2023-11-21 ENCOUNTER — HOSPITAL ENCOUNTER (OUTPATIENT)
Dept: PHYSICAL THERAPY | Facility: HOSPITAL | Age: 53
Setting detail: THERAPIES SERIES
Discharge: HOME | End: 2023-11-21
Payer: COMMERCIAL

## 2023-11-21 DIAGNOSIS — Z90.12 S/P LEFT MASTECTOMY: Primary | ICD-10-CM

## 2023-11-21 DIAGNOSIS — Z91.89 AT RISK FOR LYMPHEDEMA: ICD-10-CM

## 2023-11-21 DIAGNOSIS — Z98.890 HISTORY OF LUMPECTOMY OF LEFT BREAST: ICD-10-CM

## 2023-11-21 DIAGNOSIS — Z85.850 HISTORY OF THYROID CANCER: ICD-10-CM

## 2023-11-21 DIAGNOSIS — Z90.13 S/P MASTECTOMY, BILATERAL: ICD-10-CM

## 2023-11-21 PROCEDURE — 97535 SELF CARE MNGMENT TRAINING: CPT | Performed by: PHYSICAL THERAPIST

## 2023-11-21 PROCEDURE — 93702 BIS XTRACELL FLUID ANALYSIS: CPT | Performed by: PHYSICAL THERAPIST

## 2023-11-21 NOTE — THERAPY PROGRESS REPORT/RE-CERT
Outpatient Physical Therapy Lymphedema Progress Note  [facility]     Patient Name: Ina Akers  : 1970  MRN: [id number]  Today's Date: 2023        Visit Date: 2023    Visit Dx:    ICD-10-CM ICD-9-CM   1. S/P left mastectomy  Z90.12 V45.71   2. At risk for lymphedema  Z91.89 V49.89   3. History of lumpectomy of left breast  Z98.890 V45.89   4. History of thyroid cancer  Z85.850 V10.87   5. S/P mastectomy, bilateral  Z90.13 V45.71       Patient Active Problem List   Diagnosis    Abnormal perimenopausal bleeding    Amenorrhea    Atopic dermatitis    External hemorrhoids    Gastritis    Hypertension    Iron deficiency anemia    Irritable bowel syndrome    Melanocytic nevus    Goiter    Tinea corporis    Type 2 diabetes mellitus    Lipid screening    Malignant neoplasm of upper-outer quadrant of left breast in female, estrogen receptor negative    Acute pain of both knees    Controlled type 2 diabetes mellitus without complication, without long-term current use of insulin    Hyperlipidemia    Vitamin D deficiency    Multiple thyroid nodules    Subclinical hypothyroidism    Lumbar spine tumor    Metastasis to bone    Fracture, pathologic, femur, neck, right, initial encounter    Immunocompromised    Encounter for screening for malignant neoplasm of colon    Screen for colon cancer    COVID-19 virus infection    Gastroesophageal reflux disease    Primary cancer of thyroid gland metastatic to bone    Acute pain of left shoulder         Lymphedema       Row Name 23 1100 23 1050          Subjective Pain    Able to rate subjective pain? -- yes  -JS     Pre-Treatment Pain Level -- 0  -JS        Subjective    Subjective Comments -- Reports being very busy with work and caring for grandson (baby is now 13#), so has not been consistently wearing sleeve.  Notes that she holds phone in L hand at work, so difficulty using the sleeve. Also, using cane in L hand to offload R hip pain when  walking distances. Scheduled for upcoming surgery with Dr. Morse on 11/30/23.  -JS        Lymphedema Assessment    Lymphedema Classification -- LUE:;at risk/stage 0  -JS     Lymphedema Cancer Related Sx -- left;modified radical mastectomy;lumpectomy;axillary dissection  -JS     Lymphedema Surgery Comments -- Scheduled for surgery 11/30/23 for L expander removal/reconstruction, R breast reduction with Dr. Morse  -JS     Lymph Nodes Removed # -- --  repeat axillary dissection  -JS     Positive Lymph Nodes # -- 0  -JS     Chemo Received -- yes  -JS     Chemo Treatments #/Timeframe -- Completed  -JS     Radiation Therapy Received -- no  -JS     Infections or Cellulitis? -- no  -JS     Lymphedema Assessment Comments -- Prior chemo & radiation following 2001 lumpectomy  -JS        Left Upper Ext    Lt Upper Extremity Comments  Mild L axillary tightness endrange L shoulder flexion  -JS --        Lymphedema Edema Assessment    Edema Assessment Comment no significant swelling differences  observed L UE compared to oppositee side.  -JS --        Manual Lymphatic Drainage    Manual Therapy Reviewed self MLD L UE- resent YouTube video link  -JS --        Compression/Skin Care    Compression/Skin Care Comments Resent link to order 2nd compression sleeve through [employer] ([employer] sleeve III 20-30mmHg).  Reviewed use, wear scheduled (see education).  -JS --        L-Dex Bioimpedence Screening    L-Dex Measurement Extremity LUE  -JS --     L-Dex Patient Position Standing  -JS --     L-Dex UE Dominate Side Right  -JS --     L-Dex UE At Risk Side Left  -JS --     L-Dex UE Pre Surgical Value Yes  -JS --     L-Dex UE Score 8  -JS --     L-Dex UE Baseline Score 4.4  -JS --     L-Dex UE Value Change 3.6  -JS --     L-Dex UE Comment Increase  -JS --     $ L-Dex Charge yes  -JS --               User Key  (r) = Recorded By, (t) = Taken By, (c) = Cosigned By      Initials Name Provider Type    Melani Ambrosio, PT Physical Therapist                                    PT Assessment/Plan       Row Name 11/21/23 1050          PT Assessment    Impairments Impaired lymphatic circulation  -JS     Assessment Comments Pt is a 53 y/o female diagnosed with triple negative L breast CA, s/p L mastectomy with tissue expander placement and repeat axillary dissection on 1/24/23. Pt has completed chemotherapy treatment. PMH includes history of L breast CA, s/p lumpectomy and axillary dissection (2/9 lymph nodes) in 2001. At that time, pt received chemo and radiation. Further PMH includes thyroid CA, s/p thyroid surgery in 2018 & 2020, with additional R hip surgery and back surgery due to metastatic disease. Patient presents for follow-up assessment and repeat bioimpedance, after cancelling previously scheduled appointment.  Patient demonstrates shoulder AROM WFL, mild endrange tightness into shoulder flexion following recent expander fill.  Pt denies compliance with L UE compression sleeve use lately attributing this to busy work schedule, interference when using phone at work and caring for young grandson.  Repeat bioimpedance score is 8.0 which is increased from baseline and increased from last assessment.  Much education provided during today's treatment session.  Reviewed signs/symptoms of lymphedema. Recommend resuming daily use of sleeve and information resent to reorder an additional sleeve through [employer].  Reviewed self MLD to L UE and education provided in modification of work activities to allow use of sleeve during the work day.  Pt is scheduled for surgery with Dr. Morse on 11/30/23 for implant exchange L breast and R breast reduction Advised pt to follow MD instruction for movement restrictions post-operatively. Pt will benefit from ongoing skilled PT.  Will plan to follow-up with pt 4-6 weeks post-operatively per protocol,encouraging pt to contact PT with any questions, changes or concerns in the interim.  -JS     Rehab Potential Good  -JS      Patient/caregiver participated in establishment of treatment plan and goals Yes  -JS     Patient would benefit from skilled therapy intervention Yes  -JS        PT Plan    PT Frequency Other (comment)  -JS     Predicted Duration of Therapy Intervention (PT) return appt 4-6 weeks after upcoming surgery  -JS     Planned CPT's? PT EVAL LOW COMPLEXITY: 49403;PT RE-EVAL: 50522;PT THER PROC EA 15 MIN: 73675;PT THER ACT EA 15 MIN: 47407;PT MANUAL THERAPY EA 15 MIN: 37604;PT SELF CARE/HOME MGMT/TRAIN EA 15: 69736;PT BIS XTRACELL FLUID ANALYSIS: 44775  -JS     PT Plan Comments Repeat bioimpedance, discuss sleeve wear including reinforcement of use during heavy lifting/activity.  Discuss progression of HEP as indicated.  -JS               User Key  (r) = Recorded By, (t) = Taken By, (c) = Cosigned By      Initials Name Provider Type    Melani Ambrosio, PT Physical Therapist                        OP Exercises       Row Name 11/21/23 1050             Subjective    Subjective Comments Reports being very busy with work and caring for grandson (baby is now 13#), so has not been consistently wearing sleeve.  Notes that she holds phone in L hand at work, so difficulty using the sleeve. Also, using cane in L hand to offload R hip pain when walking distances. Scheduled for upcoming surgery with Dr. Morse on 11/30/23.  -JS         Subjective Pain    Able to rate subjective pain? yes  -JS      Pre-Treatment Pain Level 0  -JS         Exercise 1    Exercise Name 1 Post shoulder rolls  -JS      Reps 1 Reviewed  -JS         Exercise 2    Exercise Name 2 Scapular retraction  -JS      Reps 2 Reviewed  -JS         Exercise 5    Exercise Name 5 UE wall slides  -JS      Reps 5 Reviewed  -JS                User Key  (r) = Recorded By, (t) = Taken By, (c) = Cosigned By      Initials Name Provider Type    Melani Ambrosio, PT Physical Therapist                                 PT OP Goals       Row Name 11/21/23 1050          Long Term Goals     LTG Date to Achieve 05/25/23  -JS     LTG 1 Pt will be independent in comprehensive HEP to allow ongoing symptom management.  -JS     LTG 1 Progress Ongoing  -JS     LTG 2 Pt will obtain properly fitted UE compression sleeve and verbalize appropriate wear schedule.  -JS     LTG 2 Progress Met  -JS     LTG 2 Progress Comments Pt to obtain 2nd sleeve  -JS     LTG 3 Pt will maintain L-Dex bioimpedance score WNL.  -JS     LTG 3 Progress Ongoing  -JS     LTG 3 Progress Comments 8.0 increased  -JS     LTG 4 Pt will demonstrate normal L shoulder AROM.  -JS     LTG 4 Progress Met  -JS     LTG 5 Pt will score 10 or less on Quick Dash to return to previously reported level of function.  -JS     LTG 5 Progress Ongoing  -               User Key  (r) = Recorded By, (t) = Taken By, (c) = Cosigned By      Initials Name Provider Type    Melani Ambrosio, PT Physical Therapist                    Therapy Education  Education Details: reviewed s/s of lymphedema, steps to prevention, bioimpedance results and interpretation, use of compression sleeve.  Pt to order 2nd sleeve through [employer] ([employer] size III, 20-30mmHg) and instructed to resume wearing daily, avoiding use at night.  Pt to follow movement restrictions per MD following 11/30/23 surgery, including donning/doffing sleeve. Resent video for self MLD L UE.   Discussed consideration of obtaining headset for use at work to avoid holding phone in L hand while using sleeve.  Discussed use of sleeve during increased activity/heavy lifting.  Given: Symptoms/condition management, Edema management  Program: Reinforced  How Provided: Verbal  Provided to: Patient  Level of Understanding: Verbalized  95052 - PT Self Care/Mgmt Minutes: 10              Time Calculation:   Start Time: 1050  Stop Time: 1130  Time Calculation (min): 40 min  Timed Charges  01041 - PT Self Care/Mgmt Minutes: 10  Total Minutes  Timed Charges Total Minutes: 10   Total Minutes: 10   Therapy Charges for Today        Code Description Service Date Service Provider Modifiers Qty    82561323162 HC PT BIS XTRACELL FLUID ANALYSIS 11/21/2023 Melani Vieyra, PT  1    48869413522 HC PT SELF CARE/MGMT/TRAIN EA 15 MIN 11/21/2023 Melani Vieyra, PT GP 2                      Melani Vieyra, PT  11/21/2023

## 2023-11-25 DIAGNOSIS — I10 ESSENTIAL HYPERTENSION: ICD-10-CM

## 2023-11-25 DIAGNOSIS — E55.9 VITAMIN D DEFICIENCY: ICD-10-CM

## 2023-11-25 DIAGNOSIS — E11.9 CONTROLLED TYPE 2 DIABETES MELLITUS WITHOUT COMPLICATION, WITHOUT LONG-TERM CURRENT USE OF INSULIN: ICD-10-CM

## 2023-11-25 DIAGNOSIS — E78.5 HYPERLIPIDEMIA, UNSPECIFIED HYPERLIPIDEMIA TYPE: ICD-10-CM

## 2023-11-27 ENCOUNTER — PRE-ADMISSION TESTING (OUTPATIENT)
Dept: PREADMISSION TESTING | Facility: HOSPITAL | Age: 53
End: 2023-11-27
Payer: COMMERCIAL

## 2023-11-27 VITALS
HEART RATE: 86 BPM | WEIGHT: 179.2 LBS | RESPIRATION RATE: 16 BRPM | HEIGHT: 61 IN | OXYGEN SATURATION: 99 % | TEMPERATURE: 98.7 F | BODY MASS INDEX: 33.83 KG/M2 | SYSTOLIC BLOOD PRESSURE: 127 MMHG | DIASTOLIC BLOOD PRESSURE: 81 MMHG

## 2023-11-27 PROCEDURE — 93005 ELECTROCARDIOGRAM TRACING: CPT

## 2023-11-27 PROCEDURE — 93010 ELECTROCARDIOGRAM REPORT: CPT | Performed by: INTERNAL MEDICINE

## 2023-11-27 RX ORDER — LANSOPRAZOLE 30 MG/1
30 CAPSULE, DELAYED RELEASE ORAL DAILY
Qty: 90 CAPSULE | Refills: 0 | Status: SHIPPED | OUTPATIENT
Start: 2023-11-27

## 2023-11-27 NOTE — DISCHARGE INSTRUCTIONS
Take the following medications the morning of surgery:    LABETALOL, AMLODIPINE, LEVOTHYROXINE, ANTACID MED    ARRIVE TO MAIN OR DESK 11-30-23 AT 5:15AM      If you are on prescription narcotic pain medication to control your pain you may also take that medication the morning of surgery.    General Instructions:  Do not eat solid food after midnight the night before surgery.  You may drink clear liquids day of surgery but must stop at least one hour before your hospital arrival time.  It is beneficial for you to have a clear drink that contains carbohydrates the day of surgery.  We suggest a 12 to 20 ounce bottle of Gatorade or Powerade for non-diabetic patients or a 12 to 20 ounce bottle of G2 or Powerade Zero for diabetic patients. (Pediatric patients, are not advised to drink a 12 to 20 ounce carbohydrate drink)    Clear liquids are liquids you can see through.  Nothing red in color.     Plain water                               Sports drinks  Sodas                                   Gelatin (Jell-O)  Fruit juices without pulp such as white grape juice and apple juice  Popsicles that contain no fruit or yogurt  Tea or coffee (no cream or milk added)  Gatorade / Powerade  G2 / Powerade Zero    Infants may have breast milk up to four hours before surgery.  Infants drinking formula may drink formula up to six hours before surgery.   Patients who avoid smoking, chewing tobacco and alcohol for 4 weeks prior to surgery have a reduced risk of post-operative complications.  Quit smoking as many days before surgery as you can.  Do not smoke, use chewing tobacco or drink alcohol the day of surgery.   If applicable bring your C-PAP/ BI-PAP machine in with you to preop day of surgery.  Bring any papers given to you in the doctor’s office.  Wear clean comfortable clothes.  Do not wear contact lenses, false eyelashes or make-up.  Bring a case for your glasses.   Bring crutches or walker if applicable.  Remove all piercings.   Leave jewelry and any other valuables at home.  Hair extensions with metal clips must be removed prior to surgery.  The Pre-Admission Testing nurse will instruct you to bring medications if unable to obtain an accurate list in Pre-Admission Testing.        Preventing a Surgical Site Infection:  For 2 to 3 days before surgery, avoid shaving with a razor because the razor can irritate skin and make it easier to develop an infection.    Any areas of open skin can increase the risk of a post-operative wound infection by allowing bacteria to enter and travel throughout the body.  Notify your surgeon if you have any skin wounds / rashes even if it is not near the expected surgical site.  The area will need assessed to determine if surgery should be delayed until it is healed.  The night prior to surgery shower using a fresh bar of anti-bacterial soap (such as Dial) and clean washcloth.  Sleep in a clean bed with clean clothing.  Do not allow pets to sleep with you.  Shower on the morning of surgery using a fresh bar of anti-bacterial soap (such as Dial) and clean washcloth.  Dry with a clean towel and dress in clean clothing.  Ask your surgeon if you will be receiving antibiotics prior to surgery.  Make sure you, your family, and all healthcare providers clean their hands with soap and water or an alcohol based hand  before caring for you or your wound.    Day of surgery:  Your arrival time is approximately two hours before your scheduled surgery time.  Upon arrival, a Pre-op nurse and Anesthesiologist will review your health history, obtain vital signs, and answer questions you may have.  The only belongings needed at this time will be a list of your home medications and if applicable your C-PAP/BI-PAP machine.  A Pre-op nurse will start an IV and you may receive medication in preparation for surgery, including something to help you relax.     Please be aware that surgery does come with discomfort.  We want to  make every effort to control your discomfort so please discuss any uncontrolled symptoms with your nurse.   Your doctor will most likely have prescribed pain medications.      If you are going home after surgery you will receive individualized written care instructions before being discharged.  A responsible adult must drive you to and from the hospital on the day of your surgery and stay with you for 24 hours.  Discharge prescriptions can be filled by the hospital pharmacy during regular pharmacy hours.  If you are having surgery late in the day/evening your prescription may be e-prescribed to your pharmacy.  Please verify your pharmacy hours or chose a 24 hour pharmacy to avoid not having access to your prescription because your pharmacy has closed for the day.    If you are staying overnight following surgery, you will be transported to your hospital room following the recovery period.  [facility] has all private rooms.    If you have any questions please call Pre-Admission Testing at [phone].  Deductibles and co-payments are collected on the day of service. Please be prepared to pay the required co-pay, deductible or deposit on the day of service as defined by your plan.    Call your surgeon immediately if you experience any of the following symptoms:  Sore Throat  Shortness of Breath or difficulty breathing  Cough  Chills  Body soreness or muscle pain  Headache  Fever  New loss of taste or smell  Do not arrive for your surgery ill.  Your procedure will need to be rescheduled to another time.  You will need to call your physician before the day of surgery to avoid any unnecessary exposure to hospital staff as well as other patients.

## 2023-11-28 LAB
QT INTERVAL: 378 MS
QTC INTERVAL: 390 MS

## 2023-11-29 ENCOUNTER — OFFICE VISIT (OUTPATIENT)
Dept: ONCOLOGY | Facility: CLINIC | Age: 53
End: 2023-11-29
Payer: COMMERCIAL

## 2023-11-29 ENCOUNTER — INFUSION (OUTPATIENT)
Dept: ONCOLOGY | Facility: HOSPITAL | Age: 53
End: 2023-11-29
Payer: COMMERCIAL

## 2023-11-29 VITALS
HEART RATE: 77 BPM | BODY MASS INDEX: 33.62 KG/M2 | DIASTOLIC BLOOD PRESSURE: 82 MMHG | RESPIRATION RATE: 18 BRPM | OXYGEN SATURATION: 97 % | WEIGHT: 178.1 LBS | SYSTOLIC BLOOD PRESSURE: 131 MMHG | HEIGHT: 61 IN | TEMPERATURE: 97.1 F

## 2023-11-29 DIAGNOSIS — C79.51 METASTASIS TO BONE: ICD-10-CM

## 2023-11-29 DIAGNOSIS — C50.412 MALIGNANT NEOPLASM OF UPPER-OUTER QUADRANT OF LEFT BREAST IN FEMALE, ESTROGEN RECEPTOR NEGATIVE: ICD-10-CM

## 2023-11-29 DIAGNOSIS — Z17.1 MALIGNANT NEOPLASM OF UPPER-OUTER QUADRANT OF LEFT BREAST IN FEMALE, ESTROGEN RECEPTOR NEGATIVE: Primary | ICD-10-CM

## 2023-11-29 DIAGNOSIS — C50.412 MALIGNANT NEOPLASM OF UPPER-OUTER QUADRANT OF LEFT BREAST IN FEMALE, ESTROGEN RECEPTOR NEGATIVE: Primary | ICD-10-CM

## 2023-11-29 DIAGNOSIS — C73 THYROID CANCER: ICD-10-CM

## 2023-11-29 DIAGNOSIS — Z95.828 PORT-A-CATH IN PLACE: ICD-10-CM

## 2023-11-29 DIAGNOSIS — D50.9 IRON DEFICIENCY ANEMIA, UNSPECIFIED IRON DEFICIENCY ANEMIA TYPE: ICD-10-CM

## 2023-11-29 DIAGNOSIS — Z17.1 MALIGNANT NEOPLASM OF UPPER-OUTER QUADRANT OF LEFT BREAST IN FEMALE, ESTROGEN RECEPTOR NEGATIVE: ICD-10-CM

## 2023-11-29 LAB
ALBUMIN SERPL-MCNC: 4 G/DL (ref 3.5–5.2)
ALBUMIN/GLOB SERPL: 1.3 G/DL
ALP SERPL-CCNC: 62 U/L (ref 39–117)
ALT SERPL W P-5'-P-CCNC: 11 U/L (ref 1–33)
ANION GAP SERPL CALCULATED.3IONS-SCNC: 9.7 MMOL/L (ref 5–15)
AST SERPL-CCNC: 15 U/L (ref 1–32)
BASOPHILS # BLD AUTO: 0.02 10*3/MM3 (ref 0–0.2)
BASOPHILS NFR BLD AUTO: 0.4 % (ref 0–1.5)
BILIRUB SERPL-MCNC: 0.3 MG/DL (ref 0–1.2)
BUN SERPL-MCNC: 14 MG/DL (ref 6–20)
BUN/CREAT SERPL: 16.7 (ref 7–25)
CALCIUM SPEC-SCNC: 9.1 MG/DL (ref 8.6–10.5)
CHLORIDE SERPL-SCNC: 105 MMOL/L (ref 98–107)
CO2 SERPL-SCNC: 24.3 MMOL/L (ref 22–29)
CREAT SERPL-MCNC: 0.84 MG/DL (ref 0.57–1)
DEPRECATED RDW RBC AUTO: 49.4 FL (ref 37–54)
EGFRCR SERPLBLD CKD-EPI 2021: 82.2 ML/MIN/1.73
EOSINOPHIL # BLD AUTO: 0.32 10*3/MM3 (ref 0–0.4)
EOSINOPHIL NFR BLD AUTO: 6.7 % (ref 0.3–6.2)
ERYTHROCYTE [DISTWIDTH] IN BLOOD BY AUTOMATED COUNT: 15.5 % (ref 12.3–15.4)
FERRITIN SERPL-MCNC: 87.5 NG/ML (ref 13–150)
GLOBULIN UR ELPH-MCNC: 3.1 GM/DL
GLUCOSE SERPL-MCNC: 125 MG/DL (ref 65–99)
HCT VFR BLD AUTO: 33.2 % (ref 34–46.6)
HGB BLD-MCNC: 9.9 G/DL (ref 12–15.9)
IMM GRANULOCYTES # BLD AUTO: 0.01 10*3/MM3 (ref 0–0.05)
IMM GRANULOCYTES NFR BLD AUTO: 0.2 % (ref 0–0.5)
IRON 24H UR-MRATE: 35 MCG/DL (ref 37–145)
IRON SATN MFR SERPL: 11 % (ref 20–50)
LYMPHOCYTES # BLD AUTO: 1.49 10*3/MM3 (ref 0.7–3.1)
LYMPHOCYTES NFR BLD AUTO: 31.1 % (ref 19.6–45.3)
MAGNESIUM SERPL-MCNC: 2.1 MG/DL (ref 1.6–2.6)
MCH RBC QN AUTO: 26.1 PG (ref 26.6–33)
MCHC RBC AUTO-ENTMCNC: 29.8 G/DL (ref 31.5–35.7)
MCV RBC AUTO: 87.4 FL (ref 79–97)
MONOCYTES # BLD AUTO: 0.49 10*3/MM3 (ref 0.1–0.9)
MONOCYTES NFR BLD AUTO: 10.2 % (ref 5–12)
NEUTROPHILS NFR BLD AUTO: 2.46 10*3/MM3 (ref 1.7–7)
NEUTROPHILS NFR BLD AUTO: 51.4 % (ref 42.7–76)
NRBC BLD AUTO-RTO: 0 /100 WBC (ref 0–0.2)
PHOSPHATE SERPL-MCNC: 3.5 MG/DL (ref 2.5–4.5)
PLATELET # BLD AUTO: 261 10*3/MM3 (ref 140–450)
PMV BLD AUTO: 9.3 FL (ref 6–12)
POTASSIUM SERPL-SCNC: 3.5 MMOL/L (ref 3.5–5.2)
PROT SERPL-MCNC: 7.1 G/DL (ref 6–8.5)
RBC # BLD AUTO: 3.8 10*6/MM3 (ref 3.77–5.28)
SODIUM SERPL-SCNC: 139 MMOL/L (ref 136–145)
T4 FREE SERPL-MCNC: 1.77 NG/DL (ref 0.93–1.7)
TIBC SERPL-MCNC: 307 MCG/DL (ref 298–536)
TRANSFERRIN SERPL-MCNC: 206 MG/DL (ref 200–360)
TSH SERPL DL<=0.05 MIU/L-ACNC: 0.01 UIU/ML (ref 0.27–4.2)
WBC NRBC COR # BLD AUTO: 4.79 10*3/MM3 (ref 3.4–10.8)

## 2023-11-29 PROCEDURE — 25010000002 ZOLEDRONIC ACID 4 MG/100ML SOLUTION: Performed by: INTERNAL MEDICINE

## 2023-11-29 PROCEDURE — 25010000002 HEPARIN LOCK FLUSH PER 10 UNITS: Performed by: INTERNAL MEDICINE

## 2023-11-29 PROCEDURE — 85025 COMPLETE CBC W/AUTO DIFF WBC: CPT | Performed by: INTERNAL MEDICINE

## 2023-11-29 PROCEDURE — 96374 THER/PROPH/DIAG INJ IV PUSH: CPT

## 2023-11-29 PROCEDURE — 84100 ASSAY OF PHOSPHORUS: CPT | Performed by: INTERNAL MEDICINE

## 2023-11-29 PROCEDURE — 80053 COMPREHEN METABOLIC PANEL: CPT | Performed by: INTERNAL MEDICINE

## 2023-11-29 PROCEDURE — 84466 ASSAY OF TRANSFERRIN: CPT | Performed by: INTERNAL MEDICINE

## 2023-11-29 PROCEDURE — 25810000003 SODIUM CHLORIDE 0.9 % SOLUTION: Performed by: INTERNAL MEDICINE

## 2023-11-29 PROCEDURE — 83540 ASSAY OF IRON: CPT | Performed by: INTERNAL MEDICINE

## 2023-11-29 PROCEDURE — 82728 ASSAY OF FERRITIN: CPT | Performed by: INTERNAL MEDICINE

## 2023-11-29 PROCEDURE — 83735 ASSAY OF MAGNESIUM: CPT | Performed by: INTERNAL MEDICINE

## 2023-11-29 PROCEDURE — 84439 ASSAY OF FREE THYROXINE: CPT | Performed by: INTERNAL MEDICINE

## 2023-11-29 PROCEDURE — 84443 ASSAY THYROID STIM HORMONE: CPT | Performed by: INTERNAL MEDICINE

## 2023-11-29 RX ORDER — ZOLEDRONIC ACID 0.04 MG/ML
4 INJECTION, SOLUTION INTRAVENOUS ONCE
OUTPATIENT
Start: 2024-01-24

## 2023-11-29 RX ORDER — ZOLEDRONIC ACID 0.04 MG/ML
4 INJECTION, SOLUTION INTRAVENOUS ONCE
OUTPATIENT
Start: 2023-12-27

## 2023-11-29 RX ORDER — HEPARIN SODIUM (PORCINE) LOCK FLUSH IV SOLN 100 UNIT/ML 100 UNIT/ML
500 SOLUTION INTRAVENOUS AS NEEDED
OUTPATIENT
Start: 2023-11-29

## 2023-11-29 RX ORDER — SODIUM CHLORIDE 0.9 % (FLUSH) 0.9 %
10 SYRINGE (ML) INJECTION AS NEEDED
OUTPATIENT
Start: 2023-11-29

## 2023-11-29 RX ORDER — ZOLEDRONIC ACID 0.04 MG/ML
4 INJECTION, SOLUTION INTRAVENOUS ONCE
OUTPATIENT
Start: 2024-02-21

## 2023-11-29 RX ORDER — ZOLEDRONIC ACID 0.04 MG/ML
4 INJECTION, SOLUTION INTRAVENOUS ONCE
Status: COMPLETED | OUTPATIENT
Start: 2023-11-29 | End: 2023-11-29

## 2023-11-29 RX ORDER — ZOLEDRONIC ACID 0.04 MG/ML
4 INJECTION, SOLUTION INTRAVENOUS ONCE
OUTPATIENT
Start: 2024-03-20

## 2023-11-29 RX ORDER — SODIUM CHLORIDE 9 MG/ML
250 INJECTION, SOLUTION INTRAVENOUS ONCE
Status: COMPLETED | OUTPATIENT
Start: 2023-11-29 | End: 2023-11-29

## 2023-11-29 RX ORDER — SODIUM CHLORIDE 9 MG/ML
250 INJECTION, SOLUTION INTRAVENOUS ONCE
OUTPATIENT
Start: 2024-01-24

## 2023-11-29 RX ORDER — SODIUM CHLORIDE 0.9 % (FLUSH) 0.9 %
10 SYRINGE (ML) INJECTION AS NEEDED
Status: DISCONTINUED | OUTPATIENT
Start: 2023-11-29 | End: 2023-11-29 | Stop reason: HOSPADM

## 2023-11-29 RX ORDER — SODIUM CHLORIDE 9 MG/ML
250 INJECTION, SOLUTION INTRAVENOUS ONCE
OUTPATIENT
Start: 2023-12-27

## 2023-11-29 RX ORDER — HEPARIN SODIUM (PORCINE) LOCK FLUSH IV SOLN 100 UNIT/ML 100 UNIT/ML
500 SOLUTION INTRAVENOUS AS NEEDED
Status: DISCONTINUED | OUTPATIENT
Start: 2023-11-29 | End: 2023-11-29 | Stop reason: HOSPADM

## 2023-11-29 RX ORDER — SODIUM CHLORIDE 9 MG/ML
250 INJECTION, SOLUTION INTRAVENOUS ONCE
OUTPATIENT
Start: 2024-02-21

## 2023-11-29 RX ORDER — SODIUM CHLORIDE 9 MG/ML
250 INJECTION, SOLUTION INTRAVENOUS ONCE
OUTPATIENT
Start: 2024-03-20

## 2023-11-29 RX ADMIN — Medication 10 ML: at 09:50

## 2023-11-29 RX ADMIN — ZOLEDRONIC ACID 4 MG: 0.04 INJECTION, SOLUTION INTRAVENOUS at 09:27

## 2023-11-29 RX ADMIN — SODIUM CHLORIDE 250 ML: 9 INJECTION, SOLUTION INTRAVENOUS at 09:20

## 2023-11-29 RX ADMIN — Medication 500 UNITS: at 09:51

## 2023-11-29 NOTE — PROGRESS NOTES
Subjective     CHIEF COMPLAINT:      Chief Complaint   Patient presents with    Follow-up     Concern about lt hip/discuss port removal and CT Scan/having surgery tomorrow      HISTORY OF PRESENT ILLNESS:     Ina Akers is a 53 y.o. female patient who returns today for follow up on her breast cancer.  She returns today for follow-up reporting intermittent pain in the left hip.  She feels that the pain starts from the lower back and radiates to the left hip lateral aspect.  No pain in the right.  No headaches.  She is taking the oral iron regularly.  She is not having problem with constipation-takes stool softener daily.  No blood in the stool.  She reports some fatigue.    Patient was scheduled for removal of left breast expander and placement of implant tomorrow and she will have right breast mammoplasty done.    ROS:  Pertinent ROS is in the HPI.     Past medical, surgical, social and family history were reviewed.     MEDICATIONS:    Current Outpatient Medications:     acetaminophen (TYLENOL) 500 MG tablet, Take 2 tablets by mouth Every 6 (Six) Hours As Needed for Mild Pain., Disp: , Rfl:     amLODIPine (NORVASC) 10 MG tablet, TAKE ONE TABLET BY MOUTH DAILY (Patient taking differently: Take 1 tablet by mouth Daily.), Disp: 90 tablet, Rfl: 1    atorvastatin (LIPITOR) 20 MG tablet, TAKE ONE TABLET BY MOUTH DAILY (Patient taking differently: Take 1 tablet by mouth.), Disp: 90 tablet, Rfl: 0    Blood Glucose Monitoring Suppl w/Device kit, Check blood sugar twice a day, Disp: 1 each, Rfl: 0    cholecalciferol (VITAMIN D3) 25 MCG (1000 UT) tablet, Take 1 tablet by mouth Daily., Disp: , Rfl:     cyclobenzaprine (FLEXERIL) 5 MG tablet, Take 1 tablet by mouth At Night As Needed for Muscle Spasms. 1 or 2 tablets by mouth at bedtime if needed for leg cramps, Disp: 20 tablet, Rfl: 2    famotidine (PEPCID) 20 MG tablet, Take 1 tablet by mouth Every Other Day., Disp: , Rfl:     ferrous sulfate 325 (65 FE) MG tablet,  Take 1 tablet by mouth Daily. (Patient taking differently: Take 1 tablet by mouth Every Night.), Disp: , Rfl:     Glucose Blood (BLOOD GLUCOSE TEST) strip, Check blood sugar twice a day., Disp: 100 each, Rfl: 1    labetalol (NORMODYNE) 100 MG tablet, Take 0.5 tablets by mouth 2 (Two) Times a Day., Disp: 180 tablet, Rfl: 3    Lancets (ACCU-CHEK SOFT TOUCH) lancets, Check blood sugar twice a day as needed, Disp: 100 each, Rfl: 1    lansoprazole (PREVACID) 30 MG capsule, Take 1 capsule by mouth Daily. (Patient taking differently: Take 1 capsule by mouth Every Other Day.), Disp: 90 capsule, Rfl: 0    levothyroxine (SYNTHROID, LEVOTHROID) 112 MCG tablet, Take 1 tablet by mouth Every Morning., Disp: , Rfl:     losartan (COZAAR) 100 MG tablet, Take 1 tablet by mouth Daily., Disp: 90 tablet, Rfl: 3    Magnesium 400 MG capsule, Take 90 mg by mouth every night at bedtime. (Patient taking differently: Take 400 mg by mouth every night at bedtime.), Disp: 90 capsule, Rfl: 3    metFORMIN ER (GLUCOPHAGE-XR) 500 MG 24 hr tablet, Take 2 tablets by mouth Daily With Dinner., Disp: 180 tablet, Rfl: 3    NON FORMULARY, Take 1 tablet by mouth Every Night. STOOL SOFTNER, Disp: , Rfl:     promethazine (PHENERGAN) 12.5 MG tablet, TAKE 1/2 TO 1 TABLET BY MOUTH EVERY 4 HOURS AS NEEDED FOR NAUSEA AND VOMITING -MAY MAKE DROWSY (Patient taking differently: Take 1-2 tablets by mouth Every 6 (Six) Hours As Needed. TAKE 1/2 TO 1 TABLET BY MOUTH EVERY 4 HOURS AS NEEDED FOR NAUSEA AND VOMITING -MAY MAKE DROWSY), Disp: 30 tablet, Rfl: 3    triamcinolone (KENALOG) 0.1 % cream, Apply  topically to the appropriate area as directed 2 (Two) Times a Day. Sparingly shortest time possible avoid face (Patient taking differently: Apply 1 application  topically to the appropriate area as directed As Needed. Sparingly shortest time possible avoid face), Disp: 80 g, Rfl: 0  No current facility-administered medications for this visit.    Facility-Administered  "Medications Ordered in Other Visits:     heparin injection 500 Units, 500 Units, Intravenous, PRN, Tim Muñoz MD    sodium chloride 0.9 % flush 10 mL, 10 mL, Intravenous, PRN, Tim Muñoz MD    sodium chloride 0.9 % infusion 250 mL, 250 mL, Intravenous, Once, Tim Muñoz MD    zoledronic acid (ZOMETA) infusion 4 mg/100 mL (premix), 4 mg, Intravenous, Once, Tim Muñoz MD  Objective     VITAL SIGNS:     Vitals:    11/29/23 0826   BP: 131/82   Pulse: 77   Resp: 18   Temp: 97.1 °F (36.2 °C)   TempSrc: Temporal   SpO2: 97%   Weight: 80.8 kg (178 lb 1.6 oz)   Height: 154.9 cm (60.98\")   PainSc:   5   PainLoc: Hip  Comment: lt     Body mass index is 33.67 kg/m².     Wt Readings from Last 5 Encounters:   11/29/23 80.8 kg (178 lb 1.6 oz)   11/27/23 81.3 kg (179 lb 3.2 oz)   11/17/23 78.7 kg (173 lb 8 oz)   10/25/23 81.1 kg (178 lb 12.8 oz)   09/06/23 80.3 kg (177 lb)     PHYSICAL EXAMINATION:   GENERAL: The patient appears in good general condition, not in acute distress.   SKIN: No ecchymosis.  EYES: No jaundice. No Pallor.  NECK: No lymphadenopathy or masses.  CHEST: Normal respiratory effort.  Lungs clear bilaterally.  No added sounds.  Port in the right upper chest.  CVS: Normal S1-S2.  No murmurs.  ABDOMEN: Soft. No tenderness. No Hepatomegaly. No Splenomegaly. No masses.  EXTREMITIES: No noted deformity.     DIAGNOSTIC DATA:     Results from last 7 days   Lab Units 11/29/23  0826   WBC 10*3/mm3 4.79   NEUTROS ABS 10*3/mm3 2.46   HEMOGLOBIN g/dL 9.9*   HEMATOCRIT % 33.2*   PLATELETS 10*3/mm3 261     Results from last 7 days   Lab Units 11/29/23  0826   SODIUM mmol/L 139   POTASSIUM mmol/L 3.5   CHLORIDE mmol/L 105   CO2 mmol/L 24.3   BUN mg/dL 14   CREATININE mg/dL 0.84   CALCIUM mg/dL 9.1   ALBUMIN g/dL 4.0   BILIRUBIN mg/dL 0.3   ALK PHOS U/L 62   ALT (SGPT) U/L 11   AST (SGOT) U/L 15   GLUCOSE mg/dL 125*   MAGNESIUM mg/dL 2.1         Lab 11/29/23  0826   IRON 35*   IRON SATURATION " (TSAT) 11*   TIBC 307   TRANSFERRIN 206   FERRITIN 87.50      CT head on 10/20/2023:  There is no CT evidence to suggest metastatic disease to the brain. Of  course, MR imaging would be much more sensitive and specific for the  detection of very small brain metastases.     There is a 12 mm sclerotic focus at the site of the patient's known left  parietal bone metastatic lesion. This lesion measured up to  approximately 1.8 cm in diameter on the prior brain MRI study. There is  an additional 6 mm sclerotic lesion within the clivus that is  indeterminate in nature. This lesion is unchanged when compared to a  prior neck CT dating back to 08/17/2018.  At the site of the previously  noted 3 mm indeterminate lesion within the superior aspect of the  occipital bone, there is a 3 mm lucent lesion which is stable in size  and may be representative of a hemangioma as it has a different  appearance as compared to the known parietal bone metastasis.    CT neck, chest, abdomen and pelvis on 10/20/2023:  NECK: Mastoid air cells appear clear. There is mild right maxillary  sinus mucosal thickening. The parotid glands, submandibular glands  appear normal. The thyroid gland is atrophic. There is medial deviation  of the right internal carotid artery which extends into the prevertebral  fat.     CHEST: There is soft tissue density in the anterior mediastinum  consistent with residual thymic tissue. Right IJ Mediport tip extends  barely into the right atrium and to the level of the tricuspid valve.  There has been left mastectomy with implant reconstruction as well as  left axillary ally dissection without evidence for localized recurrent  disease. There is no suspicious pulmonary nodule or pleural effusion or  infiltrate.     There is cortical thickening involving the head of the right  posteromedial 9th rib where a lytic lesion was demonstrated with PET/CT  09/09/2020. This cortical thickening is without change compared  to  previous CT 10/06/2022. No new osseous lesion is demonstrated.     ABDOMEN/PELVIS: Liver, gallbladder, spleen, pancreas, kidneys appear  within normal limits. There is no bowel dilatation or evidence for bowel  obstruction. There is sigmoid diverticulosis without evidence for  diverticulitis.     There is chronic sclerosis within the L4 vertebral body where there is  diminished height and this does not appear changed. There has been  previous intramedullary nail fixation of the right proximal femur.  Treated metastasis is present within the right femoral neck without  interval change in appearance. No new bony metastatic disease is  evident.     IMPRESSION:  1. No evidence for new metastatic disease to the neck, chest, abdomen,  pelvis.  2. Treated metastases involving the head of the right 9th rib, L4  vertebral body where there is a chronic compression deformity, right  femoral neck which has undergone intramedullary edil fixation. No new  evidence for osseous metastatic disease.  3. Right IJ Mediport extends into the right atrium to the level of the  tricuspid valve.    Assessment & Plan    *Left breast cancer.  It was identified on screening mammograms on 10/5/2022.  It was located at 12 o'clock position.  It measured 6 x 5 by 5 mm on mammogram and ultrasound on 10/20/2022.  PET scan on 10/6/2022 showed the lesion to be hypermetabolic. It measured 10 x 7 mm with an SUV of 5.8.  No evidence of metastasis on PET scan.  MRI on 12/5/2022 showed the lesion to measure 9 mm.    S/P mastectomy with reconstruction on 1/24/2023.  Pathology exam revealed the primary tumor to measure 15 mm.  It was grade 3.  Triple negative.  HER2 was negative-0 by IHC.  Ki-67 was 85%.  It was considered pT1cN0.  2 sentinel lymph nodes were examined and were negative.    No lymphovascular invasion. 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  Due to the patient receiving Adriamycin in the past, she is unable to receive additional Adriamycin.  Taxotere  and Cytoxan with Neulasta support every 3 weeks x 4 cycles was recommended.  She started Taxotere and Cytoxan on 3/8/2023.  She had infusion reaction to Taxotere which was briefly held.  She was given IV Solu-Cortef.  It was restarted at a slower rate and she tolerated the infusion.  She received cycle #4 on 5/3/2023.    CT scans on 10/20/2023 showed no evidence of recurrence or metastasis.  Exam today revealed no lymphadenopathy.    *Metastatic thyroid cancer diagnosed on 10/6/2020.  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  She had right thyroid lobectomy on 12/17/2018 at [place].    The specimen was sent for outside consultation and the final conclusion was that there was no evidence of malignancy.  MRI on 8/27/2020 revealed an L4 lesion resulting in pathologic compression fracture.  Bone scan on 9/2/2020 revealed increased activity at L4 and left side of the skull.  PET scan on 9/9/2020 revealed abnormal activity at L4, right femoral neck, lower sacrum, left ischium and right ninth medial aspect.  PET scan 9/9/2020 showed no evidence of visceral metastasis.  Patient had kyphoplasty on 9/17/2020.  Pathology was nondiagnostic.  CT-guided biopsy of the lesion in the right posterior ninth rib on 10/6/2020 revealed a clear cell neoplasm.  It is positive for PAX 8 and TTF-1 favoring metastatic thyroid cancer.  Patient was [facility] to [facility].  Patient underwent completion thyroidectomy on 11/9/2020.  There was no evidence of uptake of radioactive iodine on SPECT imaging on 12/18/2020.  This was indicative of the tumor not going to respond to radioactive iodine therapy.  Patient was started on Lenvima 20 mg daily on 12/24/2020.  Lenvima was held on 2/6/2021 due to drug induced anemia and thrombocytopenia.  She was unable to start back on it due to prolonged cytopenias.  CT scans on 3/23/2021 showed stable disease.  CT scans showed stable disease.  Thyroglobulin was stable at 3.4.     PET scan on 9/1/2021 revealed decrease in the activity in the metastatic lesions.    Starting back on active treatment (Nexavar) was not recommended.  PET scan on 12/16/2021 showed complete response except for mild hypermetabolism in the left ischium.  CT scans on 4/8/2022 showed stable bone metastasis.  No new metastatic lesions.  PET scan on 10/6/2022 revealed no new areas of metastasis.  Due to the stable metastatic disease, she was not placed ontreatment for her thyroid cancer.   She is on Synthroid 112 mcg daily.  Goal is to maintain TSH <0.1.  Thyroglobulin minimal increased to 7.5 on 6/27/2023.  CT scans on 10/20/2023 showed no new areas of metastasis.    *Bone metastasis.  Patient was given Zometa on 9/29/2020.  Patient underwent right femur medullary nailing on 10/23/2020.  Patient was restarted back on Zometa on 2/24/2022.   She is tolerating the Zometa infusions well.  CT scans on 4/8/2022 revealed no new metastatic lesions.  The metastatic lesions were stable.  PET scan on 10/6/2022 revealed no change in the areas of activity in the bone metastasis.  No new lesions.  Zometa was restarted on 6/14/2023.  Last dose of Zometa was on 10/25/2023.  She is due for dose #18 of Zometa today.  She is complaining of intermittent pain in the left hip-likely referred and related to the L4 compression fracture.    * Iron deficiency anemia.    Patient was previously treated with ferrous sulfate 325 mg daily.  Hemoglobin decreased to 9.1 on 2/24/2022.  EGD on 3/25/2022 showed no evidence of blood loss.  Pathology exam revealed no typical features of celiac disease.  Hemoglobin was 10.4 on 12/27/2022.  Ferritin was 95 and transferrin saturation was 14%.  She was started on ferrous sulfate 325 mg daily.  Hemoglobin decreased to 9.6 on 2/1/2023, secondary to recent surgical blood losses.  She was restarted on ferrous sulfate 325 mg daily on 2/1/2023.  5/3/2023: Hemoglobin 9.4.  She was continued on ferrous sulfate 325 mg  once daily.  7/26/2023: Hemoglobin decreased to 10.0.  She was continued on ferrous sulfate 325 mg daily.  She was advised to take vitamin C 1000 mg daily.  11/29/2023: Hemoglobin 9.9.  Transferrin saturation 11%.  We discussed options of IV Venofer 300 mg weekly x 5 doses.  She will think about this and inform us with her decision.    *Port in place.  CT on 10/20/2023 indicated that the right IJ Mediport extended to the right atrium to the level of the tricuspid valve.  Since the patient is going to have reconstructive surgery tomorrow, I recommended having the port removed.    *History of left breast cancer, stage IIB, triIple negative.   S/P lumpectomy in 2001.  She received adjuvant chemotherapy on the [facility] B34 with AC followed by Taxotere.  S/P post-lumpectomy radiation therapy.   She was placed on tamoxifen for 5 years and it was completed in January 2007.   With the patient's history of breast cancer at a young age and development of a second new breast cancer in the left breast, repeat genetic testing was recommended.  Gene testing on 12/22/2022 revealed variants of unknown significance.    PLAN:    1.  Will give Zometa today and continue once monthly.  We discussed continuing monthly Zometa for 24-36 cycle followed by every 3 months.   2.  Obtain thyroglobulin TSH and T4.   3.  Patient will inform us about her decision about IV Venofer.   4.  Will ask Dr. Morse to remove the port on 11/30/2023.   5.  Return monthly for Zometa infusion.  CBC CMP magnesium phosphorus will be obtained.  6.  She has follow-up with Dr. Huddleston in March 2024.  I will see her in follow-up in April 2024.  She will be scheduled for Zometa.  We will obtain CBC CMP magnesium phosphorus ferritin iron panel.    I spent 50 minutes caring for Ina on this date of service. This time includes time spent by me in the following activities: preparing for the visit, reviewing tests, obtaining and/or reviewing a separately obtained history,  performing a medically appropriate examination and/or evaluation, counseling and educating the patient/family/caregiver, ordering medications, tests, or procedures, documenting information in the medical record, independently interpreting results and communicating that information with the patient/family/caregiver, and care coordination         Tim Muñoz MD  11/29/23

## 2023-11-30 ENCOUNTER — ANESTHESIA (OUTPATIENT)
Dept: PERIOP | Facility: HOSPITAL | Age: 53
End: 2023-11-30
Payer: COMMERCIAL

## 2023-11-30 ENCOUNTER — ANESTHESIA EVENT (OUTPATIENT)
Dept: PERIOP | Facility: HOSPITAL | Age: 53
End: 2023-11-30
Payer: COMMERCIAL

## 2023-11-30 ENCOUNTER — HOSPITAL ENCOUNTER (OUTPATIENT)
Facility: HOSPITAL | Age: 53
Setting detail: HOSPITAL OUTPATIENT SURGERY
Discharge: HOME | End: 2023-11-30
Attending: PLASTIC SURGERY | Admitting: PLASTIC SURGERY
Payer: COMMERCIAL

## 2023-11-30 VITALS
OXYGEN SATURATION: 93 % | BODY MASS INDEX: 33.65 KG/M2 | HEART RATE: 69 BPM | DIASTOLIC BLOOD PRESSURE: 67 MMHG | SYSTOLIC BLOOD PRESSURE: 107 MMHG | HEIGHT: 61 IN | TEMPERATURE: 97.8 F | RESPIRATION RATE: 16 BRPM

## 2023-11-30 DIAGNOSIS — C50.412 MALIGNANT NEOPLASM OF UPPER-OUTER QUADRANT OF LEFT BREAST IN FEMALE, ESTROGEN RECEPTOR NEGATIVE: Primary | ICD-10-CM

## 2023-11-30 DIAGNOSIS — Z17.1 MALIGNANT NEOPLASM OF UPPER-OUTER QUADRANT OF LEFT BREAST IN FEMALE, ESTROGEN RECEPTOR NEGATIVE: Primary | ICD-10-CM

## 2023-11-30 DIAGNOSIS — Z85.3 PERSONAL HISTORY OF BREAST CANCER: ICD-10-CM

## 2023-11-30 LAB
GLUCOSE BLDC GLUCOMTR-MCNC: 110 MG/DL (ref 70–130)
GLUCOSE BLDC GLUCOMTR-MCNC: 97 MG/DL (ref 70–130)

## 2023-11-30 PROCEDURE — 25010000002 CEFAZOLIN PER 500 MG: Performed by: PLASTIC SURGERY

## 2023-11-30 PROCEDURE — 25010000002 FENTANYL CITRATE (PF) 50 MCG/ML SOLUTION: Performed by: NURSE ANESTHETIST, CERTIFIED REGISTERED

## 2023-11-30 PROCEDURE — 88305 TISSUE EXAM BY PATHOLOGIST: CPT | Performed by: PLASTIC SURGERY

## 2023-11-30 PROCEDURE — 25010000002 ONDANSETRON PER 1 MG: Performed by: NURSE ANESTHETIST, CERTIFIED REGISTERED

## 2023-11-30 PROCEDURE — 25010000002 HYDROMORPHONE PER 4 MG: Performed by: NURSE ANESTHETIST, CERTIFIED REGISTERED

## 2023-11-30 PROCEDURE — 25810000003 LACTATED RINGERS PER 1000 ML: Performed by: ANESTHESIOLOGY

## 2023-11-30 PROCEDURE — 25010000002 GENTAMICIN PER 80 MG: Performed by: PLASTIC SURGERY

## 2023-11-30 PROCEDURE — 25010000002 SUGAMMADEX 200 MG/2ML SOLUTION: Performed by: NURSE ANESTHETIST, CERTIFIED REGISTERED

## 2023-11-30 PROCEDURE — 82948 REAGENT STRIP/BLOOD GLUCOSE: CPT

## 2023-11-30 PROCEDURE — C1789 PROSTHESIS, BREAST, IMP: HCPCS | Performed by: PLASTIC SURGERY

## 2023-11-30 PROCEDURE — 25010000002 PROPOFOL 10 MG/ML EMULSION: Performed by: NURSE ANESTHETIST, CERTIFIED REGISTERED

## 2023-11-30 RX ORDER — NALOXONE HCL 0.4 MG/ML
0.2 VIAL (ML) INJECTION AS NEEDED
Status: DISCONTINUED | OUTPATIENT
Start: 2023-11-30 | End: 2023-11-30 | Stop reason: HOSPADM

## 2023-11-30 RX ORDER — ONDANSETRON HYDROCHLORIDE 8 MG/1
8 TABLET, FILM COATED ORAL ONCE
Status: DISCONTINUED | OUTPATIENT
Start: 2023-11-30 | End: 2023-11-30 | Stop reason: HOSPADM

## 2023-11-30 RX ORDER — TRANEXAMIC ACID 100 MG/ML
INJECTION, SOLUTION INTRAVENOUS AS NEEDED
Status: DISCONTINUED | OUTPATIENT
Start: 2023-11-30 | End: 2023-11-30 | Stop reason: HOSPADM

## 2023-11-30 RX ORDER — ONDANSETRON 2 MG/ML
4 INJECTION INTRAMUSCULAR; INTRAVENOUS ONCE AS NEEDED
Status: DISCONTINUED | OUTPATIENT
Start: 2023-11-30 | End: 2023-11-30 | Stop reason: HOSPADM

## 2023-11-30 RX ORDER — FLUMAZENIL 0.1 MG/ML
0.2 INJECTION INTRAVENOUS AS NEEDED
Status: DISCONTINUED | OUTPATIENT
Start: 2023-11-30 | End: 2023-11-30 | Stop reason: HOSPADM

## 2023-11-30 RX ORDER — SODIUM CHLORIDE, SODIUM LACTATE, POTASSIUM CHLORIDE, CALCIUM CHLORIDE 600; 310; 30; 20 MG/100ML; MG/100ML; MG/100ML; MG/100ML
125 INJECTION, SOLUTION INTRAVENOUS
Status: DISCONTINUED | OUTPATIENT
Start: 2023-11-30 | End: 2023-11-30 | Stop reason: HOSPADM

## 2023-11-30 RX ORDER — PROMETHAZINE HYDROCHLORIDE 25 MG/1
25 TABLET ORAL ONCE AS NEEDED
Status: DISCONTINUED | OUTPATIENT
Start: 2023-11-30 | End: 2023-11-30 | Stop reason: HOSPADM

## 2023-11-30 RX ORDER — DOXYCYCLINE 100 MG/1
200 CAPSULE ORAL ONCE
Status: COMPLETED | OUTPATIENT
Start: 2023-11-30 | End: 2023-11-30

## 2023-11-30 RX ORDER — FENTANYL CITRATE 50 UG/ML
50 INJECTION, SOLUTION INTRAMUSCULAR; INTRAVENOUS ONCE AS NEEDED
Status: DISCONTINUED | OUTPATIENT
Start: 2023-11-30 | End: 2023-11-30 | Stop reason: HOSPADM

## 2023-11-30 RX ORDER — EPHEDRINE SULFATE 50 MG/ML
INJECTION INTRAVENOUS AS NEEDED
Status: DISCONTINUED | OUTPATIENT
Start: 2023-11-30 | End: 2023-11-30 | Stop reason: SURG

## 2023-11-30 RX ORDER — HYDROCODONE BITARTRATE AND ACETAMINOPHEN 5; 325 MG/1; MG/1
1-2 TABLET ORAL EVERY 4 HOURS PRN
Qty: 20 TABLET | Refills: 0 | Status: SHIPPED | OUTPATIENT
Start: 2023-11-30

## 2023-11-30 RX ORDER — LABETALOL HYDROCHLORIDE 5 MG/ML
5 INJECTION, SOLUTION INTRAVENOUS
Status: DISCONTINUED | OUTPATIENT
Start: 2023-11-30 | End: 2023-11-30 | Stop reason: HOSPADM

## 2023-11-30 RX ORDER — OXYCODONE HYDROCHLORIDE 5 MG/1
5 TABLET ORAL EVERY 4 HOURS PRN
Qty: 15 TABLET | Refills: 0 | Status: SHIPPED | OUTPATIENT
Start: 2023-11-30

## 2023-11-30 RX ORDER — EPHEDRINE SULFATE 50 MG/ML
5 INJECTION, SOLUTION INTRAVENOUS ONCE AS NEEDED
Status: DISCONTINUED | OUTPATIENT
Start: 2023-11-30 | End: 2023-11-30 | Stop reason: HOSPADM

## 2023-11-30 RX ORDER — HYDRALAZINE HYDROCHLORIDE 20 MG/ML
5 INJECTION INTRAMUSCULAR; INTRAVENOUS
Status: DISCONTINUED | OUTPATIENT
Start: 2023-11-30 | End: 2023-11-30 | Stop reason: HOSPADM

## 2023-11-30 RX ORDER — ACETAMINOPHEN 500 MG
1000 TABLET ORAL ONCE
Status: COMPLETED | OUTPATIENT
Start: 2023-11-30 | End: 2023-11-30

## 2023-11-30 RX ORDER — DOCUSATE SODIUM 250 MG
250 CAPSULE ORAL 2 TIMES DAILY PRN
Qty: 60 CAPSULE | Refills: 1 | Status: SHIPPED | OUTPATIENT
Start: 2023-11-30

## 2023-11-30 RX ORDER — ONDANSETRON 2 MG/ML
INJECTION INTRAMUSCULAR; INTRAVENOUS AS NEEDED
Status: DISCONTINUED | OUTPATIENT
Start: 2023-11-30 | End: 2023-11-30 | Stop reason: SURG

## 2023-11-30 RX ORDER — ONDANSETRON 8 MG/1
8 TABLET, ORALLY DISINTEGRATING ORAL EVERY 8 HOURS PRN
Qty: 10 TABLET | Refills: 1 | Status: SHIPPED | OUTPATIENT
Start: 2023-11-30

## 2023-11-30 RX ORDER — IPRATROPIUM BROMIDE AND ALBUTEROL SULFATE 2.5; .5 MG/3ML; MG/3ML
3 SOLUTION RESPIRATORY (INHALATION) ONCE AS NEEDED
Status: DISCONTINUED | OUTPATIENT
Start: 2023-11-30 | End: 2023-11-30 | Stop reason: HOSPADM

## 2023-11-30 RX ORDER — AMOXICILLIN 250 MG
2 CAPSULE ORAL DAILY PRN
Qty: 30 TABLET | Refills: 1 | Status: SHIPPED | OUTPATIENT
Start: 2023-11-30 | End: 2024-11-29

## 2023-11-30 RX ORDER — DOXYCYCLINE 100 MG/1
100 CAPSULE ORAL 2 TIMES DAILY
Qty: 10 CAPSULE | Refills: 0 | Status: SHIPPED | OUTPATIENT
Start: 2023-11-30 | End: 2023-12-05

## 2023-11-30 RX ORDER — DROPERIDOL 2.5 MG/ML
0.62 INJECTION, SOLUTION INTRAMUSCULAR; INTRAVENOUS
Status: DISCONTINUED | OUTPATIENT
Start: 2023-11-30 | End: 2023-11-30 | Stop reason: HOSPADM

## 2023-11-30 RX ORDER — MAGNESIUM HYDROXIDE 1200 MG/15ML
LIQUID ORAL AS NEEDED
Status: DISCONTINUED | OUTPATIENT
Start: 2023-11-30 | End: 2023-11-30 | Stop reason: HOSPADM

## 2023-11-30 RX ORDER — LIDOCAINE HYDROCHLORIDE 20 MG/ML
INJECTION, SOLUTION INFILTRATION; PERINEURAL AS NEEDED
Status: DISCONTINUED | OUTPATIENT
Start: 2023-11-30 | End: 2023-11-30 | Stop reason: SURG

## 2023-11-30 RX ORDER — OXYCODONE HYDROCHLORIDE 5 MG/1
10 TABLET ORAL ONCE
Status: COMPLETED | OUTPATIENT
Start: 2023-11-30 | End: 2023-11-30

## 2023-11-30 RX ORDER — SODIUM CHLORIDE, SODIUM LACTATE, POTASSIUM CHLORIDE, CALCIUM CHLORIDE 600; 310; 30; 20 MG/100ML; MG/100ML; MG/100ML; MG/100ML
9 INJECTION, SOLUTION INTRAVENOUS CONTINUOUS
Status: DISCONTINUED | OUTPATIENT
Start: 2023-11-30 | End: 2023-11-30 | Stop reason: HOSPADM

## 2023-11-30 RX ORDER — SODIUM CHLORIDE 0.9 % (FLUSH) 0.9 %
3 SYRINGE (ML) INJECTION EVERY 12 HOURS SCHEDULED
Status: DISCONTINUED | OUTPATIENT
Start: 2023-11-30 | End: 2023-11-30 | Stop reason: HOSPADM

## 2023-11-30 RX ORDER — HYDROCODONE BITARTRATE AND ACETAMINOPHEN 5; 325 MG/1; MG/1
1 TABLET ORAL ONCE AS NEEDED
Status: DISCONTINUED | OUTPATIENT
Start: 2023-11-30 | End: 2023-11-30 | Stop reason: HOSPADM

## 2023-11-30 RX ORDER — FENTANYL CITRATE 50 UG/ML
50 INJECTION, SOLUTION INTRAMUSCULAR; INTRAVENOUS
Status: DISCONTINUED | OUTPATIENT
Start: 2023-11-30 | End: 2023-11-30 | Stop reason: HOSPADM

## 2023-11-30 RX ORDER — PROMETHAZINE HYDROCHLORIDE 25 MG/1
25 SUPPOSITORY RECTAL ONCE AS NEEDED
Status: DISCONTINUED | OUTPATIENT
Start: 2023-11-30 | End: 2023-11-30 | Stop reason: HOSPADM

## 2023-11-30 RX ORDER — PROPOFOL 10 MG/ML
VIAL (ML) INTRAVENOUS AS NEEDED
Status: DISCONTINUED | OUTPATIENT
Start: 2023-11-30 | End: 2023-11-30 | Stop reason: SURG

## 2023-11-30 RX ORDER — GABAPENTIN 300 MG/1
300 CAPSULE ORAL ONCE
Status: COMPLETED | OUTPATIENT
Start: 2023-11-30 | End: 2023-11-30

## 2023-11-30 RX ORDER — LIDOCAINE HYDROCHLORIDE 10 MG/ML
0.5 INJECTION, SOLUTION INFILTRATION; PERINEURAL ONCE AS NEEDED
Status: DISCONTINUED | OUTPATIENT
Start: 2023-11-30 | End: 2023-11-30 | Stop reason: HOSPADM

## 2023-11-30 RX ORDER — HYDROMORPHONE HYDROCHLORIDE 1 MG/ML
0.5 INJECTION, SOLUTION INTRAMUSCULAR; INTRAVENOUS; SUBCUTANEOUS
Status: DISCONTINUED | OUTPATIENT
Start: 2023-11-30 | End: 2023-11-30 | Stop reason: HOSPADM

## 2023-11-30 RX ORDER — SODIUM CHLORIDE 0.9 % (FLUSH) 0.9 %
3-10 SYRINGE (ML) INJECTION AS NEEDED
Status: DISCONTINUED | OUTPATIENT
Start: 2023-11-30 | End: 2023-11-30 | Stop reason: HOSPADM

## 2023-11-30 RX ORDER — FENTANYL CITRATE 50 UG/ML
INJECTION, SOLUTION INTRAMUSCULAR; INTRAVENOUS AS NEEDED
Status: DISCONTINUED | OUTPATIENT
Start: 2023-11-30 | End: 2023-11-30 | Stop reason: SURG

## 2023-11-30 RX ORDER — ACETAMINOPHEN 325 MG/1
650 TABLET ORAL EVERY 4 HOURS PRN
Qty: 60 TABLET | Refills: 0 | Status: SHIPPED | OUTPATIENT
Start: 2023-11-30

## 2023-11-30 RX ORDER — SCOLOPAMINE TRANSDERMAL SYSTEM 1 MG/1
1 PATCH, EXTENDED RELEASE TRANSDERMAL ONCE
Status: DISCONTINUED | OUTPATIENT
Start: 2023-11-30 | End: 2023-11-30 | Stop reason: HOSPADM

## 2023-11-30 RX ORDER — GABAPENTIN 100 MG/1
100 CAPSULE ORAL EVERY 8 HOURS
Qty: 60 CAPSULE | Refills: 1 | Status: SHIPPED | OUTPATIENT
Start: 2023-11-30

## 2023-11-30 RX ORDER — OXYCODONE AND ACETAMINOPHEN 7.5; 325 MG/1; MG/1
1 TABLET ORAL EVERY 4 HOURS PRN
Status: DISCONTINUED | OUTPATIENT
Start: 2023-11-30 | End: 2023-11-30 | Stop reason: HOSPADM

## 2023-11-30 RX ORDER — CELECOXIB 200 MG/1
400 CAPSULE ORAL DAILY
Status: COMPLETED | OUTPATIENT
Start: 2023-11-30 | End: 2023-11-30

## 2023-11-30 RX ORDER — DIPHENHYDRAMINE HYDROCHLORIDE 50 MG/ML
12.5 INJECTION INTRAMUSCULAR; INTRAVENOUS
Status: DISCONTINUED | OUTPATIENT
Start: 2023-11-30 | End: 2023-11-30 | Stop reason: HOSPADM

## 2023-11-30 RX ORDER — FAMOTIDINE 10 MG/ML
20 INJECTION, SOLUTION INTRAVENOUS ONCE
Status: COMPLETED | OUTPATIENT
Start: 2023-11-30 | End: 2023-11-30

## 2023-11-30 RX ORDER — MIDAZOLAM HYDROCHLORIDE 1 MG/ML
1 INJECTION INTRAMUSCULAR; INTRAVENOUS
Status: DISCONTINUED | OUTPATIENT
Start: 2023-11-30 | End: 2023-11-30 | Stop reason: HOSPADM

## 2023-11-30 RX ORDER — ROCURONIUM BROMIDE 10 MG/ML
INJECTION, SOLUTION INTRAVENOUS AS NEEDED
Status: DISCONTINUED | OUTPATIENT
Start: 2023-11-30 | End: 2023-11-30 | Stop reason: SURG

## 2023-11-30 RX ADMIN — SCOPALAMINE 1 PATCH: 1 PATCH, EXTENDED RELEASE TRANSDERMAL at 06:36

## 2023-11-30 RX ADMIN — LIDOCAINE HYDROCHLORIDE 80 MG: 20 INJECTION, SOLUTION INFILTRATION; PERINEURAL at 07:08

## 2023-11-30 RX ADMIN — EPHEDRINE SULFATE 10 MG: 50 INJECTION INTRAVENOUS at 07:33

## 2023-11-30 RX ADMIN — SODIUM CHLORIDE, POTASSIUM CHLORIDE, SODIUM LACTATE AND CALCIUM CHLORIDE 9 ML/HR: 600; 310; 30; 20 INJECTION, SOLUTION INTRAVENOUS at 06:36

## 2023-11-30 RX ADMIN — SODIUM CHLORIDE, POTASSIUM CHLORIDE, SODIUM LACTATE AND CALCIUM CHLORIDE 9 ML/HR: 600; 310; 30; 20 INJECTION, SOLUTION INTRAVENOUS at 06:34

## 2023-11-30 RX ADMIN — FENTANYL CITRATE 50 MCG: 50 INJECTION, SOLUTION INTRAMUSCULAR; INTRAVENOUS at 09:29

## 2023-11-30 RX ADMIN — EPHEDRINE SULFATE 5 MG: 50 INJECTION INTRAVENOUS at 08:34

## 2023-11-30 RX ADMIN — FENTANYL CITRATE 50 MCG: 50 INJECTION, SOLUTION INTRAMUSCULAR; INTRAVENOUS at 07:08

## 2023-11-30 RX ADMIN — OXYCODONE AND ACETAMINOPHEN 1 TABLET: 7.5; 325 TABLET ORAL at 11:12

## 2023-11-30 RX ADMIN — HYDROMORPHONE HYDROCHLORIDE 0.5 MG: 1 INJECTION, SOLUTION INTRAMUSCULAR; INTRAVENOUS; SUBCUTANEOUS at 09:56

## 2023-11-30 RX ADMIN — OXYCODONE 10 MG: 5 TABLET ORAL at 06:47

## 2023-11-30 RX ADMIN — SUGAMMADEX 200 MG: 100 INJECTION, SOLUTION INTRAVENOUS at 09:31

## 2023-11-30 RX ADMIN — DOXYCYCLINE 200 MG: 100 CAPSULE ORAL at 06:47

## 2023-11-30 RX ADMIN — PROPOFOL 25 MCG/KG/MIN: 10 INJECTION, EMULSION INTRAVENOUS at 07:22

## 2023-11-30 RX ADMIN — GABAPENTIN 300 MG: 300 CAPSULE ORAL at 06:47

## 2023-11-30 RX ADMIN — FAMOTIDINE 20 MG: 10 INJECTION INTRAVENOUS at 06:36

## 2023-11-30 RX ADMIN — ROCURONIUM BROMIDE 60 MG: 10 INJECTION, SOLUTION INTRAVENOUS at 07:08

## 2023-11-30 RX ADMIN — HYDROMORPHONE HYDROCHLORIDE 0.5 MG: 1 INJECTION, SOLUTION INTRAMUSCULAR; INTRAVENOUS; SUBCUTANEOUS at 10:12

## 2023-11-30 RX ADMIN — EPHEDRINE SULFATE 10 MG: 50 INJECTION INTRAVENOUS at 09:02

## 2023-11-30 RX ADMIN — EPHEDRINE SULFATE 10 MG: 50 INJECTION INTRAVENOUS at 07:37

## 2023-11-30 RX ADMIN — PROPOFOL 200 MG: 10 INJECTION, EMULSION INTRAVENOUS at 07:08

## 2023-11-30 RX ADMIN — CELECOXIB 400 MG: 200 CAPSULE ORAL at 06:36

## 2023-11-30 RX ADMIN — ACETAMINOPHEN 1000 MG: 500 TABLET ORAL at 06:25

## 2023-11-30 RX ADMIN — ONDANSETRON 4 MG: 2 INJECTION INTRAMUSCULAR; INTRAVENOUS at 09:30

## 2023-11-30 NOTE — INTERVAL H&P NOTE
H&P updated. The patient was examined and the following changes are noted:  confirmed with Dr. Muñoz that port can be removed.  No other changes

## 2023-11-30 NOTE — ANESTHESIA POSTPROCEDURE EVALUATION
"Patient: Ina Akers    Procedure Summary       Date: 11/30/23 Room / Location: [facility] OR  / [facility] MAIN OR    Anesthesia Start: 0655 Anesthesia Stop: 0950    Procedures:       LEFT REMOVAL TISSUE EXPANDER AND PLACEMENT OF IMPLANT (Left: Breast)      RIGHT BREAST REDUCTION FOR SYMMETRY. REMOVAL OF RIGHT CHEST PORT (Right: Chest) Diagnosis:     Surgeons: Yony Morse MD Provider: Speedy Koenig MD    Anesthesia Type: general ASA Status: 3            Anesthesia Type: general    Vitals  Vitals Value Taken Time   /77 11/30/23 1115   Temp 36.6 °C (97.8 °F) 11/30/23 0945   Pulse 60 11/30/23 1123   Resp     SpO2 97 % 11/30/23 1123   Vitals shown include unfiled device data.        Post Anesthesia Care and Evaluation    Patient location during evaluation: bedside  Patient participation: complete - patient participated  Level of consciousness: awake and alert  Pain score: 0  Pain management: adequate    Airway patency: patent  Anesthetic complications: No anesthetic complications    Cardiovascular status: acceptable  Respiratory status: acceptable  Hydration status: acceptable    Comments: /74 (BP Location: Right arm, Patient Position: Lying)   Pulse 71   Temp 36.6 °C (97.8 °F)   Resp 16   Ht 154.9 cm (61\")   SpO2 94%   BMI 33.65 kg/m²     "

## 2023-11-30 NOTE — ANESTHESIA PREPROCEDURE EVALUATION
Anesthesia Evaluation     Patient summary reviewed and Nursing notes reviewed   history of anesthetic complications:  PONV  NPO Solid Status: > 8 hours  NPO Liquid Status: > 2 hours           Airway   Mallampati: II  TM distance: >3 FB  Neck ROM: full  no difficulty expected  Dental - normal exam     Pulmonary - normal exam   (-) COPD, asthma, not a smoker, lung cancer  Cardiovascular - normal exam  Exercise tolerance: good (4-7 METS)    ECG reviewed  Rhythm: regular  Rate: normal    (+) hypertension well controlled 2 medications or greater, valvular problems/murmurs MR, hyperlipidemia  (-) past MI, CAD, dysrhythmias, angina, CHF, cardiac stents, pericardial effusion    ROS comment: TTE 2020:  ·Calculated left ventricular EF = 64.1% Calculated left ventricular 3D EF = 62% Estimated left ventricular EF was in agreement with the calculated left ventricular EF. Estimated left ventricular EF = 64% Left ventricular systolic function is normal. Normal global longitudinal LV strain (GLS) = -17.2%. Left ventricle strain data was reviewed by the physician. Normal left ventricular cavity size and wall thickness noted. All left ventricular wall segments contract normally. Left ventricular diastolic function was normal.  ·Mild mitral valve regurgitation is present.  ·Trace tricuspid valve regurgitation is present.      Neuro/Psych  (-) seizures, TIA, CVA  GI/Hepatic/Renal/Endo    (+) obesity, GERD well controlled, diabetes mellitus type 2 well controlled, thyroid problem hypothyroidism and thyroid nodules  (-) hiatal hernia, PUD, hepatitis, liver disease, no renal disease, GI bleed    Musculoskeletal     Abdominal   (+) obese    Abdomen: soft.   Substance History      OB/GYN          Other      history of cancer active      Other Comment: Hx/o  BRCA                    Anesthesia Plan    ASA 3     general     intravenous induction     Anesthetic plan, risks, benefits, and alternatives have been provided, discussed and informed  consent has been obtained with: patient.      CODE STATUS:

## 2023-11-30 NOTE — ANESTHESIA PROCEDURE NOTES
Airway  Urgency: elective    Date/Time: 11/30/2023 7:12 AM  Airway not difficult    General Information and Staff    Patient location during procedure: OR  Anesthesiologist: Speedy Koenig MD  CRNA/CAA: Aminta Kennedy CRNA    Indications and Patient Condition  Indications for airway management: airway protection    Preoxygenated: yes  MILS not maintained throughout  Mask difficulty assessment: 1 - vent by mask    Final Airway Details  Final airway type: endotracheal airway      Successful airway: ETT  Cuffed: yes   Successful intubation technique: direct laryngoscopy  Facilitating devices/methods: intubating stylet  Endotracheal tube insertion site: oral  Blade: Lory  Blade size: 3  ETT size (mm): 7.0  Cormack-Lehane Classification: grade I - full view of glottis  Placement verified by: chest auscultation   Cuff volume (mL): 8  Measured from: lips  Number of attempts at approach: 1  Assessment: lips, teeth, and gum same as pre-op and atraumatic intubation    Additional Comments  PreO2 100% face mask, IV induction, easy mask, DVL x1, anterior cords noted, eschmann stylet used, tube through, cuff up, EBBSH, +etCO2, = chest movement, tube secured in place, atraumatic, teeth and lips intact as preop.

## 2023-11-30 NOTE — NURSING NOTE
Pt reports norco is not effective for her pain management call to dr sheth to change to percocet per pt request he says he will make the changes and call it in .

## 2023-12-01 LAB
LAB AP CASE REPORT: NORMAL
PATH REPORT.FINAL DX SPEC: NORMAL
PATH REPORT.GROSS SPEC: NORMAL

## 2023-12-08 NOTE — OP NOTE
Pre-Operative Diagnosis: acquired absence left breast, breast asymmetry    Post-Operative Diagnosis: Same    Procedure Performed:   Removal left breast tissue expander and placement of permanent implant ([id number])  Right breast reduction for symmetry  Right-sided Mediport removal    Surgeon: IRMA Morse MD    Assistant: None    Anesthesia: General    Estimated Blood Loss:  20    Specimens:  right breast tissue 771g    Complications: None    Indications: Has completed expansion and ready to proceed with second stage.  Discussed Placement of smooth round silicone gel implant and right reduction for symmetry.  Given her long breast length on the right side we discussed possible conversion free nipple grafting but will attempt inferior central pedicle.    She has also completed necessary adjuvant therapy and I did get confirmation from her medical oncology team that port removal was advised.    We discussed risks, benefits and alternatives including but not limited to: bleeding, infection, asymmetry, poor or slow wound healing, need for further surgery, possible recurrence.  The patient elected to proceed.    Description of Procedure: The patient was met in the preoperative holding area.  All questions were answered and informed consent was assured.      She was marked in a standing position and breast landmarks drawn.  Modified Wise pattern reduction was designed on the right side.  She was transferred the operating placed on table with arms padded and secured.    After induction of appropriate anesthesia, a timeout was performed correctly identifying the patient, operative site, and procedure to be performed.  All present were in agreement.    Attention was turned to the left breast first.  The lateral portion of the mastectomy incision was incised and carried down to the standard capsule.  The capsule was dissected to create an offset from the skin incision and the capsulotomy.  Capsule was then entered and  expander evacuated and removed.  There is excellent integration of the AlloDerm and no periprosthetic fluid collection.  Some inferior and inferior central capsulotomies were performed to increase some more natural ptosis to the expander side and the pocket was copiously irrigated and immaculate hemostasis achieved.  The sizer was placed and skin tailor tacked.    Attention was then turned to the right breast and reduction performed with inferior central pedicle and there was adequate perfusion to the nipple areolar complex after dissection of the pedicle.  Medial and lateral parenchymal resection performed in this tissue passed off the field weighing 770 g.  The breast was tailor tacked after irrigation performed and she was placed in a sitting position and symmetry was adequate.  The breast was taken down and copious irrigation performed and immaculate hemostasis achieved.  Topical TXA was placed.  Closure was performed with 2-0 PDS in the vertical pillar, nipple was inset with 3-0 Monocryl deep dermal layer and INSORB staple used in the intracuticular layer elsewhere in the breast.  Cutaneous closure performed with 4-0 Monocryl periareolar and 3 oh strata fix in the IMF.    Attention was then returned to the left breast and the sizer removed.  Again the pocket was checked for hemostasis and copious antibiotic irrigation performed.  50% Betadine solution was left off several minutes.  Gloves were changed and the skin was reprepped.  The expander was brought to the field and placed into the pocket with a Callaway funnel and a no touch technique.  Closure was performed with 2-0 Vicryl in a running fashion to close the capsule, 3 Monocryl deep dermal and 4 Monocryl in the intracuticular.      Attention was then turned to the med port.  Local anesthetic was all treated in the previous port incision was incised and carried down to the port capsule.  The catheter was identified and the port body dissected.  Cerclage  suture placed around the port capsule and she was placed in a Trendelenburg position and positive pressure ventilation given while the catheter was removed.  Pressure was held on the right IJ venipuncture site for 5 minutes and the cerclage suture was then tied down.  The capsule was scored to promote adherence and closure performed with 3-0 Monocryl deep dermal layer and 4 Monocryl in the intracuticular layer.    Incisions dressed with Dermabond and she was placed in well-padded Ace wrap.    The patient was then aroused from anesthesia with ease and transferred to the postoperative care area in good condition. All sponge, needle, and instrument counts were correct.

## 2023-12-15 ENCOUNTER — OFFICE VISIT (OUTPATIENT)
Dept: SURGERY | Facility: CLINIC | Age: 53
End: 2023-12-15
Payer: COMMERCIAL

## 2023-12-15 VITALS
OXYGEN SATURATION: 99 % | RESPIRATION RATE: 17 BRPM | WEIGHT: 178 LBS | HEART RATE: 83 BPM | BODY MASS INDEX: 33.61 KG/M2 | SYSTOLIC BLOOD PRESSURE: 130 MMHG | DIASTOLIC BLOOD PRESSURE: 78 MMHG | HEIGHT: 61 IN

## 2023-12-15 DIAGNOSIS — Z17.1 MALIGNANT NEOPLASM OF UPPER-OUTER QUADRANT OF LEFT BREAST IN FEMALE, ESTROGEN RECEPTOR NEGATIVE: Primary | ICD-10-CM

## 2023-12-15 DIAGNOSIS — C50.412 MALIGNANT NEOPLASM OF UPPER-OUTER QUADRANT OF LEFT BREAST IN FEMALE, ESTROGEN RECEPTOR NEGATIVE: Primary | ICD-10-CM

## 2023-12-15 PROCEDURE — 99213 OFFICE O/P EST LOW 20 MIN: CPT | Performed by: SURGERY

## 2023-12-15 NOTE — PROGRESS NOTES
[facility]     Referring Provider: Tim Muñoz MD     Chief complaint: Routine follow up breast cancer     Subjective   HPI:   12/22/2022:  Ms. Ina Akers is a 53 yo woman, seen at the request of Dr. Tim Muñoz, for a new diagnosis of left breast cancer. She has a past history of left breast cancer in 2001 (age 30), stage IIB triple negative. She underwent a lumpectomy and axillary dissection, followed by AC and Taxotere, followed by radiation, then tamoxifen for 5 years. She believes she had somewhere around 9-13 left axillary lymph nodes removed and 2 of them had cancer. In 2020, she was diagnosed with metastatic thyroid cancer to the bone. She underwent treatment in 2021 with Lenvima before this was held due to cytopenias. Her disease has been stable off treatment since 2/2021.     Recent screening mammogram in October 2022 showed a new left breast focal asymmetry separate from her lumpectomy site. Around this time she also underwent a PET scan because she was complaining of bone pain. This showed stable bone metastasis and a new FDG avid left breast lesion in the same region as the focal asymmetry. Diagnostic breast imaging confirmed a small hypoechoic mass and subsequent biopsy showed triple negative invasive ductal carcinoma. Her work-up is detailed in the oncologic history below. She denies any breast lumps, pain, skin changes, or nipple discharge. She also has a past history of a benign left breast lumpectomy in 1991.     Her only family history of cancer is oral cancer in her father. The patient has not undergone genetic testing.      2/8/2023:  She underwent left modified radical mastectomy with prepectoral tissue expander placement on 1/24/23. See surgery & pathology details below in oncologic history. She has already seen Dr. Morse back postoperatively and had 2 drains removed. She also has already seen Dr. Muñoz and discussed proceeding with TC x4. She is scheduled for a  port placement next week. She was seen at the lymphedema clinic preoperatively and her bioimpedance score was within normal limits. She did have some early arm swelling while in the hospital, however this has already improved.    5/31/2023:  She returns today for scheduled follow-up.  She completed cycle 4 of TC on 5/3/2023.  Dr. Siddiqui plans to resume Zometa which she was previously on for her thyroid bone metastasis.  Her left arm swelling has improved and her bioimpedance score last week was within normal limits.  She has been wearing a sleeve as needed.  She completed some expansion with Dr. Morse prior to starting chemotherapy and she sees him again in a couple of weeks.  She plans on waiting until December for her second stage surgery because she has a grandchild due in August.    12/15/2023, Interval History:  She underwent right screening mammogram in November and this was benign.  She saw PT early in November and her bioimpedance score was elevated.  She is supposed to be wearing her sleeve more often.  She then underwent left implant exchange and right mastopexy with Dr. Morse on 11/30/2023.  She is complaining of a rash and itching from the skin glue which is improving with topical hydrocortisone.      Oncology/Hematology History Overview Note   *History of left breast cancer, stage IIB, triIple negative.   S/P lumpectomy in 2001.  She received adjuvant chemotherapy on the NSABP B34 with AC followed by Taxotere.  S/P post-lumpectomy radiation therapy.   She was placed on tamoxifen for 5 years and it was completed in January 2007.     *Metastatic thyroid cancer diagnosed on 10/6/2020.  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  She had right thyroid lobectomy on 12/17/2018 at [place].    The specimen was sent for outside consultation and the final conclusion was that there was no evidence of malignancy.  MRI on 8/27/2020 revealed an L4 lesion resulting in pathologic compression  fracture.  Bone scan on 9/2/2020 revealed increased activity at L4 and left side of the skull.  PET scan on 9/9/2020 revealed abnormal activity at L4, right femoral neck, lower sacrum, left ischium and right ninth medial aspect.  PET scan 9/9/2020 showed no evidence of visceral metastasis.  Patient had kyphoplasty on 9/17/2020.  Pathology was nondiagnostic.  CT-guided biopsy of the lesion in the right posterior ninth rib on 10/6/2020 revealed a clear cell neoplasm.  It is positive for PAX 8 and TTF-1 favoring metastatic thyroid cancer.  Patient was [facility] to [facility].  Patient underwent completion thyroidectomy on 11/9/2020.  There was no evidence of uptake of radioactive iodine on SPECT imaging on 12/18/2020.  This was indicative of the tumor not going to respond to radioactive iodine therapy.  Patient was started on Lenvima 20 mg daily on 12/24/2020.  Lenvima was held on 2/6/2021 due to drug induced anemia and thrombocytopenia.  She was unable to start back on it due to prolonged cytopenias.  CT scans on 3/23/2021 showed stable disease.  CBC was stable on 6/8/2021.  CT scans showed stable disease.  Thyroglobulin was stable at 3.4.    PET scan on 9/1/2021 revealed decrease in the activity in the metastatic lesions.    Starting back on active treatment (Nexavar) was not recommended.  PET scan on 12/16/2021 showed complete response except for mild hypermetabolism in the left ischium.  CT scans on 4/8/2022 showed stable bone metastasis.  No new metastatic lesions.  PET scan on 10/6/2022 revealed no new areas of metastasis.  Due to the stable metastatic disease, she is not currently on treatment for her thyroid cancer.   She is on Synthroid 112 mcg daily.     Malignant neoplasm of upper-outer quadrant of left breast in female, estrogen receptor negative   11/23/2016 Initial Diagnosis    Malignant neoplasm of upper-outer quadrant of left breast in female, estrogen receptor negative (CMS/HCC)      9/23/2020 Cancer Staged    Staging form: Breast, AJCC V7  - Pathologic stage from 9/23/2020: Stage IIB (T2, N1, cM0) - Signed by Tim Muñoz MD on 10/9/2020     9/29/2020 - 9/29/2020 Chemotherapy    OP SUPPORTIVE Denosumab (Xgeva) Q28D     9/29/2020 -  Chemotherapy    OP SUPPORTIVE Zoledronic Acid Q28D     8/19/2021 Imaging    Screening MMG with Teodoro ([facility])  There are scattered areas of fibroglandular density. Left breast post-operative change is again seen. There are no suspicious masses, significant calcifications, or other abnormalities seen in either breast.  BI-RADS 2: Benign     10/5/2022 Imaging    Screening MMG with Teodoro ([facility])  There are scattered areas of fibroglandular density.  Finding 1: There is a new fat containing focal asymmetry measuring 6 mm seen in the middle one-third region of the left breast at 12 o'clock  Finding 2: There is a focal asymmetry with associated dystrophic calcifications and post-surgical scar seen in the upper outer region of the left breast. Focal asymmetry in in an area of prior lumpectomy. There are no significant changes from the prior exam(s).   In the right breast, no suspicious masses, significant calcifications or other abnormalities are seen.  BI-RADS 0: Incomplete     10/6/2022 Imaging    PET CT FDG ( [email or web address])  Chest:  New intensely FDG avid left breast ill-defined nodular focus at the 12 o'clock position measuring .0 x 0.7 cm with a maximum SUV of 5.8 (series 4, image 122).  No significant change in 19 x 15 mm non FDG avid speculated mass within the posterior upper outer quadrant of the left breast with intralesional fat and a peripherally located biopsy clip, consistent with known a post-lumpectomy scar.  Impression:  1. New FDG avid left breast ill-defined nodular focus at the 12 o-clock position, suspicious for malignancy. This could be metastic disease given the increase in non-stimulated thyroglobulin level. Tissue sampling can be  considered for further characterization.  2. No significant change in hyper metabolic well-defined lytic lesion with sclerotic margin within the left ischium compared to the prior 2 PET/CT studies from  yet showing evidence of treatment response compared to outside study from 2020.  3. No new metabolically active osseous metastatic disease.     10/20/2022 Imaging    Left Diagnostic MMG with Teodoro & Left Breast US ([facility])  MMG:  On the present examination, there is isodense focal asymmetry measuring 6 mm in the left breast at 12 o'clock located 5 cm from the nipple.  US:  Demonstrates a round non-parallel complex cystic and solid mass with circumscribed margins measuring 6 x 5 x 5 mm in the left breast at 12 o'clock located 5 cm from the nipple.  BI-RADS 4B: Suspicious     2022 Imaging    Left Diagnostic MMG with Teodoro & Left Breast US ([facility]):  MM. Re-demonstration of 7 mm mass at approximately 12:00-1:00, 6 to 8 cm from the nipple.  2. Stable post-lumpectomy changes in the upper outer left breast.  US:  Focused ultrasound was performed at 1:00, 7 cm from the nipple. Ultrasound demonstrates a hypoechoic round mass with indistinct margins and without internal vascularity measuring 5 x 5 x 4 mm. There is an indistinct hyperechoic halo surrounding the mass. This correlates with mammographic finding.  BI-RADS 4: Suspicious     2022 Biopsy    Left Breast, US-Guided Biopsy ():    LEFT BREAST, 1:00 (7 CM FROM NIPPLE), ULTRASOUND GUIDED CORE NEEDLE BIOPSY FOR 0.7 CM MASS (U CLIP):     - INVASIVE DUCTAL CARCINOMA, poorly differentiated ([place] grade III/III: tubule score=3, nuclear score=3, mitoses score=3), with necrosis, measuring up to 0.5 cm.      - No lymphovascular invasion or in situ carcinoma identified.      ER Negative (<1%)  [place] Negative (<1%)  HER2 Negative (IHC 0)      [facility] PATHOLOGY REVIEW    1. Left Breast, 1 o'clock, 7 cm from Nipple, Ultrasound-Guided  Core Biopsies for a Mass (Outside Consult [id number]):       INVASIVE MAMMARY CARCINOMA, NO SPECIAL TYPE (INVASIVE DUCTAL CARCINOMA).               A. Histologic grade: Carlos histologic score III (tubules = 3, nuclei = 3, mitosis = 3).               B. Largest contiguous focus measures up to 5 mm.   C. No associated in situ component identified.               D. No lymphovascular nor perineural invasion identified.     12/5/2022 Imaging    Bilateral Breast MRI ([facility]):  In the posterior one third of the left breast at the 12:30 position centered on the order of 8.5 cm from the nipple there is a focal signal void seen within an irregular enhancing mass that measures on the order of 0.9 cm in the superior-inferior dimension, 0.8 cm in anterior to posterior dimension and 0.8 cm and the mediolateral dimension. This represents the biopsy-proven site of malignancy with the internal biopsy clip.  In the anterior one third of the left breast located medial to the plane of the nipple at the 9 o'clock position on the order of 3 cm from the nipple there is an irregular enhancing lesion that measures on the order of 0.5 cm in the superior-inferior dimension, 0.5 cm in anterior to posterior dimension and 0.5 cm in the medial to lateral dimension. A possible mammographic correlate is seen on the CC image of the left breast on the examination of 11/18/2022. This area of enhancement is not seen on the prior breast MRI examination from 12/26/2012.  There is post surgical change of the left breast associated with prior breast conservation therapy. No other areas of abnormal enhancement or morphology are seen within the left breast. I see no evidence for  abnormal left breast skin, nipple or chest wall enhancement and there is no evidence for left axillary or internal mammary chain adenopathy.   There are no areas of abnormal enhancement or morphology in the right breast. I see no evidence for abnormal right breast skin, nipple  or chest wall enhancement and there is no evidence for right axillary or internal mammary chain adenopathy.  BI-RADS 4: Suspicious.     12/23/2022 Genetic Testing    [employer] Multi-Cancer Panel (84 genes):    VUS in NF1  VUS in POT1  VUS in SMAD4     1/24/2023 Surgery    Left modified radical mastectomy with prepectoral tissue expander placement    1. Left Breast Modified Radical Mastectomy:               A. Invasive carcinoma with mixed ductal and lobular features:                            1. Invasive carcinoma measures 15 mm x 9 mm x 5 mm.                            2. Overall Parkman grade III (tubular score = 3, nuclear score = 3, mitotic score = 3).                            3. No definitive lymphovascular space invasion identified.               B. Associated ductal carcinoma in situ (DCIS):                            1. DCIS spans an area estimated at 20 mm x 5 mm x 2 mm.                            2. High-grade solid and comedo DCIS.               C. All margins are negative for invasive carcinoma.                    Invasive carcinoma measures 25 mm from the closest (Posterior) margin of excision.                                  D. All margins are negative for ductal carcinoma in-situ (DCIS).                    DCIS measures 25 mm from the closest (Posterior) margin of excision                              E. Focal biopsy site changes are identified, and metallic clip retrieved.               F. No Pagetoid involvement of skin nor nipple by malignancy identified.               G. No lobular neoplasia (LCIS, ALH) identified.               H. Two incidental lymph nodes identified, negative for metastatic carcinoma by routine and immunostaining (0/2).               I.  Focal dense dermal and stromal scar consistent with previous surgical procedure identified.  J. Previous Biomarkers: Estrogen receptors: negative, Progesterone receptors: negative,                    HER/2-stacey: negative (Score 0), Ki-67 =  Not reported.  [id number] ([id number]).     2. Left Breast Augmentation:                 A. Benign skin and subcutaneous tissue.     2/21/2023 -  Chemotherapy    OP CENTRAL VENOUS ACCESS DEVICE ACCESS, CARE, AND MAINTENANCE ([facility])     3/1/2023 - 5/3/2023 Chemotherapy    OP BREAST TC DOCEtaxel / Cyclophosphamide       11/8/2023 Imaging    Right Screening MMG with Teodoro ([facility]):  Scattered areas of fibroglandular density.  No suspicious masses, significant calcifications or other abnormalities are seen.  BI-RADS 1: Negative.     Metastasis to bone   9/23/2020 Initial Diagnosis    Bone metastasis (CMS/HCC)     9/29/2020 - 9/29/2020 Chemotherapy    OP SUPPORTIVE Denosumab (Xgeva) Q28D     9/29/2020 -  Chemotherapy    OP SUPPORTIVE Zoledronic Acid Q28D     Primary cancer of thyroid gland metastatic to bone   3/2/2022 Initial Diagnosis    Primary cancer of thyroid gland metastatic to bone (HCC)         Review of Systems:  See interval history.       Medications:    Current Outpatient Medications:     acetaminophen (TYLENOL) 325 MG tablet, Take 2 tablets by mouth Every 4 (Four) Hours As Needed for Mild Pain., Disp: 60 tablet, Rfl: 0    acetaminophen (TYLENOL) 500 MG tablet, Take 2 tablets by mouth Every 6 (Six) Hours As Needed for Mild Pain., Disp: , Rfl:     amLODIPine (NORVASC) 10 MG tablet, TAKE ONE TABLET BY MOUTH DAILY (Patient taking differently: Take 1 tablet by mouth Daily.), Disp: 90 tablet, Rfl: 1    atorvastatin (LIPITOR) 20 MG tablet, TAKE ONE TABLET BY MOUTH DAILY (Patient taking differently: Take 1 tablet by mouth.), Disp: 90 tablet, Rfl: 0    Blood Glucose Monitoring Suppl w/Device kit, Check blood sugar twice a day, Disp: 1 each, Rfl: 0    cholecalciferol (VITAMIN D3) 25 MCG (1000 UT) tablet, Take 1 tablet by mouth Daily., Disp: , Rfl:     cyclobenzaprine (FLEXERIL) 5 MG tablet, Take 1 tablet by mouth At Night As Needed for Muscle Spasms. 1 or 2 tablets by mouth at bedtime if needed for leg cramps,  Disp: 20 tablet, Rfl: 2    famotidine (PEPCID) 20 MG tablet, Take 1 tablet by mouth Every Other Day., Disp: , Rfl:     Glucose Blood (BLOOD GLUCOSE TEST) strip, Check blood sugar twice a day., Disp: 100 each, Rfl: 1    labetalol (NORMODYNE) 100 MG tablet, Take 0.5 tablets by mouth 2 (Two) Times a Day., Disp: 180 tablet, Rfl: 3    Lancets (ACCU-CHEK SOFT TOUCH) lancets, Check blood sugar twice a day as needed, Disp: 100 each, Rfl: 1    lansoprazole (PREVACID) 30 MG capsule, Take 1 capsule by mouth Daily. (Patient taking differently: Take 1 capsule by mouth Every Other Day.), Disp: 90 capsule, Rfl: 0    levothyroxine (SYNTHROID, LEVOTHROID) 112 MCG tablet, Take 1 tablet by mouth Every Morning., Disp: , Rfl:     losartan (COZAAR) 100 MG tablet, Take 1 tablet by mouth Daily., Disp: 90 tablet, Rfl: 3    metFORMIN ER (GLUCOPHAGE-XR) 500 MG 24 hr tablet, Take 2 tablets by mouth Daily With Dinner., Disp: 180 tablet, Rfl: 3    NON FORMULARY, Take 1 tablet by mouth Every Night. STOOL SOFTNER, Disp: , Rfl:     oxyCODONE (Roxicodone) 5 MG immediate release tablet, Take 1 tablet by mouth Every 4 (Four) Hours As Needed for Moderate Pain., Disp: 15 tablet, Rfl: 0    promethazine (PHENERGAN) 12.5 MG tablet, TAKE 1/2 TO 1 TABLET BY MOUTH EVERY 4 HOURS AS NEEDED FOR NAUSEA AND VOMITING -MAY MAKE DROWSY (Patient taking differently: Take 1-2 tablets by mouth Every 6 (Six) Hours As Needed. TAKE 1/2 TO 1 TABLET BY MOUTH EVERY 4 HOURS AS NEEDED FOR NAUSEA AND VOMITING -MAY MAKE DROWSY), Disp: 30 tablet, Rfl: 3    triamcinolone (KENALOG) 0.1 % cream, Apply  topically to the appropriate area as directed 2 (Two) Times a Day. Sparingly shortest time possible avoid face (Patient taking differently: Apply 1 application  topically to the appropriate area as directed As Needed. Sparingly shortest time possible avoid face), Disp: 80 g, Rfl: 0      Allergies   Allergen Reactions    Monoclonal Antibodies Anaphylaxis and Shortness Of Breath      Regeneron,  High b/p increased heart rate     Zofran [Ondansetron] Nausea And Vomiting     DOES NOT HELP WITH NAUSEA AND VOMITING MAKES IT WORSE    Hydrocodone Nausea And Vomiting    Lenvatinib Unknown - Low Severity     Internal bleeding       Family History   Problem Relation Age of Onset    No Known Problems Mother     Cancer Father         Oral    Hypertension Other     Diabetes Other     Malig Hyperthermia Neg Hx        Objective   PHYSICAL EXAMINATION:   Vitals:    12/15/23 0855   BP: 130/78   Pulse: 83   Resp: 17   SpO2: 99%   ECOG 0 - Asymptomatic  General: NAD, well appearing  Psych: a&o x3, normal mood and affect  Eyes: EOMI, no scleral icterus  ENMT: neck supple without masses or thyromegaly, mucous membranes moist  MSK: normal gait, normal ROM in bilateral shoulders  Lymph nodes: no cervical, supraclavicular or axillary lymphadenopathy  Breast:   Right: Well-healing port incision with some irritation around the incision.  Well-healing mastopexy incisions.  Flaps and NAC healthy.  Left: Sp mastectomy with implant in place.  Well-healing incisions.      I have independently reviewed her imaging and here are my findings:   No suspicious findings in the right breast on mammogram.      Assessment & Plan   Assessment:   1) 53 y.o. F with a diagnosis of left breast cancer: High grade, triple negative, invasive ductal carcinoma. She underwent left modified radical mastectomy with prepectoral tissue expander placement on 1/24/23, pT1cN0, anatomic stage IA, prognostic stage IB.  She completed TC x4 on 5/3/2023.  2) She has a past history of left breast cancer in 2001 (age 30), stage IIB triple negative. She underwent a lumpectomy and axillary dissection, followed by AC and Taxotere, followed by radiation, then tamoxifen for 5 years.  3) She has metastatic thyroid cancer diagnosed in 2020 with stable bone metastasis.    Plan:  -Continue follow-up with Dr. Morse.  -Continue follow-up with lymphedema  clinic.  -Follow-up in 6 months for clinical exam.    Shavonne Sharif MD      CC:  Khalid Z. Ghosheh, MD James Epley, APRN Lori Warren, MD

## 2023-12-15 NOTE — LETTER
December 15, 2023       No Recipients    Patient: Ina Akers   YOB: 1970   Date of Visit: 12/15/2023     Dear Tim Muñoz MD:       Thank you for referring Ina Akers to me for evaluation. Below are the relevant portions of my assessment and plan of care.    If you have questions, please do not hesitate to call me. I look forward to following Ina along with you.         Sincerely,        Shavonne Sharif MD        CC:   No Recipients    Shavonne Sharif MD  12/15/23 0900  Sign when Signing Visit  [facility]     Referring Provider: Tim Muñoz MD     Chief complaint: Routine follow up breast cancer     Subjective  HPI:   12/22/2022:  Ms. Ina Akers is a 51 yo woman, seen at the request of Dr. Tim Muñoz, for a new diagnosis of left breast cancer. She has a past history of left breast cancer in 2001 (age 30), stage IIB triple negative. She underwent a lumpectomy and axillary dissection, followed by AC and Taxotere, followed by radiation, then tamoxifen for 5 years. She believes she had somewhere around 9-13 left axillary lymph nodes removed and 2 of them had cancer. In 2020, she was diagnosed with metastatic thyroid cancer to the bone. She underwent treatment in 2021 with Lenvima before this was held due to cytopenias. Her disease has been stable off treatment since 2/2021.     Recent screening mammogram in October 2022 showed a new left breast focal asymmetry separate from her lumpectomy site. Around this time she also underwent a PET scan because she was complaining of bone pain. This showed stable bone metastasis and a new FDG avid left breast lesion in the same region as the focal asymmetry. Diagnostic breast imaging confirmed a small hypoechoic mass and subsequent biopsy showed triple negative invasive ductal carcinoma. Her work-up is detailed in the oncologic history below. She denies any breast lumps, pain, skin changes, or nipple  discharge. She also has a past history of a benign left breast lumpectomy in 1991.     Her only family history of cancer is oral cancer in her father. The patient has not undergone genetic testing.      2/8/2023:  She underwent left modified radical mastectomy with prepectoral tissue expander placement on 1/24/23. See surgery & pathology details below in oncologic history. She has already seen Dr. Morse back postoperatively and had 2 drains removed. She also has already seen Dr. Muñoz and discussed proceeding with TC x4. She is scheduled for a port placement next week. She was seen at the lymphedema clinic preoperatively and her bioimpedance score was within normal limits. She did have some early arm swelling while in the hospital, however this has already improved.    5/31/2023:  She returns today for scheduled follow-up.  She completed cycle 4 of TC on 5/3/2023.  Dr. Siddiqui plans to resume Zometa which she was previously on for her thyroid bone metastasis.  Her left arm swelling has improved and her bioimpedance score last week was within normal limits.  She has been wearing a sleeve as needed.  She completed some expansion with Dr. Morse prior to starting chemotherapy and she sees him again in a couple of weeks.  She plans on waiting until December for her second stage surgery because she has a grandchild due in August.    12/15/2023, Interval History:  She underwent right screening mammogram in November and this was benign.  She saw PT early in November and her bioimpedance score was elevated.  She is supposed to be wearing her sleeve more often.  She then underwent left implant exchange and right mastopexy with Dr. Morse on 11/30/2023.  She is complaining of a rash and itching from the skin glue which is improving with topical hydrocortisone.      Oncology/Hematology History Overview Note   *History of left breast cancer, stage IIB, triIple negative.   S/P lumpectomy in 2001.  She received  adjuvant chemotherapy on the NSABP B34 with AC followed by Taxotere.  S/P post-lumpectomy radiation therapy.   She was placed on tamoxifen for 5 years and it was completed in January 2007.     *Metastatic thyroid cancer diagnosed on 10/6/2020.  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  She had right thyroid lobectomy on 12/17/2018 at [place].    The specimen was sent for outside consultation and the final conclusion was that there was no evidence of malignancy.  MRI on 8/27/2020 revealed an L4 lesion resulting in pathologic compression fracture.  Bone scan on 9/2/2020 revealed increased activity at L4 and left side of the skull.  PET scan on 9/9/2020 revealed abnormal activity at L4, right femoral neck, lower sacrum, left ischium and right ninth medial aspect.  PET scan 9/9/2020 showed no evidence of visceral metastasis.  Patient had kyphoplasty on 9/17/2020.  Pathology was nondiagnostic.  CT-guided biopsy of the lesion in the right posterior ninth rib on 10/6/2020 revealed a clear cell neoplasm.  It is positive for PAX 8 and TTF-1 favoring metastatic thyroid cancer.  Patient was referred to [facility].  Patient underwent completion thyroidectomy on 11/9/2020.  There was no evidence of uptake of radioactive iodine on SPECT imaging on 12/18/2020.  This was indicative of the tumor not going to respond to radioactive iodine therapy.  Patient was started on Lenvima 20 mg daily on 12/24/2020.  Lenvima was held on 2/6/2021 due to drug induced anemia and thrombocytopenia.  She was unable to start back on it due to prolonged cytopenias.  CT scans on 3/23/2021 showed stable disease.  CBC was stable on 6/8/2021.  CT scans showed stable disease.  Thyroglobulin was stable at 3.4.    PET scan on 9/1/2021 revealed decrease in the activity in the metastatic lesions.    Starting back on active treatment (Nexavar) was not recommended.  PET scan on 12/16/2021 showed complete response except for mild  hypermetabolism in the left ischium.  CT scans on 4/8/2022 showed stable bone metastasis.  No new metastatic lesions.  PET scan on 10/6/2022 revealed no new areas of metastasis.  Due to the stable metastatic disease, she is not currently on treatment for her thyroid cancer.   She is on Synthroid 112 mcg daily.     Malignant neoplasm of upper-outer quadrant of left breast in female, estrogen receptor negative   11/23/2016 Initial Diagnosis    Malignant neoplasm of upper-outer quadrant of left breast in female, estrogen receptor negative (CMS/HCC)     9/23/2020 Cancer Staged    Staging form: Breast, AJCC V7  - Pathologic stage from 9/23/2020: Stage IIB (T2, N1, cM0) - Signed by Tim Muñoz MD on 10/9/2020     9/29/2020 - 9/29/2020 Chemotherapy    OP SUPPORTIVE Denosumab (Xgeva) Q28D     9/29/2020 -  Chemotherapy    OP SUPPORTIVE Zoledronic Acid Q28D     8/19/2021 Imaging    Screening MMG with Teodoro ([facility])  There are scattered areas of fibroglandular density. Left breast post-operative change is again seen. There are no suspicious masses, significant calcifications, or other abnormalities seen in either breast.  BI-RADS 2: Benign     10/5/2022 Imaging    Screening MMG with Teodoro ([facility])  There are scattered areas of fibroglandular density.  Finding 1: There is a new fat containing focal asymmetry measuring 6 mm seen in the middle one-third region of the left breast at 12 o'clock  Finding 2: There is a focal asymmetry with associated dystrophic calcifications and post-surgical scar seen in the upper outer region of the left breast. Focal asymmetry in in an area of prior lumpectomy. There are no significant changes from the prior exam(s).   In the right breast, no suspicious masses, significant calcifications or other abnormalities are seen.  BI-RADS 0: Incomplete     10/6/2022 Imaging    PET CT FDG ( [facility])  Chest:  New intensely FDG avid left breast ill-defined nodular focus at the 12 o'clock  position measuring .0 x 0.7 cm with a maximum SUV of 5.8 (series 4, image 122).  No significant change in 19 x 15 mm non FDG avid speculated mass within the posterior upper outer quadrant of the left breast with intralesional fat and a peripherally located biopsy clip, consistent with known a post-lumpectomy scar.  Impression:  1. New FDG avid left breast ill-defined nodular focus at the 12 o-clock position, suspicious for malignancy. This could be metastic disease given the increase in non-stimulated thyroglobulin level. Tissue sampling can be considered for further characterization.  2. No significant change in hyper metabolic well-defined lytic lesion with sclerotic margin within the left ischium compared to the prior 2 PET/CT studies from  yet showing evidence of treatment response compared to outside study from 2020.  3. No new metabolically active osseous metastatic disease.     10/20/2022 Imaging    Left Diagnostic MMG with Teodoro & Left Breast US ([facility])  MMG:  On the present examination, there is isodense focal asymmetry measuring 6 mm in the left breast at 12 o'clock located 5 cm from the nipple.  US:  Demonstrates a round non-parallel complex cystic and solid mass with circumscribed margins measuring 6 x 5 x 5 mm in the left breast at 12 o'clock located 5 cm from the nipple.  BI-RADS 4B: Suspicious     2022 Imaging    Left Diagnostic MMG with Teodoro & Left Breast US ([facility]):  MM. Re-demonstration of 7 mm mass at approximately 12:00-1:00, 6 to 8 cm from the nipple.  2. Stable post-lumpectomy changes in the upper outer left breast.  US:  Focused ultrasound was performed at 1:00, 7 cm from the nipple. Ultrasound demonstrates a hypoechoic round mass with indistinct margins and without internal vascularity measuring 5 x 5 x 4 mm. There is an indistinct hyperechoic halo surrounding the mass. This correlates with mammographic finding.  BI-RADS 4: Suspicious     2022 Biopsy    Left  Breast, US-Guided Biopsy ():    LEFT BREAST, 1:00 (7 CM FROM NIPPLE), ULTRASOUND GUIDED CORE NEEDLE BIOPSY FOR 0.7 CM MASS (U CLIP):     - INVASIVE DUCTAL CARCINOMA, poorly differentiated ([place] grade III/III: tubule score=3, nuclear score=3, mitoses score=3), with necrosis, measuring up to 0.5 cm.      - No lymphovascular invasion or in situ carcinoma identified.      ER Negative (<1%)  KS Negative (<1%)  HER2 Negative (IHC 0)      [facility] PATHOLOGY REVIEW    1. Left Breast, 1 o'clock, 7 cm from Nipple, Ultrasound-Guided Core Biopsies for a Mass (Outside Consult [id number]):       INVASIVE MAMMARY CARCINOMA, NO SPECIAL TYPE (INVASIVE DUCTAL CARCINOMA).               A. Histologic grade: Carlos histologic score III (tubules = 3, nuclei = 3, mitosis = 3).               B. Largest contiguous focus measures up to 5 mm.   C. No associated in situ component identified.               D. No lymphovascular nor perineural invasion identified.     12/5/2022 Imaging    Bilateral Breast MRI ([facility]):  In the posterior one third of the left breast at the 12:30 position centered on the order of 8.5 cm from the nipple there is a focal signal void seen within an irregular enhancing mass that measures on the order of 0.9 cm in the superior-inferior dimension, 0.8 cm in anterior to posterior dimension and 0.8 cm and the mediolateral dimension. This represents the biopsy-proven site of malignancy with the internal biopsy clip.  In the anterior one third of the left breast located medial to the plane of the nipple at the 9 o'clock position on the order of 3 cm from the nipple there is an irregular enhancing lesion that measures on the order of 0.5 cm in the superior-inferior dimension, 0.5 cm in anterior to posterior dimension and 0.5 cm in the medial to lateral dimension. A possible mammographic correlate is seen on the CC image of the left breast on the examination of 11/18/2022. This area of enhancement  is not seen on the prior breast MRI examination from 12/26/2012.  There is post surgical change of the left breast associated with prior breast conservation therapy. No other areas of abnormal enhancement or morphology are seen within the left breast. I see no evidence for  abnormal left breast skin, nipple or chest wall enhancement and there is no evidence for left axillary or internal mammary chain adenopathy.   There are no areas of abnormal enhancement or morphology in the right breast. I see no evidence for abnormal right breast skin, nipple or chest wall enhancement and there is no evidence for right axillary or internal mammary chain adenopathy.  BI-RADS 4: Suspicious.     12/23/2022 Genetic Testing    [employer] Multi-Cancer Panel (84 genes):    VUS in NF1  VUS in POT1  VUS in SMAD4     1/24/2023 Surgery    Left modified radical mastectomy with prepectoral tissue expander placement    1. Left Breast Modified Radical Mastectomy:               A. Invasive carcinoma with mixed ductal and lobular features:                            1. Invasive carcinoma measures 15 mm x 9 mm x 5 mm.                            2. Overall Carlos grade III (tubular score = 3, nuclear score = 3, mitotic score = 3).                            3. No definitive lymphovascular space invasion identified.               B. Associated ductal carcinoma in situ (DCIS):                            1. DCIS spans an area estimated at 20 mm x 5 mm x 2 mm.                            2. High-grade solid and comedo DCIS.               C. All margins are negative for invasive carcinoma.                    Invasive carcinoma measures 25 mm from the closest (Posterior) margin of excision.                                  D. All margins are negative for ductal carcinoma in-situ (DCIS).                    DCIS measures 25 mm from the closest (Posterior) margin of excision                              E. Focal biopsy site changes are identified, and  metallic clip retrieved.               F. No Pagetoid involvement of skin nor nipple by malignancy identified.               G. No lobular neoplasia (LCIS, ALH) identified.               H. Two incidental lymph nodes identified, negative for metastatic carcinoma by routine and immunostaining (0/2).               I.  Focal dense dermal and stromal scar consistent with previous surgical procedure identified.  J. Previous Biomarkers: Estrogen receptors: negative, Progesterone receptors: negative,                    HER/2-stacey: negative (Score 0), Ki-67 = Not reported.  [id number] ([id number]).     2. Left Breast Augmentation:                 A. Benign skin and subcutaneous tissue.     2/21/2023 -  Chemotherapy    OP CENTRAL VENOUS ACCESS DEVICE ACCESS, CARE, AND MAINTENANCE ([facility])     3/1/2023 - 5/3/2023 Chemotherapy    OP BREAST TC DOCEtaxel / Cyclophosphamide       11/8/2023 Imaging    Right Screening MMG with Teodoro ([facility]):  Scattered areas of fibroglandular density.  No suspicious masses, significant calcifications or other abnormalities are seen.  BI-RADS 1: Negative.     Metastasis to bone   9/23/2020 Initial Diagnosis    Bone metastasis (CMS/HCC)     9/29/2020 - 9/29/2020 Chemotherapy    OP SUPPORTIVE Denosumab (Xgeva) Q28D     9/29/2020 -  Chemotherapy    OP SUPPORTIVE Zoledronic Acid Q28D     Primary cancer of thyroid gland metastatic to bone   3/2/2022 Initial Diagnosis    Primary cancer of thyroid gland metastatic to bone (HCC)         Review of Systems:  See interval history.       Medications:    Current Outpatient Medications:   •  acetaminophen (TYLENOL) 325 MG tablet, Take 2 tablets by mouth Every 4 (Four) Hours As Needed for Mild Pain., Disp: 60 tablet, Rfl: 0  •  acetaminophen (TYLENOL) 500 MG tablet, Take 2 tablets by mouth Every 6 (Six) Hours As Needed for Mild Pain., Disp: , Rfl:   •  amLODIPine (NORVASC) 10 MG tablet, TAKE ONE TABLET BY MOUTH DAILY (Patient taking differently: Take 1 tablet by  mouth Daily.), Disp: 90 tablet, Rfl: 1  •  atorvastatin (LIPITOR) 20 MG tablet, TAKE ONE TABLET BY MOUTH DAILY (Patient taking differently: Take 1 tablet by mouth.), Disp: 90 tablet, Rfl: 0  •  Blood Glucose Monitoring Suppl w/Device kit, Check blood sugar twice a day, Disp: 1 each, Rfl: 0  •  cholecalciferol (VITAMIN D3) 25 MCG (1000 UT) tablet, Take 1 tablet by mouth Daily., Disp: , Rfl:   •  cyclobenzaprine (FLEXERIL) 5 MG tablet, Take 1 tablet by mouth At Night As Needed for Muscle Spasms. 1 or 2 tablets by mouth at bedtime if needed for leg cramps, Disp: 20 tablet, Rfl: 2  •  famotidine (PEPCID) 20 MG tablet, Take 1 tablet by mouth Every Other Day., Disp: , Rfl:   •  Glucose Blood (BLOOD GLUCOSE TEST) strip, Check blood sugar twice a day., Disp: 100 each, Rfl: 1  •  labetalol (NORMODYNE) 100 MG tablet, Take 0.5 tablets by mouth 2 (Two) Times a Day., Disp: 180 tablet, Rfl: 3  •  Lancets (ACCU-CHEK SOFT TOUCH) lancets, Check blood sugar twice a day as needed, Disp: 100 each, Rfl: 1  •  lansoprazole (PREVACID) 30 MG capsule, Take 1 capsule by mouth Daily. (Patient taking differently: Take 1 capsule by mouth Every Other Day.), Disp: 90 capsule, Rfl: 0  •  levothyroxine (SYNTHROID, LEVOTHROID) 112 MCG tablet, Take 1 tablet by mouth Every Morning., Disp: , Rfl:   •  losartan (COZAAR) 100 MG tablet, Take 1 tablet by mouth Daily., Disp: 90 tablet, Rfl: 3  •  metFORMIN ER (GLUCOPHAGE-XR) 500 MG 24 hr tablet, Take 2 tablets by mouth Daily With Dinner., Disp: 180 tablet, Rfl: 3  •  NON FORMULARY, Take 1 tablet by mouth Every Night. STOOL SOFTNER, Disp: , Rfl:   •  oxyCODONE (Roxicodone) 5 MG immediate release tablet, Take 1 tablet by mouth Every 4 (Four) Hours As Needed for Moderate Pain., Disp: 15 tablet, Rfl: 0  •  promethazine (PHENERGAN) 12.5 MG tablet, TAKE 1/2 TO 1 TABLET BY MOUTH EVERY 4 HOURS AS NEEDED FOR NAUSEA AND VOMITING -MAY MAKE DROWSY (Patient taking differently: Take 1-2 tablets by mouth Every 6 (Six)  Hours As Needed. TAKE 1/2 TO 1 TABLET BY MOUTH EVERY 4 HOURS AS NEEDED FOR NAUSEA AND VOMITING -MAY MAKE DROWSY), Disp: 30 tablet, Rfl: 3  •  triamcinolone (KENALOG) 0.1 % cream, Apply  topically to the appropriate area as directed 2 (Two) Times a Day. Sparingly shortest time possible avoid face (Patient taking differently: Apply 1 application  topically to the appropriate area as directed As Needed. Sparingly shortest time possible avoid face), Disp: 80 g, Rfl: 0      Allergies   Allergen Reactions   • Monoclonal Antibodies Anaphylaxis and Shortness Of Breath     Regeneron,  High b/p increased heart rate    • Zofran [Ondansetron] Nausea And Vomiting     DOES NOT HELP WITH NAUSEA AND VOMITING MAKES IT WORSE   • Hydrocodone Nausea And Vomiting   • Lenvatinib Unknown - Low Severity     Internal bleeding       Family History   Problem Relation Age of Onset   • No Known Problems Mother    • Cancer Father         Oral   • Hypertension Other    • Diabetes Other    • Malig Hyperthermia Neg Hx        Objective  PHYSICAL EXAMINATION:   Vitals:    12/15/23 0855   BP: 130/78   Pulse: 83   Resp: 17   SpO2: 99%   ECOG 0 - Asymptomatic  General: NAD, well appearing  Psych: a&o x3, normal mood and affect  Eyes: EOMI, no scleral icterus  ENMT: neck supple without masses or thyromegaly, mucous membranes moist  MSK: normal gait, normal ROM in bilateral shoulders  Lymph nodes: no cervical, supraclavicular or axillary lymphadenopathy  Breast:   Right: Well-healing port incision with some irritation around the incision.  Well-healing mastopexy incisions.  Flaps and NAC healthy.  Left: Sp mastectomy with implant in place.  Well-healing incisions.      I have independently reviewed her imaging and here are my findings:   No suspicious findings in the right breast on mammogram.      Assessment & Plan  Assessment:   1) 53 y.o. F with a diagnosis of left breast cancer: High grade, triple negative, invasive ductal carcinoma. She underwent  left modified radical mastectomy with prepectoral tissue expander placement on 1/24/23, pT1cN0, anatomic stage IA, prognostic stage IB.  She completed TC x4 on 5/3/2023.  2) She has a past history of left breast cancer in 2001 (age 30), stage IIB triple negative. She underwent a lumpectomy and axillary dissection, followed by AC and Taxotere, followed by radiation, then tamoxifen for 5 years.  3) She has metastatic thyroid cancer diagnosed in 2020 with stable bone metastasis.    Plan:  -Continue follow-up with Dr. Morse.  -Continue follow-up with lymphedema clinic.  -Follow-up in 6 months for clinical exam.    Shavonne Sharif MD      CC:  Khalid Z. Ghosheh, MD James Epley, APRN Lori Warren, MD

## 2023-12-19 ENCOUNTER — TELEPHONE (OUTPATIENT)
Dept: NEUROSURGERY | Facility: CLINIC | Age: 53
End: 2023-12-19
Payer: COMMERCIAL

## 2023-12-19 ENCOUNTER — SOCIAL WORK (OUTPATIENT)
Dept: PSYCHIATRY | Facility: HOSPITAL | Age: 53
End: 2023-12-19
Payer: COMMERCIAL

## 2023-12-19 DIAGNOSIS — D49.2 LUMBAR SPINE TUMOR: Primary | ICD-10-CM

## 2023-12-19 NOTE — PROGRESS NOTES
Oncology Social Work   [facility]ive Oncology Services            Chief Complaint: Anxiety, Sadness, Overwhelmed with emotions     Subjective  Patient ID: Ina Akers is a 52 y.o. female who presents to the [facility]cology Services ([facility]) clinic for initial consultation at the request of Tim Muñoz MD . Chart reviewed.  Patient was initially diagnosed with Right Breast Cancer in 2001 - s/p lumpectomy, underwent chemo and radiation.  In 2018 She was dx with Thyroid cancer and underwent right thyroid lobectomy .  In 2020 patient was diagnosed with *Metastatic thyroid cancer - she underwent a complete thyroidectomy on 11/9/2020. In 2022 she was diagnosed with Left breast cancer and underwent chemotherapy.  CT scan scheduled in November 2023.    At this time she is on Zometa for the bone mets. Patient met with [facility] on 10/4 for supportive therapy to process the last couple years and to navigate the many emotions she is experiencing.  On 11/8/2023 met with patient again for continued emotional support. Doing much better emotionally, coping has improved.  On 12/19/2023 met with patient - She is still recovering from reconstruction surgery. She is still on FMLA from work - hopes to return in Jan. 2. Continues to worry about work and the stress that it brings.      Objective   Mental Status Exam  Appearance:  clean and casually dressed, appropriate  Attitude toward clinician:  cooperative and agreeable   Speech:               Rate:  regular rate and rhythm              Volume:  normal  Motor:  no abnormal movements present  Mood:  Stable  Affect:  euthymic   Thought Processes:  linear, logical, and goal directed  Thought Content:  normal  Suicidal Thoughts:  absent  Homicidal Thoughts:  absent  Perceptual Disturbance: no perceptual disturbance  Attention and Concentration:  good  Insight and Judgement:  good  Memory:  memory appears to be intact        Plan of Care    Continue to see for supportive therapy.   Time spent today talking about transitioning to part time employment and ways to do this.   She reports feeling better in her spirit and is looking forward to time with family during the holidays.     Next appt - 1/24/2024 @ 10AM at   JEANNETTE Barber, LCSW

## 2023-12-19 NOTE — TELEPHONE ENCOUNTER
Caller: Ina Aekrs    Relationship to patient: Self    Best call back number:[phone]     Patient is needing:     PATIENT WANTING TO F/U WITH DR GAMBOA D/T INCREASE IN PAIN IN THE PAST FEW WEEKS IN LOWER SPINE/BUTTOCKS    DID NOT RETURN FOR F/U WITH DR GAMBOA D/T PATIENT HAD BREAST SX IN NOV 2023 AND ALSO IN JANUARY 2023.    IT IS NOTED DR GAMBOA WANTED PATIENT TO HAVE MRI, PATIENT REPORTS HAVING FULL BODY PET SCAN 10/6/22 @ [facility].( IN CHART)    PATIENT ASKING IF ADD'L IMAGING IS NEEDED PRIOR TO RETURNING FOR F/U.    PLEASE CALL TO ADVISE

## 2023-12-21 NOTE — TELEPHONE ENCOUNTER
I called patient left vm. MRI Lumbar ordered. Patient can call back to get added to Dr. Rios after MRI is completed next available.

## 2023-12-27 ENCOUNTER — INFUSION (OUTPATIENT)
Dept: ONCOLOGY | Facility: HOSPITAL | Age: 53
End: 2023-12-27
Payer: COMMERCIAL

## 2023-12-27 VITALS
WEIGHT: 173.4 LBS | SYSTOLIC BLOOD PRESSURE: 128 MMHG | BODY MASS INDEX: 32.76 KG/M2 | OXYGEN SATURATION: 96 % | TEMPERATURE: 97.3 F | DIASTOLIC BLOOD PRESSURE: 80 MMHG | HEART RATE: 87 BPM | RESPIRATION RATE: 16 BRPM

## 2023-12-27 DIAGNOSIS — Z17.1 MALIGNANT NEOPLASM OF UPPER-OUTER QUADRANT OF LEFT BREAST IN FEMALE, ESTROGEN RECEPTOR NEGATIVE: Primary | ICD-10-CM

## 2023-12-27 DIAGNOSIS — C50.412 MALIGNANT NEOPLASM OF UPPER-OUTER QUADRANT OF LEFT BREAST IN FEMALE, ESTROGEN RECEPTOR NEGATIVE: Primary | ICD-10-CM

## 2023-12-27 DIAGNOSIS — C79.51 METASTASIS TO BONE: ICD-10-CM

## 2023-12-27 LAB
ALBUMIN SERPL-MCNC: 4.1 G/DL (ref 3.5–5.2)
ALBUMIN/GLOB SERPL: 1.3 G/DL
ALP SERPL-CCNC: 73 U/L (ref 39–117)
ALT SERPL W P-5'-P-CCNC: 10 U/L (ref 1–33)
ANION GAP SERPL CALCULATED.3IONS-SCNC: 8.7 MMOL/L (ref 5–15)
AST SERPL-CCNC: 13 U/L (ref 1–32)
BASOPHILS # BLD AUTO: 0.03 10*3/MM3 (ref 0–0.2)
BASOPHILS NFR BLD AUTO: 0.6 % (ref 0–1.5)
BILIRUB SERPL-MCNC: 0.2 MG/DL (ref 0–1.2)
BUN SERPL-MCNC: 17 MG/DL (ref 6–20)
BUN/CREAT SERPL: 17.7 (ref 7–25)
CALCIUM SPEC-SCNC: 9.1 MG/DL (ref 8.6–10.5)
CHLORIDE SERPL-SCNC: 104 MMOL/L (ref 98–107)
CO2 SERPL-SCNC: 25.3 MMOL/L (ref 22–29)
CREAT SERPL-MCNC: 0.96 MG/DL (ref 0.57–1)
DEPRECATED RDW RBC AUTO: 49.5 FL (ref 37–54)
EGFRCR SERPLBLD CKD-EPI 2021: 70 ML/MIN/1.73
EOSINOPHIL # BLD AUTO: 0.46 10*3/MM3 (ref 0–0.4)
EOSINOPHIL NFR BLD AUTO: 9.1 % (ref 0.3–6.2)
ERYTHROCYTE [DISTWIDTH] IN BLOOD BY AUTOMATED COUNT: 15.7 % (ref 12.3–15.4)
GLOBULIN UR ELPH-MCNC: 3.1 GM/DL
GLUCOSE SERPL-MCNC: 99 MG/DL (ref 65–99)
HCT VFR BLD AUTO: 34.3 % (ref 34–46.6)
HGB BLD-MCNC: 10.4 G/DL (ref 12–15.9)
IMM GRANULOCYTES # BLD AUTO: 0.01 10*3/MM3 (ref 0–0.05)
IMM GRANULOCYTES NFR BLD AUTO: 0.2 % (ref 0–0.5)
LYMPHOCYTES # BLD AUTO: 1.99 10*3/MM3 (ref 0.7–3.1)
LYMPHOCYTES NFR BLD AUTO: 39.6 % (ref 19.6–45.3)
MAGNESIUM SERPL-MCNC: 2 MG/DL (ref 1.6–2.6)
MCH RBC QN AUTO: 26.6 PG (ref 26.6–33)
MCHC RBC AUTO-ENTMCNC: 30.3 G/DL (ref 31.5–35.7)
MCV RBC AUTO: 87.7 FL (ref 79–97)
MONOCYTES # BLD AUTO: 0.46 10*3/MM3 (ref 0.1–0.9)
MONOCYTES NFR BLD AUTO: 9.1 % (ref 5–12)
NEUTROPHILS NFR BLD AUTO: 2.08 10*3/MM3 (ref 1.7–7)
NEUTROPHILS NFR BLD AUTO: 41.4 % (ref 42.7–76)
NRBC BLD AUTO-RTO: 0 /100 WBC (ref 0–0.2)
PHOSPHATE SERPL-MCNC: 3.9 MG/DL (ref 2.5–4.5)
PLATELET # BLD AUTO: 327 10*3/MM3 (ref 140–450)
PMV BLD AUTO: 9.5 FL (ref 6–12)
POTASSIUM SERPL-SCNC: 3.4 MMOL/L (ref 3.5–5.2)
PROT SERPL-MCNC: 7.2 G/DL (ref 6–8.5)
RBC # BLD AUTO: 3.91 10*6/MM3 (ref 3.77–5.28)
SODIUM SERPL-SCNC: 138 MMOL/L (ref 136–145)
WBC NRBC COR # BLD AUTO: 5.03 10*3/MM3 (ref 3.4–10.8)

## 2023-12-27 PROCEDURE — 25010000002 ZOLEDRONIC ACID 4 MG/100ML SOLUTION: Performed by: INTERNAL MEDICINE

## 2023-12-27 PROCEDURE — 84100 ASSAY OF PHOSPHORUS: CPT | Performed by: INTERNAL MEDICINE

## 2023-12-27 PROCEDURE — 96374 THER/PROPH/DIAG INJ IV PUSH: CPT

## 2023-12-27 PROCEDURE — 80053 COMPREHEN METABOLIC PANEL: CPT | Performed by: INTERNAL MEDICINE

## 2023-12-27 PROCEDURE — 25810000003 SODIUM CHLORIDE 0.9 % SOLUTION: Performed by: INTERNAL MEDICINE

## 2023-12-27 PROCEDURE — 85025 COMPLETE CBC W/AUTO DIFF WBC: CPT | Performed by: INTERNAL MEDICINE

## 2023-12-27 PROCEDURE — 83735 ASSAY OF MAGNESIUM: CPT | Performed by: INTERNAL MEDICINE

## 2023-12-27 RX ORDER — ZOLEDRONIC ACID 0.04 MG/ML
4 INJECTION, SOLUTION INTRAVENOUS ONCE
Status: COMPLETED | OUTPATIENT
Start: 2023-12-27 | End: 2023-12-27

## 2023-12-27 RX ORDER — SODIUM CHLORIDE 9 MG/ML
250 INJECTION, SOLUTION INTRAVENOUS ONCE
Status: COMPLETED | OUTPATIENT
Start: 2023-12-27 | End: 2023-12-27

## 2023-12-27 RX ADMIN — SODIUM CHLORIDE 250 ML: 9 INJECTION, SOLUTION INTRAVENOUS at 09:00

## 2023-12-27 RX ADMIN — ZOLEDRONIC ACID 4 MG: 0.04 INJECTION, SOLUTION INTRAVENOUS at 09:07

## 2024-01-02 DIAGNOSIS — I10 ESSENTIAL HYPERTENSION: ICD-10-CM

## 2024-01-02 DIAGNOSIS — E78.5 HYPERLIPIDEMIA, UNSPECIFIED HYPERLIPIDEMIA TYPE: ICD-10-CM

## 2024-01-02 DIAGNOSIS — E55.9 VITAMIN D DEFICIENCY: ICD-10-CM

## 2024-01-02 DIAGNOSIS — E11.9 CONTROLLED TYPE 2 DIABETES MELLITUS WITHOUT COMPLICATION, WITHOUT LONG-TERM CURRENT USE OF INSULIN: ICD-10-CM

## 2024-01-02 NOTE — TELEPHONE ENCOUNTER
Rx Refill Note  Requested Prescriptions     Pending Prescriptions Disp Refills    atorvastatin (LIPITOR) 20 MG tablet 90 tablet 1     Sig: Take 1 tablet by mouth Daily.      Last office visit with prescribing clinician: 11/17/2023   Last telemedicine visit with prescribing clinician: Visit date not found   Next office visit with prescribing clinician: 2/9/2024                         Would you like a call back once the refill request has been completed: [] Yes [] No    If the office needs to give you a call back, can they leave a voicemail: [] Yes [] No    Farhan Lyle  01/02/24, 14:13 EST

## 2024-01-03 RX ORDER — ATORVASTATIN CALCIUM 20 MG/1
20 TABLET, FILM COATED ORAL DAILY
Qty: 90 TABLET | Refills: 0 | Status: SHIPPED | OUTPATIENT
Start: 2024-01-03

## 2024-01-19 ENCOUNTER — HOSPITAL ENCOUNTER (OUTPATIENT)
Dept: MRI IMAGING | Facility: HOSPITAL | Age: 54
Discharge: HOME | End: 2024-01-19
Admitting: NEUROLOGICAL SURGERY
Payer: COMMERCIAL

## 2024-01-19 DIAGNOSIS — D49.2 LUMBAR SPINE TUMOR: ICD-10-CM

## 2024-01-19 PROCEDURE — 72158 MRI LUMBAR SPINE W/O & W/DYE: CPT

## 2024-01-19 PROCEDURE — 0 GADOBENATE DIMEGLUMINE 529 MG/ML SOLUTION: Performed by: NEUROLOGICAL SURGERY

## 2024-01-19 PROCEDURE — A9577 INJ MULTIHANCE: HCPCS | Performed by: NEUROLOGICAL SURGERY

## 2024-01-19 RX ADMIN — GADOBENATE DIMEGLUMINE 16 ML: 529 INJECTION, SOLUTION INTRAVENOUS at 15:19

## 2024-01-24 ENCOUNTER — INFUSION (OUTPATIENT)
Dept: ONCOLOGY | Facility: HOSPITAL | Age: 54
End: 2024-01-24
Payer: COMMERCIAL

## 2024-01-24 VITALS
DIASTOLIC BLOOD PRESSURE: 91 MMHG | HEART RATE: 84 BPM | TEMPERATURE: 98 F | SYSTOLIC BLOOD PRESSURE: 141 MMHG | OXYGEN SATURATION: 99 % | WEIGHT: 172 LBS | HEIGHT: 61 IN | BODY MASS INDEX: 32.47 KG/M2

## 2024-01-24 DIAGNOSIS — Z17.1 MALIGNANT NEOPLASM OF UPPER-OUTER QUADRANT OF LEFT BREAST IN FEMALE, ESTROGEN RECEPTOR NEGATIVE: Primary | ICD-10-CM

## 2024-01-24 DIAGNOSIS — C79.51 METASTASIS TO BONE: ICD-10-CM

## 2024-01-24 DIAGNOSIS — C50.412 MALIGNANT NEOPLASM OF UPPER-OUTER QUADRANT OF LEFT BREAST IN FEMALE, ESTROGEN RECEPTOR NEGATIVE: Primary | ICD-10-CM

## 2024-01-24 LAB
ALBUMIN SERPL-MCNC: 4.1 G/DL (ref 3.5–5.2)
ALBUMIN/GLOB SERPL: 1.2 G/DL
ALP SERPL-CCNC: 65 U/L (ref 39–117)
ALT SERPL W P-5'-P-CCNC: 11 U/L (ref 1–33)
ANION GAP SERPL CALCULATED.3IONS-SCNC: 7.8 MMOL/L (ref 5–15)
AST SERPL-CCNC: 14 U/L (ref 1–32)
BASOPHILS # BLD AUTO: 0.02 10*3/MM3 (ref 0–0.2)
BASOPHILS NFR BLD AUTO: 0.4 % (ref 0–1.5)
BILIRUB SERPL-MCNC: 0.2 MG/DL (ref 0–1.2)
BUN SERPL-MCNC: 16 MG/DL (ref 6–20)
BUN/CREAT SERPL: 15.7 (ref 7–25)
CALCIUM SPEC-SCNC: 9.3 MG/DL (ref 8.6–10.5)
CHLORIDE SERPL-SCNC: 107 MMOL/L (ref 98–107)
CO2 SERPL-SCNC: 25.2 MMOL/L (ref 22–29)
CREAT SERPL-MCNC: 1.02 MG/DL (ref 0.57–1)
DEPRECATED RDW RBC AUTO: 49.2 FL (ref 37–54)
EGFRCR SERPLBLD CKD-EPI 2021: 65.1 ML/MIN/1.73
EOSINOPHIL # BLD AUTO: 0.23 10*3/MM3 (ref 0–0.4)
EOSINOPHIL NFR BLD AUTO: 5.1 % (ref 0.3–6.2)
ERYTHROCYTE [DISTWIDTH] IN BLOOD BY AUTOMATED COUNT: 15.4 % (ref 12.3–15.4)
GLOBULIN UR ELPH-MCNC: 3.3 GM/DL
GLUCOSE SERPL-MCNC: 112 MG/DL (ref 65–99)
HCT VFR BLD AUTO: 34 % (ref 34–46.6)
HGB BLD-MCNC: 10.3 G/DL (ref 12–15.9)
IMM GRANULOCYTES # BLD AUTO: 0.01 10*3/MM3 (ref 0–0.05)
IMM GRANULOCYTES NFR BLD AUTO: 0.2 % (ref 0–0.5)
LYMPHOCYTES # BLD AUTO: 1.49 10*3/MM3 (ref 0.7–3.1)
LYMPHOCYTES NFR BLD AUTO: 32.8 % (ref 19.6–45.3)
MAGNESIUM SERPL-MCNC: 1.8 MG/DL (ref 1.6–2.6)
MCH RBC QN AUTO: 26.5 PG (ref 26.6–33)
MCHC RBC AUTO-ENTMCNC: 30.3 G/DL (ref 31.5–35.7)
MCV RBC AUTO: 87.6 FL (ref 79–97)
MONOCYTES # BLD AUTO: 0.49 10*3/MM3 (ref 0.1–0.9)
MONOCYTES NFR BLD AUTO: 10.8 % (ref 5–12)
NEUTROPHILS NFR BLD AUTO: 2.3 10*3/MM3 (ref 1.7–7)
NEUTROPHILS NFR BLD AUTO: 50.7 % (ref 42.7–76)
NRBC BLD AUTO-RTO: 0 /100 WBC (ref 0–0.2)
PHOSPHATE SERPL-MCNC: 3.3 MG/DL (ref 2.5–4.5)
PLATELET # BLD AUTO: 341 10*3/MM3 (ref 140–450)
PMV BLD AUTO: 9.3 FL (ref 6–12)
POTASSIUM SERPL-SCNC: 3.9 MMOL/L (ref 3.5–5.2)
PROT SERPL-MCNC: 7.4 G/DL (ref 6–8.5)
RBC # BLD AUTO: 3.88 10*6/MM3 (ref 3.77–5.28)
SODIUM SERPL-SCNC: 140 MMOL/L (ref 136–145)
WBC NRBC COR # BLD AUTO: 4.54 10*3/MM3 (ref 3.4–10.8)

## 2024-01-24 PROCEDURE — 83735 ASSAY OF MAGNESIUM: CPT | Performed by: INTERNAL MEDICINE

## 2024-01-24 PROCEDURE — 96409 CHEMO IV PUSH SNGL DRUG: CPT

## 2024-01-24 PROCEDURE — 96374 THER/PROPH/DIAG INJ IV PUSH: CPT

## 2024-01-24 PROCEDURE — 84100 ASSAY OF PHOSPHORUS: CPT | Performed by: INTERNAL MEDICINE

## 2024-01-24 PROCEDURE — 80053 COMPREHEN METABOLIC PANEL: CPT | Performed by: INTERNAL MEDICINE

## 2024-01-24 PROCEDURE — 25810000003 SODIUM CHLORIDE 0.9 % SOLUTION: Performed by: INTERNAL MEDICINE

## 2024-01-24 PROCEDURE — 25010000002 ZOLEDRONIC ACID 4 MG/100ML SOLUTION: Performed by: INTERNAL MEDICINE

## 2024-01-24 PROCEDURE — 85025 COMPLETE CBC W/AUTO DIFF WBC: CPT | Performed by: INTERNAL MEDICINE

## 2024-01-24 RX ORDER — ZOLEDRONIC ACID 0.04 MG/ML
4 INJECTION, SOLUTION INTRAVENOUS ONCE
Status: COMPLETED | OUTPATIENT
Start: 2024-01-24 | End: 2024-01-24

## 2024-01-24 RX ORDER — SODIUM CHLORIDE 9 MG/ML
250 INJECTION, SOLUTION INTRAVENOUS ONCE
Status: COMPLETED | OUTPATIENT
Start: 2024-01-24 | End: 2024-01-24

## 2024-01-24 RX ADMIN — ZOLEDRONIC ACID 4 MG: 0.04 INJECTION, SOLUTION INTRAVENOUS at 09:05

## 2024-01-24 RX ADMIN — SODIUM CHLORIDE 250 ML: 9 INJECTION, SOLUTION INTRAVENOUS at 08:35

## 2024-01-31 ENCOUNTER — DOCUMENTATION (OUTPATIENT)
Dept: OTHER | Facility: HOSPITAL | Age: 54
End: 2024-01-31
Payer: COMMERCIAL

## 2024-01-31 NOTE — PROGRESS NOTES
Oncology Social Work   Supportive Oncology Services            Chief Complaint: Anxiety, Sadness, Overwhelmed with emotions     Subjective  Patient ID: Ina Akers is a 52 y.o. female who presents to the Supportive Oncology Services ([facility]) clinic for initial consultation at the request of Tim Muñoz MD . Chart reviewed.  Patient was initially diagnosed with Right Breast Cancer in 2001 - s/p lumpectomy, underwent chemo and radiation.  In 2018 She was dx with Thyroid cancer and underwent right thyroid lobectomy .  In 2020 patient was diagnosed with *Metastatic thyroid cancer - she underwent a complete thyroidectomy on 11/9/2020. In 2022 she was diagnosed with Left breast cancer and underwent chemotherapy.  CT scan scheduled in November 2023.    At this time she is on Zometa for the bone mets. Patient met with OSW on 10/4 for supportive therapy to process the last couple years and to navigate the many emotions she is experiencing.  On 11/8/2023 met with patient again for continued emotional support. Doing much better emotionally, coping has improved.  On 12/19/2023 met with patient - She is still recovering from reconstruction surgery. She is still on FMLA from work - hopes to return in Jan. 2. Continues to worry about work and the stress that it brings. OSW met with patient on 1/31 for continued support and therapy. Tearful while talking about relationship with daughters. Still searching for part time employment.      Objective   Mental Status Exam  Appearance:  clean and casually dressed, appropriate  Attitude toward clinician:  cooperative and agreeable   Speech:               Rate:  regular rate and rhythm              Volume:  normal  Motor:  no abnormal movements present  Mood:  Stable  Affect:  euthymic   Thought Processes:  linear, logical, and goal directed  Thought Content:  normal  Suicidal Thoughts:  absent  Homicidal Thoughts:  absent  Perceptual Disturbance: no perceptual  disturbance  Attention and Concentration:  good  Insight and Judgement:  good  Memory:  memory appears to be intact        Plan of Care    Continue to see for supportive therapy.  Time spent today talking about transitioning to part time employment and ways to do this. Also spent time talking about daughters, interpersonal relationships.      Next appt - Will call and schedule   Tia Florian, ANTONYW, LCSW

## 2024-02-06 ENCOUNTER — TELEPHONE (OUTPATIENT)
Dept: NEUROSURGERY | Facility: CLINIC | Age: 54
End: 2024-02-06
Payer: COMMERCIAL

## 2024-02-06 ENCOUNTER — HOSPITAL ENCOUNTER (OUTPATIENT)
Dept: PHYSICAL THERAPY | Facility: HOSPITAL | Age: 54
Setting detail: THERAPIES SERIES
Discharge: HOME | End: 2024-02-06
Payer: COMMERCIAL

## 2024-02-06 DIAGNOSIS — Z98.890 HISTORY OF LUMPECTOMY OF LEFT BREAST: ICD-10-CM

## 2024-02-06 DIAGNOSIS — Z91.89 AT RISK FOR LYMPHEDEMA: ICD-10-CM

## 2024-02-06 DIAGNOSIS — Z90.12 S/P LEFT MASTECTOMY: Primary | ICD-10-CM

## 2024-02-06 DIAGNOSIS — Z85.850 HISTORY OF THYROID CANCER: ICD-10-CM

## 2024-02-06 PROCEDURE — 93702 BIS XTRACELL FLUID ANALYSIS: CPT | Performed by: PHYSICAL THERAPIST

## 2024-02-06 PROCEDURE — 97110 THERAPEUTIC EXERCISES: CPT | Performed by: PHYSICAL THERAPIST

## 2024-02-06 PROCEDURE — 97535 SELF CARE MNGMENT TRAINING: CPT | Performed by: PHYSICAL THERAPIST

## 2024-02-06 NOTE — THERAPY PROGRESS REPORT/RE-CERT
"  Outpatient Physical Therapy Lymphedema Progress Note  [facility]     Patient Name: Ina Akers  : 1970  MRN: [id number]  Today's Date: 2024        Visit Date: 2024    Visit Dx:    ICD-10-CM ICD-9-CM   1. S/P left mastectomy  Z90.12 V45.71   2. At risk for lymphedema  Z91.89 V49.89   3. History of lumpectomy of left breast  Z98.890 V45.89   4. History of thyroid cancer  Z85.850 V10.87       Patient Active Problem List   Diagnosis    Abnormal perimenopausal bleeding    Amenorrhea    Atopic dermatitis    External hemorrhoids    Gastritis    Hypertension    Iron deficiency anemia    Irritable bowel syndrome    Melanocytic nevus    Goiter    Tinea corporis    Type 2 diabetes mellitus    Lipid screening    Malignant neoplasm of upper-outer quadrant of left breast in female, estrogen receptor negative    Acute pain of both knees    Controlled type 2 diabetes mellitus without complication, without long-term current use of insulin    Hyperlipidemia    Vitamin D deficiency    Multiple thyroid nodules    Subclinical hypothyroidism    Lumbar spine tumor    Metastasis to bone    Fracture, pathologic, femur, neck, right, initial encounter    Immunocompromised    Encounter for screening for malignant neoplasm of colon    Screen for colon cancer    COVID-19 virus infection    Gastroesophageal reflux disease    Primary cancer of thyroid gland metastatic to bone    Acute pain of left shoulder         Lymphedema       Row Name 24 1135             Subjective Pain    Able to rate subjective pain? yes  -JS      Pre-Treatment Pain Level 0  -JS         Subjective    Subjective Comments Reports wearing sleeve \"at least a few days a wee\"k. Denies complaints of pain and swelling.  Interested in obtaining new sleeve.  -JS         Lymphedema Assessment    Lymphedema Classification LUE:;at risk/stage 0  -JS      Lymphedema Cancer Related Sx left;modified radical mastectomy;lumpectomy;axillary dissection  -JS  "     Lymphedema Surgery Comments Surgery 11/30/23 for L expander removal/reconstruction, R breast reduction with Dr. Morse  -JS      Lymph Nodes Removed # --  repeat axillary dissection  -JS      Positive Lymph Nodes # 0  -JS      Chemo Received yes  -JS      Chemo Treatments #/Timeframe Completed  -JS      Radiation Therapy Received no  -JS      Infections or Cellulitis? no  -JS      Lymphedema Assessment Comments Prior chemo & radiation after 2001 surgery. Chemo following following L mastectomy 1/24/23 surgery  -JS         Right Upper Ext    Rt Shoulder Abduction AROM 160  -JS      Rt Shoulder Flexion AROM 155  -JS      Rt Shoulder External Rotation AROM THAIS T2  -JS      Rt Shoulder Internal Rotation AROM FIR T10  -JS         Left Upper Ext    Lt Shoulder Abduction AROM 160  -JS      Lt Shoulder Flexion AROM 160  -JS      Lt Shoulder External Rotation AROM THAIS T2  -JS      Lt Shoulder Internal Rotation AROM FIR T11  -JS         MMT (Manual Muscle Testing)    General MMT Comments B UE 4+/5  -JS         Lymphedema Edema Assessment    Edema Assessment Comment No visable swelling noted  -JS         Skin Changes/Observations    Skin Observations Comment Well -healed incision B breasts  -JS         L-Dex Bioimpedence Screening    L-Dex Measurement Extremity LUE  -JS      L-Dex Patient Position Standing  -JS      L-Dex UE Dominate Side Right  -JS      L-Dex UE At Risk Side Left  -JS      L-Dex UE Pre Surgical Value Yes  -JS      L-Dex UE Score -0.4  -JS      L-Dex UE Baseline Score 4.4  -JS      L-Dex UE Value Change -4.8  -JS      L-Dex UE Comment Significantly decreased & WNL.  -JS      $ L-Dex Charge yes  -JS                User Key  (r) = Recorded By, (t) = Taken By, (c) = Cosigned By      Initials Name Provider Type    Melani Ambrosio, PT Physical Therapist                                   PT Assessment/Plan       Row Name 02/06/24 6141          PT Assessment    Assessment Comments Pt is a 53 y/o female diagnosed  "with triple negative L breast CA s/p implant exchange surgery & R breast reduction surgery on 11/30/23. PMH includes L mastectomy with tissue expander placement and repeat axillary dissection on 1/24/23 with course of chemotherapy following procedure. history of prior lumpectomy and axillary dissection (2/9 lymph nodes) in 2001. At that time, pt received chemo and radiation. Further PMH includes thyroid CA, s/p thyroid surgery in 2018 & 2020, with additional R hip surgery and back surgery due to metastatic disease. Patient presents for follow-up assessment and repeat bioimpedance, using sleeve 2-3x/week per pt report. Repeat bioimpedance reveals score of -.4 which is significantly decreased and WNL.  Education provided in weaning from sleeve, while continued use during increased activity. Pt interested in obtaining an additional sleeve- will submit information to [employer] for [employer] size III, 20-30mm Hg sleeve.  Further education provided on HEP.  Pt has currently met 2/5 LTG's. Patient will benefit from skilled PT to address impairments and progress toward goals.  -JERMAINE        PT Plan    PT Plan Comments Pt to wean from UE compression sleeve as tolerated, though continue use during increased activity. Return in 1 month to ensure stability of bioimpedance score.  Review HEP adding further strengthening as tolerated (theraband HA, shoulder extension, D2 flexion). Therapist to submit information regarding sleeve to [employer] to determine coverage. Pending status at next visit, consider resuming 3 month follow up per protocol.  -JERMAINE               User Key  (r) = Recorded By, (t) = Taken By, (c) = Cosigned By      Initials Name Provider Type    Melani Ambrosio, PT Physical Therapist                        OP Exercises       Row Name 02/06/24 3245             Subjective    Subjective Comments Reports wearing sleeve \"at least a few days a wee\"k. Denies complaints of pain and swelling.  Interested in obtaining new sleeve.  -JS "         Subjective Pain    Able to rate subjective pain? yes  -JS      Pre-Treatment Pain Level 0  -JS         Total Minutes    27169 - PT Therapeutic Exercise Minutes 15  -JS         Exercise 1    Exercise Name 1 Post shoulder rolls  -JS      Reps 1 10  -JS         Exercise 2    Exercise Name 2 Scapular retraction  -JS      Reps 2 10  -JS         Exercise 4    Exercise Name 4 B shoulder ER- seated  -JS      Reps 4 10  -JS         Exercise 5    Exercise Name 5 UE wall slides  -JS      Cueing 5 Verbal;Demo  -JS      Reps 5 10  -JS         Exercise 6    Exercise Name 6 Theraband rows  -JS      Cueing 6 Verbal;Demo  -JS      Reps 6 10  -JS      Time 6 YTB  -JS         Exercise 7    Exercise Name 7 B scapular ER  -JS      Cueing 7 Verbal;Demo  -JS      Reps 7 10  -JS      Time 7 YTB  -JS                User Key  (r) = Recorded By, (t) = Taken By, (c) = Cosigned By      Initials Name Provider Type    Melani Ambrosio, PT Physical Therapist                                 PT OP Goals       Row Name 02/06/24 1135          Long Term Goals    LTG Date to Achieve 05/25/23  -JS     LTG 1 Pt will be independent in comprehensive HEP to allow ongoing symptom management.  -JS     LTG 1 Progress Ongoing  -JS     LTG 2 Pt will obtain properly fitted UE compression sleeve and verbalize appropriate wear schedule.  -JS     LTG 2 Progress Met  -JS     LTG 3 Pt will maintain L-Dex bioimpedance score WNL.  -JS     LTG 3 Progress Ongoing  -JS     LTG 3 Progress Comments -.4. WNL & significantly decreased from last visit  -JS     LTG 4 Pt will demonstrate normal L shoulder AROM.  -JS     LTG 4 Progress Met  -JS     LTG 5 Pt will score 10 or less on Quick Dash to return to previously reported level of function.  -JS     LTG 5 Progress Ongoing  -JS     LTG 5 Progress Comments Current score 18.18  -JS               User Key  (r) = Recorded By, (t) = Taken By, (c) = Cosigned By      Initials Name Provider Type    Melani Amrbosio, PT Physical  Therapist                    Therapy Education  Education Details: reviewed s/s of lymphedema, steps to prevention, bioimpedance results and interpretation, use of compression sleeve with recommendation to wean from sleeve over the next month while assessing status.  Pt to continue to use UE compression sleeve during increased activity, including increased activity/exercise and caring for/lifting 6 month old grandson. Reviewed HEP, adding initial strengthening exercises with HEP emailed to pt via [employer] [id number]  Given: Symptoms/condition management, Edema management  Program: Reinforced  How Provided: Verbal  Provided to: Patient  Level of Understanding: Verbalized  58222 - PT Self Care/Mgmt Minutes: 10    Outcome Measure Options: Quick DASH  Quick DASH  Open a tight or new jar.: Mild Difficulty  Do heavy household chores (e.g., wash walls, wash floors): Mild Difficulty  Carry a shopping bag or briefcase: No Difficulty  Wash your back: Mild Difficulty  Use a knife to cut food: Mild Difficulty  Recreational activities in which you take some force or impact through your arm, should or hand (e.g. golf, hammering, tennis, etc.): Moderate Difficulty  During the past week, to what extent has your arm, shoulder, or hand problem interfered with your normal social activites with family, friends, neighbors or groups?: Slightly  During the past week, were you limited in your work or other regular daily activities as a result of your arm, shoulder or hand problem?: Not limited at all  Arm, Shoulder, or hand pain: Mild  Tingling (pins and needles) in your arm, shoulder, or hand: None  During the past week, how much difficulty have you had sleeping because of the pain in your arm, shoulder or hand?: No difficulty  Number of Questions Answered: 11  Quick DASH Score: 18.18         Time Calculation:   Start Time: 1135  Stop Time: 1220  Time Calculation (min): 45 min  Timed Charges  00452 - PT Therapeutic Exercise Minutes:  15  06843 - PT Self Care/Mgmt Minutes: 10  Total Minutes  Timed Charges Total Minutes: 25   Total Minutes: 25   Therapy Charges for Today       Code Description Service Date Service Provider Modifiers Qty    33244593365 HC PT BIS XTRACELL FLUID ANALYSIS 2/6/2024 Melani Vieyra, PT  1    93227443903 HC PT THER PROC EA 15 MIN 2/6/2024 Melani Vieyra, PT GP 1    25380632400 HC PT SELF CARE/MGMT/TRAIN EA 15 MIN 2/6/2024 Melani Vieyra, PT GP 1            PT G-Codes  Outcome Measure Options: Quick DASH  Quick DASH Score: 18.18         Melani Vieyra PT  2/6/2024

## 2024-02-06 NOTE — TELEPHONE ENCOUNTER
Patient has an appointment on 2/28 with Dr. Rios, she will not be able to keep that appointment she wanted to reschedule for either the first or second week in March I told her that I show next available on 3/18 she wanted me to give her information to you to see if you could get her in the first or second week of March she said anytime would work or if she can schedule a video visit instead that would be fine. Please call patient and advise thanks!       I left the 2/28 appt. in the system just in case I needed to for rescheduling purposes.

## 2024-02-21 ENCOUNTER — INFUSION (OUTPATIENT)
Dept: ONCOLOGY | Facility: HOSPITAL | Age: 54
End: 2024-02-21
Payer: COMMERCIAL

## 2024-02-21 VITALS
WEIGHT: 173.2 LBS | DIASTOLIC BLOOD PRESSURE: 76 MMHG | HEART RATE: 79 BPM | TEMPERATURE: 98.2 F | BODY MASS INDEX: 32.73 KG/M2 | SYSTOLIC BLOOD PRESSURE: 122 MMHG | OXYGEN SATURATION: 97 % | RESPIRATION RATE: 16 BRPM

## 2024-02-21 DIAGNOSIS — C50.412 MALIGNANT NEOPLASM OF UPPER-OUTER QUADRANT OF LEFT BREAST IN FEMALE, ESTROGEN RECEPTOR NEGATIVE: Primary | ICD-10-CM

## 2024-02-21 DIAGNOSIS — Z17.1 MALIGNANT NEOPLASM OF UPPER-OUTER QUADRANT OF LEFT BREAST IN FEMALE, ESTROGEN RECEPTOR NEGATIVE: Primary | ICD-10-CM

## 2024-02-21 DIAGNOSIS — C79.51 METASTASIS TO BONE: ICD-10-CM

## 2024-02-21 LAB
ALBUMIN SERPL-MCNC: 4.3 G/DL (ref 3.5–5.2)
ALBUMIN/GLOB SERPL: 1.5 G/DL
ALP SERPL-CCNC: 67 U/L (ref 39–117)
ALT SERPL W P-5'-P-CCNC: 11 U/L (ref 1–33)
ANION GAP SERPL CALCULATED.3IONS-SCNC: 9.1 MMOL/L (ref 5–15)
AST SERPL-CCNC: 17 U/L (ref 1–32)
BASOPHILS # BLD AUTO: 0.03 10*3/MM3 (ref 0–0.2)
BASOPHILS NFR BLD AUTO: 0.6 % (ref 0–1.5)
BILIRUB SERPL-MCNC: 0.3 MG/DL (ref 0–1.2)
BUN SERPL-MCNC: 16 MG/DL (ref 6–20)
BUN/CREAT SERPL: 17.4 (ref 7–25)
CALCIUM SPEC-SCNC: 9.3 MG/DL (ref 8.6–10.5)
CHLORIDE SERPL-SCNC: 105 MMOL/L (ref 98–107)
CO2 SERPL-SCNC: 23.9 MMOL/L (ref 22–29)
CREAT SERPL-MCNC: 0.92 MG/DL (ref 0.57–1)
DEPRECATED RDW RBC AUTO: 48.5 FL (ref 37–54)
EGFRCR SERPLBLD CKD-EPI 2021: 73.7 ML/MIN/1.73
EOSINOPHIL # BLD AUTO: 0.29 10*3/MM3 (ref 0–0.4)
EOSINOPHIL NFR BLD AUTO: 6.2 % (ref 0.3–6.2)
ERYTHROCYTE [DISTWIDTH] IN BLOOD BY AUTOMATED COUNT: 15.5 % (ref 12.3–15.4)
GLOBULIN UR ELPH-MCNC: 2.9 GM/DL
GLUCOSE SERPL-MCNC: 103 MG/DL (ref 65–99)
HCT VFR BLD AUTO: 33.6 % (ref 34–46.6)
HGB BLD-MCNC: 10.3 G/DL (ref 12–15.9)
IMM GRANULOCYTES # BLD AUTO: 0.01 10*3/MM3 (ref 0–0.05)
IMM GRANULOCYTES NFR BLD AUTO: 0.2 % (ref 0–0.5)
LYMPHOCYTES # BLD AUTO: 1.52 10*3/MM3 (ref 0.7–3.1)
LYMPHOCYTES NFR BLD AUTO: 32.5 % (ref 19.6–45.3)
MAGNESIUM SERPL-MCNC: 2.1 MG/DL (ref 1.6–2.6)
MCH RBC QN AUTO: 26.3 PG (ref 26.6–33)
MCHC RBC AUTO-ENTMCNC: 30.7 G/DL (ref 31.5–35.7)
MCV RBC AUTO: 85.9 FL (ref 79–97)
MONOCYTES # BLD AUTO: 0.47 10*3/MM3 (ref 0.1–0.9)
MONOCYTES NFR BLD AUTO: 10.1 % (ref 5–12)
NEUTROPHILS NFR BLD AUTO: 2.35 10*3/MM3 (ref 1.7–7)
NEUTROPHILS NFR BLD AUTO: 50.4 % (ref 42.7–76)
NRBC BLD AUTO-RTO: 0 /100 WBC (ref 0–0.2)
PHOSPHATE SERPL-MCNC: 4 MG/DL (ref 2.5–4.5)
PLATELET # BLD AUTO: 311 10*3/MM3 (ref 140–450)
PMV BLD AUTO: 9.2 FL (ref 6–12)
POTASSIUM SERPL-SCNC: 3.7 MMOL/L (ref 3.5–5.2)
PROT SERPL-MCNC: 7.2 G/DL (ref 6–8.5)
RBC # BLD AUTO: 3.91 10*6/MM3 (ref 3.77–5.28)
SODIUM SERPL-SCNC: 138 MMOL/L (ref 136–145)
WBC NRBC COR # BLD AUTO: 4.67 10*3/MM3 (ref 3.4–10.8)

## 2024-02-21 PROCEDURE — 85025 COMPLETE CBC W/AUTO DIFF WBC: CPT | Performed by: INTERNAL MEDICINE

## 2024-02-21 PROCEDURE — 84100 ASSAY OF PHOSPHORUS: CPT | Performed by: INTERNAL MEDICINE

## 2024-02-21 PROCEDURE — 25010000002 ZOLEDRONIC ACID 4 MG/100ML SOLUTION: Performed by: INTERNAL MEDICINE

## 2024-02-21 PROCEDURE — 96374 THER/PROPH/DIAG INJ IV PUSH: CPT

## 2024-02-21 PROCEDURE — 80053 COMPREHEN METABOLIC PANEL: CPT | Performed by: INTERNAL MEDICINE

## 2024-02-21 PROCEDURE — 83735 ASSAY OF MAGNESIUM: CPT | Performed by: INTERNAL MEDICINE

## 2024-02-21 PROCEDURE — 25810000003 SODIUM CHLORIDE 0.9 % SOLUTION: Performed by: INTERNAL MEDICINE

## 2024-02-21 RX ORDER — ZOLEDRONIC ACID 0.04 MG/ML
4 INJECTION, SOLUTION INTRAVENOUS ONCE
Status: COMPLETED | OUTPATIENT
Start: 2024-02-21 | End: 2024-02-21

## 2024-02-21 RX ORDER — SODIUM CHLORIDE 9 MG/ML
250 INJECTION, SOLUTION INTRAVENOUS ONCE
Status: COMPLETED | OUTPATIENT
Start: 2024-02-21 | End: 2024-02-21

## 2024-02-21 RX ADMIN — SODIUM CHLORIDE 250 ML: 9 INJECTION, SOLUTION INTRAVENOUS at 07:45

## 2024-02-21 RX ADMIN — ZOLEDRONIC ACID 4 MG: 0.04 INJECTION, SOLUTION INTRAVENOUS at 08:45

## 2024-03-04 NOTE — PROGRESS NOTES
Subjective   History of Present Illness: Ina Akers is a 53 y.o. female is here today for follow-up. MRI Lumbar 1/19/24. She is s/p ostiocool ablation/kyphoplasty of an L4 lesion on 9/17/20.  The L4 lesion was found to be metastasis from metastatic thyroid cancer.  She has since been treated and in remission.  She reports that she more recently developed a new left breast cancer.  She has been treated for this with surgery and chemo therapy.  She is doing well today.  Denies any changes in strength or sensation.  Denies any significant worsening of her back pain.    History of Present Illness    The following portions of the patient's history were reviewed and updated as appropriate: allergies, current medications, past family history, past medical history, past social history, past surgical history, and problem list.    Past Medical History:   Diagnosis Date    Anemia     History of recurent iron deficiency    Cancer of breast 10/2022    NEW CANCER LEFT BREAST    Compression fracture of fourth lumbar vertebra     RECENT KYPHOPLASTY 9/2020    COVID-19 virus infection 12/26/2021    Diabetes mellitus     Disease of thyroid gland     Diverticulosis     GERD (gastroesophageal reflux disease)     History of breast cancer 2001    LEFT BREAST MASTECTOMY, CHEMO AND RADIATION     History of cancer chemotherapy     MAY 2002.  REPEAT CHEMO MARCH X4 CYCLES D/T NEW LEFT BREAST CANCER    History of gastritis     History of radiation therapy     BREAST 2002, OCT 2020 SPINE    History of thyroid nodule 2018    cancer 2020    Hyperlipidemia     Hypertension     Metastatic bone cancer     RIB, HIP - THYROID METS    PONV (postoperative nausea and vomiting)         Past Surgical History:   Procedure Laterality Date    BILATERAL BREAST REDUCTION Right 11/30/2023    Procedure: RIGHT BREAST REDUCTION FOR SYMMETRY. REMOVAL OF RIGHT CHEST PORT;  Surgeon: Yony Morse MD;  Location: [facility];  Service: Plastics;   Laterality: Right;    BREAST LUMPECTOMY Left     BREAST RECONSTRUCTION Left 2023    Procedure: LEFT PREPECTORAL PLACEMENT TISSUE EXPANDER AND ALLODERM;  Surgeon: Yony Morse MD;  Location: [facility] MAIN OR;  Service: Plastics;  Laterality: Left;    BREAST TISSUE EXPANDER REMOVAL INSERTION OF IMPLANT Left 2023    Procedure: LEFT REMOVAL TISSUE EXPANDER AND PLACEMENT OF IMPLANT;  Surgeon: Yony Morse MD;  Location: [facility] MAIN OR;  Service: Plastics;  Laterality: Left;     SECTION      COLONOSCOPY N/A 2021    Procedure: COLONOSCOPY TO CECUM AND TI;  Surgeon: Jefferson Newman MD;  Location: [facility] ENDOSCOPY;  Service: Gastroenterology;  Laterality: N/A;  PRE:SCREENING  POST:DIVERTICULOSIS AND HEMORRHOIDS    ENDOSCOPY N/A 2022    Procedure: ESOPHAGOGASTRODUODENOSCOPY WITH COLD BIOPSIES;  Surgeon: Jefferson Newman MD;  Location: [facility] ENDOSCOPY;  Service: Gastroenterology;  Laterality: N/A;  PRE: iron deficiency anemia  POST: HIATAL HERNIA    FEMUR IM NAILING/RODDING Right 10/23/2020    Procedure: HIP INTERTROCHCHANTERIC NAILING;  Surgeon: Gretchen Connell MD;  Location: [facility] OR;  Service: Orthopedics;  Laterality: Right;    KYPHOPLASTY N/A 2020    Procedure: Lumbar 4  osteo cool radiofrequency ablation and KYPHOPLASTY;  Surgeon: Vik Rios MD;  Location: [facility] HYBRID OR ;  Service: Neurosurgery;  Laterality: N/A;    MASTECTOMY W/ SENTINEL NODE BIOPSY Left 2023    Procedure: LEFT MODIFIED RADICAL MASTECTOMY;  Surgeon: Shavonne Sharif MD;  Location: [facility] MAIN OR;  Service: General;  Laterality: Left;    MASTECTOMY, PARTIAL Left     RIB BIOPSY  10/2020    SPINE SURGERY      THYROIDECTOMY Left     THYROIDECTOMY, PARTIAL Right     VENOUS ACCESS DEVICE (PORT) INSERTION Right 2023    Procedure: INSERTION VENOUS ACCESS DEVICE;  Surgeon: Shavonne Sharif MD;  Location: [facility] MAIN OR;  Service:  General;  Laterality: Right;          Current Outpatient Medications:     acetaminophen (TYLENOL) 500 MG tablet, Take 2 tablets by mouth Every 6 (Six) Hours As Needed for Mild Pain., Disp: , Rfl:     amLODIPine (NORVASC) 10 MG tablet, TAKE ONE TABLET BY MOUTH DAILY (Patient taking differently: Take 1 tablet by mouth Daily.), Disp: 90 tablet, Rfl: 1    atorvastatin (LIPITOR) 20 MG tablet, Take 1 tablet by mouth Daily., Disp: 90 tablet, Rfl: 3    Blood Glucose Monitoring Suppl w/Device kit, Check blood sugar twice a day, Disp: 1 each, Rfl: 0    cholecalciferol (VITAMIN D3) 25 MCG (1000 UT) tablet, Take 1 tablet by mouth Daily., Disp: , Rfl:     famotidine (PEPCID) 20 MG tablet, Take 1 tablet by mouth Every Other Day., Disp: , Rfl:     Glucose Blood (BLOOD GLUCOSE TEST) strip, Check blood sugar twice a day., Disp: 100 each, Rfl: 1    labetalol (NORMODYNE) 100 MG tablet, Take 0.5 tablets by mouth 2 (Two) Times a Day., Disp: 180 tablet, Rfl: 3    Lancets (ACCU-CHEK SOFT TOUCH) lancets, Check blood sugar twice a day as needed, Disp: 100 each, Rfl: 1    lansoprazole (PREVACID) 30 MG capsule, Take 1 capsule by mouth Daily. (Patient taking differently: Take 1 capsule by mouth Every Other Day.), Disp: 90 capsule, Rfl: 0    levothyroxine (SYNTHROID, LEVOTHROID) 112 MCG tablet, Take 1 tablet by mouth Every Morning., Disp: , Rfl:     losartan (COZAAR) 100 MG tablet, Take 1 tablet by mouth Daily., Disp: 90 tablet, Rfl: 3    metFORMIN ER (GLUCOPHAGE-XR) 500 MG 24 hr tablet, Take 2 tablets by mouth Daily With Dinner., Disp: 180 tablet, Rfl: 3    NON FORMULARY, Take 1 tablet by mouth Every Night. STOOL SOFTNER, Disp: , Rfl:     promethazine (PHENERGAN) 12.5 MG tablet, TAKE 1/2 TO 1 TABLET BY MOUTH EVERY 4 HOURS AS NEEDED FOR NAUSEA AND VOMITING -MAY MAKE DROWSY (Patient taking differently: Take 1-2 tablets by mouth Every 6 (Six) Hours As Needed. TAKE 1/2 TO 1 TABLET BY MOUTH EVERY 4 HOURS AS NEEDED FOR NAUSEA AND VOMITING -MAY MAKE  DROWSY), Disp: 30 tablet, Rfl: 3    triamcinolone (KENALOG) 0.1 % cream, Apply  topically to the appropriate area as directed 2 (Two) Times a Day. Sparingly shortest time possible avoid face (Patient taking differently: Apply 1 Application topically to the appropriate area as directed As Needed. Sparingly shortest time possible avoid face), Disp: 80 g, Rfl: 0    acetaminophen (TYLENOL) 325 MG tablet, Take 2 tablets by mouth Every 4 (Four) Hours As Needed for Mild Pain., Disp: 60 tablet, Rfl: 0    cyclobenzaprine (FLEXERIL) 5 MG tablet, Take 1 tablet by mouth At Night As Needed for Muscle Spasms. 1 or 2 tablets by mouth at bedtime if needed for leg cramps, Disp: 20 tablet, Rfl: 2     Allergies   Allergen Reactions    Monoclonal Antibodies Anaphylaxis and Shortness Of Breath     Regeneron,  High b/p increased heart rate     Zofran [Ondansetron] Nausea And Vomiting     DOES NOT HELP WITH NAUSEA AND VOMITING MAKES IT WORSE    Hydrocodone Nausea And Vomiting    Lenvatinib Unknown - Low Severity     Internal bleeding        Social History     Socioeconomic History    Marital status:      Spouse name: Raghav    Number of children: 2   Tobacco Use    Smoking status: Never    Smokeless tobacco: Never   Vaping Use    Vaping status: Never Used   Substance and Sexual Activity    Alcohol use: Yes     Comment: 1 drink monthly    Drug use: Never    Sexual activity: Defer        Family History   Problem Relation Age of Onset    No Known Problems Mother     Cancer Father         Oral    Hypertension Other     Diabetes Other     Malig Hyperthermia Neg Hx         Review of Systems   Eyes:  Negative for visual disturbance.   Gastrointestinal:  Negative for constipation, diarrhea and nausea.   Genitourinary:  Negative for difficulty urinating.   Musculoskeletal:  Positive for back pain (left sided lower back pain).   Neurological:  Negative for dizziness, light-headedness and numbness.       Objective     Vitals:    03/06/24 1333  "  BP: 122/64   Pulse: 90   Temp: 96.2 °F (35.7 °C)   SpO2: 99%   Weight: 80.7 kg (178 lb)   Height: 154.9 cm (61\")     Body mass index is 33.63 kg/m².      Physical Exam  Neurologic Exam  Awake, alert, oriented x3  Pupils equal round reactive to light  Extraocular muscles intact  Face symmetric  Motor exam  Bilateral deltoids 5/5, bilateral biceps 5/5, bilateral triceps 5/5, bilateral wrist extension 5/5 bilateral hand  5/5  Bilateral hip flexion 5/5, bilateral knee extension 5/5, bilateral DF/PF 5/5  No clonus  No Alethea's reflex  Steady normal gait  Able to detect  light touch in all 4 extremities          Assessment & Plan   Independent Review of Radiographic Studies:      I personally reviewed the images from the following studies.  MRI lumbar spine from 2024, CT head with and without contrast from 2023, MRI lumbar spine from May 8, 2021  The MRI images were reviewed.  There is no evidence of new mass lesion.  No evidence of severe canal or neuroforaminal stenosis.  There are no obvious intracranial mass lesions    Medical Decision Makin-year-old female s/p osteo cool ablation/kyphoplasty of an L4 lesion on 2020  -She is doing well today.  At the time of the ablation she was diagnosed with metastatic thyroid cancer.  More recently she has been diagnosed with new breast cancer and underwent surgery and chemotherapy.  The follow-up MRI of her lumbar spine shows no evidence of new lesions.  She denies any significant worsening of her back pain.  She has no new neurologic symptoms.  The CT of her head shows no obvious intracranial lesions and no obvious expansile calvarial lesions.  I will plan to have her follow-up as needed with any new neurologic complaints.  She is scheduled to have a CT of her lumbar spine in [place] in April.  She would like to bring by the images for me to review in April.    Diagnoses and all orders for this visit:    1. Lumbar spine tumor " (Primary)    2. Skull lesion      Return if symptoms worsen or fail to improve.  I spent 40 minutes reviewing the medical record, reviewing the MRI images, reviewing the CT images, discussing the management of calvarial lesions, discussing the management of metastatic vertebral body lesions

## 2024-03-05 ENCOUNTER — HOSPITAL ENCOUNTER (OUTPATIENT)
Dept: PHYSICAL THERAPY | Facility: HOSPITAL | Age: 54
Setting detail: THERAPIES SERIES
Discharge: HOME | End: 2024-03-05
Payer: COMMERCIAL

## 2024-03-05 DIAGNOSIS — Z85.850 HISTORY OF THYROID CANCER: ICD-10-CM

## 2024-03-05 DIAGNOSIS — Z98.890 HISTORY OF LUMPECTOMY OF LEFT BREAST: ICD-10-CM

## 2024-03-05 DIAGNOSIS — Z90.12 S/P LEFT MASTECTOMY: Primary | ICD-10-CM

## 2024-03-05 DIAGNOSIS — Z17.1 MALIGNANT NEOPLASM OF UPPER-OUTER QUADRANT OF LEFT BREAST IN FEMALE, ESTROGEN RECEPTOR NEGATIVE: ICD-10-CM

## 2024-03-05 DIAGNOSIS — C50.412 MALIGNANT NEOPLASM OF UPPER-OUTER QUADRANT OF LEFT BREAST IN FEMALE, ESTROGEN RECEPTOR NEGATIVE: ICD-10-CM

## 2024-03-05 DIAGNOSIS — Z91.89 AT RISK FOR LYMPHEDEMA: ICD-10-CM

## 2024-03-05 PROCEDURE — 97535 SELF CARE MNGMENT TRAINING: CPT | Performed by: PHYSICAL THERAPIST

## 2024-03-05 PROCEDURE — 93702 BIS XTRACELL FLUID ANALYSIS: CPT | Performed by: PHYSICAL THERAPIST

## 2024-03-05 PROCEDURE — 97110 THERAPEUTIC EXERCISES: CPT | Performed by: PHYSICAL THERAPIST

## 2024-03-05 NOTE — THERAPY PROGRESS REPORT/RE-CERT
Outpatient Physical Therapy Lymphedema Progress Note  [facility]     Patient Name: Ina Akers  : 1970  MRN: [id number]  Today's Date: 3/5/2024        Visit Date: 2024    Visit Dx:    ICD-10-CM ICD-9-CM   1. S/P left mastectomy  Z90.12 V45.71   2. At risk for lymphedema  Z91.89 V49.89   3. History of lumpectomy of left breast  Z98.890 V45.89   4. History of thyroid cancer  Z85.850 V10.87   5. Malignant neoplasm of upper-outer quadrant of left breast in female, estrogen receptor negative  C50.412 174.4    Z17.1 V86.1       Patient Active Problem List   Diagnosis    Abnormal perimenopausal bleeding    Amenorrhea    Atopic dermatitis    External hemorrhoids    Gastritis    Hypertension    Iron deficiency anemia    Irritable bowel syndrome    Melanocytic nevus    Goiter    Tinea corporis    Type 2 diabetes mellitus    Lipid screening    Malignant neoplasm of upper-outer quadrant of left breast in female, estrogen receptor negative    Acute pain of both knees    Controlled type 2 diabetes mellitus without complication, without long-term current use of insulin    Hyperlipidemia    Vitamin D deficiency    Multiple thyroid nodules    Subclinical hypothyroidism    Lumbar spine tumor    Metastasis to bone    Fracture, pathologic, femur, neck, right, initial encounter    Immunocompromised    Encounter for screening for malignant neoplasm of colon    Screen for colon cancer    COVID-19 virus infection    Gastroesophageal reflux disease    Primary cancer of thyroid gland metastatic to bone    Acute pain of left shoulder         Lymphedema       Row Name 24 1145             Subjective Pain    Able to rate subjective pain? yes  -JS      Pre-Treatment Pain Level 0  -JS         Subjective    Subjective Comments Denies pain, swelling. Had weaned down to 2-3x/week with sleeve, last week without use of sleeve. Retired from [employer], will now be taking care of 7 mo grandchild and helping with her  grandmother, including possibly helping with her move.  -JS         Lymphedema Assessment    Lymphedema Classification LUE:;at risk/stage 0  -JS      Lymphedema Cancer Related Sx left;modified radical mastectomy;lumpectomy;axillary dissection  -JS      Lymphedema Surgery Comments Surgery 11/30/23 for L expander removal/reconstruction, R breast reduction with Dr. Morse  -JS      Lymph Nodes Removed # --  repeat ax dissection  -JS      Positive Lymph Nodes # 0  -JS         General ROM    GENERAL ROM COMMENTS B shoulder AROM WFL with mild axillary tightness endrange shoulder flexion  -JS         Lymphedema Edema Assessment    Edema Assessment Comment Slight increased L proximal wrist girth compared to opp side- pt denies change and feels this is her baseline.  -JS         Compression/Skin Care    Compression/Skin Care Comments Received new sleeve from [employer]. Reviewed approriate use (see education)  -         L-Dex Bioimpedence Screening    L-Dex Measurement Extremity LUE  -      L-Dex Patient Position Standing  -JS      L-Dex UE Dominate Side Right  -JS      L-Dex UE At Risk Side Left  -JS      L-Dex UE Pre Surgical Value Yes  -      L-Dex UE Score 2.3  -JS      L-Dex UE Baseline Score 4.4  -JS      L-Dex UE Value Change -2.1  -JS      L-Dex UE Comment WNL & below baseline. Increased from last visit  -JS      $ L-Dex Charge yes  -JS                User Key  (r) = Recorded By, (t) = Taken By, (c) = Cosigned By      Initials Name Provider Type    Melani Ambrosio, PT Physical Therapist                                   PT Assessment/Plan       Row Name 03/05/24 4623          PT Assessment    Assessment Comments Pt is a 51 y/o female diagnosed with triple negative L breast CA s/p implant exchange surgery & R breast reduction surgery on 11/30/23. PMH includes L mastectomy with tissue expander placement and repeat axillary dissection on 1/24/23 with course of chemotherapy following procedure. history of prior  lumpectomy and axillary dissection (2/9 lymph nodes) in 2001. At that time, pt received chemo and radiation. Further PMH includes thyroid CA, s/p thyroid surgery in 2018 & 2020, with additional R hip surgery and back surgery due to metastatic disease. Patient presents for follow-up assessment and repeat bioimpedance, using sleeve 2-3x/week for the past few weeks, weaning to no use at all during the past week per pt report. Repeat bioimpedance reveals score of 2.3 which is WNL, below baseline though increased from last visit. Patient obtained new sleeve from [employer] & review appropriate use of compression sleeve during increased activity/exercise. Patient demonstrates normal shoulder AROM. Updated and reviewed HEP. Further education provided in resuming consistent exercise program, including gradual walking program and consideration of joining [employer] program at [facility]. Patient will benefit from skilled PT. Due to current status and good progress, pt scheduled for return in 3 months per protocol.  -JS        PT Plan    Predicted Duration of Therapy Intervention (PT) Repeat bioimpedance in 3 months  -JS     PT Plan Comments Repeat bioimpedance in 3 months. Reassess Quick Dash. Inquire if patient has resumed consistent exercise program.  -JS               User Key  (r) = Recorded By, (t) = Taken By, (c) = Cosigned By      Initials Name Provider Type    Melani Ambrosio, PT Physical Therapist                        OP Exercises       Row Name 03/05/24 1200 03/05/24 7987          Subjective    Subjective Comments -- Denies pain, swelling. Had weaned down to 2-3x/week with sleeve, last week without use of sleeve. Retired from [employer] job, will now be taking care of 7 mo grandchild and helping with her grandmother, including possibly helping with her move.  -JERMAINE        Subjective Pain    Able to rate subjective pain? -- yes  -JS     Pre-Treatment Pain Level -- 0  -JS        Exercise 1    Exercise Name 1 --   -JS Post shoulder rolls  -JS     Cueing 1 --  -JS Verbal;Demo  -JS     Reps 1 --  -JS 10  -JS        Exercise 2    Exercise Name 2 --  -JS Scapular retraction  -JS     Cueing 2 --  -JS Verbal;Demo  -JS     Reps 2 --  -JS 10  -JS        Exercise 4    Exercise Name 4 --  -JS --     Cueing 4 --  -JS --     Reps 4 --  -JS --        Exercise 5    Exercise Name 5 --  -JS UE wall slides  -JS     Cueing 5 --  -JS Verbal;Demo  -JS     Reps 5 --  -JS 10  -JS        Exercise 6    Exercise Name 6 --  -JS Theraband rows  -JS     Cueing 6 --  -JS Verbal;Demo  -JS     Reps 6 --  -JS 10  -JS     Time 6 --  -JS YTB  -JS        Exercise 7    Exercise Name 7 --  -JS B shoulder ER  -JS     Cueing 7 --  -JS Verbal;Demo  -JS     Reps 7 --  -JS 10  -JS     Time 7 --  -JS YTB  -JS        Exercise 8    Exercise Name 8 --  -JS B shoulder ext  -JS     Cueing 8 --  -JS Verbal;Demo  -JS     Reps 8 --  -JS 10  -JS     Time 8 --  -JS YTB  -JS        Exercise 9    Exercise Name 9 --  -JS Standing shoulder HA  -JS     Cueing 9 -- Verbal;Demo  -JS     Reps 9 --  -JS 10  -JS     Time 9 --  -JS YTB  -JS        Exercise 10    Exercise Name 10 -- Discussed gradually resuming walking for exercise  -JS               User Key  (r) = Recorded By, (t) = Taken By, (c) = Cosigned By      Initials Name Provider Type    Melani Ambrosio, PT Physical Therapist                                 PT OP Goals       Row Name 03/05/24 1145          Long Term Goals    LTG Date to Achieve 05/25/23  -JS     LTG 1 Pt will be independent in comprehensive HEP to allow ongoing symptom management.  -JS     LTG 1 Progress Ongoing  -JS     LTG 2 Pt will obtain properly fitted UE compression sleeve and verbalize appropriate wear schedule.  -JS     LTG 2 Progress Met  -JS     LTG 3 Pt will maintain L-Dex bioimpedance score WNL.  -JS     LTG 3 Progress Ongoing  -JS     LTG 3 Progress Comments 2.3. WNL and below baseline (increased from last visit)  -JS     LTG 4 Pt will demonstrate  normal L shoulder AROM.  -     LTG 4 Progress Met  -     LTG 5 Pt will score 10 or less on Quick Dash to return to previously reported level of function.  -     LTG 5 Progress Ongoing  -               User Key  (r) = Recorded By, (t) = Taken By, (c) = Cosigned By      Initials Name Provider Type    Melani Ambrosio, PT Physical Therapist                    Therapy Education  Education Details: reviewed s/s of lymphedema, steps to prevention, bioimpedance results and interpretation, use of compression sleeve during increased activity/exercise (including caring for grandson, assisting family with move and during exercise. Encouraged pt to initiate gradual walking program for exercise.  Given: Symptoms/condition management, Edema management  Program: Reinforced  How Provided: Verbal  Provided to: Patient  Level of Understanding: Verbalized  93707 - PT Self Care/Mgmt Minutes: 10              Time Calculation:   Start Time: 1145  Stop Time: 1230  Time Calculation (min): 45 min  Timed Charges  38331 - PT Self Care/Mgmt Minutes: 10  Total Minutes  Timed Charges Total Minutes: 10   Total Minutes: 10   Therapy Charges for Today       Code Description Service Date Service Provider Modifiers Qty    22678113382 HC PT BIS XTRACELL FLUID ANALYSIS 3/5/2024 Melani Vieyra, PT  1    86299297257 HC PT SELF CARE/MGMT/TRAIN EA 15 MIN 3/5/2024 Melani Vieyra, PT GP 1    04299325863 HC PT THER PROC EA 15 MIN 3/5/2024 Melani Vieyra, PT GP 1                      Melani Vieyra PT  3/5/2024

## 2024-03-06 ENCOUNTER — OFFICE VISIT (OUTPATIENT)
Dept: NEUROSURGERY | Facility: CLINIC | Age: 54
End: 2024-03-06
Payer: COMMERCIAL

## 2024-03-06 VITALS
BODY MASS INDEX: 33.61 KG/M2 | OXYGEN SATURATION: 99 % | HEART RATE: 90 BPM | HEIGHT: 61 IN | WEIGHT: 178 LBS | DIASTOLIC BLOOD PRESSURE: 64 MMHG | TEMPERATURE: 96.2 F | SYSTOLIC BLOOD PRESSURE: 122 MMHG

## 2024-03-06 DIAGNOSIS — E78.5 HYPERLIPIDEMIA, UNSPECIFIED HYPERLIPIDEMIA TYPE: ICD-10-CM

## 2024-03-06 DIAGNOSIS — I10 ESSENTIAL HYPERTENSION: ICD-10-CM

## 2024-03-06 DIAGNOSIS — E11.9 CONTROLLED TYPE 2 DIABETES MELLITUS WITHOUT COMPLICATION, WITHOUT LONG-TERM CURRENT USE OF INSULIN: ICD-10-CM

## 2024-03-06 DIAGNOSIS — D49.2 LUMBAR SPINE TUMOR: Primary | ICD-10-CM

## 2024-03-06 DIAGNOSIS — E55.9 VITAMIN D DEFICIENCY: ICD-10-CM

## 2024-03-06 DIAGNOSIS — M89.9 SKULL LESION: ICD-10-CM

## 2024-03-06 RX ORDER — ATORVASTATIN CALCIUM 20 MG/1
20 TABLET, FILM COATED ORAL DAILY
Qty: 90 TABLET | Refills: 3 | Status: SHIPPED | OUTPATIENT
Start: 2024-03-06

## 2024-03-11 DIAGNOSIS — I10 ESSENTIAL HYPERTENSION: ICD-10-CM

## 2024-03-11 DIAGNOSIS — E11.9 CONTROLLED TYPE 2 DIABETES MELLITUS WITHOUT COMPLICATION, WITHOUT LONG-TERM CURRENT USE OF INSULIN: ICD-10-CM

## 2024-03-11 DIAGNOSIS — E78.5 HYPERLIPIDEMIA, UNSPECIFIED HYPERLIPIDEMIA TYPE: ICD-10-CM

## 2024-03-11 DIAGNOSIS — E55.9 VITAMIN D DEFICIENCY: ICD-10-CM

## 2024-03-11 RX ORDER — LANSOPRAZOLE 30 MG/1
30 CAPSULE, DELAYED RELEASE ORAL DAILY
Qty: 90 CAPSULE | Refills: 3 | Status: SHIPPED | OUTPATIENT
Start: 2024-03-11

## 2024-03-11 NOTE — TELEPHONE ENCOUNTER
Rx Refill Note  Requested Prescriptions     Pending Prescriptions Disp Refills    lansoprazole (PREVACID) 30 MG capsule [Pharmacy Med Name: LANSOPRAZOLE 30MG DR CAPSULES] 90 capsule 0     Sig: TAKE 1 CAPSULE BY MOUTH DAILY      Last office visit with prescribing clinician: 11/17/2023   Last telemedicine visit with prescribing clinician: Visit date not found   Next office visit with prescribing clinician: 5/17/2024                         Would you like a call back once the refill request has been completed: [] Yes [] No    If the office needs to give you a call back, can they leave a voicemail: [] Yes [] No    Natan López MA  03/11/24, 10:31 EDT

## 2024-03-20 ENCOUNTER — TELEPHONE (OUTPATIENT)
Dept: ONCOLOGY | Facility: CLINIC | Age: 54
End: 2024-03-20
Payer: COMMERCIAL

## 2024-03-20 ENCOUNTER — INFUSION (OUTPATIENT)
Dept: ONCOLOGY | Facility: HOSPITAL | Age: 54
End: 2024-03-20
Payer: COMMERCIAL

## 2024-03-20 VITALS
OXYGEN SATURATION: 95 % | DIASTOLIC BLOOD PRESSURE: 83 MMHG | RESPIRATION RATE: 16 BRPM | HEART RATE: 83 BPM | TEMPERATURE: 97.7 F | BODY MASS INDEX: 33.22 KG/M2 | SYSTOLIC BLOOD PRESSURE: 131 MMHG | WEIGHT: 175.8 LBS

## 2024-03-20 DIAGNOSIS — Z17.1 MALIGNANT NEOPLASM OF UPPER-OUTER QUADRANT OF LEFT BREAST IN FEMALE, ESTROGEN RECEPTOR NEGATIVE: Primary | ICD-10-CM

## 2024-03-20 DIAGNOSIS — C50.412 MALIGNANT NEOPLASM OF UPPER-OUTER QUADRANT OF LEFT BREAST IN FEMALE, ESTROGEN RECEPTOR NEGATIVE: Primary | ICD-10-CM

## 2024-03-20 DIAGNOSIS — C79.51 METASTASIS TO BONE: ICD-10-CM

## 2024-03-20 LAB
ALBUMIN SERPL-MCNC: 4.2 G/DL (ref 3.5–5.2)
ALBUMIN/GLOB SERPL: 1.3 G/DL
ALP SERPL-CCNC: 66 U/L (ref 39–117)
ALT SERPL W P-5'-P-CCNC: 13 U/L (ref 1–33)
ANION GAP SERPL CALCULATED.3IONS-SCNC: 8.2 MMOL/L (ref 5–15)
AST SERPL-CCNC: 14 U/L (ref 1–32)
BASOPHILS # BLD AUTO: 0.03 10*3/MM3 (ref 0–0.2)
BASOPHILS NFR BLD AUTO: 0.6 % (ref 0–1.5)
BILIRUB SERPL-MCNC: 0.2 MG/DL (ref 0–1.2)
BUN SERPL-MCNC: 23 MG/DL (ref 6–20)
BUN/CREAT SERPL: 21.7 (ref 7–25)
CALCIUM SPEC-SCNC: 9.9 MG/DL (ref 8.6–10.5)
CHLORIDE SERPL-SCNC: 107 MMOL/L (ref 98–107)
CO2 SERPL-SCNC: 25.8 MMOL/L (ref 22–29)
CREAT SERPL-MCNC: 1.06 MG/DL (ref 0.57–1)
DEPRECATED RDW RBC AUTO: 50 FL (ref 37–54)
EGFRCR SERPLBLD CKD-EPI 2021: 62.2 ML/MIN/1.73
EOSINOPHIL # BLD AUTO: 0.25 10*3/MM3 (ref 0–0.4)
EOSINOPHIL NFR BLD AUTO: 5.4 % (ref 0.3–6.2)
ERYTHROCYTE [DISTWIDTH] IN BLOOD BY AUTOMATED COUNT: 15.7 % (ref 12.3–15.4)
GLOBULIN UR ELPH-MCNC: 3.2 GM/DL
GLUCOSE SERPL-MCNC: 105 MG/DL (ref 65–99)
HCT VFR BLD AUTO: 34.1 % (ref 34–46.6)
HGB BLD-MCNC: 10.2 G/DL (ref 12–15.9)
IMM GRANULOCYTES # BLD AUTO: 0.01 10*3/MM3 (ref 0–0.05)
IMM GRANULOCYTES NFR BLD AUTO: 0.2 % (ref 0–0.5)
LYMPHOCYTES # BLD AUTO: 1.5 10*3/MM3 (ref 0.7–3.1)
LYMPHOCYTES NFR BLD AUTO: 32.2 % (ref 19.6–45.3)
MAGNESIUM SERPL-MCNC: 2 MG/DL (ref 1.6–2.6)
MCH RBC QN AUTO: 26.2 PG (ref 26.6–33)
MCHC RBC AUTO-ENTMCNC: 29.9 G/DL (ref 31.5–35.7)
MCV RBC AUTO: 87.4 FL (ref 79–97)
MONOCYTES # BLD AUTO: 0.47 10*3/MM3 (ref 0.1–0.9)
MONOCYTES NFR BLD AUTO: 10.1 % (ref 5–12)
NEUTROPHILS NFR BLD AUTO: 2.4 10*3/MM3 (ref 1.7–7)
NEUTROPHILS NFR BLD AUTO: 51.5 % (ref 42.7–76)
NRBC BLD AUTO-RTO: 0 /100 WBC (ref 0–0.2)
PHOSPHATE SERPL-MCNC: 3.8 MG/DL (ref 2.5–4.5)
PLATELET # BLD AUTO: 291 10*3/MM3 (ref 140–450)
PMV BLD AUTO: 9.2 FL (ref 6–12)
POTASSIUM SERPL-SCNC: 3.5 MMOL/L (ref 3.5–5.2)
PROT SERPL-MCNC: 7.4 G/DL (ref 6–8.5)
RBC # BLD AUTO: 3.9 10*6/MM3 (ref 3.77–5.28)
SODIUM SERPL-SCNC: 141 MMOL/L (ref 136–145)
WBC NRBC COR # BLD AUTO: 4.66 10*3/MM3 (ref 3.4–10.8)

## 2024-03-20 PROCEDURE — 85025 COMPLETE CBC W/AUTO DIFF WBC: CPT | Performed by: INTERNAL MEDICINE

## 2024-03-20 PROCEDURE — 80053 COMPREHEN METABOLIC PANEL: CPT | Performed by: INTERNAL MEDICINE

## 2024-03-20 PROCEDURE — 96374 THER/PROPH/DIAG INJ IV PUSH: CPT

## 2024-03-20 PROCEDURE — 25810000003 SODIUM CHLORIDE 0.9 % SOLUTION: Performed by: INTERNAL MEDICINE

## 2024-03-20 PROCEDURE — 25010000002 ZOLEDRONIC ACID 4 MG/100ML SOLUTION: Performed by: INTERNAL MEDICINE

## 2024-03-20 PROCEDURE — 83735 ASSAY OF MAGNESIUM: CPT | Performed by: INTERNAL MEDICINE

## 2024-03-20 PROCEDURE — 84100 ASSAY OF PHOSPHORUS: CPT | Performed by: INTERNAL MEDICINE

## 2024-03-20 RX ORDER — SODIUM CHLORIDE 9 MG/ML
250 INJECTION, SOLUTION INTRAVENOUS ONCE
Status: COMPLETED | OUTPATIENT
Start: 2024-03-20 | End: 2024-03-20

## 2024-03-20 RX ORDER — ZOLEDRONIC ACID 0.04 MG/ML
4 INJECTION, SOLUTION INTRAVENOUS ONCE
Status: COMPLETED | OUTPATIENT
Start: 2024-03-20 | End: 2024-03-20

## 2024-03-20 RX ADMIN — SODIUM CHLORIDE 250 ML: 9 INJECTION, SOLUTION INTRAVENOUS at 09:21

## 2024-03-20 RX ADMIN — ZOLEDRONIC ACID 4 MG: 0.04 INJECTION, SOLUTION INTRAVENOUS at 09:22

## 2024-03-20 NOTE — TELEPHONE ENCOUNTER
Caller: Ina Akers    Relationship: Self    Best call back number: [phone]     What is the best time to reach you: ANYTIME    Who are you requesting to speak with (clinical staff, provider,  specific staff member): CLINICAL    What was the call regarding: PT WOULD LIKE TO GIVE AN OF WHAT IS GOING ON AND THE INFORMATION SHE HAS FROM HER [facility] DOCTOR. PLEASE CALL BACK TO DISCUSS.     Is it okay if the provider responds through [employer]: CALL BACK

## 2024-03-20 NOTE — NURSING NOTE
Per Dr. oTni cordoba to proceed with Zometa today with elevated BUN and Creatinine. He recommended increasing fluid intake. Reviewed with pt, she v/u and agrees with this plan.     Lab Results   Component Value Date    GLUCOSE 105 (H) 03/20/2024    BUN 23 (H) 03/20/2024    CREATININE 1.06 (H) 03/20/2024    EGFR 62.2 03/20/2024    BCR 21.7 03/20/2024    K 3.5 03/20/2024    CO2 25.8 03/20/2024    CALCIUM 9.9 03/20/2024    PROTENTOTREF 7.1 12/08/2021    ALBUMIN 4.2 03/20/2024    BILITOT 0.2 03/20/2024    AST 14 03/20/2024    ALT 13 03/20/2024

## 2024-03-20 NOTE — TELEPHONE ENCOUNTER
Dr. Muñoz,    Patient wanted to let you know the provider she was seeing at [facility] is leaving  and she wanted to make you aware. She is still going to be seen in April, but she is seeing the APRN.     Called the patient back and she made me aware of the above that was then sent to Dr. Muñoz. I let her know I would pass this along and she v/u.

## 2024-04-17 ENCOUNTER — OFFICE VISIT (OUTPATIENT)
Dept: ONCOLOGY | Facility: CLINIC | Age: 54
End: 2024-04-17
Payer: COMMERCIAL

## 2024-04-17 ENCOUNTER — INFUSION (OUTPATIENT)
Dept: ONCOLOGY | Facility: HOSPITAL | Age: 54
End: 2024-04-17
Payer: COMMERCIAL

## 2024-04-17 VITALS
HEART RATE: 77 BPM | BODY MASS INDEX: 33.17 KG/M2 | OXYGEN SATURATION: 97 % | SYSTOLIC BLOOD PRESSURE: 129 MMHG | DIASTOLIC BLOOD PRESSURE: 82 MMHG | WEIGHT: 175.7 LBS | HEIGHT: 61 IN | TEMPERATURE: 97.5 F | RESPIRATION RATE: 16 BRPM

## 2024-04-17 DIAGNOSIS — D50.9 IRON DEFICIENCY ANEMIA, UNSPECIFIED IRON DEFICIENCY ANEMIA TYPE: ICD-10-CM

## 2024-04-17 DIAGNOSIS — C50.412 MALIGNANT NEOPLASM OF UPPER-OUTER QUADRANT OF LEFT BREAST IN FEMALE, ESTROGEN RECEPTOR NEGATIVE: Primary | ICD-10-CM

## 2024-04-17 DIAGNOSIS — N64.9 BREAST LESION: ICD-10-CM

## 2024-04-17 DIAGNOSIS — C79.51 METASTASIS TO BONE: ICD-10-CM

## 2024-04-17 DIAGNOSIS — Z17.1 MALIGNANT NEOPLASM OF UPPER-OUTER QUADRANT OF LEFT BREAST IN FEMALE, ESTROGEN RECEPTOR NEGATIVE: Primary | ICD-10-CM

## 2024-04-17 DIAGNOSIS — C73 THYROID CANCER: ICD-10-CM

## 2024-04-17 DIAGNOSIS — C50.412 MALIGNANT NEOPLASM OF UPPER-OUTER QUADRANT OF LEFT BREAST IN FEMALE, ESTROGEN RECEPTOR NEGATIVE: ICD-10-CM

## 2024-04-17 DIAGNOSIS — Z17.1 MALIGNANT NEOPLASM OF UPPER-OUTER QUADRANT OF LEFT BREAST IN FEMALE, ESTROGEN RECEPTOR NEGATIVE: ICD-10-CM

## 2024-04-17 LAB
ALBUMIN SERPL-MCNC: 4.2 G/DL (ref 3.5–5.2)
ALBUMIN/GLOB SERPL: 1.3 G/DL
ALP SERPL-CCNC: 69 U/L (ref 39–117)
ALT SERPL W P-5'-P-CCNC: 11 U/L (ref 1–33)
ANION GAP SERPL CALCULATED.3IONS-SCNC: 9.6 MMOL/L (ref 5–15)
AST SERPL-CCNC: 17 U/L (ref 1–32)
BASOPHILS # BLD AUTO: 0.03 10*3/MM3 (ref 0–0.2)
BASOPHILS NFR BLD AUTO: 0.7 % (ref 0–1.5)
BILIRUB SERPL-MCNC: 0.3 MG/DL (ref 0–1.2)
BUN SERPL-MCNC: 16 MG/DL (ref 6–20)
BUN/CREAT SERPL: 18 (ref 7–25)
CALCIUM SPEC-SCNC: 9.5 MG/DL (ref 8.6–10.5)
CHLORIDE SERPL-SCNC: 105 MMOL/L (ref 98–107)
CO2 SERPL-SCNC: 24.4 MMOL/L (ref 22–29)
CREAT SERPL-MCNC: 0.89 MG/DL (ref 0.57–1)
DEPRECATED RDW RBC AUTO: 47.1 FL (ref 37–54)
EGFRCR SERPLBLD CKD-EPI 2021: 76.7 ML/MIN/1.73
EOSINOPHIL # BLD AUTO: 0.28 10*3/MM3 (ref 0–0.4)
EOSINOPHIL NFR BLD AUTO: 6.4 % (ref 0.3–6.2)
ERYTHROCYTE [DISTWIDTH] IN BLOOD BY AUTOMATED COUNT: 14.7 % (ref 12.3–15.4)
GLOBULIN UR ELPH-MCNC: 3.2 GM/DL
GLUCOSE SERPL-MCNC: 115 MG/DL (ref 65–99)
HCT VFR BLD AUTO: 33.5 % (ref 34–46.6)
HGB BLD-MCNC: 10.1 G/DL (ref 12–15.9)
IMM GRANULOCYTES # BLD AUTO: 0.02 10*3/MM3 (ref 0–0.05)
IMM GRANULOCYTES NFR BLD AUTO: 0.5 % (ref 0–0.5)
LYMPHOCYTES # BLD AUTO: 1.71 10*3/MM3 (ref 0.7–3.1)
LYMPHOCYTES NFR BLD AUTO: 39 % (ref 19.6–45.3)
MAGNESIUM SERPL-MCNC: 2 MG/DL (ref 1.6–2.6)
MCH RBC QN AUTO: 26.4 PG (ref 26.6–33)
MCHC RBC AUTO-ENTMCNC: 30.1 G/DL (ref 31.5–35.7)
MCV RBC AUTO: 87.5 FL (ref 79–97)
MONOCYTES # BLD AUTO: 0.48 10*3/MM3 (ref 0.1–0.9)
MONOCYTES NFR BLD AUTO: 10.9 % (ref 5–12)
NEUTROPHILS NFR BLD AUTO: 1.87 10*3/MM3 (ref 1.7–7)
NEUTROPHILS NFR BLD AUTO: 42.5 % (ref 42.7–76)
NRBC BLD AUTO-RTO: 0 /100 WBC (ref 0–0.2)
PHOSPHATE SERPL-MCNC: 4.2 MG/DL (ref 2.5–4.5)
PLATELET # BLD AUTO: 336 10*3/MM3 (ref 140–450)
PMV BLD AUTO: 8.7 FL (ref 6–12)
POTASSIUM SERPL-SCNC: 3.7 MMOL/L (ref 3.5–5.2)
PROT SERPL-MCNC: 7.4 G/DL (ref 6–8.5)
RBC # BLD AUTO: 3.83 10*6/MM3 (ref 3.77–5.28)
SODIUM SERPL-SCNC: 139 MMOL/L (ref 136–145)
WBC NRBC COR # BLD AUTO: 4.39 10*3/MM3 (ref 3.4–10.8)

## 2024-04-17 PROCEDURE — 84100 ASSAY OF PHOSPHORUS: CPT | Performed by: INTERNAL MEDICINE

## 2024-04-17 PROCEDURE — 83735 ASSAY OF MAGNESIUM: CPT | Performed by: INTERNAL MEDICINE

## 2024-04-17 PROCEDURE — 25010000002 ZOLEDRONIC ACID 4 MG/100ML SOLUTION: Performed by: INTERNAL MEDICINE

## 2024-04-17 PROCEDURE — 99214 OFFICE O/P EST MOD 30 MIN: CPT | Performed by: INTERNAL MEDICINE

## 2024-04-17 PROCEDURE — 80053 COMPREHEN METABOLIC PANEL: CPT | Performed by: INTERNAL MEDICINE

## 2024-04-17 PROCEDURE — 96374 THER/PROPH/DIAG INJ IV PUSH: CPT

## 2024-04-17 PROCEDURE — 85025 COMPLETE CBC W/AUTO DIFF WBC: CPT | Performed by: INTERNAL MEDICINE

## 2024-04-17 PROCEDURE — 25810000003 SODIUM CHLORIDE 0.9 % SOLUTION: Performed by: INTERNAL MEDICINE

## 2024-04-17 RX ORDER — ZOLEDRONIC ACID 0.04 MG/ML
4 INJECTION, SOLUTION INTRAVENOUS ONCE
Status: COMPLETED | OUTPATIENT
Start: 2024-04-17 | End: 2024-04-17

## 2024-04-17 RX ORDER — ZOLEDRONIC ACID 0.04 MG/ML
4 INJECTION, SOLUTION INTRAVENOUS ONCE
Status: CANCELLED | OUTPATIENT
Start: 2024-04-17

## 2024-04-17 RX ORDER — SODIUM CHLORIDE 9 MG/ML
250 INJECTION, SOLUTION INTRAVENOUS ONCE
Status: COMPLETED | OUTPATIENT
Start: 2024-04-17 | End: 2024-04-17

## 2024-04-17 RX ORDER — SODIUM CHLORIDE 9 MG/ML
250 INJECTION, SOLUTION INTRAVENOUS ONCE
Status: CANCELLED | OUTPATIENT
Start: 2024-04-17

## 2024-04-17 RX ADMIN — ZOLEDRONIC ACID 4 MG: 0.04 INJECTION, SOLUTION INTRAVENOUS at 09:45

## 2024-04-17 RX ADMIN — SODIUM CHLORIDE 250 ML: 9 INJECTION, SOLUTION INTRAVENOUS at 09:35

## 2024-04-17 NOTE — TELEPHONE ENCOUNTER
----- Message from Jefferson Newman MD sent at 3/25/2022  1:07 PM EDT -----  Please arrange small bowel capsule endoscopy for this patient for iron deficiency anemia     Rehabilitation Facility

## 2024-04-17 NOTE — PROGRESS NOTES
Subjective     CHIEF COMPLAINT:      Chief Complaint   Patient presents with    Follow-up     Discuss the visit from [place] regarding thyroid cancer      HISTORY OF PRESENT ILLNESS:     Ina Akers is a 53 y.o. female patient who returns today for follow up on her breast cancer and metastatic thyroid cancer with bone metastasis.  She returns today for follow-up reporting pain in the lower back.  She is babysitting her 8-month-old grandchild.  She attributes the pain to increased movements as she is taking care of the grandchild.    Patient continues to follow-up at [facility].  At the last visit, she was found to have an increase in her thyroglobulin level.  She is going to have a PET scan done in about 2 weeks.    Patient reports feeling lesion at 6:00 of the right reconstructed breast.  It is under the reconstruction incision.  It is occasionally tender.    ROS:  Pertinent ROS is in the HPI.     Past medical, surgical, social and family history were reviewed.     MEDICATIONS:    Current Outpatient Medications:     acetaminophen (TYLENOL) 500 MG tablet, Take 2 tablets by mouth Every 6 (Six) Hours As Needed for Mild Pain., Disp: , Rfl:     amLODIPine (NORVASC) 10 MG tablet, TAKE ONE TABLET BY MOUTH DAILY (Patient taking differently: Take 1 tablet by mouth Daily.), Disp: 90 tablet, Rfl: 1    atorvastatin (LIPITOR) 20 MG tablet, Take 1 tablet by mouth Daily., Disp: 90 tablet, Rfl: 3    Blood Glucose Monitoring Suppl w/Device kit, Check blood sugar twice a day, Disp: 1 each, Rfl: 0    cholecalciferol (VITAMIN D3) 25 MCG (1000 UT) tablet, Take 1 tablet by mouth Daily., Disp: , Rfl:     cyclobenzaprine (FLEXERIL) 5 MG tablet, Take 1 tablet by mouth At Night As Needed for Muscle Spasms. 1 or 2 tablets by mouth at bedtime if needed for leg cramps, Disp: 20 tablet, Rfl: 2    famotidine (PEPCID) 20 MG tablet, Take 1 tablet by mouth Every Other Day., Disp: , Rfl:     Glucose Blood (BLOOD GLUCOSE TEST)  "strip, Check blood sugar twice a day., Disp: 100 each, Rfl: 1    labetalol (NORMODYNE) 100 MG tablet, Take 0.5 tablets by mouth 2 (Two) Times a Day., Disp: 180 tablet, Rfl: 3    Lancets (ACCU-CHEK SOFT TOUCH) lancets, Check blood sugar twice a day as needed, Disp: 100 each, Rfl: 1    lansoprazole (PREVACID) 30 MG capsule, TAKE 1 CAPSULE BY MOUTH DAILY (Patient taking differently: Take 1 capsule by mouth Every Other Day.), Disp: 90 capsule, Rfl: 3    levothyroxine (SYNTHROID, LEVOTHROID) 112 MCG tablet, Take 1 tablet by mouth Every Morning., Disp: , Rfl:     losartan (COZAAR) 100 MG tablet, Take 1 tablet by mouth Daily., Disp: 90 tablet, Rfl: 3    metFORMIN ER (GLUCOPHAGE-XR) 500 MG 24 hr tablet, Take 2 tablets by mouth Daily With Dinner., Disp: 180 tablet, Rfl: 3    NON FORMULARY, Take 1 tablet by mouth Every Night. STOOL SOFTNER, Disp: , Rfl:     promethazine (PHENERGAN) 12.5 MG tablet, TAKE 1/2 TO 1 TABLET BY MOUTH EVERY 4 HOURS AS NEEDED FOR NAUSEA AND VOMITING -MAY MAKE DROWSY (Patient taking differently: Take 1-2 tablets by mouth Every 6 (Six) Hours As Needed. TAKE 1/2 TO 1 TABLET BY MOUTH EVERY 4 HOURS AS NEEDED FOR NAUSEA AND VOMITING -MAY MAKE DROWSY), Disp: 30 tablet, Rfl: 3    triamcinolone (KENALOG) 0.1 % cream, Apply  topically to the appropriate area as directed 2 (Two) Times a Day. Sparingly shortest time possible avoid face (Patient taking differently: Apply 1 Application topically to the appropriate area as directed As Needed. Sparingly shortest time possible avoid face), Disp: 80 g, Rfl: 0  Objective     VITAL SIGNS:     Vitals:    04/17/24 0853   BP: 129/82   Pulse: 77   Resp: 16   Temp: 97.5 °F (36.4 °C)   TempSrc: Temporal   SpO2: 97%   Weight: 79.7 kg (175 lb 11.2 oz)   Height: 154.9 cm (60.98\")   PainSc:   3   PainLoc: Generalized  Comment: soreness     Body mass index is 33.22 kg/m².     Wt Readings from Last 5 Encounters:   04/17/24 79.7 kg (175 lb 11.2 oz)   03/20/24 79.7 kg (175 lb 12.8 oz) "   03/06/24 80.7 kg (178 lb)   02/21/24 78.6 kg (173 lb 3.2 oz)   01/24/24 78 kg (172 lb)     PHYSICAL EXAMINATION:   GENERAL: The patient appears in good general condition, not in acute distress.   SKIN: No ecchymosis.  EYES: No jaundice. No Pallor.  LYMPHATIC: No cervical or suprapubic lymphadenopathy.  BREASTS: 1 cm nodule at 6 o'clock position of the right breast, located underneath the inverted T incision.  CHEST: Normal respiratory effort.  Lungs clear bilaterally.  CVS: Normal S1-S2.  No murmurs.  ABDOMEN: Soft. No tenderness. No Hepatomegaly. No Splenomegaly. No masses.  EXTREMITIES: No palpable masses on exam of the lumbar spine.    DIAGNOSTIC DATA:     Results from last 7 days   Lab Units 04/17/24  0821   WBC 10*3/mm3 4.39   NEUTROS ABS 10*3/mm3 1.87   HEMOGLOBIN g/dL 10.1*   HEMATOCRIT % 33.5*   PLATELETS 10*3/mm3 336     Results from last 7 days   Lab Units 04/17/24  0821   SODIUM mmol/L 139   POTASSIUM mmol/L 3.7   CHLORIDE mmol/L 105   CO2 mmol/L 24.4   BUN mg/dL 16   CREATININE mg/dL 0.89   CALCIUM mg/dL 9.5   ALBUMIN g/dL 4.2   BILIRUBIN mg/dL 0.3   ALK PHOS U/L 69   ALT (SGPT) U/L 11   AST (SGOT) U/L 17   GLUCOSE mg/dL 115*   MAGNESIUM mg/dL 2.0     Component      Latest Ref Rn 4/17/2024   Phosphorus      2.5 - 4.5 mg/dL 4.2      Component      Latest Ref Rn 3/26/2024 4/4/2024   Thyroglobulin Ab      <4.0 IU/mL <1.0 (E) <1.0 (E)   TSH      0.40 - 4.20 uIU/mL <0.01 (L) (E) <0.01 (L) (E)   Free T4      0.8 - 1.7 ng/dL 2.0 (H) (E) 1.8 (H) (E)      Assessment & Plan    *Left breast cancer.  It was identified on screening mammograms on 10/5/2022.  It was located at 12 o'clock position.  It measured 6 x 5 by 5 mm on mammogram and ultrasound on 10/20/2022.  PET scan on 10/6/2022 showed the lesion to be hypermetabolic. It measured 10 x 7 mm with an SUV of 5.8.  No evidence of metastasis on PET scan.  MRI on 12/5/2022 showed the lesion to measure 9 mm.    S/P mastectomy with reconstruction on  1/24/2023.  Pathology exam revealed the primary tumor to measure 15 mm.  It was grade 3.  Triple negative.  HER2 was negative-0 by IHC.  Ki-67 was 85%.  It was considered pT1cN0.  2 sentinel lymph nodes were examined and were negative.    No lymphovascular invasion. 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  Due to the patient receiving Adriamycin in the past, she is unable to receive additional Adriamycin.  Taxotere and Cytoxan with Neulasta support every 3 weeks x 4 cycles was recommended.  She started Taxotere and Cytoxan on 3/8/2023.  She had infusion reaction to Taxotere which was briefly held.  She was given IV Solu-Cortef.  It was restarted at a slower rate and she tolerated the infusion.  She received cycle #4 on 5/3/2023.    CT scans on 10/20/2023 showed no evidence of recurrence or metastasis.  CT scans on 3/26/2024 showed no evidence of recurrence.  She is doing well with no evidence of recurrence.    *Metastatic thyroid cancer diagnosed on 10/6/2020.  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  She had right thyroid lobectomy on 12/17/2018 at [place].    The specimen was sent for outside consultation and the final conclusion was that there was no evidence of malignancy.  MRI on 8/27/2020 revealed an L4 lesion resulting in pathologic compression fracture.  Bone scan on 9/2/2020 revealed increased activity at L4 and left side of the skull.  PET scan on 9/9/2020 revealed abnormal activity at L4, right femoral neck, lower sacrum, left ischium and right ninth medial aspect.  PET scan 9/9/2020 showed no evidence of visceral metastasis.  Patient had kyphoplasty on 9/17/2020.  Pathology was nondiagnostic.  CT-guided biopsy of the lesion in the right posterior ninth rib on 10/6/2020 revealed a clear cell neoplasm.  It is positive for PAX 8 and TTF-1 favoring metastatic thyroid cancer.  Patient was referred to [facility].  Patient underwent completion thyroidectomy on 11/9/2020.  There was no  evidence of uptake of radioactive iodine on SPECT imaging on 12/18/2020.  This was indicative of the tumor not going to respond to radioactive iodine therapy.  Patient was started on Lenvima 20 mg daily on 12/24/2020.  Lenvima was held on 2/6/2021 due to drug induced anemia and thrombocytopenia.  She was unable to start back on it due to prolonged cytopenias.  CT scans on 3/23/2021 showed stable disease.  CT scans showed stable disease.  Thyroglobulin was stable at 3.4.    PET scan on 9/1/2021 revealed decrease in the activity in the metastatic lesions.    Starting back on active treatment (Nexavar) was not recommended.  PET scan on 12/16/2021 showed complete response except for mild hypermetabolism in the left ischium.  CT scans on 4/8/2022 showed stable bone metastasis.  No new metastatic lesions.  PET scan on 10/6/2022 revealed no new areas of metastasis.  Due to the stable metastatic disease, she was not placed ontreatment for her thyroid cancer.   She is on Synthroid 112 mcg daily.  Goal is to maintain TSH <0.1.  Thyroglobulin increased to 13.1 on 3/26/2024 and was 11.3 on 4/4/2024.  Due to the increase in the thyroglobulin, she is going to have a PET scan at [facility] in 2 weeks.    *Bone metastasis.  Patient was given Zometa on 9/29/2020.  Patient underwent right femur medullary nailing on 10/23/2020.  Patient was restarted back on Zometa on 2/24/2022.   She is tolerating the Zometa infusions well.  CT scans on 4/8/2022 revealed no new metastatic lesions.  The metastatic lesions were stable.  PET scan on 10/6/2022 revealed no change in the areas of activity in the bone metastasis.  No new lesions.  Zometa was restarted on 6/14/2023.  She is complaining of intermittent pain in the left hip-likely referred and related to the L4 compression fracture.  She follows with neurosurgery.    * Iron deficiency anemia.    Patient was previously treated with ferrous sulfate 325 mg daily.  Hemoglobin decreased to  9.1 on 2/24/2022.  EGD on 3/25/2022 showed no evidence of blood loss.  Pathology exam revealed no typical features of celiac disease.  Hemoglobin was 10.4 on 12/27/2022.  Ferritin was 95 and transferrin saturation was 14%.  She was started on ferrous sulfate 325 mg daily.  Hemoglobin decreased to 9.6 on 2/1/2023, secondary to recent surgical blood losses.  She was restarted on ferrous sulfate 325 mg daily on 2/1/2023.  5/3/2023: Hemoglobin 9.4.  She was continued on ferrous sulfate 325 mg once daily.  7/26/2023: Hemoglobin decreased to 10.0.  She was continued on ferrous sulfate 325 mg daily.  She was advised to take vitamin C 1000 mg daily.  11/29/2023: Hemoglobin 9.9.  Transferrin saturation 11%.  We discussed options of IV Venofer 300 mg weekly x 5 doses but the patient decided to eat with oral iron.  4/17/2024: Hemoglobin 10.1.    *Lesion in the right breast at 6 o'clock position.  It is underneath the reconstruction inverted T incision.    *History of left breast cancer, stage IIB, triIple negative.   S/P lumpectomy in 2001.  She received adjuvant chemotherapy on the NSABP B34 with AC followed by Taxotere.  S/P post-lumpectomy radiation therapy.   She was placed on tamoxifen for 5 years and it was completed in January 2007.   With the patient's history of breast cancer at a young age and development of a second new breast cancer in the left breast, repeat genetic testing was recommended.  Gene testing on 12/22/2022 revealed variants of unknown significance.    PLAN:    1.  Will give Zometa today.  2.  Obtain right breast mammogram and ultrasound.    3.  Continue ferrous sulfate once daily.    4.  Await PET scan scheduled in 2 weeks at [facility].  5.  Follow-up in 4 weeks.  We will review the PET scan from .  We will schedule her to receive dose #24 of Zometa.  CBC CMP magnesium phosphorus ferritin iron panel will be obtained.  6.  We discussed the option of switching Zometa to every 3 months at that  point.        Tim Muñoz MD  04/17/24

## 2024-04-25 ENCOUNTER — TELEPHONE (OUTPATIENT)
Dept: ONCOLOGY | Facility: CLINIC | Age: 54
End: 2024-04-25
Payer: COMMERCIAL

## 2024-04-25 NOTE — TELEPHONE ENCOUNTER
Caller: Ina Akers    Relationship: Self    Best call back number: [phone]     What is the best time to reach you: ASAP    Who are you requesting to speak with (clinical staff, provider,  specific staff member): SCHEDULING    What was the call regarding: PATIENT NEEDS HER ONE MONTH FOLLOW UP 1 SCHEDULED WITH DR LORD AT [place], PLEASE CALL PATIENT TO SCHEDULE.

## 2024-04-30 ENCOUNTER — APPOINTMENT (OUTPATIENT)
Dept: WOMENS IMAGING | Facility: HOSPITAL | Age: 54
End: 2024-04-30
Payer: COMMERCIAL

## 2024-04-30 PROCEDURE — 76642 ULTRASOUND BREAST LIMITED: CPT | Performed by: RADIOLOGY

## 2024-04-30 PROCEDURE — 77061 BREAST TOMOSYNTHESIS UNI: CPT | Performed by: RADIOLOGY

## 2024-04-30 PROCEDURE — G0279 TOMOSYNTHESIS, MAMMO: HCPCS | Performed by: RADIOLOGY

## 2024-04-30 PROCEDURE — 77065 DX MAMMO INCL CAD UNI: CPT | Performed by: RADIOLOGY

## 2024-05-07 ENCOUNTER — TELEPHONE (OUTPATIENT)
Dept: FAMILY MEDICINE CLINIC | Facility: CLINIC | Age: 54
End: 2024-05-07

## 2024-05-07 NOTE — TELEPHONE ENCOUNTER
Caller: Ina Akers    Relationship: Self    Best call back number: [phone]     What was the call regarding: PATIENT WOULD LIKE TO SPEAK TO LEX AL OR HIS MA ABOUT MEDICAL DISABILITY. SHE IS REQUESTING A CALL BACK     PLEASE CALL AND ADVISE

## 2024-05-14 ENCOUNTER — OFFICE VISIT (OUTPATIENT)
Dept: FAMILY MEDICINE CLINIC | Facility: CLINIC | Age: 54
End: 2024-05-14
Payer: COMMERCIAL

## 2024-05-14 VITALS
SYSTOLIC BLOOD PRESSURE: 117 MMHG | OXYGEN SATURATION: 96 % | HEIGHT: 61 IN | HEART RATE: 88 BPM | TEMPERATURE: 97.7 F | BODY MASS INDEX: 33.02 KG/M2 | WEIGHT: 174.9 LBS | DIASTOLIC BLOOD PRESSURE: 70 MMHG

## 2024-05-14 DIAGNOSIS — C73 PRIMARY CANCER OF THYROID GLAND METASTATIC TO BONE: Primary | ICD-10-CM

## 2024-05-14 DIAGNOSIS — R53.1 WEAKNESS GENERALIZED: ICD-10-CM

## 2024-05-14 DIAGNOSIS — C79.51 PRIMARY CANCER OF THYROID GLAND METASTATIC TO BONE: Primary | ICD-10-CM

## 2024-05-14 DIAGNOSIS — Z56.0 NOT CURRENTLY WORKING DUE TO DISABLED STATUS: ICD-10-CM

## 2024-05-14 DIAGNOSIS — C50.412 MALIGNANT NEOPLASM OF UPPER-OUTER QUADRANT OF LEFT BREAST IN FEMALE, ESTROGEN RECEPTOR NEGATIVE: ICD-10-CM

## 2024-05-14 DIAGNOSIS — Z17.1 MALIGNANT NEOPLASM OF UPPER-OUTER QUADRANT OF LEFT BREAST IN FEMALE, ESTROGEN RECEPTOR NEGATIVE: ICD-10-CM

## 2024-05-14 PROCEDURE — 99213 OFFICE O/P EST LOW 20 MIN: CPT | Performed by: NURSE PRACTITIONER

## 2024-05-14 RX ORDER — PROMETHAZINE HYDROCHLORIDE 12.5 MG/1
12.5-25 TABLET ORAL EVERY 6 HOURS PRN
Qty: 60 TABLET | Refills: 2 | Status: SHIPPED | OUTPATIENT
Start: 2024-05-14

## 2024-05-14 SDOH — ECONOMIC STABILITY - INCOME SECURITY: UNEMPLOYMENT, UNSPECIFIED: Z56.0

## 2024-05-14 NOTE — PROGRESS NOTES
"Chief Complaint  Cancer (Wants to discuss paperwork for disability )    Subjective        Ina Akers presents to [facility]  History of Present Illness  Pleasant patient here today for follow-up, unfortunately, she has been dealing with cancer for several years, and breast cancer returned, she has had 2 occurrences,  2022 left ductal cancer, breast current reconstruction 2023, probably completed,  Primary cancer with metastatic disease to patient's hip pelvis low back skull,  Recent PET scan with changes has not follow-up with this plan    Last chemo for left ductal cancer was in May 2023,  Patient tried to work throughout most of this but over time and continue treatments just too much fatigue too many things going on unable to maintain  The energy threshold required to work a full-time job, she is unable to work in a full-time job or sustain full employment due to recurrent thyroid and breast cancers  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    Weight as of this encounter: 79.3 kg (174 lb 14.4 oz).          Physical Exam  Vitals reviewed.   Constitutional:       General: She is not in acute distress.     Appearance: Normal appearance. She is well-developed. She is not toxic-appearing or diaphoretic.      Comments: Pleasant as always, warm spirited, without the previous vibrant healthy appearing she has had in the past prior to the last diagnosis of cancer     HENT:      Head: Normocephalic.      Mouth/Throat:      Pharynx: No " oropharyngeal exudate.   Eyes:      Conjunctiva/sclera: Conjunctivae normal.      Pupils: Pupils are equal, round, and reactive to light.   Neck:      Vascular: No JVD.   Cardiovascular:      Rate and Rhythm: Normal rate and regular rhythm.      Heart sounds: Normal heart sounds. No murmur heard.     No friction rub.   Pulmonary:      Effort: Pulmonary effort is normal. No respiratory distress.      Breath sounds: Normal breath sounds. No wheezing.   Abdominal:      General: Bowel sounds are normal. There is no distension.      Palpations: Abdomen is soft. There is no mass.      Tenderness: There is no abdominal tenderness. There is no guarding.      Hernia: No hernia is present.   Musculoskeletal:         General: No tenderness.      Cervical back: Neck supple.   Lymphadenopathy:      Cervical: No cervical adenopathy.   Skin:     General: Skin is warm and dry.   Neurological:      General: No focal deficit present.      Mental Status: She is alert and oriented to person, place, and time. Mental status is at baseline.   Psychiatric:         Mood and Affect: Mood normal.         Behavior: Behavior normal.         Thought Content: Thought content normal.         Judgment: Judgment normal.      Comments: Pleasant alert, tearful, frequently through conversations discussing need for disability.  Appropriately in conversation consolable            Result Review :                Assessment and Plan   Diagnoses and all orders for this visit:    1. 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 Malignant neoplasm of upper-outer quadrant of left breast in female, estrogen receptor negative  -     Hemoglobin A1c; Future  -     CBC & Differential; Future  -      Comprehensive Metabolic Panel; Future  -     Lipid Panel With LDL / HDL Ratio; Future  -     Urinalysis With Microscopic If Indicated (No Culture) - Urine, Clean Catch; Future    3. Weakness generalized  -     Hemoglobin A1c; Future  -     CBC & Differential; Future  -     Comprehensive Metabolic Panel; Future  -     Lipid Panel With LDL / HDL Ratio; Future  -     Urinalysis With Microscopic If Indicated (No Culture) - Urine, Clean Catch; Future    4. Not currently working due to disabled status    Other orders  -     promethazine (PHENERGAN) 12.5 MG tablet; Take 1-2 tablets by mouth Every 6 (Six) Hours As Needed for Nausea. TAKE 1/2 TO 1 TABLET BY MOUTH EVERY 4 HOURS AS NEEDED FOR NAUSEA AND VOMITING -MAY MAKE DROWSY  Dispense: 60 tablet; Refill: 2           I spent 24 minutes caring for Ina on this date of service. This time includes time spent by me in the following activities:preparing for the visit, reviewing tests, obtaining and/or reviewing a separately obtained history, performing a medically appropriate examination and/or evaluation , counseling and educating the patient/family/caregiver, referring and communicating with other health care professionals , documenting information in the medical record, and care coordination  Follow Up   Return for Labs before next visit.  Patient was given instructions and counseling regarding her condition or for health maintenance advice. Please see specific information pulled into the AVS if appropriate.     Ina has had a spectacular work ethic, and pushing through working these years with cancer treatments, nausea weakness fatigue, unfortunately, due to her ongoing fight with cancer, treatment requiring  Periods of fatigue, weakness,, and overall health decline, she is unable to continue to meet the demands of her occupation.  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  She is completely disabled due to chronic disease related to ongoing cancer therapies.      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

## 2024-05-14 NOTE — PATIENT INSTRUCTIONS
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

## 2024-05-15 ENCOUNTER — INFUSION (OUTPATIENT)
Dept: ONCOLOGY | Facility: HOSPITAL | Age: 54
End: 2024-05-15
Payer: COMMERCIAL

## 2024-05-15 ENCOUNTER — OFFICE VISIT (OUTPATIENT)
Dept: ONCOLOGY | Facility: CLINIC | Age: 54
End: 2024-05-15
Payer: COMMERCIAL

## 2024-05-15 VITALS
RESPIRATION RATE: 18 BRPM | TEMPERATURE: 98 F | SYSTOLIC BLOOD PRESSURE: 112 MMHG | DIASTOLIC BLOOD PRESSURE: 72 MMHG | BODY MASS INDEX: 32.85 KG/M2 | HEIGHT: 61 IN | WEIGHT: 174 LBS | OXYGEN SATURATION: 97 % | HEART RATE: 82 BPM

## 2024-05-15 DIAGNOSIS — C50.412 MALIGNANT NEOPLASM OF UPPER-OUTER QUADRANT OF LEFT BREAST IN FEMALE, ESTROGEN RECEPTOR NEGATIVE: Primary | ICD-10-CM

## 2024-05-15 DIAGNOSIS — Z17.1 MALIGNANT NEOPLASM OF UPPER-OUTER QUADRANT OF LEFT BREAST IN FEMALE, ESTROGEN RECEPTOR NEGATIVE: Primary | ICD-10-CM

## 2024-05-15 DIAGNOSIS — C50.412 MALIGNANT NEOPLASM OF UPPER-OUTER QUADRANT OF LEFT BREAST IN FEMALE, ESTROGEN RECEPTOR NEGATIVE: ICD-10-CM

## 2024-05-15 DIAGNOSIS — Z85.3 HISTORY OF LEFT BREAST CANCER: Primary | ICD-10-CM

## 2024-05-15 DIAGNOSIS — D50.9 IRON DEFICIENCY ANEMIA, UNSPECIFIED IRON DEFICIENCY ANEMIA TYPE: ICD-10-CM

## 2024-05-15 DIAGNOSIS — C79.51 METASTASIS TO BONE: ICD-10-CM

## 2024-05-15 DIAGNOSIS — Z17.1 MALIGNANT NEOPLASM OF UPPER-OUTER QUADRANT OF LEFT BREAST IN FEMALE, ESTROGEN RECEPTOR NEGATIVE: ICD-10-CM

## 2024-05-15 DIAGNOSIS — C73 THYROID CANCER: ICD-10-CM

## 2024-05-15 DIAGNOSIS — N64.9 BREAST LESION: ICD-10-CM

## 2024-05-15 PROBLEM — D49.2 LUMBAR SPINE TUMOR: Status: RESOLVED | Noted: 2020-09-11 | Resolved: 2024-05-15

## 2024-05-15 LAB
ALBUMIN SERPL-MCNC: 4.2 G/DL (ref 3.5–5.2)
ALBUMIN/GLOB SERPL: 1.3 G/DL
ALP SERPL-CCNC: 68 U/L (ref 39–117)
ALT SERPL W P-5'-P-CCNC: 14 U/L (ref 1–33)
ANION GAP SERPL CALCULATED.3IONS-SCNC: 11 MMOL/L (ref 5–15)
AST SERPL-CCNC: 17 U/L (ref 1–32)
BASOPHILS # BLD AUTO: 0.02 10*3/MM3 (ref 0–0.2)
BASOPHILS NFR BLD AUTO: 0.4 % (ref 0–1.5)
BILIRUB SERPL-MCNC: 0.4 MG/DL (ref 0–1.2)
BUN SERPL-MCNC: 14 MG/DL (ref 6–20)
BUN/CREAT SERPL: 15.1 (ref 7–25)
CALCIUM SPEC-SCNC: 9.6 MG/DL (ref 8.6–10.5)
CHLORIDE SERPL-SCNC: 104 MMOL/L (ref 98–107)
CO2 SERPL-SCNC: 23 MMOL/L (ref 22–29)
CREAT SERPL-MCNC: 0.93 MG/DL (ref 0.57–1)
DEPRECATED RDW RBC AUTO: 46.8 FL (ref 37–54)
EGFRCR SERPLBLD CKD-EPI 2021: 72.7 ML/MIN/1.73
EOSINOPHIL # BLD AUTO: 0.26 10*3/MM3 (ref 0–0.4)
EOSINOPHIL NFR BLD AUTO: 5.6 % (ref 0.3–6.2)
ERYTHROCYTE [DISTWIDTH] IN BLOOD BY AUTOMATED COUNT: 15 % (ref 12.3–15.4)
FERRITIN SERPL-MCNC: 100.7 NG/ML (ref 13–150)
GLOBULIN UR ELPH-MCNC: 3.2 GM/DL
GLUCOSE SERPL-MCNC: 140 MG/DL (ref 65–99)
HCT VFR BLD AUTO: 33.6 % (ref 34–46.6)
HGB BLD-MCNC: 10.3 G/DL (ref 12–15.9)
IMM GRANULOCYTES # BLD AUTO: 0.02 10*3/MM3 (ref 0–0.05)
IMM GRANULOCYTES NFR BLD AUTO: 0.4 % (ref 0–0.5)
IRON 24H UR-MRATE: 54 MCG/DL (ref 37–145)
IRON SATN MFR SERPL: 17 % (ref 20–50)
LYMPHOCYTES # BLD AUTO: 1.82 10*3/MM3 (ref 0.7–3.1)
LYMPHOCYTES NFR BLD AUTO: 39 % (ref 19.6–45.3)
MAGNESIUM SERPL-MCNC: 2 MG/DL (ref 1.6–2.6)
MCH RBC QN AUTO: 26.3 PG (ref 26.6–33)
MCHC RBC AUTO-ENTMCNC: 30.7 G/DL (ref 31.5–35.7)
MCV RBC AUTO: 85.7 FL (ref 79–97)
MONOCYTES # BLD AUTO: 0.32 10*3/MM3 (ref 0.1–0.9)
MONOCYTES NFR BLD AUTO: 6.9 % (ref 5–12)
NEUTROPHILS NFR BLD AUTO: 2.23 10*3/MM3 (ref 1.7–7)
NEUTROPHILS NFR BLD AUTO: 47.7 % (ref 42.7–76)
NRBC BLD AUTO-RTO: 0 /100 WBC (ref 0–0.2)
PHOSPHATE SERPL-MCNC: 3.3 MG/DL (ref 2.5–4.5)
PLATELET # BLD AUTO: 314 10*3/MM3 (ref 140–450)
PMV BLD AUTO: 9.5 FL (ref 6–12)
POTASSIUM SERPL-SCNC: 3.6 MMOL/L (ref 3.5–5.2)
PROT SERPL-MCNC: 7.4 G/DL (ref 6–8.5)
RBC # BLD AUTO: 3.92 10*6/MM3 (ref 3.77–5.28)
SODIUM SERPL-SCNC: 138 MMOL/L (ref 136–145)
TIBC SERPL-MCNC: 316 MCG/DL (ref 298–536)
TRANSFERRIN SERPL-MCNC: 212 MG/DL (ref 200–360)
WBC NRBC COR # BLD AUTO: 4.67 10*3/MM3 (ref 3.4–10.8)

## 2024-05-15 PROCEDURE — 84100 ASSAY OF PHOSPHORUS: CPT | Performed by: INTERNAL MEDICINE

## 2024-05-15 PROCEDURE — 25810000003 SODIUM CHLORIDE 0.9 % SOLUTION: Performed by: INTERNAL MEDICINE

## 2024-05-15 PROCEDURE — 84466 ASSAY OF TRANSFERRIN: CPT | Performed by: INTERNAL MEDICINE

## 2024-05-15 PROCEDURE — 83540 ASSAY OF IRON: CPT | Performed by: INTERNAL MEDICINE

## 2024-05-15 PROCEDURE — 85025 COMPLETE CBC W/AUTO DIFF WBC: CPT | Performed by: INTERNAL MEDICINE

## 2024-05-15 PROCEDURE — 96374 THER/PROPH/DIAG INJ IV PUSH: CPT

## 2024-05-15 PROCEDURE — 83735 ASSAY OF MAGNESIUM: CPT | Performed by: INTERNAL MEDICINE

## 2024-05-15 PROCEDURE — 80053 COMPREHEN METABOLIC PANEL: CPT | Performed by: INTERNAL MEDICINE

## 2024-05-15 PROCEDURE — 82728 ASSAY OF FERRITIN: CPT | Performed by: INTERNAL MEDICINE

## 2024-05-15 PROCEDURE — 25010000002 ZOLEDRONIC ACID 4 MG/100ML SOLUTION: Performed by: INTERNAL MEDICINE

## 2024-05-15 RX ORDER — SODIUM CHLORIDE 9 MG/ML
250 INJECTION, SOLUTION INTRAVENOUS ONCE
Status: COMPLETED | OUTPATIENT
Start: 2024-05-15 | End: 2024-05-15

## 2024-05-15 RX ORDER — ZOLEDRONIC ACID 0.04 MG/ML
4 INJECTION, SOLUTION INTRAVENOUS ONCE
Status: CANCELLED | OUTPATIENT
Start: 2024-05-15

## 2024-05-15 RX ORDER — ZOLEDRONIC ACID 0.04 MG/ML
4 INJECTION, SOLUTION INTRAVENOUS ONCE
Status: COMPLETED | OUTPATIENT
Start: 2024-05-15 | End: 2024-05-15

## 2024-05-15 RX ORDER — SODIUM CHLORIDE 9 MG/ML
250 INJECTION, SOLUTION INTRAVENOUS ONCE
Status: CANCELLED | OUTPATIENT
Start: 2024-05-15

## 2024-05-15 RX ADMIN — SODIUM CHLORIDE 250 ML: 9 INJECTION, SOLUTION INTRAVENOUS at 11:53

## 2024-05-15 RX ADMIN — ZOLEDRONIC ACID 4 MG: 0.04 INJECTION, SOLUTION INTRAVENOUS at 11:55

## 2024-05-15 NOTE — PROGRESS NOTES
Subjective     CHIEF COMPLAINT:      Chief Complaint   Patient presents with    Follow-up     HISTORY OF PRESENT ILLNESS:     Ina Akers is a 53 y.o. female patient who returns today for follow up on her breast cancer and thyroid cancer.  She returns today for follow-up reporting continued problem with pain in the hip areas.  It worsens when she sits.  No new area of pain.    Patient underwent PET scan at [facility] in April and she is going to contact them for follow-up with a new oncologist there.    ROS:  Pertinent ROS is in the HPI.     Past medical, surgical, social and family history were reviewed.     MEDICATIONS:    Current Outpatient Medications:     acetaminophen (TYLENOL) 500 MG tablet, Take 2 tablets by mouth Every 6 (Six) Hours As Needed for Mild Pain., Disp: , Rfl:     amLODIPine (NORVASC) 10 MG tablet, TAKE ONE TABLET BY MOUTH DAILY (Patient taking differently: Take 1 tablet by mouth Daily.), Disp: 90 tablet, Rfl: 1    atorvastatin (LIPITOR) 20 MG tablet, Take 1 tablet by mouth Daily., Disp: 90 tablet, Rfl: 3    Blood Glucose Monitoring Suppl w/Device kit, Check blood sugar twice a day, Disp: 1 each, Rfl: 0    cholecalciferol (VITAMIN D3) 25 MCG (1000 UT) tablet, Take 1 tablet by mouth Daily., Disp: , Rfl:     cyclobenzaprine (FLEXERIL) 5 MG tablet, Take 1 tablet by mouth At Night As Needed for Muscle Spasms. 1 or 2 tablets by mouth at bedtime if needed for leg cramps, Disp: 20 tablet, Rfl: 2    famotidine (PEPCID) 20 MG tablet, Take 1 tablet by mouth Every Other Day., Disp: , Rfl:     Glucose Blood (BLOOD GLUCOSE TEST) strip, Check blood sugar twice a day., Disp: 100 each, Rfl: 1    labetalol (NORMODYNE) 100 MG tablet, Take 0.5 tablets by mouth 2 (Two) Times a Day., Disp: 180 tablet, Rfl: 3    Lancets (ACCU-CHEK SOFT TOUCH) lancets, Check blood sugar twice a day as needed, Disp: 100 each, Rfl: 1    lansoprazole (PREVACID) 30 MG capsule, TAKE 1 CAPSULE BY MOUTH DAILY (Patient  "taking differently: Take 1 capsule by mouth Every Other Day.), Disp: 90 capsule, Rfl: 3    levothyroxine (SYNTHROID, LEVOTHROID) 112 MCG tablet, Take 1 tablet by mouth Every Morning., Disp: , Rfl:     losartan (COZAAR) 100 MG tablet, Take 1 tablet by mouth Daily., Disp: 90 tablet, Rfl: 3    metFORMIN ER (GLUCOPHAGE-XR) 500 MG 24 hr tablet, Take 2 tablets by mouth Daily With Dinner., Disp: 180 tablet, Rfl: 3    NON FORMULARY, Take 1 tablet by mouth Every Night. STOOL SOFTNER, Disp: , Rfl:     promethazine (PHENERGAN) 12.5 MG tablet, Take 1-2 tablets by mouth Every 6 (Six) Hours As Needed for Nausea. TAKE 1/2 TO 1 TABLET BY MOUTH EVERY 4 HOURS AS NEEDED FOR NAUSEA AND VOMITING -MAY MAKE DROWSY, Disp: 60 tablet, Rfl: 2    triamcinolone (KENALOG) 0.1 % cream, Apply  topically to the appropriate area as directed 2 (Two) Times a Day. Sparingly shortest time possible avoid face (Patient taking differently: Apply 1 Application topically to the appropriate area as directed As Needed. Sparingly shortest time possible avoid face), Disp: 80 g, Rfl: 0  No current facility-administered medications for this visit.  Objective     VITAL SIGNS:     Vitals:    05/15/24 1058   BP: 112/72   Pulse: 82   Resp: 18   Temp: 98 °F (36.7 °C)   TempSrc: Temporal   SpO2: 97%   Weight: 78.9 kg (174 lb)   Height: 154.9 cm (60.98\")   PainSc: 0-No pain     Body mass index is 32.89 kg/m².     Wt Readings from Last 5 Encounters:   05/15/24 78.9 kg (174 lb)   05/14/24 79.3 kg (174 lb 14.4 oz)   04/17/24 79.7 kg (175 lb 11.2 oz)   03/20/24 79.7 kg (175 lb 12.8 oz)   03/06/24 80.7 kg (178 lb)     PHYSICAL EXAMINATION:   GENERAL: The patient appears in good general condition, not in acute distress.   SKIN: No ecchymosis.  EYES: No jaundice. No Pallor.  CHEST: Normal respiratory effort.   CVS: No edema.  ABDOMEN: Nondistended.  EXTREMITIES: No joint deformity.     DIAGNOSTIC DATA:     Results from last 7 days   Lab Units 05/15/24  1055   WBC 10*3/mm3 4.67 "   NEUTROS ABS 10*3/mm3 2.23   HEMOGLOBIN g/dL 10.3*   HEMATOCRIT % 33.6*   PLATELETS 10*3/mm3 314     Results from last 7 days   Lab Units 05/15/24  1043   SODIUM mmol/L 138   POTASSIUM mmol/L 3.6   CHLORIDE mmol/L 104   CO2 mmol/L 23.0   BUN mg/dL 14   CREATININE mg/dL 0.93   CALCIUM mg/dL 9.6   ALBUMIN g/dL 4.2   BILIRUBIN mg/dL 0.4   ALK PHOS U/L 68   ALT (SGPT) U/L 14   AST (SGOT) U/L 17   GLUCOSE mg/dL 140*   MAGNESIUM mg/dL 2.0         Lab 05/15/24  1043   IRON 54   IRON SATURATION (TSAT) 17*   TIBC 316   TRANSFERRIN 212   FERRITIN 100.70      Component      Latest Ref Rng 5/15/2024   Phosphorus      2.5 - 4.5 mg/dL 3.3      Right breast mammogram on 5/1/2024:   Mass in the right breast is probably benign.  Follow-up mammography in 6 months is recommended.    PET scan on 4/26/2024:  No convincing new sites of metabolically active disease. There is however significantly increased FDG avid uptake involving the known left ischial and right posterior ninth rib metastases, possibly representing an element of progression in this patient with rising thyroglobulin.       Assessment & Plan    *History of left breast cancer.  It was identified on screening mammograms on 10/5/2022.  It was located at 12 o'clock position.  It measured 6 x 5 by 5 mm on mammogram and ultrasound on 10/20/2022.  PET scan on 10/6/2022 showed the lesion to be hypermetabolic. It measured 10 x 7 mm with an SUV of 5.8.  No evidence of metastasis on PET scan.  MRI on 12/5/2022 showed the lesion to measure 9 mm.    S/P mastectomy with reconstruction on 1/24/2023.  Pathology exam revealed the primary tumor to measure 15 mm.  It was grade 3.  Triple negative.  HER2 was negative-0 by IHC.  Ki-67 was 85%.  It was considered pT1cN0.  2 sentinel lymph nodes were examined and were negative.    No lymphovascular invasion. 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  Due to the patient receiving Adriamycin in the past, she is unable to receive additional  Adriamycin.  Taxotere and Cytoxan with Neulasta support every 3 weeks x 4 cycles was recommended.  She started Taxotere and Cytoxan on 3/8/2023.  She had infusion reaction to Taxotere which was briefly held.  She was given IV Solu-Cortef.  It was restarted at a slower rate and she tolerated the infusion.  She received cycle #4 on 5/3/2023.    CT scans on 10/20/2023 showed no evidence of recurrence or metastasis.  CT scans on 3/26/2024 showed no evidence of recurrence.  She is due well.  No evidence of recurrence on the FDG PET scan.    *Metastatic thyroid cancer diagnosed on 10/6/2020.  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  She had right thyroid lobectomy on 12/17/2018 at [facility].    The specimen was sent for outside consultation and the final conclusion was that there was no evidence of malignancy.  MRI on 8/27/2020 revealed an L4 lesion resulting in pathologic compression fracture.  Bone scan on 9/2/2020 revealed increased activity at L4 and left side of the skull.  PET scan on 9/9/2020 revealed abnormal activity at L4, right femoral neck, lower sacrum, left ischium and right ninth medial aspect.  PET scan 9/9/2020 showed no evidence of visceral metastasis.  Patient had kyphoplasty on 9/17/2020.  Pathology was nondiagnostic.  CT-guided biopsy of the lesion in the right posterior ninth rib on 10/6/2020 revealed a clear cell neoplasm.  It is positive for PAX 8 and TTF-1 favoring metastatic thyroid cancer.  [facility].  Patient underwent completion thyroidectomy on 11/9/2020.  There was no evidence of uptake of radioactive iodine on SPECT imaging on 12/18/2020.  This was indicative of the tumor not going to respond to radioactive iodine therapy.  Patient was started on Lenvima 20 mg daily on 12/24/2020.  Lenvima was held on 2/6/2021 due to drug induced anemia and thrombocytopenia.  She was unable to start back on it due to prolonged cytopenias.  CT scans  on 3/23/2021 showed stable disease.  CT scans showed stable disease.  Thyroglobulin was stable at 3.4.    PET scan on 9/1/2021 revealed decrease in the activity in the metastatic lesions.    Starting back on active treatment (Nexavar) was not recommended.  PET scan on 12/16/2021 showed complete response except for mild hypermetabolism in the left ischium.  CT scans on 4/8/2022 showed stable bone metastasis.  No new metastatic lesions.  PET scan on 10/6/2022 revealed no new areas of metastasis.  Due to the stable metastatic disease, she was not placed ontreatment for her thyroid cancer.   She is on Synthroid 112 mcg daily.  Goal is to maintain TSH <0.1.  Thyroglobulin increased to 13.1 on 3/26/2024 and was 11.3 on 4/4/2024.  PET scan on 4/26/2024 revealed increase in the FDG uptake in the left ischial and right posterior ninth rib metastasis.  This is concerning for progression of the known metastasis.   Due to the worsening on the PET scan and the gradual decrease in the thyroglobulin, my recommendation to her was to start treatment with Nexavar.  She will be following up at  in the near future to review the PET scan.    *Bone metastasis.  Patient was given Zometa on 9/29/2020.  Patient underwent right femur medullary nailing on 10/23/2020.  Patient was restarted back on Zometa on 2/24/2022.   CT scans on 4/8/2022 revealed no new metastatic lesions.  The metastatic lesions were stable.  PET scan on 10/6/2022 revealed no change in the areas of activity in the bone metastasis.  No new lesions.  Zometa was restarted on 6/14/2023.  She is complaining of intermittent pain in the left hip-likely referred and related to the L4 compression fracture.  She follows with neurosurgery.    * Iron deficiency anemia.    Patient was previously treated with ferrous sulfate 325 mg daily.  Hemoglobin decreased to 9.1 on 2/24/2022.  EGD on 3/25/2022 showed no evidence of blood loss.  Pathology exam revealed no typical features of  celiac disease.  Hemoglobin was 10.4 on 12/27/2022.  Ferritin was 95 and transferrin saturation was 14%.  She was started on ferrous sulfate 325 mg daily.  Hemoglobin decreased to 9.6 on 2/1/2023, secondary to recent surgical blood losses.  She was restarted on ferrous sulfate 325 mg daily on 2/1/2023.  5/3/2023: Hemoglobin 9.4.  She was continued on ferrous sulfate 325 mg once daily.  7/26/2023: Hemoglobin decreased to 10.0.  She was continued on ferrous sulfate 325 mg daily.  She was advised to take vitamin C 1000 mg daily.  11/29/2023: Hemoglobin 9.9.  Transferrin saturation 11%.  We discussed options of IV Venofer 300 mg weekly x 5 doses but the patient decided to eat with oral iron.  4/17/2024: Hemoglobin 10.1.  5/15/2024: Hemoglobin improved to 10.3.  Ferritin improved to 100.7.  Transferrin saturation improved to 17%.    *Lesion in the right breast at 6 o'clock position.  It is underneath the reconstruction inverted T incision.  We obtained a diagnostic mammogram that revealed no suspicious lesions.  This is likely a scar.  The radiologist recommended a follow-up mammogram in 6 months.    *History of left breast cancer, stage IIB, triIple negative.   S/P lumpectomy in 2001.  She received adjuvant chemotherapy on the NSABP B34 with AC followed by Taxotere.  S/P post-lumpectomy radiation therapy.   She was placed on tamoxifen for 5 years and it was completed in January 2007.   With the patient's history of breast cancer at a young age and development of a second new breast cancer in the left breast, repeat genetic testing was recommended.  Gene testing on 12/22/2022 revealed variants of unknown significance.    PLAN:    1.  We will give Zometa today.  2.  Continue ferrous sulfate 1 tablet daily.  3.  I asked her to contact [facility] to arrange for follow-up with the new oncologist to discuss treatment.  I told her that my recommendation is to start treatment with Nexavar.  4.  She will return  monthly for Zometa.  5.  I will see her in follow-up in 4 months.  CBC CMP magnesium phosphorus ferritin and iron panel.        Tim Muñoz MD  05/15/24

## 2024-05-23 ENCOUNTER — TELEPHONE (OUTPATIENT)
Dept: NEUROSURGERY | Facility: CLINIC | Age: 54
End: 2024-05-23
Payer: COMMERCIAL

## 2024-05-23 NOTE — TELEPHONE ENCOUNTER
Patient called stated she had a PET scan done at [facility] and they stated there were changes. She would to see DR. Rios in regards to changes. I have called [facility] to have them powershare and send report over to us. Will contact our Rad dept to accept.

## 2024-05-27 PROBLEM — Z56.0 NOT CURRENTLY WORKING DUE TO DISABLED STATUS: Status: ACTIVE | Noted: 2024-05-27

## 2024-05-27 PROBLEM — R53.1 WEAKNESS GENERALIZED: Status: ACTIVE | Noted: 2024-05-27

## 2024-05-28 ENCOUNTER — LAB (OUTPATIENT)
Dept: LAB | Facility: HOSPITAL | Age: 54
End: 2024-05-28
Payer: COMMERCIAL

## 2024-05-28 DIAGNOSIS — Z17.1 MALIGNANT NEOPLASM OF UPPER-OUTER QUADRANT OF LEFT BREAST IN FEMALE, ESTROGEN RECEPTOR NEGATIVE: ICD-10-CM

## 2024-05-28 DIAGNOSIS — R53.1 WEAKNESS GENERALIZED: ICD-10-CM

## 2024-05-28 DIAGNOSIS — C50.412 MALIGNANT NEOPLASM OF UPPER-OUTER QUADRANT OF LEFT BREAST IN FEMALE, ESTROGEN RECEPTOR NEGATIVE: ICD-10-CM

## 2024-05-28 DIAGNOSIS — C79.51 PRIMARY CANCER OF THYROID GLAND METASTATIC TO BONE: ICD-10-CM

## 2024-05-28 DIAGNOSIS — C73 PRIMARY CANCER OF THYROID GLAND METASTATIC TO BONE: ICD-10-CM

## 2024-05-28 LAB
BASOPHILS # BLD AUTO: 0.02 10*3/MM3 (ref 0–0.2)
BASOPHILS NFR BLD AUTO: 0.4 % (ref 0–1.5)
BILIRUB UR QL STRIP: NEGATIVE
CHOLEST SERPL-MCNC: 153 MG/DL (ref 0–200)
CLARITY UR: CLEAR
COLOR UR: YELLOW
DEPRECATED RDW RBC AUTO: 47.8 FL (ref 37–54)
EOSINOPHIL # BLD AUTO: 0.32 10*3/MM3 (ref 0–0.4)
EOSINOPHIL NFR BLD AUTO: 6.2 % (ref 0.3–6.2)
ERYTHROCYTE [DISTWIDTH] IN BLOOD BY AUTOMATED COUNT: 15.3 % (ref 12.3–15.4)
GLUCOSE UR STRIP-MCNC: NEGATIVE MG/DL
HBA1C MFR BLD: 6.4 % (ref 4.8–5.6)
HCT VFR BLD AUTO: 33.1 % (ref 34–46.6)
HDLC SERPL-MCNC: 50 MG/DL (ref 40–60)
HGB BLD-MCNC: 10.1 G/DL (ref 12–15.9)
HGB UR QL STRIP.AUTO: NEGATIVE
IMM GRANULOCYTES # BLD AUTO: 0.02 10*3/MM3 (ref 0–0.05)
IMM GRANULOCYTES NFR BLD AUTO: 0.4 % (ref 0–0.5)
KETONES UR QL STRIP: NEGATIVE
LDLC SERPL CALC-MCNC: 93 MG/DL (ref 0–100)
LDLC/HDLC SERPL: 1.86 {RATIO}
LEUKOCYTE ESTERASE UR QL STRIP.AUTO: NEGATIVE
LYMPHOCYTES # BLD AUTO: 1.89 10*3/MM3 (ref 0.7–3.1)
LYMPHOCYTES NFR BLD AUTO: 36.7 % (ref 19.6–45.3)
MCH RBC QN AUTO: 26.2 PG (ref 26.6–33)
MCHC RBC AUTO-ENTMCNC: 30.5 G/DL (ref 31.5–35.7)
MCV RBC AUTO: 86 FL (ref 79–97)
MONOCYTES # BLD AUTO: 0.47 10*3/MM3 (ref 0.1–0.9)
MONOCYTES NFR BLD AUTO: 9.1 % (ref 5–12)
NEUTROPHILS NFR BLD AUTO: 2.43 10*3/MM3 (ref 1.7–7)
NEUTROPHILS NFR BLD AUTO: 47.2 % (ref 42.7–76)
NITRITE UR QL STRIP: NEGATIVE
NRBC BLD AUTO-RTO: 0 /100 WBC (ref 0–0.2)
PH UR STRIP.AUTO: 5.5 [PH] (ref 5–8)
PLATELET # BLD AUTO: 280 10*3/MM3 (ref 140–450)
PMV BLD AUTO: 8.3 FL (ref 6–12)
PROT UR QL STRIP: NEGATIVE
RBC # BLD AUTO: 3.85 10*6/MM3 (ref 3.77–5.28)
SP GR UR STRIP: 1.01 (ref 1–1.03)
TRIGL SERPL-MCNC: 49 MG/DL (ref 0–150)
UROBILINOGEN UR QL STRIP: NORMAL
VLDLC SERPL-MCNC: 10 MG/DL (ref 5–40)
WBC NRBC COR # BLD AUTO: 5.15 10*3/MM3 (ref 3.4–10.8)

## 2024-05-28 PROCEDURE — 81003 URINALYSIS AUTO W/O SCOPE: CPT

## 2024-05-28 PROCEDURE — 83036 HEMOGLOBIN GLYCOSYLATED A1C: CPT

## 2024-05-28 PROCEDURE — 85025 COMPLETE CBC W/AUTO DIFF WBC: CPT

## 2024-05-28 PROCEDURE — 80053 COMPREHEN METABOLIC PANEL: CPT

## 2024-05-28 PROCEDURE — 36415 COLL VENOUS BLD VENIPUNCTURE: CPT

## 2024-05-28 PROCEDURE — 80061 LIPID PANEL: CPT

## 2024-05-29 LAB
ALBUMIN SERPL-MCNC: 4.3 G/DL (ref 3.5–5.2)
ALBUMIN/GLOB SERPL: 1.5 G/DL
ALP SERPL-CCNC: 54 U/L (ref 39–117)
ALT SERPL W P-5'-P-CCNC: 13 U/L (ref 1–33)
ANION GAP SERPL CALCULATED.3IONS-SCNC: 11.3 MMOL/L (ref 5–15)
AST SERPL-CCNC: 16 U/L (ref 1–32)
BILIRUB SERPL-MCNC: 0.3 MG/DL (ref 0–1.2)
BUN SERPL-MCNC: 16 MG/DL (ref 6–20)
BUN/CREAT SERPL: 16.7 (ref 7–25)
CALCIUM SPEC-SCNC: 9.6 MG/DL (ref 8.6–10.5)
CHLORIDE SERPL-SCNC: 104 MMOL/L (ref 98–107)
CO2 SERPL-SCNC: 22.7 MMOL/L (ref 22–29)
CREAT SERPL-MCNC: 0.96 MG/DL (ref 0.57–1)
EGFRCR SERPLBLD CKD-EPI 2021: 70 ML/MIN/1.73
GLOBULIN UR ELPH-MCNC: 2.9 GM/DL
GLUCOSE SERPL-MCNC: 87 MG/DL (ref 65–99)
POTASSIUM SERPL-SCNC: 3.9 MMOL/L (ref 3.5–5.2)
PROT SERPL-MCNC: 7.2 G/DL (ref 6–8.5)
SODIUM SERPL-SCNC: 138 MMOL/L (ref 136–145)

## 2024-06-05 ENCOUNTER — OFFICE VISIT (OUTPATIENT)
Dept: FAMILY MEDICINE CLINIC | Facility: CLINIC | Age: 54
End: 2024-06-05
Payer: COMMERCIAL

## 2024-06-05 VITALS
DIASTOLIC BLOOD PRESSURE: 73 MMHG | BODY MASS INDEX: 32.94 KG/M2 | SYSTOLIC BLOOD PRESSURE: 115 MMHG | HEART RATE: 82 BPM | HEIGHT: 61 IN | WEIGHT: 174.5 LBS | RESPIRATION RATE: 18 BRPM | OXYGEN SATURATION: 100 %

## 2024-06-05 DIAGNOSIS — M25.562 ACUTE PAIN OF BOTH KNEES: ICD-10-CM

## 2024-06-05 DIAGNOSIS — Z56.0 NOT CURRENTLY WORKING DUE TO DISABLED STATUS: ICD-10-CM

## 2024-06-05 DIAGNOSIS — E11.9 CONTROLLED TYPE 2 DIABETES MELLITUS WITHOUT COMPLICATION, WITHOUT LONG-TERM CURRENT USE OF INSULIN: ICD-10-CM

## 2024-06-05 DIAGNOSIS — M25.561 ACUTE PAIN OF BOTH KNEES: ICD-10-CM

## 2024-06-05 DIAGNOSIS — I10 HYPERTENSION, UNSPECIFIED TYPE: ICD-10-CM

## 2024-06-05 DIAGNOSIS — Z00.00 HEALTH MAINTENANCE EXAMINATION: Primary | ICD-10-CM

## 2024-06-05 DIAGNOSIS — C79.51 PRIMARY CANCER OF THYROID GLAND METASTATIC TO BONE: ICD-10-CM

## 2024-06-05 DIAGNOSIS — C50.919 MALIGNANT NEOPLASM OF FEMALE BREAST, UNSPECIFIED ESTROGEN RECEPTOR STATUS, UNSPECIFIED LATERALITY, UNSPECIFIED SITE OF BREAST: ICD-10-CM

## 2024-06-05 DIAGNOSIS — C73 PRIMARY CANCER OF THYROID GLAND METASTATIC TO BONE: ICD-10-CM

## 2024-06-05 PROCEDURE — 99396 PREV VISIT EST AGE 40-64: CPT | Performed by: NURSE PRACTITIONER

## 2024-06-05 SDOH — ECONOMIC STABILITY - INCOME SECURITY: UNEMPLOYMENT, UNSPECIFIED: Z56.0

## 2024-06-05 NOTE — PATIENT INSTRUCTIONS
Discharge instructions DEXA scan next year with your follow-up with GYN when due  Falls precaution  Planning ahead with for the airport  Follow-up with oncology next week, they likely will order CAT scan or MRI of your hips and pelvis but discussed with them if you need this or not    Let me know if I need to do anything for you here, as well they will send the results  For my review, Bright lights positive music positive aromas,  Prayer Kymberly encourage you to continue with hope for the future, and spending lots of andrew time with your grandchild,    Consider RSV vaccine outpatient pharmacy consider Prevnar 20    As well pneumonia vaccine at your local pharmacy or here in the future

## 2024-06-05 NOTE — PROGRESS NOTES
Chief Complaint  Annual Exam (Annual physical ) and Leg Pain (Back and leg pain and tingling)    Subjective        Ina Akers presents to [facility]  History of Present Illness  Very pleasant patient here today, for health maintenance, she is doing okay presently but she does have increased anxiety, but over recently  Some increased uptake in several regions, on her PET scan, more recently last several weeks she has had some more left hip and low back pain, and sometimes notices pain that radiates down her legs, sometimes paresthesias.  She had previous involvement malignancy, thyroid etiology, right hip, surgically fixed with right and hand, she also had low back L4 kyphoplasty for metastatic disease,  Left hip ischial region,  She has an appointment with oncology in [place] in 5 days,  She has no bowel or bladder incontinence or retention new, no saddlebag paresthesias no fevers no chills, unfortunately she had to retire recently, and she has mixed emotions but she is spending more time with her grandchild and this is a blessing.    Unfortunately patient is unable to work full-time or substantial gainful employment and had to leave her job with disability due to  The gravity of thyroid cancer with metastatic disease to her hip pelvis, and spine with previous reconstruction right hip, chronic pain of her hip and low back, as well as fatigue from chemotherapy,  As well advised that she was found to have recurrence of breast cancer and recently had to have chemo and with the breast cancer diagnosis, everything just been so overwhelming for her and she has been unable to rebound in the past that she was when she was younger.  She does not have the stamina as well as she has pain low back and regions, and unable to  Repetitively to bend and left and walk and stand and have the energy to make important decisions in communications and therefore she is disabled completely unable to  "work  With gainful employment as a nurse.    Screenings are up-to-date, blood pressure good controlled nicely, hyperlipidemia takes a statin appropriately placement therapy for thyroid  Takes metformin for diabetes, she is up-to-date with her oncologist and recently oncologist had left and she has a new 1 at [facility],          Leg Pain       Objective   Vital Signs:  /73   Pulse 82   Resp 18   Ht 154.9 cm (60.98\")   Wt 79.2 kg (174 lb 8 oz)   SpO2 100%   BMI 32.99 kg/m²   Estimated body mass index is 32.99 kg/m² as calculated from the following:    Height as of this encounter: 154.9 cm (60.98\").    Weight as of this encounter: 79.2 kg (174 lb 8 oz).          Physical Exam  Vitals reviewed.   Constitutional:       General: She is not in acute distress.     Appearance: Normal appearance. She is well-developed. She is not ill-appearing, toxic-appearing or diaphoretic.   HENT:      Head: Normocephalic.      Right Ear: Tympanic membrane normal.      Left Ear: Tympanic membrane normal.      Nose: Nose normal.   Eyes:      General: No scleral icterus.     Conjunctiva/sclera: Conjunctivae normal.      Pupils: Pupils are equal, round, and reactive to light.   Neck:      Thyroid: No thyromegaly.      Vascular: No JVD.   Cardiovascular:      Rate and Rhythm: Normal rate and regular rhythm.      Heart sounds: Normal heart sounds. No murmur heard.     No friction rub. No gallop.   Pulmonary:      Effort: Pulmonary effort is normal. No respiratory distress.      Breath sounds: Normal breath sounds. No stridor. No wheezing or rales.   Abdominal:      General: Bowel sounds are normal. There is no distension.      Palpations: Abdomen is soft.      Tenderness: There is no abdominal tenderness.      Comments: No hepatosplenomegaly, no ascites,   Musculoskeletal:         General: No tenderness. Normal range of motion.      Cervical back: Neck supple.      Comments: Negative straight leg raise, gently range her " hips, without pain, normal gait   Lymphadenopathy:      Cervical: No cervical adenopathy.   Skin:     General: Skin is warm and dry.      Findings: No erythema or rash.   Neurological:      General: No focal deficit present.      Mental Status: She is alert and oriented to person, place, and time. Mental status is at baseline.      Deep Tendon Reflexes: Reflexes are normal and symmetric.   Psychiatric:         Mood and Affect: Mood normal.         Behavior: Behavior normal.         Thought Content: Thought content normal.         Judgment: Judgment normal.      Comments: Smiling, pleasant appropriate at times quite tearful appropriately for conversation,        Result Review :                Assessment and Plan   Diagnoses and all orders for this visit:    1. Health maintenance examination (Primary)    2. Hypertension, unspecified type    3. Controlled type 2 diabetes mellitus without complication, without long-term current use of insulin    4. Acute pain of both knees    5. Primary cancer of thyroid gland metastatic to bone    6. Malignant neoplasm of female breast, unspecified estrogen receptor status, unspecified laterality, unspecified site of breast    7. Not currently working due to disabled status  Comments:  Completely disabled due to primary thyroid cancer with metastatic disease to the bone spine and hips, and breast cancer             Follow Up   Return in about 6 months (around 12/5/2024) for Labs before next visit.  Patient was given instructions and counseling regarding her condition or for health maintenance advice. Please see specific information pulled into the AVS if appropriate.   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 which has been treated radiation and chemo in the past    Patient Instructions   Discharge instructions DEXA scan next year with your follow-up with GYN when due  Falls precaution  Planning ahead with for the airport  Follow-up with oncology next week, they likely will order CAT scan or MRI of your hips and pelvis but discussed with them if you need this or not    Let me know if I need to do anything for you here, as well they will send the results  For my review, Bright lights positive music positive aromas,  Prayer Kymberly encourage you to continue with hope for the future, and spending lots of andrew time with your grandchild,    Consider RSV vaccine outpatient pharmacy consider Prevnar 20    As well pneumonia vaccine at your local pharmacy or here in the future

## 2024-06-11 PROBLEM — C79.51 PRIMARY CANCER OF THYROID GLAND METASTATIC TO BONE: Status: ACTIVE | Noted: 2024-06-11

## 2024-06-11 PROBLEM — C73 PRIMARY CANCER OF THYROID GLAND METASTATIC TO BONE: Status: ACTIVE | Noted: 2024-06-11

## 2024-06-12 ENCOUNTER — INFUSION (OUTPATIENT)
Dept: ONCOLOGY | Facility: HOSPITAL | Age: 54
End: 2024-06-12
Payer: COMMERCIAL

## 2024-06-12 VITALS
WEIGHT: 175 LBS | HEART RATE: 78 BPM | TEMPERATURE: 98.2 F | HEIGHT: 61 IN | OXYGEN SATURATION: 98 % | DIASTOLIC BLOOD PRESSURE: 78 MMHG | SYSTOLIC BLOOD PRESSURE: 119 MMHG | RESPIRATION RATE: 15 BRPM | BODY MASS INDEX: 33.04 KG/M2

## 2024-06-12 DIAGNOSIS — C79.51 METASTASIS TO BONE: Primary | ICD-10-CM

## 2024-06-12 DIAGNOSIS — Z17.1 MALIGNANT NEOPLASM OF UPPER-OUTER QUADRANT OF LEFT BREAST IN FEMALE, ESTROGEN RECEPTOR NEGATIVE: ICD-10-CM

## 2024-06-12 DIAGNOSIS — C50.412 MALIGNANT NEOPLASM OF UPPER-OUTER QUADRANT OF LEFT BREAST IN FEMALE, ESTROGEN RECEPTOR NEGATIVE: ICD-10-CM

## 2024-06-12 LAB
ALBUMIN SERPL-MCNC: 4.5 G/DL (ref 3.5–5.2)
ALBUMIN/GLOB SERPL: 1.5 G/DL
ALP SERPL-CCNC: 57 U/L (ref 39–117)
ALT SERPL W P-5'-P-CCNC: 15 U/L (ref 1–33)
ANION GAP SERPL CALCULATED.3IONS-SCNC: 8.8 MMOL/L (ref 5–15)
AST SERPL-CCNC: 16 U/L (ref 1–32)
BASOPHILS # BLD AUTO: 0.02 10*3/MM3 (ref 0–0.2)
BASOPHILS NFR BLD AUTO: 0.5 % (ref 0–1.5)
BILIRUB SERPL-MCNC: 0.3 MG/DL (ref 0–1.2)
BUN SERPL-MCNC: 16 MG/DL (ref 6–20)
BUN/CREAT SERPL: 19.5 (ref 7–25)
CALCIUM SPEC-SCNC: 9.5 MG/DL (ref 8.6–10.5)
CHLORIDE SERPL-SCNC: 106 MMOL/L (ref 98–107)
CO2 SERPL-SCNC: 26.2 MMOL/L (ref 22–29)
CREAT SERPL-MCNC: 0.82 MG/DL (ref 0.57–1)
DEPRECATED RDW RBC AUTO: 49.5 FL (ref 37–54)
EGFRCR SERPLBLD CKD-EPI 2021: 84.6 ML/MIN/1.73
EOSINOPHIL # BLD AUTO: 0.3 10*3/MM3 (ref 0–0.4)
EOSINOPHIL NFR BLD AUTO: 7 % (ref 0.3–6.2)
ERYTHROCYTE [DISTWIDTH] IN BLOOD BY AUTOMATED COUNT: 15.7 % (ref 12.3–15.4)
FERRITIN SERPL-MCNC: 80 NG/ML (ref 13–150)
GLOBULIN UR ELPH-MCNC: 3 GM/DL
GLUCOSE SERPL-MCNC: 139 MG/DL (ref 65–99)
HCT VFR BLD AUTO: 34 % (ref 34–46.6)
HGB BLD-MCNC: 10.5 G/DL (ref 12–15.9)
IMM GRANULOCYTES # BLD AUTO: 0.01 10*3/MM3 (ref 0–0.05)
IMM GRANULOCYTES NFR BLD AUTO: 0.2 % (ref 0–0.5)
IRON 24H UR-MRATE: 44 MCG/DL (ref 37–145)
IRON SATN MFR SERPL: 13 % (ref 20–50)
LYMPHOCYTES # BLD AUTO: 1.58 10*3/MM3 (ref 0.7–3.1)
LYMPHOCYTES NFR BLD AUTO: 36.7 % (ref 19.6–45.3)
MAGNESIUM SERPL-MCNC: 1.9 MG/DL (ref 1.6–2.6)
MCH RBC QN AUTO: 26.8 PG (ref 26.6–33)
MCHC RBC AUTO-ENTMCNC: 30.9 G/DL (ref 31.5–35.7)
MCV RBC AUTO: 86.7 FL (ref 79–97)
MONOCYTES # BLD AUTO: 0.29 10*3/MM3 (ref 0.1–0.9)
MONOCYTES NFR BLD AUTO: 6.7 % (ref 5–12)
NEUTROPHILS NFR BLD AUTO: 2.1 10*3/MM3 (ref 1.7–7)
NEUTROPHILS NFR BLD AUTO: 48.9 % (ref 42.7–76)
NRBC BLD AUTO-RTO: 0 /100 WBC (ref 0–0.2)
PHOSPHATE SERPL-MCNC: 3 MG/DL (ref 2.5–4.5)
PLATELET # BLD AUTO: 298 10*3/MM3 (ref 140–450)
PMV BLD AUTO: 9.5 FL (ref 6–12)
POTASSIUM SERPL-SCNC: 3.5 MMOL/L (ref 3.5–5.2)
PROT SERPL-MCNC: 7.5 G/DL (ref 6–8.5)
RBC # BLD AUTO: 3.92 10*6/MM3 (ref 3.77–5.28)
SODIUM SERPL-SCNC: 141 MMOL/L (ref 136–145)
TIBC SERPL-MCNC: 326 MCG/DL (ref 298–536)
TRANSFERRIN SERPL-MCNC: 219 MG/DL (ref 200–360)
WBC NRBC COR # BLD AUTO: 4.3 10*3/MM3 (ref 3.4–10.8)

## 2024-06-12 PROCEDURE — 84100 ASSAY OF PHOSPHORUS: CPT | Performed by: INTERNAL MEDICINE

## 2024-06-12 PROCEDURE — 82728 ASSAY OF FERRITIN: CPT | Performed by: INTERNAL MEDICINE

## 2024-06-12 PROCEDURE — 25010000002 ZOLEDRONIC ACID 4 MG/100ML SOLUTION: Performed by: NURSE PRACTITIONER

## 2024-06-12 PROCEDURE — 83540 ASSAY OF IRON: CPT | Performed by: INTERNAL MEDICINE

## 2024-06-12 PROCEDURE — 85025 COMPLETE CBC W/AUTO DIFF WBC: CPT | Performed by: INTERNAL MEDICINE

## 2024-06-12 PROCEDURE — 80053 COMPREHEN METABOLIC PANEL: CPT | Performed by: INTERNAL MEDICINE

## 2024-06-12 PROCEDURE — 96374 THER/PROPH/DIAG INJ IV PUSH: CPT

## 2024-06-12 PROCEDURE — 84466 ASSAY OF TRANSFERRIN: CPT | Performed by: INTERNAL MEDICINE

## 2024-06-12 PROCEDURE — 25810000003 SODIUM CHLORIDE 0.9 % SOLUTION: Performed by: NURSE PRACTITIONER

## 2024-06-12 PROCEDURE — 83735 ASSAY OF MAGNESIUM: CPT | Performed by: INTERNAL MEDICINE

## 2024-06-12 RX ORDER — SODIUM CHLORIDE 9 MG/ML
250 INJECTION, SOLUTION INTRAVENOUS ONCE
Status: COMPLETED | OUTPATIENT
Start: 2024-06-12 | End: 2024-06-12

## 2024-06-12 RX ORDER — ZOLEDRONIC ACID 0.04 MG/ML
4 INJECTION, SOLUTION INTRAVENOUS ONCE
Status: COMPLETED | OUTPATIENT
Start: 2024-06-12 | End: 2024-06-12

## 2024-06-12 RX ADMIN — SODIUM CHLORIDE 250 ML: 9 INJECTION, SOLUTION INTRAVENOUS at 09:51

## 2024-06-12 RX ADMIN — ZOLEDRONIC ACID 4 MG: 0.04 INJECTION, SOLUTION INTRAVENOUS at 09:51

## 2024-06-17 NOTE — PROGRESS NOTES
Patient spoke with this RN to request an appeal letter for her supplemental cancer insurance policy with [employer]. She is requesting a letter detailing her Zometa treatment. I reviewed this with Dr. Muñoz and Ifrah NEW, both agreed for letter to be written. Patient made aware [email or web address]

## 2024-06-20 ENCOUNTER — OFFICE VISIT (OUTPATIENT)
Dept: SURGERY | Facility: CLINIC | Age: 54
End: 2024-06-20
Payer: COMMERCIAL

## 2024-06-20 VITALS
DIASTOLIC BLOOD PRESSURE: 82 MMHG | OXYGEN SATURATION: 98 % | RESPIRATION RATE: 17 BRPM | HEART RATE: 84 BPM | SYSTOLIC BLOOD PRESSURE: 128 MMHG | BODY MASS INDEX: 33.04 KG/M2 | WEIGHT: 175 LBS | HEIGHT: 61 IN

## 2024-06-20 DIAGNOSIS — R92.8 ABNORMAL FINDING ON BREAST IMAGING: ICD-10-CM

## 2024-06-20 DIAGNOSIS — Z17.1 MALIGNANT NEOPLASM OF UPPER-OUTER QUADRANT OF LEFT BREAST IN FEMALE, ESTROGEN RECEPTOR NEGATIVE: Primary | ICD-10-CM

## 2024-06-20 DIAGNOSIS — C50.412 MALIGNANT NEOPLASM OF UPPER-OUTER QUADRANT OF LEFT BREAST IN FEMALE, ESTROGEN RECEPTOR NEGATIVE: Primary | ICD-10-CM

## 2024-06-20 PROCEDURE — 99213 OFFICE O/P EST LOW 20 MIN: CPT | Performed by: SURGERY

## 2024-06-20 NOTE — PROGRESS NOTES
[facility]     Referring Provider: Tim Muñoz MD     Chief complaint: Routine follow up breast cancer     Subjective   HPI:   12/22/2022:  Ms. Ina Akers is a 53 yo woman, seen at the request of Dr. Tim Muñoz, for a new diagnosis of left breast cancer. She has a past history of left breast cancer in 2001 (age 30), stage IIB triple negative. She underwent a lumpectomy and axillary dissection, followed by AC and Taxotere, followed by radiation, then tamoxifen for 5 years. She believes she had somewhere around 9-13 left axillary lymph nodes removed and 2 of them had cancer. In 2020, she was diagnosed with metastatic thyroid cancer to the bone. She underwent treatment in 2021 with Lenvima before this was held due to cytopenias. Her disease has been stable off treatment since 2/2021.     Recent screening mammogram in October 2022 showed a new left breast focal asymmetry separate from her lumpectomy site. Around this time she also underwent a PET scan because she was complaining of bone pain. This showed stable bone metastasis and a new FDG avid left breast lesion in the same region as the focal asymmetry. Diagnostic breast imaging confirmed a small hypoechoic mass and subsequent biopsy showed triple negative invasive ductal carcinoma. Her work-up is detailed in the oncologic history below. She denies any breast lumps, pain, skin changes, or nipple discharge. She also has a past history of a benign left breast lumpectomy in 1991.     Her only family history of cancer is oral cancer in her father. The patient has not undergone genetic testing.      2/8/2023:  She underwent left modified radical mastectomy with prepectoral tissue expander placement on 1/24/23. See surgery & pathology details below in oncologic history. She has already seen Dr. Morse back postoperatively and had 2 drains removed. She also has already seen Dr. Muñoz and discussed proceeding with TC x4. She is scheduled for a  port placement next week. She was seen at the lymphedema clinic preoperatively and her bioimpedance score was within normal limits. She did have some early arm swelling while in the hospital, however this has already improved.    5/31/2023:  She returns today for scheduled follow-up.  She completed cycle 4 of TC on 5/3/2023.  Dr. Siddiqui plans to resume Zometa which she was previously on for her thyroid bone metastasis.  Her left arm swelling has improved and her bioimpedance score last week was within normal limits.  She has been wearing a sleeve as needed.  She completed some expansion with Dr. Morse prior to starting chemotherapy and she sees him again in a couple of weeks.  She plans on waiting until December for her second stage surgery because she has a grandchild due in August.    12/15/2023:  She underwent right screening mammogram in November and this was benign.  She saw PT early in November and her bioimpedance score was elevated.  She is supposed to be wearing her sleeve more often.  She then underwent left implant exchange and right mastopexy with Dr. Morse on 11/30/2023.  She is complaining of a rash and itching from the skin glue which is improving with topical hydrocortisone.    6/20/2024, Interval History:  She returns today for scheduled follow-up.  Bioimpedance score in March was within normal limits, but slightly elevated from her prior measurement.  She does still have issues with mild left arm swelling and tries to wear her sleeve at least 3 times a week to keep it controlled.  She noticed a lump at the right breast T-junction after mastopexy with Dr. Morse.  Dr. Morse saw her and said it was fat necrosis.  She then had diagnostic imaging which also demonstrated fat necrosis and placed the area in imaging surveillance.    Unfortunately repeat PET scan in 4/2024 showed likely progression of the bone mets from her thyroid cancer.  Her original oncologist from [facility] is no longer there  and she decided to switch her care to Dr. Dahl at [facility].  She is tearful today about the cancer progression.  She recently retired from work, although her original plan was to work longer.  She is now spending time taking care of her [id number] grandson.      Oncology/Hematology History Overview Note   *History of left breast cancer, stage IIB, triIple negative.   S/P lumpectomy in 2001.  She received adjuvant chemotherapy on the NSABP B34 with AC followed by Taxotere.  S/P post-lumpectomy radiation therapy.   She was placed on tamoxifen for 5 years and it was completed in January 2007.     *Metastatic thyroid cancer diagnosed on 10/6/2020.  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  She had right thyroid lobectomy on 12/17/2018 at [place].    The specimen was sent for outside consultation and the final conclusion was that there was no evidence of malignancy.  MRI on 8/27/2020 revealed an L4 lesion resulting in pathologic compression fracture.  Bone scan on 9/2/2020 revealed increased activity at L4 and left side of the skull.  PET scan on 9/9/2020 revealed abnormal activity at L4, right femoral neck, lower sacrum, left ischium and right ninth medial aspect.  PET scan 9/9/2020 showed no evidence of visceral metastasis.  Patient had kyphoplasty on 9/17/2020.  Pathology was nondiagnostic.  CT-guided biopsy of the lesion in the right posterior ninth rib on 10/6/2020 revealed a clear cell neoplasm.  It is positive for PAX 8 and TTF-1 favoring metastatic thyroid cancer.  Patient was [facility].  Patient underwent completion thyroidectomy on 11/9/2020.  There was no evidence of uptake of radioactive iodine on SPECT imaging on 12/18/2020.  This was indicative of the tumor not going to respond to radioactive iodine therapy.  Patient was started on Lenvima 20 mg daily on 12/24/2020.  Lenvima was held on 2/6/2021 due to drug induced anemia and thrombocytopenia.  She was unable  to start back on it due to prolonged cytopenias.  CT scans on 3/23/2021 showed stable disease.  CBC was stable on 6/8/2021.  CT scans showed stable disease.  Thyroglobulin was stable at 3.4.    PET scan on 9/1/2021 revealed decrease in the activity in the metastatic lesions.    Starting back on active treatment (Nexavar) was not recommended.  PET scan on 12/16/2021 showed complete response except for mild hypermetabolism in the left ischium.  CT scans on 4/8/2022 showed stable bone metastasis.  No new metastatic lesions.  PET scan on 10/6/2022 revealed no new areas of metastasis.  Due to the stable metastatic disease, she is not currently on treatment for her thyroid cancer.   She is on Synthroid 112 mcg daily.     Malignant neoplasm of upper-outer quadrant of left breast in female, estrogen receptor negative   11/23/2016 Initial Diagnosis    Malignant neoplasm of upper-outer quadrant of left breast in female, estrogen receptor negative (CMS/HCC)     9/23/2020 Cancer Staged    Staging form: Breast, AJCC V7  - Pathologic stage from 9/23/2020: Stage IIB (T2, N1, cM0) - Signed by Tim Muñoz MD on 10/9/2020     9/29/2020 - 9/29/2020 Chemotherapy    OP SUPPORTIVE Denosumab (Xgeva) Q28D     9/29/2020 -  Chemotherapy    OP SUPPORTIVE Zoledronic Acid Q28D     8/19/2021 Imaging    Screening MMG with Teodoro ([facility])  There are scattered areas of fibroglandular density. Left breast post-operative change is again seen. There are no suspicious masses, significant calcifications, or other abnormalities seen in either breast.  BI-RADS 2: Benign     10/5/2022 Imaging    Screening MMG with Teodoro ([facility])  There are scattered areas of fibroglandular density.  Finding 1: There is a new fat containing focal asymmetry measuring 6 mm seen in the middle one-third region of the left breast at 12 o'clock  Finding 2: There is a focal asymmetry with associated dystrophic calcifications and post-surgical scar seen in the upper  outer region of the left breast. Focal asymmetry in in an area of prior lumpectomy. There are no significant changes from the prior exam(s).   In the right breast, no suspicious masses, significant calcifications or other abnormalities are seen.  BI-RADS 0: Incomplete     10/6/2022 Imaging    PET CT FDG ([facility])  Chest:  New intensely FDG avid left breast ill-defined nodular focus at the 12 o'clock position measuring .0 x 0.7 cm with a maximum SUV of 5.8 (series 4, image 122).  No significant change in 19 x 15 mm non FDG avid speculated mass within the posterior upper outer quadrant of the left breast with intralesional fat and a peripherally located biopsy clip, consistent with known a post-lumpectomy scar.  Impression:  1. New FDG avid left breast ill-defined nodular focus at the 12 o-clock position, suspicious for malignancy. This could be metastic disease given the increase in non-stimulated thyroglobulin level. Tissue sampling can be considered for further characterization.  2. No significant change in hyper metabolic well-defined lytic lesion with sclerotic margin within the left ischium compared to the prior 2 PET/CT studies from  yet showing evidence of treatment response compared to outside study from 2020.  3. No new metabolically active osseous metastatic disease.     10/20/2022 Imaging    Left Diagnostic MMG with Teodoro & Left Breast US ([facility])  MMG:  On the present examination, there is isodense focal asymmetry measuring 6 mm in the left breast at 12 o'clock located 5 cm from the nipple.  US:  Demonstrates a round non-parallel complex cystic and solid mass with circumscribed margins measuring 6 x 5 x 5 mm in the left breast at 12 o'clock located 5 cm from the nipple.  BI-RADS 4B: Suspicious     2022 Imaging    Left Diagnostic MMG with Teodoro & Left Breast US ([facility]):  MM. Re-demonstration of 7 mm mass at approximately 12:00-1:00, 6 to 8 cm from the nipple.  2. Stable  post-lumpectomy changes in the upper outer left breast.  US:  Focused ultrasound was performed at 1:00, 7 cm from the nipple. Ultrasound demonstrates a hypoechoic round mass with indistinct margins and without internal vascularity measuring 5 x 5 x 4 mm. There is an indistinct hyperechoic halo surrounding the mass. This correlates with mammographic finding.  BI-RADS 4: Suspicious     11/18/2022 Biopsy    Left Breast, US-Guided Biopsy ():    LEFT BREAST, 1:00 (7 CM FROM NIPPLE), ULTRASOUND GUIDED CORE NEEDLE BIOPSY FOR 0.7 CM MASS (U CLIP):     - INVASIVE DUCTAL CARCINOMA, poorly differentiated (Carlos grade III/III: tubule score=3, nuclear score=3, mitoses score=3), with necrosis, measuring up to 0.5 cm.      - No lymphovascular invasion or in situ carcinoma identified.      ER Negative (<1%)  MT Negative (<1%)  HER2 Negative (IHC 0)      [facility] PATHOLOGY REVIEW    1. Left Breast, 1 o'clock, 7 cm from Nipple, Ultrasound-Guided Core Biopsies for a Mass (Outside Consult [id number]):       INVASIVE MAMMARY CARCINOMA, NO SPECIAL TYPE (INVASIVE DUCTAL CARCINOMA).               A. Histologic grade: Carlos histologic score III (tubules = 3, nuclei = 3, mitosis = 3).               B. Largest contiguous focus measures up to 5 mm.   C. No associated in situ component identified.               D. No lymphovascular nor perineural invasion identified.     12/5/2022 Imaging    Bilateral Breast MRI ([facility]):  In the posterior one third of the left breast at the 12:30 position centered on the order of 8.5 cm from the nipple there is a focal signal void seen within an irregular enhancing mass that measures on the order of 0.9 cm in the superior-inferior dimension, 0.8 cm in anterior to posterior dimension and 0.8 cm and the mediolateral dimension. This represents the biopsy-proven site of malignancy with the internal biopsy clip.  In the anterior one third of the left breast located medial to the plane of  the nipple at the 9 o'clock position on the order of 3 cm from the nipple there is an irregular enhancing lesion that measures on the order of 0.5 cm in the superior-inferior dimension, 0.5 cm in anterior to posterior dimension and 0.5 cm in the medial to lateral dimension. A possible mammographic correlate is seen on the CC image of the left breast on the examination of 11/18/2022. This area of enhancement is not seen on the prior breast MRI examination from 12/26/2012.  There is post surgical change of the left breast associated with prior breast conservation therapy. No other areas of abnormal enhancement or morphology are seen within the left breast. I see no evidence for  abnormal left breast skin, nipple or chest wall enhancement and there is no evidence for left axillary or internal mammary chain adenopathy.   There are no areas of abnormal enhancement or morphology in the right breast. I see no evidence for abnormal right breast skin, nipple or chest wall enhancement and there is no evidence for right axillary or internal mammary chain adenopathy.  BI-RADS 4: Suspicious.     12/23/2022 Genetic Testing    [employer] Multi-Cancer Panel (84 genes):    VUS in NF1  VUS in POT1  VUS in SMAD4     1/24/2023 Surgery    Left modified radical mastectomy with prepectoral tissue expander placement    1. Left Breast Modified Radical Mastectomy:               A. Invasive carcinoma with mixed ductal and lobular features:                            1. Invasive carcinoma measures 15 mm x 9 mm x 5 mm.                            2. Overall Woden grade III (tubular score = 3, nuclear score = 3, mitotic score = 3).                            3. No definitive lymphovascular space invasion identified.               B. Associated ductal carcinoma in situ (DCIS):                            1. DCIS spans an area estimated at 20 mm x 5 mm x 2 mm.                            2. High-grade solid and comedo DCIS.               C. All  margins are negative for invasive carcinoma.                    Invasive carcinoma measures 25 mm from the closest (Posterior) margin of excision.                                  D. All margins are negative for ductal carcinoma in-situ (DCIS).                    DCIS measures 25 mm from the closest (Posterior) margin of excision                              E. Focal biopsy site changes are identified, and metallic clip retrieved.               F. No Pagetoid involvement of skin nor nipple by malignancy identified.               G. No lobular neoplasia (LCIS, ALH) identified.               H. Two incidental lymph nodes identified, negative for metastatic carcinoma by routine and immunostaining (0/2).               I.  Focal dense dermal and stromal scar consistent with previous surgical procedure identified.  J. Previous Biomarkers: Estrogen receptors: negative, Progesterone receptors: negative,                    HER/2-stacey: negative (Score 0), Ki-67 = Not reported.  [id number] ([id number]).     2. Left Breast Augmentation:                 A. Benign skin and subcutaneous tissue.     2/21/2023 - 11/29/2023 Chemotherapy    OP CENTRAL VENOUS ACCESS DEVICE ACCESS, CARE, AND MAINTENANCE ([facility])     3/1/2023 - 5/3/2023 Chemotherapy    OP BREAST TC DOCEtaxel / Cyclophosphamide       11/8/2023 Imaging    Right Screening MMG with Teodoro ([facility]):  Scattered areas of fibroglandular density.  No suspicious masses, significant calcifications or other abnormalities are seen.  BI-RADS 1: Negative.     4/30/2024 Imaging    Right Diagnostic MMG with Teodoro & Right Breast US ([facility]):  MMG:  Scattered areas of fibroglandular density.  There is a fat-containing, oval mass seen in the right breast at 6:00.  The mass is in an area of scarring from prior breast reduction and mastopexy.  Mass correlates to the palpable abnormality in the right breast at 6:00.  This area is an oil cyst or fat necrosis.  There are no findings to suggest  malignancy at this time.  US:  Ultrasound demonstrates an oval mass with partially defined margins seen in the right breast at 6:00.  Internal echotexture is mixed.  This area is consistent with an area of fat necrosis/oil cyst as best seen mammographically.  BI-RADS 3: Probably benign.  Recommend follow-up mammogram in 6 months.     Metastasis to bone   9/23/2020 Initial Diagnosis    Bone metastasis (CMS/HCC)     9/29/2020 - 9/29/2020 Chemotherapy    OP SUPPORTIVE Denosumab (Xgeva) Q28D     9/29/2020 -  Chemotherapy    OP SUPPORTIVE Zoledronic Acid Q28D     Primary cancer of thyroid gland metastatic to bone   3/2/2022 Initial Diagnosis    Primary cancer of thyroid gland metastatic to bone (HCC)     Primary cancer of thyroid gland metastatic to bone   6/11/2024 Initial Diagnosis    Primary cancer of thyroid gland metastatic to bone         Review of Systems:  See interval history.       Medications:    Current Outpatient Medications:     acetaminophen (TYLENOL) 500 MG tablet, Take 2 tablets by mouth Every 6 (Six) Hours As Needed for Mild Pain., Disp: , Rfl:     amLODIPine (NORVASC) 10 MG tablet, TAKE ONE TABLET BY MOUTH DAILY (Patient taking differently: Take 1 tablet by mouth Daily.), Disp: 90 tablet, Rfl: 1    atorvastatin (LIPITOR) 20 MG tablet, Take 1 tablet by mouth Daily., Disp: 90 tablet, Rfl: 3    Blood Glucose Monitoring Suppl w/Device kit, Check blood sugar twice a day, Disp: 1 each, Rfl: 0    cholecalciferol (VITAMIN D3) 25 MCG (1000 UT) tablet, Take 1 tablet by mouth Daily., Disp: , Rfl:     cyclobenzaprine (FLEXERIL) 5 MG tablet, Take 1 tablet by mouth At Night As Needed for Muscle Spasms. 1 or 2 tablets by mouth at bedtime if needed for leg cramps, Disp: 20 tablet, Rfl: 2    famotidine (PEPCID) 20 MG tablet, Take 1 tablet by mouth Every Other Day., Disp: , Rfl:     Glucose Blood (BLOOD GLUCOSE TEST) strip, Check blood sugar twice a day., Disp: 100 each, Rfl: 1    labetalol (NORMODYNE) 100 MG tablet,  Take 0.5 tablets by mouth 2 (Two) Times a Day., Disp: 180 tablet, Rfl: 3    Lancets (ACCU-CHEK SOFT TOUCH) lancets, Check blood sugar twice a day as needed, Disp: 100 each, Rfl: 1    lansoprazole (PREVACID) 30 MG capsule, TAKE 1 CAPSULE BY MOUTH DAILY (Patient taking differently: Take 1 capsule by mouth Every Other Day.), Disp: 90 capsule, Rfl: 3    levothyroxine (SYNTHROID, LEVOTHROID) 112 MCG tablet, Take 1 tablet by mouth Every Morning., Disp: , Rfl:     losartan (COZAAR) 100 MG tablet, Take 1 tablet by mouth Daily., Disp: 90 tablet, Rfl: 3    MAGNESIUM PO, Take  by mouth., Disp: , Rfl:     metFORMIN ER (GLUCOPHAGE-XR) 500 MG 24 hr tablet, Take 2 tablets by mouth Daily With Dinner., Disp: 180 tablet, Rfl: 3    NON FORMULARY, Take 1 tablet by mouth Every Night. STOOL SOFTNER, Disp: , Rfl:     promethazine (PHENERGAN) 12.5 MG tablet, Take 1-2 tablets by mouth Every 6 (Six) Hours As Needed for Nausea. TAKE 1/2 TO 1 TABLET BY MOUTH EVERY 4 HOURS AS NEEDED FOR NAUSEA AND VOMITING -MAY MAKE DROWSY, Disp: 60 tablet, Rfl: 2    triamcinolone (KENALOG) 0.1 % cream, Apply  topically to the appropriate area as directed 2 (Two) Times a Day. Sparingly shortest time possible avoid face (Patient taking differently: Apply 1 Application topically to the appropriate area as directed As Needed. Sparingly shortest time possible avoid face), Disp: 80 g, Rfl: 0      Allergies   Allergen Reactions    Monoclonal Antibodies Anaphylaxis and Shortness Of Breath     Regeneron,  High b/p increased heart rate     Zofran [Ondansetron] Nausea And Vomiting     DOES NOT HELP WITH NAUSEA AND VOMITING MAKES IT WORSE    Hydrocodone Nausea And Vomiting    Lenvatinib Unknown - Low Severity     Internal bleeding       Family History   Problem Relation Age of Onset    No Known Problems Mother     Cancer Father         Oral    Hypertension Other     Diabetes Other     Malig Hyperthermia Neg Hx        Objective   PHYSICAL EXAMINATION:   Vitals:    06/20/24  1050   BP: 128/82   Pulse: 84   Resp: 17   SpO2: 98%     ECOG 0 - Asymptomatic  General: NAD, well appearing  Psych: a&o x3, normal mood and affect  Eyes: EOMI, no scleral icterus  ENMT: neck supple without masses or thyromegaly, mucous membranes moist  MSK: normal gait, normal ROM in bilateral shoulders  Lymph nodes: no cervical, supraclavicular or axillary lymphadenopathy  Breast:   Right: Sp mastopexy.  There is a 1 cm nodule at the T-junction (fat necrosis).  Otherwise scars are soft.  No nipple abnormalities.  Left: Sp mastectomy with implant in place.  Scar is soft.  No masses.      I have independently reviewed her imaging and here are my findings:   Oval mass in the inferior right breast consistent with fat necrosis.      Assessment & Plan   Assessment:   1) 53 y.o. F with a diagnosis of left breast cancer: High grade, triple negative, invasive ductal carcinoma. She underwent left modified radical mastectomy with prepectoral tissue expander placement on 1/24/23, pT1cN0, anatomic stage IA, prognostic stage IB.  She completed TC x4 on 5/3/2023.  -She has an area of fat necrosis in the inferior right breast after mastopexy which is currently in imaging surveillance.  2) She has a past history of left breast cancer in 2001 (age 30), stage IIB triple negative. She underwent a lumpectomy and axillary dissection, followed by AC and Taxotere, followed by radiation, then tamoxifen for 5 years.  3) She has metastatic thyroid cancer diagnosed in 2020 with bone metastasis.    Plan:  -Continue follow-up with medical oncology  -Continue follow-up with lymphedema clinic.  -Follow-up in 10/2024 with NP with right diagnostic mammogram for imaging surveillance.      Shavonne Sharif MD      CC:  Khalid Z. Ghosheh, MD James Epley, APRN Lori Warren, MD

## 2024-06-20 NOTE — LETTER
June 20, 2024       No Recipients    Patient: Ina Akers   YOB: 1970   Date of Visit: 6/20/2024     Dear Tim Muñoz MD:       Thank you for referring Ina Akers to me for evaluation. Below are the relevant portions of my assessment and plan of care.    If you have questions, please do not hesitate to call me. I look forward to following Ina along with you.         Sincerely,        Shavonne Sharif MD        CC:   No Recipients    Shavonne Sharif MD  06/20/24 1139  Sign when Signing Visit  [facility]     Referring Provider: Tim Muñoz MD     Chief complaint: Routine follow up breast cancer     Subjective  HPI:   12/22/2022:  Ms. Ina Akers is a 51 yo woman, seen at the request of Dr. Tim Muñoz, for a new diagnosis of left breast cancer. She has a past history of left breast cancer in 2001 (age 30), stage IIB triple negative. She underwent a lumpectomy and axillary dissection, followed by AC and Taxotere, followed by radiation, then tamoxifen for 5 years. She believes she had somewhere around 9-13 left axillary lymph nodes removed and 2 of them had cancer. In 2020, she was diagnosed with metastatic thyroid cancer to the bone. She underwent treatment in 2021 with Lenvima before this was held due to cytopenias. Her disease has been stable off treatment since 2/2021.     Recent screening mammogram in October 2022 showed a new left breast focal asymmetry separate from her lumpectomy site. Around this time she also underwent a PET scan because she was complaining of bone pain. This showed stable bone metastasis and a new FDG avid left breast lesion in the same region as the focal asymmetry. Diagnostic breast imaging confirmed a small hypoechoic mass and subsequent biopsy showed triple negative invasive ductal carcinoma. Her work-up is detailed in the oncologic history below. She denies any breast lumps, pain, skin changes, or nipple discharge.  She also has a past history of a benign left breast lumpectomy in 1991.     Her only family history of cancer is oral cancer in her father. The patient has not undergone genetic testing.      2/8/2023:  She underwent left modified radical mastectomy with prepectoral tissue expander placement on 1/24/23. See surgery & pathology details below in oncologic history. She has already seen Dr. Morse back postoperatively and had 2 drains removed. She also has already seen Dr. Muñoz and discussed proceeding with TC x4. She is scheduled for a port placement next week. She was seen at the lymphedema clinic preoperatively and her bioimpedance score was within normal limits. She did have some early arm swelling while in the hospital, however this has already improved.    5/31/2023:  She returns today for scheduled follow-up.  She completed cycle 4 of TC on 5/3/2023.  Dr. Siddiqui plans to resume Zometa which she was previously on for her thyroid bone metastasis.  Her left arm swelling has improved and her bioimpedance score last week was within normal limits.  She has been wearing a sleeve as needed.  She completed some expansion with Dr. Morse prior to starting chemotherapy and she sees him again in a couple of weeks.  She plans on waiting until December for her second stage surgery because she has a grandchild due in August.    12/15/2023:  She underwent right screening mammogram in November and this was benign.  She saw PT early in November and her bioimpedance score was elevated.  She is supposed to be wearing her sleeve more often.  She then underwent left implant exchange and right mastopexy with Dr. Morse on 11/30/2023.  She is complaining of a rash and itching from the skin glue which is improving with topical hydrocortisone.    6/20/2024, Interval History:  She returns today for scheduled follow-up.  Bioimpedance score in March was within normal limits, but slightly elevated from her prior measurement.  She  does still have issues with mild left arm swelling and tries to wear her sleeve at least 3 times a week to keep it controlled.  She noticed a lump at the right breast T-junction after mastopexy with Dr. Morse.  Dr. Morse saw her and said it was fat necrosis.  She then had diagnostic imaging which also demonstrated fat necrosis and placed the area in imaging surveillance.    Unfortunately repeat PET scan in 4/2024 showed likely progression of the bone mets from her thyroid cancer.  Her original oncologist from  is no longer there and she decided to switch her care to Dr. Dahl at [facility].  She is tearful today about the cancer progression.  She recently retired from work, although her original plan was to work longer.  She is now spending time taking care of her [id number] grandson.      Oncology/Hematology History Overview Note   *History of left breast cancer, stage IIB, triIple negative.   S/P lumpectomy in 2001.  She received adjuvant chemotherapy on the NSABP B34 with AC followed by Taxotere.  S/P post-lumpectomy radiation therapy.   She was placed on tamoxifen for 5 years and it was completed in January 2007.     *Metastatic thyroid cancer diagnosed on 10/6/2020.  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  She had right thyroid lobectomy on 12/17/2018 at [place].    The specimen was sent for outside consultation and the final conclusion was that there was no evidence of malignancy.  MRI on 8/27/2020 revealed an L4 lesion resulting in pathologic compression fracture.  Bone scan on 9/2/2020 revealed increased activity at L4 and left side of the skull.  PET scan on 9/9/2020 revealed abnormal activity at L4, right femoral neck, lower sacrum, left ischium and right ninth medial aspect.  PET scan 9/9/2020 showed no evidence of visceral metastasis.  Patient had kyphoplasty on 9/17/2020.  Pathology was nondiagnostic.  CT-guided biopsy of the lesion in the right posterior ninth rib on  10/6/2020 revealed a clear cell neoplasm.  It is positive for PAX 8 and TTF-1 favoring metastatic thyroid cancer.  Patient was referred to [facility].  Patient underwent completion thyroidectomy on 11/9/2020.  There was no evidence of uptake of radioactive iodine on SPECT imaging on 12/18/2020.  This was indicative of the tumor not going to respond to radioactive iodine therapy.  Patient was started on Lenvima 20 mg daily on 12/24/2020.  Lenvima was held on 2/6/2021 due to drug induced anemia and thrombocytopenia.  She was unable to start back on it due to prolonged cytopenias.  CT scans on 3/23/2021 showed stable disease.  CBC was stable on 6/8/2021.  CT scans showed stable disease.  Thyroglobulin was stable at 3.4.    PET scan on 9/1/2021 revealed decrease in the activity in the metastatic lesions.    Starting back on active treatment (Nexavar) was not recommended.  PET scan on 12/16/2021 showed complete response except for mild hypermetabolism in the left ischium.  CT scans on 4/8/2022 showed stable bone metastasis.  No new metastatic lesions.  PET scan on 10/6/2022 revealed no new areas of metastasis.  Due to the stable metastatic disease, she is not currently on treatment for her thyroid cancer.   She is on Synthroid 112 mcg daily.     Malignant neoplasm of upper-outer quadrant of left breast in female, estrogen receptor negative   11/23/2016 Initial Diagnosis    Malignant neoplasm of upper-outer quadrant of left breast in female, estrogen receptor negative (CMS/HCC)     9/23/2020 Cancer Staged    Staging form: Breast, AJCC V7  - Pathologic stage from 9/23/2020: Stage IIB (T2, N1, cM0) - Signed by Tim Muñoz MD on 10/9/2020     9/29/2020 - 9/29/2020 Chemotherapy    OP SUPPORTIVE Denosumab (Xgeva) Q28D     9/29/2020 -  Chemotherapy    OP SUPPORTIVE Zoledronic Acid Q28D     8/19/2021 Imaging    Screening MMG with Teodoro ([facility])  There are scattered areas of fibroglandular density. Left  breast post-operative change is again seen. There are no suspicious masses, significant calcifications, or other abnormalities seen in either breast.  BI-RADS 2: Benign     10/5/2022 Imaging    Screening MMG with Teodoro ([facility])  There are scattered areas of fibroglandular density.  Finding 1: There is a new fat containing focal asymmetry measuring 6 mm seen in the middle one-third region of the left breast at 12 o'clock  Finding 2: There is a focal asymmetry with associated dystrophic calcifications and post-surgical scar seen in the upper outer region of the left breast. Focal asymmetry in in an area of prior lumpectomy. There are no significant changes from the prior exam(s).   In the right breast, no suspicious masses, significant calcifications or other abnormalities are seen.  BI-RADS 0: Incomplete     10/6/2022 Imaging    PET CT FDG ([facility])  Chest:  New intensely FDG avid left breast ill-defined nodular focus at the 12 o'clock position measuring .0 x 0.7 cm with a maximum SUV of 5.8 (series 4, image 122).  No significant change in 19 x 15 mm non FDG avid speculated mass within the posterior upper outer quadrant of the left breast with intralesional fat and a peripherally located biopsy clip, consistent with known a post-lumpectomy scar.  Impression:  1. New FDG avid left breast ill-defined nodular focus at the 12 o-clock position, suspicious for malignancy. This could be metastic disease given the increase in non-stimulated thyroglobulin level. Tissue sampling can be considered for further characterization.  2. No significant change in hyper metabolic well-defined lytic lesion with sclerotic margin within the left ischium compared to the prior 2 PET/CT studies from 2021 yet showing evidence of treatment response compared to outside study from 09/09/2020.  3. No new metabolically active osseous metastatic disease.     10/20/2022 Imaging    Left Diagnostic MMG with Teodoro & Left Breast US  ([facility])  MMG:  On the present examination, there is isodense focal asymmetry measuring 6 mm in the left breast at 12 o'clock located 5 cm from the nipple.  US:  Demonstrates a round non-parallel complex cystic and solid mass with circumscribed margins measuring 6 x 5 x 5 mm in the left breast at 12 o'clock located 5 cm from the nipple.  BI-RADS 4B: Suspicious     2022 Imaging    Left Diagnostic MMG with Teodoro & Left Breast US ([facility]):  MM. Re-demonstration of 7 mm mass at approximately 12:00-1:00, 6 to 8 cm from the nipple.  2. Stable post-lumpectomy changes in the upper outer left breast.  US:  Focused ultrasound was performed at 1:00, 7 cm from the nipple. Ultrasound demonstrates a hypoechoic round mass with indistinct margins and without internal vascularity measuring 5 x 5 x 4 mm. There is an indistinct hyperechoic halo surrounding the mass. This correlates with mammographic finding.  BI-RADS 4: Suspicious     2022 Biopsy    Left Breast, US-Guided Biopsy ():    LEFT BREAST, 1:00 (7 CM FROM NIPPLE), ULTRASOUND GUIDED CORE NEEDLE BIOPSY FOR 0.7 CM MASS (U CLIP):     - INVASIVE DUCTAL CARCINOMA, poorly differentiated (Carlos grade III/III: tubule score=3, nuclear score=3, mitoses score=3), with necrosis, measuring up to 0.5 cm.      - No lymphovascular invasion or in situ carcinoma identified.      ER Negative (<1%)  WA Negative (<1%)  HER2 Negative (IHC 0)      [facility] PATHOLOGY REVIEW    1. Left Breast, 1 o'clock, 7 cm from Nipple, Ultrasound-Guided Core Biopsies for a Mass (Outside Consult [id number]):       INVASIVE MAMMARY CARCINOMA, NO SPECIAL TYPE (INVASIVE DUCTAL CARCINOMA).               A. Histologic grade: [place] histologic score III (tubules = 3, nuclei = 3, mitosis = 3).               B. Largest contiguous focus measures up to 5 mm.   C. No associated in situ component identified.               D. No lymphovascular nor perineural invasion identified.      12/5/2022 Imaging    Bilateral Breast MRI ([facility]):  In the posterior one third of the left breast at the 12:30 position centered on the order of 8.5 cm from the nipple there is a focal signal void seen within an irregular enhancing mass that measures on the order of 0.9 cm in the superior-inferior dimension, 0.8 cm in anterior to posterior dimension and 0.8 cm and the mediolateral dimension. This represents the biopsy-proven site of malignancy with the internal biopsy clip.  In the anterior one third of the left breast located medial to the plane of the nipple at the 9 o'clock position on the order of 3 cm from the nipple there is an irregular enhancing lesion that measures on the order of 0.5 cm in the superior-inferior dimension, 0.5 cm in anterior to posterior dimension and 0.5 cm in the medial to lateral dimension. A possible mammographic correlate is seen on the CC image of the left breast on the examination of 11/18/2022. This area of enhancement is not seen on the prior breast MRI examination from 12/26/2012.  There is post surgical change of the left breast associated with prior breast conservation therapy. No other areas of abnormal enhancement or morphology are seen within the left breast. I see no evidence for  abnormal left breast skin, nipple or chest wall enhancement and there is no evidence for left axillary or internal mammary chain adenopathy.   There are no areas of abnormal enhancement or morphology in the right breast. I see no evidence for abnormal right breast skin, nipple or chest wall enhancement and there is no evidence for right axillary or internal mammary chain adenopathy.  BI-RADS 4: Suspicious.     12/23/2022 Genetic Testing    [employer] Multi-Cancer Panel (84 genes):    VUS in NF1  VUS in POT1  VUS in SMAD4     1/24/2023 Surgery    Left modified radical mastectomy with prepectoral tissue expander placement    1. Left Breast Modified Radical Mastectomy:               A. Invasive  carcinoma with mixed ductal and lobular features:                            1. Invasive carcinoma measures 15 mm x 9 mm x 5 mm.                            2. Overall Casanova grade III (tubular score = 3, nuclear score = 3, mitotic score = 3).                            3. No definitive lymphovascular space invasion identified.               B. Associated ductal carcinoma in situ (DCIS):                            1. DCIS spans an area estimated at 20 mm x 5 mm x 2 mm.                            2. High-grade solid and comedo DCIS.               C. All margins are negative for invasive carcinoma.                    Invasive carcinoma measures 25 mm from the closest (Posterior) margin of excision.                                  D. All margins are negative for ductal carcinoma in-situ (DCIS).                    DCIS measures 25 mm from the closest (Posterior) margin of excision                              E. Focal biopsy site changes are identified, and metallic clip retrieved.               F. No Pagetoid involvement of skin nor nipple by malignancy identified.               G. No lobular neoplasia (LCIS, ALH) identified.               H. Two incidental lymph nodes identified, negative for metastatic carcinoma by routine and immunostaining (0/2).               I.  Focal dense dermal and stromal scar consistent with previous surgical procedure identified.  J. Previous Biomarkers: Estrogen receptors: negative, Progesterone receptors: negative,                    HER/2-stacey: negative (Score 0), Ki-67 = Not reported.  [id number] ([id number]).     2. Left Breast Augmentation:                 A. Benign skin and subcutaneous tissue.     2/21/2023 - 11/29/2023 Chemotherapy    OP CENTRAL VENOUS ACCESS DEVICE ACCESS, CARE, AND MAINTENANCE ([facility])     3/1/2023 - 5/3/2023 Chemotherapy    OP BREAST TC DOCEtaxel / Cyclophosphamide       11/8/2023 Imaging    Right Screening MMG with Teodoro ([facility]):  Scattered areas of  fibroglandular density.  No suspicious masses, significant calcifications or other abnormalities are seen.  BI-RADS 1: Negative.     4/30/2024 Imaging    Right Diagnostic MMG with Teodoro & Right Breast US ([facility]):  MMG:  Scattered areas of fibroglandular density.  There is a fat-containing, oval mass seen in the right breast at 6:00.  The mass is in an area of scarring from prior breast reduction and mastopexy.  Mass correlates to the palpable abnormality in the right breast at 6:00.  This area is an oil cyst or fat necrosis.  There are no findings to suggest malignancy at this time.  US:  Ultrasound demonstrates an oval mass with partially defined margins seen in the right breast at 6:00.  Internal echotexture is mixed.  This area is consistent with an area of fat necrosis/oil cyst as best seen mammographically.  BI-RADS 3: Probably benign.  Recommend follow-up mammogram in 6 months.     Metastasis to bone   9/23/2020 Initial Diagnosis    Bone metastasis (CMS/HCC)     9/29/2020 - 9/29/2020 Chemotherapy    OP SUPPORTIVE Denosumab (Xgeva) Q28D     9/29/2020 -  Chemotherapy    OP SUPPORTIVE Zoledronic Acid Q28D     Primary cancer of thyroid gland metastatic to bone   3/2/2022 Initial Diagnosis    Primary cancer of thyroid gland metastatic to bone (HCC)     Primary cancer of thyroid gland metastatic to bone   6/11/2024 Initial Diagnosis    Primary cancer of thyroid gland metastatic to bone         Review of Systems:  See interval history.       Medications:    Current Outpatient Medications:   •  acetaminophen (TYLENOL) 500 MG tablet, Take 2 tablets by mouth Every 6 (Six) Hours As Needed for Mild Pain., Disp: , Rfl:   •  amLODIPine (NORVASC) 10 MG tablet, TAKE ONE TABLET BY MOUTH DAILY (Patient taking differently: Take 1 tablet by mouth Daily.), Disp: 90 tablet, Rfl: 1  •  atorvastatin (LIPITOR) 20 MG tablet, Take 1 tablet by mouth Daily., Disp: 90 tablet, Rfl: 3  •  Blood Glucose Monitoring Suppl w/Device kit, Check blood  sugar twice a day, Disp: 1 each, Rfl: 0  •  cholecalciferol (VITAMIN D3) 25 MCG (1000 UT) tablet, Take 1 tablet by mouth Daily., Disp: , Rfl:   •  cyclobenzaprine (FLEXERIL) 5 MG tablet, Take 1 tablet by mouth At Night As Needed for Muscle Spasms. 1 or 2 tablets by mouth at bedtime if needed for leg cramps, Disp: 20 tablet, Rfl: 2  •  famotidine (PEPCID) 20 MG tablet, Take 1 tablet by mouth Every Other Day., Disp: , Rfl:   •  Glucose Blood (BLOOD GLUCOSE TEST) strip, Check blood sugar twice a day., Disp: 100 each, Rfl: 1  •  labetalol (NORMODYNE) 100 MG tablet, Take 0.5 tablets by mouth 2 (Two) Times a Day., Disp: 180 tablet, Rfl: 3  •  Lancets (ACCU-CHEK SOFT TOUCH) lancets, Check blood sugar twice a day as needed, Disp: 100 each, Rfl: 1  •  lansoprazole (PREVACID) 30 MG capsule, TAKE 1 CAPSULE BY MOUTH DAILY (Patient taking differently: Take 1 capsule by mouth Every Other Day.), Disp: 90 capsule, Rfl: 3  •  levothyroxine (SYNTHROID, LEVOTHROID) 112 MCG tablet, Take 1 tablet by mouth Every Morning., Disp: , Rfl:   •  losartan (COZAAR) 100 MG tablet, Take 1 tablet by mouth Daily., Disp: 90 tablet, Rfl: 3  •  MAGNESIUM PO, Take  by mouth., Disp: , Rfl:   •  metFORMIN ER (GLUCOPHAGE-XR) 500 MG 24 hr tablet, Take 2 tablets by mouth Daily With Dinner., Disp: 180 tablet, Rfl: 3  •  NON FORMULARY, Take 1 tablet by mouth Every Night. STOOL SOFTNER, Disp: , Rfl:   •  promethazine (PHENERGAN) 12.5 MG tablet, Take 1-2 tablets by mouth Every 6 (Six) Hours As Needed for Nausea. TAKE 1/2 TO 1 TABLET BY MOUTH EVERY 4 HOURS AS NEEDED FOR NAUSEA AND VOMITING -MAY MAKE DROWSY, Disp: 60 tablet, Rfl: 2  •  triamcinolone (KENALOG) 0.1 % cream, Apply  topically to the appropriate area as directed 2 (Two) Times a Day. Sparingly shortest time possible avoid face (Patient taking differently: Apply 1 Application topically to the appropriate area as directed As Needed. Sparingly shortest time possible avoid face), Disp: 80 g, Rfl:  0      Allergies   Allergen Reactions   • Monoclonal Antibodies Anaphylaxis and Shortness Of Breath     Regeneron,  High b/p increased heart rate    • Zofran [Ondansetron] Nausea And Vomiting     DOES NOT HELP WITH NAUSEA AND VOMITING MAKES IT WORSE   • Hydrocodone Nausea And Vomiting   • Lenvatinib Unknown - Low Severity     Internal bleeding       Family History   Problem Relation Age of Onset   • No Known Problems Mother    • Cancer Father         Oral   • Hypertension Other    • Diabetes Other    • Malig Hyperthermia Neg Hx        Objective  PHYSICAL EXAMINATION:   Vitals:    06/20/24 1050   BP: 128/82   Pulse: 84   Resp: 17   SpO2: 98%     ECOG 0 - Asymptomatic  General: NAD, well appearing  Psych: a&o x3, normal mood and affect  Eyes: EOMI, no scleral icterus  ENMT: neck supple without masses or thyromegaly, mucous membranes moist  MSK: normal gait, normal ROM in bilateral shoulders  Lymph nodes: no cervical, supraclavicular or axillary lymphadenopathy  Breast:   Right: Sp mastopexy.  There is a 1 cm nodule at the T-junction (fat necrosis).  Otherwise scars are soft.  No nipple abnormalities.  Left: Sp mastectomy with implant in place.  Scar is soft.  No masses.      I have independently reviewed her imaging and here are my findings:   Oval mass in the inferior right breast consistent with fat necrosis.      Assessment & Plan  Assessment:   1) 53 y.o. F with a diagnosis of left breast cancer: High grade, triple negative, invasive ductal carcinoma. She underwent left modified radical mastectomy with prepectoral tissue expander placement on 1/24/23, pT1cN0, anatomic stage IA, prognostic stage IB.  She completed TC x4 on 5/3/2023.  -She has an area of fat necrosis in the inferior right breast after mastopexy which is currently in imaging surveillance.  2) She has a past history of left breast cancer in 2001 (age 30), stage IIB triple negative. She underwent a lumpectomy and axillary dissection, followed by AC and  Taxotere, followed by radiation, then tamoxifen for 5 years.  3) She has metastatic thyroid cancer diagnosed in 2020 with bone metastasis.    Plan:  -Continue follow-up with medical oncology  -Continue follow-up with lymphedema clinic.  -Follow-up in 10/2024 with NP with right diagnostic mammogram for imaging surveillance.      Shavonne Sharif MD      CC:  Khalid Z. Ghosheh, MD James Epley, APRN Lori Warren, MD

## 2024-06-25 LAB
CYTO UR: NORMAL
DX PRELIMINARY: NORMAL
LAB AP CASE REPORT: NORMAL
LAB AP CLINICAL INFORMATION: NORMAL
LAB AP DIAGNOSIS COMMENT: NORMAL
LAB AP SPECIAL STAINS: NORMAL
PATH REPORT.ADDENDUM SPEC: NORMAL
PATH REPORT.FINAL DX SPEC: NORMAL
PATH REPORT.GROSS SPEC: NORMAL

## 2024-06-25 RX ORDER — CELECOXIB 200 MG/1
200 CAPSULE ORAL DAILY
Qty: 30 CAPSULE | Refills: 2 | Status: SHIPPED | OUTPATIENT
Start: 2024-06-25

## 2024-06-25 RX ORDER — TRIAMCINOLONE ACETONIDE 1 MG/G
1 CREAM TOPICAL 2 TIMES DAILY
Qty: 80 G | Refills: 0 | Status: SHIPPED | OUTPATIENT
Start: 2024-06-25

## 2024-07-03 LAB
CYTO UR: NORMAL
DX PRELIMINARY: NORMAL
LAB AP CASE REPORT: NORMAL
LAB AP CLINICAL INFORMATION: NORMAL
LAB AP DIAGNOSIS COMMENT: NORMAL
LAB AP SPECIAL STAINS: NORMAL
Lab: NORMAL
PATH REPORT.ADDENDUM SPEC: NORMAL
PATH REPORT.FINAL DX SPEC: NORMAL
PATH REPORT.GROSS SPEC: NORMAL

## 2024-07-09 DIAGNOSIS — C50.412 MALIGNANT NEOPLASM OF UPPER-OUTER QUADRANT OF LEFT BREAST IN FEMALE, ESTROGEN RECEPTOR NEGATIVE: ICD-10-CM

## 2024-07-09 DIAGNOSIS — Z17.1 MALIGNANT NEOPLASM OF UPPER-OUTER QUADRANT OF LEFT BREAST IN FEMALE, ESTROGEN RECEPTOR NEGATIVE: ICD-10-CM

## 2024-07-09 DIAGNOSIS — C79.51 METASTASIS TO BONE: Primary | ICD-10-CM

## 2024-07-10 ENCOUNTER — INFUSION (OUTPATIENT)
Dept: ONCOLOGY | Facility: HOSPITAL | Age: 54
End: 2024-07-10
Payer: COMMERCIAL

## 2024-07-10 VITALS
DIASTOLIC BLOOD PRESSURE: 77 MMHG | BODY MASS INDEX: 33.07 KG/M2 | TEMPERATURE: 96.3 F | WEIGHT: 175 LBS | RESPIRATION RATE: 16 BRPM | SYSTOLIC BLOOD PRESSURE: 131 MMHG | HEART RATE: 82 BPM | OXYGEN SATURATION: 98 %

## 2024-07-10 DIAGNOSIS — C79.51 METASTASIS TO BONE: ICD-10-CM

## 2024-07-10 DIAGNOSIS — Z17.1 MALIGNANT NEOPLASM OF UPPER-OUTER QUADRANT OF LEFT BREAST IN FEMALE, ESTROGEN RECEPTOR NEGATIVE: Primary | ICD-10-CM

## 2024-07-10 DIAGNOSIS — C50.412 MALIGNANT NEOPLASM OF UPPER-OUTER QUADRANT OF LEFT BREAST IN FEMALE, ESTROGEN RECEPTOR NEGATIVE: Primary | ICD-10-CM

## 2024-07-10 LAB
ALBUMIN SERPL-MCNC: 4.3 G/DL (ref 3.5–5.2)
ALBUMIN/GLOB SERPL: 1.5 G/DL
ALP SERPL-CCNC: 56 U/L (ref 39–117)
ALT SERPL W P-5'-P-CCNC: 18 U/L (ref 1–33)
ANION GAP SERPL CALCULATED.3IONS-SCNC: 8.9 MMOL/L (ref 5–15)
AST SERPL-CCNC: 19 U/L (ref 1–32)
BASOPHILS # BLD AUTO: 0.02 10*3/MM3 (ref 0–0.2)
BASOPHILS NFR BLD AUTO: 0.5 % (ref 0–1.5)
BILIRUB SERPL-MCNC: 0.3 MG/DL (ref 0–1.2)
BUN SERPL-MCNC: 16 MG/DL (ref 6–20)
BUN/CREAT SERPL: 18 (ref 7–25)
CALCIUM SPEC-SCNC: 9.2 MG/DL (ref 8.6–10.5)
CHLORIDE SERPL-SCNC: 105 MMOL/L (ref 98–107)
CO2 SERPL-SCNC: 25.1 MMOL/L (ref 22–29)
CREAT SERPL-MCNC: 0.89 MG/DL (ref 0.57–1)
DEPRECATED RDW RBC AUTO: 50 FL (ref 37–54)
EGFRCR SERPLBLD CKD-EPI 2021: 76.7 ML/MIN/1.73
EOSINOPHIL # BLD AUTO: 0.3 10*3/MM3 (ref 0–0.4)
EOSINOPHIL NFR BLD AUTO: 7 % (ref 0.3–6.2)
ERYTHROCYTE [DISTWIDTH] IN BLOOD BY AUTOMATED COUNT: 15.9 % (ref 12.3–15.4)
FERRITIN SERPL-MCNC: 82 NG/ML (ref 13–150)
GLOBULIN UR ELPH-MCNC: 2.9 GM/DL
GLUCOSE SERPL-MCNC: 140 MG/DL (ref 65–99)
HCT VFR BLD AUTO: 32.3 % (ref 34–46.6)
HGB BLD-MCNC: 10.1 G/DL (ref 12–15.9)
IMM GRANULOCYTES # BLD AUTO: 0.01 10*3/MM3 (ref 0–0.05)
IMM GRANULOCYTES NFR BLD AUTO: 0.2 % (ref 0–0.5)
IRON 24H UR-MRATE: 45 MCG/DL (ref 37–145)
IRON SATN MFR SERPL: 14 % (ref 20–50)
LYMPHOCYTES # BLD AUTO: 1.62 10*3/MM3 (ref 0.7–3.1)
LYMPHOCYTES NFR BLD AUTO: 37.8 % (ref 19.6–45.3)
MAGNESIUM SERPL-MCNC: 2 MG/DL (ref 1.6–2.6)
MCH RBC QN AUTO: 27.2 PG (ref 26.6–33)
MCHC RBC AUTO-ENTMCNC: 31.3 G/DL (ref 31.5–35.7)
MCV RBC AUTO: 86.8 FL (ref 79–97)
MONOCYTES # BLD AUTO: 0.34 10*3/MM3 (ref 0.1–0.9)
MONOCYTES NFR BLD AUTO: 7.9 % (ref 5–12)
NEUTROPHILS NFR BLD AUTO: 2 10*3/MM3 (ref 1.7–7)
NEUTROPHILS NFR BLD AUTO: 46.6 % (ref 42.7–76)
NRBC BLD AUTO-RTO: 0 /100 WBC (ref 0–0.2)
PHOSPHATE SERPL-MCNC: 3.1 MG/DL (ref 2.5–4.5)
PLATELET # BLD AUTO: 280 10*3/MM3 (ref 140–450)
PMV BLD AUTO: 9.4 FL (ref 6–12)
POTASSIUM SERPL-SCNC: 3.6 MMOL/L (ref 3.5–5.2)
PROT SERPL-MCNC: 7.2 G/DL (ref 6–8.5)
RBC # BLD AUTO: 3.72 10*6/MM3 (ref 3.77–5.28)
SODIUM SERPL-SCNC: 139 MMOL/L (ref 136–145)
TIBC SERPL-MCNC: 320 MCG/DL (ref 298–536)
TRANSFERRIN SERPL-MCNC: 215 MG/DL (ref 200–360)
WBC NRBC COR # BLD AUTO: 4.29 10*3/MM3 (ref 3.4–10.8)

## 2024-07-10 PROCEDURE — 96374 THER/PROPH/DIAG INJ IV PUSH: CPT

## 2024-07-10 PROCEDURE — 83735 ASSAY OF MAGNESIUM: CPT | Performed by: INTERNAL MEDICINE

## 2024-07-10 PROCEDURE — 25810000003 SODIUM CHLORIDE 0.9 % SOLUTION: Performed by: NURSE PRACTITIONER

## 2024-07-10 PROCEDURE — 83540 ASSAY OF IRON: CPT | Performed by: INTERNAL MEDICINE

## 2024-07-10 PROCEDURE — 84100 ASSAY OF PHOSPHORUS: CPT | Performed by: INTERNAL MEDICINE

## 2024-07-10 PROCEDURE — 25010000002 ZOLEDRONIC ACID 4 MG/100ML SOLUTION: Performed by: NURSE PRACTITIONER

## 2024-07-10 PROCEDURE — 84466 ASSAY OF TRANSFERRIN: CPT | Performed by: INTERNAL MEDICINE

## 2024-07-10 PROCEDURE — 82728 ASSAY OF FERRITIN: CPT | Performed by: INTERNAL MEDICINE

## 2024-07-10 PROCEDURE — 85025 COMPLETE CBC W/AUTO DIFF WBC: CPT | Performed by: INTERNAL MEDICINE

## 2024-07-10 PROCEDURE — 80053 COMPREHEN METABOLIC PANEL: CPT | Performed by: INTERNAL MEDICINE

## 2024-07-10 RX ORDER — SODIUM CHLORIDE 9 MG/ML
250 INJECTION, SOLUTION INTRAVENOUS ONCE
Status: COMPLETED | OUTPATIENT
Start: 2024-07-10 | End: 2024-07-10

## 2024-07-10 RX ORDER — ZOLEDRONIC ACID 0.04 MG/ML
4 INJECTION, SOLUTION INTRAVENOUS ONCE
Status: COMPLETED | OUTPATIENT
Start: 2024-07-10 | End: 2024-07-10

## 2024-07-10 RX ADMIN — SODIUM CHLORIDE 250 ML: 9 INJECTION, SOLUTION INTRAVENOUS at 09:30

## 2024-07-10 RX ADMIN — ZOLEDRONIC ACID 4 MG: 0.04 INJECTION, SOLUTION INTRAVENOUS at 09:30

## 2024-07-23 RX ORDER — METFORMIN HYDROCHLORIDE 500 MG/1
1000 TABLET, EXTENDED RELEASE ORAL
Qty: 180 TABLET | Refills: 3 | Status: SHIPPED | OUTPATIENT
Start: 2024-07-23

## 2024-07-23 NOTE — TELEPHONE ENCOUNTER
Rx Refill Note  Requested Prescriptions     Pending Prescriptions Disp Refills    metFORMIN ER (GLUCOPHAGE-XR) 500 MG 24 hr tablet 180 tablet 3     Sig: Take 2 tablets by mouth Daily With Dinner.      Last office visit with prescribing clinician: 6/5/2024   Last telemedicine visit with prescribing clinician: Visit date not found   Next office visit with prescribing clinician: 12/4/2024                         Would you like a call back once the refill request has been completed: [] Yes [] No    If the office needs to give you a call back, can they leave a voicemail: [] Yes [] No    Martha Irwin MA  07/23/24, 14:03 EDT

## 2024-07-24 NOTE — TELEPHONE ENCOUNTER
Rx Refill Note  Requested Prescriptions     Signed Prescriptions Disp Refills    metFORMIN ER (GLUCOPHAGE-XR) 500 MG 24 hr tablet 180 tablet 3     Sig: Take 2 tablets by mouth Daily With Dinner.     Authorizing Provider: EPLEY, JAMES      Last office visit with prescribing clinician: 6/5/2024   Last telemedicine visit with prescribing clinician: Visit date not found   Next office visit with prescribing clinician: 12/4/2024                         Would you like a call back once the refill request has been completed: [] Yes [] No    If the office needs to give you a call back, can they leave a voicemail: [] Yes [] No    Martha Irwin MA  07/24/24, 08:25 EDT

## 2024-08-07 ENCOUNTER — INFUSION (OUTPATIENT)
Dept: ONCOLOGY | Facility: HOSPITAL | Age: 54
End: 2024-08-07
Payer: COMMERCIAL

## 2024-08-07 VITALS
SYSTOLIC BLOOD PRESSURE: 152 MMHG | RESPIRATION RATE: 15 BRPM | HEIGHT: 60 IN | BODY MASS INDEX: 34.36 KG/M2 | HEART RATE: 80 BPM | TEMPERATURE: 96.4 F | WEIGHT: 175 LBS | DIASTOLIC BLOOD PRESSURE: 88 MMHG | OXYGEN SATURATION: 96 %

## 2024-08-07 DIAGNOSIS — Z17.1 MALIGNANT NEOPLASM OF UPPER-OUTER QUADRANT OF LEFT BREAST IN FEMALE, ESTROGEN RECEPTOR NEGATIVE: ICD-10-CM

## 2024-08-07 DIAGNOSIS — C50.412 MALIGNANT NEOPLASM OF UPPER-OUTER QUADRANT OF LEFT BREAST IN FEMALE, ESTROGEN RECEPTOR NEGATIVE: ICD-10-CM

## 2024-08-07 DIAGNOSIS — C79.51 METASTASIS TO BONE: Primary | ICD-10-CM

## 2024-08-07 LAB
ALBUMIN SERPL-MCNC: 4.2 G/DL (ref 3.5–5.2)
ALBUMIN/GLOB SERPL: 1.3 G/DL
ALP SERPL-CCNC: 68 U/L (ref 39–117)
ALT SERPL W P-5'-P-CCNC: 16 U/L (ref 1–33)
ANION GAP SERPL CALCULATED.3IONS-SCNC: 9.8 MMOL/L (ref 5–15)
AST SERPL-CCNC: 18 U/L (ref 1–32)
BASOPHILS # BLD AUTO: 0.03 10*3/MM3 (ref 0–0.2)
BASOPHILS NFR BLD AUTO: 0.6 % (ref 0–1.5)
BILIRUB SERPL-MCNC: 0.2 MG/DL (ref 0–1.2)
BUN SERPL-MCNC: 15 MG/DL (ref 6–20)
BUN/CREAT SERPL: 14.4 (ref 7–25)
CALCIUM SPEC-SCNC: 9.1 MG/DL (ref 8.6–10.5)
CHLORIDE SERPL-SCNC: 105 MMOL/L (ref 98–107)
CO2 SERPL-SCNC: 25.2 MMOL/L (ref 22–29)
CREAT SERPL-MCNC: 1.04 MG/DL (ref 0.57–1)
DEPRECATED RDW RBC AUTO: 50.4 FL (ref 37–54)
EGFRCR SERPLBLD CKD-EPI 2021: 63.6 ML/MIN/1.73
EOSINOPHIL # BLD AUTO: 0.36 10*3/MM3 (ref 0–0.4)
EOSINOPHIL NFR BLD AUTO: 7.8 % (ref 0.3–6.2)
ERYTHROCYTE [DISTWIDTH] IN BLOOD BY AUTOMATED COUNT: 15.8 % (ref 12.3–15.4)
FERRITIN SERPL-MCNC: 98.9 NG/ML (ref 13–150)
GLOBULIN UR ELPH-MCNC: 3.3 GM/DL
GLUCOSE SERPL-MCNC: 97 MG/DL (ref 65–99)
HCT VFR BLD AUTO: 34.3 % (ref 34–46.6)
HGB BLD-MCNC: 10.6 G/DL (ref 12–15.9)
IMM GRANULOCYTES # BLD AUTO: 0.01 10*3/MM3 (ref 0–0.05)
IMM GRANULOCYTES NFR BLD AUTO: 0.2 % (ref 0–0.5)
IRON 24H UR-MRATE: 41 MCG/DL (ref 37–145)
IRON SATN MFR SERPL: 13 % (ref 20–50)
LYMPHOCYTES # BLD AUTO: 1.94 10*3/MM3 (ref 0.7–3.1)
LYMPHOCYTES NFR BLD AUTO: 41.8 % (ref 19.6–45.3)
MAGNESIUM SERPL-MCNC: 1.9 MG/DL (ref 1.6–2.6)
MCH RBC QN AUTO: 27 PG (ref 26.6–33)
MCHC RBC AUTO-ENTMCNC: 30.9 G/DL (ref 31.5–35.7)
MCV RBC AUTO: 87.5 FL (ref 79–97)
MONOCYTES # BLD AUTO: 0.6 10*3/MM3 (ref 0.1–0.9)
MONOCYTES NFR BLD AUTO: 12.9 % (ref 5–12)
NEUTROPHILS NFR BLD AUTO: 1.7 10*3/MM3 (ref 1.7–7)
NEUTROPHILS NFR BLD AUTO: 36.7 % (ref 42.7–76)
NRBC BLD AUTO-RTO: 0 /100 WBC (ref 0–0.2)
PHOSPHATE SERPL-MCNC: 3.5 MG/DL (ref 2.5–4.5)
PLATELET # BLD AUTO: 303 10*3/MM3 (ref 140–450)
PMV BLD AUTO: 9.6 FL (ref 6–12)
POTASSIUM SERPL-SCNC: 3.6 MMOL/L (ref 3.5–5.2)
PROT SERPL-MCNC: 7.5 G/DL (ref 6–8.5)
RBC # BLD AUTO: 3.92 10*6/MM3 (ref 3.77–5.28)
SODIUM SERPL-SCNC: 140 MMOL/L (ref 136–145)
TIBC SERPL-MCNC: 310 MCG/DL (ref 298–536)
TRANSFERRIN SERPL-MCNC: 208 MG/DL (ref 200–360)
WBC NRBC COR # BLD AUTO: 4.64 10*3/MM3 (ref 3.4–10.8)

## 2024-08-07 PROCEDURE — 82728 ASSAY OF FERRITIN: CPT | Performed by: INTERNAL MEDICINE

## 2024-08-07 PROCEDURE — 25010000002 ZOLEDRONIC ACID 4 MG/100ML SOLUTION: Performed by: INTERNAL MEDICINE

## 2024-08-07 PROCEDURE — 25810000003 SODIUM CHLORIDE 0.9 % SOLUTION: Performed by: INTERNAL MEDICINE

## 2024-08-07 PROCEDURE — 83735 ASSAY OF MAGNESIUM: CPT | Performed by: INTERNAL MEDICINE

## 2024-08-07 PROCEDURE — 85025 COMPLETE CBC W/AUTO DIFF WBC: CPT | Performed by: INTERNAL MEDICINE

## 2024-08-07 PROCEDURE — 84100 ASSAY OF PHOSPHORUS: CPT | Performed by: INTERNAL MEDICINE

## 2024-08-07 PROCEDURE — 83540 ASSAY OF IRON: CPT | Performed by: INTERNAL MEDICINE

## 2024-08-07 PROCEDURE — 80053 COMPREHEN METABOLIC PANEL: CPT | Performed by: INTERNAL MEDICINE

## 2024-08-07 PROCEDURE — 84466 ASSAY OF TRANSFERRIN: CPT | Performed by: INTERNAL MEDICINE

## 2024-08-07 PROCEDURE — 96374 THER/PROPH/DIAG INJ IV PUSH: CPT

## 2024-08-07 RX ORDER — SODIUM CHLORIDE 9 MG/ML
250 INJECTION, SOLUTION INTRAVENOUS ONCE
Status: COMPLETED | OUTPATIENT
Start: 2024-08-07 | End: 2024-08-07

## 2024-08-07 RX ORDER — ZOLEDRONIC ACID 0.04 MG/ML
4 INJECTION, SOLUTION INTRAVENOUS ONCE
Status: COMPLETED | OUTPATIENT
Start: 2024-08-07 | End: 2024-08-07

## 2024-08-07 RX ADMIN — ZOLEDRONIC ACID 4 MG: 0.04 INJECTION, SOLUTION INTRAVENOUS at 09:25

## 2024-08-07 RX ADMIN — SODIUM CHLORIDE 250 ML: 9 INJECTION, SOLUTION INTRAVENOUS at 09:25

## 2024-08-13 ENCOUNTER — TRANSCRIBE ORDERS (OUTPATIENT)
Dept: SURGERY | Facility: CLINIC | Age: 54
End: 2024-08-13
Payer: COMMERCIAL

## 2024-08-13 DIAGNOSIS — Z17.1 MALIGNANT NEOPLASM OF UPPER-OUTER QUADRANT OF LEFT BREAST IN FEMALE, ESTROGEN RECEPTOR NEGATIVE: Primary | ICD-10-CM

## 2024-08-13 DIAGNOSIS — C50.412 MALIGNANT NEOPLASM OF UPPER-OUTER QUADRANT OF LEFT BREAST IN FEMALE, ESTROGEN RECEPTOR NEGATIVE: Primary | ICD-10-CM

## 2024-09-01 ENCOUNTER — HOSPITAL ENCOUNTER (EMERGENCY)
Facility: HOSPITAL | Age: 54
Discharge: HOME | End: 2024-09-01
Attending: EMERGENCY MEDICINE | Admitting: EMERGENCY MEDICINE
Payer: COMMERCIAL

## 2024-09-01 ENCOUNTER — APPOINTMENT (OUTPATIENT)
Dept: CT IMAGING | Facility: HOSPITAL | Age: 54
End: 2024-09-01
Payer: COMMERCIAL

## 2024-09-01 VITALS
TEMPERATURE: 97 F | HEART RATE: 86 BPM | WEIGHT: 167 LBS | RESPIRATION RATE: 16 BRPM | SYSTOLIC BLOOD PRESSURE: 145 MMHG | DIASTOLIC BLOOD PRESSURE: 91 MMHG | OXYGEN SATURATION: 94 % | BODY MASS INDEX: 32.79 KG/M2 | HEIGHT: 60 IN

## 2024-09-01 DIAGNOSIS — G89.3 PAIN DUE TO MALIGNANT NEOPLASM METASTATIC TO BONE: ICD-10-CM

## 2024-09-01 DIAGNOSIS — C79.51 PAIN DUE TO MALIGNANT NEOPLASM METASTATIC TO BONE: ICD-10-CM

## 2024-09-01 DIAGNOSIS — M25.551 ACUTE RIGHT HIP PAIN: Primary | ICD-10-CM

## 2024-09-01 LAB
ALBUMIN SERPL-MCNC: 4.2 G/DL (ref 3.5–5.2)
ALBUMIN/GLOB SERPL: 1.6 G/DL
ALP SERPL-CCNC: 57 U/L (ref 39–117)
ALT SERPL W P-5'-P-CCNC: 14 U/L (ref 1–33)
ANION GAP SERPL CALCULATED.3IONS-SCNC: 10 MMOL/L (ref 5–15)
AST SERPL-CCNC: 16 U/L (ref 1–32)
BASOPHILS # BLD AUTO: 0.03 10*3/MM3 (ref 0–0.2)
BASOPHILS NFR BLD AUTO: 1 % (ref 0–1.5)
BILIRUB SERPL-MCNC: 0.3 MG/DL (ref 0–1.2)
BUN SERPL-MCNC: 11 MG/DL (ref 6–20)
BUN/CREAT SERPL: 13.9 (ref 7–25)
CALCIUM SPEC-SCNC: 9.4 MG/DL (ref 8.6–10.5)
CHLORIDE SERPL-SCNC: 107 MMOL/L (ref 98–107)
CO2 SERPL-SCNC: 23 MMOL/L (ref 22–29)
CREAT SERPL-MCNC: 0.79 MG/DL (ref 0.57–1)
CRP SERPL-MCNC: <0.3 MG/DL (ref 0–0.5)
DEPRECATED RDW RBC AUTO: 42.9 FL (ref 37–54)
EGFRCR SERPLBLD CKD-EPI 2021: 88.5 ML/MIN/1.73
EOSINOPHIL # BLD AUTO: 0.32 10*3/MM3 (ref 0–0.4)
EOSINOPHIL NFR BLD AUTO: 10.2 % (ref 0.3–6.2)
ERYTHROCYTE [DISTWIDTH] IN BLOOD BY AUTOMATED COUNT: 13.9 % (ref 12.3–15.4)
ERYTHROCYTE [SEDIMENTATION RATE] IN BLOOD: 12 MM/HR (ref 0–30)
GLOBULIN UR ELPH-MCNC: 2.7 GM/DL
GLUCOSE SERPL-MCNC: 97 MG/DL (ref 65–99)
HCT VFR BLD AUTO: 32.1 % (ref 34–46.6)
HGB BLD-MCNC: 10.1 G/DL (ref 12–15.9)
IMM GRANULOCYTES # BLD AUTO: 0 10*3/MM3 (ref 0–0.05)
IMM GRANULOCYTES NFR BLD AUTO: 0 % (ref 0–0.5)
LYMPHOCYTES # BLD AUTO: 1.23 10*3/MM3 (ref 0.7–3.1)
LYMPHOCYTES NFR BLD AUTO: 39.3 % (ref 19.6–45.3)
MCH RBC QN AUTO: 26.8 PG (ref 26.6–33)
MCHC RBC AUTO-ENTMCNC: 31.5 G/DL (ref 31.5–35.7)
MCV RBC AUTO: 85.1 FL (ref 79–97)
MONOCYTES # BLD AUTO: 0.41 10*3/MM3 (ref 0.1–0.9)
MONOCYTES NFR BLD AUTO: 13.1 % (ref 5–12)
NEUTROPHILS NFR BLD AUTO: 1.14 10*3/MM3 (ref 1.7–7)
NEUTROPHILS NFR BLD AUTO: 36.4 % (ref 42.7–76)
NRBC BLD AUTO-RTO: 0 /100 WBC (ref 0–0.2)
PLATELET # BLD AUTO: 239 10*3/MM3 (ref 140–450)
PMV BLD AUTO: 8.9 FL (ref 6–12)
POTASSIUM SERPL-SCNC: 3.6 MMOL/L (ref 3.5–5.2)
PROT SERPL-MCNC: 6.9 G/DL (ref 6–8.5)
RBC # BLD AUTO: 3.77 10*6/MM3 (ref 3.77–5.28)
SODIUM SERPL-SCNC: 140 MMOL/L (ref 136–145)
WBC NRBC COR # BLD AUTO: 3.13 10*3/MM3 (ref 3.4–10.8)

## 2024-09-01 PROCEDURE — 80053 COMPREHEN METABOLIC PANEL: CPT | Performed by: EMERGENCY MEDICINE

## 2024-09-01 PROCEDURE — 72192 CT PELVIS W/O DYE: CPT

## 2024-09-01 PROCEDURE — 86140 C-REACTIVE PROTEIN: CPT | Performed by: EMERGENCY MEDICINE

## 2024-09-01 PROCEDURE — 85652 RBC SED RATE AUTOMATED: CPT | Performed by: EMERGENCY MEDICINE

## 2024-09-01 PROCEDURE — 36415 COLL VENOUS BLD VENIPUNCTURE: CPT

## 2024-09-01 PROCEDURE — 85025 COMPLETE CBC W/AUTO DIFF WBC: CPT | Performed by: EMERGENCY MEDICINE

## 2024-09-01 PROCEDURE — 63710000001 PROMETHAZINE PER 25 MG: Performed by: EMERGENCY MEDICINE

## 2024-09-01 PROCEDURE — 99284 EMERGENCY DEPT VISIT MOD MDM: CPT

## 2024-09-01 RX ORDER — OXYCODONE AND ACETAMINOPHEN 5; 325 MG/1; MG/1
1 TABLET ORAL ONCE
Status: COMPLETED | OUTPATIENT
Start: 2024-09-01 | End: 2024-09-01

## 2024-09-01 RX ORDER — PROMETHAZINE HYDROCHLORIDE 25 MG/1
25 TABLET ORAL ONCE
Status: COMPLETED | OUTPATIENT
Start: 2024-09-01 | End: 2024-09-01

## 2024-09-01 RX ORDER — OXYCODONE AND ACETAMINOPHEN 5; 325 MG/1; MG/1
1 TABLET ORAL EVERY 4 HOURS PRN
Qty: 16 TABLET | Refills: 0 | Status: SHIPPED | OUTPATIENT
Start: 2024-09-01

## 2024-09-01 RX ORDER — SODIUM CHLORIDE 0.9 % (FLUSH) 0.9 %
10 SYRINGE (ML) INJECTION AS NEEDED
Status: DISCONTINUED | OUTPATIENT
Start: 2024-09-01 | End: 2024-09-01 | Stop reason: HOSPADM

## 2024-09-01 RX ADMIN — PROMETHAZINE HYDROCHLORIDE 25 MG: 25 TABLET ORAL at 10:47

## 2024-09-01 RX ADMIN — OXYCODONE HYDROCHLORIDE AND ACETAMINOPHEN 1 TABLET: 5; 325 TABLET ORAL at 10:47

## 2024-09-01 NOTE — ED PROVIDER NOTES
EMERGENCY DEPARTMENT ENCOUNTER    Room Number:  27/27  PCP: Epley, James, APRN  Historian: Patient      HPI:  Chief Complaint: Right hip pain  A complete HPI/ROS/PMH/PSH/SH/FH are unobtainable due to: None  Context: Ina Akers is a 53 y.o. female who presents to the ED c/o fairly sudden onset of nontraumatic right hip pain that is now been present roughly for the past 2 to 3 weeks.  She states that she has had a previous hip surgery on the right following osteonecrosis and was concerned that there could be something wrong with the prosthesis or hardware.  She does have a history of metastatic thyroid cancer and finished radiation therapy to her left ischium and right rib area last week.  She denies fever/chills, chest pain, nausea/vomiting, back pain, or shortness of breath.  She states the pain is currently moderate in intensity and is typically present at rest and somewhat improved by ambulation.            PAST MEDICAL HISTORY  Active Ambulatory Problems     Diagnosis Date Noted    Abnormal perimenopausal bleeding 10/31/2016    Amenorrhea 10/31/2016    Atopic dermatitis 10/31/2016    External hemorrhoids 10/31/2016    Gastritis 10/31/2016    Hypertension 10/31/2016    Iron deficiency anemia 10/31/2016    Irritable bowel syndrome 10/31/2016    Melanocytic nevus 10/31/2016    Goiter 10/31/2016    Tinea corporis 10/31/2016    Type 2 diabetes mellitus 10/31/2016    Lipid screening 10/31/2016    Malignant neoplasm of upper-outer quadrant of left breast in female, estrogen receptor negative 11/23/2016    Acute pain of both knees 10/11/2017    Controlled type 2 diabetes mellitus without complication, without long-term current use of insulin 01/29/2018    Hyperlipidemia 01/29/2018    Vitamin D deficiency 01/29/2018    Multiple thyroid nodules 01/29/2018    Subclinical hypothyroidism 12/17/2019    Metastasis to bone 09/23/2020    Fracture, pathologic, femur, neck, right, initial encounter 10/14/2020     Immunocompromised 12/22/2020    Encounter for screening for malignant neoplasm of colon 05/19/2020    Screen for colon cancer 06/07/2021    COVID-19 virus infection 12/28/2021    Gastroesophageal reflux disease 01/26/2022    Primary cancer of thyroid gland metastatic to bone 03/02/2022    Acute pain of left shoulder 03/02/2022    Not currently working due to disabled status 05/27/2024    Weakness generalized 05/27/2024    Primary cancer of thyroid gland metastatic to bone 06/11/2024     Resolved Ambulatory Problems     Diagnosis Date Noted    Screen for colon cancer 02/04/2019    Encounter for screening for malignant neoplasm of colon 05/19/2020    Lumbar spine tumor 09/11/2020     Past Medical History:   Diagnosis Date    Anemia     Cancer of breast 10/2022    Compression fracture of fourth lumbar vertebra     Diabetes mellitus     Disease of thyroid gland     Diverticulosis     GERD (gastroesophageal reflux disease)     History of breast cancer 2001    History of cancer chemotherapy     History of gastritis     History of radiation therapy     History of thyroid nodule 2018    Metastatic bone cancer     PONV (postoperative nausea and vomiting)          PAST SURGICAL HISTORY  Past Surgical History:   Procedure Laterality Date    BILATERAL BREAST REDUCTION Right 11/30/2023    Procedure: RIGHT BREAST REDUCTION FOR SYMMETRY. REMOVAL OF RIGHT CHEST PORT;  Surgeon: Yony Morse MD;  Location: [facility];  Service: Plastics;  Laterality: Right;    BREAST LUMPECTOMY Left 1991    BREAST RECONSTRUCTION Left 01/24/2023    Procedure: LEFT PREPECTORAL PLACEMENT TISSUE EXPANDER AND ALLODERM;  Surgeon: Yony Morse MD;  Location: [facility];  Service: Plastics;  Laterality: Left;    BREAST TISSUE EXPANDER REMOVAL INSERTION OF IMPLANT Left 11/30/2023    Procedure: LEFT REMOVAL TISSUE EXPANDER AND PLACEMENT OF IMPLANT;  Surgeon: Yony Morse MD;  Location: [facility];  Service: Plastics;   Laterality: Left;     SECTION      COLONOSCOPY N/A 2021    Procedure: COLONOSCOPY TO CECUM AND TI;  Surgeon: Jefferson Newman MD;  Location: [facility] ENDOSCOPY;  Service: Gastroenterology;  Laterality: N/A;  PRE:SCREENING  POST:DIVERTICULOSIS AND HEMORRHOIDS    ENDOSCOPY N/A 2022    Procedure: ESOPHAGOGASTRODUODENOSCOPY WITH COLD BIOPSIES;  Surgeon: Jefferson Newman MD;  Location: [facility] ENDOSCOPY;  Service: Gastroenterology;  Laterality: N/A;  PRE: iron deficiency anemia  POST: HIATAL HERNIA    FEMUR IM NAILING/RODDING Right 10/23/2020    Procedure: HIP INTERTROCHCHANTERIC NAILING;  Surgeon: Gretchen Connell MD;  Location: [facility] OR;  Service: Orthopedics;  Laterality: Right;    KYPHOPLASTY N/A 2020    Procedure: Lumbar 4  osteo cool radiofrequency ablation and KYPHOPLASTY;  Surgeon: Vik Rios MD;  Location: [facility] OR ;  Service: Neurosurgery;  Laterality: N/A;    MASTECTOMY W/ SENTINEL NODE BIOPSY Left 2023    Procedure: LEFT MODIFIED RADICAL MASTECTOMY;  Surgeon: Shavonne Sharif MD;  Location: [facility] MAIN OR;  Service: General;  Laterality: Left;    MASTECTOMY, PARTIAL Left     RIB BIOPSY  10/2020    SPINE SURGERY      THYROIDECTOMY Left     THYROIDECTOMY, PARTIAL Right     VENOUS ACCESS DEVICE (PORT) INSERTION Right 2023    Procedure: INSERTION VENOUS ACCESS DEVICE;  Surgeon: Shavonne Sharif MD;  Location: [facility] MAIN OR;  Service: General;  Laterality: Right;         FAMILY HISTORY  Family History   Problem Relation Age of Onset    No Known Problems Mother     Cancer Father         Oral    Hypertension Other     Diabetes Other     Malig Hyperthermia Neg Hx          SOCIAL HISTORY  Social History     Socioeconomic History    Marital status:      Spouse name: Raghav    Number of children: 2   Tobacco Use    Smoking status: Never    Smokeless tobacco: Never   Vaping Use    Vaping status: Never Used   Substance  and Sexual Activity    Alcohol use: Yes     Comment: 1 drink monthly    Drug use: Never    Sexual activity: Defer         ALLERGIES  Monoclonal antibodies, Zofran [ondansetron], Hydrocodone, and Lenvatinib        REVIEW OF SYSTEMS  Review of Systems   Constitutional:  Negative for fever.   HENT:  Negative for sore throat.    Eyes: Negative.    Respiratory:  Negative for cough and shortness of breath.    Cardiovascular:  Negative for chest pain.   Gastrointestinal:  Negative for abdominal pain, diarrhea and vomiting.   Genitourinary:  Negative for dysuria.   Musculoskeletal:  Negative for neck pain.        Right hip pain   Skin:  Negative for rash.   Allergic/Immunologic: Negative.    Neurological:  Negative for weakness, numbness and headaches.   Hematological: Negative.    Psychiatric/Behavioral: Negative.     All other systems reviewed and are negative.         PHYSICAL EXAM  ED Triage Vitals [09/01/24 0838]   Temp Heart Rate Resp BP SpO2   97 °F (36.1 °C) 96 16 -- 96 %      Temp src Heart Rate Source Patient Position BP Location FiO2 (%)   -- -- -- -- --       Physical Exam  Constitutional:       General: She is not in acute distress.     Appearance: Normal appearance. She is not ill-appearing or toxic-appearing.   HENT:      Head: Normocephalic and atraumatic.   Eyes:      Extraocular Movements: Extraocular movements intact.      Pupils: Pupils are equal, round, and reactive to light.   Cardiovascular:      Rate and Rhythm: Normal rate and regular rhythm.      Heart sounds: No murmur heard.     No friction rub. No gallop.   Pulmonary:      Effort: Pulmonary effort is normal.      Breath sounds: Normal breath sounds.   Abdominal:      General: Abdomen is flat. There is no distension.      Palpations: Abdomen is soft.      Tenderness: There is no abdominal tenderness.   Musculoskeletal:         General: No swelling or tenderness. Normal range of motion.      Cervical back: Normal range of motion and neck supple.    Skin:     General: Skin is warm and dry.   Neurological:      General: No focal deficit present.      Mental Status: She is alert and oriented to person, place, and time.      Sensory: No sensory deficit.      Motor: No weakness.   Psychiatric:         Mood and Affect: Mood normal.         Behavior: Behavior normal.           Vital signs and nursing notes reviewed.          LAB RESULTS  Recent Results (from the past 24 hour(s))   Comprehensive Metabolic Panel    Collection Time: 09/01/24  9:15 AM    Specimen: Blood   Result Value Ref Range    Glucose 97 65 - 99 mg/dL    BUN 11 6 - 20 mg/dL    Creatinine 0.79 0.57 - 1.00 mg/dL    Sodium 140 136 - 145 mmol/L    Potassium 3.6 3.5 - 5.2 mmol/L    Chloride 107 98 - 107 mmol/L    CO2 23.0 22.0 - 29.0 mmol/L    Calcium 9.4 8.6 - 10.5 mg/dL    Total Protein 6.9 6.0 - 8.5 g/dL    Albumin 4.2 3.5 - 5.2 g/dL    ALT (SGPT) 14 1 - 33 U/L    AST (SGOT) 16 1 - 32 U/L    Alkaline Phosphatase 57 39 - 117 U/L    Total Bilirubin 0.3 0.0 - 1.2 mg/dL    Globulin 2.7 gm/dL    A/G Ratio 1.6 g/dL    BUN/Creatinine Ratio 13.9 7.0 - 25.0    Anion Gap 10.0 5.0 - 15.0 mmol/L    eGFR 88.5 >60.0 mL/min/1.73   Sedimentation Rate    Collection Time: 09/01/24  9:15 AM    Specimen: Blood   Result Value Ref Range    Sed Rate 12 0 - 30 mm/hr   C-reactive Protein    Collection Time: 09/01/24  9:15 AM    Specimen: Blood   Result Value Ref Range    C-Reactive Protein <0.30 0.00 - 0.50 mg/dL   CBC Auto Differential    Collection Time: 09/01/24  9:15 AM    Specimen: Blood   Result Value Ref Range    WBC 3.13 (L) 3.40 - 10.80 10*3/mm3    RBC 3.77 3.77 - 5.28 10*6/mm3    Hemoglobin 10.1 (L) 12.0 - 15.9 g/dL    Hematocrit 32.1 (L) 34.0 - 46.6 %    MCV 85.1 79.0 - 97.0 fL    MCH 26.8 26.6 - 33.0 pg    MCHC 31.5 31.5 - 35.7 g/dL    RDW 13.9 12.3 - 15.4 %    RDW-SD 42.9 37.0 - 54.0 fl    MPV 8.9 6.0 - 12.0 fL    Platelets 239 140 - 450 10*3/mm3    Neutrophil % 36.4 (L) 42.7 - 76.0 %    Lymphocyte % 39.3 19.6 -  45.3 %    Monocyte % 13.1 (H) 5.0 - 12.0 %    Eosinophil % 10.2 (H) 0.3 - 6.2 %    Basophil % 1.0 0.0 - 1.5 %    Immature Grans % 0.0 0.0 - 0.5 %    Neutrophils, Absolute 1.14 (L) 1.70 - 7.00 10*3/mm3    Lymphocytes, Absolute 1.23 0.70 - 3.10 10*3/mm3    Monocytes, Absolute 0.41 0.10 - 0.90 10*3/mm3    Eosinophils, Absolute 0.32 0.00 - 0.40 10*3/mm3    Basophils, Absolute 0.03 0.00 - 0.20 10*3/mm3    Immature Grans, Absolute 0.00 0.00 - 0.05 10*3/mm3    nRBC 0.0 0.0 - 0.2 /100 WBC       Ordered the above labs and reviewed the results.        RADIOLOGY  CT Pelvis Without Contrast    Result Date: 9/1/2024  CT PELVIS WO CONTRAST-  DATE OF EXAM: 9/1/2024 9:30 AM  INDICATION: non traumatic right hip/pelvis pain. Right hip pain for 1 week.  COMPARISON: PET/CT 4/26/2024, 10/6/2022, and 9/9/2020. CT abdomen pelvis 10/20/2023. CT lumbar spine and pelvis 4/8/2022. Pelvis and right hip radiographs 10/23/2020.  TECHNIQUE: Multiple contiguous axial images were acquired through the pelvis without the intravenous administration of contrast. Reformatted coronal and sagittal sequences were also reviewed. Radiation dose reduction techniques were utilized, including automated exposure control and exposure modulation based on body size.  FINDINGS: Enlarged or more conspicuous lytic lesion in the base of the right femoral neck and intertrochanteric proximal right femur, now measuring approximately 1.5 cm x 1 point centimeters in axial dimensions (axial series 2 image 92) and 2.7 cm craniocaudal (coronal series 4 image 81, previously 1.2 cm x 1 cm x 2.7 cm by my measurements. Stable focal defect in the anterior cortex at the base of the femoral neck. Proximal lag screw, interlocking screw, and distal interlocking screw fixation hardware of the proximal femur without is of hardware complications.  Similar-appearing lucent lesion in the left ischial tuberosity, measuring 1.1 cm x 0.9 cm (axial series 2 image 92) with a thin peripheral  sclerotic rim.  Similar-appearing lytic destructive bone lesion in the L4 vertebral body with associated chronic pathologic compression fracture deformity and vertebral augmentation cement.  No acute fracture. No new osseous lesion is identified.  No significant soft tissue edema. No cystic or solid soft tissue mass. The urinary bladder, uterus, and adnexa are unremarkable in limited noncontrast CT appearance. Mild to moderate visualized rectal stool. The appendix is normal. No free fluid in the pelvis. No free intraperitoneal air. No pathologically enlarged lymph nodes. No abnormal bursal fluid collection.  No evidence of a significant hip joint effusion. The hip joint spaces are fairly well-maintained. Mild DJD at the pubic symphysis. Mild bilateral SI joint DJD. Partially imaged lumbosacral spondylosis.  No noncontrast CT evidence of a full-thickness tendon tear. No significant muscular fatty atrophy.       1. Enlarged lytic likely metastatic bone lesion in the base of the right femoral neck and the intertrochanteric proximal right femur with stable fixation hardware in the proximal right femur and focal cortical defect at the anterior base of the femoral neck. 2. Stable lytic bone lesion in the left ischial tuberosity with a thin sclerotic rim. 3. Stable appearance of the metastatic bone lesion in the L4 vertebral body with associated pathologic fracture and vertebral augmentation cement.  This report was finalized on 9/1/2024 10:06 AM by Kevin Bashir MD on Workstation: [email or web address]       Ordered the above noted radiological studies. Reviewed by me in PACS.            PROCEDURES  Procedures            MEDICATIONS GIVEN IN ER  Medications   oxyCODONE-acetaminophen (PERCOCET) 5-325 MG per tablet 1 tablet (1 tablet Oral Given 9/1/24 1047)   promethazine (PHENERGAN) tablet 25 mg (25 mg Oral Given 9/1/24 1047)                   MEDICAL DECISION MAKING, PROGRESS, and CONSULTS    All labs have been independently reviewed  by me.  All radiology studies have been reviewed by me and I have also reviewed the radiology report.   EKG's independently viewed and interpreted by me.  Discussion below represents my analysis of pertinent findings related to patient's condition, differential diagnosis, treatment plan and final disposition.      Additional sources:  - Discussed/ obtained information from independent historians: History obtained from the patient herself at bedside.    - External (non-ED) record review: Upon medical records review, the patient was last seen and evaluated in the outpatient office of her breast surgeon on 6/20/2024 in follow-up for her known left breast carcinoma.    - Chronic or social conditions impacting care: Radiation therapy secondary to metastatic thyroid cancer.    - Shared decision making: Discharge decision based on shared conversations have between myself as well as the patient and patient's family at bedside.      Orders placed during this visit:  Orders Placed This Encounter   Procedures    CT Pelvis Without Contrast    Comprehensive Metabolic Panel    Sedimentation Rate    C-reactive Protein    CBC Auto Differential    CBC & Differential             Differential diagnosis includes but is not limited to:    Septic arthritis, hip fracture, hip dislocation, osteoarthritis, degenerative disc disease, osteonecrosis, inflammatory arthritis, or electrolyte disturbance      Independent interpretation of labs, radiology studies, and discussions with consultants:    Laboratory values were independently interpreted by myself with my interpretation revealing a normal CRP and sedimentation rate.        ED Course as of 09/02/24 0806   Sun Sep 01, 2024   1040 On reevaluation, the patient is resting comfortably with stable vital signs and without any further complaints.  I did inform her that her lab work is unremarkable as is the hardware in her right hip.  However, there is a enlarging lytic metastatic lesion to the  right hip/pelvis that is certainly going to need to be addressed by her oncologist.  She is aware of this and will call her oncologist this week for further management and treatment planning.  We will provide her with medication to control her discomfort in the meantime.  Return instructions given and all questions answered. [BM]      ED Course User Index  [BM] Dennis Schuler MD           DIAGNOSIS  Final diagnoses:   Acute right hip pain   Pain due to malignant neoplasm metastatic to bone         DISPOSITION  DISCHARGE    Patient discharged in stable condition.    Reviewed implications of results, diagnosis, meds, responsibility to follow up, warning signs and symptoms of possible worsening, potential complications and reasons to return to ER.    Patient/Family voiced understanding of above instructions.    Discussed plan for discharge, as there is no emergent indication for admission. Patient referred to primary care provider for BP management due to today's BP. Pt/family is agreeable and understands need for follow up and repeat testing.  Pt is aware that discharge does not mean that nothing is wrong but it indicates no emergency is present that requires admission and they must continue care with follow-up as given below or physician of their choice.     FOLLOW-UP  Epley, James, APRN  [address]  [facility] [id number] [phone]    Schedule an appointment as soon as possible for a visit       Tim Muñoz MD  [address]  [place] [id number] [phone]    Schedule an appointment as soon as possible for a visit            Medication List        New Prescriptions      oxyCODONE-acetaminophen 5-325 MG per tablet  Commonly known as: PERCOCET  Take 1 tablet by mouth Every 4 (Four) Hours As Needed for Moderate Pain.            Changed      lansoprazole 30 MG capsule  Commonly known as: PREVACID  TAKE 1 CAPSULE BY MOUTH DAILY  What changed: when to take this                Where to Get Your Medications        These medications were sent to [id number] - [place], [place] - [address] [facility]  [place] - [phone]  - [phone] FX  [address], [facility] [address]      Phone: [phone]   oxyCODONE-acetaminophen 5-325 MG per tablet                   Latest Documented Vital Signs:  As of 08:06 EDT  BP- 145/91 HR- 86 Temp- 97 °F (36.1 °C) O2 sat- 94%              --    Please note that portions of this were completed with a voice recognition program.       Note Disclaimer: At [facility], we believe that sharing information builds trust and better relationships. You are receiving this note because you are receiving care at [facility] or recently visited. It is possible you will see health information before a provider has talked with you about it. This kind of information can be easy to misunderstand. To help you fully understand what it means for your health, we urge you to discuss this note with your provider.             Dennis Schuler MD  09/02/24 0856
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
## 2024-09-04 ENCOUNTER — INFUSION (OUTPATIENT)
Dept: ONCOLOGY | Facility: HOSPITAL | Age: 54
End: 2024-09-04
Payer: COMMERCIAL

## 2024-09-04 ENCOUNTER — OFFICE VISIT (OUTPATIENT)
Dept: ONCOLOGY | Facility: CLINIC | Age: 54
End: 2024-09-04
Payer: COMMERCIAL

## 2024-09-04 VITALS
RESPIRATION RATE: 16 BRPM | HEIGHT: 60 IN | HEART RATE: 73 BPM | DIASTOLIC BLOOD PRESSURE: 79 MMHG | OXYGEN SATURATION: 98 % | BODY MASS INDEX: 32.83 KG/M2 | TEMPERATURE: 98.4 F | WEIGHT: 167.2 LBS | SYSTOLIC BLOOD PRESSURE: 123 MMHG

## 2024-09-04 DIAGNOSIS — D50.9 IRON DEFICIENCY ANEMIA, UNSPECIFIED IRON DEFICIENCY ANEMIA TYPE: ICD-10-CM

## 2024-09-04 DIAGNOSIS — Z17.1 MALIGNANT NEOPLASM OF UPPER-OUTER QUADRANT OF LEFT BREAST IN FEMALE, ESTROGEN RECEPTOR NEGATIVE: Primary | ICD-10-CM

## 2024-09-04 DIAGNOSIS — C79.51 METASTASIS TO BONE: ICD-10-CM

## 2024-09-04 DIAGNOSIS — C50.412 MALIGNANT NEOPLASM OF UPPER-OUTER QUADRANT OF LEFT BREAST IN FEMALE, ESTROGEN RECEPTOR NEGATIVE: Primary | ICD-10-CM

## 2024-09-04 DIAGNOSIS — C73 THYROID CANCER: ICD-10-CM

## 2024-09-04 DIAGNOSIS — C50.412 MALIGNANT NEOPLASM OF UPPER-OUTER QUADRANT OF LEFT BREAST IN FEMALE, ESTROGEN RECEPTOR NEGATIVE: ICD-10-CM

## 2024-09-04 DIAGNOSIS — Z17.1 MALIGNANT NEOPLASM OF UPPER-OUTER QUADRANT OF LEFT BREAST IN FEMALE, ESTROGEN RECEPTOR NEGATIVE: ICD-10-CM

## 2024-09-04 DIAGNOSIS — N64.9 BREAST LESION: ICD-10-CM

## 2024-09-04 LAB
ALBUMIN SERPL-MCNC: 4.3 G/DL (ref 3.5–5.2)
ALBUMIN/GLOB SERPL: 1.3 G/DL
ALP SERPL-CCNC: 55 U/L (ref 39–117)
ALT SERPL W P-5'-P-CCNC: 14 U/L (ref 1–33)
ANION GAP SERPL CALCULATED.3IONS-SCNC: 14.3 MMOL/L (ref 5–15)
AST SERPL-CCNC: 16 U/L (ref 1–32)
BASOPHILS # BLD AUTO: 0.02 10*3/MM3 (ref 0–0.2)
BASOPHILS NFR BLD AUTO: 0.5 % (ref 0–1.5)
BILIRUB SERPL-MCNC: 0.3 MG/DL (ref 0–1.2)
BUN SERPL-MCNC: 18 MG/DL (ref 6–20)
BUN/CREAT SERPL: 18.9 (ref 7–25)
CALCIUM SPEC-SCNC: 9.7 MG/DL (ref 8.6–10.5)
CHLORIDE SERPL-SCNC: 104 MMOL/L (ref 98–107)
CO2 SERPL-SCNC: 20.7 MMOL/L (ref 22–29)
CREAT SERPL-MCNC: 0.95 MG/DL (ref 0.57–1)
DEPRECATED RDW RBC AUTO: 48.3 FL (ref 37–54)
EGFRCR SERPLBLD CKD-EPI 2021: 70.9 ML/MIN/1.73
EOSINOPHIL # BLD AUTO: 0.3 10*3/MM3 (ref 0–0.4)
EOSINOPHIL NFR BLD AUTO: 7.6 % (ref 0.3–6.2)
ERYTHROCYTE [DISTWIDTH] IN BLOOD BY AUTOMATED COUNT: 14.7 % (ref 12.3–15.4)
FERRITIN SERPL-MCNC: 120.9 NG/ML (ref 13–150)
GLOBULIN UR ELPH-MCNC: 3.2 GM/DL
GLUCOSE SERPL-MCNC: 98 MG/DL (ref 65–99)
HCT VFR BLD AUTO: 32.8 % (ref 34–46.6)
HGB BLD-MCNC: 10 G/DL (ref 12–15.9)
IMM GRANULOCYTES # BLD AUTO: 0.01 10*3/MM3 (ref 0–0.05)
IMM GRANULOCYTES NFR BLD AUTO: 0.3 % (ref 0–0.5)
IRON 24H UR-MRATE: 60 MCG/DL (ref 37–145)
IRON SATN MFR SERPL: 20 % (ref 20–50)
LYMPHOCYTES # BLD AUTO: 1.75 10*3/MM3 (ref 0.7–3.1)
LYMPHOCYTES NFR BLD AUTO: 44.2 % (ref 19.6–45.3)
MAGNESIUM SERPL-MCNC: 2.2 MG/DL (ref 1.6–2.6)
MCH RBC QN AUTO: 27 PG (ref 26.6–33)
MCHC RBC AUTO-ENTMCNC: 30.5 G/DL (ref 31.5–35.7)
MCV RBC AUTO: 88.4 FL (ref 79–97)
MONOCYTES # BLD AUTO: 0.49 10*3/MM3 (ref 0.1–0.9)
MONOCYTES NFR BLD AUTO: 12.4 % (ref 5–12)
NEUTROPHILS NFR BLD AUTO: 1.39 10*3/MM3 (ref 1.7–7)
NEUTROPHILS NFR BLD AUTO: 35 % (ref 42.7–76)
NRBC BLD AUTO-RTO: 0 /100 WBC (ref 0–0.2)
PHOSPHATE SERPL-MCNC: 3.8 MG/DL (ref 2.5–4.5)
PLATELET # BLD AUTO: 239 10*3/MM3 (ref 140–450)
PMV BLD AUTO: 9 FL (ref 6–12)
POTASSIUM SERPL-SCNC: 3.9 MMOL/L (ref 3.5–5.2)
PROT SERPL-MCNC: 7.5 G/DL (ref 6–8.5)
RBC # BLD AUTO: 3.71 10*6/MM3 (ref 3.77–5.28)
SODIUM SERPL-SCNC: 139 MMOL/L (ref 136–145)
TIBC SERPL-MCNC: 297 MCG/DL (ref 298–536)
TRANSFERRIN SERPL-MCNC: 199 MG/DL (ref 200–360)
WBC NRBC COR # BLD AUTO: 3.96 10*3/MM3 (ref 3.4–10.8)

## 2024-09-04 PROCEDURE — 83540 ASSAY OF IRON: CPT | Performed by: INTERNAL MEDICINE

## 2024-09-04 PROCEDURE — 99215 OFFICE O/P EST HI 40 MIN: CPT | Performed by: INTERNAL MEDICINE

## 2024-09-04 PROCEDURE — 82728 ASSAY OF FERRITIN: CPT | Performed by: INTERNAL MEDICINE

## 2024-09-04 PROCEDURE — 85025 COMPLETE CBC W/AUTO DIFF WBC: CPT | Performed by: INTERNAL MEDICINE

## 2024-09-04 PROCEDURE — 84466 ASSAY OF TRANSFERRIN: CPT | Performed by: INTERNAL MEDICINE

## 2024-09-04 PROCEDURE — 25010000002 ZOLEDRONIC ACID 4 MG/100ML SOLUTION: Performed by: INTERNAL MEDICINE

## 2024-09-04 PROCEDURE — 96374 THER/PROPH/DIAG INJ IV PUSH: CPT

## 2024-09-04 PROCEDURE — 83735 ASSAY OF MAGNESIUM: CPT | Performed by: INTERNAL MEDICINE

## 2024-09-04 PROCEDURE — 25810000003 SODIUM CHLORIDE 0.9 % SOLUTION: Performed by: INTERNAL MEDICINE

## 2024-09-04 PROCEDURE — 84100 ASSAY OF PHOSPHORUS: CPT | Performed by: INTERNAL MEDICINE

## 2024-09-04 PROCEDURE — 80053 COMPREHEN METABOLIC PANEL: CPT | Performed by: INTERNAL MEDICINE

## 2024-09-04 RX ORDER — SODIUM CHLORIDE 9 MG/ML
250 INJECTION, SOLUTION INTRAVENOUS ONCE
OUTPATIENT
Start: 2024-10-02

## 2024-09-04 RX ORDER — ZOLEDRONIC ACID 0.04 MG/ML
4 INJECTION, SOLUTION INTRAVENOUS ONCE
OUTPATIENT
Start: 2024-10-02

## 2024-09-04 RX ORDER — ZOLEDRONIC ACID 0.04 MG/ML
4 INJECTION, SOLUTION INTRAVENOUS ONCE
Status: COMPLETED | OUTPATIENT
Start: 2024-09-04 | End: 2024-09-04

## 2024-09-04 RX ORDER — SODIUM CHLORIDE 9 MG/ML
250 INJECTION, SOLUTION INTRAVENOUS ONCE
Status: CANCELLED | OUTPATIENT
Start: 2024-09-04

## 2024-09-04 RX ORDER — SODIUM CHLORIDE 9 MG/ML
250 INJECTION, SOLUTION INTRAVENOUS ONCE
Status: COMPLETED | OUTPATIENT
Start: 2024-09-04 | End: 2024-09-04

## 2024-09-04 RX ORDER — ZOLEDRONIC ACID 0.04 MG/ML
4 INJECTION, SOLUTION INTRAVENOUS ONCE
Status: CANCELLED | OUTPATIENT
Start: 2024-09-04

## 2024-09-04 RX ADMIN — ZOLEDRONIC ACID 4 MG: 0.04 INJECTION, SOLUTION INTRAVENOUS at 09:55

## 2024-09-04 RX ADMIN — SODIUM CHLORIDE 250 ML: 9 INJECTION, SOLUTION INTRAVENOUS at 09:50

## 2024-09-04 NOTE — PROGRESS NOTES
Subjective     CHIEF COMPLAINT:      Chief Complaint   Patient presents with    Follow-up     HISTORY OF PRESENT ILLNESS:     Ina Akers is a 53 y.o. female patient who returns today for follow up on her breast cancer and metastatic thyroid cancer.  She recently received radiation therapy at [facility].  She received radiation in 3 fractions to the left ischial metastasis and in 3 fractions to the right ninth rib.  She tolerated the radiation well.    Patient went to the ER on 9/1/2024 complaining of pain in the right hip.  No preceding trauma.  She is taking care of her grandchild and she frequently has to lift him up.    ROS:  Pertinent ROS is in the HPI.     Past medical, surgical, social and family history were reviewed.     MEDICATIONS:    Current Outpatient Medications:     acetaminophen (TYLENOL) 500 MG tablet, Take 2 tablets by mouth Every 6 (Six) Hours As Needed for Mild Pain., Disp: , Rfl:     amLODIPine (NORVASC) 10 MG tablet, TAKE ONE TABLET BY MOUTH DAILY (Patient taking differently: Take 1 tablet by mouth Daily.), Disp: 90 tablet, Rfl: 1    atorvastatin (LIPITOR) 20 MG tablet, Take 1 tablet by mouth Daily., Disp: 90 tablet, Rfl: 3    Blood Glucose Monitoring Suppl w/Device kit, Check blood sugar twice a day, Disp: 1 each, Rfl: 0    celecoxib (CeleBREX) 200 MG capsule, Take 1 capsule by mouth Daily. With food and water as needed lowest effective dose shortest time possible, Disp: 30 capsule, Rfl: 2    cholecalciferol (VITAMIN D3) 25 MCG (1000 UT) tablet, Take 1 tablet by mouth Daily., Disp: , Rfl:     cyclobenzaprine (FLEXERIL) 5 MG tablet, Take 1 tablet by mouth At Night As Needed for Muscle Spasms. 1 or 2 tablets by mouth at bedtime if needed for leg cramps, Disp: 20 tablet, Rfl: 2    famotidine (PEPCID) 20 MG tablet, Take 1 tablet by mouth Every Other Day., Disp: , Rfl:     Glucose Blood (BLOOD GLUCOSE TEST) strip, Check blood sugar twice a day., Disp: 100 each, Rfl: 1     "labetalol (NORMODYNE) 100 MG tablet, Take 0.5 tablets by mouth 2 (Two) Times a Day., Disp: 180 tablet, Rfl: 3    Lancets (ACCU-CHEK SOFT TOUCH) lancets, Check blood sugar twice a day as needed, Disp: 100 each, Rfl: 1    lansoprazole (PREVACID) 30 MG capsule, TAKE 1 CAPSULE BY MOUTH DAILY (Patient taking differently: Take 1 capsule by mouth Every Other Day.), Disp: 90 capsule, Rfl: 3    levothyroxine (SYNTHROID, LEVOTHROID) 112 MCG tablet, Take 1 tablet by mouth Every Morning., Disp: , Rfl:     losartan (COZAAR) 100 MG tablet, Take 1 tablet by mouth Daily., Disp: 90 tablet, Rfl: 3    MAGNESIUM PO, Take  by mouth., Disp: , Rfl:     metFORMIN ER (GLUCOPHAGE-XR) 500 MG 24 hr tablet, Take 2 tablets by mouth Daily With Dinner., Disp: 180 tablet, Rfl: 3    NON FORMULARY, Take 1 tablet by mouth Every Night. STOOL SOFTNER, Disp: , Rfl:     oxyCODONE-acetaminophen (PERCOCET) 5-325 MG per tablet, Take 1 tablet by mouth Every 4 (Four) Hours As Needed for Moderate Pain., Disp: 16 tablet, Rfl: 0    promethazine (PHENERGAN) 12.5 MG tablet, Take 1-2 tablets by mouth Every 6 (Six) Hours As Needed for Nausea. TAKE 1/2 TO 1 TABLET BY MOUTH EVERY 4 HOURS AS NEEDED FOR NAUSEA AND VOMITING -MAY MAKE DROWSY, Disp: 60 tablet, Rfl: 2    triamcinolone (KENALOG) 0.1 % cream, Apply 1 Application topically to the appropriate area as directed 2 (Two) Times a Day. Use sparingly avoid chronic use avoid face, Disp: 80 g, Rfl: 0  Objective     VITAL SIGNS:     Vitals:    09/04/24 0844   BP: 123/79   Pulse: 73   Resp: 16   Temp: 98.4 °F (36.9 °C)   TempSrc: Oral   SpO2: 98%   Weight: 75.8 kg (167 lb 3.2 oz)   Height: 152 cm (59.84\")   PainSc:   3   PainLoc: Hip     Body mass index is 32.83 kg/m².     Wt Readings from Last 5 Encounters:   09/04/24 75.8 kg (167 lb 3.2 oz)   09/01/24 75.8 kg (167 lb)   08/07/24 79.4 kg (175 lb)   07/10/24 79.4 kg (175 lb)   06/20/24 79.4 kg (175 lb)     PHYSICAL EXAMINATION:   GENERAL: The patient appears in good " general condition, not in acute distress.   SKIN: No Ecchymosis.  EYES: No jaundice. No Pallor.  CHEST: Normal respiratory effort. Normal breathing sounds bilaterally. No added sounds.  No palpable lesions on exam of the right posterior chest.  CVS: Normal S1 and S2. No Murmur.  ABDOMEN: Non-distended.  EXTREMITIES: Trace right leg edema.    DIAGNOSTIC DATA:     Results from last 7 days   Lab Units 09/04/24  0830 09/01/24  0915   WBC 10*3/mm3 3.96 3.13*   NEUTROS ABS 10*3/mm3 1.39* 1.14*   HEMOGLOBIN g/dL 10.0* 10.1*   HEMATOCRIT % 32.8* 32.1*   PLATELETS 10*3/mm3 239 239     Results from last 7 days   Lab Units 09/04/24  0831 09/01/24  0915   SODIUM mmol/L 139 140   POTASSIUM mmol/L 3.9 3.6   CHLORIDE mmol/L 104 107   CO2 mmol/L 20.7* 23.0   BUN mg/dL 18 11   CREATININE mg/dL 0.95 0.79   CALCIUM mg/dL 9.7 9.4   ALBUMIN g/dL 4.3 4.2   BILIRUBIN mg/dL 0.3 0.3   ALK PHOS U/L 55 57   ALT (SGPT) U/L 14 14   AST (SGOT) U/L 16 16   GLUCOSE mg/dL 98 97   MAGNESIUM mg/dL 2.2  --      Component      Latest Ref Rng 9/4/2024   Phosphorus      2.5 - 4.5 mg/dL 3.8          Lab 09/04/24  0831   IRON 60   IRON SATURATION (TSAT) 20   TIBC 297*   TRANSFERRIN 199*   FERRITIN 120.90      Component      Latest Ref Rng 9/1/2024   Sed Rate      0 - 30 mm/hr 12    C-Reactive Protein      0.00 - 0.50 mg/dL <0.30        CT pelvis on 9/1/2024:  Enlarged or more conspicuous lytic lesion in the base of the right  femoral neck and intertrochanteric proximal right femur, now measuring  approximately 1.5 cm x 1 point centimeters in axial dimensions (axial  series 2 image 92) and 2.7 cm craniocaudal (coronal series 4 image 81,  previously 1.2 cm x 1 cm x 2.7 cm by my measurements. Stable focal  defect in the anterior cortex at the base of the femoral neck. Proximal  lag screw, interlocking screw, and distal interlocking screw fixation  hardware of the proximal femur without is of hardware complications.     Similar-appearing lucent lesion in the  left ischial tuberosity,  measuring 1.1 cm x 0.9 cm (axial series 2 image 92) with a thin  peripheral sclerotic rim.     Similar-appearing lytic destructive bone lesion in the L4 vertebral body  with associated chronic pathologic compression fracture deformity and  vertebral augmentation cement.     No acute fracture. No new osseous lesion is identified.     No significant soft tissue edema. No cystic or solid soft tissue mass.  The urinary bladder, uterus, and adnexa are unremarkable in limited  noncontrast CT appearance. Mild to moderate visualized rectal stool. The  appendix is normal. No free fluid in the pelvis. No free intraperitoneal  air. No pathologically enlarged lymph nodes. No abnormal bursal fluid  collection.     No evidence of a significant hip joint effusion. The hip joint spaces  are fairly well-maintained. Mild DJD at the pubic symphysis. Mild  bilateral SI joint DJD. Partially imaged lumbosacral spondylosis.     No noncontrast CT evidence of a full-thickness tendon tear. No  significant muscular fatty atrophy.     IMPRESSION:     1. Enlarged lytic likely metastatic bone lesion in the base of the right  femoral neck and the intertrochanteric proximal right femur with stable  fixation hardware in the proximal right femur and focal cortical defect  at the anterior base of the femoral neck.  2. Stable lytic bone lesion in the left ischial tuberosity with a thin  sclerotic rim.  3. Stable appearance of the metastatic bone lesion in the L4 vertebral  body with associated pathologic fracture and vertebral augmentation  cement.    Assessment & Plan    *History of left breast cancer.  It was identified on screening mammograms on 10/5/2022.  It was located at 12 o'clock position.  It measured 6 x 5 by 5 mm on mammogram and ultrasound on 10/20/2022.  PET scan on 10/6/2022 showed the lesion to be hypermetabolic. It measured 10 x 7 mm with an SUV of 5.8.  No evidence of metastasis on PET scan.  MRI on  12/5/2022 showed the lesion to measure 9 mm.    S/P mastectomy with reconstruction on 1/24/2023.  Pathology exam revealed the primary tumor to measure 15 mm.  It was grade 3.  Triple negative.  HER2 was negative-0 by IHC.  Ki-67 was 85%.  It was considered pT1cN0.  2 sentinel lymph nodes were examined and were negative.    No lymphovascular invasion. 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  Due to the patient receiving Adriamycin in the past, she is unable to receive additional Adriamycin.  Taxotere and Cytoxan with Neulasta support every 3 weeks x 4 cycles was recommended.  She started Taxotere and Cytoxan on 3/8/2023.  She had infusion reaction to Taxotere which was briefly held.  She was given IV Solu-Cortef.  It was restarted at a slower rate and she tolerated the infusion.  She received cycle #4 on 5/3/2023.    CT scans on 10/20/2023 showed no evidence of recurrence or metastasis.  CT scans on 3/26/2024 showed no evidence of recurrence.  She is due well.  No evidence of recurrence on the FDG PET scan.    *Metastatic thyroid cancer diagnosed on 10/6/2020.  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  She had right thyroid lobectomy on 12/17/2018 at [place].    The specimen was sent for outside consultation and the final conclusion was that there was no evidence of malignancy.  MRI on 8/27/2020 revealed an L4 lesion resulting in pathologic compression fracture.  Bone scan on 9/2/2020 revealed increased activity at L4 and left side of the skull.  PET scan on 9/9/2020 revealed abnormal activity at L4, right femoral neck, lower sacrum, left ischium and right ninth medial aspect.  PET scan 9/9/2020 showed no evidence of visceral metastasis.  Patient had kyphoplasty on 9/17/2020.  Pathology was nondiagnostic.  CT-guided biopsy of the lesion in the right posterior ninth rib on 10/6/2020 revealed a clear cell neoplasm.  It is positive for PAX 8 and TTF-1 favoring metastatic thyroid cancer.  Patient was referred to  [facility].  Patient underwent completion thyroidectomy on 11/9/2020.  There was no evidence of uptake of radioactive iodine on SPECT imaging on 12/18/2020.  This was indicative of the tumor not going to respond to radioactive iodine therapy.  Patient was started on Lenvima 20 mg daily on 12/24/2020.  Lenvima was held on 2/6/2021 due to drug induced anemia and thrombocytopenia.  She was unable to start back on it due to prolonged cytopenias.  CT scans on 3/23/2021 showed stable disease.  CT scans showed stable disease.  Thyroglobulin was stable at 3.4.    PET scan on 9/1/2021 revealed decrease in the activity in the metastatic lesions.    Starting back on active treatment (Nexavar) was not recommended.  PET scan on 12/16/2021 showed complete response except for mild hypermetabolism in the left ischium.  CT scans on 4/8/2022 showed stable bone metastasis.  No new metastatic lesions.  PET scan on 10/6/2022 revealed no new areas of metastasis.  Due to the stable metastatic disease, she was not placed ontreatment for her thyroid cancer.   She is on Synthroid 112 mcg daily.  Goal is to maintain TSH <0.1.  Thyroglobulin increased to 13.1 on 3/26/2024 and was 11.3 on 4/4/2024.  PET scan on 4/26/2024 revealed increase in the FDG uptake in the left ischial and right posterior ninth rib metastasis.  This is concerning for progression of the known metastasis.   I recommended starting systemic therapy with Nexavar.  She is going to see Dr. Dahl at [facility] soon to discuss this.    *Bone metastasis.  Patient was given Zometa on 9/29/2020.  Patient underwent right femur medullary nailing on 10/23/2020.  Patient was restarted back on Zometa on 2/24/2022.   CT scans on 4/8/2022 revealed no new metastatic lesions.  The metastatic lesions were stable.  PET scan on 10/6/2022 revealed no change in the areas of activity in the bone metastasis.  No new lesions.  Zometa was restarted on 6/14/2023.  PET scan  on 4/26/2024 at  revealed increase in the hypermetabolism in the left ischial bone with an SUV of 8.8 (Increased from 4 0.8).  There was increase in the activity in the right ninth rib with an SUV of 5-previously below blood pool.  She received radiation therapy in 3 fractions to the left ischial metastasis and right ninth rib metastasis.  Patient developed pain in the right hip.  CT scan on 9/1/2024 showed that the lesion at the femoral neck was more clearly demarcated/larger.  Since she is having pain and since she did not receive radiation to this lesion, I recommended radiation therapy.  With the patient developing    * Iron deficiency anemia.    Patient was previously treated with ferrous sulfate 325 mg daily.  Hemoglobin decreased to 9.1 on 2/24/2022.  EGD on 3/25/2022 showed no evidence of blood loss.  Pathology exam revealed no typical features of celiac disease.  Hemoglobin was 10.4 on 12/27/2022.  Ferritin was 95 and transferrin saturation was 14%.  She was started on ferrous sulfate 325 mg daily.  Hemoglobin decreased to 9.6 on 2/1/2023, secondary to recent surgical blood losses.  She was restarted on ferrous sulfate 325 mg daily on 2/1/2023.  5/3/2023: Hemoglobin 9.4.  She was continued on ferrous sulfate 325 mg once daily.  7/26/2023: Hemoglobin decreased to 10.0.  She was continued on ferrous sulfate 325 mg daily.  She was advised to take vitamin C 1000 mg daily.  11/29/2023: Hemoglobin 9.9.  Transferrin saturation 11%.  We discussed options of IV Venofer 300 mg weekly x 5 doses but the patient decided to eat with oral iron.  4/17/2024: Hemoglobin 10.1.  5/15/2024: Hemoglobin improved to 10.3.  Ferritin improved to 100.7.  Transferrin saturation improved to 17%.  She continues ferrous sulfate 325 mg once daily.  9/4/2024: Hemoglobin decreased to 10.0.  The decrease in the hemoglobin is likely secondary to the recent radiation therapy.    *Lesion in the right breast at 6 o'clock position.  It is  underneath the reconstruction inverted T incision.  We obtained a diagnostic mammogram that revealed no suspicious lesions.  This is likely a scar.  She is going to have a 6-month follow-up mammogram and will see her surgeon in follow-up afterwards.    *History of left breast cancer, stage IIB, triIple negative.   S/P lumpectomy in 2001.  She received adjuvant chemotherapy on the NSABP B34 with AC followed by Taxotere.  S/P post-lumpectomy radiation therapy.   She was placed on tamoxifen for 5 years and it was completed in January 2007.   With the patient's history of breast cancer at a young age and development of a second new breast cancer in the left breast, repeat genetic testing was recommended.  Gene testing on 12/22/2022 revealed variants of unknown significance.    PLAN:    1.  We will give Zometa today.  2.  I recommended evaluation for radiation therapy to the right femoral neck lytic lesion.  3.  I recommended starting systemic therapy for her thyroid cancer with Nexavar.  4.  Continue ferrous sulfate 325 mg 1 tablet daily.  5.  Return in 4 weeks for the next dose of IV Zometa.  6.  Follow-up in 2 months.  We will schedule her for Zometa.  We will obtain CBC CMP magnesium phosphorus ferritin and iron panel.    7.  Patient indicated that she is traveling on a trip.  I recommended maintaining adequate hydration, avoiding prolonged sitting and I recommended taking aspirin 81 mg daily x 2 days when she travels to decrease risk for developing DVT.    I spent 45 minutes caring for Ina on this date of service. This time includes time spent by me in the following activities: preparing for the visit, reviewing tests, performing a medically appropriate examination and/or evaluation, counseling and educating the patient/family/caregiver, documenting information in the medical record, independently interpreting results and communicating that information with the patient/family/caregiver, care coordination,  ordering medications, and ordering test(s)     Tim Muñoz MD  09/04/24

## 2024-09-11 RX ORDER — CELECOXIB 200 MG/1
CAPSULE ORAL
Qty: 90 CAPSULE | Refills: 1 | Status: SHIPPED | OUTPATIENT
Start: 2024-09-11

## 2024-10-02 ENCOUNTER — INFUSION (OUTPATIENT)
Dept: ONCOLOGY | Facility: HOSPITAL | Age: 54
End: 2024-10-02
Payer: COMMERCIAL

## 2024-10-02 VITALS
WEIGHT: 161 LBS | DIASTOLIC BLOOD PRESSURE: 83 MMHG | HEART RATE: 88 BPM | RESPIRATION RATE: 15 BRPM | SYSTOLIC BLOOD PRESSURE: 125 MMHG | TEMPERATURE: 96.7 F | OXYGEN SATURATION: 99 % | HEIGHT: 60 IN | BODY MASS INDEX: 31.61 KG/M2

## 2024-10-02 DIAGNOSIS — Z17.1 MALIGNANT NEOPLASM OF UPPER-OUTER QUADRANT OF LEFT BREAST IN FEMALE, ESTROGEN RECEPTOR NEGATIVE: Primary | ICD-10-CM

## 2024-10-02 DIAGNOSIS — C50.412 MALIGNANT NEOPLASM OF UPPER-OUTER QUADRANT OF LEFT BREAST IN FEMALE, ESTROGEN RECEPTOR NEGATIVE: Primary | ICD-10-CM

## 2024-10-02 DIAGNOSIS — C79.51 METASTASIS TO BONE: ICD-10-CM

## 2024-10-02 LAB
ALBUMIN SERPL-MCNC: 4.4 G/DL (ref 3.5–5.2)
ALBUMIN/GLOB SERPL: 1.3 G/DL
ALP SERPL-CCNC: 58 U/L (ref 39–117)
ALT SERPL W P-5'-P-CCNC: 11 U/L (ref 1–33)
ANION GAP SERPL CALCULATED.3IONS-SCNC: 11.2 MMOL/L (ref 5–15)
AST SERPL-CCNC: 19 U/L (ref 1–32)
BASOPHILS # BLD AUTO: 0.02 10*3/MM3 (ref 0–0.2)
BASOPHILS NFR BLD AUTO: 0.6 % (ref 0–1.5)
BILIRUB SERPL-MCNC: 0.3 MG/DL (ref 0–1.2)
BUN SERPL-MCNC: 14 MG/DL (ref 6–20)
BUN/CREAT SERPL: 16.3 (ref 7–25)
CALCIUM SPEC-SCNC: 9.8 MG/DL (ref 8.6–10.5)
CHLORIDE SERPL-SCNC: 106 MMOL/L (ref 98–107)
CO2 SERPL-SCNC: 25.8 MMOL/L (ref 22–29)
CREAT SERPL-MCNC: 0.86 MG/DL (ref 0.57–1)
DEPRECATED RDW RBC AUTO: 47.7 FL (ref 37–54)
EGFRCR SERPLBLD CKD-EPI 2021: 79.9 ML/MIN/1.73
EOSINOPHIL # BLD AUTO: 0.28 10*3/MM3 (ref 0–0.4)
EOSINOPHIL NFR BLD AUTO: 8.7 % (ref 0.3–6.2)
ERYTHROCYTE [DISTWIDTH] IN BLOOD BY AUTOMATED COUNT: 14.9 % (ref 12.3–15.4)
GLOBULIN UR ELPH-MCNC: 3.4 GM/DL
GLUCOSE SERPL-MCNC: 95 MG/DL (ref 65–99)
HCT VFR BLD AUTO: 33.3 % (ref 34–46.6)
HGB BLD-MCNC: 10.4 G/DL (ref 12–15.9)
IMM GRANULOCYTES # BLD AUTO: 0.01 10*3/MM3 (ref 0–0.05)
IMM GRANULOCYTES NFR BLD AUTO: 0.3 % (ref 0–0.5)
LYMPHOCYTES # BLD AUTO: 1.2 10*3/MM3 (ref 0.7–3.1)
LYMPHOCYTES NFR BLD AUTO: 37.3 % (ref 19.6–45.3)
MAGNESIUM SERPL-MCNC: 2.1 MG/DL (ref 1.6–2.6)
MCH RBC QN AUTO: 27.2 PG (ref 26.6–33)
MCHC RBC AUTO-ENTMCNC: 31.2 G/DL (ref 31.5–35.7)
MCV RBC AUTO: 86.9 FL (ref 79–97)
MONOCYTES # BLD AUTO: 0.42 10*3/MM3 (ref 0.1–0.9)
MONOCYTES NFR BLD AUTO: 13 % (ref 5–12)
NEUTROPHILS NFR BLD AUTO: 1.29 10*3/MM3 (ref 1.7–7)
NEUTROPHILS NFR BLD AUTO: 40.1 % (ref 42.7–76)
NRBC BLD AUTO-RTO: 0 /100 WBC (ref 0–0.2)
PHOSPHATE SERPL-MCNC: 3.6 MG/DL (ref 2.5–4.5)
PLATELET # BLD AUTO: 273 10*3/MM3 (ref 140–450)
PMV BLD AUTO: 9.1 FL (ref 6–12)
POTASSIUM SERPL-SCNC: 3.8 MMOL/L (ref 3.5–5.2)
PROT SERPL-MCNC: 7.8 G/DL (ref 6–8.5)
RBC # BLD AUTO: 3.83 10*6/MM3 (ref 3.77–5.28)
SODIUM SERPL-SCNC: 143 MMOL/L (ref 136–145)
WBC NRBC COR # BLD AUTO: 3.22 10*3/MM3 (ref 3.4–10.8)

## 2024-10-02 PROCEDURE — 96374 THER/PROPH/DIAG INJ IV PUSH: CPT

## 2024-10-02 PROCEDURE — 83735 ASSAY OF MAGNESIUM: CPT | Performed by: INTERNAL MEDICINE

## 2024-10-02 PROCEDURE — 25010000002 ZOLEDRONIC ACID 4 MG/100ML SOLUTION: Performed by: INTERNAL MEDICINE

## 2024-10-02 PROCEDURE — 84100 ASSAY OF PHOSPHORUS: CPT | Performed by: INTERNAL MEDICINE

## 2024-10-02 PROCEDURE — 80053 COMPREHEN METABOLIC PANEL: CPT | Performed by: INTERNAL MEDICINE

## 2024-10-02 PROCEDURE — 25810000003 SODIUM CHLORIDE 0.9 % SOLUTION: Performed by: INTERNAL MEDICINE

## 2024-10-02 PROCEDURE — 85025 COMPLETE CBC W/AUTO DIFF WBC: CPT | Performed by: INTERNAL MEDICINE

## 2024-10-02 PROCEDURE — 96365 THER/PROPH/DIAG IV INF INIT: CPT

## 2024-10-02 RX ORDER — SODIUM CHLORIDE 9 MG/ML
250 INJECTION, SOLUTION INTRAVENOUS ONCE
Status: COMPLETED | OUTPATIENT
Start: 2024-10-02 | End: 2024-10-02

## 2024-10-02 RX ORDER — ZOLEDRONIC ACID 0.04 MG/ML
4 INJECTION, SOLUTION INTRAVENOUS ONCE
Status: COMPLETED | OUTPATIENT
Start: 2024-10-02 | End: 2024-10-02

## 2024-10-02 RX ADMIN — SODIUM CHLORIDE 250 ML: 9 INJECTION, SOLUTION INTRAVENOUS at 09:20

## 2024-10-02 RX ADMIN — ZOLEDRONIC ACID 4 MG: 0.04 INJECTION, SOLUTION INTRAVENOUS at 09:34

## 2024-10-18 ENCOUNTER — OFFICE VISIT (OUTPATIENT)
Dept: FAMILY MEDICINE CLINIC | Facility: CLINIC | Age: 54
End: 2024-10-18
Payer: COMMERCIAL

## 2024-10-18 VITALS
HEIGHT: 60 IN | DIASTOLIC BLOOD PRESSURE: 74 MMHG | WEIGHT: 161.2 LBS | BODY MASS INDEX: 31.65 KG/M2 | OXYGEN SATURATION: 97 % | HEART RATE: 86 BPM | SYSTOLIC BLOOD PRESSURE: 114 MMHG

## 2024-10-18 DIAGNOSIS — E11.00 TYPE 2 DIABETES MELLITUS WITH HYPEROSMOLARITY WITHOUT COMA, WITHOUT LONG-TERM CURRENT USE OF INSULIN: ICD-10-CM

## 2024-10-18 DIAGNOSIS — I10 HYPERTENSION, UNSPECIFIED TYPE: ICD-10-CM

## 2024-10-18 DIAGNOSIS — E78.2 MIXED HYPERLIPIDEMIA: ICD-10-CM

## 2024-10-18 DIAGNOSIS — C79.51 METASTASIS TO BONE: ICD-10-CM

## 2024-10-18 DIAGNOSIS — C79.51 PRIMARY CANCER OF THYROID GLAND METASTATIC TO BONE: ICD-10-CM

## 2024-10-18 DIAGNOSIS — C50.412 MALIGNANT NEOPLASM OF UPPER-OUTER QUADRANT OF LEFT BREAST IN FEMALE, ESTROGEN RECEPTOR NEGATIVE: Primary | ICD-10-CM

## 2024-10-18 DIAGNOSIS — Z17.1 MALIGNANT NEOPLASM OF UPPER-OUTER QUADRANT OF LEFT BREAST IN FEMALE, ESTROGEN RECEPTOR NEGATIVE: Primary | ICD-10-CM

## 2024-10-18 DIAGNOSIS — C73 PRIMARY CANCER OF THYROID GLAND METASTATIC TO BONE: ICD-10-CM

## 2024-10-18 PROCEDURE — 99213 OFFICE O/P EST LOW 20 MIN: CPT | Performed by: NURSE PRACTITIONER

## 2024-10-18 NOTE — PROGRESS NOTES
"Chief Complaint  Follow-up    Subjective        Ina Akers presents to [facility]  History of Present Illness  Very pleasant patient here today follow-up, she has had unfortunately, treated for cancer, breast cancer, status post  Left mastectomy, as well as unfortunately she had thyroid cancer metastasis to the bone,  To her hip and pelvic region right hip, she has great doctors in [place], she is up-to-date should be doing new chemo treatment this week, she had a pneumonia VAX 23 4 years ago,  Up-to-date shingles shot up-to-date RSV COVID flu shots,  needs FMLA  In case he needs to take off with her,  They are doing well and have a great relationship.  Diabetes well-controlled,  Right rib metastasis left ischial pelvis recent radiation treatment  She is doing well presently,          Follow-up      Objective   Vital Signs:  /74 (BP Location: Right arm, Patient Position: Sitting, Cuff Size: Large Adult)   Pulse 86   Ht 152 cm (59.84\")   Wt 73.1 kg (161 lb 3.2 oz)   SpO2 97%   BMI 31.65 kg/m²   Estimated body mass index is 31.65 kg/m² as calculated from the following:    Height as of this encounter: 152 cm (59.84\").    Weight as of this encounter: 73.1 kg (161 lb 3.2 oz).            Physical Exam  Vitals reviewed.   Constitutional:       General: She is not in acute distress.     Appearance: Normal appearance. She is well-developed. She is not ill-appearing, toxic-appearing or diaphoretic.   HENT:      Head: Normocephalic.      Nose: Nose normal.   Eyes:      General: No scleral icterus.     Conjunctiva/sclera: Conjunctivae normal.      Pupils: Pupils are equal, round, and reactive to light.   Neck:      Thyroid: No thyromegaly.      Vascular: No JVD.   Cardiovascular:      Rate and Rhythm: Normal rate and regular rhythm.      Heart sounds: Normal heart sounds. No murmur heard.     No friction rub. No gallop.   Pulmonary:      Effort: Pulmonary effort is " normal. No respiratory distress.      Breath sounds: Normal breath sounds. No stridor. No wheezing or rales.   Abdominal:      General: Bowel sounds are normal. There is no distension.      Palpations: Abdomen is soft.      Tenderness: There is no abdominal tenderness.      Comments: No hepatosplenomegaly, no ascites,   Musculoskeletal:         General: No tenderness.      Cervical back: Neck supple.   Lymphadenopathy:      Cervical: No cervical adenopathy.   Skin:     General: Skin is warm and dry.      Findings: No erythema or rash.   Neurological:      Mental Status: She is alert and oriented to person, place, and time.      Deep Tendon Reflexes: Reflexes are normal and symmetric.   Psychiatric:         Behavior: Behavior normal.         Thought Content: Thought content normal.         Judgment: Judgment normal.        Result Review :                Assessment and Plan   Diagnoses and all orders for this visit:    1. Malignant neoplasm of upper-outer quadrant of left breast in female, estrogen receptor negative (Primary)    2. Metastasis to bone    3. Primary cancer of thyroid gland metastatic to bone    4. Hypertension, unspecified type    5. Mixed hyperlipidemia    6. Type 2 diabetes mellitus with hyperosmolarity without coma, without long-term current use of insulin           I spent 20 minutes caring for Ina on this date of service. This time includes time spent by me in the following activities:preparing for the visit, reviewing tests, performing a medically appropriate examination and/or evaluation , counseling and educating the patient/family/caregiver, documenting information in the medical record, and care coordination  Follow Up   No follow-ups on file.  Patient was given instructions and counseling regarding her condition or for health maintenance advice. Please see specific information pulled into the AVS if appropriate.     There are no Patient Instructions on file for this visit.     Continue  present care, follow-up with specialist, oncology,  Continue healthy diet follow-up with endocrinology, blood pressure is controlled nicely patient   Fever cough, shortness of breath urgent evaluation, she is at increased risk of pneumonias, stay on top of vaccines,  t  No change in present medications.

## 2024-10-30 ENCOUNTER — INFUSION (OUTPATIENT)
Dept: ONCOLOGY | Facility: HOSPITAL | Age: 54
End: 2024-10-30
Payer: COMMERCIAL

## 2024-10-30 ENCOUNTER — OFFICE VISIT (OUTPATIENT)
Dept: ONCOLOGY | Facility: CLINIC | Age: 54
End: 2024-10-30
Payer: COMMERCIAL

## 2024-10-30 VITALS
DIASTOLIC BLOOD PRESSURE: 96 MMHG | WEIGHT: 159 LBS | RESPIRATION RATE: 16 BRPM | HEART RATE: 73 BPM | TEMPERATURE: 98.2 F | HEIGHT: 60 IN | OXYGEN SATURATION: 96 % | SYSTOLIC BLOOD PRESSURE: 161 MMHG | BODY MASS INDEX: 31.22 KG/M2

## 2024-10-30 DIAGNOSIS — Z17.1 MALIGNANT NEOPLASM OF UPPER-OUTER QUADRANT OF LEFT BREAST IN FEMALE, ESTROGEN RECEPTOR NEGATIVE: Primary | ICD-10-CM

## 2024-10-30 DIAGNOSIS — D50.9 IRON DEFICIENCY ANEMIA, UNSPECIFIED IRON DEFICIENCY ANEMIA TYPE: ICD-10-CM

## 2024-10-30 DIAGNOSIS — N64.9 BREAST LESION: ICD-10-CM

## 2024-10-30 DIAGNOSIS — C50.412 MALIGNANT NEOPLASM OF UPPER-OUTER QUADRANT OF LEFT BREAST IN FEMALE, ESTROGEN RECEPTOR NEGATIVE: Primary | ICD-10-CM

## 2024-10-30 DIAGNOSIS — C50.412 MALIGNANT NEOPLASM OF UPPER-OUTER QUADRANT OF LEFT BREAST IN FEMALE, ESTROGEN RECEPTOR NEGATIVE: ICD-10-CM

## 2024-10-30 DIAGNOSIS — C79.51 METASTASIS TO BONE: ICD-10-CM

## 2024-10-30 DIAGNOSIS — C73 THYROID CANCER: ICD-10-CM

## 2024-10-30 DIAGNOSIS — Z17.1 MALIGNANT NEOPLASM OF UPPER-OUTER QUADRANT OF LEFT BREAST IN FEMALE, ESTROGEN RECEPTOR NEGATIVE: ICD-10-CM

## 2024-10-30 LAB
ALBUMIN SERPL-MCNC: 4.2 G/DL (ref 3.5–5.2)
ALBUMIN/GLOB SERPL: 1.2 G/DL
ALP SERPL-CCNC: 71 U/L (ref 39–117)
ALT SERPL W P-5'-P-CCNC: 29 U/L (ref 1–33)
ANION GAP SERPL CALCULATED.3IONS-SCNC: 9.9 MMOL/L (ref 5–15)
AST SERPL-CCNC: 21 U/L (ref 1–32)
BASOPHILS # BLD AUTO: 0.01 10*3/MM3 (ref 0–0.2)
BASOPHILS NFR BLD AUTO: 0.3 % (ref 0–1.5)
BILIRUB SERPL-MCNC: 0.4 MG/DL (ref 0–1.2)
BUN SERPL-MCNC: 14 MG/DL (ref 6–20)
BUN/CREAT SERPL: 15.7 (ref 7–25)
CALCIUM SPEC-SCNC: 9.5 MG/DL (ref 8.6–10.5)
CHLORIDE SERPL-SCNC: 104 MMOL/L (ref 98–107)
CO2 SERPL-SCNC: 27.1 MMOL/L (ref 22–29)
CREAT SERPL-MCNC: 0.89 MG/DL (ref 0.57–1)
DEPRECATED RDW RBC AUTO: 48.6 FL (ref 37–54)
EGFRCR SERPLBLD CKD-EPI 2021: 76.7 ML/MIN/1.73
EOSINOPHIL # BLD AUTO: 0.3 10*3/MM3 (ref 0–0.4)
EOSINOPHIL NFR BLD AUTO: 8.2 % (ref 0.3–6.2)
ERYTHROCYTE [DISTWIDTH] IN BLOOD BY AUTOMATED COUNT: 15.2 % (ref 12.3–15.4)
FERRITIN SERPL-MCNC: 152.5 NG/ML (ref 13–150)
GLOBULIN UR ELPH-MCNC: 3.5 GM/DL
GLUCOSE SERPL-MCNC: 125 MG/DL (ref 65–99)
HCT VFR BLD AUTO: 36.2 % (ref 34–46.6)
HGB BLD-MCNC: 11 G/DL (ref 12–15.9)
IMM GRANULOCYTES # BLD AUTO: 0 10*3/MM3 (ref 0–0.05)
IMM GRANULOCYTES NFR BLD AUTO: 0 % (ref 0–0.5)
IRON 24H UR-MRATE: 55 MCG/DL (ref 37–145)
IRON SATN MFR SERPL: 17 % (ref 20–50)
LYMPHOCYTES # BLD AUTO: 1.56 10*3/MM3 (ref 0.7–3.1)
LYMPHOCYTES NFR BLD AUTO: 42.4 % (ref 19.6–45.3)
MAGNESIUM SERPL-MCNC: 2 MG/DL (ref 1.6–2.6)
MCH RBC QN AUTO: 26.6 PG (ref 26.6–33)
MCHC RBC AUTO-ENTMCNC: 30.4 G/DL (ref 31.5–35.7)
MCV RBC AUTO: 87.7 FL (ref 79–97)
MONOCYTES # BLD AUTO: 0.3 10*3/MM3 (ref 0.1–0.9)
MONOCYTES NFR BLD AUTO: 8.2 % (ref 5–12)
NEUTROPHILS NFR BLD AUTO: 1.51 10*3/MM3 (ref 1.7–7)
NEUTROPHILS NFR BLD AUTO: 40.9 % (ref 42.7–76)
NRBC BLD AUTO-RTO: 0 /100 WBC (ref 0–0.2)
PHOSPHATE SERPL-MCNC: 3.4 MG/DL (ref 2.5–4.5)
PLATELET # BLD AUTO: 255 10*3/MM3 (ref 140–450)
PMV BLD AUTO: 9.6 FL (ref 6–12)
POTASSIUM SERPL-SCNC: 3.8 MMOL/L (ref 3.5–5.2)
PROT SERPL-MCNC: 7.7 G/DL (ref 6–8.5)
RBC # BLD AUTO: 4.13 10*6/MM3 (ref 3.77–5.28)
SODIUM SERPL-SCNC: 141 MMOL/L (ref 136–145)
TIBC SERPL-MCNC: 326 MCG/DL (ref 298–536)
TRANSFERRIN SERPL-MCNC: 219 MG/DL (ref 200–360)
WBC NRBC COR # BLD AUTO: 3.68 10*3/MM3 (ref 3.4–10.8)

## 2024-10-30 PROCEDURE — 96374 THER/PROPH/DIAG INJ IV PUSH: CPT

## 2024-10-30 PROCEDURE — 25010000002 ZOLEDRONIC ACID 4 MG/100ML SOLUTION: Performed by: INTERNAL MEDICINE

## 2024-10-30 PROCEDURE — 80053 COMPREHEN METABOLIC PANEL: CPT | Performed by: INTERNAL MEDICINE

## 2024-10-30 PROCEDURE — 82728 ASSAY OF FERRITIN: CPT | Performed by: INTERNAL MEDICINE

## 2024-10-30 PROCEDURE — 96365 THER/PROPH/DIAG IV INF INIT: CPT

## 2024-10-30 PROCEDURE — 83540 ASSAY OF IRON: CPT | Performed by: INTERNAL MEDICINE

## 2024-10-30 PROCEDURE — 85025 COMPLETE CBC W/AUTO DIFF WBC: CPT | Performed by: INTERNAL MEDICINE

## 2024-10-30 PROCEDURE — 83735 ASSAY OF MAGNESIUM: CPT | Performed by: INTERNAL MEDICINE

## 2024-10-30 PROCEDURE — 84100 ASSAY OF PHOSPHORUS: CPT | Performed by: INTERNAL MEDICINE

## 2024-10-30 PROCEDURE — 84466 ASSAY OF TRANSFERRIN: CPT | Performed by: INTERNAL MEDICINE

## 2024-10-30 RX ORDER — SODIUM CHLORIDE 9 MG/ML
250 INJECTION, SOLUTION INTRAVENOUS ONCE
Status: CANCELLED | OUTPATIENT
Start: 2024-10-30

## 2024-10-30 RX ORDER — ZOLEDRONIC ACID 0.04 MG/ML
4 INJECTION, SOLUTION INTRAVENOUS ONCE
OUTPATIENT
Start: 2024-12-25

## 2024-10-30 RX ORDER — ZOLEDRONIC ACID 0.04 MG/ML
4 INJECTION, SOLUTION INTRAVENOUS ONCE
Status: COMPLETED | OUTPATIENT
Start: 2024-10-30 | End: 2024-10-30

## 2024-10-30 RX ORDER — SODIUM CHLORIDE 9 MG/ML
250 INJECTION, SOLUTION INTRAVENOUS ONCE
OUTPATIENT
Start: 2024-11-27

## 2024-10-30 RX ORDER — SODIUM CHLORIDE 9 MG/ML
250 INJECTION, SOLUTION INTRAVENOUS ONCE
OUTPATIENT
Start: 2024-12-25

## 2024-10-30 RX ORDER — ONDANSETRON 8 MG/1
8 TABLET, ORALLY DISINTEGRATING ORAL EVERY 8 HOURS PRN
Qty: 20 TABLET | Refills: 2 | Status: SHIPPED | OUTPATIENT
Start: 2024-10-30

## 2024-10-30 RX ORDER — ZOLEDRONIC ACID 0.04 MG/ML
4 INJECTION, SOLUTION INTRAVENOUS ONCE
Status: CANCELLED | OUTPATIENT
Start: 2024-10-30

## 2024-10-30 RX ORDER — ZOLEDRONIC ACID 0.04 MG/ML
4 INJECTION, SOLUTION INTRAVENOUS ONCE
OUTPATIENT
Start: 2024-11-27

## 2024-10-30 RX ADMIN — ZOLEDRONIC ACID 4 MG: 0.04 INJECTION, SOLUTION INTRAVENOUS at 10:09

## 2024-10-30 NOTE — PROGRESS NOTES
Subjective     CHIEF COMPLAINT:      Chief Complaint   Patient presents with    Follow-up     HISTORY OF PRESENT ILLNESS:     Ina Akers is a 53 y.o. female patient who returns today for follow up on her breast cancer and thyroid cancer. She reports feeling good. Pain in the ischial area is somewhat better. She was seen by Orthopedic Surgery. They did not recommend any intervention to the right hip area.     Patient reports that is is tolerating the Zometa infusions. However, she reports having more achiness in the bones and joints 4 weeks ago and attributes this to not receiving enough hydration. Due to the nationwide shortage of IV fluid bags, we discussed today increasing PO intake of fluids.    Patient reports that she started Cabozantinib 2 weeks ago. She had nausea on rare occasions. She took Phenergan which helped but made her very sleepy. She does not have Zofran.     ROS:  Pertinent ROS is in the HPI.     Past medical, surgical, social and family history were reviewed.     MEDICATIONS:    Current Outpatient Medications:     acetaminophen (TYLENOL) 500 MG tablet, Take 2 tablets by mouth Every 6 (Six) Hours As Needed for Mild Pain., Disp: , Rfl:     amLODIPine (NORVASC) 10 MG tablet, TAKE ONE TABLET BY MOUTH DAILY (Patient taking differently: Take 1 tablet by mouth Daily.), Disp: 90 tablet, Rfl: 1    atorvastatin (LIPITOR) 20 MG tablet, Take 1 tablet by mouth Daily., Disp: 90 tablet, Rfl: 3    Blood Glucose Monitoring Suppl w/Device kit, Check blood sugar twice a day, Disp: 1 each, Rfl: 0    cabozantinib s-malate (CABOMETYX) 40 MG tablet tablet, 1 tablet., Disp: , Rfl:     celecoxib (CeleBREX) 200 MG capsule, TAKE ONE CAPSULE BY MOUTH DAILY WITH FOOD AND WATER AS NEEDED FOR LOWEST EFFECTIVE DOSE AND SHORTEST TIME POSSIBLE, Disp: 90 capsule, Rfl: 1    cholecalciferol (VITAMIN D3) 25 MCG (1000 UT) tablet, Take 1 tablet by mouth Daily., Disp: , Rfl:     cyclobenzaprine (FLEXERIL) 5 MG tablet, Take 1  tablet by mouth At Night As Needed for Muscle Spasms. 1 or 2 tablets by mouth at bedtime if needed for leg cramps, Disp: 20 tablet, Rfl: 2    famotidine (PEPCID) 20 MG tablet, Take 1 tablet by mouth Every Other Day., Disp: , Rfl:     Glucose Blood (BLOOD GLUCOSE TEST) strip, Check blood sugar twice a day., Disp: 100 each, Rfl: 1    labetalol (NORMODYNE) 100 MG tablet, Take 0.5 tablets by mouth 2 (Two) Times a Day., Disp: 180 tablet, Rfl: 3    Lancets (ACCU-CHEK SOFT TOUCH) lancets, Check blood sugar twice a day as needed, Disp: 100 each, Rfl: 1    lansoprazole (PREVACID) 30 MG capsule, TAKE 1 CAPSULE BY MOUTH DAILY (Patient taking differently: Take 1 capsule by mouth Every Other Day.), Disp: 90 capsule, Rfl: 3    levothyroxine (SYNTHROID, LEVOTHROID) 112 MCG tablet, Take 1 tablet by mouth Every Morning., Disp: , Rfl:     losartan (COZAAR) 100 MG tablet, Take 1 tablet by mouth Daily., Disp: 90 tablet, Rfl: 3    MAGNESIUM PO, Take  by mouth., Disp: , Rfl:     metFORMIN ER (GLUCOPHAGE-XR) 500 MG 24 hr tablet, Take 2 tablets by mouth Daily With Dinner., Disp: 180 tablet, Rfl: 3    NON FORMULARY, Take 1 tablet by mouth Every Night. STOOL SOFTNER, Disp: , Rfl:     oxyCODONE-acetaminophen (PERCOCET) 5-325 MG per tablet, Take 1 tablet by mouth Every 4 (Four) Hours As Needed for Moderate Pain., Disp: 16 tablet, Rfl: 0    promethazine (PHENERGAN) 12.5 MG tablet, Take 1-2 tablets by mouth Every 6 (Six) Hours As Needed for Nausea. TAKE 1/2 TO 1 TABLET BY MOUTH EVERY 4 HOURS AS NEEDED FOR NAUSEA AND VOMITING -MAY MAKE DROWSY, Disp: 60 tablet, Rfl: 2    triamcinolone (KENALOG) 0.1 % cream, Apply 1 Application topically to the appropriate area as directed 2 (Two) Times a Day. Use sparingly avoid chronic use avoid face, Disp: 80 g, Rfl: 0    ondansetron ODT (ZOFRAN-ODT) 8 MG disintegrating tablet, Take 1 tablet by mouth Every 8 (Eight) Hours As Needed for Nausea or Vomiting., Disp: 20 tablet, Rfl: 2  Objective     VITAL SIGNS:    "  Vitals:    10/30/24 0926   BP: 161/96  Comment: hasn't taken bp meds this am   Pulse: 73   Resp: 16   Temp: 98.2 °F (36.8 °C)   TempSrc: Oral   SpO2: 96%   Weight: 72.1 kg (159 lb)   Height: 152 cm (59.84\")   PainSc:   2   PainLoc: Hip     Body mass index is 31.22 kg/m².     Wt Readings from Last 5 Encounters:   10/30/24 72.1 kg (159 lb)   10/18/24 73.1 kg (161 lb 3.2 oz)   10/02/24 73 kg (161 lb)   09/04/24 75.8 kg (167 lb 3.2 oz)   09/01/24 75.8 kg (167 lb)     PHYSICAL EXAMINATION:   GENERAL: The patient appears in good general condition, not in acute distress.   SKIN: No Ecchymosis.  EYES: No jaundice. No Pallor.  LYMPHATIC:  No cervical, supraclavicular or axillary lymphadenopathy.  CHEST: Normal respiratory effort. Normal breathing sounds bilaterally. No added sounds.  CVS: Normal S1 and S2. No Murmur.  ABDOMEN: Soft. No tenderness. No Hepatomegaly. No Splenomegaly. No masses.  EXTREMITIES: No edema.     DIAGNOSTIC DATA:     Results from last 7 days   Lab Units 10/30/24  0924 10/28/24  0905   WBC 10*3/mm3 3.68  --    NEUTROS ABS 10*3/mm3 1.51* 1.9   LYMPHS ABS x10(3)/ul  --  1.3   HEMOGLOBIN g/dL 11.0*  --    HEMATOCRIT % 36.2  --    PLATELETS 10*3/mm3 255  --      Results from last 7 days   Lab Units 10/30/24  0924   SODIUM mmol/L 141   POTASSIUM mmol/L 3.8   CHLORIDE mmol/L 104   CO2 mmol/L 27.1   BUN mg/dL 14   CREATININE mg/dL 0.89   CALCIUM mg/dL 9.5   ALBUMIN g/dL 4.2   BILIRUBIN mg/dL 0.4   ALK PHOS U/L 71   ALT (SGPT) U/L 29   AST (SGOT) U/L 21   GLUCOSE mg/dL 125*   MAGNESIUM mg/dL 2.0     Component      Latest Ref Rng 10/30/2024   Phosphorus      2.5 - 4.5 mg/dL 3.4          Lab 10/30/24  0924   IRON 55   IRON SATURATION (TSAT) 17*   TIBC 326   TRANSFERRIN 219   FERRITIN 152.50*      Assessment & Plan    *History of left breast cancer.  It was identified on screening mammograms on 10/5/2022.  It was located at 12 o'clock position.  It measured 6 x 5 by 5 mm on mammogram and ultrasound on " 10/20/2022.  PET scan on 10/6/2022 showed the lesion to be hypermetabolic. It measured 10 x 7 mm with an SUV of 5.8.  No evidence of metastasis on PET scan.  MRI on 12/5/2022 showed the lesion to measure 9 mm.    S/P mastectomy with reconstruction on 1/24/2023.  Pathology exam revealed the primary tumor to measure 15 mm.  It was grade 3.  Triple negative.  HER2 was negative-0 by IHC.  Ki-67 was 85%.  It was considered pT1cN0.  2 sentinel lymph nodes were examined and were negative.    No lymphovascular invasion. 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  Due to the patient receiving Adriamycin in the past, she is unable to receive additional Adriamycin.  Taxotere and Cytoxan with Neulasta support every 3 weeks x 4 cycles was recommended.  She started Taxotere and Cytoxan on 3/8/2023.  She had infusion reaction to Taxotere which was briefly held.  She was given IV Solu-Cortef.  It was restarted at a slower rate and she tolerated the infusion.  She received cycle #4 on 5/3/2023.    CT scans on 10/20/2023 showed no evidence of recurrence or metastasis.  CT scans on 3/26/2024 showed no evidence of recurrence.  She is doing well with no evidence of recurrence.   She is going to have her annual mammogram in November 2024 and will see the breast surgery NP afterwards.     *Metastatic thyroid cancer diagnosed on 10/6/2020.  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  She had right thyroid lobectomy on 12/17/2018 at [facility].    The specimen was sent for outside consultation and the final conclusion was that there was no evidence of malignancy.  MRI on 8/27/2020 revealed an L4 lesion resulting in pathologic compression fracture.  Bone scan on 9/2/2020 revealed increased activity at L4 and left side of the skull.  PET scan on 9/9/2020 revealed abnormal activity at L4, right femoral neck, lower sacrum, left ischium and right ninth medial aspect.  PET scan 9/9/2020 showed no evidence of visceral metastasis.  Patient had  kyphoplasty on 9/17/2020.  Pathology was nondiagnostic.  CT-guided biopsy of the lesion in the right posterior ninth rib on 10/6/2020 revealed a clear cell neoplasm.  It is positive for PAX 8 and TTF-1 favoring metastatic thyroid cancer.  Patient was referred to [facility].  Patient underwent completion thyroidectomy on 11/9/2020.  There was no evidence of uptake of radioactive iodine on SPECT imaging on 12/18/2020.  This was indicative of the tumor not going to respond to radioactive iodine therapy.  Patient was started on Lenvima 20 mg daily on 12/24/2020.  Lenvima was held on 2/6/2021 due to drug induced anemia and thrombocytopenia.  She was unable to start back on it due to prolonged cytopenias.  CT scans on 3/23/2021 showed stable disease.  CT scans showed stable disease.  Thyroglobulin was stable at 3.4.    PET scan on 9/1/2021 revealed decrease in the activity in the metastatic lesions.    Starting back on active treatment (Nexavar) was not recommended.  PET scan on 12/16/2021 showed complete response except for mild hypermetabolism in the left ischium.  CT scans on 4/8/2022 showed stable bone metastasis.  No new metastatic lesions.  PET scan on 10/6/2022 revealed no new areas of metastasis.  Due to the stable metastatic disease, she was not placed ontreatment for her thyroid cancer.   She is on Synthroid 112 mcg daily.  Goal is to maintain TSH <0.1.  Thyroglobulin increased to 13.1 on 3/26/2024 and was 11.3 on 4/4/2024.  PET scan on 4/26/2024 revealed increase in the FDG uptake in the left ischial and right posterior ninth rib metastasis.  This was concerning for progression of the known metastasis.   She was started on Cabozantinib 2 weeks ago at 40 mg daily.  She is tolerating it except for some occasional nausea and an increase in the BP.     *Bone metastasis.  Patient was given Zometa on 9/29/2020.  Patient underwent right femur medullary nailing on 10/23/2020.  Patient was restarted back on  Zometa on 2/24/2022.   CT scans on 4/8/2022 revealed no new metastatic lesions.  The metastatic lesions were stable.  PET scan on 10/6/2022 revealed no change in the areas of activity in the bone metastasis.  No new lesions.  Zometa was restarted on 6/14/2023.  PET scan on 4/26/2024 at  revealed increase in the hypermetabolism in the left ischial bone with an SUV of 8.8 (Increased from 4 0.8).  There was increase in the activity in the right ninth rib with an SUV of 5-previously below blood pool.  She received radiation therapy in 3 fractions to the left ischial metastasis and right ninth rib metastasis.  Patient developed pain in the right hip.  CT scan on 9/1/2024 showed that the lesion at the femoral neck was more clearly demarcated/larger.  Since she is having pain and since she did not receive radiation to this lesion, I recommended radiation therapy.  She was seen by Rad Onc at [facility]. Radiation was not recommended since the PET scan showed improvement compared to the previous scan.     * Iron deficiency anemia.    Patient was previously treated with ferrous sulfate 325 mg daily.  Hemoglobin decreased to 9.1 on 2/24/2022.  EGD on 3/25/2022 showed no evidence of blood loss.  Pathology exam revealed no typical features of celiac disease.  Hemoglobin was 10.4 on 12/27/2022.  Ferritin was 95 and transferrin saturation was 14%.  She was started on ferrous sulfate 325 mg daily.  Hemoglobin decreased to 9.6 on 2/1/2023, secondary to recent surgical blood losses.  She was restarted on ferrous sulfate 325 mg daily on 2/1/2023.  5/3/2023: Hemoglobin 9.4.  She was continued on ferrous sulfate 325 mg once daily.  7/26/2023: Hemoglobin decreased to 10.0.  She was continued on ferrous sulfate 325 mg daily.  She was advised to take vitamin C 1000 mg daily.  11/29/2023: Hemoglobin 9.9.  Transferrin saturation 11%.  We discussed options of IV Venofer 300 mg weekly x 5 doses but the patient decided to eat with oral  iron.  4/17/2024: Hemoglobin 10.1.  5/15/2024: Hemoglobin improved to 10.3.  Ferritin improved to 100.7.  Transferrin saturation improved to 17%.  She continues ferrous sulfate 325 mg once daily.  9/4/2024: Hemoglobin decreased to 10.0.  10/30/2024: Hemoglobin improved to 11.0    *Lesion in the right breast at 6 o'clock position.  It is underneath the reconstruction inverted T incision.  We obtained a diagnostic mammogram that revealed no suspicious lesions.  This is likely a scar.  She is going to have a 6-month follow-up mammogram in November 2024.    *History of left breast cancer, stage IIB, triIple negative.   S/P lumpectomy in 2001.  She received adjuvant chemotherapy on the NSABP B34 with AC followed by Taxotere.  S/P post-lumpectomy radiation therapy.   She was placed on tamoxifen for 5 years and it was completed in January 2007.   With the patient's history of breast cancer at a young age and development of a second new breast cancer in the left breast, repeat genetic testing was recommended.  Gene testing on 12/22/2022 revealed variants of unknown significance.    PLAN:    1.  We will give Zometa today.  2.  Continue ferrous sulfate 325 mg daily.  3.  She will continue cabozantinib 40 mg daily.  4.  Maintain adequate hydration.  5.  Return in 4 and 8 weeks for the next dose of Zometa.  6.  She is going to have a follow-up PET scan in February 2025 at [facility].  7.  Due to planned trip in late January 2025, we will see her in follow-up in February 2025.  Will schedule her to receive Zometa.  CBC CMP magnesium phosphorus ferritin iron panel will be obtained.    I spent 42 minutes caring for Ina on this date of service. This time includes time spent by me in the following activities: preparing for the visit, reviewing tests, performing a medically appropriate examination and/or evaluation, counseling and educating the patient/family/caregiver, documenting information in the medical record,  independently interpreting results and communicating that information with the patient/family/caregiver, care coordination, ordering medications, and ordering test(s)       Tim Muñoz MD  10/30/24

## 2024-11-06 ENCOUNTER — APPOINTMENT (OUTPATIENT)
Dept: WOMENS IMAGING | Facility: HOSPITAL | Age: 54
End: 2024-11-06
Payer: COMMERCIAL

## 2024-11-06 PROCEDURE — 77065 DX MAMMO INCL CAD UNI: CPT | Performed by: RADIOLOGY

## 2024-11-06 PROCEDURE — 77061 BREAST TOMOSYNTHESIS UNI: CPT | Performed by: RADIOLOGY

## 2024-11-06 PROCEDURE — G0279 TOMOSYNTHESIS, MAMMO: HCPCS | Performed by: RADIOLOGY

## 2024-11-08 NOTE — PROGRESS NOTES
[facility]     Referring Provider: Tim Muñoz MD     Chief complaint: Routine follow up breast cancer     Subjective   HPI:   12/22/2022:  Ms. Ina Akers is a 51 yo woman, seen at the request of Dr. Tim Muñoz, for a new diagnosis of left breast cancer. She has a past history of left breast cancer in 2001 (age 30), stage IIB triple negative. She underwent a lumpectomy and axillary dissection, followed by AC and Taxotere, followed by radiation, then tamoxifen for 5 years. She believes she had somewhere around 9-13 left axillary lymph nodes removed and 2 of them had cancer. In 2020, she was diagnosed with metastatic thyroid cancer to the bone. She underwent treatment in 2021 with Lenvima before this was held due to cytopenias. Her disease has been stable off treatment since 2/2021.     Recent screening mammogram in October 2022 showed a new left breast focal asymmetry separate from her lumpectomy site. Around this time she also underwent a PET scan because she was complaining of bone pain. This showed stable bone metastasis and a new FDG avid left breast lesion in the same region as the focal asymmetry. Diagnostic breast imaging confirmed a small hypoechoic mass and subsequent biopsy showed triple negative invasive ductal carcinoma. Her work-up is detailed in the oncologic history below. She denies any breast lumps, pain, skin changes, or nipple discharge. She also has a past history of a benign left breast lumpectomy in 1991.     Her only family history of cancer is oral cancer in her father. The patient has not undergone genetic testing.      2/8/2023:  She underwent left modified radical mastectomy with prepectoral tissue expander placement on 1/24/23. See surgery & pathology details below in oncologic history. She has already seen Dr. Morse back postoperatively and had 2 drains removed. She also has already seen Dr. Muñoz and discussed proceeding with TC x4. She is scheduled for a  port placement next week. She was seen at the lymphedema clinic preoperatively and her bioimpedance score was within normal limits. She did have some early arm swelling while in the hospital, however this has already improved.    5/31/2023:  She returns today for scheduled follow-up.  She completed cycle 4 of TC on 5/3/2023.  Dr. Siddiqui plans to resume Zometa which she was previously on for her thyroid bone metastasis.  Her left arm swelling has improved and her bioimpedance score last week was within normal limits.  She has been wearing a sleeve as needed.  She completed some expansion with Dr. Morse prior to starting chemotherapy and she sees him again in a couple of weeks.  She plans on waiting until December for her second stage surgery because she has a grandchild due in August.    12/15/2023:  She underwent right screening mammogram in November and this was benign.  She saw PT early in November and her bioimpedance score was elevated.  She is supposed to be wearing her sleeve more often.  She then underwent left implant exchange and right mastopexy with Dr. Morse on 11/30/2023.  She is complaining of a rash and itching from the skin glue which is improving with topical hydrocortisone.    6/20/2024  She returns today for scheduled follow-up.  Bioimpedance score in March was within normal limits, but slightly elevated from her prior measurement.  She does still have issues with mild left arm swelling and tries to wear her sleeve at least 3 times a week to keep it controlled.  She noticed a lump at the right breast T-junction after mastopexy with Dr. Morse.  Dr. Morse saw her and said it was fat necrosis.  She then had diagnostic imaging which also demonstrated fat necrosis and placed the area in imaging surveillance.    Unfortunately repeat PET scan in 4/2024 showed likely progression of the bone mets from her thyroid cancer.  Her original oncologist from  is no longer there and she decided to  switch her care to Dr. Dahl at [facility].  She is tearful today about the cancer progression.  She recently retired from work, although her original plan was to work longer.  She is now spending time taking care of her [id number] grandson.    11/11/2024 interval history  Patient presenting to the office today for routine follow-up.  On 11/6/2024 she had a right diagnostic mammogram that returned as BI-RADS 3 for a mass in the right breast that is probably benign and suggesting a follow-up mammogram in 6 months.  Last saw her oncologist in October with no changes made to the treatment plan.  She has a follow-up PET scan scheduled for February 2025 at [facility].  She has not followed up with the lymphedema clinic.  She is not having any left arm swelling at this time.      Oncology/Hematology History Overview Note   *History of left breast cancer, stage IIB, triIple negative.   S/P lumpectomy in 2001.  She received adjuvant chemotherapy on the NSABP B34 with AC followed by Taxotere.  S/P post-lumpectomy radiation therapy.   She was placed on tamoxifen for 5 years and it was completed in January 2007.     *Metastatic thyroid cancer diagnosed on 10/6/2020.  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  She had right thyroid lobectomy on 12/17/2018 at [facility].    The specimen was sent for outside consultation and the final conclusion was that there was no evidence of malignancy.  MRI on 8/27/2020 revealed an L4 lesion resulting in pathologic compression fracture.  Bone scan on 9/2/2020 revealed increased activity at L4 and left side of the skull.  PET scan on 9/9/2020 revealed abnormal activity at L4, right femoral neck, lower sacrum, left ischium and right ninth medial aspect.  PET scan 9/9/2020 showed no evidence of visceral metastasis.  Patient had kyphoplasty on 9/17/2020.  Pathology was nondiagnostic.  CT-guided biopsy of the lesion in the right posterior ninth rib on 10/6/2020 revealed a clear cell  neoplasm.  It is positive for PAX 8 and TTF-1 favoring metastatic thyroid cancer.  Patient was referred to [facility].  Patient underwent completion thyroidectomy on 11/9/2020.  There was no evidence of uptake of radioactive iodine on SPECT imaging on 12/18/2020.  This was indicative of the tumor not going to respond to radioactive iodine therapy.  Patient was started on Lenvima 20 mg daily on 12/24/2020.  Lenvima was held on 2/6/2021 due to drug induced anemia and thrombocytopenia.  She was unable to start back on it due to prolonged cytopenias.  CT scans on 3/23/2021 showed stable disease.  CBC was stable on 6/8/2021.  CT scans showed stable disease.  Thyroglobulin was stable at 3.4.    PET scan on 9/1/2021 revealed decrease in the activity in the metastatic lesions.    Starting back on active treatment (Nexavar) was not recommended.  PET scan on 12/16/2021 showed complete response except for mild hypermetabolism in the left ischium.  CT scans on 4/8/2022 showed stable bone metastasis.  No new metastatic lesions.  PET scan on 10/6/2022 revealed no new areas of metastasis.  Due to the stable metastatic disease, she is not currently on treatment for her thyroid cancer.   She is on Synthroid 112 mcg daily.     Malignant neoplasm of upper-outer quadrant of left breast in female, estrogen receptor negative   11/23/2016 Initial Diagnosis    Malignant neoplasm of upper-outer quadrant of left breast in female, estrogen receptor negative (CMS/HCC)     9/23/2020 Cancer Staged    Staging form: Breast, AJCC V7  - Pathologic stage from 9/23/2020: Stage IIB (T2, N1, cM0) - Signed by Tim Muñoz MD on 10/9/2020     9/29/2020 - 9/29/2020 Chemotherapy    OP SUPPORTIVE Denosumab (Xgeva) Q28D     9/29/2020 -  Chemotherapy    OP SUPPORTIVE Zoledronic Acid Q28D     8/19/2021 Imaging    Screening MMG with Teodoro ([facility])  There are scattered areas of fibroglandular density. Left breast post-operative change is  again seen. There are no suspicious masses, significant calcifications, or other abnormalities seen in either breast.  BI-RADS 2: Benign     10/5/2022 Imaging    Screening MMG with Teodoro ([facility])  There are scattered areas of fibroglandular density.  Finding 1: There is a new fat containing focal asymmetry measuring 6 mm seen in the middle one-third region of the left breast at 12 o'clock  Finding 2: There is a focal asymmetry with associated dystrophic calcifications and post-surgical scar seen in the upper outer region of the left breast. Focal asymmetry in in an area of prior lumpectomy. There are no significant changes from the prior exam(s).   In the right breast, no suspicious masses, significant calcifications or other abnormalities are seen.  BI-RADS 0: Incomplete     10/6/2022 Imaging    PET CT FDG ( [facility])  Chest:  New intensely FDG avid left breast ill-defined nodular focus at the 12 o'clock position measuring .0 x 0.7 cm with a maximum SUV of 5.8 (series 4, image 122).  No significant change in 19 x 15 mm non FDG avid speculated mass within the posterior upper outer quadrant of the left breast with intralesional fat and a peripherally located biopsy clip, consistent with known a post-lumpectomy scar.  Impression:  1. New FDG avid left breast ill-defined nodular focus at the 12 o-clock position, suspicious for malignancy. This could be metastic disease given the increase in non-stimulated thyroglobulin level. Tissue sampling can be considered for further characterization.  2. No significant change in hyper metabolic well-defined lytic lesion with sclerotic margin within the left ischium compared to the prior 2 PET/CT studies from 2021 yet showing evidence of treatment response compared to outside study from 09/09/2020.  3. No new metabolically active osseous metastatic disease.     10/20/2022 Imaging    Left Diagnostic MMG with Teodoro & Left Breast US ([facility])  MMG:  On the present examination,  there is isodense focal asymmetry measuring 6 mm in the left breast at 12 o'clock located 5 cm from the nipple.  US:  Demonstrates a round non-parallel complex cystic and solid mass with circumscribed margins measuring 6 x 5 x 5 mm in the left breast at 12 o'clock located 5 cm from the nipple.  BI-RADS 4B: Suspicious     2022 Imaging    Left Diagnostic MMG with Teodoro & Left Breast US ([facility]):  MM. Re-demonstration of 7 mm mass at approximately 12:00-1:00, 6 to 8 cm from the nipple.  2. Stable post-lumpectomy changes in the upper outer left breast.  US:  Focused ultrasound was performed at 1:00, 7 cm from the nipple. Ultrasound demonstrates a hypoechoic round mass with indistinct margins and without internal vascularity measuring 5 x 5 x 4 mm. There is an indistinct hyperechoic halo surrounding the mass. This correlates with mammographic finding.  BI-RADS 4: Suspicious     2022 Biopsy    Left Breast, US-Guided Biopsy ():    LEFT BREAST, 1:00 (7 CM FROM NIPPLE), ULTRASOUND GUIDED CORE NEEDLE BIOPSY FOR 0.7 CM MASS (U CLIP):     - INVASIVE DUCTAL CARCINOMA, poorly differentiated (Carlos grade III/III: tubule score=3, nuclear score=3, mitoses score=3), with necrosis, measuring up to 0.5 cm.      - No lymphovascular invasion or in situ carcinoma identified.      ER Negative (<1%)  VT Negative (<1%)  HER2 Negative (IHC 0)      [facility] PATHOLOGY REVIEW    1. Left Breast, 1 o'clock, 7 cm from Nipple, Ultrasound-Guided Core Biopsies for a Mass (Outside Consult [id number]):       INVASIVE MAMMARY CARCINOMA, NO SPECIAL TYPE (INVASIVE DUCTAL CARCINOMA).               A. Histologic grade: Carlos histologic score III (tubules = 3, nuclei = 3, mitosis = 3).               B. Largest contiguous focus measures up to 5 mm.   C. No associated in situ component identified.               D. No lymphovascular nor perineural invasion identified.     2022 Imaging    Bilateral Breast  MRI ([facility]):  In the posterior one third of the left breast at the 12:30 position centered on the order of 8.5 cm from the nipple there is a focal signal void seen within an irregular enhancing mass that measures on the order of 0.9 cm in the superior-inferior dimension, 0.8 cm in anterior to posterior dimension and 0.8 cm and the mediolateral dimension. This represents the biopsy-proven site of malignancy with the internal biopsy clip.  In the anterior one third of the left breast located medial to the plane of the nipple at the 9 o'clock position on the order of 3 cm from the nipple there is an irregular enhancing lesion that measures on the order of 0.5 cm in the superior-inferior dimension, 0.5 cm in anterior to posterior dimension and 0.5 cm in the medial to lateral dimension. A possible mammographic correlate is seen on the CC image of the left breast on the examination of 11/18/2022. This area of enhancement is not seen on the prior breast MRI examination from 12/26/2012.  There is post surgical change of the left breast associated with prior breast conservation therapy. No other areas of abnormal enhancement or morphology are seen within the left breast. I see no evidence for  abnormal left breast skin, nipple or chest wall enhancement and there is no evidence for left axillary or internal mammary chain adenopathy.   There are no areas of abnormal enhancement or morphology in the right breast. I see no evidence for abnormal right breast skin, nipple or chest wall enhancement and there is no evidence for right axillary or internal mammary chain adenopathy.  BI-RADS 4: Suspicious.     12/23/2022 Genetic Testing    [employer] Multi-Cancer Panel (84 genes):    VUS in NF1  VUS in POT1  VUS in SMAD4     1/24/2023 Surgery    Left modified radical mastectomy with prepectoral tissue expander placement    1. Left Breast Modified Radical Mastectomy:               A. Invasive carcinoma with mixed ductal and lobular  features:                            1. Invasive carcinoma measures 15 mm x 9 mm x 5 mm.                            2. Overall Carlos grade III (tubular score = 3, nuclear score = 3, mitotic score = 3).                            3. No definitive lymphovascular space invasion identified.               B. Associated ductal carcinoma in situ (DCIS):                            1. DCIS spans an area estimated at 20 mm x 5 mm x 2 mm.                            2. High-grade solid and comedo DCIS.               C. All margins are negative for invasive carcinoma.                    Invasive carcinoma measures 25 mm from the closest (Posterior) margin of excision.                                  D. All margins are negative for ductal carcinoma in-situ (DCIS).                    DCIS measures 25 mm from the closest (Posterior) margin of excision                              E. Focal biopsy site changes are identified, and metallic clip retrieved.               F. No Pagetoid involvement of skin nor nipple by malignancy identified.               G. No lobular neoplasia (LCIS, ALH) identified.               H. Two incidental lymph nodes identified, negative for metastatic carcinoma by routine and immunostaining (0/2).               I.  Focal dense dermal and stromal scar consistent with previous surgical procedure identified.  J. Previous Biomarkers: Estrogen receptors: negative, Progesterone receptors: negative,                    HER/2-stacey: negative (Score 0), Ki-67 = Not reported.  [id number] ([id number]).     2. Left Breast Augmentation:                 A. Benign skin and subcutaneous tissue.     2/21/2023 - 11/29/2023 Chemotherapy    OP CENTRAL VENOUS ACCESS DEVICE ACCESS, CARE, AND MAINTENANCE ([facility])     3/1/2023 - 5/3/2023 Chemotherapy    OP BREAST TC DOCEtaxel / Cyclophosphamide       11/8/2023 Imaging    Right Screening MMG with Teodoro ([facility]):  Scattered areas of fibroglandular density.  No suspicious masses,  significant calcifications or other abnormalities are seen.  BI-RADS 1: Negative.     4/30/2024 Imaging    Right Diagnostic MMG with Teodoro & Right Breast US ([facility]):  MMG:  Scattered areas of fibroglandular density.  There is a fat-containing, oval mass seen in the right breast at 6:00.  The mass is in an area of scarring from prior breast reduction and mastopexy.  Mass correlates to the palpable abnormality in the right breast at 6:00.  This area is an oil cyst or fat necrosis.  There are no findings to suggest malignancy at this time.  US:  Ultrasound demonstrates an oval mass with partially defined margins seen in the right breast at 6:00.  Internal echotexture is mixed.  This area is consistent with an area of fat necrosis/oil cyst as best seen mammographically.  BI-RADS 3: Probably benign.  Recommend follow-up mammogram in 6 months.     11/6/2024 Imaging    Right diagnostic mammogram at [facility]  There are scattered areas of fibroglandular density.    Follow-up examination was performed for the oval mass in the right breast, 6 o'clock seen on 04/30/2024. On the  present examination, there is a fat containing, oval mass with associated rim and dystrophic calcifications in the  right breast at 6 o'clock. This finding is an oil cyst or fat necrosis.  There are no suspicious masses,  calcifications, or areas of architectural distortion.    IMPRESSION:  Mass in the right breast is probably benign. Follow-up mammography in 6 months is recommended.    The patient was mailed a notification letter.    BI-RADS Category 3: Probably Benign     Metastasis to bone   9/23/2020 Initial Diagnosis    Bone metastasis (CMS/HCC)     9/29/2020 - 9/29/2020 Chemotherapy    OP SUPPORTIVE Denosumab (Xgeva) Q28D     9/29/2020 -  Chemotherapy    OP SUPPORTIVE Zoledronic Acid Q28D     Primary cancer of thyroid gland metastatic to bone   3/2/2022 Initial Diagnosis    Primary cancer of thyroid gland metastatic to bone (HCC)     Primary cancer of  thyroid gland metastatic to bone   6/11/2024 Initial Diagnosis    Primary cancer of thyroid gland metastatic to bone         Review of Systems:  See interval history.       Medications:    Current Outpatient Medications:     acetaminophen (TYLENOL) 500 MG tablet, Take 2 tablets by mouth Every 6 (Six) Hours As Needed for Mild Pain., Disp: , Rfl:     amLODIPine (NORVASC) 10 MG tablet, TAKE ONE TABLET BY MOUTH DAILY (Patient taking differently: Take 1 tablet by mouth Daily.), Disp: 90 tablet, Rfl: 1    atorvastatin (LIPITOR) 20 MG tablet, Take 1 tablet by mouth Daily., Disp: 90 tablet, Rfl: 3    Blood Glucose Monitoring Suppl w/Device kit, Check blood sugar twice a day, Disp: 1 each, Rfl: 0    cabozantinib s-malate (CABOMETYX) 40 MG tablet tablet, 1 tablet., Disp: , Rfl:     celecoxib (CeleBREX) 200 MG capsule, TAKE ONE CAPSULE BY MOUTH DAILY WITH FOOD AND WATER AS NEEDED FOR LOWEST EFFECTIVE DOSE AND SHORTEST TIME POSSIBLE, Disp: 90 capsule, Rfl: 1    cholecalciferol (VITAMIN D3) 25 MCG (1000 UT) tablet, Take 1 tablet by mouth Daily., Disp: , Rfl:     cyclobenzaprine (FLEXERIL) 5 MG tablet, Take 1 tablet by mouth At Night As Needed for Muscle Spasms. 1 or 2 tablets by mouth at bedtime if needed for leg cramps, Disp: 20 tablet, Rfl: 2    famotidine (PEPCID) 20 MG tablet, Take 1 tablet by mouth Every Other Day., Disp: , Rfl:     Glucose Blood (BLOOD GLUCOSE TEST) strip, Check blood sugar twice a day., Disp: 100 each, Rfl: 1    labetalol (NORMODYNE) 100 MG tablet, Take 0.5 tablets by mouth 2 (Two) Times a Day., Disp: 180 tablet, Rfl: 3    Lancets (ACCU-CHEK SOFT TOUCH) lancets, Check blood sugar twice a day as needed, Disp: 100 each, Rfl: 1    lansoprazole (PREVACID) 30 MG capsule, TAKE 1 CAPSULE BY MOUTH DAILY (Patient taking differently: Take 1 capsule by mouth Every Other Day.), Disp: 90 capsule, Rfl: 3    levothyroxine (SYNTHROID, LEVOTHROID) 112 MCG tablet, Take 1 tablet by mouth Every Morning., Disp: , Rfl:      losartan (COZAAR) 100 MG tablet, Take 1 tablet by mouth Daily., Disp: 90 tablet, Rfl: 3    MAGNESIUM PO, Take  by mouth., Disp: , Rfl:     metFORMIN ER (GLUCOPHAGE-XR) 500 MG 24 hr tablet, Take 2 tablets by mouth Daily With Dinner., Disp: 180 tablet, Rfl: 3    NON FORMULARY, Take 1 tablet by mouth Every Night. STOOL SOFTNER, Disp: , Rfl:     ondansetron ODT (ZOFRAN-ODT) 8 MG disintegrating tablet, Take 1 tablet by mouth Every 8 (Eight) Hours As Needed for Nausea or Vomiting., Disp: 20 tablet, Rfl: 2    oxyCODONE-acetaminophen (PERCOCET) 5-325 MG per tablet, Take 1 tablet by mouth Every 4 (Four) Hours As Needed for Moderate Pain., Disp: 16 tablet, Rfl: 0    promethazine (PHENERGAN) 12.5 MG tablet, Take 1-2 tablets by mouth Every 6 (Six) Hours As Needed for Nausea. TAKE 1/2 TO 1 TABLET BY MOUTH EVERY 4 HOURS AS NEEDED FOR NAUSEA AND VOMITING -MAY MAKE DROWSY, Disp: 60 tablet, Rfl: 2    triamcinolone (KENALOG) 0.1 % cream, Apply 1 Application topically to the appropriate area as directed 2 (Two) Times a Day. Use sparingly avoid chronic use avoid face, Disp: 80 g, Rfl: 0      Allergies   Allergen Reactions    Monoclonal Antibodies Anaphylaxis and Shortness Of Breath     Regeneron,  High b/p increased heart rate     Zofran [Ondansetron] Nausea And Vomiting     DOES NOT HELP WITH NAUSEA AND VOMITING MAKES IT WORSE    Hydrocodone Nausea And Vomiting    Lenvatinib Unknown - Low Severity     Internal bleeding       Family History   Problem Relation Age of Onset    No Known Problems Mother     Cancer Father         Oral    Hypertension Other     Diabetes Other     Malig Hyperthermia Neg Hx        Objective   PHYSICAL EXAMINATION:   There were no vitals filed for this visit.    ECOG 0 - Asymptomatic  General: NAD, well appearing  Psych: a&o x3, normal mood and affect  Eyes: EOMI, no scleral icterus  ENMT: neck supple without masses or thyromegaly, mucous membranes moist  MSK: normal gait, normal ROM in bilateral  shoulders  Lymph nodes: no cervical, supraclavicular or axillary lymphadenopathy  Breast:   Right: Sp mastopexy.  There is a 1 cm nodule at the T-junction (fat necrosis).  Otherwise scars are soft.  No nipple abnormalities.  Left: Sp mastectomy with implant in place.  Scar is soft.  No masses.          Assessment & Plan   Assessment:   1) 53 y.o. F with a diagnosis of left breast cancer: High grade, triple negative, invasive ductal carcinoma. She underwent left modified radical mastectomy with prepectoral tissue expander placement on 1/24/23, pT1cN0, anatomic stage IA, prognostic stage IB.  She completed TC x4 on 5/3/2023.  -She has an area of fat necrosis in the inferior right breast after mastopexy which is currently in imaging surveillance.  2) She has a past history of left breast cancer in 2001 (age 30), stage IIB triple negative. She underwent a lumpectomy and axillary dissection, followed by AC and Taxotere, followed by radiation, then tamoxifen for 5 years.  3) She has metastatic thyroid cancer diagnosed in 2020 with bone metastasis.    Plan:  -Continue follow-up with medical oncology  -Continue follow-up with lymphedema clinic.  -6 mons dx mammo and exam       MAGO Shearer      CC:  Khalid Z. Ghosheh, MD James Epley, APRN Lori Warren, MD

## 2024-11-11 ENCOUNTER — OFFICE VISIT (OUTPATIENT)
Dept: SURGERY | Facility: CLINIC | Age: 54
End: 2024-11-11
Payer: COMMERCIAL

## 2024-11-11 VITALS
WEIGHT: 164 LBS | SYSTOLIC BLOOD PRESSURE: 142 MMHG | BODY MASS INDEX: 32.2 KG/M2 | HEART RATE: 79 BPM | HEIGHT: 60 IN | OXYGEN SATURATION: 96 % | DIASTOLIC BLOOD PRESSURE: 80 MMHG

## 2024-11-11 DIAGNOSIS — C50.412 MALIGNANT NEOPLASM OF UPPER-OUTER QUADRANT OF LEFT BREAST IN FEMALE, ESTROGEN RECEPTOR NEGATIVE: Primary | ICD-10-CM

## 2024-11-11 DIAGNOSIS — Z17.1 MALIGNANT NEOPLASM OF UPPER-OUTER QUADRANT OF LEFT BREAST IN FEMALE, ESTROGEN RECEPTOR NEGATIVE: Primary | ICD-10-CM

## 2024-11-11 DIAGNOSIS — R92.8 ABNORMAL MAMMOGRAM: ICD-10-CM

## 2024-11-11 PROCEDURE — 99213 OFFICE O/P EST LOW 20 MIN: CPT | Performed by: NURSE PRACTITIONER

## 2024-12-04 ENCOUNTER — INFUSION (OUTPATIENT)
Dept: ONCOLOGY | Facility: HOSPITAL | Age: 54
End: 2024-12-04
Payer: COMMERCIAL

## 2024-12-04 ENCOUNTER — APPOINTMENT (OUTPATIENT)
Dept: OTHER | Facility: HOSPITAL | Age: 54
End: 2024-12-04
Payer: COMMERCIAL

## 2024-12-04 ENCOUNTER — OFFICE VISIT (OUTPATIENT)
Dept: FAMILY MEDICINE CLINIC | Facility: CLINIC | Age: 54
End: 2024-12-04
Payer: COMMERCIAL

## 2024-12-04 VITALS
RESPIRATION RATE: 14 BRPM | TEMPERATURE: 97.1 F | BODY MASS INDEX: 32.18 KG/M2 | SYSTOLIC BLOOD PRESSURE: 130 MMHG | DIASTOLIC BLOOD PRESSURE: 78 MMHG | HEART RATE: 76 BPM | HEIGHT: 60 IN | OXYGEN SATURATION: 96 % | WEIGHT: 163.9 LBS

## 2024-12-04 VITALS
OXYGEN SATURATION: 98 % | HEART RATE: 68 BPM | HEIGHT: 60 IN | DIASTOLIC BLOOD PRESSURE: 88 MMHG | TEMPERATURE: 96.4 F | BODY MASS INDEX: 32.04 KG/M2 | SYSTOLIC BLOOD PRESSURE: 146 MMHG | RESPIRATION RATE: 15 BRPM | WEIGHT: 163.2 LBS

## 2024-12-04 DIAGNOSIS — C50.412 MALIGNANT NEOPLASM OF UPPER-OUTER QUADRANT OF LEFT BREAST IN FEMALE, ESTROGEN RECEPTOR NEGATIVE: ICD-10-CM

## 2024-12-04 DIAGNOSIS — C79.51 METASTASIS TO BONE: Primary | ICD-10-CM

## 2024-12-04 DIAGNOSIS — Z17.1 MALIGNANT NEOPLASM OF UPPER-OUTER QUADRANT OF LEFT BREAST IN FEMALE, ESTROGEN RECEPTOR NEGATIVE: ICD-10-CM

## 2024-12-04 DIAGNOSIS — E78.2 MIXED HYPERLIPIDEMIA: ICD-10-CM

## 2024-12-04 DIAGNOSIS — I10 HYPERTENSION, UNSPECIFIED TYPE: ICD-10-CM

## 2024-12-04 DIAGNOSIS — C73 THYROID CANCER: ICD-10-CM

## 2024-12-04 DIAGNOSIS — E11.9 CONTROLLED TYPE 2 DIABETES MELLITUS WITHOUT COMPLICATION, WITHOUT LONG-TERM CURRENT USE OF INSULIN: Primary | ICD-10-CM

## 2024-12-04 LAB
ALBUMIN SERPL-MCNC: 4.4 G/DL (ref 3.5–5.2)
ALBUMIN/GLOB SERPL: 1.4 G/DL
ALP SERPL-CCNC: 62 U/L (ref 39–117)
ALT SERPL W P-5'-P-CCNC: 35 U/L (ref 1–33)
ANION GAP SERPL CALCULATED.3IONS-SCNC: 7.7 MMOL/L (ref 5–15)
AST SERPL-CCNC: 30 U/L (ref 1–32)
BASOPHILS # BLD AUTO: 0.01 10*3/MM3 (ref 0–0.2)
BASOPHILS NFR BLD AUTO: 0.3 % (ref 0–1.5)
BILIRUB SERPL-MCNC: 0.5 MG/DL (ref 0–1.2)
BUN SERPL-MCNC: 16 MG/DL (ref 6–20)
BUN/CREAT SERPL: 17.4 (ref 7–25)
CALCIUM SPEC-SCNC: 9.7 MG/DL (ref 8.6–10.5)
CHLORIDE SERPL-SCNC: 104 MMOL/L (ref 98–107)
CO2 SERPL-SCNC: 29.3 MMOL/L (ref 22–29)
CREAT SERPL-MCNC: 0.92 MG/DL (ref 0.57–1)
DEPRECATED RDW RBC AUTO: 55.4 FL (ref 37–54)
EGFRCR SERPLBLD CKD-EPI 2021: 73.2 ML/MIN/1.73
EOSINOPHIL # BLD AUTO: 0.17 10*3/MM3 (ref 0–0.4)
EOSINOPHIL NFR BLD AUTO: 5.3 % (ref 0.3–6.2)
ERYTHROCYTE [DISTWIDTH] IN BLOOD BY AUTOMATED COUNT: 17.1 % (ref 12.3–15.4)
GLOBULIN UR ELPH-MCNC: 3.2 GM/DL
GLUCOSE SERPL-MCNC: 74 MG/DL (ref 65–99)
HBA1C MFR BLD: 6.3 % (ref 4.8–5.6)
HCT VFR BLD AUTO: 34 % (ref 34–46.6)
HGB BLD-MCNC: 10.6 G/DL (ref 12–15.9)
IMM GRANULOCYTES # BLD AUTO: 0.01 10*3/MM3 (ref 0–0.05)
IMM GRANULOCYTES NFR BLD AUTO: 0.3 % (ref 0–0.5)
LYMPHOCYTES # BLD AUTO: 1.24 10*3/MM3 (ref 0.7–3.1)
LYMPHOCYTES NFR BLD AUTO: 38.4 % (ref 19.6–45.3)
MAGNESIUM SERPL-MCNC: 1.9 MG/DL (ref 1.6–2.6)
MCH RBC QN AUTO: 27.9 PG (ref 26.6–33)
MCHC RBC AUTO-ENTMCNC: 31.2 G/DL (ref 31.5–35.7)
MCV RBC AUTO: 89.5 FL (ref 79–97)
MONOCYTES # BLD AUTO: 0.27 10*3/MM3 (ref 0.1–0.9)
MONOCYTES NFR BLD AUTO: 8.4 % (ref 5–12)
NEUTROPHILS NFR BLD AUTO: 1.53 10*3/MM3 (ref 1.7–7)
NEUTROPHILS NFR BLD AUTO: 47.3 % (ref 42.7–76)
NRBC BLD AUTO-RTO: 0 /100 WBC (ref 0–0.2)
PHOSPHATE SERPL-MCNC: 3.6 MG/DL (ref 2.5–4.5)
PLATELET # BLD AUTO: 250 10*3/MM3 (ref 140–450)
PMV BLD AUTO: 9 FL (ref 6–12)
POTASSIUM SERPL-SCNC: 3.8 MMOL/L (ref 3.5–5.2)
PROT SERPL-MCNC: 7.6 G/DL (ref 6–8.5)
RBC # BLD AUTO: 3.8 10*6/MM3 (ref 3.77–5.28)
SODIUM SERPL-SCNC: 141 MMOL/L (ref 136–145)
WBC NRBC COR # BLD AUTO: 3.23 10*3/MM3 (ref 3.4–10.8)

## 2024-12-04 PROCEDURE — 85025 COMPLETE CBC W/AUTO DIFF WBC: CPT | Performed by: INTERNAL MEDICINE

## 2024-12-04 PROCEDURE — 80053 COMPREHEN METABOLIC PANEL: CPT | Performed by: INTERNAL MEDICINE

## 2024-12-04 PROCEDURE — 83036 HEMOGLOBIN GLYCOSYLATED A1C: CPT | Performed by: NURSE PRACTITIONER

## 2024-12-04 PROCEDURE — 99214 OFFICE O/P EST MOD 30 MIN: CPT | Performed by: NURSE PRACTITIONER

## 2024-12-04 PROCEDURE — 36415 COLL VENOUS BLD VENIPUNCTURE: CPT | Performed by: NURSE PRACTITIONER

## 2024-12-04 PROCEDURE — 83735 ASSAY OF MAGNESIUM: CPT | Performed by: INTERNAL MEDICINE

## 2024-12-04 PROCEDURE — 84100 ASSAY OF PHOSPHORUS: CPT | Performed by: INTERNAL MEDICINE

## 2024-12-04 PROCEDURE — 25010000002 ZOLEDRONIC ACID 4 MG/100ML SOLUTION: Performed by: INTERNAL MEDICINE

## 2024-12-04 PROCEDURE — 96374 THER/PROPH/DIAG INJ IV PUSH: CPT

## 2024-12-04 RX ORDER — ZOLEDRONIC ACID 0.04 MG/ML
4 INJECTION, SOLUTION INTRAVENOUS ONCE
Status: COMPLETED | OUTPATIENT
Start: 2024-12-04 | End: 2024-12-04

## 2024-12-04 RX ORDER — CELECOXIB 200 MG/1
200 CAPSULE ORAL 2 TIMES DAILY
Qty: 180 CAPSULE | Refills: 1 | Status: SHIPPED | OUTPATIENT
Start: 2024-12-04

## 2024-12-04 RX ADMIN — ZOLEDRONIC ACID 4 MG: 0.04 INJECTION, SOLUTION INTRAVENOUS at 12:12

## 2024-12-04 NOTE — PATIENT INSTRUCTIONS
Discharge instructions continue present plan just keep me in you you doing great nothing else you need to do  If there is anything else we should do it with medication probably    Celebrex always lowest effective dose but I would recommend taking 2 for couple days when you have a flareup then decreasing to 1 a day and likely gets considerable relief,    When you are doing well changed to every other day possibly if possible but continue daily if need be  Always can take Tylenol for breakthrough pain  Do not forget heat ice,  If not controlling your pain levels return to office for further discussion    Stay on top the back pain with the PET scan and your oncologist at your well  And with your breast cancer at [facility], I will see you back in 6 months as long as you are feeling good and doing well should you get a fever chills shortness of breath promptly emergency room urgent care early recheck and monitoring to prevent a early detection of any pneumonia ski    You are doing wonderful metabolically you are doing great take it when there is nothing else I want you to do    You do not have medullary thyroid cancer to my knowledge and for this reason Ozempic possibly could be an option for you to clarify this with your oncologist and see if this is an option or not or should we avoid this discussion if so let me know

## 2024-12-04 NOTE — NURSING NOTE
Pt arrived for zometa and denies any recent or upcoming dental procedures. Pt tolerated today's infusion without incident and discharged in stable condition.

## 2024-12-04 NOTE — PROGRESS NOTES
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  Right rib as well as a school, recently had x-ray treatments radiation oncology about 2 months ago she has been traveling little bit more pain in the left low back not bad little positional she has no fever weight loss, she discussed this with oncology on Monday and they suggested and wanted her to keep and get the PET scan early January if she has increasing pain until then she will notify them, and then review the PET scan before next step regarding the left low back pain            Objective   Vital Signs:  /78   Pulse 76   Temp 97.1 °F (36.2 °C) (Infrared)   Resp 14   Ht 152 cm (59.84\")   Wt 74.3 kg (163 lb 14.4 oz)   SpO2 96%   BMI 32.18 kg/m²   Estimated body mass index is 32.18 kg/m² as calculated from the following:    Height as of this encounter: 152 cm (59.84\").    Weight as of this encounter: 74.3 kg (163 lb 14.4 oz).            Physical Exam   Result Review :                Assessment and Plan   Diagnoses and all orders for this visit:    1. Controlled type 2 diabetes mellitus without complication, without long-term current use of insulin (Primary)  -     Hemoglobin A1c    2. Hypertension, unspecified type    3. Mixed hyperlipidemia    4. Malignant neoplasm of upper-outer quadrant of left breast in female, estrogen receptor negative    5. Thyroid cancer  Comments:  Follicular no medullary    Other orders  -     celecoxib (CeleBREX) 200 MG capsule; Take 1 capsule by mouth 2 (Two) Times a Day. As " needed for pain, take with food tall glass of water always lowest effective dose shortest time possible  Dispense: 180 capsule; Refill: 1             Follow Up   Return in about 6 months (around 6/4/2025), or june, for Labs before next visit, Annual physical.  Patient was given instructions and counseling regarding her condition or for health maintenance advice. Please see specific information pulled into the AVS if appropriate.     Patient Instructions   Discharge instructions continue present plan just keep me in you you doing great nothing else you need to do  If there is anything else we should do it with medication probably    Celebrex always lowest effective dose but I would recommend taking 2 for couple days when you have a flareup then decreasing to 1 a day and likely gets considerable relief,    When you are doing well changed to every other day possibly if possible but continue daily if need be  Always can take Tylenol for breakthrough pain  Do not forget heat ice,  If not controlling your pain levels return to office for further discussion    Stay on top the back pain with the PET scan and your oncologist at your well  And with your breast cancer at [facility], I will see you back in 6 months as long as you are feeling good and doing well should you get a fever chills shortness of breath promptly emergency room urgent care early recheck and monitoring to prevent a early detection of any pneumonia ski    You are doing wonderful metabolically you are doing great take it when there is nothing else I want you to do    You do not have medullary thyroid cancer to my knowledge and for this reason Ozempic possibly could be an option for you to clarify this with your oncologist and see if this is an option or not or should we avoid this discussion if so let me know         Dose change Celebrex but still lowest effective dose but occasionally can take twice a day then decrease back to daily fluids fluids fluids

## 2024-12-28 ENCOUNTER — APPOINTMENT (OUTPATIENT)
Dept: CT IMAGING | Facility: HOSPITAL | Age: 54
End: 2024-12-28
Payer: COMMERCIAL

## 2024-12-28 ENCOUNTER — HOSPITAL ENCOUNTER (EMERGENCY)
Facility: HOSPITAL | Age: 54
Discharge: HOME | End: 2024-12-28
Attending: EMERGENCY MEDICINE
Payer: COMMERCIAL

## 2024-12-28 VITALS
RESPIRATION RATE: 15 BRPM | OXYGEN SATURATION: 96 % | TEMPERATURE: 98 F | HEART RATE: 91 BPM | DIASTOLIC BLOOD PRESSURE: 87 MMHG | SYSTOLIC BLOOD PRESSURE: 126 MMHG

## 2024-12-28 DIAGNOSIS — M54.16 BILATERAL LUMBAR RADICULOPATHY: Primary | ICD-10-CM

## 2024-12-28 PROCEDURE — 25010000002 KETOROLAC TROMETHAMINE PER 15 MG: Performed by: PHYSICIAN ASSISTANT

## 2024-12-28 PROCEDURE — 72192 CT PELVIS W/O DYE: CPT

## 2024-12-28 PROCEDURE — 99284 EMERGENCY DEPT VISIT MOD MDM: CPT

## 2024-12-28 PROCEDURE — 72131 CT LUMBAR SPINE W/O DYE: CPT

## 2024-12-28 PROCEDURE — 96372 THER/PROPH/DIAG INJ SC/IM: CPT

## 2024-12-28 RX ORDER — CYCLOBENZAPRINE HCL 10 MG
10 TABLET ORAL ONCE
Status: COMPLETED | OUTPATIENT
Start: 2024-12-28 | End: 2024-12-28

## 2024-12-28 RX ORDER — PREDNISONE 20 MG/1
TABLET ORAL
Qty: 19 TABLET | Refills: 0 | Status: SHIPPED | OUTPATIENT
Start: 2024-12-28

## 2024-12-28 RX ORDER — KETOROLAC TROMETHAMINE 30 MG/ML
60 INJECTION, SOLUTION INTRAMUSCULAR; INTRAVENOUS ONCE
Status: COMPLETED | OUTPATIENT
Start: 2024-12-28 | End: 2024-12-28

## 2024-12-28 RX ADMIN — KETOROLAC TROMETHAMINE 60 MG: 30 INJECTION, SOLUTION INTRAMUSCULAR at 11:04

## 2024-12-28 RX ADMIN — CYCLOBENZAPRINE HYDROCHLORIDE 10 MG: 10 TABLET, FILM COATED ORAL at 11:03

## 2024-12-28 NOTE — ED PROVIDER NOTES
EMERGENCY DEPARTMENT ENCOUNTER      PCP: Epley, James, APRN  Patient Care Team:  Epley, James, APRN as PCP - General (Family Medicine)  Tim Muñoz MD as Consulting Physician (Hematology and Oncology)  Epley, James, APRN as Referring Physician (Family Medicine)  Tiffany Contreras MD as Consulting Physician (Radiation Oncology)  Shavonne Sharif MD as Consulting Physician (Breast Surgery)  Yony Morse MD as Attending Provider (Plastic Surgery)   Independent Historians: Patient    HPI:  Chief Complaint: Back pain   A complete HPI/ROS/PMH/PSH/SH/FH are unobtainable due to: None    Chronic or social conditions impacting patient care (social determinants of health): None    Context: Ina Akers is a 54 y.o. female w/ hx of metastatic cancer who presents to the ED c/o acute back pain radiating into both hips and lower extremities. Pt states she has been on several long road trips recently and has had intermittent pain to each of her hips. Pain recently has radiated into her buttocks. She denies leg swelling, calf pain, soa, chest pain. Pain is worse with sitting. She has had radiation previously to areas of her pelvis and is concerned the pain has to do with this.    Review of prior external notes and/or external test results outside of this encounter: PET scan on 9/11/2024 showed lesions involving the left ischium and right ninth rib.  No foci of abnormal activity identified in the chest abdomen or pelvis.  CMP on 12/4/24 was unremarkable.      PAST MEDICAL HISTORY  Active Ambulatory Problems     Diagnosis Date Noted    Abnormal perimenopausal bleeding 10/31/2016    Amenorrhea 10/31/2016    Atopic dermatitis 10/31/2016    External hemorrhoids 10/31/2016    Gastritis 10/31/2016    Hypertension 10/31/2016    Iron deficiency anemia 10/31/2016    Irritable bowel syndrome 10/31/2016    Melanocytic nevus 10/31/2016    Goiter 10/31/2016    Tinea corporis 10/31/2016    Type 2 diabetes mellitus  10/31/2016    Lipid screening 10/31/2016    Malignant neoplasm of upper-outer quadrant of left breast in female, estrogen receptor negative 11/23/2016    Acute pain of both knees 10/11/2017    Controlled type 2 diabetes mellitus without complication, without long-term current use of insulin 01/29/2018    Hyperlipidemia 01/29/2018    Vitamin D deficiency 01/29/2018    Multiple thyroid nodules 01/29/2018    Subclinical hypothyroidism 12/17/2019    Metastasis to bone 09/23/2020    Fracture, pathologic, femur, neck, right, initial encounter 10/14/2020    Immunocompromised 12/22/2020    Encounter for screening for malignant neoplasm of colon 05/19/2020    Screen for colon cancer 06/07/2021    COVID-19 virus infection 12/28/2021    Gastroesophageal reflux disease 01/26/2022    Primary cancer of thyroid gland metastatic to bone 03/02/2022    Acute pain of left shoulder 03/02/2022    Not currently working due to disabled status 05/27/2024    Weakness generalized 05/27/2024    Primary cancer of thyroid gland metastatic to bone 06/11/2024     Resolved Ambulatory Problems     Diagnosis Date Noted    Screen for colon cancer 02/04/2019    Encounter for screening for malignant neoplasm of colon 05/19/2020    Lumbar spine tumor 09/11/2020     Past Medical History:   Diagnosis Date    Anemia     Cancer of breast 10/2022    Compression fracture of fourth lumbar vertebra     Diabetes mellitus     Disease of thyroid gland     Diverticulosis     GERD (gastroesophageal reflux disease)     History of breast cancer 2001    History of cancer chemotherapy     History of gastritis     History of radiation therapy     History of thyroid nodule 2018    Metastatic bone cancer     PONV (postoperative nausea and vomiting)        The patient has a COVID HM Topic on their chart, and they are fully vaccinated.    PAST SURGICAL HISTORY  Past Surgical History:   Procedure Laterality Date    BILATERAL BREAST REDUCTION Right 11/30/2023    Procedure:  RIGHT BREAST REDUCTION FOR SYMMETRY. REMOVAL OF RIGHT CHEST PORT;  Surgeon: Yony Morse MD;  Location: [facility] OR;  Service: Plastics;  Laterality: Right;    BREAST LUMPECTOMY Left     BREAST RECONSTRUCTION Left 2023    Procedure: LEFT PREPECTORAL PLACEMENT TISSUE EXPANDER AND ALLODERM;  Surgeon: Yony Morse MD;  Location: [facility] MAIN OR;  Service: Plastics;  Laterality: Left;    BREAST TISSUE EXPANDER REMOVAL INSERTION OF IMPLANT Left 2023    Procedure: LEFT REMOVAL TISSUE EXPANDER AND PLACEMENT OF IMPLANT;  Surgeon: Yony Morse MD;  Location: [facility] MAIN OR;  Service: Plastics;  Laterality: Left;     SECTION      COLONOSCOPY N/A 2021    Procedure: COLONOSCOPY TO CECUM AND TI;  Surgeon: Jefferson Newman MD;  Location: [facility] ENDOSCOPY;  Service: Gastroenterology;  Laterality: N/A;  PRE:SCREENING  POST:DIVERTICULOSIS AND HEMORRHOIDS    ENDOSCOPY N/A 2022    Procedure: ESOPHAGOGASTRODUODENOSCOPY WITH COLD BIOPSIES;  Surgeon: Jefferson Newman MD;  Location: [facility] ENDOSCOPY;  Service: Gastroenterology;  Laterality: N/A;  PRE: iron deficiency anemia  POST: HIATAL HERNIA    FEMUR IM NAILING/RODDING Right 10/23/2020    Procedure: HIP INTERTROCHCHANTERIC NAILING;  Surgeon: Gretchen Connell MD;  Location: [facility] OR;  Service: Orthopedics;  Laterality: Right;    KYPHOPLASTY N/A 2020    Procedure: Lumbar 4  osteo cool radiofrequency ablation and KYPHOPLASTY;  Surgeon: Vik Rios MD;  Location: [facility] HYBRID OR ;  Service: Neurosurgery;  Laterality: N/A;    MASTECTOMY W/ SENTINEL NODE BIOPSY Left 2023    Procedure: LEFT MODIFIED RADICAL MASTECTOMY;  Surgeon: Shavonne Sharif MD;  Location: [facility] OR;  Service: General;  Laterality: Left;    MASTECTOMY, PARTIAL Left     RIB BIOPSY  10/2020    SPINE SURGERY      THYROIDECTOMY Left     THYROIDECTOMY, PARTIAL Right     VENOUS ACCESS DEVICE  (PORT) INSERTION Right 02/14/2023    Procedure: INSERTION VENOUS ACCESS DEVICE;  Surgeon: Shavonne Sharif MD;  Location: [facility] OR;  Service: General;  Laterality: Right;         FAMILY HISTORY  Family History   Problem Relation Age of Onset    No Known Problems Mother     Cancer Father         Oral    Hypertension Other     Diabetes Other     Malig Hyperthermia Neg Hx          SOCIAL HISTORY  Social History     Socioeconomic History    Marital status:      Spouse name: Raghav    Number of children: 2   Tobacco Use    Smoking status: Never     Passive exposure: Never    Smokeless tobacco: Never   Vaping Use    Vaping status: Never Used   Substance and Sexual Activity    Alcohol use: Yes     Comment: 1 drink monthly    Drug use: Never    Sexual activity: Defer         ALLERGIES  Monoclonal antibodies, Zofran [ondansetron], Hydrocodone, and Lenvatinib        REVIEW OF SYSTEMS  Review of Systems   Constitutional:  Negative for chills and fever.   Cardiovascular:  Negative for chest pain and leg swelling.   Genitourinary:  Negative for difficulty urinating and dysuria.   Musculoskeletal:  Positive for back pain.   Neurological:  Negative for weakness and numbness.        All systems reviewed and negative except for those discussed in HPI.       PHYSICAL EXAM    I have reviewed the triage vital signs and nursing notes.    ED Triage Vitals   Temp Heart Rate Resp BP SpO2   12/28/24 0913 12/28/24 0908 12/28/24 0912 12/28/24 0913 12/28/24 0908   98 °F (36.7 °C) 91 15 126/87 96 %      Temp src Heart Rate Source Patient Position BP Location FiO2 (%)   12/28/24 0913 -- -- -- --   Tympanic           Physical Exam  GENERAL: alert, no acute distress  SKIN: Warm, dry  HENT: Normocephalic, atraumatic  EYES: no scleral icterus  CV: regular rhythm, regular rate  RESPIRATORY: normal effort, lungs clear  ABDOMEN: soft, nontender, nondistended  MUSCULOSKELETAL: no deformity, no significant midline lumbar ttp or hip pain,  no lower extremity swelling  NEURO: alert, moves all extremities, follows commands          LAB RESULTS  No results found for this or any previous visit (from the past 24 hours).    Ordered the above labs and independently reviewed and interpreted the results.        RADIOLOGY  CT Pelvis Without Contrast    Result Date: 12/28/2024  CT PELVIS WO CONTRAST-  INDICATIONS: Metastatic bone disease with bilateral hip pain. Radiation dose reduction techniques were utilized, including automated exposure control and exposure modulation based on body size.  TECHNIQUE: Noncontrast pelvis CT  COMPARISON: 9/1/2024  FINDINGS:  Previously noted lucent foci at the right femoral neck and left ischial tuberosity are seen, for example axial image 85. Chronic suspected synovial pitting at the right femoral neck. Fixative hardware of the proximal right femur appears grossly stable.  No new lytic lesions or acute fractures are identified.  Findings in the lumbar spine are characterized on the lumbar spine CT report from same date (please see separate report).  Colonic diverticula are seen that do not appear inflamed. Appendix does not appear inflamed (only partly included). Small umbilical hernia fat is seen.       As described.    This report was finalized on 12/28/2024 12:29 PM by Dr. Michael Elias M.D on Workstation: [id number]      CT Lumbar Spine Without Contrast    Result Date: 12/28/2024  CT LUMBAR SPINE WITHOUT CONTRAST  HISTORY: Back pain, metastatic disease.  COMPARISON: MRI lumbar spine 1/19/2024.  FINDINGS: A severe compression deformity involving the L4 vertebral body is noted centrally. There is loss of approximately 16 mm proximal height centrally. The patient is undergone kyphoplasty at L4. An area of sclerosis is appreciated involving the posterior aspect of the L1 vertebral body. This was present on the CT examination of the chest performed on 4/8/2022. It appears similar to the prior examination and likely  represents a bone island, less likely treated metastatic lesion.  Bilateral L5 pars defects are appreciated, nondisplaced.  L1-L2: There is no evidence of disc bulge or herniation.  L2-L3: There is no evidence of disc bulge or herniation.  L3-L4: A mild central broad-based disc osteophyte complex is present with zones of herniation.  L4-L5: A mild central disc bulge is present with nodes of herniation.  L5-S1: Moderate facet degenerative disease is present bilaterally.      1.  The patient is undergone kyphoplasty at L4. No obvious acute fracture is identified. 2.  There is an area of sclerosis involving the posterior aspect of the L1 vertebral body. This was present on 4/8/2022. Although nonspecific, it likely represents a bone island. 3.  Metastatic disease cannot be excluded on this noncontrasted CT examination of the lumbar spine. Further evaluation with a MRI examination lumbar spine with and without contrast is recommended. 4.  Bilateral nondisplaced L5 pars defects are noted.   Radiation dose reduction techniques were utilized, including automated exposure control and exposure modulation based on body size.   This report was finalized on 12/28/2024 12:00 PM by Dr. Santosh Thomson M.D on Workstation: [id number]       I ordered the above noted radiological studies. Independently reviewed and interpreted by me.  See dictation for official radiology interpretation.      PROCEDURES    Procedures      MEDICATIONS GIVEN IN ER    Medications   cyclobenzaprine (FLEXERIL) tablet 10 mg (10 mg Oral Given 12/28/24 1103)   ketorolac (TORADOL) injection 60 mg (60 mg Intramuscular Given 12/28/24 1104)         PROGRESS, DATA ANALYSIS, CONSULTS, AND MEDICAL DECISION MAKING    All labs have been independently reviewed and interpreted by me.  All radiology studies have been independently reviewed and interpreted by me and discussed with radiologist dictating the report.   EKG's independently reviewed and interpreted by me.   Discussion below represents my analysis of pertinent findings related to patient's condition, differential diagnosis, treatment plan and final disposition.    My differential diagnosis for back pain includes but is not limited to:  Musculoskeletal strain, contusion, retroperitoneal hematoma, disc protrusion, vertebral fracture, transverse process fracture, rib fracture, facet syndrome, sacroiliac joint strain, sciatica, renal injury, splenic injury, pancreatic injury, osteoarthritis, lumbar spondylosis, spinal stenosis, ankylosing spondylitis, sacroiliac joint inflammation, pancreatitis, perforated peptic ulcer, diverticulitis, epidural abscess, osteomyelitis, retroperitoneal abscess, pyelonephritis, pneumonia, subphrenic abscess, tuberculosis, neurofibroma, meningioma, multiple myeloma, lymphoma, metastatic cancer, primary cancer, AAA, aortic dissection, spinal ischemia, referred pain, ureterolithiasis      ED Course as of 12/28/24 2009   Sat Dec 28, 2024   1300 CT Pelvis Without Contrast  Radiology study independently interpreted by me and my findings are no acute fracture.   [DC]   1338 Discussed imaging results with patient. She has flexeril at home but we will start on steroid taper and have her continue muscle relaxer and percocet. She will follow up with PCP and neurosurgery. ER return precautions discussed. [DC]      ED Course User Index  [DC] Harleen Kevin PA             AS OF 20:09 EST VITALS:    BP - 126/87  HR - 91  TEMP - 98 °F (36.7 °C) (Tympanic)  O2 SATS - 96%        DIAGNOSIS  Final diagnoses:   Bilateral lumbar radiculopathy         DISPOSITION  ED Disposition       ED Disposition   Discharge    Condition   Stable    Comment   --                  Note Disclaimer: At [facility], we believe that sharing information builds trust and better relationships. You are receiving this note because you recently visited [facility]. It is possible you will see health information before a provider has  talked with you about it. This kind of information can be easy to misunderstand. To help you fully understand what it means for your health, we urge you to discuss this note with your provider.         Harleen Kevin PA  12/28/24 2009

## 2024-12-28 NOTE — ED PROVIDER NOTES
MD ATTESTATION NOTE     SHARED VISIT: This visit was performed by BOTH a physician and an APC. The substantive portion of the medical decision making was performed by this attesting physician who made or approved the management plan and takes responsibility for patient management. All studies in the APC note (if performed) were independently interpreted by me.  The OSWALD and I have discussed this patient's history, physical exam, and treatment plan. I have reviewed the documentation and personally had a face to face interaction with the patient. I affirm the documentation and agree with the treatment and plan. I provided a substantive portion of the care of the patient.  I personally performed the physical exam in its entirety, and below are my findings.      Brief HPI: Patient complains of low back pain.  She has a history of metastatic thyroid cancer and has had radiation treatment for a left pelvic lesion.  She has taken several car trips recently but denies any specific injury.  Pain does not radiate.  Denies fever, loss of bowel/bladder control, saddle anesthesia, or numbness/tingling/weakness in her legs.    PHYSICAL EXAM    GENERAL: Awake, alert, oriented x 3.  No acute distress  HENT: nares patent  EYES: no scleral icterus  CV: regular rhythm, normal rate  RESPIRATORY: normal effort, CTAB  ABDOMEN: soft, nontender  MUSCULOSKELETAL: There is tenderness over the left SI joint.  C/T/L-spine are nontender  NEURO: Normal light touch sensation in both lower extremities.  No saddle anesthesia.  Normal and equal strength of bilateral hip flexion/extension, knee flexion/extension, ankle dorsiflexion/plantarflexion, and extension of the great toes  PSYCH:  calm, cooperative  SKIN: warm, dry    Vital signs and nursing notes reviewed.        Plan: Obtain CT of the lumbar spine and pelvis.  Patient has been given Flexeril and Toradol.      CT pelvis personally interpreted by me.  My personal interpretation is: No acute  fracture.  No dislocation.    MDM: Patient presented to the ED complaining of low back pain.  She has a history of metastatic cancer.  She did not have any focal neurological deficits.  CT scans did not show any acute fractures.    ED Course as of 12/29/24 1636   Sat Dec 28, 2024   1300 CT Pelvis Without Contrast  Radiology study independently interpreted by me and my findings are no acute fracture.   [DC]   1338 Discussed imaging results with patient. She has flexeril at home but we will start on steroid taper and have her continue muscle relaxer and percocet. She will follow up with PCP and neurosurgery. ER return precautions discussed. [DC]      ED Course User Index  [DC] Harleen Kevin PA Holland, William D, MD  12/28/24 1519       Adriano Shaikh MD  12/29/24 1636

## 2025-01-02 ENCOUNTER — APPOINTMENT (OUTPATIENT)
Dept: OTHER | Facility: HOSPITAL | Age: 55
End: 2025-01-02
Payer: COMMERCIAL

## 2025-01-02 ENCOUNTER — INFUSION (OUTPATIENT)
Dept: ONCOLOGY | Facility: HOSPITAL | Age: 55
End: 2025-01-02
Payer: COMMERCIAL

## 2025-01-02 VITALS
WEIGHT: 165 LBS | SYSTOLIC BLOOD PRESSURE: 158 MMHG | TEMPERATURE: 96.9 F | BODY MASS INDEX: 32.22 KG/M2 | DIASTOLIC BLOOD PRESSURE: 84 MMHG | HEART RATE: 89 BPM | OXYGEN SATURATION: 96 % | RESPIRATION RATE: 16 BRPM

## 2025-01-02 DIAGNOSIS — Z17.1 MALIGNANT NEOPLASM OF UPPER-OUTER QUADRANT OF LEFT BREAST IN FEMALE, ESTROGEN RECEPTOR NEGATIVE: ICD-10-CM

## 2025-01-02 DIAGNOSIS — C79.51 METASTASIS TO BONE: Primary | ICD-10-CM

## 2025-01-02 DIAGNOSIS — C50.412 MALIGNANT NEOPLASM OF UPPER-OUTER QUADRANT OF LEFT BREAST IN FEMALE, ESTROGEN RECEPTOR NEGATIVE: ICD-10-CM

## 2025-01-02 LAB
ALBUMIN SERPL-MCNC: 4.1 G/DL (ref 3.5–5.2)
ALBUMIN/GLOB SERPL: 1.3 G/DL
ALP SERPL-CCNC: 65 U/L (ref 39–117)
ALT SERPL W P-5'-P-CCNC: 42 U/L (ref 1–33)
ANION GAP SERPL CALCULATED.3IONS-SCNC: 12 MMOL/L (ref 5–15)
AST SERPL-CCNC: 30 U/L (ref 1–32)
BASOPHILS # BLD AUTO: 0.02 10*3/MM3 (ref 0–0.2)
BASOPHILS NFR BLD AUTO: 0.3 % (ref 0–1.5)
BILIRUB SERPL-MCNC: 0.5 MG/DL (ref 0–1.2)
BUN SERPL-MCNC: 21 MG/DL (ref 6–20)
BUN/CREAT SERPL: 25.3 (ref 7–25)
CALCIUM SPEC-SCNC: 9.6 MG/DL (ref 8.6–10.5)
CHLORIDE SERPL-SCNC: 100 MMOL/L (ref 98–107)
CO2 SERPL-SCNC: 25 MMOL/L (ref 22–29)
CREAT SERPL-MCNC: 0.83 MG/DL (ref 0.57–1)
DEPRECATED RDW RBC AUTO: 63.6 FL (ref 37–54)
EGFRCR SERPLBLD CKD-EPI 2021: 82.9 ML/MIN/1.73
EOSINOPHIL # BLD AUTO: 0.02 10*3/MM3 (ref 0–0.4)
EOSINOPHIL NFR BLD AUTO: 0.3 % (ref 0.3–6.2)
ERYTHROCYTE [DISTWIDTH] IN BLOOD BY AUTOMATED COUNT: 19 % (ref 12.3–15.4)
GLOBULIN UR ELPH-MCNC: 3.2 GM/DL
GLUCOSE SERPL-MCNC: 132 MG/DL (ref 65–99)
HCT VFR BLD AUTO: 33.1 % (ref 34–46.6)
HGB BLD-MCNC: 10.2 G/DL (ref 12–15.9)
IMM GRANULOCYTES # BLD AUTO: 0.14 10*3/MM3 (ref 0–0.05)
IMM GRANULOCYTES NFR BLD AUTO: 2.1 % (ref 0–0.5)
LYMPHOCYTES # BLD AUTO: 2.26 10*3/MM3 (ref 0.7–3.1)
LYMPHOCYTES NFR BLD AUTO: 34.5 % (ref 19.6–45.3)
MAGNESIUM SERPL-MCNC: 1.9 MG/DL (ref 1.6–2.6)
MCH RBC QN AUTO: 28.3 PG (ref 26.6–33)
MCHC RBC AUTO-ENTMCNC: 30.8 G/DL (ref 31.5–35.7)
MCV RBC AUTO: 91.9 FL (ref 79–97)
MONOCYTES # BLD AUTO: 0.56 10*3/MM3 (ref 0.1–0.9)
MONOCYTES NFR BLD AUTO: 8.5 % (ref 5–12)
NEUTROPHILS NFR BLD AUTO: 3.56 10*3/MM3 (ref 1.7–7)
NEUTROPHILS NFR BLD AUTO: 54.3 % (ref 42.7–76)
NRBC BLD AUTO-RTO: 0 /100 WBC (ref 0–0.2)
PHOSPHATE SERPL-MCNC: 3.3 MG/DL (ref 2.5–4.5)
PLATELET # BLD AUTO: 329 10*3/MM3 (ref 140–450)
PMV BLD AUTO: 9 FL (ref 6–12)
POTASSIUM SERPL-SCNC: 3.4 MMOL/L (ref 3.5–5.2)
PROT SERPL-MCNC: 7.3 G/DL (ref 6–8.5)
RBC # BLD AUTO: 3.6 10*6/MM3 (ref 3.77–5.28)
SODIUM SERPL-SCNC: 137 MMOL/L (ref 136–145)
WBC NRBC COR # BLD AUTO: 6.56 10*3/MM3 (ref 3.4–10.8)

## 2025-01-02 PROCEDURE — 96374 THER/PROPH/DIAG INJ IV PUSH: CPT

## 2025-01-02 PROCEDURE — 85025 COMPLETE CBC W/AUTO DIFF WBC: CPT | Performed by: INTERNAL MEDICINE

## 2025-01-02 PROCEDURE — 80053 COMPREHEN METABOLIC PANEL: CPT | Performed by: INTERNAL MEDICINE

## 2025-01-02 PROCEDURE — 84100 ASSAY OF PHOSPHORUS: CPT | Performed by: INTERNAL MEDICINE

## 2025-01-02 PROCEDURE — 25010000002 ZOLEDRONIC ACID 4 MG/100ML SOLUTION: Performed by: INTERNAL MEDICINE

## 2025-01-02 PROCEDURE — 83735 ASSAY OF MAGNESIUM: CPT | Performed by: INTERNAL MEDICINE

## 2025-01-02 RX ORDER — ZOLEDRONIC ACID 0.04 MG/ML
4 INJECTION, SOLUTION INTRAVENOUS ONCE
Status: COMPLETED | OUTPATIENT
Start: 2025-01-02 | End: 2025-01-02

## 2025-01-02 RX ADMIN — ZOLEDRONIC ACID 4 MG: 0.04 INJECTION, SOLUTION INTRAVENOUS at 10:45

## 2025-01-03 ENCOUNTER — TELEPHONE (OUTPATIENT)
Dept: NEUROSURGERY | Facility: CLINIC | Age: 55
End: 2025-01-03
Payer: COMMERCIAL

## 2025-01-03 DIAGNOSIS — D49.2 LUMBAR SPINE TUMOR: Primary | ICD-10-CM

## 2025-01-03 NOTE — TELEPHONE ENCOUNTER
Patient was seen in the ER for back pain, she had a CT lumbar. She would like to know if she should have a MRI lumbar scan before coming to see Dr. Rios? Please advise.

## 2025-01-03 NOTE — TELEPHONE ENCOUNTER
Caller: Ina Akers    Relationship: Self    Best call back number: [phone]    What is the best time to reach you: ANYTIME    Who are you requesting to speak with (clinical staff, provider,  specific staff member): CLINICAL     Do you know the name of the person who called: NA    What was the call regarding: PATIENT CALLED AND STATES THAT SHE WAS SEEN IN ER ON 12/28/24 FOR BACK PAIN-PT TOLD TO F/U WITH -MADE APPT. FOR 01/10/25 PT IS ASKING IF  WILL WANT HER TO COMPLETE A MRI BEFORE HER APPT. SENDING TO OFFICE TO ADVISE OF CALL THANK YOU    Is it okay if the provider responds through "[employer]hart: NO

## 2025-01-07 NOTE — TELEPHONE ENCOUNTER
Patient advised. MRI has been ordered per Dr. Rios, she will call back after MRI is scheduled to reschedule appointment.

## 2025-01-08 RX ORDER — ONDANSETRON 4 MG/1
4 TABLET, ORALLY DISINTEGRATING ORAL EVERY 6 HOURS PRN
Qty: 30 TABLET | Refills: 5 | Status: SHIPPED | OUTPATIENT
Start: 2025-01-08

## 2025-01-22 ENCOUNTER — HOSPITAL ENCOUNTER (OUTPATIENT)
Dept: MRI IMAGING | Facility: HOSPITAL | Age: 55
Discharge: HOME | End: 2025-01-22
Admitting: NEUROLOGICAL SURGERY
Payer: COMMERCIAL

## 2025-01-22 DIAGNOSIS — D49.2 LUMBAR SPINE TUMOR: ICD-10-CM

## 2025-01-22 PROCEDURE — 72148 MRI LUMBAR SPINE W/O DYE: CPT

## 2025-01-31 NOTE — PROGRESS NOTES
Subjective   History of Present Illness: Ina Akers is a 54 y.o. female is here today for follow-up on back pain. CT lumbar spine  was done 12/28/24 CT Pelvis 12/28/24.  She is doing well today.  She reports that she had a recent follow-up MRI of the lumbar spine on January 22, 2025 due to new onset bilateral sciatica.  She reports that the pain was worse with sitting or standing for prolonged period of time.  She reports that she thinks it was related to a chemotherapy medication she was taking that she has now stopped.  The pain has completely resolved.  She is doing well today.  Denies any changes in strength or sensation.  Denies any back pain today    History of Present Illness    The following portions of the patient's history were reviewed and updated as appropriate: allergies, current medications, past family history, past medical history, past social history, past surgical history, and problem list.    Past Medical History:   Diagnosis Date    Anemia     History of recurent iron deficiency    Cancer of breast 10/2022    NEW CANCER LEFT BREAST    Compression fracture of fourth lumbar vertebra     RECENT KYPHOPLASTY 9/2020    COVID-19 virus infection 12/26/2021    Diabetes mellitus     Disease of thyroid gland     Diverticulosis     GERD (gastroesophageal reflux disease)     History of breast cancer 2001    LEFT BREAST MASTECTOMY, CHEMO AND RADIATION     History of cancer chemotherapy     MAY 2002.  REPEAT CHEMO MARCH X4 CYCLES D/T NEW LEFT BREAST CANCER    History of gastritis     History of radiation therapy     BREAST 2002, OCT 2020 SPINE    History of thyroid nodule 2018    cancer 2020    Hyperlipidemia     Hypertension     Metastatic bone cancer     RIB, HIP - THYROID METS    PONV (postoperative nausea and vomiting)         Past Surgical History:   Procedure Laterality Date    BILATERAL BREAST REDUCTION Right 11/30/2023    Procedure: RIGHT BREAST REDUCTION FOR SYMMETRY. REMOVAL OF RIGHT CHEST  PORT;  Surgeon: Yony Morse MD;  Location: [facility] OR;  Service: Plastics;  Laterality: Right;    BREAST LUMPECTOMY Left     BREAST RECONSTRUCTION Left 2023    Procedure: LEFT PREPECTORAL PLACEMENT TISSUE EXPANDER AND ALLODERM;  Surgeon: Yony Morse MD;  Location: [facility] MAIN OR;  Service: Plastics;  Laterality: Left;    BREAST TISSUE EXPANDER REMOVAL INSERTION OF IMPLANT Left 2023    Procedure: LEFT REMOVAL TISSUE EXPANDER AND PLACEMENT OF IMPLANT;  Surgeon: Yony Morse MD;  Location: [facility] MAIN OR;  Service: Plastics;  Laterality: Left;     SECTION      COLONOSCOPY N/A 2021    Procedure: COLONOSCOPY TO CECUM AND TI;  Surgeon: Jefferson Newman MD;  Location: [facility] ENDOSCOPY;  Service: Gastroenterology;  Laterality: N/A;  PRE:SCREENING  POST:DIVERTICULOSIS AND HEMORRHOIDS    ENDOSCOPY N/A 2022    Procedure: ESOPHAGOGASTRODUODENOSCOPY WITH COLD BIOPSIES;  Surgeon: Jefferson Newman MD;  Location: [facility] ENDOSCOPY;  Service: Gastroenterology;  Laterality: N/A;  PRE: iron deficiency anemia  POST: HIATAL HERNIA    FEMUR IM NAILING/RODDING Right 10/23/2020    Procedure: HIP INTERTROCHCHANTERIC NAILING;  Surgeon: Gretchen Connell MD;  Location: [facility] OR;  Service: Orthopedics;  Laterality: Right;    KYPHOPLASTY N/A 2020    Procedure: Lumbar 4  osteo cool radiofrequency ablation and KYPHOPLASTY;  Surgeon: Vik Rios MD;  Location: [facility] HYBRID OR ;  Service: Neurosurgery;  Laterality: N/A;    MASTECTOMY W/ SENTINEL NODE BIOPSY Left 2023    Procedure: LEFT MODIFIED RADICAL MASTECTOMY;  Surgeon: Shavonne Sharif MD;  Location: [facility] OR;  Service: General;  Laterality: Left;    MASTECTOMY, PARTIAL Left     RIB BIOPSY  10/2020    SPINE SURGERY      THYROIDECTOMY Left     THYROIDECTOMY, PARTIAL Right     VENOUS ACCESS DEVICE (PORT) INSERTION Right 2023    Procedure: INSERTION  VENOUS ACCESS DEVICE;  Surgeon: Shavonne Sharif MD;  Location: [facility] OR;  Service: General;  Laterality: Right;          Current Outpatient Medications:     acetaminophen (TYLENOL) 500 MG tablet, Take 2 tablets by mouth Every 6 (Six) Hours As Needed for Mild Pain., Disp: , Rfl:     amLODIPine (NORVASC) 10 MG tablet, TAKE ONE TABLET BY MOUTH DAILY (Patient taking differently: Take 1 tablet by mouth Daily.), Disp: 90 tablet, Rfl: 1    atorvastatin (LIPITOR) 20 MG tablet, Take 1 tablet by mouth Daily., Disp: 90 tablet, Rfl: 3    Blood Glucose Monitoring Suppl w/Device kit, Check blood sugar twice a day, Disp: 1 each, Rfl: 0    celecoxib (CeleBREX) 200 MG capsule, Take 1 capsule by mouth 2 (Two) Times a Day. As needed for pain, take with food tall glass of water always lowest effective dose shortest time possible, Disp: 180 capsule, Rfl: 1    cholecalciferol (VITAMIN D3) 25 MCG (1000 UT) tablet, Take 1 tablet by mouth Daily., Disp: , Rfl:     cyclobenzaprine (FLEXERIL) 5 MG tablet, Take 1 tablet by mouth At Night As Needed for Muscle Spasms. 1 or 2 tablets by mouth at bedtime if needed for leg cramps, Disp: 20 tablet, Rfl: 2    famotidine (PEPCID) 20 MG tablet, Take 1 tablet by mouth Every Other Day., Disp: , Rfl:     fluticasone (FLONASE) 50 MCG/ACT nasal spray, Administer 2 sprays into the nostril(s) as directed by provider Daily., Disp: 15.8 g, Rfl: 0    Glucose Blood (BLOOD GLUCOSE TEST) strip, Check blood sugar twice a day., Disp: 100 each, Rfl: 1    labetalol (NORMODYNE) 100 MG tablet, Take 0.5 tablets by mouth 2 (Two) Times a Day., Disp: 180 tablet, Rfl: 3    labetalol (NORMODYNE) 300 MG tablet, Take 1 tablet by mouth 2 (Two) Times a Day., Disp: , Rfl:     Lancets (ACCU-CHEK SOFT TOUCH) lancets, Check blood sugar twice a day as needed, Disp: 100 each, Rfl: 1    lansoprazole (PREVACID) 30 MG capsule, TAKE 1 CAPSULE BY MOUTH DAILY (Patient taking differently: Take 1 capsule by mouth Every Other Day.),  Disp: 90 capsule, Rfl: 3    levothyroxine (SYNTHROID, LEVOTHROID) 112 MCG tablet, Take 1 tablet by mouth Every Morning., Disp: , Rfl:     losartan (COZAAR) 100 MG tablet, Take 1 tablet by mouth Daily., Disp: 90 tablet, Rfl: 3    MAGNESIUM PO, Take  by mouth., Disp: , Rfl:     metFORMIN ER (GLUCOPHAGE-XR) 500 MG 24 hr tablet, Take 2 tablets by mouth Daily With Dinner., Disp: 180 tablet, Rfl: 3    NON FORMULARY, Take 1 tablet by mouth Every Night. STOOL SOFTNER, Disp: , Rfl:     ondansetron ODT (ZOFRAN-ODT) 4 MG disintegrating tablet, Place 1 tablet on the tongue Every 6 (Six) Hours As Needed for Nausea or Vomiting., Disp: 30 tablet, Rfl: 5    ondansetron ODT (ZOFRAN-ODT) 8 MG disintegrating tablet, Take 1 tablet by mouth Every 8 (Eight) Hours As Needed for Nausea or Vomiting., Disp: 20 tablet, Rfl: 2    oxyCODONE-acetaminophen (PERCOCET) 5-325 MG per tablet, Take 1 tablet by mouth Every 4 (Four) Hours As Needed for Moderate Pain., Disp: 16 tablet, Rfl: 0    promethazine (PHENERGAN) 12.5 MG tablet, Take 1-2 tablets by mouth Every 6 (Six) Hours As Needed for Nausea. TAKE 1/2 TO 1 TABLET BY MOUTH EVERY 4 HOURS AS NEEDED FOR NAUSEA AND VOMITING -MAY MAKE DROWSY, Disp: 60 tablet, Rfl: 2    triamcinolone (KENALOG) 0.1 % cream, Apply 1 Application topically to the appropriate area as directed 2 (Two) Times a Day. Use sparingly avoid chronic use avoid face, Disp: 80 g, Rfl: 0     Allergies   Allergen Reactions    Monoclonal Antibodies Anaphylaxis and Shortness Of Breath     Regeneron,  High b/p increased heart rate     Zofran [Ondansetron] Nausea And Vomiting     DOES NOT HELP WITH NAUSEA AND VOMITING MAKES IT WORSE    Hydrocodone Nausea And Vomiting    Lenvatinib Unknown - Low Severity     Internal bleeding        Social History     Socioeconomic History    Marital status:      Spouse name: Raghav    Number of children: 2   Tobacco Use    Smoking status: Never     Passive exposure: Never    Smokeless tobacco: Never  "  Vaping Use    Vaping status: Never Used   Substance and Sexual Activity    Alcohol use: Yes     Comment: 1 drink monthly    Drug use: Never    Sexual activity: Defer        Family History   Problem Relation Age of Onset    No Known Problems Mother     Cancer Father         Oral    Hypertension Other     Diabetes Other     Malig Hyperthermia Neg Hx         Review of Systems   Constitutional:  Negative for chills and fatigue.   Gastrointestinal:  Negative for nausea and vomiting.   Genitourinary:  Negative for difficulty urinating, frequency and urgency.   Musculoskeletal:  Positive for back pain (bilateral leg pain). Negative for gait problem.   Neurological:  Positive for weakness and numbness.   All other systems reviewed and are negative.      Objective     Vitals:    02/10/25 0945   BP: 108/60   Pulse: 78   Temp: 97.5 °F (36.4 °C)   SpO2: 98%   Weight: 77.6 kg (171 lb)   Height: 152.4 cm (60\")     Body mass index is 33.4 kg/m².      Physical Exam  Awake, alert, oriented x3  Pupils equal round reactive to light  Extraocular muscles intact  Face symmetric  Speech is fluent and clear  No pronator drift  Motor exam  Bilateral deltoids 5/5, bilateral biceps 5/5, bilateral triceps 5/5, bilateral wrist extension 5/5 bilateral hand  5/5  Bilateral hip flexion 5/5, bilateral knee extension 5/5, bilateral DF/PF 5/5  No clonus  No Alethea's reflex  Steady normal gait  Able to detect  light touch in all 4 extremities          Assessment & Plan   Independent Review of Radiographic Studies:      I personally reviewed the images from the following studies.  MRI lumbar spine from 2025  The follow-up MRI images show no new mass lesions..  There is no evidence of severe canal or neuroforaminal stenosis    Medical Decision Makin-year-old female s/p osteo cool ablation/kyphoplasty of an L4 lesion on 2020  -She was subsequently diagnosed with metastatic thyroid cancer and has been treated.  She " followed up today because of new onset bilateral lower back pain and sciatica.  Her symptoms have completely resolved.  The follow-up MRI images show no evidence of severe canal or neuroforaminal stenosis.  I have asked her to call or come back if she has a recurrence of her symptoms.  She believes the symptoms were related to a new medication that she was taking for her thyroid cancer that she has since stopped  Diagnoses and all orders for this visit:    1. Lumbar spine tumor (Primary)      Return if symptoms worsen or fail to improve.    I spent 25 minutes reviewing the medical record, reviewing the MRI images, discussing the causes and management of lower back pain with bilateral sciatica

## 2025-02-05 ENCOUNTER — INFUSION (OUTPATIENT)
Dept: ONCOLOGY | Facility: HOSPITAL | Age: 55
End: 2025-02-05
Payer: COMMERCIAL

## 2025-02-05 ENCOUNTER — OFFICE VISIT (OUTPATIENT)
Dept: ONCOLOGY | Facility: CLINIC | Age: 55
End: 2025-02-05
Payer: COMMERCIAL

## 2025-02-05 VITALS
TEMPERATURE: 98.1 F | OXYGEN SATURATION: 98 % | RESPIRATION RATE: 16 BRPM | SYSTOLIC BLOOD PRESSURE: 128 MMHG | DIASTOLIC BLOOD PRESSURE: 80 MMHG | HEART RATE: 83 BPM | WEIGHT: 171 LBS | BODY MASS INDEX: 33.57 KG/M2 | HEIGHT: 60 IN

## 2025-02-05 DIAGNOSIS — C50.412 MALIGNANT NEOPLASM OF UPPER-OUTER QUADRANT OF LEFT BREAST IN FEMALE, ESTROGEN RECEPTOR NEGATIVE: ICD-10-CM

## 2025-02-05 DIAGNOSIS — C73 THYROID CANCER: ICD-10-CM

## 2025-02-05 DIAGNOSIS — D50.9 IRON DEFICIENCY ANEMIA, UNSPECIFIED IRON DEFICIENCY ANEMIA TYPE: ICD-10-CM

## 2025-02-05 DIAGNOSIS — C79.51 METASTASIS TO BONE: Primary | ICD-10-CM

## 2025-02-05 DIAGNOSIS — Z17.1 MALIGNANT NEOPLASM OF UPPER-OUTER QUADRANT OF LEFT BREAST IN FEMALE, ESTROGEN RECEPTOR NEGATIVE: Primary | ICD-10-CM

## 2025-02-05 DIAGNOSIS — C79.51 METASTASIS TO BONE: ICD-10-CM

## 2025-02-05 DIAGNOSIS — N64.9 BREAST LESION: ICD-10-CM

## 2025-02-05 DIAGNOSIS — C50.412 MALIGNANT NEOPLASM OF UPPER-OUTER QUADRANT OF LEFT BREAST IN FEMALE, ESTROGEN RECEPTOR NEGATIVE: Primary | ICD-10-CM

## 2025-02-05 DIAGNOSIS — Z17.1 MALIGNANT NEOPLASM OF UPPER-OUTER QUADRANT OF LEFT BREAST IN FEMALE, ESTROGEN RECEPTOR NEGATIVE: ICD-10-CM

## 2025-02-05 LAB
ALBUMIN SERPL-MCNC: 4.1 G/DL (ref 3.5–5.2)
ALBUMIN/GLOB SERPL: 1.4 G/DL
ALP SERPL-CCNC: 50 U/L (ref 39–117)
ALT SERPL W P-5'-P-CCNC: 22 U/L (ref 1–33)
ANION GAP SERPL CALCULATED.3IONS-SCNC: 11.3 MMOL/L (ref 5–15)
AST SERPL-CCNC: 24 U/L (ref 1–32)
BASOPHILS # BLD AUTO: 0.01 10*3/MM3 (ref 0–0.2)
BASOPHILS NFR BLD AUTO: 0.3 % (ref 0–1.5)
BILIRUB SERPL-MCNC: 0.5 MG/DL (ref 0–1.2)
BUN SERPL-MCNC: 18 MG/DL (ref 6–20)
BUN/CREAT SERPL: 20 (ref 7–25)
CALCIUM SPEC-SCNC: 9.4 MG/DL (ref 8.6–10.5)
CHLORIDE SERPL-SCNC: 104 MMOL/L (ref 98–107)
CO2 SERPL-SCNC: 25.7 MMOL/L (ref 22–29)
CREAT SERPL-MCNC: 0.9 MG/DL (ref 0.57–1)
DEPRECATED RDW RBC AUTO: 65.3 FL (ref 37–54)
EGFRCR SERPLBLD CKD-EPI 2021: 75.2 ML/MIN/1.73
EOSINOPHIL # BLD AUTO: 0.18 10*3/MM3 (ref 0–0.4)
EOSINOPHIL NFR BLD AUTO: 4.7 % (ref 0.3–6.2)
ERYTHROCYTE [DISTWIDTH] IN BLOOD BY AUTOMATED COUNT: 18.2 % (ref 12.3–15.4)
GLOBULIN UR ELPH-MCNC: 3 GM/DL
GLUCOSE SERPL-MCNC: 131 MG/DL (ref 65–99)
HCT VFR BLD AUTO: 32.3 % (ref 34–46.6)
HGB BLD-MCNC: 10 G/DL (ref 12–15.9)
IMM GRANULOCYTES # BLD AUTO: 0.01 10*3/MM3 (ref 0–0.05)
IMM GRANULOCYTES NFR BLD AUTO: 0.3 % (ref 0–0.5)
LYMPHOCYTES # BLD AUTO: 1.42 10*3/MM3 (ref 0.7–3.1)
LYMPHOCYTES NFR BLD AUTO: 37.1 % (ref 19.6–45.3)
MAGNESIUM SERPL-MCNC: 2.1 MG/DL (ref 1.6–2.6)
MCH RBC QN AUTO: 30.3 PG (ref 26.6–33)
MCHC RBC AUTO-ENTMCNC: 31 G/DL (ref 31.5–35.7)
MCV RBC AUTO: 97.9 FL (ref 79–97)
MONOCYTES # BLD AUTO: 0.48 10*3/MM3 (ref 0.1–0.9)
MONOCYTES NFR BLD AUTO: 12.5 % (ref 5–12)
NEUTROPHILS NFR BLD AUTO: 1.73 10*3/MM3 (ref 1.7–7)
NEUTROPHILS NFR BLD AUTO: 45.1 % (ref 42.7–76)
NRBC BLD AUTO-RTO: 0 /100 WBC (ref 0–0.2)
PHOSPHATE SERPL-MCNC: 4.4 MG/DL (ref 2.5–4.5)
PLATELET # BLD AUTO: 302 10*3/MM3 (ref 140–450)
PMV BLD AUTO: 9.5 FL (ref 6–12)
POTASSIUM SERPL-SCNC: 3.7 MMOL/L (ref 3.5–5.2)
PROT SERPL-MCNC: 7.1 G/DL (ref 6–8.5)
RBC # BLD AUTO: 3.3 10*6/MM3 (ref 3.77–5.28)
SODIUM SERPL-SCNC: 141 MMOL/L (ref 136–145)
WBC NRBC COR # BLD AUTO: 3.83 10*3/MM3 (ref 3.4–10.8)

## 2025-02-05 PROCEDURE — 84100 ASSAY OF PHOSPHORUS: CPT | Performed by: INTERNAL MEDICINE

## 2025-02-05 PROCEDURE — 25010000002 ZOLEDRONIC ACID 4 MG/100ML SOLUTION: Performed by: INTERNAL MEDICINE

## 2025-02-05 PROCEDURE — 96374 THER/PROPH/DIAG INJ IV PUSH: CPT

## 2025-02-05 PROCEDURE — 99214 OFFICE O/P EST MOD 30 MIN: CPT | Performed by: INTERNAL MEDICINE

## 2025-02-05 PROCEDURE — 83735 ASSAY OF MAGNESIUM: CPT | Performed by: INTERNAL MEDICINE

## 2025-02-05 PROCEDURE — 85025 COMPLETE CBC W/AUTO DIFF WBC: CPT | Performed by: INTERNAL MEDICINE

## 2025-02-05 PROCEDURE — 80053 COMPREHEN METABOLIC PANEL: CPT | Performed by: INTERNAL MEDICINE

## 2025-02-05 RX ORDER — LABETALOL 300 MG/1
300 TABLET, FILM COATED ORAL 2 TIMES DAILY
COMMUNITY

## 2025-02-05 RX ORDER — ZOLEDRONIC ACID 0.04 MG/ML
4 INJECTION, SOLUTION INTRAVENOUS ONCE
Status: COMPLETED | OUTPATIENT
Start: 2025-02-05 | End: 2025-02-05

## 2025-02-05 RX ORDER — SODIUM CHLORIDE 9 MG/ML
250 INJECTION, SOLUTION INTRAVENOUS ONCE
Status: CANCELLED | OUTPATIENT
Start: 2025-02-05

## 2025-02-05 RX ADMIN — ZOLEDRONIC ACID 4 MG: 0.04 INJECTION, SOLUTION INTRAVENOUS at 10:39

## 2025-02-05 NOTE — PROGRESS NOTES
Subjective     CHIEF COMPLAINT:      Chief Complaint   Patient presents with    Follow-up     HISTORY OF PRESENT ILLNESS:     Ina Akers is a 54 y.o. female patient who returns today for follow up on her breast cancer and metastatic thyroid cancer to bone.  She was taking cabozantinib until January 2025.  She had a PET scan on 1/7/2025 which revealed stable metastatic lesions.  No new lesions were seen.  She was having pain in different areas of her bones-different from her areas of bone metastasis.  The pain became intolerable and she discussed with Dr. Dahl and cabozantinib was discontinued.  She felt better after stopping cabozantinib.    ROS:  Pertinent ROS is in the HPI.     Past medical, surgical, social and family history were reviewed.     MEDICATIONS:    Current Outpatient Medications:     acetaminophen (TYLENOL) 500 MG tablet, Take 2 tablets by mouth Every 6 (Six) Hours As Needed for Mild Pain., Disp: , Rfl:     amLODIPine (NORVASC) 10 MG tablet, TAKE ONE TABLET BY MOUTH DAILY (Patient taking differently: Take 1 tablet by mouth Daily.), Disp: 90 tablet, Rfl: 1    atorvastatin (LIPITOR) 20 MG tablet, Take 1 tablet by mouth Daily., Disp: 90 tablet, Rfl: 3    Blood Glucose Monitoring Suppl w/Device kit, Check blood sugar twice a day, Disp: 1 each, Rfl: 0    celecoxib (CeleBREX) 200 MG capsule, Take 1 capsule by mouth 2 (Two) Times a Day. As needed for pain, take with food tall glass of water always lowest effective dose shortest time possible, Disp: 180 capsule, Rfl: 1    cholecalciferol (VITAMIN D3) 25 MCG (1000 UT) tablet, Take 1 tablet by mouth Daily., Disp: , Rfl:     cyclobenzaprine (FLEXERIL) 5 MG tablet, Take 1 tablet by mouth At Night As Needed for Muscle Spasms. 1 or 2 tablets by mouth at bedtime if needed for leg cramps, Disp: 20 tablet, Rfl: 2    famotidine (PEPCID) 20 MG tablet, Take 1 tablet by mouth Every Other Day., Disp: , Rfl:     fluticasone (FLONASE) 50 MCG/ACT nasal spray,  Administer 2 sprays into the nostril(s) as directed by provider Daily., Disp: 15.8 g, Rfl: 0    Glucose Blood (BLOOD GLUCOSE TEST) strip, Check blood sugar twice a day., Disp: 100 each, Rfl: 1    labetalol (NORMODYNE) 300 MG tablet, Take 1 tablet by mouth 2 (Two) Times a Day., Disp: , Rfl:     Lancets (ACCU-CHEK SOFT TOUCH) lancets, Check blood sugar twice a day as needed, Disp: 100 each, Rfl: 1    lansoprazole (PREVACID) 30 MG capsule, TAKE 1 CAPSULE BY MOUTH DAILY (Patient taking differently: Take 1 capsule by mouth Every Other Day.), Disp: 90 capsule, Rfl: 3    levothyroxine (SYNTHROID, LEVOTHROID) 112 MCG tablet, Take 1 tablet by mouth Every Morning., Disp: , Rfl:     losartan (COZAAR) 100 MG tablet, Take 1 tablet by mouth Daily., Disp: 90 tablet, Rfl: 3    MAGNESIUM PO, Take  by mouth., Disp: , Rfl:     metFORMIN ER (GLUCOPHAGE-XR) 500 MG 24 hr tablet, Take 2 tablets by mouth Daily With Dinner., Disp: 180 tablet, Rfl: 3    NON FORMULARY, Take 1 tablet by mouth Every Night. STOOL SOFTNER, Disp: , Rfl:     ondansetron ODT (ZOFRAN-ODT) 4 MG disintegrating tablet, Place 1 tablet on the tongue Every 6 (Six) Hours As Needed for Nausea or Vomiting., Disp: 30 tablet, Rfl: 5    ondansetron ODT (ZOFRAN-ODT) 8 MG disintegrating tablet, Take 1 tablet by mouth Every 8 (Eight) Hours As Needed for Nausea or Vomiting., Disp: 20 tablet, Rfl: 2    oxyCODONE-acetaminophen (PERCOCET) 5-325 MG per tablet, Take 1 tablet by mouth Every 4 (Four) Hours As Needed for Moderate Pain., Disp: 16 tablet, Rfl: 0    promethazine (PHENERGAN) 12.5 MG tablet, Take 1-2 tablets by mouth Every 6 (Six) Hours As Needed for Nausea. TAKE 1/2 TO 1 TABLET BY MOUTH EVERY 4 HOURS AS NEEDED FOR NAUSEA AND VOMITING -MAY MAKE DROWSY, Disp: 60 tablet, Rfl: 2    triamcinolone (KENALOG) 0.1 % cream, Apply 1 Application topically to the appropriate area as directed 2 (Two) Times a Day. Use sparingly avoid chronic use avoid face, Disp: 80 g, Rfl: 0    labetalol  "(NORMODYNE) 100 MG tablet, Take 0.5 tablets by mouth 2 (Two) Times a Day. (Patient not taking: Reported on 2/5/2025), Disp: 180 tablet, Rfl: 3  Objective     VITAL SIGNS:     Vitals:    02/05/25 0914   BP: 128/80   Pulse: 83   Resp: 16   Temp: 98.1 °F (36.7 °C)   TempSrc: Oral   SpO2: 98%   Weight: 77.6 kg (171 lb)   Height: 152 cm (59.84\")   PainSc:   2   PainLoc: Hip     Body mass index is 33.57 kg/m².     Wt Readings from Last 5 Encounters:   02/05/25 77.6 kg (171 lb)   01/12/25 74.8 kg (165 lb)   01/02/25 74.8 kg (165 lb)   12/04/24 74 kg (163 lb 3.2 oz)   12/04/24 74.3 kg (163 lb 14.4 oz)     PHYSICAL EXAMINATION:   GENERAL: The patient appears in good general condition, not in acute distress.   SKIN: No Ecchymosis.  EYES: No jaundice. No Pallor.  LYMPHATIC: No cervical or axillary lymphadenopathy.  CHEST: Normal respiratory effort. Normal breathing sounds bilaterally. No added sounds.  CVS: Normal S1 and S2. No Murmur.  ABDOMEN: Soft. No tenderness. No Hepatomegaly. No Splenomegaly. No masses.  EXTREMITIES: No joint deformity.     DIAGNOSTIC DATA:     Results from last 7 days   Lab Units 02/05/25  0915   WBC 10*3/mm3 3.83   NEUTROS ABS 10*3/mm3 1.73   HEMOGLOBIN g/dL 10.0*   HEMATOCRIT % 32.3*   PLATELETS 10*3/mm3 302     Results from last 7 days   Lab Units 02/05/25  0915   SODIUM mmol/L 141   POTASSIUM mmol/L 3.7   CHLORIDE mmol/L 104   CO2 mmol/L 25.7   BUN mg/dL 18   CREATININE mg/dL 0.90   CALCIUM mg/dL 9.4   ALBUMIN g/dL 4.1   BILIRUBIN mg/dL 0.5   ALK PHOS U/L 50   ALT (SGPT) U/L 22   AST (SGOT) U/L 24   GLUCOSE mg/dL 131*   MAGNESIUM mg/dL 2.1     PET scan on 1/7/2025:  Stable osseous disease involving the left ischium and right ninth rib.     No new abnormal uptake in the head/neck, chest, abdomen, or pelvis.     Assessment & Plan    *History of left breast cancer.  It was identified on screening mammograms on 10/5/2022.  It was located at 12 o'clock position.  It measured 6 x 5 by 5 mm on mammogram " and ultrasound on 10/20/2022.  PET scan on 10/6/2022 showed the lesion to be hypermetabolic. It measured 10 x 7 mm with an SUV of 5.8.  No evidence of metastasis on PET scan.  MRI on 12/5/2022 showed the lesion to measure 9 mm.    S/P mastectomy with reconstruction on 1/24/2023.  Pathology exam revealed the primary tumor to measure 15 mm.  It was grade 3.  Triple negative.  HER2 was negative-0 by IHC.  Ki-67 was 85%.  It was considered pT1cN0.  2 sentinel lymph nodes were examined and were negative.    No lymphovascular invasion. 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  Due to the patient receiving Adriamycin in the past, she is unable to receive additional Adriamycin.  Taxotere and Cytoxan with Neulasta support every 3 weeks x 4 cycles was recommended.  She started Taxotere and Cytoxan on 3/8/2023.  She had infusion reaction to Taxotere which was briefly held.  She was given IV Solu-Cortef.  It was restarted at a slower rate and she tolerated the infusion.  She received cycle #4 on 5/3/2023.    CT scans on 10/20/2023 showed no evidence of recurrence or metastasis.  CT scans on 3/26/2024 showed no evidence of recurrence.  Mammogram in November 2024 were benign.  She is doing well with no evidence of recurrence.    *Metastatic thyroid cancer diagnosed on 10/6/2020.  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  She had right thyroid lobectomy on 12/17/2018 at [place].    The specimen was sent for outside consultation and the final conclusion was that there was no evidence of malignancy.  MRI on 8/27/2020 revealed an L4 lesion resulting in pathologic compression fracture.  Bone scan on 9/2/2020 revealed increased activity at L4 and left side of the skull.  PET scan on 9/9/2020 revealed abnormal activity at L4, right femoral neck, lower sacrum, left ischium and right ninth medial aspect.  PET scan 9/9/2020 showed no evidence of visceral metastasis.  Patient had kyphoplasty on 9/17/2020.  Pathology was  nondiagnostic.  CT-guided biopsy of the lesion in the right posterior ninth rib on 10/6/2020 revealed a clear cell neoplasm.  It is positive for PAX 8 and TTF-1 favoring metastatic thyroid cancer.  Patient was referred to [facility].  Patient underwent completion thyroidectomy on 11/9/2020.  There was no evidence of uptake of radioactive iodine on SPECT imaging on 12/18/2020.  This was indicative of the tumor not going to respond to radioactive iodine therapy.  Patient was started on Lenvima 20 mg daily on 12/24/2020.  Lenvima was held on 2/6/2021 due to drug induced anemia and thrombocytopenia.  She was unable to start back on it due to prolonged cytopenias.  CT scans on 3/23/2021 showed stable disease.  CT scans showed stable disease.  Thyroglobulin was stable at 3.4.    PET scan on 9/1/2021 revealed decrease in the activity in the metastatic lesions.    Starting back on active treatment (Nexavar) was not recommended.  PET scan on 12/16/2021 showed complete response except for mild hypermetabolism in the left ischium.  CT scans on 4/8/2022 showed stable bone metastasis.  No new metastatic lesions.  PET scan on 10/6/2022 revealed no new areas of metastasis.  Due to the stable metastatic disease, she was not placed ontreatment for her thyroid cancer.   She is on Synthroid 112 mcg daily.  Goal is to maintain TSH <0.1.  Thyroglobulin increased to 13.1 on 3/26/2024 and was 11.3 on 4/4/2024.  PET scan on 4/26/2024 revealed increase in the FDG uptake in the left ischial and right posterior ninth rib metastasis.  This was concerning for progression of the known metastasis.   She was started on Cabozantinib 2 weeks ago at 40 mg daily.  PET scan on 1/7/2025 revealed stable osseous metastatic disease in the left ischium and right ninth rib.  There were no new lesions.  She was having pain in different areas of her bones away from the areas of the metastasis and was not able to continue cabozantinib.  Cabozantinib  was held in January 2025 after the PET scan.    *Bone metastasis.  Patient was given Zometa on 9/29/2020.  Patient underwent right femur medullary nailing on 10/23/2020.  Patient was restarted back on Zometa on 2/24/2022.   CT scans on 4/8/2022 revealed no new metastatic lesions.  The metastatic lesions were stable.  PET scan on 10/6/2022 revealed no change in the areas of activity in the bone metastasis.  No new lesions.  Zometa was restarted on 6/14/2023.  PET scan on 4/26/2024 at  revealed increase in the hypermetabolism in the left ischial bone with an SUV of 8.8 (Increased from 4 0.8).  There was increase in the activity in the right ninth rib with an SUV of 5-previously below blood pool.  She received radiation therapy in 3 fractions to the left ischial metastasis and right ninth rib metastasis.  Patient developed pain in the right hip.  CT scan on 9/1/2024 showed that the lesion at the femoral neck was more clearly demarcated/larger.  Since she is having pain and since she did not receive radiation to this lesion, I recommended radiation therapy.  PET scan on    * Iron deficiency anemia.    Patient was previously treated with ferrous sulfate 325 mg daily.  Hemoglobin decreased to 9.1 on 2/24/2022.  EGD on 3/25/2022 showed no evidence of blood loss.  Pathology exam revealed no typical features of celiac disease.  Hemoglobin was 10.4 on 12/27/2022.  Ferritin was 95 and transferrin saturation was 14%.  She was started on ferrous sulfate 325 mg daily.  Hemoglobin decreased to 9.6 on 2/1/2023, secondary to recent surgical blood losses.  She was restarted on ferrous sulfate 325 mg daily on 2/1/2023.  5/3/2023: Hemoglobin 9.4.  She was continued on ferrous sulfate 325 mg once daily.  7/26/2023: Hemoglobin decreased to 10.0.  She was continued on ferrous sulfate 325 mg daily.  She was advised to take vitamin C 1000 mg daily.  11/29/2023: Hemoglobin 9.9.  Transferrin saturation 11%.  We discussed options of IV  Venofer 300 mg weekly x 5 doses but the patient decided to continue with oral iron.  2/5/2025: Hemoglobin decreased to 10.0.  She is taking the ferrous sulfate daily regularly.    *Lesion in the right breast at 6 o'clock position.  It is underneath the reconstruction inverted T incision.  We obtained a diagnostic mammogram that revealed no suspicious lesions.  This is likely a scar.  Mammogram in November 2024 was benign.    *History of left breast cancer, stage IIB, triIple negative.   S/P lumpectomy in 2001.  She received adjuvant chemotherapy on the NSABP B34 with AC followed by Taxotere.  S/P post-lumpectomy radiation therapy.   She was placed on tamoxifen for 5 years and it was completed in January 2007.   With the patient's history of breast cancer at a young age and development of a second new breast cancer in the left breast, repeat genetic testing was recommended.  Gene testing on 12/22/2022 revealed variants of unknown significance.    PLAN:    1.  We will give Zometa today.  We will continue Zometa every 4 weeks through dose #36.  After that, Zometa will be changed to every 3 months.  2.  Continue ferrous sulfate 325 mg daily.  3.  As she was advised by Dr. Dahl, she will keep cabozantinib on hold for now.  4.  Follow-up in 3 months.  She will be scheduled to receive Zometa.  We will obtain CBC CMP magnesium phosphorus ferritin and iron panel..      Tim Muñoz MD  02/05/25

## 2025-02-05 NOTE — NURSING NOTE
Pt arrived for Zometa infusion after being seen by Dr Muñoz. Pt denies any recent or upcoming dental work. Pt tolerated infusion and discharged in stable condition.

## 2025-02-10 ENCOUNTER — OFFICE VISIT (OUTPATIENT)
Dept: NEUROSURGERY | Facility: CLINIC | Age: 55
End: 2025-02-10
Payer: COMMERCIAL

## 2025-02-10 VITALS
SYSTOLIC BLOOD PRESSURE: 108 MMHG | BODY MASS INDEX: 33.57 KG/M2 | WEIGHT: 171 LBS | DIASTOLIC BLOOD PRESSURE: 60 MMHG | HEIGHT: 60 IN | OXYGEN SATURATION: 98 % | HEART RATE: 78 BPM | TEMPERATURE: 97.5 F

## 2025-02-10 DIAGNOSIS — D49.2 LUMBAR SPINE TUMOR: Primary | ICD-10-CM

## 2025-02-10 PROCEDURE — 99213 OFFICE O/P EST LOW 20 MIN: CPT | Performed by: NEUROLOGICAL SURGERY

## 2025-02-13 ENCOUNTER — PATIENT MESSAGE (OUTPATIENT)
Dept: FAMILY MEDICINE CLINIC | Facility: CLINIC | Age: 55
End: 2025-02-13
Payer: COMMERCIAL

## 2025-02-13 DIAGNOSIS — Z78.0 POSTMENOPAUSAL: Primary | ICD-10-CM

## 2025-02-18 ENCOUNTER — TELEPHONE (OUTPATIENT)
Dept: FAMILY MEDICINE CLINIC | Facility: CLINIC | Age: 55
End: 2025-02-18

## 2025-02-18 NOTE — TELEPHONE ENCOUNTER
Caller: Ina Akers    Relationship: Self    Best call back number: [phone]    What is the best time to reach you:     Who are you requesting to speak with (clinical staff, provider,  specific staff member): CLINICAL STAFF     Do you know the name of the person who called:     What was the call regarding: REGARDING FMLA PAPERS THAT WILL NEED TO BE FILLED OUT FOR HER DAUGHTER DURING HER TIME OF CANCER TREATMENT , THE PAPERS WILL BE COMING FROM [employer]    Is it okay if the provider responds through MyChart: NO

## 2025-02-27 DIAGNOSIS — I10 ESSENTIAL HYPERTENSION: ICD-10-CM

## 2025-02-27 DIAGNOSIS — E55.9 VITAMIN D DEFICIENCY: ICD-10-CM

## 2025-02-27 DIAGNOSIS — E78.5 HYPERLIPIDEMIA, UNSPECIFIED HYPERLIPIDEMIA TYPE: ICD-10-CM

## 2025-02-27 DIAGNOSIS — E11.9 CONTROLLED TYPE 2 DIABETES MELLITUS WITHOUT COMPLICATION, WITHOUT LONG-TERM CURRENT USE OF INSULIN: ICD-10-CM

## 2025-02-27 RX ORDER — ATORVASTATIN CALCIUM 20 MG/1
20 TABLET, FILM COATED ORAL DAILY
Qty: 90 TABLET | Refills: 1 | Status: SHIPPED | OUTPATIENT
Start: 2025-02-27

## 2025-02-27 NOTE — TELEPHONE ENCOUNTER
Rx Refill Note  Requested Prescriptions     Pending Prescriptions Disp Refills    atorvastatin (LIPITOR) 20 MG tablet [Pharmacy Med Name: ATORVASTATIN 20MG TABLETS] 90 tablet 1     Sig: TAKE 1 TABLET BY MOUTH DAILY      Last office visit with prescribing clinician: 12/4/2024   Last telemedicine visit with prescribing clinician: Visit date not found   Next office visit with prescribing clinician: 6/13/2025                         Would you like a call back once the refill request has been completed: [] Yes [] No    If the office needs to give you a call back, can they leave a voicemail: [] Yes [] No    Silvana Slater MA  02/27/25, 09:50 EST

## 2025-03-03 ENCOUNTER — TELEPHONE (OUTPATIENT)
Dept: FAMILY MEDICINE CLINIC | Facility: CLINIC | Age: 55
End: 2025-03-03

## 2025-03-03 NOTE — TELEPHONE ENCOUNTER
Caller: Ina Akers    Relationship to patient: Self    Best call back number: [phone]    Patient is needing: PATIENT CALLING IN FOR AN UPDATE ON [employer] MESSAGE SENT IN FOR CALLBACK TO DISCUSS FMLA PAPERWORK.

## 2025-03-05 ENCOUNTER — TELEPHONE (OUTPATIENT)
Dept: FAMILY MEDICINE CLINIC | Facility: CLINIC | Age: 55
End: 2025-03-05
Payer: COMMERCIAL

## 2025-03-05 ENCOUNTER — APPOINTMENT (OUTPATIENT)
Dept: OTHER | Facility: HOSPITAL | Age: 55
End: 2025-03-05
Payer: COMMERCIAL

## 2025-03-05 ENCOUNTER — INFUSION (OUTPATIENT)
Dept: ONCOLOGY | Facility: HOSPITAL | Age: 55
End: 2025-03-05
Payer: COMMERCIAL

## 2025-03-05 VITALS
RESPIRATION RATE: 15 BRPM | OXYGEN SATURATION: 98 % | BODY MASS INDEX: 32.59 KG/M2 | HEIGHT: 60 IN | DIASTOLIC BLOOD PRESSURE: 73 MMHG | TEMPERATURE: 97.4 F | HEART RATE: 80 BPM | SYSTOLIC BLOOD PRESSURE: 108 MMHG | WEIGHT: 166 LBS

## 2025-03-05 DIAGNOSIS — Z17.1 MALIGNANT NEOPLASM OF UPPER-OUTER QUADRANT OF LEFT BREAST IN FEMALE, ESTROGEN RECEPTOR NEGATIVE: ICD-10-CM

## 2025-03-05 DIAGNOSIS — C50.412 MALIGNANT NEOPLASM OF UPPER-OUTER QUADRANT OF LEFT BREAST IN FEMALE, ESTROGEN RECEPTOR NEGATIVE: ICD-10-CM

## 2025-03-05 DIAGNOSIS — C79.51 METASTASIS TO BONE: Primary | ICD-10-CM

## 2025-03-05 LAB
ALBUMIN SERPL-MCNC: 4.3 G/DL (ref 3.5–5.2)
ALBUMIN/GLOB SERPL: 1.4 G/DL
ALP SERPL-CCNC: 58 U/L (ref 39–117)
ALT SERPL W P-5'-P-CCNC: 14 U/L (ref 1–33)
ANION GAP SERPL CALCULATED.3IONS-SCNC: 12.1 MMOL/L (ref 5–15)
AST SERPL-CCNC: 16 U/L (ref 1–32)
BASOPHILS # BLD AUTO: 0.02 10*3/MM3 (ref 0–0.2)
BASOPHILS NFR BLD AUTO: 0.5 % (ref 0–1.5)
BILIRUB SERPL-MCNC: 0.3 MG/DL (ref 0–1.2)
BUN SERPL-MCNC: 22 MG/DL (ref 6–20)
BUN/CREAT SERPL: 26.8 (ref 7–25)
CALCIUM SPEC-SCNC: 9.7 MG/DL (ref 8.6–10.5)
CHLORIDE SERPL-SCNC: 105 MMOL/L (ref 98–107)
CO2 SERPL-SCNC: 24.9 MMOL/L (ref 22–29)
CREAT SERPL-MCNC: 0.82 MG/DL (ref 0.57–1)
DEPRECATED RDW RBC AUTO: 51.2 FL (ref 37–54)
EGFRCR SERPLBLD CKD-EPI 2021: 84.1 ML/MIN/1.73
EOSINOPHIL # BLD AUTO: 0.28 10*3/MM3 (ref 0–0.4)
EOSINOPHIL NFR BLD AUTO: 6.8 % (ref 0.3–6.2)
ERYTHROCYTE [DISTWIDTH] IN BLOOD BY AUTOMATED COUNT: 14.3 % (ref 12.3–15.4)
GLOBULIN UR ELPH-MCNC: 3.1 GM/DL
GLUCOSE SERPL-MCNC: 118 MG/DL (ref 65–99)
HCT VFR BLD AUTO: 34.4 % (ref 34–46.6)
HGB BLD-MCNC: 10.7 G/DL (ref 12–15.9)
IMM GRANULOCYTES # BLD AUTO: 0.01 10*3/MM3 (ref 0–0.05)
IMM GRANULOCYTES NFR BLD AUTO: 0.2 % (ref 0–0.5)
LYMPHOCYTES # BLD AUTO: 1.67 10*3/MM3 (ref 0.7–3.1)
LYMPHOCYTES NFR BLD AUTO: 40.3 % (ref 19.6–45.3)
MAGNESIUM SERPL-MCNC: 1.9 MG/DL (ref 1.6–2.6)
MCH RBC QN AUTO: 30.4 PG (ref 26.6–33)
MCHC RBC AUTO-ENTMCNC: 31.1 G/DL (ref 31.5–35.7)
MCV RBC AUTO: 97.7 FL (ref 79–97)
MONOCYTES # BLD AUTO: 0.49 10*3/MM3 (ref 0.1–0.9)
MONOCYTES NFR BLD AUTO: 11.8 % (ref 5–12)
NEUTROPHILS NFR BLD AUTO: 1.67 10*3/MM3 (ref 1.7–7)
NEUTROPHILS NFR BLD AUTO: 40.4 % (ref 42.7–76)
NRBC BLD AUTO-RTO: 0 /100 WBC (ref 0–0.2)
PHOSPHATE SERPL-MCNC: 4.1 MG/DL (ref 2.5–4.5)
PLATELET # BLD AUTO: 275 10*3/MM3 (ref 140–450)
PMV BLD AUTO: 9.5 FL (ref 6–12)
POTASSIUM SERPL-SCNC: 3.7 MMOL/L (ref 3.5–5.2)
PROT SERPL-MCNC: 7.4 G/DL (ref 6–8.5)
RBC # BLD AUTO: 3.52 10*6/MM3 (ref 3.77–5.28)
SODIUM SERPL-SCNC: 142 MMOL/L (ref 136–145)
WBC NRBC COR # BLD AUTO: 4.14 10*3/MM3 (ref 3.4–10.8)

## 2025-03-05 PROCEDURE — 84100 ASSAY OF PHOSPHORUS: CPT | Performed by: INTERNAL MEDICINE

## 2025-03-05 PROCEDURE — 96365 THER/PROPH/DIAG IV INF INIT: CPT

## 2025-03-05 PROCEDURE — 80053 COMPREHEN METABOLIC PANEL: CPT | Performed by: INTERNAL MEDICINE

## 2025-03-05 PROCEDURE — 85025 COMPLETE CBC W/AUTO DIFF WBC: CPT | Performed by: INTERNAL MEDICINE

## 2025-03-05 PROCEDURE — 25010000002 ZOLEDRONIC ACID 4 MG/100ML SOLUTION: Performed by: INTERNAL MEDICINE

## 2025-03-05 PROCEDURE — 96374 THER/PROPH/DIAG INJ IV PUSH: CPT

## 2025-03-05 PROCEDURE — 83735 ASSAY OF MAGNESIUM: CPT | Performed by: INTERNAL MEDICINE

## 2025-03-05 RX ORDER — ZOLEDRONIC ACID 0.04 MG/ML
4 INJECTION, SOLUTION INTRAVENOUS ONCE
Status: COMPLETED | OUTPATIENT
Start: 2025-03-05 | End: 2025-03-05

## 2025-03-05 RX ADMIN — ZOLEDRONIC ACID 4 MG: 0.04 INJECTION, SOLUTION INTRAVENOUS at 09:34

## 2025-03-05 NOTE — NURSING NOTE
Pt here for Zometa, with no complaints voiced and denies any recent dental procedures.  Pt tolerated infusion and instructed to call the office for any concerns prior to next appt next month. Pt vu and discharged in stable condition.

## 2025-03-11 ENCOUNTER — HOSPITAL ENCOUNTER (OUTPATIENT)
Dept: BONE DENSITY | Facility: HOSPITAL | Age: 55
Discharge: HOME | End: 2025-03-11
Admitting: NURSE PRACTITIONER
Payer: COMMERCIAL

## 2025-03-11 PROCEDURE — 77080 DXA BONE DENSITY AXIAL: CPT

## 2025-03-26 RX ORDER — TRIAMCINOLONE ACETONIDE 1 MG/G
CREAM TOPICAL
Qty: 80 G | Refills: 0 | Status: SHIPPED | OUTPATIENT
Start: 2025-03-26

## 2025-03-31 ENCOUNTER — TELEPHONE (OUTPATIENT)
Dept: FAMILY MEDICINE CLINIC | Facility: CLINIC | Age: 55
End: 2025-03-31

## 2025-03-31 NOTE — TELEPHONE ENCOUNTER
Caller: Ina Akers    Relationship: Self    Best call back number: [phone]         What was the call regarding:     A MEDICAL RELEASE FORM TO THE [facility] WAS DONE BY THE OFFICE.  IN THE AREA WHERE IT SAID CLIENT THE OFFICE BUT Psychiatric NOT THE PATIENT'S NAME.  PLEASE FAX BACK TO THE [facility].    THE PATIENT'S NAME NEEDS TO BE PUT THERE AND INITIALED BY THE OFFICE    ANY QUESTIONS PLEASE CALL PATIENT.

## 2025-04-02 ENCOUNTER — INFUSION (OUTPATIENT)
Dept: ONCOLOGY | Facility: HOSPITAL | Age: 55
End: 2025-04-02
Payer: COMMERCIAL

## 2025-04-02 ENCOUNTER — APPOINTMENT (OUTPATIENT)
Dept: OTHER | Facility: HOSPITAL | Age: 55
End: 2025-04-02
Payer: COMMERCIAL

## 2025-04-02 VITALS
BODY MASS INDEX: 33.38 KG/M2 | RESPIRATION RATE: 15 BRPM | OXYGEN SATURATION: 100 % | SYSTOLIC BLOOD PRESSURE: 132 MMHG | WEIGHT: 170 LBS | TEMPERATURE: 97.4 F | DIASTOLIC BLOOD PRESSURE: 88 MMHG | HEIGHT: 60 IN | HEART RATE: 76 BPM

## 2025-04-02 DIAGNOSIS — C79.51 METASTASIS TO BONE: Primary | ICD-10-CM

## 2025-04-02 DIAGNOSIS — C50.412 MALIGNANT NEOPLASM OF UPPER-OUTER QUADRANT OF LEFT BREAST IN FEMALE, ESTROGEN RECEPTOR NEGATIVE: ICD-10-CM

## 2025-04-02 DIAGNOSIS — Z17.1 MALIGNANT NEOPLASM OF UPPER-OUTER QUADRANT OF LEFT BREAST IN FEMALE, ESTROGEN RECEPTOR NEGATIVE: ICD-10-CM

## 2025-04-02 LAB
ALBUMIN SERPL-MCNC: 4.5 G/DL (ref 3.5–5.2)
ALBUMIN/GLOB SERPL: 1.4 G/DL
ALP SERPL-CCNC: 63 U/L (ref 39–117)
ALT SERPL W P-5'-P-CCNC: 14 U/L (ref 1–33)
ANION GAP SERPL CALCULATED.3IONS-SCNC: 9.4 MMOL/L (ref 5–15)
AST SERPL-CCNC: 17 U/L (ref 1–32)
BASOPHILS # BLD AUTO: 0.02 10*3/MM3 (ref 0–0.2)
BASOPHILS NFR BLD AUTO: 0.6 % (ref 0–1.5)
BILIRUB SERPL-MCNC: 0.3 MG/DL (ref 0–1.2)
BUN SERPL-MCNC: 24 MG/DL (ref 6–20)
BUN/CREAT SERPL: 24.7 (ref 7–25)
CALCIUM SPEC-SCNC: 9.7 MG/DL (ref 8.6–10.5)
CHLORIDE SERPL-SCNC: 103 MMOL/L (ref 98–107)
CO2 SERPL-SCNC: 25.6 MMOL/L (ref 22–29)
CREAT SERPL-MCNC: 0.97 MG/DL (ref 0.57–1)
DEPRECATED RDW RBC AUTO: 44.8 FL (ref 37–54)
EGFRCR SERPLBLD CKD-EPI 2021: 68.7 ML/MIN/1.73
EOSINOPHIL # BLD AUTO: 0.22 10*3/MM3 (ref 0–0.4)
EOSINOPHIL NFR BLD AUTO: 6.1 % (ref 0.3–6.2)
ERYTHROCYTE [DISTWIDTH] IN BLOOD BY AUTOMATED COUNT: 13 % (ref 12.3–15.4)
GLOBULIN UR ELPH-MCNC: 3.2 GM/DL
GLUCOSE SERPL-MCNC: 91 MG/DL (ref 65–99)
HCT VFR BLD AUTO: 33.4 % (ref 34–46.6)
HGB BLD-MCNC: 10.4 G/DL (ref 12–15.9)
IMM GRANULOCYTES # BLD AUTO: 0.02 10*3/MM3 (ref 0–0.05)
IMM GRANULOCYTES NFR BLD AUTO: 0.6 % (ref 0–0.5)
LYMPHOCYTES # BLD AUTO: 1.41 10*3/MM3 (ref 0.7–3.1)
LYMPHOCYTES NFR BLD AUTO: 39.4 % (ref 19.6–45.3)
MAGNESIUM SERPL-MCNC: 2 MG/DL (ref 1.6–2.6)
MCH RBC QN AUTO: 29.2 PG (ref 26.6–33)
MCHC RBC AUTO-ENTMCNC: 31.1 G/DL (ref 31.5–35.7)
MCV RBC AUTO: 93.8 FL (ref 79–97)
MONOCYTES # BLD AUTO: 0.45 10*3/MM3 (ref 0.1–0.9)
MONOCYTES NFR BLD AUTO: 12.6 % (ref 5–12)
NEUTROPHILS NFR BLD AUTO: 1.46 10*3/MM3 (ref 1.7–7)
NEUTROPHILS NFR BLD AUTO: 40.7 % (ref 42.7–76)
NRBC BLD AUTO-RTO: 0 /100 WBC (ref 0–0.2)
PHOSPHATE SERPL-MCNC: 4 MG/DL (ref 2.5–4.5)
PLATELET # BLD AUTO: 303 10*3/MM3 (ref 140–450)
PMV BLD AUTO: 9 FL (ref 6–12)
POTASSIUM SERPL-SCNC: 3.9 MMOL/L (ref 3.5–5.2)
PROT SERPL-MCNC: 7.7 G/DL (ref 6–8.5)
RBC # BLD AUTO: 3.56 10*6/MM3 (ref 3.77–5.28)
SODIUM SERPL-SCNC: 138 MMOL/L (ref 136–145)
WBC NRBC COR # BLD AUTO: 3.58 10*3/MM3 (ref 3.4–10.8)

## 2025-04-02 PROCEDURE — 84100 ASSAY OF PHOSPHORUS: CPT

## 2025-04-02 PROCEDURE — 25010000002 ZOLEDRONIC ACID 4 MG/100ML SOLUTION

## 2025-04-02 PROCEDURE — 96374 THER/PROPH/DIAG INJ IV PUSH: CPT

## 2025-04-02 PROCEDURE — 83735 ASSAY OF MAGNESIUM: CPT

## 2025-04-02 PROCEDURE — 85025 COMPLETE CBC W/AUTO DIFF WBC: CPT

## 2025-04-02 PROCEDURE — 80053 COMPREHEN METABOLIC PANEL: CPT

## 2025-04-02 RX ORDER — ZOLEDRONIC ACID 0.04 MG/ML
4 INJECTION, SOLUTION INTRAVENOUS ONCE
Status: COMPLETED | OUTPATIENT
Start: 2025-04-02 | End: 2025-04-02

## 2025-04-02 RX ADMIN — ZOLEDRONIC ACID 4 MG: 0.04 INJECTION, SOLUTION INTRAVENOUS at 10:13

## 2025-04-21 DIAGNOSIS — E78.5 HYPERLIPIDEMIA, UNSPECIFIED HYPERLIPIDEMIA TYPE: ICD-10-CM

## 2025-04-21 DIAGNOSIS — E55.9 VITAMIN D DEFICIENCY: ICD-10-CM

## 2025-04-21 DIAGNOSIS — I10 ESSENTIAL HYPERTENSION: ICD-10-CM

## 2025-04-21 DIAGNOSIS — E11.9 CONTROLLED TYPE 2 DIABETES MELLITUS WITHOUT COMPLICATION, WITHOUT LONG-TERM CURRENT USE OF INSULIN: ICD-10-CM

## 2025-04-21 RX ORDER — LANSOPRAZOLE 30 MG/1
30 CAPSULE, DELAYED RELEASE ORAL EVERY OTHER DAY
Qty: 90 CAPSULE | Refills: 3 | Status: SHIPPED | OUTPATIENT
Start: 2025-04-21

## 2025-05-07 ENCOUNTER — INFUSION (OUTPATIENT)
Dept: ONCOLOGY | Facility: HOSPITAL | Age: 55
End: 2025-05-07
Payer: COMMERCIAL

## 2025-05-07 ENCOUNTER — OFFICE VISIT (OUTPATIENT)
Dept: ONCOLOGY | Facility: CLINIC | Age: 55
End: 2025-05-07
Payer: COMMERCIAL

## 2025-05-07 ENCOUNTER — APPOINTMENT (OUTPATIENT)
Dept: OTHER | Facility: HOSPITAL | Age: 55
End: 2025-05-07
Payer: COMMERCIAL

## 2025-05-07 VITALS
WEIGHT: 172.3 LBS | HEIGHT: 60 IN | DIASTOLIC BLOOD PRESSURE: 69 MMHG | TEMPERATURE: 98.1 F | SYSTOLIC BLOOD PRESSURE: 107 MMHG | BODY MASS INDEX: 33.83 KG/M2 | OXYGEN SATURATION: 97 % | HEART RATE: 74 BPM

## 2025-05-07 DIAGNOSIS — C79.51 METASTASIS TO BONE: ICD-10-CM

## 2025-05-07 DIAGNOSIS — Z17.1 MALIGNANT NEOPLASM OF UPPER-OUTER QUADRANT OF LEFT BREAST IN FEMALE, ESTROGEN RECEPTOR NEGATIVE: Primary | ICD-10-CM

## 2025-05-07 DIAGNOSIS — C79.51 METASTASIS TO BONE: Primary | ICD-10-CM

## 2025-05-07 DIAGNOSIS — C50.412 MALIGNANT NEOPLASM OF UPPER-OUTER QUADRANT OF LEFT BREAST IN FEMALE, ESTROGEN RECEPTOR NEGATIVE: ICD-10-CM

## 2025-05-07 DIAGNOSIS — D50.9 IRON DEFICIENCY ANEMIA, UNSPECIFIED IRON DEFICIENCY ANEMIA TYPE: ICD-10-CM

## 2025-05-07 DIAGNOSIS — C73 THYROID CANCER: ICD-10-CM

## 2025-05-07 DIAGNOSIS — C50.412 MALIGNANT NEOPLASM OF UPPER-OUTER QUADRANT OF LEFT BREAST IN FEMALE, ESTROGEN RECEPTOR NEGATIVE: Primary | ICD-10-CM

## 2025-05-07 DIAGNOSIS — Z17.1 MALIGNANT NEOPLASM OF UPPER-OUTER QUADRANT OF LEFT BREAST IN FEMALE, ESTROGEN RECEPTOR NEGATIVE: ICD-10-CM

## 2025-05-07 LAB
ALBUMIN SERPL-MCNC: 4.5 G/DL (ref 3.5–5.2)
ALBUMIN/GLOB SERPL: 1.5 G/DL
ALP SERPL-CCNC: 53 U/L (ref 39–117)
ALT SERPL W P-5'-P-CCNC: 14 U/L (ref 1–33)
ANION GAP SERPL CALCULATED.3IONS-SCNC: 11 MMOL/L (ref 5–15)
AST SERPL-CCNC: 16 U/L (ref 1–32)
BASOPHILS # BLD AUTO: 0.02 10*3/MM3 (ref 0–0.2)
BASOPHILS NFR BLD AUTO: 0.5 % (ref 0–1.5)
BILIRUB SERPL-MCNC: 0.3 MG/DL (ref 0–1.2)
BUN SERPL-MCNC: 23 MG/DL (ref 6–20)
BUN/CREAT SERPL: 21.7 (ref 7–25)
CALCIUM SPEC-SCNC: 9.3 MG/DL (ref 8.6–10.5)
CHLORIDE SERPL-SCNC: 105 MMOL/L (ref 98–107)
CO2 SERPL-SCNC: 23 MMOL/L (ref 22–29)
CREAT SERPL-MCNC: 1.06 MG/DL (ref 0.57–1)
DEPRECATED RDW RBC AUTO: 45.7 FL (ref 37–54)
EGFRCR SERPLBLD CKD-EPI 2021: 61.8 ML/MIN/1.73
EOSINOPHIL # BLD AUTO: 0.38 10*3/MM3 (ref 0–0.4)
EOSINOPHIL NFR BLD AUTO: 8.9 % (ref 0.3–6.2)
ERYTHROCYTE [DISTWIDTH] IN BLOOD BY AUTOMATED COUNT: 13.6 % (ref 12.3–15.4)
FERRITIN SERPL-MCNC: 85.4 NG/ML (ref 13–150)
GLOBULIN UR ELPH-MCNC: 3.1 GM/DL
GLUCOSE SERPL-MCNC: 105 MG/DL (ref 65–99)
HCT VFR BLD AUTO: 34.5 % (ref 34–46.6)
HGB BLD-MCNC: 10.4 G/DL (ref 12–15.9)
IMM GRANULOCYTES # BLD AUTO: 0.02 10*3/MM3 (ref 0–0.05)
IMM GRANULOCYTES NFR BLD AUTO: 0.5 % (ref 0–0.5)
IRON 24H UR-MRATE: 49 MCG/DL (ref 37–145)
IRON SATN MFR SERPL: 15 % (ref 20–50)
LYMPHOCYTES # BLD AUTO: 1.54 10*3/MM3 (ref 0.7–3.1)
LYMPHOCYTES NFR BLD AUTO: 35.9 % (ref 19.6–45.3)
MAGNESIUM SERPL-MCNC: 2.1 MG/DL (ref 1.6–2.6)
MCH RBC QN AUTO: 28.1 PG (ref 26.6–33)
MCHC RBC AUTO-ENTMCNC: 30.1 G/DL (ref 31.5–35.7)
MCV RBC AUTO: 93.2 FL (ref 79–97)
MONOCYTES # BLD AUTO: 0.46 10*3/MM3 (ref 0.1–0.9)
MONOCYTES NFR BLD AUTO: 10.7 % (ref 5–12)
NEUTROPHILS NFR BLD AUTO: 1.87 10*3/MM3 (ref 1.7–7)
NEUTROPHILS NFR BLD AUTO: 43.5 % (ref 42.7–76)
NRBC BLD AUTO-RTO: 0 /100 WBC (ref 0–0.2)
PHOSPHATE SERPL-MCNC: 3.7 MG/DL (ref 2.5–4.5)
PLATELET # BLD AUTO: 289 10*3/MM3 (ref 140–450)
PMV BLD AUTO: 9.4 FL (ref 6–12)
POTASSIUM SERPL-SCNC: 3.9 MMOL/L (ref 3.5–5.2)
PROT SERPL-MCNC: 7.6 G/DL (ref 6–8.5)
RBC # BLD AUTO: 3.7 10*6/MM3 (ref 3.77–5.28)
SODIUM SERPL-SCNC: 139 MMOL/L (ref 136–145)
TIBC SERPL-MCNC: 332 MCG/DL (ref 298–536)
TRANSFERRIN SERPL-MCNC: 223 MG/DL (ref 200–360)
WBC NRBC COR # BLD AUTO: 4.29 10*3/MM3 (ref 3.4–10.8)

## 2025-05-07 PROCEDURE — 84100 ASSAY OF PHOSPHORUS: CPT

## 2025-05-07 PROCEDURE — 25010000002 ZOLEDRONIC ACID 4 MG/100ML SOLUTION: Performed by: INTERNAL MEDICINE

## 2025-05-07 PROCEDURE — 83735 ASSAY OF MAGNESIUM: CPT

## 2025-05-07 PROCEDURE — 99214 OFFICE O/P EST MOD 30 MIN: CPT | Performed by: INTERNAL MEDICINE

## 2025-05-07 PROCEDURE — 96374 THER/PROPH/DIAG INJ IV PUSH: CPT

## 2025-05-07 PROCEDURE — 82728 ASSAY OF FERRITIN: CPT

## 2025-05-07 PROCEDURE — 83540 ASSAY OF IRON: CPT

## 2025-05-07 PROCEDURE — 85025 COMPLETE CBC W/AUTO DIFF WBC: CPT

## 2025-05-07 PROCEDURE — 80053 COMPREHEN METABOLIC PANEL: CPT

## 2025-05-07 PROCEDURE — 84466 ASSAY OF TRANSFERRIN: CPT

## 2025-05-07 RX ORDER — ZOLEDRONIC ACID 0.04 MG/ML
4 INJECTION, SOLUTION INTRAVENOUS ONCE
Status: COMPLETED | OUTPATIENT
Start: 2025-05-07 | End: 2025-05-07

## 2025-05-07 RX ORDER — ZOLEDRONIC ACID 0.04 MG/ML
4 INJECTION, SOLUTION INTRAVENOUS ONCE
Status: CANCELLED | OUTPATIENT
Start: 2025-05-07

## 2025-05-07 RX ORDER — SODIUM CHLORIDE 9 MG/ML
250 INJECTION, SOLUTION INTRAVENOUS ONCE
Status: CANCELLED | OUTPATIENT
Start: 2025-05-07

## 2025-05-07 RX ADMIN — ZOLEDRONIC ACID 4 MG: 0.04 INJECTION, SOLUTION INTRAVENOUS at 10:19

## 2025-05-07 NOTE — PROGRESS NOTES
Subjective     CHIEF COMPLAINT:      Chief Complaint   Patient presents with    Follow-up     HISTORY OF PRESENT ILLNESS:     Ina Akers is a 54 y.o. female patient who returns today for follow up on her left breast cancer and metastatic thyroid cancer.  She has iron deficiency anemia and she is on oral iron daily.  She is taking it regularly.  She continues to follow-up at [facility].  She had labs and PET scan on 4/8/2025.  Their recommendation was to stay off cabozantinib.    Patient reports intermittent discomfort in the left hip area.  It usually develops in the supine position and improves when she lies on her left side.  No new areas of pain.    ROS:  Pertinent ROS is in the HPI.     Past medical, surgical, social and family history were reviewed.     MEDICATIONS:    Current Outpatient Medications:     acetaminophen (TYLENOL) 500 MG tablet, Take 2 tablets by mouth Every 6 (Six) Hours As Needed for Mild Pain., Disp: , Rfl:     amLODIPine (NORVASC) 10 MG tablet, TAKE ONE TABLET BY MOUTH DAILY (Patient taking differently: Take 1 tablet by mouth Daily.), Disp: 90 tablet, Rfl: 1    atorvastatin (LIPITOR) 20 MG tablet, TAKE 1 TABLET BY MOUTH DAILY, Disp: 90 tablet, Rfl: 1    Blood Glucose Monitoring Suppl w/Device kit, Check blood sugar twice a day, Disp: 1 each, Rfl: 0    celecoxib (CeleBREX) 200 MG capsule, Take 1 capsule by mouth 2 (Two) Times a Day. As needed for pain, take with food tall glass of water always lowest effective dose shortest time possible, Disp: 180 capsule, Rfl: 1    cholecalciferol (VITAMIN D3) 25 MCG (1000 UT) tablet, Take 1 tablet by mouth Daily., Disp: , Rfl:     cyclobenzaprine (FLEXERIL) 5 MG tablet, Take 1 tablet by mouth At Night As Needed for Muscle Spasms. 1 or 2 tablets by mouth at bedtime if needed for leg cramps, Disp: 20 tablet, Rfl: 2    famotidine (PEPCID) 20 MG tablet, Take 1 tablet by mouth Every Other Day., Disp: , Rfl:     fluticasone (FLONASE) 50  MCG/ACT nasal spray, Administer 2 sprays into the nostril(s) as directed by provider Daily., Disp: 15.8 g, Rfl: 0    Glucose Blood (BLOOD GLUCOSE TEST) strip, Check blood sugar twice a day., Disp: 100 each, Rfl: 1    labetalol (NORMODYNE) 300 MG tablet, Take 1 tablet by mouth 2 (Two) Times a Day., Disp: , Rfl:     Lancets (ACCU-CHEK SOFT TOUCH) lancets, Check blood sugar twice a day as needed, Disp: 100 each, Rfl: 1    lansoprazole (PREVACID) 30 MG capsule, Take 1 capsule by mouth Every Other Day., Disp: 90 capsule, Rfl: 3    levothyroxine (SYNTHROID, LEVOTHROID) 112 MCG tablet, Take 1 tablet by mouth Every Morning., Disp: , Rfl:     losartan (COZAAR) 100 MG tablet, Take 1 tablet by mouth Daily., Disp: 90 tablet, Rfl: 3    MAGNESIUM PO, Take  by mouth., Disp: , Rfl:     metFORMIN ER (GLUCOPHAGE-XR) 500 MG 24 hr tablet, Take 2 tablets by mouth Daily With Dinner., Disp: 180 tablet, Rfl: 3    NON FORMULARY, Take 1 tablet by mouth Every Night. STOOL SOFTNER, Disp: , Rfl:     ondansetron ODT (ZOFRAN-ODT) 4 MG disintegrating tablet, Place 1 tablet on the tongue Every 6 (Six) Hours As Needed for Nausea or Vomiting., Disp: 30 tablet, Rfl: 5    ondansetron ODT (ZOFRAN-ODT) 8 MG disintegrating tablet, Take 1 tablet by mouth Every 8 (Eight) Hours As Needed for Nausea or Vomiting., Disp: 20 tablet, Rfl: 2    oxyCODONE-acetaminophen (PERCOCET) 5-325 MG per tablet, Take 1 tablet by mouth Every 4 (Four) Hours As Needed for Moderate Pain., Disp: 16 tablet, Rfl: 0    promethazine (PHENERGAN) 12.5 MG tablet, Take 1-2 tablets by mouth Every 6 (Six) Hours As Needed for Nausea. TAKE 1/2 TO 1 TABLET BY MOUTH EVERY 4 HOURS AS NEEDED FOR NAUSEA AND VOMITING -MAY MAKE DROWSY, Disp: 60 tablet, Rfl: 2    triamcinolone (KENALOG) 0.1 % cream, APPLY TO THE AFFECTED AREA AS DIRECTED TWICE DAILY. USE SPARINGLY. AVOID CHRONIC USE. AVOID FACE., Disp: 80 g, Rfl: 0    labetalol (NORMODYNE) 100 MG tablet, Take 0.5 tablets by mouth 2 (Two) Times a Day.  "(Patient not taking: Reported on 5/7/2025), Disp: 180 tablet, Rfl: 3      Objective     VITAL SIGNS:     Vitals:    05/07/25 0916   BP: 107/69   Pulse: 74   Temp: 98.1 °F (36.7 °C)   TempSrc: Oral   SpO2: 97%   Weight: 78.2 kg (172 lb 4.8 oz)   Height: 152.4 cm (60\")   PainSc: 0-No pain     Body mass index is 33.65 kg/m².     Wt Readings from Last 5 Encounters:   05/07/25 78.2 kg (172 lb 4.8 oz)   04/02/25 77.1 kg (170 lb)   03/05/25 75.3 kg (166 lb)   02/10/25 77.6 kg (171 lb)   02/05/25 77.6 kg (171 lb)     PHYSICAL EXAMINATION:   GENERAL: The patient appears in good general condition, not in acute distress.   SKIN: No Ecchymosis.  EYES: No jaundice. No Pallor.  LYMPHATIC: No cervical, supraclavicular or axillary lymphadenopathy.  CHEST: Normal respiratory effort. Normal breathing sounds bilaterally. No added sounds.  CVS: Normal S1 and S2. No Murmur.  ABDOMEN: Soft. No tenderness. No Hepatomegaly. No Splenomegaly. No masses.  EXTREMITIES: Mild left forearm lymphedema.    DIAGNOSTIC DATA:     Results from last 7 days   Lab Units 05/07/25  0915   WBC 10*3/mm3 4.29   NEUTROS ABS 10*3/mm3 1.87   HEMOGLOBIN g/dL 10.4*   HEMATOCRIT % 34.5   PLATELETS 10*3/mm3 289     Results from last 7 days   Lab Units 05/07/25  0915   SODIUM mmol/L 139   POTASSIUM mmol/L 3.9   CHLORIDE mmol/L 105   CO2 mmol/L 23.0   BUN mg/dL 23*   CREATININE mg/dL 1.06*   CALCIUM mg/dL 9.3   ALBUMIN g/dL 4.5   BILIRUBIN mg/dL 0.3   ALK PHOS U/L 53   ALT (SGPT) U/L 14   AST (SGOT) U/L 16   GLUCOSE mg/dL 105*   MAGNESIUM mg/dL 2.1         Lab 05/07/25  0915   IRON 49   IRON SATURATION (TSAT) 15*   TIBC 332   TRANSFERRIN 223   FERRITIN 85.40      Assessment & Plan    *History of left breast cancer.  It was identified on screening mammograms on 10/5/2022.  It was located at 12 o'clock position.  It measured 6 x 5 by 5 mm on mammogram and ultrasound on 10/20/2022.  PET scan on 10/6/2022 showed the lesion to be hypermetabolic. It measured 10 x 7 mm with " an SUV of 5.8.  No evidence of metastasis on PET scan.  MRI on 12/5/2022 showed the lesion to measure 9 mm.    S/P mastectomy with reconstruction on 1/24/2023.  Pathology exam revealed the primary tumor to measure 15 mm.  It was grade 3.  Triple negative.  HER2 was negative-0 by IHC.  Ki-67 was 85%.  It was considered pT1cN0.  2 sentinel lymph nodes were examined and were negative.    No lymphovascular invasion. 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  Due to the patient receiving Adriamycin in the past, she is unable to receive additional Adriamycin.  Taxotere and Cytoxan with Neulasta support every 3 weeks x 4 cycles was recommended.  She started Taxotere and Cytoxan on 3/8/2023.  She had infusion reaction to Taxotere which was briefly held.  She was given IV Solu-Cortef.  It was restarted at a slower rate and she tolerated the infusion.  She received cycle #4 on 5/3/2023.    CT scans on 10/20/2023 showed no evidence of recurrence or metastasis.  CT scans on 3/26/2024 showed no evidence of recurrence.  Mammogram in November 2024 were benign.  Patient is doing well.  No evidence of recurrence or metastasis.    *Metastatic thyroid cancer diagnosed on 10/6/2020.  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  She had right thyroid lobectomy on 12/17/2018 at [place].    The specimen was sent for outside consultation and the final conclusion was that there was no evidence of malignancy.  MRI on 8/27/2020 revealed an L4 lesion resulting in pathologic compression fracture.  Bone scan on 9/2/2020 revealed increased activity at L4 and left side of the skull.  PET scan on 9/9/2020 revealed abnormal activity at L4, right femoral neck, lower sacrum, left ischium and right ninth medial aspect.  PET scan 9/9/2020 showed no evidence of visceral metastasis.  Patient had kyphoplasty on 9/17/2020.  Pathology was nondiagnostic.  CT-guided biopsy of the lesion in the right posterior ninth rib on 10/6/2020 revealed a clear cell  neoplasm.  It is positive for PAX 8 and TTF-1 favoring metastatic thyroid cancer.  Patient was referred to [facility].  Patient underwent completion thyroidectomy on 11/9/2020.  There was no evidence of uptake of radioactive iodine on SPECT imaging on 12/18/2020.  This was indicative of the tumor not going to respond to radioactive iodine therapy.  Patient was started on Lenvima 20 mg daily on 12/24/2020.  Lenvima was held on 2/6/2021 due to drug induced anemia and thrombocytopenia.  She was unable to start back on it due to prolonged cytopenias.  CT scans on 3/23/2021 showed stable disease.  CT scans showed stable disease.  Thyroglobulin was stable at 3.4.    PET scan on 9/1/2021 revealed decrease in the activity in the metastatic lesions.    Starting back on active treatment (Nexavar) was not recommended.  PET scan on 12/16/2021 showed complete response except for mild hypermetabolism in the left ischium.  CT scans on 4/8/2022 showed stable bone metastasis.  No new metastatic lesions.  PET scan on 10/6/2022 revealed no new areas of metastasis.  Due to the stable metastatic disease, she was not placed ontreatment for her thyroid cancer.   She is on Synthroid 112 mcg daily.  Goal is to maintain TSH <0.1.  Thyroglobulin increased to 13.1 on 3/26/2024 and was 11.3 on 4/4/2024.  PET scan on 4/26/2024 revealed increase in the FDG uptake in the left ischial and right posterior ninth rib metastasis.  This was concerning for progression of the known metastasis.   She was started on Cabozantinib 2 weeks ago at 40 mg daily.  PET scan on 1/7/2025 revealed stable osseous metastatic disease in the left ischium and right ninth rib.  There were no new lesions.  She was having pain in different areas of her bones away from the areas of the metastasis and was not able to continue cabozantinib.  PET scan on 4/9/2025 showed changes of treated metastatic focus in the left ischium.  No new lesions.  Thyroglobulin was  26.6.  Dr. Dahl recommended continued monitoring while off cabozantinib.    *Bone metastasis.  Patient was given Zometa on 9/29/2020.  Patient underwent right femur medullary nailing on 10/23/2020.  Patient was restarted back on Zometa on 2/24/2022.   CT scans on 4/8/2022 revealed no new metastatic lesions.  The metastatic lesions were stable.  PET scan on 10/6/2022 revealed no change in the areas of activity in the bone metastasis.  No new lesions.  Zometa was restarted on 6/14/2023.  PET scan on 4/26/2024 at  revealed increase in the hypermetabolism in the left ischial bone with an SUV of 8.8 (Increased from 4 0.8).  There was increase in the activity in the right ninth rib with an SUV of 5-previously below blood pool.  She received radiation therapy in 3 fractions to the left ischial metastasis and right ninth rib metastasis.  Patient developed pain in the right hip.  CT scan on 9/1/2024 showed that the lesion at the femoral neck was more clearly demarcated/larger.  Since she is having pain and since she did not receive radiation to this lesion, I recommended radiation therapy.  PET scan on 4/9/2025 revealed minimally FDG avid lesions involving the left ischium consistent with treated metastatic focus of disease.  There was resolution of the previous activity at the right ninth rib.  No new areas of activity.  He reports intermittent pain in the area.  Since the PET scan showed no signs of progression, I did not recommend evaluation by orthopedic oncology at this time.    * Iron deficiency anemia.    Patient was previously treated with ferrous sulfate 325 mg daily.  Hemoglobin decreased to 9.1 on 2/24/2022.  EGD on 3/25/2022 showed no evidence of blood loss.  Pathology exam revealed no typical features of celiac disease.  Hemoglobin was 10.4 on 12/27/2022.  Ferritin was 95 and transferrin saturation was 14%.  She was started on ferrous sulfate 325 mg daily.  Hemoglobin decreased to 9.6 on 2/1/2023, secondary  to recent surgical blood losses.  She was restarted on ferrous sulfate 325 mg daily on 2/1/2023.  5/3/2023: Hemoglobin 9.4.  She was continued on ferrous sulfate 325 mg once daily.  7/26/2023: Hemoglobin decreased to 10.0.  She was continued on ferrous sulfate 325 mg daily.  She was advised to take vitamin C 1000 mg daily.  11/29/2023: Hemoglobin 9.9.  Transferrin saturation 11%.  We discussed options of IV Venofer 300 mg weekly x 5 doses but the patient decided to continue with oral iron.  2/5/2025: Hemoglobin decreased to 10.0.  5/7/2025: Hemoglobin 10.4. Ferritin 85.4. Transferrin saturation decreased to 15%.    *History of left breast cancer, stage IIB, triIple negative.   S/P lumpectomy in 2001.  She received adjuvant chemotherapy on the NSABP B34 with AC followed by Taxotere.  S/P post-lumpectomy radiation therapy.   She was placed on tamoxifen for 5 years and it was completed in January 2007.   With the patient's history of breast cancer at a young age and development of a second new breast cancer in the left breast, repeat genetic testing was recommended.  Gene testing on 12/22/2022 revealed variants of unknown significance.    PLAN:    1.  We will give Zometa today.  After today's dose, Zometa will be given every 3 months.    2.  Continue ferrous sulfate 325 mg daily.  She will also continue vitamin B12 daily.  3.  Patient is scheduled for a PET scan in July and will see Dr. Dahl afterwards for follow-up.  If there is increase in the activity in the left hip, my recommendation would be to start back on cabozantinib.  4.  Follow-up in 3 months.  She will be scheduled for Zometa.  We will obtain CBC CMP magnesium phosphorus ferritin and iron panel.  5.  Patient will be due for follow-up mammogram in November 2025.      Tim Muñoz MD  05/07/25

## 2025-05-07 NOTE — NURSING NOTE
Pt arrived for zometa after being seen by Dr Muñoz and denies any recent or upcoming dental procedures.Tolerated infusion and discharged in stable condition.

## 2025-05-21 NOTE — PROGRESS NOTES
[facility]     Referring Provider: No ref. provider found     Chief complaint: Routine follow up breast cancer     Subjective   HPI:   12/22/2022: Saw Dr. Sharif  Ms. Ina Akers is a 51 yo woman, seen at the request of Dr. Tim Muñoz, for a new diagnosis of left breast cancer. She has a past history of left breast cancer in 2001 (age 30), stage IIB triple negative. She underwent a lumpectomy and axillary dissection, followed by AC and Taxotere, followed by radiation, then tamoxifen for 5 years. She believes she had somewhere around 9-13 left axillary lymph nodes removed and 2 of them had cancer. In 2020, she was diagnosed with metastatic thyroid cancer to the bone. She underwent treatment in 2021 with Lenvima before this was held due to cytopenias. Her disease has been stable off treatment since 2/2021.     Recent screening mammogram in October 2022 showed a new left breast focal asymmetry separate from her lumpectomy site. Around this time she also underwent a PET scan because she was complaining of bone pain. This showed stable bone metastasis and a new FDG avid left breast lesion in the same region as the focal asymmetry. Diagnostic breast imaging confirmed a small hypoechoic mass and subsequent biopsy showed triple negative invasive ductal carcinoma. Her work-up is detailed in the oncologic history below. She denies any breast lumps, pain, skin changes, or nipple discharge. She also has a past history of a benign left breast lumpectomy in 1991.     Her only family history of cancer is oral cancer in her father. The patient has not undergone genetic testing.      2/8/2023: Saw Dr. Sharif  She underwent left modified radical mastectomy with prepectoral tissue expander placement on 1/24/23. See surgery & pathology details below in oncologic history. She has already seen Dr. Morse back postoperatively and had 2 drains removed. She also has already seen Dr. Muñoz and discussed proceeding  with TC x4. She is scheduled for a port placement next week. She was seen at the lymphedema clinic preoperatively and her bioimpedance score was within normal limits. She did have some early arm swelling while in the hospital, however this has already improved.    5/31/2023: Saw Dr. Sharif  She returns today for scheduled follow-up.  She completed cycle 4 of TC on 5/3/2023.  Dr. Siddiqui plans to resume Zometa which she was previously on for her thyroid bone metastasis.  Her left arm swelling has improved and her bioimpedance score last week was within normal limits.  She has been wearing a sleeve as needed.  She completed some expansion with Dr. Morse prior to starting chemotherapy and she sees him again in a couple of weeks.  She plans on waiting until December for her second stage surgery because she has a grandchild due in August.    12/15/2023: Saw Dr. Sharif  She underwent right screening mammogram in November and this was benign.  She saw PT early in November and her bioimpedance score was elevated.  She is supposed to be wearing her sleeve more often.  She then underwent left implant exchange and right mastopexy with Dr. Morse on 11/30/2023.  She is complaining of a rash and itching from the skin glue which is improving with topical hydrocortisone.    6/20/2024 saw Dr. Sharif  She returns today for scheduled follow-up.  Bioimpedance score in March was within normal limits, but slightly elevated from her prior measurement.  She does still have issues with mild left arm swelling and tries to wear her sleeve at least 3 times a week to keep it controlled.  She noticed a lump at the right breast T-junction after mastopexy with Dr. Morse.  Dr. Morse saw her and said it was fat necrosis.  She then had diagnostic imaging which also demonstrated fat necrosis and placed the area in imaging surveillance.    Unfortunately repeat PET scan in 4/2024 showed likely progression of the bone mets from her thyroid  cancer.  Her original oncologist from  is no longer there and she decided to switch her care to Dr. Dahl at [facility].  She is tearful today about the cancer progression.  She recently retired from work, although her original plan was to work longer.  She is now spending time taking care of her [id number] grandson.    11/11/2024  Patient presenting to the office today for routine follow-up.  On 11/6/2024 she had a right diagnostic mammogram that returned as BI-RADS 3 for a mass in the right breast that is probably benign and suggesting a follow-up mammogram in 6 months.  Last saw her oncologist in October with no changes made to the treatment plan.  She has a follow-up PET scan scheduled for February 2025 at [facility].  She has not followed up with the lymphedema clinic.  She is not having any left arm swelling at this time.    5/22/2025 interval history  Patient presenting to the office today for routine follow-up.  [facility].  We will get this reset up for her and I will call her with those results.  She last saw her oncologist earlier this month currently on Zometa.  She had a PET scan April that is stable.  She is repeating the PET scan in July.  She last saw Dr. Dahl earlier this month as well.      Oncology/Hematology History Overview Note   *History of left breast cancer, stage IIB, triIple negative.   S/P lumpectomy in 2001.  She received adjuvant chemotherapy on the NSABP B34 with AC followed by Taxotere.  S/P post-lumpectomy radiation therapy.   She was placed on tamoxifen for 5 years and it was completed in January 2007.     *Metastatic thyroid cancer diagnosed on 10/6/2020.  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  She had right thyroid lobectomy on 12/17/2018 at [facility].    The specimen was sent for outside consultation and the final conclusion was that there was no evidence of  malignancy.  MRI on 8/27/2020 revealed an L4 lesion resulting in pathologic compression fracture.  Bone scan on 9/2/2020 revealed increased activity at L4 and left side of the skull.  PET scan on 9/9/2020 revealed abnormal activity at L4, right femoral neck, lower sacrum, left ischium and right ninth medial aspect.  PET scan 9/9/2020 showed no evidence of visceral metastasis.  Patient had kyphoplasty on 9/17/2020.  Pathology was nondiagnostic.  CT-guided biopsy of the lesion in the right posterior ninth rib on 10/6/2020 revealed a clear cell neoplasm.  It is positive for PAX 8 and TTF-1 favoring metastatic thyroid cancer.  Patient was referred to [facility].  Patient underwent completion thyroidectomy on 11/9/2020.  There was no evidence of uptake of radioactive iodine on SPECT imaging on 12/18/2020.  This was indicative of the tumor not going to respond to radioactive iodine therapy.  Patient was started on Lenvima 20 mg daily on 12/24/2020.  Lenvima was held on 2/6/2021 due to drug induced anemia and thrombocytopenia.  She was unable to start back on it due to prolonged cytopenias.  CT scans on 3/23/2021 showed stable disease.  CBC was stable on 6/8/2021.  CT scans showed stable disease.  Thyroglobulin was stable at 3.4.    PET scan on 9/1/2021 revealed decrease in the activity in the metastatic lesions.    Starting back on active treatment (Nexavar) was not recommended.  PET scan on 12/16/2021 showed complete response except for mild hypermetabolism in the left ischium.  CT scans on 4/8/2022 showed stable bone metastasis.  No new metastatic lesions.  PET scan on 10/6/2022 revealed no new areas of metastasis.  Due to the stable metastatic disease, she is not currently on treatment for her thyroid cancer.   She is on Synthroid 112 mcg daily.     Malignant neoplasm of upper-outer quadrant of left breast in female, estrogen receptor negative   11/23/2016 Initial Diagnosis    Malignant neoplasm of  upper-outer quadrant of left breast in female, estrogen receptor negative (CMS/HCC)     9/23/2020 Cancer Staged    Staging form: Breast, AJCC V7  - Pathologic stage from 9/23/2020: Stage IIB (T2, N1, cM0) - Signed by Tim Muñoz MD on 10/9/2020     9/29/2020 - 9/29/2020 Chemotherapy    OP SUPPORTIVE Denosumab (Xgeva) Q28D     9/29/2020 -  Chemotherapy    OP SUPPORTIVE Zoledronic Acid Q28D     8/19/2021 Imaging    Screening MMG with Teodoro ([facility])  There are scattered areas of fibroglandular density. Left breast post-operative change is again seen. There are no suspicious masses, significant calcifications, or other abnormalities seen in either breast.  BI-RADS 2: Benign     10/5/2022 Imaging    Screening MMG with Teodoro ([facility])  There are scattered areas of fibroglandular density.  Finding 1: There is a new fat containing focal asymmetry measuring 6 mm seen in the middle one-third region of the left breast at 12 o'clock  Finding 2: There is a focal asymmetry with associated dystrophic calcifications and post-surgical scar seen in the upper outer region of the left breast. Focal asymmetry in in an area of prior lumpectomy. There are no significant changes from the prior exam(s).   In the right breast, no suspicious masses, significant calcifications or other abnormalities are seen.  BI-RADS 0: Incomplete     10/6/2022 Imaging    PET CT FDG ( [facility])  Chest:  New intensely FDG avid left breast ill-defined nodular focus at the 12 o'clock position measuring .0 x 0.7 cm with a maximum SUV of 5.8 (series 4, image 122).  No significant change in 19 x 15 mm non FDG avid speculated mass within the posterior upper outer quadrant of the left breast with intralesional fat and a peripherally located biopsy clip, consistent with known a post-lumpectomy scar.  Impression:  1. New FDG avid left breast ill-defined nodular focus at the 12 o-clock position, suspicious for malignancy. This could be metastic  disease given the increase in non-stimulated thyroglobulin level. Tissue sampling can be considered for further characterization.  2. No significant change in hyper metabolic well-defined lytic lesion with sclerotic margin within the left ischium compared to the prior 2 PET/CT studies from  yet showing evidence of treatment response compared to outside study from 2020.  3. No new metabolically active osseous metastatic disease.     10/20/2022 Imaging    Left Diagnostic MMG with Teodoro & Left Breast US ([facility])  MMG:  On the present examination, there is isodense focal asymmetry measuring 6 mm in the left breast at 12 o'clock located 5 cm from the nipple.  US:  Demonstrates a round non-parallel complex cystic and solid mass with circumscribed margins measuring 6 x 5 x 5 mm in the left breast at 12 o'clock located 5 cm from the nipple.  BI-RADS 4B: Suspicious     2022 Imaging    Left Diagnostic MMG with Teodoro & Left Breast US ([facility]):  MM. Re-demonstration of 7 mm mass at approximately 12:00-1:00, 6 to 8 cm from the nipple.  2. Stable post-lumpectomy changes in the upper outer left breast.  US:  Focused ultrasound was performed at 1:00, 7 cm from the nipple. Ultrasound demonstrates a hypoechoic round mass with indistinct margins and without internal vascularity measuring 5 x 5 x 4 mm. There is an indistinct hyperechoic halo surrounding the mass. This correlates with mammographic finding.  BI-RADS 4: Suspicious     2022 Biopsy    Left Breast, US-Guided Biopsy ():    LEFT BREAST, 1:00 (7 CM FROM NIPPLE), ULTRASOUND GUIDED CORE NEEDLE BIOPSY FOR 0.7 CM MASS (U CLIP):     - INVASIVE DUCTAL CARCINOMA, poorly differentiated (Cutler grade III/III: tubule score=3, nuclear score=3, mitoses score=3), with necrosis, measuring up to 0.5 cm.      - No lymphovascular invasion or in situ carcinoma identified.      ER Negative (<1%)  DE Negative (<1%)  HER2 Negative (IHC 0)      [facility]  [place] PATHOLOGY REVIEW    1. Left Breast, 1 o'clock, 7 cm from Nipple, Ultrasound-Guided Core Biopsies for a Mass (Outside Consult [id number]):       INVASIVE MAMMARY CARCINOMA, NO SPECIAL TYPE (INVASIVE DUCTAL CARCINOMA).               A. Histologic grade: Troy histologic score III (tubules = 3, nuclei = 3, mitosis = 3).               B. Largest contiguous focus measures up to 5 mm.   C. No associated in situ component identified.               D. No lymphovascular nor perineural invasion identified.     12/5/2022 Imaging    Bilateral Breast MRI ([facility]):  In the posterior one third of the left breast at the 12:30 position centered on the order of 8.5 cm from the nipple there is a focal signal void seen within an irregular enhancing mass that measures on the order of 0.9 cm in the superior-inferior dimension, 0.8 cm in anterior to posterior dimension and 0.8 cm and the mediolateral dimension. This represents the biopsy-proven site of malignancy with the internal biopsy clip.  In the anterior one third of the left breast located medial to the plane of the nipple at the 9 o'clock position on the order of 3 cm from the nipple there is an irregular enhancing lesion that measures on the order of 0.5 cm in the superior-inferior dimension, 0.5 cm in anterior to posterior dimension and 0.5 cm in the medial to lateral dimension. A possible mammographic correlate is seen on the CC image of the left breast on the examination of 11/18/2022. This area of enhancement is not seen on the prior breast MRI examination from 12/26/2012.  There is post surgical change of the left breast associated with prior breast conservation therapy. No other areas of abnormal enhancement or morphology are seen within the left breast. I see no evidence for  abnormal left breast skin, nipple or chest wall enhancement and there is no evidence for left axillary or internal mammary chain adenopathy.   There are no areas of abnormal enhancement  or morphology in the right breast. I see no evidence for abnormal right breast skin, nipple or chest wall enhancement and there is no evidence for right axillary or internal mammary chain adenopathy.  BI-RADS 4: Suspicious.     12/23/2022 Genetic Testing    [employer] Multi-Cancer Panel (84 genes):    VUS in NF1  VUS in POT1  VUS in SMAD4     1/24/2023 Surgery    Left modified radical mastectomy with prepectoral tissue expander placement    1. Left Breast Modified Radical Mastectomy:               A. Invasive carcinoma with mixed ductal and lobular features:                            1. Invasive carcinoma measures 15 mm x 9 mm x 5 mm.                            2. Overall Carlos grade III (tubular score = 3, nuclear score = 3, mitotic score = 3).                            3. No definitive lymphovascular space invasion identified.               B. Associated ductal carcinoma in situ (DCIS):                            1. DCIS spans an area estimated at 20 mm x 5 mm x 2 mm.                            2. High-grade solid and comedo DCIS.               C. All margins are negative for invasive carcinoma.                    Invasive carcinoma measures 25 mm from the closest (Posterior) margin of excision.                                  D. All margins are negative for ductal carcinoma in-situ (DCIS).                    DCIS measures 25 mm from the closest (Posterior) margin of excision                              E. Focal biopsy site changes are identified, and metallic clip retrieved.               F. No Pagetoid involvement of skin nor nipple by malignancy identified.               G. No lobular neoplasia (LCIS, ALH) identified.               H. Two incidental lymph nodes identified, negative for metastatic carcinoma by routine and immunostaining (0/2).               I.  Focal dense dermal and stromal scar consistent with previous surgical procedure identified.  J. Previous Biomarkers: Estrogen receptors: negative,  Progesterone receptors: negative,                    HER/2-stacey: negative (Score 0), Ki-67 = Not reported.  [id number] ([id number]).     2. Left Breast Augmentation:                 A. Benign skin and subcutaneous tissue.     2/21/2023 - 11/29/2023 Chemotherapy    OP CENTRAL VENOUS ACCESS DEVICE ACCESS, CARE, AND MAINTENANCE ([facility])     3/1/2023 - 5/3/2023 Chemotherapy    OP BREAST TC DOCEtaxel / Cyclophosphamide       11/8/2023 Imaging    Right Screening MMG with Teodoro ([facility]):  Scattered areas of fibroglandular density.  No suspicious masses, significant calcifications or other abnormalities are seen.  BI-RADS 1: Negative.     4/30/2024 Imaging    Right Diagnostic MMG with Teodoro & Right Breast US ([facility]):  MMG:  Scattered areas of fibroglandular density.  There is a fat-containing, oval mass seen in the right breast at 6:00.  The mass is in an area of scarring from prior breast reduction and mastopexy.  Mass correlates to the palpable abnormality in the right breast at 6:00.  This area is an oil cyst or fat necrosis.  There are no findings to suggest malignancy at this time.  US:  Ultrasound demonstrates an oval mass with partially defined margins seen in the right breast at 6:00.  Internal echotexture is mixed.  This area is consistent with an area of fat necrosis/oil cyst as best seen mammographically.  BI-RADS 3: Probably benign.  Recommend follow-up mammogram in 6 months.     11/6/2024 Imaging    Right diagnostic mammogram at [facility]  There are scattered areas of fibroglandular density.    Follow-up examination was performed for the oval mass in the right breast, 6 o'clock seen on 04/30/2024. On the  present examination, there is a fat containing, oval mass with associated rim and dystrophic calcifications in the  right breast at 6 o'clock. This finding is an oil cyst or fat necrosis.  There are no suspicious masses,  calcifications, or areas of architectural distortion.    IMPRESSION:  Mass in the right breast is  probably benign. Follow-up mammography in 6 months is recommended.    The patient was mailed a notification letter.    BI-RADS Category 3: Probably Benign     Metastasis to bone   9/23/2020 Initial Diagnosis    Bone metastasis (CMS/HCC)     9/29/2020 - 9/29/2020 Chemotherapy    OP SUPPORTIVE Denosumab (Xgeva) Q28D     9/29/2020 -  Chemotherapy    OP SUPPORTIVE Zoledronic Acid Q28D     Primary cancer of thyroid gland metastatic to bone   3/2/2022 Initial Diagnosis    Primary cancer of thyroid gland metastatic to bone (HCC)     Primary cancer of thyroid gland metastatic to bone   6/11/2024 Initial Diagnosis    Primary cancer of thyroid gland metastatic to bone         Review of Systems:  See interval history.       Medications:    Current Outpatient Medications:     acetaminophen (TYLENOL) 500 MG tablet, Take 2 tablets by mouth Every 6 (Six) Hours As Needed for Mild Pain., Disp: , Rfl:     amLODIPine (NORVASC) 10 MG tablet, TAKE ONE TABLET BY MOUTH DAILY (Patient taking differently: Take 1 tablet by mouth Daily.), Disp: 90 tablet, Rfl: 1    atorvastatin (LIPITOR) 20 MG tablet, TAKE 1 TABLET BY MOUTH DAILY, Disp: 90 tablet, Rfl: 1    Blood Glucose Monitoring Suppl w/Device kit, Check blood sugar twice a day, Disp: 1 each, Rfl: 0    celecoxib (CeleBREX) 200 MG capsule, Take 1 capsule by mouth 2 (Two) Times a Day. As needed for pain, take with food tall glass of water always lowest effective dose shortest time possible, Disp: 180 capsule, Rfl: 1    cholecalciferol (VITAMIN D3) 25 MCG (1000 UT) tablet, Take 1 tablet by mouth Daily., Disp: , Rfl:     cyclobenzaprine (FLEXERIL) 5 MG tablet, Take 1 tablet by mouth At Night As Needed for Muscle Spasms. 1 or 2 tablets by mouth at bedtime if needed for leg cramps, Disp: 20 tablet, Rfl: 2    famotidine (PEPCID) 20 MG tablet, Take 1 tablet by mouth Every Other Day., Disp: , Rfl:     fluticasone (FLONASE) 50 MCG/ACT nasal spray, Administer 2 sprays into the nostril(s) as directed  by provider Daily., Disp: 15.8 g, Rfl: 0    Glucose Blood (BLOOD GLUCOSE TEST) strip, Check blood sugar twice a day., Disp: 100 each, Rfl: 1    labetalol (NORMODYNE) 100 MG tablet, Take 0.5 tablets by mouth 2 (Two) Times a Day. (Patient not taking: Reported on 5/7/2025), Disp: 180 tablet, Rfl: 3    labetalol (NORMODYNE) 300 MG tablet, Take 1 tablet by mouth 2 (Two) Times a Day., Disp: , Rfl:     Lancets (ACCU-CHEK SOFT TOUCH) lancets, Check blood sugar twice a day as needed, Disp: 100 each, Rfl: 1    lansoprazole (PREVACID) 30 MG capsule, Take 1 capsule by mouth Every Other Day., Disp: 90 capsule, Rfl: 3    levothyroxine (SYNTHROID, LEVOTHROID) 112 MCG tablet, Take 1 tablet by mouth Every Morning., Disp: , Rfl:     losartan (COZAAR) 100 MG tablet, Take 1 tablet by mouth Daily., Disp: 90 tablet, Rfl: 3    MAGNESIUM PO, Take  by mouth., Disp: , Rfl:     metFORMIN ER (GLUCOPHAGE-XR) 500 MG 24 hr tablet, Take 2 tablets by mouth Daily With Dinner., Disp: 180 tablet, Rfl: 3    NON FORMULARY, Take 1 tablet by mouth Every Night. STOOL SOFTNER, Disp: , Rfl:     ondansetron ODT (ZOFRAN-ODT) 4 MG disintegrating tablet, Place 1 tablet on the tongue Every 6 (Six) Hours As Needed for Nausea or Vomiting., Disp: 30 tablet, Rfl: 5    ondansetron ODT (ZOFRAN-ODT) 8 MG disintegrating tablet, Take 1 tablet by mouth Every 8 (Eight) Hours As Needed for Nausea or Vomiting., Disp: 20 tablet, Rfl: 2    oxyCODONE-acetaminophen (PERCOCET) 5-325 MG per tablet, Take 1 tablet by mouth Every 4 (Four) Hours As Needed for Moderate Pain., Disp: 16 tablet, Rfl: 0    promethazine (PHENERGAN) 12.5 MG tablet, Take 1-2 tablets by mouth Every 6 (Six) Hours As Needed for Nausea. TAKE 1/2 TO 1 TABLET BY MOUTH EVERY 4 HOURS AS NEEDED FOR NAUSEA AND VOMITING -MAY MAKE DROWSY, Disp: 60 tablet, Rfl: 2    triamcinolone (KENALOG) 0.1 % cream, APPLY TO THE AFFECTED AREA AS DIRECTED TWICE DAILY. USE SPARINGLY. AVOID CHRONIC USE. AVOID FACE., Disp: 80 g, Rfl:  0      Allergies   Allergen Reactions    Monoclonal Antibodies Anaphylaxis and Shortness Of Breath     Regeneron,  High b/p increased heart rate     Zofran [Ondansetron] Nausea And Vomiting     DOES NOT HELP WITH NAUSEA AND VOMITING MAKES IT WORSE    Hydrocodone Nausea And Vomiting    Lenvatinib Unknown - Low Severity     Internal bleeding       Family History   Problem Relation Age of Onset    No Known Problems Mother     Cancer Father         Oral    Hypertension Other     Diabetes Other     Malig Hyperthermia Neg Hx        Objective   PHYSICAL EXAMINATION:   There were no vitals filed for this visit.    ECOG 0 - Asymptomatic  General: NAD, well appearing  Psych: a&o x3, normal mood and affect  Eyes: EOMI, no scleral icterus  ENMT: neck supple without masses or thyromegaly, mucous membranes moist  MSK: normal gait, normal ROM in bilateral shoulders  Lymph nodes: no cervical, supraclavicular or axillary lymphadenopathy  Breast:   Right: Sp mastopexy.  There is a 1 cm nodule at the T-junction (fat necrosis).  Otherwise scars are soft.  No nipple abnormalities.  Left: Sp mastectomy with implant in place.  Scar is soft.  No masses.          Assessment & Plan   Assessment:   1) 54 y.o. F with a diagnosis of left breast cancer: High grade, triple negative, invasive ductal carcinoma. She underwent left modified radical mastectomy with prepectoral tissue expander placement on 1/24/23, pT1cN0, anatomic stage IA, prognostic stage IB.  She completed TC x4 on 5/3/2023.  -She has an area of fat necrosis in the inferior right breast after mastopexy which is currently in imaging surveillance.  2) She has a past history of left breast cancer in 2001 (age 30), stage IIB triple negative. She underwent a lumpectomy and axillary dissection, followed by AC and Taxotere, followed by radiation, then tamoxifen for 5 years.  3) She has metastatic thyroid cancer diagnosed in 2020 with bone metastasis.    Plan:  -Continue follow-up with  medical oncology  -Continue follow-up with lymphedema clinic.  Call with mammogram results if normal mammogram and exam in 1 year    MAGO Shearer      CC:  No ref. provider found  James Epley, APRN Lori Warren, MD

## 2025-05-22 ENCOUNTER — APPOINTMENT (OUTPATIENT)
Dept: WOMENS IMAGING | Facility: HOSPITAL | Age: 55
End: 2025-05-22
Payer: COMMERCIAL

## 2025-05-22 ENCOUNTER — OFFICE VISIT (OUTPATIENT)
Dept: SURGERY | Facility: CLINIC | Age: 55
End: 2025-05-22
Payer: COMMERCIAL

## 2025-05-22 VITALS
SYSTOLIC BLOOD PRESSURE: 118 MMHG | HEART RATE: 84 BPM | WEIGHT: 172 LBS | OXYGEN SATURATION: 98 % | HEIGHT: 60 IN | BODY MASS INDEX: 33.77 KG/M2 | DIASTOLIC BLOOD PRESSURE: 78 MMHG

## 2025-05-22 DIAGNOSIS — Z17.1 MALIGNANT NEOPLASM OF UPPER-OUTER QUADRANT OF LEFT BREAST IN FEMALE, ESTROGEN RECEPTOR NEGATIVE: Primary | ICD-10-CM

## 2025-05-22 DIAGNOSIS — C50.412 MALIGNANT NEOPLASM OF UPPER-OUTER QUADRANT OF LEFT BREAST IN FEMALE, ESTROGEN RECEPTOR NEGATIVE: Primary | ICD-10-CM

## 2025-05-22 PROCEDURE — 77065 DX MAMMO INCL CAD UNI: CPT | Performed by: RADIOLOGY

## 2025-05-22 PROCEDURE — 77061 BREAST TOMOSYNTHESIS UNI: CPT | Performed by: RADIOLOGY

## 2025-05-22 PROCEDURE — G0279 TOMOSYNTHESIS, MAMMO: HCPCS | Performed by: RADIOLOGY

## 2025-05-22 PROCEDURE — 99213 OFFICE O/P EST LOW 20 MIN: CPT | Performed by: NURSE PRACTITIONER

## 2025-05-29 ENCOUNTER — TELEPHONE (OUTPATIENT)
Dept: FAMILY MEDICINE CLINIC | Facility: CLINIC | Age: 55
End: 2025-05-29
Payer: COMMERCIAL

## 2025-05-29 NOTE — TELEPHONE ENCOUNTER
Lvm for pt to call back to reschedule appt on 6/13/25 due to PCP being out of the office. Please transfer to the office to reschedule.

## 2025-07-17 ENCOUNTER — PATIENT MESSAGE (OUTPATIENT)
Dept: FAMILY MEDICINE CLINIC | Facility: CLINIC | Age: 55
End: 2025-07-17
Payer: COMMERCIAL

## 2025-07-17 DIAGNOSIS — E55.9 VITAMIN D DEFICIENCY: ICD-10-CM

## 2025-07-17 DIAGNOSIS — E11.9 CONTROLLED TYPE 2 DIABETES MELLITUS WITHOUT COMPLICATION, WITHOUT LONG-TERM CURRENT USE OF INSULIN: ICD-10-CM

## 2025-07-17 DIAGNOSIS — C73 PRIMARY CANCER OF THYROID GLAND METASTATIC TO BONE: ICD-10-CM

## 2025-07-17 DIAGNOSIS — E11.00 TYPE 2 DIABETES MELLITUS WITH HYPEROSMOLARITY WITHOUT COMA, WITHOUT LONG-TERM CURRENT USE OF INSULIN: ICD-10-CM

## 2025-07-17 DIAGNOSIS — R53.1 WEAKNESS GENERALIZED: ICD-10-CM

## 2025-07-17 DIAGNOSIS — E04.2 MULTIPLE THYROID NODULES: ICD-10-CM

## 2025-07-17 DIAGNOSIS — I10 HYPERTENSION, UNSPECIFIED TYPE: Primary | ICD-10-CM

## 2025-07-17 DIAGNOSIS — E03.8 SUBCLINICAL HYPOTHYROIDISM: ICD-10-CM

## 2025-07-17 DIAGNOSIS — E78.2 MIXED HYPERLIPIDEMIA: ICD-10-CM

## 2025-07-17 DIAGNOSIS — C79.51 PRIMARY CANCER OF THYROID GLAND METASTATIC TO BONE: ICD-10-CM

## 2025-07-17 DIAGNOSIS — E03.9 ACQUIRED HYPOTHYROIDISM: ICD-10-CM

## 2025-07-21 RX ORDER — METFORMIN HYDROCHLORIDE 500 MG/1
1000 TABLET, EXTENDED RELEASE ORAL
Qty: 180 TABLET | Refills: 3 | Status: SHIPPED | OUTPATIENT
Start: 2025-07-21

## 2025-07-21 NOTE — TELEPHONE ENCOUNTER
Rx Refill Note  Requested Prescriptions     Pending Prescriptions Disp Refills    metFORMIN ER (GLUCOPHAGE-XR) 500 MG 24 hr tablet 180 tablet 3     Sig: Take 2 tablets by mouth Daily With Dinner.      Last office visit with prescribing clinician: 12/4/2024   Last telemedicine visit with prescribing clinician: Visit date not found   Next office visit with prescribing clinician: 8/13/2025                         Would you like a call back once the refill request has been completed: [] Yes [] No    If the office needs to give you a call back, can they leave a voicemail: [] Yes [] No    Brittney Thornton MA  07/21/25, 09:05 EDT

## 2025-07-30 ENCOUNTER — INFUSION (OUTPATIENT)
Dept: ONCOLOGY | Facility: HOSPITAL | Age: 55
End: 2025-07-30
Payer: COMMERCIAL

## 2025-07-30 ENCOUNTER — TELEPHONE (OUTPATIENT)
Dept: FAMILY MEDICINE CLINIC | Facility: CLINIC | Age: 55
End: 2025-07-30
Payer: COMMERCIAL

## 2025-07-30 ENCOUNTER — OFFICE VISIT (OUTPATIENT)
Dept: ONCOLOGY | Facility: CLINIC | Age: 55
End: 2025-07-30
Payer: COMMERCIAL

## 2025-07-30 VITALS
BODY MASS INDEX: 33.45 KG/M2 | HEART RATE: 70 BPM | TEMPERATURE: 98.4 F | WEIGHT: 170.4 LBS | RESPIRATION RATE: 17 BRPM | SYSTOLIC BLOOD PRESSURE: 122 MMHG | DIASTOLIC BLOOD PRESSURE: 70 MMHG | HEIGHT: 60 IN | OXYGEN SATURATION: 96 %

## 2025-07-30 DIAGNOSIS — C50.412 MALIGNANT NEOPLASM OF UPPER-OUTER QUADRANT OF LEFT BREAST IN FEMALE, ESTROGEN RECEPTOR NEGATIVE: Primary | ICD-10-CM

## 2025-07-30 DIAGNOSIS — D50.9 IRON DEFICIENCY ANEMIA, UNSPECIFIED IRON DEFICIENCY ANEMIA TYPE: ICD-10-CM

## 2025-07-30 DIAGNOSIS — C79.51 METASTASIS TO BONE: ICD-10-CM

## 2025-07-30 DIAGNOSIS — Z17.1 MALIGNANT NEOPLASM OF UPPER-OUTER QUADRANT OF LEFT BREAST IN FEMALE, ESTROGEN RECEPTOR NEGATIVE: Primary | ICD-10-CM

## 2025-07-30 DIAGNOSIS — E11.9 CONTROLLED TYPE 2 DIABETES MELLITUS WITHOUT COMPLICATION, WITHOUT LONG-TERM CURRENT USE OF INSULIN: ICD-10-CM

## 2025-07-30 DIAGNOSIS — Z17.1 MALIGNANT NEOPLASM OF UPPER-OUTER QUADRANT OF LEFT BREAST IN FEMALE, ESTROGEN RECEPTOR NEGATIVE: ICD-10-CM

## 2025-07-30 DIAGNOSIS — E11.9 CONTROLLED TYPE 2 DIABETES MELLITUS WITHOUT COMPLICATION, WITHOUT LONG-TERM CURRENT USE OF INSULIN: Primary | ICD-10-CM

## 2025-07-30 DIAGNOSIS — C50.412 MALIGNANT NEOPLASM OF UPPER-OUTER QUADRANT OF LEFT BREAST IN FEMALE, ESTROGEN RECEPTOR NEGATIVE: ICD-10-CM

## 2025-07-30 DIAGNOSIS — C73 THYROID CANCER: ICD-10-CM

## 2025-07-30 LAB
ALBUMIN SERPL-MCNC: 4.4 G/DL (ref 3.5–5.2)
ALBUMIN/GLOB SERPL: 1.4 G/DL
ALP SERPL-CCNC: 60 U/L (ref 39–117)
ALT SERPL W P-5'-P-CCNC: 16 U/L (ref 1–33)
ANION GAP SERPL CALCULATED.3IONS-SCNC: 10.3 MMOL/L (ref 5–15)
AST SERPL-CCNC: 20 U/L (ref 1–32)
BASOPHILS # BLD AUTO: 0.03 10*3/MM3 (ref 0–0.2)
BASOPHILS NFR BLD AUTO: 0.7 % (ref 0–1.5)
BILIRUB SERPL-MCNC: 0.5 MG/DL (ref 0–1.2)
BUN SERPL-MCNC: 18.2 MG/DL (ref 6–20)
BUN/CREAT SERPL: 20.7 (ref 7–25)
CALCIUM SPEC-SCNC: 9.8 MG/DL (ref 8.6–10.5)
CHLORIDE SERPL-SCNC: 105 MMOL/L (ref 98–107)
CHOLEST SERPL-MCNC: 155 MG/DL (ref 0–200)
CO2 SERPL-SCNC: 24.7 MMOL/L (ref 22–29)
CREAT SERPL-MCNC: 0.88 MG/DL (ref 0.57–1)
DEPRECATED RDW RBC AUTO: 50.6 FL (ref 37–54)
EGFRCR SERPLBLD CKD-EPI 2021: 77.2 ML/MIN/1.73
EOSINOPHIL # BLD AUTO: 0.24 10*3/MM3 (ref 0–0.4)
EOSINOPHIL NFR BLD AUTO: 5.5 % (ref 0.3–6.2)
ERYTHROCYTE [DISTWIDTH] IN BLOOD BY AUTOMATED COUNT: 15.3 % (ref 12.3–15.4)
FERRITIN SERPL-MCNC: 87.8 NG/ML (ref 13–150)
GLOBULIN UR ELPH-MCNC: 3.2 GM/DL
GLUCOSE SERPL-MCNC: 79 MG/DL (ref 65–99)
HBA1C MFR BLD: 6 % (ref 4.8–5.6)
HCT VFR BLD AUTO: 35.3 % (ref 34–46.6)
HDLC SERPL-MCNC: 47 MG/DL (ref 40–60)
HGB BLD-MCNC: 10.8 G/DL (ref 12–15.9)
IMM GRANULOCYTES # BLD AUTO: 0.01 10*3/MM3 (ref 0–0.05)
IMM GRANULOCYTES NFR BLD AUTO: 0.2 % (ref 0–0.5)
IRON 24H UR-MRATE: 63 MCG/DL (ref 37–145)
IRON SATN MFR SERPL: 18 % (ref 20–50)
LDLC SERPL CALC-MCNC: 95 MG/DL (ref 0–100)
LDLC/HDLC SERPL: 2.03 {RATIO}
LYMPHOCYTES # BLD AUTO: 1.53 10*3/MM3 (ref 0.7–3.1)
LYMPHOCYTES NFR BLD AUTO: 35.2 % (ref 19.6–45.3)
MAGNESIUM SERPL-MCNC: 2 MG/DL (ref 1.6–2.6)
MCH RBC QN AUTO: 27.4 PG (ref 26.6–33)
MCHC RBC AUTO-ENTMCNC: 30.6 G/DL (ref 31.5–35.7)
MCV RBC AUTO: 89.6 FL (ref 79–97)
MONOCYTES # BLD AUTO: 0.4 10*3/MM3 (ref 0.1–0.9)
MONOCYTES NFR BLD AUTO: 9.2 % (ref 5–12)
NEUTROPHILS NFR BLD AUTO: 2.14 10*3/MM3 (ref 1.7–7)
NEUTROPHILS NFR BLD AUTO: 49.2 % (ref 42.7–76)
NRBC BLD AUTO-RTO: 0 /100 WBC (ref 0–0.2)
PHOSPHATE SERPL-MCNC: 3.1 MG/DL (ref 2.5–4.5)
PLATELET # BLD AUTO: 298 10*3/MM3 (ref 140–450)
PMV BLD AUTO: 9.6 FL (ref 6–12)
POTASSIUM SERPL-SCNC: 3.6 MMOL/L (ref 3.5–5.2)
PROT SERPL-MCNC: 7.6 G/DL (ref 6–8.5)
RBC # BLD AUTO: 3.94 10*6/MM3 (ref 3.77–5.28)
SODIUM SERPL-SCNC: 140 MMOL/L (ref 136–145)
TIBC SERPL-MCNC: 344 MCG/DL (ref 298–536)
TRANSFERRIN SERPL-MCNC: 231 MG/DL (ref 200–360)
TRIGL SERPL-MCNC: 63 MG/DL (ref 0–150)
VLDLC SERPL-MCNC: 13 MG/DL (ref 5–40)
WBC NRBC COR # BLD AUTO: 4.35 10*3/MM3 (ref 3.4–10.8)

## 2025-07-30 PROCEDURE — 82728 ASSAY OF FERRITIN: CPT | Performed by: INTERNAL MEDICINE

## 2025-07-30 PROCEDURE — 83036 HEMOGLOBIN GLYCOSYLATED A1C: CPT | Performed by: INTERNAL MEDICINE

## 2025-07-30 PROCEDURE — 83540 ASSAY OF IRON: CPT | Performed by: INTERNAL MEDICINE

## 2025-07-30 PROCEDURE — 84466 ASSAY OF TRANSFERRIN: CPT | Performed by: INTERNAL MEDICINE

## 2025-07-30 PROCEDURE — 25010000002 ZOLEDRONIC ACID 4 MG/100ML SOLUTION: Performed by: INTERNAL MEDICINE

## 2025-07-30 PROCEDURE — 80061 LIPID PANEL: CPT | Performed by: INTERNAL MEDICINE

## 2025-07-30 PROCEDURE — 96374 THER/PROPH/DIAG INJ IV PUSH: CPT

## 2025-07-30 PROCEDURE — 84100 ASSAY OF PHOSPHORUS: CPT | Performed by: INTERNAL MEDICINE

## 2025-07-30 PROCEDURE — 83735 ASSAY OF MAGNESIUM: CPT | Performed by: INTERNAL MEDICINE

## 2025-07-30 PROCEDURE — 80053 COMPREHEN METABOLIC PANEL: CPT | Performed by: INTERNAL MEDICINE

## 2025-07-30 PROCEDURE — 99214 OFFICE O/P EST MOD 30 MIN: CPT | Performed by: INTERNAL MEDICINE

## 2025-07-30 PROCEDURE — 85025 COMPLETE CBC W/AUTO DIFF WBC: CPT | Performed by: INTERNAL MEDICINE

## 2025-07-30 RX ORDER — ZOLEDRONIC ACID 0.04 MG/ML
4 INJECTION, SOLUTION INTRAVENOUS ONCE
Status: COMPLETED | OUTPATIENT
Start: 2025-07-30 | End: 2025-07-30

## 2025-07-30 RX ORDER — SODIUM CHLORIDE 9 MG/ML
250 INJECTION, SOLUTION INTRAVENOUS ONCE
Status: CANCELLED | OUTPATIENT
Start: 2025-07-30

## 2025-07-30 RX ORDER — ZOLEDRONIC ACID 0.04 MG/ML
4 INJECTION, SOLUTION INTRAVENOUS ONCE
Status: CANCELLED | OUTPATIENT
Start: 2025-07-30

## 2025-07-30 RX ADMIN — ZOLEDRONIC ACID 4 MG: 0.04 INJECTION, SOLUTION INTRAVENOUS at 14:54

## 2025-07-30 NOTE — PROGRESS NOTES
Subjective     CHIEF COMPLAINT:      Chief Complaint   Patient presents with    Follow-up     HISTORY OF PRESENT ILLNESS:     Ina Akers is a 54 y.o. female patient who returns today for follow up on her breast cancer and metastatic thyroid cancer to bone.  She had a PET scan on 7/21/2025 and saw Dr. Dahl afterwards.  She has been referred to radiation oncology for possible radioactive iodine treatment.    Patient reports pain in the left ischial area.  It usually develops when she sleeps on her back.  Pain improves when she sleeps on her left side.    Patient reports having pain and swelling at the left side of the skull.  She pointed this out when she had the PET scan done and findings are concerning for metastasis.    ROS:  Pertinent ROS is in the HPI.     Past medical, surgical, social and family history were reviewed.     MEDICATIONS:    Current Outpatient Medications:     acetaminophen (TYLENOL) 500 MG tablet, Take 2 tablets by mouth Every 6 (Six) Hours As Needed for Mild Pain., Disp: , Rfl:     amLODIPine (NORVASC) 10 MG tablet, TAKE ONE TABLET BY MOUTH DAILY (Patient taking differently: Take 1 tablet by mouth Daily.), Disp: 90 tablet, Rfl: 1    atorvastatin (LIPITOR) 20 MG tablet, TAKE 1 TABLET BY MOUTH DAILY, Disp: 90 tablet, Rfl: 1    Blood Glucose Monitoring Suppl w/Device kit, Check blood sugar twice a day, Disp: 1 each, Rfl: 0    celecoxib (CeleBREX) 200 MG capsule, Take 1 capsule by mouth 2 (Two) Times a Day. As needed for pain, take with food tall glass of water always lowest effective dose shortest time possible, Disp: 180 capsule, Rfl: 1    cholecalciferol (VITAMIN D3) 25 MCG (1000 UT) tablet, Take 1 tablet by mouth Daily., Disp: , Rfl:     cyclobenzaprine (FLEXERIL) 5 MG tablet, Take 1 tablet by mouth At Night As Needed for Muscle Spasms. 1 or 2 tablets by mouth at bedtime if needed for leg cramps, Disp: 20 tablet, Rfl: 2    famotidine (PEPCID) 20 MG tablet, Take 1 tablet by mouth Every  Other Day., Disp: , Rfl:     fluticasone (FLONASE) 50 MCG/ACT nasal spray, Administer 2 sprays into the nostril(s) as directed by provider Daily., Disp: 15.8 g, Rfl: 0    Glucose Blood (BLOOD GLUCOSE TEST) strip, Check blood sugar twice a day., Disp: 100 each, Rfl: 1    labetalol (NORMODYNE) 300 MG tablet, Take 1 tablet by mouth 2 (Two) Times a Day., Disp: , Rfl:     Lancets (ACCU-CHEK SOFT TOUCH) lancets, Check blood sugar twice a day as needed, Disp: 100 each, Rfl: 1    lansoprazole (PREVACID) 30 MG capsule, Take 1 capsule by mouth Every Other Day., Disp: 90 capsule, Rfl: 3    levothyroxine (SYNTHROID, LEVOTHROID) 112 MCG tablet, Take 1 tablet by mouth Every Morning., Disp: , Rfl:     losartan (COZAAR) 100 MG tablet, Take 1 tablet by mouth Daily., Disp: 90 tablet, Rfl: 3    MAGNESIUM PO, Take  by mouth., Disp: , Rfl:     metFORMIN ER (GLUCOPHAGE-XR) 500 MG 24 hr tablet, Take 2 tablets by mouth Daily With Dinner., Disp: 180 tablet, Rfl: 3    NON FORMULARY, Take 1 tablet by mouth Every Night. STOOL SOFTNER, Disp: , Rfl:     ondansetron ODT (ZOFRAN-ODT) 4 MG disintegrating tablet, Place 1 tablet on the tongue Every 6 (Six) Hours As Needed for Nausea or Vomiting., Disp: 30 tablet, Rfl: 5    ondansetron ODT (ZOFRAN-ODT) 8 MG disintegrating tablet, Take 1 tablet by mouth Every 8 (Eight) Hours As Needed for Nausea or Vomiting., Disp: 20 tablet, Rfl: 2    oxyCODONE-acetaminophen (PERCOCET) 5-325 MG per tablet, Take 1 tablet by mouth Every 4 (Four) Hours As Needed for Moderate Pain., Disp: 16 tablet, Rfl: 0    promethazine (PHENERGAN) 12.5 MG tablet, Take 1-2 tablets by mouth Every 6 (Six) Hours As Needed for Nausea. TAKE 1/2 TO 1 TABLET BY MOUTH EVERY 4 HOURS AS NEEDED FOR NAUSEA AND VOMITING -MAY MAKE DROWSY, Disp: 60 tablet, Rfl: 2    triamcinolone (KENALOG) 0.1 % cream, APPLY TO THE AFFECTED AREA AS DIRECTED TWICE DAILY. USE SPARINGLY. AVOID CHRONIC USE. AVOID FACE., Disp: 80 g, Rfl: 0    labetalol (NORMODYNE) 100 MG  "tablet, Take 0.5 tablets by mouth 2 (Two) Times a Day., Disp: 180 tablet, Rfl: 3  Objective     VITAL SIGNS:     Vitals:    07/30/25 1330   BP: 122/70   Pulse: 70   Resp: 17   Temp: 98.4 °F (36.9 °C)   TempSrc: Oral   SpO2: 96%   Weight: 77.3 kg (170 lb 6.4 oz)   Height: 153 cm (60.24\")   PainSc: 2    PainLoc: Hip     Body mass index is 33.02 kg/m².     Wt Readings from Last 5 Encounters:   07/30/25 77.3 kg (170 lb 6.4 oz)   05/22/25 78 kg (172 lb)   05/07/25 78.2 kg (172 lb 4.8 oz)   04/02/25 77.1 kg (170 lb)   03/05/25 75.3 kg (166 lb)     PHYSICAL EXAMINATION:   GENERAL: The patient appears in good general condition, not in acute distress.   SKIN: No Ecchymosis.  HEAD: Swelling at the left posterior parietal area, new.  EYES: No jaundice. No Pallor.  LYMPHATIC: No cervical, supraclavicular or axillary lymphadenopathy.  CHEST: Normal respiratory effort. Normal breathing sounds bilaterally. No added sounds.  CVS: Normal S1 and S2. No Murmur.  ABDOMEN: Soft. No tenderness. No Hepatomegaly. No Splenomegaly. No masses.  EXTREMITIES: No joint deformity.     DIAGNOSTIC DATA:     Results from last 7 days   Lab Units 07/30/25  1326   WBC 10*3/mm3 4.35   NEUTROS ABS 10*3/mm3 2.14   HEMOGLOBIN g/dL 10.8*   HEMATOCRIT % 35.3   PLATELETS 10*3/mm3 298     Results from last 7 days   Lab Units 07/30/25  1326   SODIUM mmol/L 140   POTASSIUM mmol/L 3.6   CHLORIDE mmol/L 105   CO2 mmol/L 24.7   BUN mg/dL 18.2   CREATININE mg/dL 0.88   CALCIUM mg/dL 9.8   ALBUMIN g/dL 4.4   BILIRUBIN mg/dL 0.5   ALK PHOS U/L 60   ALT (SGPT) U/L 16   AST (SGOT) U/L 20   GLUCOSE mg/dL 79   MAGNESIUM mg/dL 2.0         Lab 07/30/25  1326   IRON 63   IRON SATURATION (TSAT) 18*   TIBC 344   TRANSFERRIN 231   FERRITIN 87.80      PET scan on 7/21/2025:  1. Postthyroidectomy without abnormal uptake or suspicious lesion on the surgical bed.        2. Post left mastectomy without scintigraphic evidence of tumor recurrence.     3. No lymphadenopathy.     4. " Peripherally sclerotic osteolytic lesion with intense uptake (SUV 8.9) in the left paramedian posterior parietal bone, representing metabolically active osseous metastasis. This area is not included on the previous skull base to mid thigh images.     5. Another similar lesion with mild to moderate uptake (SUV 3.2) in the left ischial tuberosity, increased in uptake compared to that on prior study and representing metastatic disease as well.     6. Non-FDG avid lesion in the right posteromedial 9th rib, consistent with treated disease.     7. Treated bone disease with kyphoplasty in the L4. Treated bone metastasis with intramedullary hardware in the right femur.     Assessment & Plan    *History of left breast cancer.  It was identified on screening mammograms on 10/5/2022.  It was located at 12 o'clock position.  It measured 6 x 5 by 5 mm on mammogram and ultrasound on 10/20/2022.  PET scan on 10/6/2022 showed the lesion to be hypermetabolic. It measured 10 x 7 mm with an SUV of 5.8.  No evidence of metastasis on PET scan.  MRI on 12/5/2022 showed the lesion to measure 9 mm.    S/P mastectomy with reconstruction on 1/24/2023.  Pathology exam revealed the primary tumor to measure 15 mm.  It was grade 3.  Triple negative.  HER2 was negative-0 by IHC.  Ki-67 was 85%.  It was considered pT1cN0.  2 sentinel lymph nodes were examined and were negative.    No lymphovascular invasion. 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  Due to the patient receiving Adriamycin in the past, she is unable to receive additional Adriamycin.  Taxotere and Cytoxan with Neulasta support every 3 weeks x 4 cycles was recommended.  She started Taxotere and Cytoxan on 3/8/2023.  She had infusion reaction to Taxotere which was briefly held.  She was given IV Solu-Cortef.  It was restarted at a slower rate and she tolerated the infusion.  She received cycle #4 on 5/3/2023.    CT scans on 10/20/2023 showed no evidence of recurrence or metastasis.  CT scans on  3/26/2024 showed no evidence of recurrence.  Mammogram in November 2024 were benign.  Patient is doing well.  No evidence of recurrence or metastasis.    *Metastatic thyroid cancer diagnosed on 10/6/2020.  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  She had right thyroid lobectomy on 12/17/2018 at [facility].    The specimen was sent for outside consultation and the final conclusion was that there was no evidence of malignancy.  MRI on 8/27/2020 revealed an L4 lesion resulting in pathologic compression fracture.  Bone scan on 9/2/2020 revealed increased activity at L4 and left side of the skull.  PET scan on 9/9/2020 revealed abnormal activity at L4, right femoral neck, lower sacrum, left ischium and right ninth medial aspect.  PET scan 9/9/2020 showed no evidence of visceral metastasis.  Patient had kyphoplasty on 9/17/2020.  Pathology was nondiagnostic.  CT-guided biopsy of the lesion in the right posterior ninth rib on 10/6/2020 revealed a clear cell neoplasm.  It is positive for PAX 8 and TTF-1 favoring metastatic thyroid cancer.  Patient was referred to [facility].  Patient underwent completion thyroidectomy on 11/9/2020.  There was no evidence of uptake of radioactive iodine on SPECT imaging on 12/18/2020.  This was indicative of the tumor not going to respond to radioactive iodine therapy.  Patient was started on Lenvima 20 mg daily on 12/24/2020.  Lenvima was held on 2/6/2021 due to drug induced anemia and thrombocytopenia.  She was unable to start back on it due to prolonged cytopenias.  CT scans on 3/23/2021 showed stable disease.  CT scans showed stable disease.  Thyroglobulin was stable at 3.4.    PET scan on 9/1/2021 revealed decrease in the activity in the metastatic lesions.    Starting back on active treatment (Nexavar) was not recommended.  PET scan on 12/16/2021 showed complete response except for mild hypermetabolism in the left ischium.  CT scans on 4/8/2022 showed  stable bone metastasis.  No new metastatic lesions.  PET scan on 10/6/2022 revealed no new areas of metastasis.  Due to the stable metastatic disease, she was not placed ontreatment for her thyroid cancer.   She is on Synthroid 112 mcg daily.  Goal is to maintain TSH <0.1.  Thyroglobulin increased to 13.1 on 3/26/2024 and was 11.3 on 4/4/2024.  PET scan on 4/26/2024 revealed increase in the FDG uptake in the left ischial and right posterior ninth rib metastasis.  This was concerning for progression of the known metastasis.   She was started on Cabozantinib at 40 mg daily.  PET scan on 1/7/2025 revealed stable osseous metastatic disease in the left ischium and right ninth rib.  There were no new lesions.  She was having pain in different areas of her bones away from the areas of the metastasis and was not able to continue cabozantinib.  PET scan on 4/9/2025 showed changes of treated metastatic focus in the left ischium.  No new lesions.  PET scan 7/21/2025 revealed hypermetabolic left parotid lesion.  The left ischial lesion became more hypermetabolic.  Thyroglobulin increased to 33.1.  The findings are concerning for progression of the metastatic thyroid cancer.  Dr. Dahl referred her to radiation oncology    *Bone metastasis.  Patient was given Zometa on 9/29/2020.  Patient underwent right femur medullary nailing on 10/23/2020.  Patient was restarted back on Zometa on 2/24/2022.   CT scans on 4/8/2022 revealed no new metastatic lesions.  The metastatic lesions were stable.  PET scan on 10/6/2022 revealed no change in the areas of activity in the bone metastasis.  No new lesions.  Zometa was restarted on 6/14/2023.  PET scan on 4/26/2024 at  revealed increase in the hypermetabolism in the left ischial bone with an SUV of 8.8 (Increased from 4 0.8).  There was increase in the activity in the right ninth rib with an SUV of 5-previously below blood pool.  She received radiation therapy in 3 fractions to the left  ischial metastasis and right ninth rib metastasis.  Patient developed pain in the right hip.  CT scan on 9/1/2024 showed that the lesion at the femoral neck was more clearly demarcated/larger.  Since she is having pain and since she did not receive radiation to this lesion, I recommended radiation therapy.  PET scan on 4/9/2025 revealed minimally FDG avid lesions involving the left ischium consistent with treated metastatic focus of disease.  There was resolution of the previous activity at the right ninth rib.  No new areas of activity.  PET scan on 7/21/2025 revealed hypermetabolic lesion in the sclerotic osteolytic lesion in the left posterior parietal bone, concerning for metastasis.  There was increase in the hypermetabolism in the left ischial lesion.  The findings are concerning for progression of the bone metastatic disease.    * Iron deficiency anemia.    Patient was previously treated with ferrous sulfate 325 mg daily.  Hemoglobin decreased to 9.1 on 2/24/2022.  EGD on 3/25/2022 showed no evidence of blood loss.  Pathology exam revealed no typical features of celiac disease.  Hemoglobin was 10.4 on 12/27/2022.  Ferritin was 95 and transferrin saturation was 14%.  She was started on ferrous sulfate 325 mg daily.  Hemoglobin decreased to 9.6 on 2/1/2023, secondary to recent surgical blood losses.  She was restarted on ferrous sulfate 325 mg daily on 2/1/2023.  5/3/2023: Hemoglobin 9.4.  She was continued on ferrous sulfate 325 mg once daily.  7/26/2023: Hemoglobin decreased to 10.0.  She was continued on ferrous sulfate 325 mg daily.  She was advised to take vitamin C 1000 mg daily.  11/29/2023: Hemoglobin 9.9.  Transferrin saturation 11%.  We discussed options of IV Venofer but the patient decided to continue with oral iron.  2/5/2025: Hemoglobin decreased to 10.0.  7/30/2025: Hemoglobin improved to 10.8.  Ferritin 87.8. transferrin saturation improved to 18%.  She is tolerating the oral iron.    *History  of left breast cancer, stage IIB, triIple negative.   S/P lumpectomy in 2001.  She received adjuvant chemotherapy on the NSABP B34 with AC followed by Taxotere.  S/P post-lumpectomy radiation therapy.   She was placed on tamoxifen for 5 years and it was completed in January 2007.   With the patient's history of breast cancer at a young age and development of a second new breast cancer in the left breast, repeat genetic testing was recommended.  Gene testing on 12/22/2022 revealed variants of unknown significance.    PLAN:    1.  We will give Zometa today.  We will continue Zometa every 3 months.  2.  Continue ferrous sulfate 325 mg once daily.  3.  She is going to see radiation oncology at [facility] for evaluation for possible radioactive iodine therapy.  We discussed safety measures if she receives the treatment.  4.  She does not respond to radioactive iodine, my recommendation was to start back on cabozantinib at the reduced dose of 40 mg daily with close monitoring for development of the previous side effects-pain.  5.  Follow-up in 3 months.  We will schedule her to receive Zometa.  We will obtain CBC CMP magnesium phosphorus ferritin and iron panel.      Tim Muñoz MD  07/30/25

## 2025-07-30 NOTE — NURSING NOTE
Pt arrived to infusion area for zometa after being seen by Dr Muñoz. Pt denies any recent or upcoming dental procedures. Pt tolerated infusion and discharged in stable condition.

## 2025-08-13 ENCOUNTER — OFFICE VISIT (OUTPATIENT)
Dept: FAMILY MEDICINE CLINIC | Facility: CLINIC | Age: 55
End: 2025-08-13
Payer: COMMERCIAL

## 2025-08-13 VITALS
OXYGEN SATURATION: 97 % | HEIGHT: 60 IN | SYSTOLIC BLOOD PRESSURE: 159 MMHG | DIASTOLIC BLOOD PRESSURE: 85 MMHG | HEART RATE: 76 BPM | WEIGHT: 172.3 LBS | BODY MASS INDEX: 33.83 KG/M2

## 2025-08-13 DIAGNOSIS — Z17.1 MALIGNANT NEOPLASM OF UPPER-OUTER QUADRANT OF LEFT BREAST IN FEMALE, ESTROGEN RECEPTOR NEGATIVE: ICD-10-CM

## 2025-08-13 DIAGNOSIS — E11.9 CONTROLLED TYPE 2 DIABETES MELLITUS WITHOUT COMPLICATION, WITHOUT LONG-TERM CURRENT USE OF INSULIN: ICD-10-CM

## 2025-08-13 DIAGNOSIS — C50.412 MALIGNANT NEOPLASM OF UPPER-OUTER QUADRANT OF LEFT BREAST IN FEMALE, ESTROGEN RECEPTOR NEGATIVE: ICD-10-CM

## 2025-08-13 DIAGNOSIS — I10 ESSENTIAL HYPERTENSION: ICD-10-CM

## 2025-08-13 DIAGNOSIS — C73 PRIMARY CANCER OF THYROID GLAND METASTATIC TO BONE: ICD-10-CM

## 2025-08-13 DIAGNOSIS — Z00.00 HEALTH MAINTENANCE EXAMINATION: Primary | ICD-10-CM

## 2025-08-13 DIAGNOSIS — M85.80 OSTEOPENIA, UNSPECIFIED LOCATION: ICD-10-CM

## 2025-08-13 DIAGNOSIS — C73 FOLLICULAR THYROID CANCER: ICD-10-CM

## 2025-08-13 DIAGNOSIS — C79.51 PRIMARY CANCER OF THYROID GLAND METASTATIC TO BONE: ICD-10-CM

## 2025-08-13 DIAGNOSIS — E55.9 VITAMIN D DEFICIENCY: ICD-10-CM

## 2025-08-13 DIAGNOSIS — E78.5 HYPERLIPIDEMIA, UNSPECIFIED HYPERLIPIDEMIA TYPE: ICD-10-CM

## 2025-08-13 RX ORDER — METFORMIN HYDROCHLORIDE 500 MG/1
1000 TABLET, EXTENDED RELEASE ORAL
Qty: 180 TABLET | Refills: 3 | Status: SHIPPED | OUTPATIENT
Start: 2025-08-13

## 2025-08-13 RX ORDER — FAMOTIDINE 20 MG/1
20 TABLET, FILM COATED ORAL DAILY
Qty: 90 TABLET | Refills: 3 | Status: SHIPPED | OUTPATIENT
Start: 2025-08-13

## 2025-08-13 RX ORDER — ATORVASTATIN CALCIUM 20 MG/1
20 TABLET, FILM COATED ORAL DAILY
Qty: 90 TABLET | Refills: 3 | Status: SHIPPED | OUTPATIENT
Start: 2025-08-13

## 2025-08-13 RX ORDER — CELECOXIB 200 MG/1
200 CAPSULE ORAL DAILY PRN
Qty: 90 CAPSULE | Refills: 2 | Status: SHIPPED | OUTPATIENT
Start: 2025-08-13

## 2025-08-13 RX ORDER — AMLODIPINE BESYLATE 10 MG/1
10 TABLET ORAL DAILY
Qty: 90 TABLET | Refills: 3 | Status: SHIPPED | OUTPATIENT
Start: 2025-08-13

## 2025-08-13 RX ORDER — LIOTHYRONINE SODIUM 25 UG/1
1 TABLET ORAL DAILY
COMMUNITY
Start: 2025-08-11

## 2025-08-13 RX ORDER — LOSARTAN POTASSIUM 100 MG/1
100 TABLET ORAL DAILY
Qty: 90 TABLET | Refills: 3 | Status: SHIPPED | OUTPATIENT
Start: 2025-08-13

## 2025-08-13 RX ORDER — FERROUS SULFATE 325(65) MG
325 TABLET ORAL
COMMUNITY

## 2025-08-13 RX ORDER — LEVOTHYROXINE SODIUM 112 UG/1
112 TABLET ORAL
Start: 2025-08-13

## (undated) DEVICE — TBG PENCL TELESCP MEGADYNE SMOKE EVAC 10FT

## (undated) DEVICE — GLV SURG BIOGEL LTX PF 8

## (undated) DEVICE — KT DRP SPY/PHI W/ICG 25MG STRL

## (undated) DEVICE — BITEBLOCK OMNI BLOC

## (undated) DEVICE — DRILL, AO SMALL: Brand: GAMMA

## (undated) DEVICE — PK HIP PINNING 40

## (undated) DEVICE — DRSNG SURESITE WNDW 4X4.5

## (undated) DEVICE — TOTAL TRAY, 16FR 10ML SIL FOLEY, URN: Brand: MEDLINE

## (undated) DEVICE — OPTIFOAM GENTLE SA, POSTOP, 4X8: Brand: MEDLINE

## (undated) DEVICE — JACKSON-PRATT 100CC BULB RESERVOIR: Brand: CARDINAL HEALTH

## (undated) DEVICE — LAG SCREW STEP DRILL: Brand: GAMMA

## (undated) DEVICE — GLV SURG SIGNATURE ESSENTIAL PF LTX SZ8

## (undated) DEVICE — K-WIRE
Type: IMPLANTABLE DEVICE | Site: FEMUR | Status: NON-FUNCTIONAL
Removed: 2020-10-23

## (undated) DEVICE — GLV SURG SENSICARE POLYISPRN W/ALOE PF LF 6.5 GRN STRL

## (undated) DEVICE — ANTIBACTERIAL UNDYED BRAIDED (POLYGLACTIN 910), SYNTHETIC ABSORBABLE SUTURE: Brand: COATED VICRYL

## (undated) DEVICE — BIOPATCH™ ANTIMICROBIAL DRESSING WITH CHLORHEXIDINE GLUCONATE IS A HYDROPHILLIC POLYURETHANE ABSORPTIVE FOAM WITH CHLORHEXIDINE GLUCONATE (CHG) WHICH INHIBITS BACTERIAL GROWTH UNDER THE DRESSING. THE DRESSING IS INTENDED TO BE USED TO ABSORB EXUDATE, COVER A WOUND CAUSED BY VASCULAR AND NONVASCULAR PERCUTANEOUS MEDICAL DEVICES DURING SURGERY, AS WELL AS REDUCE LOCAL INFECTION AND COLONIZATION OF MICROORGANISMS.: Brand: BIOPATCH

## (undated) DEVICE — TRAP FLD MINIVAC MEGADYNE 100ML

## (undated) DEVICE — SENSR O2 OXIMAX FNGR A/ 18IN NONSTR

## (undated) DEVICE — DRAPE,REIN 53X77,STERILE: Brand: MEDLINE

## (undated) DEVICE — SUT MNCRYL PLS ANTIB UD 4/0 PS2 18IN

## (undated) DEVICE — ELECTRD BLD EZ CLN MOD XLNG 2.75IN

## (undated) DEVICE — PK UNIV COMPL 40

## (undated) DEVICE — ADHS SKIN SURG TISS VISC PREMIERPRO EXOFIN HI/VISC FAST/DRY

## (undated) DEVICE — NEEDLE, QUINCKE 22GX3.5": Brand: MEDLINE INDUSTRIES, INC.

## (undated) DEVICE — KIT OCN002 OSTEOCOOL BONE ACCESS 10G 090: Brand: OSTEOCOOL™ BONE ACCESS KIT

## (undated) DEVICE — TUBING, SUCTION, 1/4" X 10', STRAIGHT: Brand: MEDLINE

## (undated) DEVICE — SUT VIC 3/0 CTI 36IN J944H

## (undated) DEVICE — INTENDED FOR TISSUE SEPARATION, AND OTHER PROCEDURES THAT REQUIRE A SHARP SURGICAL BLADE TO PUNCTURE OR CUT.: Brand: BARD-PARKER ® STAINLESS STEEL BLADES

## (undated) DEVICE — GLV SURG TRIUMPH ORTHO W/ALOE PF LTX 8 STRL

## (undated) DEVICE — DRP C/ARM 41X74IN

## (undated) DEVICE — BANDAGE,GAUZE,BULKEE II,4.5"X4.1YD,STRL: Brand: MEDLINE

## (undated) DEVICE — SOL NS 500ML

## (undated) DEVICE — IRRIGATOR BULB ASEPTO 60CC STRL

## (undated) DEVICE — Device

## (undated) DEVICE — APPL CHLORAPREP HI/LITE 26ML ORNG

## (undated) DEVICE — PENCL E/S ULTRAVAC TELESCP NOSE HOLSTR 10FT

## (undated) DEVICE — 3M™ IOBAN™ 2 ANTIMICROBIAL INCISE DRAPE 6640EZ: Brand: IOBAN™ 2

## (undated) DEVICE — CVR TRANSD CIV FLX TPR 11.9 TO 3.8X61CM

## (undated) DEVICE — CANN O2 ETCO2 FITS ALL CONN CO2 SMPL A/ 7IN DISP LF

## (undated) DEVICE — SOL ISO/ALC RUB 70PCT 4OZ

## (undated) DEVICE — GUIDE WIRE, BALL-TIPPED, STERILE

## (undated) DEVICE — GLV SURG BIOGEL LTX PF 8 1/2

## (undated) DEVICE — NDL HYPO ECLPS SFTY 22G 1 1/2IN

## (undated) DEVICE — CVR PROB GEN PURP W ISOSILK 6X48

## (undated) DEVICE — TOWEL,OR,DSP,ST,BLUE,STD,4/PK,20PK/CS: Brand: MEDLINE

## (undated) DEVICE — SYR LL TP 10ML STRL

## (undated) DEVICE — RUBBERBAND LF STRL PK/2

## (undated) DEVICE — BONE TAMP KIT KEX102EB FF E2 10/2 OI: Brand: KYPHON EXPRESS II KYPHOPAK TRAY

## (undated) DEVICE — GOWN,SIRUS,NON REINFRCD,LARGE,SET IN SL: Brand: MEDLINE

## (undated) DEVICE — COVER,MAYO STAND,STERILE: Brand: MEDLINE

## (undated) DEVICE — PK KYPHOPLASTY 40

## (undated) DEVICE — SMOKE EVACUATION TUBING WITH 7/8 IN TO 1/4 IN REDUCER: Brand: BUFFALO FILTER

## (undated) DEVICE — ADAPT CLN BIOGUARD AIR/H2O DISP

## (undated) DEVICE — PK ENT MAJ 40

## (undated) DEVICE — GLV SURG PREMIERPRO ORTHO LTX PF SZ8 BRN

## (undated) DEVICE — FRCP BX RADJAW4 NDL 2.8 240CM LG OG BX40

## (undated) DEVICE — INTENDED FOR TISSUE SEPARATION, AND OTHER PROCEDURES THAT REQUIRE A SHARP SURGICAL BLADE TO PUNCTURE OR CUT.: Brand: BARD-PARKER ® CARBON RIB-BACK BLADES

## (undated) DEVICE — EQUIPMENT COVER BAG TYPE 48” X 36” (122CM X 91CM): Brand: EQUIPMENT COVER BAG TYPE

## (undated) DEVICE — STCKNT IMPERV 9X36IN STRL

## (undated) DEVICE — DRSNG WND GEL FIBR OPTICELL AG PLS W/SLV LF 4X5IN  STRL

## (undated) DEVICE — BNDG ELAS ELITE V/CLOSE 6IN 5YD LF STRL

## (undated) DEVICE — BONE BIOPSY DEVICE F07A TAPERED SIZE 2: Brand: MEDTRONIC REUSABLE INSTRUMENTS

## (undated) DEVICE — DECANTER BAG 9": Brand: MEDLINE INDUSTRIES, INC.

## (undated) DEVICE — TUBING, SUCTION, 1/4" X 20', STRAIGHT: Brand: MEDLINE INDUSTRIES, INC.

## (undated) DEVICE — 3M™ STERI-STRIP™ REINFORCED ADHESIVE SKIN CLOSURES, R1547, 1/2 IN X 4 IN (12 MM X 100 MM), 6 STRIPS/ENVELOPE: Brand: 3M™ STERI-STRIP™

## (undated) DEVICE — STPLR SKIN VISISTAT WD 35CT

## (undated) DEVICE — CEMENT GUN AND BONE FILLER CDS2A SISE 2: Brand: MEDTRONIC REUSABLE INSTRUMENTS

## (undated) DEVICE — CEMENT CARTRIDGES CC02A CDS: Brand: KYPHON® CEMENT DELIVERY SYSTEM

## (undated) DEVICE — SUT SILK 2/0 SH 30IN K833H

## (undated) DEVICE — CONN TBG Y 5 IN 1 LF STRL

## (undated) DEVICE — LN SMPL CO2 SHTRM SD STREAM W/M LUER

## (undated) DEVICE — SUT MNCRYL 3/0 PS2 18IN MCP497G

## (undated) DEVICE — SUT PROLN 3/0 SH D/A 36IN 8522H

## (undated) DEVICE — SKIN PREP TRAY W/CHG: Brand: MEDLINE INDUSTRIES, INC.

## (undated) DEVICE — SYR LUERLOK 30CC

## (undated) DEVICE — 1000 S-DRAPE TOWEL DRAPE 10/BX: Brand: STERI-DRAPE™

## (undated) DEVICE — LEGGINGS, PAIR, 31X48, STERILE: Brand: MEDLINE

## (undated) DEVICE — SPNG LAP 18X18IN LF STRL PK/5

## (undated) DEVICE — PATIENT RETURN ELECTRODE, SINGLE-USE, CONTACT QUALITY MONITORING, ADULT, WITH 9FT CORD, FOR PATIENTS WEIGING OVER 33LBS. (15KG): Brand: MEGADYNE

## (undated) DEVICE — DEV TUNNELING SUBCONTANIOUS

## (undated) DEVICE — SUT VIC 0 CT1 36IN J946H

## (undated) DEVICE — DRSNG WND SIL OPTIFOAM GENTLE BRDR ADHS W/SA 4X4IN

## (undated) DEVICE — HARMONIC FOCUS SHEARS 9CM LENGTH + ADAPTIVE TISSUE TECHNOLOGY FOR USE WITH BLUE HAND PIECE ONLY: Brand: HARMONIC FOCUS

## (undated) DEVICE — GLV SURG SENSICARE PI MIC PF SZ6.5 LF STRL

## (undated) DEVICE — MAT FLR ABSORBENT LG 4FT 10 2.5FT

## (undated) DEVICE — KT ORCA ORCAPOD DISP STRL

## (undated) DEVICE — MIXER A07A CEMENT

## (undated) DEVICE — PROBE OCP207 OSTEOCOOL RF 17G 7MMX2: Brand: OSTEOCOOL™ RF ABLATION SYSTEM

## (undated) DEVICE — THE TORRENT IRRIGATION SCOPE CONNECTOR IS USED WITH THE TORRENT IRRIGATION TUBING TO PROVIDE IRRIGATION FLUIDS SUCH AS STERILE WATER DURING GASTROINTESTINAL ENDOSCOPIC PROCEDURES WHEN USED IN CONJUNCTION WITH AN IRRIGATION PUMP (OR ELECTROSURGICAL UNIT).: Brand: TORRENT

## (undated) DEVICE — SUT PDS 2/0 SH 27IN Z317H

## (undated) DEVICE — SUT ETHLN 3/0 PS1 18IN 1663H